# Patient Record
Sex: MALE | Race: BLACK OR AFRICAN AMERICAN | Employment: OTHER | URBAN - METROPOLITAN AREA
[De-identification: names, ages, dates, MRNs, and addresses within clinical notes are randomized per-mention and may not be internally consistent; named-entity substitution may affect disease eponyms.]

---

## 2018-01-10 PROCEDURE — 94762 N-INVAS EAR/PLS OXIMTRY CONT: CPT

## 2018-01-10 PROCEDURE — 51701 INSERT BLADDER CATHETER: CPT

## 2018-01-10 PROCEDURE — 96365 THER/PROPH/DIAG IV INF INIT: CPT

## 2018-01-10 PROCEDURE — 99285 EMERGENCY DEPT VISIT HI MDM: CPT

## 2018-01-10 PROCEDURE — 96361 HYDRATE IV INFUSION ADD-ON: CPT

## 2018-01-11 ENCOUNTER — APPOINTMENT (OUTPATIENT)
Dept: GENERAL RADIOLOGY | Age: 78
DRG: 193 | End: 2018-01-11
Attending: EMERGENCY MEDICINE
Payer: MEDICARE

## 2018-01-11 ENCOUNTER — APPOINTMENT (OUTPATIENT)
Dept: CT IMAGING | Age: 78
DRG: 193 | End: 2018-01-11
Attending: EMERGENCY MEDICINE
Payer: MEDICARE

## 2018-01-11 ENCOUNTER — HOSPITAL ENCOUNTER (INPATIENT)
Age: 78
LOS: 35 days | Discharge: HOME | DRG: 193 | End: 2018-02-15
Attending: EMERGENCY MEDICINE | Admitting: FAMILY MEDICINE
Payer: MEDICARE

## 2018-01-11 DIAGNOSIS — J96.01 ACUTE RESPIRATORY FAILURE WITH HYPOXIA (HCC): Primary | ICD-10-CM

## 2018-01-11 DIAGNOSIS — G47.00 INSOMNIA, UNSPECIFIED TYPE: Chronic | ICD-10-CM

## 2018-01-11 DIAGNOSIS — G89.29 CHRONIC LOW BACK PAIN, UNSPECIFIED BACK PAIN LATERALITY, WITH SCIATICA PRESENCE UNSPECIFIED: ICD-10-CM

## 2018-01-11 DIAGNOSIS — M48.061 SPINAL STENOSIS OF LUMBAR REGION, UNSPECIFIED WHETHER NEUROGENIC CLAUDICATION PRESENT: ICD-10-CM

## 2018-01-11 DIAGNOSIS — M54.5 CHRONIC LOW BACK PAIN, UNSPECIFIED BACK PAIN LATERALITY, WITH SCIATICA PRESENCE UNSPECIFIED: ICD-10-CM

## 2018-01-11 DIAGNOSIS — F41.9 ANXIETY: Chronic | ICD-10-CM

## 2018-01-11 DIAGNOSIS — J18.9 PNEUMONIA OF LEFT LOWER LOBE DUE TO INFECTIOUS ORGANISM: ICD-10-CM

## 2018-01-11 DIAGNOSIS — S32.019A CLOSED FRACTURE OF FIRST LUMBAR VERTEBRA, UNSPECIFIED FRACTURE MORPHOLOGY, INITIAL ENCOUNTER (HCC): ICD-10-CM

## 2018-01-11 PROBLEM — R41.82 ALTERED MENTAL STATUS: Status: ACTIVE | Noted: 2018-01-11

## 2018-01-11 PROBLEM — J44.9 COPD (CHRONIC OBSTRUCTIVE PULMONARY DISEASE) (HCC): Status: ACTIVE | Noted: 2018-01-11

## 2018-01-11 PROBLEM — M54.9 BACK PAIN: Status: ACTIVE | Noted: 2018-01-11

## 2018-01-11 LAB
ALBUMIN SERPL-MCNC: 3.4 G/DL (ref 3.5–5)
ALBUMIN/GLOB SERPL: 0.9 {RATIO} (ref 1.1–2.2)
ALP SERPL-CCNC: 68 U/L (ref 45–117)
ALT SERPL-CCNC: 38 U/L (ref 12–78)
AMMONIA PLAS-SCNC: 14 UMOL/L
AMORPH CRY URNS QL MICRO: ABNORMAL
AMPHET UR QL SCN: NEGATIVE
ANION GAP SERPL CALC-SCNC: 7 MMOL/L (ref 5–15)
APAP SERPL-MCNC: <2 UG/ML (ref 10–30)
APPEARANCE UR: ABNORMAL
APTT PPP: 49.7 SEC (ref 22.1–32.5)
ARTERIAL PATENCY WRIST A: ABNORMAL
AST SERPL-CCNC: 79 U/L (ref 15–37)
ATRIAL RATE: 76 BPM
BACTERIA URNS QL MICRO: NEGATIVE /HPF
BARBITURATES UR QL SCN: NEGATIVE
BASE EXCESS BLD CALC-SCNC: 1 MMOL/L
BASOPHILS # BLD: 0 K/UL (ref 0–0.1)
BASOPHILS NFR BLD: 0 % (ref 0–1)
BDY SITE: ABNORMAL
BENZODIAZ UR QL: POSITIVE
BILIRUB SERPL-MCNC: 0.9 MG/DL (ref 0.2–1)
BILIRUB UR QL: NEGATIVE
BUN SERPL-MCNC: 16 MG/DL (ref 6–20)
BUN/CREAT SERPL: 16 (ref 12–20)
CALCIUM SERPL-MCNC: 8.9 MG/DL (ref 8.5–10.1)
CALCULATED P AXIS, ECG09: 78 DEGREES
CALCULATED R AXIS, ECG10: 65 DEGREES
CALCULATED T AXIS, ECG11: 85 DEGREES
CANNABINOIDS UR QL SCN: NEGATIVE
CHLORIDE SERPL-SCNC: 100 MMOL/L (ref 97–108)
CK MB CFR SERPL CALC: 0.5 % (ref 0–2.5)
CK MB SERPL-MCNC: 9.2 NG/ML (ref 5–25)
CK SERPL-CCNC: 1926 U/L (ref 39–308)
CO2 SERPL-SCNC: 30 MMOL/L (ref 21–32)
COCAINE UR QL SCN: NEGATIVE
COLOR UR: ABNORMAL
CREAT SERPL-MCNC: 1.03 MG/DL (ref 0.7–1.3)
DIAGNOSIS, 93000: NORMAL
DIFFERENTIAL METHOD BLD: ABNORMAL
DRUG SCRN COMMENT,DRGCM: ABNORMAL
EOSINOPHIL # BLD: 0 K/UL (ref 0–0.4)
EOSINOPHIL NFR BLD: 0 % (ref 0–7)
EPITH CASTS URNS QL MICRO: ABNORMAL /LPF
ERYTHROCYTE [DISTWIDTH] IN BLOOD BY AUTOMATED COUNT: 12.6 % (ref 11.5–14.5)
ETHANOL SERPL-MCNC: <10 MG/DL
GAS FLOW.O2 O2 DELIVERY SYS: ABNORMAL L/MIN
GLOBULIN SER CALC-MCNC: 4 G/DL (ref 2–4)
GLUCOSE SERPL-MCNC: 101 MG/DL (ref 65–100)
GLUCOSE UR STRIP.AUTO-MCNC: NEGATIVE MG/DL
HCO3 BLD-SCNC: 26.1 MMOL/L (ref 22–26)
HCT VFR BLD AUTO: 35.9 % (ref 36.6–50.3)
HGB BLD-MCNC: 12.1 G/DL (ref 12.1–17)
HGB UR QL STRIP: NEGATIVE
KETONES UR QL STRIP.AUTO: NEGATIVE MG/DL
LACTATE BLD-SCNC: 1.1 MMOL/L (ref 0.4–2)
LEUKOCYTE ESTERASE UR QL STRIP.AUTO: NEGATIVE
LYMPHOCYTES # BLD: 0.7 K/UL (ref 0.8–3.5)
LYMPHOCYTES NFR BLD: 8 % (ref 12–49)
MAGNESIUM SERPL-MCNC: 2.1 MG/DL (ref 1.6–2.4)
MCH RBC QN AUTO: 33.9 PG (ref 26–34)
MCHC RBC AUTO-ENTMCNC: 33.7 G/DL (ref 30–36.5)
MCV RBC AUTO: 100.6 FL (ref 80–99)
METHADONE UR QL: NEGATIVE
MONOCYTES # BLD: 0.7 K/UL (ref 0–1)
MONOCYTES NFR BLD: 8 % (ref 5–13)
MUCOUS THREADS URNS QL MICRO: ABNORMAL /LPF
MYOGLOBIN UR QL: NEGATIVE
NEUTS SEG # BLD: 7.1 K/UL (ref 1.8–8)
NEUTS SEG NFR BLD: 84 % (ref 32–75)
NITRITE UR QL STRIP.AUTO: NEGATIVE
OPIATES UR QL: NEGATIVE
P-R INTERVAL, ECG05: 118 MS
PCO2 BLD: 46 MMHG (ref 35–45)
PCP UR QL: NEGATIVE
PH BLD: 7.36 [PH] (ref 7.35–7.45)
PH UR STRIP: 5 [PH] (ref 5–8)
PHOSPHATE SERPL-MCNC: 4.4 MG/DL (ref 2.6–4.7)
PLATELET # BLD AUTO: 172 K/UL (ref 150–400)
PO2 BLD: 57 MMHG (ref 80–100)
POTASSIUM SERPL-SCNC: 3.9 MMOL/L (ref 3.5–5.1)
PROT SERPL-MCNC: 7.4 G/DL (ref 6.4–8.2)
PROT UR STRIP-MCNC: 30 MG/DL
Q-T INTERVAL, ECG07: 408 MS
QRS DURATION, ECG06: 72 MS
QTC CALCULATION (BEZET), ECG08: 459 MS
RBC # BLD AUTO: 3.57 M/UL (ref 4.1–5.7)
RBC #/AREA URNS HPF: ABNORMAL /HPF (ref 0–5)
RBC MORPH BLD: ABNORMAL
RBC MORPH BLD: ABNORMAL
SALICYLATES SERPL-MCNC: <1.7 MG/DL (ref 2.8–20)
SAO2 % BLD: 88 % (ref 92–97)
SODIUM SERPL-SCNC: 137 MMOL/L (ref 136–145)
SP GR UR REFRACTOMETRY: 1.03 (ref 1–1.03)
SPECIMEN TYPE: ABNORMAL
THERAPEUTIC RANGE,PTTT: ABNORMAL SECS (ref 58–77)
TOTAL RESP. RATE, ITRR: 18
TROPONIN I SERPL-MCNC: <0.04 NG/ML
TSH SERPL DL<=0.05 MIU/L-ACNC: 1.43 UIU/ML (ref 0.36–3.74)
UROBILINOGEN UR QL STRIP.AUTO: 0.2 EU/DL (ref 0.2–1)
VENTRICULAR RATE, ECG03: 76 BPM
WBC # BLD AUTO: 8.5 K/UL (ref 4.1–11.1)
WBC URNS QL MICRO: ABNORMAL /HPF (ref 0–4)

## 2018-01-11 PROCEDURE — 82140 ASSAY OF AMMONIA: CPT | Performed by: EMERGENCY MEDICINE

## 2018-01-11 PROCEDURE — 97161 PT EVAL LOW COMPLEX 20 MIN: CPT

## 2018-01-11 PROCEDURE — 74011250636 HC RX REV CODE- 250/636: Performed by: FAMILY MEDICINE

## 2018-01-11 PROCEDURE — 93005 ELECTROCARDIOGRAM TRACING: CPT

## 2018-01-11 PROCEDURE — 85025 COMPLETE CBC W/AUTO DIFF WBC: CPT | Performed by: EMERGENCY MEDICINE

## 2018-01-11 PROCEDURE — 84443 ASSAY THYROID STIM HORMONE: CPT | Performed by: FAMILY MEDICINE

## 2018-01-11 PROCEDURE — 87086 URINE CULTURE/COLONY COUNT: CPT | Performed by: EMERGENCY MEDICINE

## 2018-01-11 PROCEDURE — 72131 CT LUMBAR SPINE W/O DYE: CPT

## 2018-01-11 PROCEDURE — 85730 THROMBOPLASTIN TIME PARTIAL: CPT | Performed by: EMERGENCY MEDICINE

## 2018-01-11 PROCEDURE — 97116 GAIT TRAINING THERAPY: CPT

## 2018-01-11 PROCEDURE — 71275 CT ANGIOGRAPHY CHEST: CPT

## 2018-01-11 PROCEDURE — 83735 ASSAY OF MAGNESIUM: CPT | Performed by: EMERGENCY MEDICINE

## 2018-01-11 PROCEDURE — 97530 THERAPEUTIC ACTIVITIES: CPT

## 2018-01-11 PROCEDURE — 74011250636 HC RX REV CODE- 250/636: Performed by: EMERGENCY MEDICINE

## 2018-01-11 PROCEDURE — 81001 URINALYSIS AUTO W/SCOPE: CPT | Performed by: EMERGENCY MEDICINE

## 2018-01-11 PROCEDURE — 80307 DRUG TEST PRSMV CHEM ANLYZR: CPT | Performed by: FAMILY MEDICINE

## 2018-01-11 PROCEDURE — 65270000032 HC RM SEMIPRIVATE

## 2018-01-11 PROCEDURE — 74011636320 HC RX REV CODE- 636/320: Performed by: EMERGENCY MEDICINE

## 2018-01-11 PROCEDURE — 74011250637 HC RX REV CODE- 250/637: Performed by: INTERNAL MEDICINE

## 2018-01-11 PROCEDURE — 71045 X-RAY EXAM CHEST 1 VIEW: CPT

## 2018-01-11 PROCEDURE — 83605 ASSAY OF LACTIC ACID: CPT

## 2018-01-11 PROCEDURE — 84100 ASSAY OF PHOSPHORUS: CPT | Performed by: EMERGENCY MEDICINE

## 2018-01-11 PROCEDURE — 81002 URINALYSIS NONAUTO W/O SCOPE: CPT | Performed by: FAMILY MEDICINE

## 2018-01-11 PROCEDURE — 74011000258 HC RX REV CODE- 258: Performed by: EMERGENCY MEDICINE

## 2018-01-11 PROCEDURE — G8978 MOBILITY CURRENT STATUS: HCPCS

## 2018-01-11 PROCEDURE — 36600 WITHDRAWAL OF ARTERIAL BLOOD: CPT

## 2018-01-11 PROCEDURE — 77030005563 HC CATH URETH INT MMGH -A

## 2018-01-11 PROCEDURE — 70450 CT HEAD/BRAIN W/O DYE: CPT

## 2018-01-11 PROCEDURE — 80307 DRUG TEST PRSMV CHEM ANLYZR: CPT | Performed by: EMERGENCY MEDICINE

## 2018-01-11 PROCEDURE — G8979 MOBILITY GOAL STATUS: HCPCS

## 2018-01-11 PROCEDURE — 82550 ASSAY OF CK (CPK): CPT | Performed by: EMERGENCY MEDICINE

## 2018-01-11 PROCEDURE — 87040 BLOOD CULTURE FOR BACTERIA: CPT | Performed by: EMERGENCY MEDICINE

## 2018-01-11 PROCEDURE — 80053 COMPREHEN METABOLIC PANEL: CPT | Performed by: EMERGENCY MEDICINE

## 2018-01-11 PROCEDURE — 36415 COLL VENOUS BLD VENIPUNCTURE: CPT | Performed by: EMERGENCY MEDICINE

## 2018-01-11 PROCEDURE — 82803 BLOOD GASES ANY COMBINATION: CPT

## 2018-01-11 PROCEDURE — 84484 ASSAY OF TROPONIN QUANT: CPT | Performed by: EMERGENCY MEDICINE

## 2018-01-11 RX ORDER — ACETAMINOPHEN 325 MG/1
650 TABLET ORAL
COMMUNITY
End: 2018-01-11

## 2018-01-11 RX ORDER — PAROXETINE 10 MG/1
10 TABLET, FILM COATED ORAL DAILY
COMMUNITY
End: 2018-01-11

## 2018-01-11 RX ORDER — TRAZODONE HYDROCHLORIDE 150 MG/1
150 TABLET ORAL
Status: ON HOLD | COMMUNITY
End: 2018-02-15

## 2018-01-11 RX ORDER — NALTREXONE HYDROCHLORIDE 50 MG/1
50 TABLET, FILM COATED ORAL 2 TIMES DAILY
COMMUNITY
End: 2018-01-11

## 2018-01-11 RX ORDER — FLUVOXAMINE MALEATE 100 MG/1
100 TABLET, COATED ORAL
COMMUNITY
End: 2018-01-11

## 2018-01-11 RX ORDER — LORAZEPAM 1 MG/1
1 TABLET ORAL
COMMUNITY
End: 2018-01-11

## 2018-01-11 RX ORDER — SODIUM CHLORIDE 0.9 % (FLUSH) 0.9 %
10 SYRINGE (ML) INJECTION
Status: COMPLETED | OUTPATIENT
Start: 2018-01-11 | End: 2018-01-11

## 2018-01-11 RX ORDER — OMEPRAZOLE 20 MG/1
40 CAPSULE, DELAYED RELEASE ORAL DAILY
COMMUNITY
End: 2018-01-11

## 2018-01-11 RX ORDER — MUPIROCIN 20 MG/G
OINTMENT TOPICAL 2 TIMES DAILY
COMMUNITY
End: 2018-01-11

## 2018-01-11 RX ORDER — HYDROXYZINE PAMOATE 50 MG/1
25 CAPSULE ORAL 4 TIMES DAILY
COMMUNITY
End: 2018-01-11

## 2018-01-11 RX ORDER — THERA TABS 400 MCG
1 TAB ORAL DAILY
COMMUNITY
End: 2018-01-11

## 2018-01-11 RX ORDER — IBUPROFEN 600 MG/1
600 TABLET ORAL 3 TIMES DAILY
Status: COMPLETED | OUTPATIENT
Start: 2018-01-11 | End: 2018-01-11

## 2018-01-11 RX ORDER — TEMAZEPAM 30 MG/1
30 CAPSULE ORAL
Status: ON HOLD | COMMUNITY
End: 2018-02-15

## 2018-01-11 RX ORDER — SODIUM CHLORIDE 0.9 % (FLUSH) 0.9 %
5-10 SYRINGE (ML) INJECTION AS NEEDED
Status: DISCONTINUED | OUTPATIENT
Start: 2018-01-11 | End: 2018-02-15 | Stop reason: HOSPADM

## 2018-01-11 RX ORDER — LORATADINE 10 MG/1
10 TABLET ORAL DAILY
COMMUNITY
End: 2018-01-11

## 2018-01-11 RX ORDER — ASPIRIN 81 MG/1
81 TABLET ORAL DAILY
COMMUNITY
End: 2018-01-11

## 2018-01-11 RX ORDER — TRAMADOL HYDROCHLORIDE 50 MG/1
50 TABLET ORAL
Status: ON HOLD | COMMUNITY
End: 2018-02-15

## 2018-01-11 RX ORDER — HYDROCORTISONE 25 MG/G
CREAM TOPICAL 4 TIMES DAILY
COMMUNITY
End: 2018-01-11

## 2018-01-11 RX ORDER — SODIUM CHLORIDE 9 MG/ML
50 INJECTION, SOLUTION INTRAVENOUS CONTINUOUS
Status: DISCONTINUED | OUTPATIENT
Start: 2018-01-11 | End: 2018-01-15

## 2018-01-11 RX ORDER — SODIUM CHLORIDE 0.9 % (FLUSH) 0.9 %
5-10 SYRINGE (ML) INJECTION EVERY 8 HOURS
Status: DISCONTINUED | OUTPATIENT
Start: 2018-01-11 | End: 2018-02-15 | Stop reason: HOSPADM

## 2018-01-11 RX ORDER — RISPERIDONE 2 MG/1
2 TABLET, FILM COATED ORAL
Status: ON HOLD | COMMUNITY
End: 2018-02-15

## 2018-01-11 RX ORDER — OXYBUTYNIN CHLORIDE 5 MG/1
5 TABLET ORAL 2 TIMES DAILY
COMMUNITY
End: 2018-01-11

## 2018-01-11 RX ORDER — LEVOFLOXACIN 5 MG/ML
750 INJECTION, SOLUTION INTRAVENOUS ONCE
Status: COMPLETED | OUTPATIENT
Start: 2018-01-11 | End: 2018-01-11

## 2018-01-11 RX ORDER — LEVOFLOXACIN 5 MG/ML
750 INJECTION, SOLUTION INTRAVENOUS EVERY 24 HOURS
Status: DISCONTINUED | OUTPATIENT
Start: 2018-01-12 | End: 2018-01-12

## 2018-01-11 RX ADMIN — LEVOFLOXACIN 750 MG: 5 INJECTION, SOLUTION INTRAVENOUS at 04:19

## 2018-01-11 RX ADMIN — IBUPROFEN 600 MG: 600 TABLET, FILM COATED ORAL at 08:48

## 2018-01-11 RX ADMIN — PIPERACILLIN AND TAZOBACTAM 10.12 G: 3; .375 INJECTION, POWDER, FOR SOLUTION INTRAVENOUS at 06:09

## 2018-01-11 RX ADMIN — Medication 10 ML: at 03:04

## 2018-01-11 RX ADMIN — SODIUM CHLORIDE 125 ML/HR: 900 INJECTION, SOLUTION INTRAVENOUS at 19:00

## 2018-01-11 RX ADMIN — Medication 10 ML: at 06:11

## 2018-01-11 RX ADMIN — IBUPROFEN 600 MG: 600 TABLET, FILM COATED ORAL at 16:11

## 2018-01-11 RX ADMIN — SODIUM CHLORIDE 100 ML: 900 INJECTION, SOLUTION INTRAVENOUS at 03:04

## 2018-01-11 RX ADMIN — IBUPROFEN 600 MG: 600 TABLET, FILM COATED ORAL at 21:01

## 2018-01-11 RX ADMIN — SODIUM CHLORIDE 1000 ML: 900 INJECTION, SOLUTION INTRAVENOUS at 01:00

## 2018-01-11 RX ADMIN — PIPERACILLIN SODIUM AND TAZOBACTAM SODIUM 3.38 G: .375; 3 INJECTION, POWDER, LYOPHILIZED, FOR SOLUTION INTRAVENOUS at 06:09

## 2018-01-11 RX ADMIN — IOPAMIDOL 70 ML: 755 INJECTION, SOLUTION INTRAVENOUS at 03:04

## 2018-01-11 RX ADMIN — Medication 10 ML: at 21:01

## 2018-01-11 RX ADMIN — SODIUM CHLORIDE 125 ML/HR: 900 INJECTION, SOLUTION INTRAVENOUS at 06:57

## 2018-01-11 NOTE — PROGRESS NOTES
Problem: Mobility Impaired (Adult and Pediatric)  Goal: *Acute Goals and Plan of Care (Insert Text)  Physical Therapy Goals  Initiated 1/11/2018  1. Patient will move from supine to sit and sit to supine  in bed with supervision/set-up within 7 day(s). 2.  Patient will transfer from bed to chair and chair to bed with supervision/set-up using the least restrictive device within 7 day(s). 3.  Patient will perform sit to stand with supervision/set-up within 7 day(s). 4.  Patient will ambulate with supervision/set-up for 150 feet with the least restrictive device within 7 day(s). 5.  Patient will ascend/descend 12 stairs with 1 handrail(s) with supervision/set-up within 7 day(s). 6.  Patient will independently don and doff quick draw TLSO within 7 days. physical Therapy EVALUATION  Patient: Megha Feliciano (15 y.o. male)  Date: 1/11/2018  Primary Diagnosis: Pneumonia  Altered mental status  Closed L1 vertebral fracture (HCC)  Back pain  Acute respiratory failure with hypoxemia (HCC)  COPD (chronic obstructive pulmonary disease) (HCC)  Lumbar spinal stenosis        Precautions:   [place] draw TLSO for OOB, WBAT    ASSESSMENT :  Chart reviewed. Patient presents from group home with AMS which was much worse last night than this morning per nursing report. Patient c/o of LBP and imaging revealing acute L1 compression fracture. Incidentally PNA was found as well. Based on the objective data described below, the patient presents with minimal LBP, decreased independence with bed mobility, and decreased activity tolerance. Patient found on 2L O2 at 93%. O2 doffed to assess as patient does not wear O2 at home. Patient educated on log rolling technique, needing mod assist to come to sitting. Pt c/o of 5/10 pain with task, but quickly relieving once sitting. On EOB, educated patient on how to don new quick draw TLSO.  Patient has good ROM to complete the task, but will most likely need to practice to become independent with the task. Patient standing without device. Pt amb x 15-20 feet, SBA without device. Narrow OMER. Slow but steady gait pattern. No pain reported. Patient from group home and will need to be able to get out of bed, don brace, and perform stairs without physical assist. Recommend home with HHPT, pending progress with acute care PT. Nursing notified. Plan for patient to transfer to floor soon. Patient will benefit from skilled intervention to address the above impairments. Patients rehabilitation potential is considered to be Good  Factors which may influence rehabilitation potential include:   []         None noted  [x]         Mental ability/status  []         Medical condition  []         Home/family situation and support systems  []         Safety awareness  []         Pain tolerance/management  []         Other:      PLAN :  Recommendations and Planned Interventions:  [x]           Bed Mobility Training             []    Neuromuscular Re-Education  [x]           Transfer Training                   []    Orthotic/Prosthetic Training  [x]           Gait Training                         []    Modalities  [x]           Therapeutic Exercises           []    Edema Management/Control  [x]           Therapeutic Activities            []    Patient and Family Training/Education  []           Other (comment):    Frequency/Duration: Patient will be followed by physical therapy  5 times a week to address goals. Discharge Recommendations: Home Health, pending progress with acute care PT  Further Equipment Recommendations for Discharge: Quick draw TLSO     SUBJECTIVE:   Patient stated I just need my crackers ordered.     OBJECTIVE DATA SUMMARY:   HISTORY:    Past Medical History:   Diagnosis Date    Sleep disorder     insomnia   History reviewed. No pertinent surgical history. Prior Level of Function/Home Situation: Patient lives in a group home.  Pt is a poor historian, intermittently responding to questions, though able to follow commands. Patient reports that staff is able to help as often as he needs it, and someone is there 24 hours/ day. Previously ambulating independently without device. Personal factors and/or comorbidities impacting plan of care:     Home Situation  Home Environment: [facility] Name: [facility]  One/Two Story Residence: Two story  # of Interior Steps: 12  Interior Rails: Both  Lift Chair Available: No  Living Alone: No  Support Systems: Home care staff  Patient Expects to be Discharged to[de-identified] Group home  Current DME Used/Available at Home: None    EXAMINATION/PRESENTATION/DECISION MAKING:   Critical Behavior:  Neurologic State: Drowsy, Lethargic, Confused  Orientation Level: Disoriented to place, Disoriented to time     Hearing: Auditory  Auditory Impairment: Hard of hearing, bilateral     Strength:    Strength:  Within functional limits      Tone & Sensation:   Tone: Normal      Coordination:  Coordination: Within functional limits    Functional Mobility:  Bed Mobility:   Supine to Sit: Moderate assistance;Assist x1  Sit to Supine: Minimum assistance;Assist x1  Transfers:  Sit to Stand: Stand-by asssistance  Stand to Sit: Stand-by asssistance  Bed to Chair: Stand-by asssistance  Balance:   Sitting: Intact  Standing: Impaired  Standing - Static: Good  Standing - Dynamic : Fair  Ambulation/Gait Training:  Distance (ft): 15 Feet (ft)  Assistive Device: Gait belt  Ambulation - Level of Assistance: Contact guard assistance  Gait Abnormalities: Decreased step clearance  Base of Support: Narrowed   Speed/Courtney: Slow  Step Length: Right shortened;Left shortened    Functional Measure:  Tinetti test:    Sitting Balance: 1  Arises: 1  Attempts to Rise: 1  Immediate Standing Balance: 2  Standing Balance: 2  Nudged: 1  Eyes Closed: 1  Turn 360 Degrees - Continuous/Discontinuous: 0  Turn 360 Degrees - Steady/Unsteady: 1  Sitting Down: 1  Balance Score: 11  Indication of Gait: 1  R Step Length/Height: 0  L Step Length/Height: 0  R Foot Clearance: 1  L Foot Clearance: 1  Step Symmetry: 1  Step Continuity: 1  Path: 1  Trunk: 1  Walking Time: 1  Gait Score: 8  Total Score: 19       Tinetti Test and G-code impairment scale:  Percentage of Impairment CH    0%   CI    1-19% CJ    20-39% CK    40-59% CL    60-79% CM    80-99% CN     100%   Tinetti  Score 0-28 28 23-27 17-22 12-16 6-11 1-5 0       Tinetti Tool Score Risk of Falls  <19 = High Fall Risk  19-24 = Moderate Fall Risk  25-28 = Low Fall Risk  Tinetti ME. Performance-Oriented Assessment of Mobility Problems in Elderly Patients. Wiseman 66; [id number]. (Scoring Description: PT Bulletin Feb. 10, 1993)    Older adults: Alex Gomez et al, 2009; n = 1000 [facility] elderly evaluated with ABC, IZA, ADL, and IADL)  · Mean IZA score for males aged 69-68 years = 26.21(3.40)  · Mean IZA score for females age 69-68 years = 25.16(4.30)  · Mean IZA score for males over 80 years = 23.29(6.02)  · Mean IZA score for females over 80 years = 17.20(8.32)       G codes: In compliance with CMSs Claims Based Outcome Reporting, the following G-code set was chosen for this patient based on their primary functional limitation being treated: The outcome measure chosen to determine the severity of the functional limitation was the Tinetti with a score of 19/28 which was correlated with the impairment scale. ? Mobility - Walking and Moving Around:     - CURRENT STATUS: CJ - 20%-39% impaired, limited or restricted    - GOAL STATUS: CI - 1%-19% impaired, limited or restricted    - D/C STATUS:  ---------------To be determined---------------        Pain:   Complaints of LBP with bed mobility. Once up with brace on for support, no back back pain reported. Activity Tolerance:   Decreased. O2 down to 90% on RA with minimal activity. Systolic BP up to 528 with activity. Please refer to the flowsheet for vital signs taken during this treatment.   After treatment:   []         Patient left in no apparent distress sitting up in chair  [x]         Patient left in no apparent distress in bed  [x]         Call bell left within reach  [x]         Nursing notified  []         Caregiver present  []         Bed alarm activated    COMMUNICATION/EDUCATION:   The patients plan of care was discussed with: Registered Nurse. [x]         Fall prevention education was provided and the patient/caregiver indicated understanding. [x]         Patient/family have participated as able in goal setting and plan of care. [x]         Patient/family agree to work toward stated goals and plan of care. []         Patient understands intent and goals of therapy, but is neutral about his/her participation. []         Patient is unable to participate in goal setting and plan of care.     Thank you for this referral.  Yamilet Saavedra PT, DPT   Time Calculation: 23 mins

## 2018-01-11 NOTE — CONSULTS
[address]    Nigel Newberry  MR#: [id number]  : 1940  ACCOUNT #: [de-identified]   DATE OF SERVICE: 2018    CHIEF COMPLAINT:  Back pain. HISTORY OF PRESENT ILLNESS:  The patient is a pleasant 53-year-old male who fell in the nursing home that he lives in and had back pain. He tells me that he fell out of his bed and he is oriented to self at this time, which sounds like his normal baseline. He is not complaining of any other pain and this occurred yesterday. PAST MEDICAL HISTORY:  Currently unknown due to his confusion. PAST SURGICAL HISTORY:  Not known. FAMILY HISTORY:  Noncontributory. SOCIAL HISTORY:  He lives in a nursing home. REVIEW OF SYSTEMS:  Negative for any other GI, , endocrine, vascular, musculoskeletal, dermatologic or endocrine complaints from the patient's standpoint. PHYSICAL EXAMINATION:  GENERAL:  He is a well appearing, no apparent distress. He is alert to himself. VITAL SIGNS:  Recorded with blood pressure 150s over 70s, pulse 70, respirations 16. EXTREMITIES:  His upper extremities seem atraumatic, intact C5-T1 without motor or sensory deficit. Lower extremities seem intact without deficit at L2-S1 without motor or sensory deficit. His upper and lower extremities have no deformity on examination today. ABDOMEN:  Seems soft. SKIN:  Warm, dry, intact. DATA:  His CT scan shows L1 compression fracture. ASSESSMENT:  L1 compression fracture. PLAN:  At this time, I do think he needs admission for his medical issues, which I am not sure what his current baseline is. For his back, we will get him into a TLSO which he can wear for about 8 weeks. I will follow him up in 3-4 weeks in the clinic and addressed further surveillance x-rays. At this time, he is weightbearing as tolerated with his brace and we will get the TLSO ordered.       MD KAYY Henry / MADDIE  D: 2018 10:49     T: 01/11/2018 11:26  JOB #: [id number]

## 2018-01-11 NOTE — PROGRESS NOTES
TRANSFER - OUT REPORT:    Verbal report given to JASMYN Webb(name) on Katey Cramer  being transferred to [facility](unit) for routine progression of care       Report consisted of patients Situation, Background, Assessment and   Recommendations(SBAR). Information from the following report(s) SBAR, Kardex, ED Summary and Recent Results was reviewed with the receiving nurse. Lines:   Peripheral IV 01/11/18 Right Antecubital (Active)       Peripheral IV 01/11/18 Left Arm (Active)   Site Assessment Clean, dry, & intact 1/11/2018  1:30 AM   Phlebitis Assessment 0 1/11/2018  1:30 AM   Infiltration Assessment 0 1/11/2018  1:30 AM   Dressing Status Clean, dry, & intact 1/11/2018  1:30 AM   Dressing Type Tape;Topical skin adhesive;Transparent 1/11/2018  1:30 AM   Hub Color/Line Status Green;Flushed;Patent 1/11/2018  1:30 AM   Action Taken Blood drawn 1/11/2018  1:30 AM       Peripheral IV 01/11/18 Left Arm (Active)   Site Assessment Clean, dry, & intact 1/11/2018  1:15 AM   Phlebitis Assessment 0 1/11/2018  1:15 AM   Infiltration Assessment 0 1/11/2018  1:15 AM   Dressing Status Clean, dry, & intact 1/11/2018  1:15 AM   Dressing Type Tape;Topical skin adhesive;Transparent 1/11/2018  1:15 AM   Hub Color/Line Status Green;Flushed;Patent 1/11/2018  1:15 AM        Opportunity for questions and clarification was provided.       Patient transported with:   [employer]

## 2018-01-11 NOTE — ED TRIAGE NOTES
Patient arrives via EMS from [facility] EMS was called because patient was confused and slow to respond, which is not baseline. Additionally patient has been having difficulty urinating and endorses back pain.

## 2018-01-11 NOTE — ED NOTES
Patient arrived from EMS and immediately taken to decon shower. Assessment started at that time. Patient was able to tell me his full name, the correct month and year, and that he is at [facility]. He is unsure of why he was brought here. Remainder of assessment will be completed once decon complete.

## 2018-01-11 NOTE — ED NOTES
Reassessed patient at this time. He is now oriented again and asking for something to eat. He denies any pain or discomfort at this time. Will continue to assess.

## 2018-01-11 NOTE — ED NOTES
Patient reassessed at this time. Patient reports severe back pain, for which he states he takes ibuprofen at home. He is also requesting food. Patient has no orders for pain medication and is NPO. Will page provider to address these issues for patient at this time. Additionally, patient is now oriented x 4, and able to carry on a full conversation without difficulty. Appears his confusion has resolved at this time. CM x 2. Call bell within reach of patient at this time.

## 2018-01-11 NOTE — ED NOTES
Patient is resting well in his bed. No apparent distress at this time. CM x 2. Call bell within reach of patient at this time.

## 2018-01-11 NOTE — PROGRESS NOTES
Primary Nurse Jennifer Avina and Alger Runner, RN performed a dual skin assessment on this patient Impairment noted- see wound doc flow sheet - left hip abrasion  Renato score is 19

## 2018-01-11 NOTE — PROGRESS NOTES
Bedside and Verbal shift change report given to Sandra Salter (oncoming nurse) by Jorge Norton RN (offgoing nurse). Report included the following information SBAR, Kardex, Intake/Output, MAR, Accordion, Recent Results and Med Rec Status.

## 2018-01-11 NOTE — PROGRESS NOTES
Patient was admitted earlier today by my colleague. Seen and examined by me, chart reviewed. Visit Vitals    /69    Pulse 60    Temp 97.8 °F (36.6 °C)    Resp 16    Ht 5' 9\" (1.753 m)    Wt 79.4 kg (175 lb)    SpO2 98%    BMI 25.84 kg/m2       Plan  1. As previously outlined  2.  D/c tele, transfer to medical

## 2018-01-11 NOTE — ED NOTES
Reassessed patient at this time. Patient continues to report severe back pain and requesting breakfast. MD has not yet responded to page regarding these issues. Patient was offered ice packs and repositioning at this time, which he refused. CM x 2. Call bell within reach of patient.

## 2018-01-11 NOTE — ED PROVIDER NOTES
HPI Comments: 78 y.o. male with past medical history significant for Insomnia who presents from [facility] via EMS for evaluation of Altered Mental Status. Per EMS, pt with confusion tonight. Typically he is oriented but en route, pt only oriented to self and place. While in ED, pt c/o back since yesterday. He denies headache, chest pain, abdominal pain, arm pain, leg pain or swelling. Full history, physical exam, and ROS unable to be obtained due to:  Mental status change, limited historian. PCP: Bill Cheng MD    Note written by Symone Villa, as dictated by Anali Marcus MD 12:56 AM    The history is provided by the patient. No  was used. Past Medical History:   Diagnosis Date    Sleep disorder     insomnia       History reviewed. No pertinent surgical history. Family History:   Problem Relation Age of Onset    Family history unknown: Yes       Social History     Social History    Marital status:      Spouse name: N/A    Number of children: N/A    Years of education: N/A     Occupational History    Not on file. Social History Main Topics    Smoking status: Unknown If Ever Smoked    Smokeless tobacco: Never Used    Alcohol use No    Drug use: No    Sexual activity: Not on file     Other Topics Concern    Not on file     Social History Narrative    No narrative on file         ALLERGIES: Review of patient's allergies indicates no known allergies. Review of Systems   Unable to perform ROS: Mental status change       Vitals:    01/11/18 0029   BP: 152/71   Pulse: 70   Resp: 16   Temp: 97.8 °F (36.6 °C)   SpO2: 97%   Weight: 79.4 kg (175 lb)   Height: 5' 9\" (1.753 m)            Physical Exam   Nursing note and vitals reviewed. CONSTITUTIONAL: Well-appearing; well-nourished; in no apparent distress  HEAD: Normocephalic; atraumatic  EYES: PERRL; EOM intact; conjunctiva and sclera are clear bilaterally.   ENT: No rhinorrhea; normal pharynx with no tonsillar hypertrophy; mucous membranes pink/moist, no erythema, no exudate. NECK: Supple; non-tender; no cervical lymphadenopathy  CARD: Normal S1, S2; no murmurs, rubs, or gallops. Regular rate and rhythm. RESP: Normal respiratory effort; breath sounds clear and equal bilaterally; no wheezes, rhonchi, or rales. ABD: Normal bowel sounds; non-distended; non-tender; no palpable organomegaly, no masses, no bruits. Back Exam: Normal inspection; no vertebral point tenderness, no CVA tenderness. Normal range of motion. EXT: Normal ROM in all four extremities; non-tender to palpation; no swelling or deformity; distal pulses are normal, no edema. SKIN: Warm; dry; no rash. NEURO:Alert and oriented x 3, coherent, JOSE ANGEL-XII grossly intact, sensory and motor are non-focal.      MDM  Number of Diagnoses or Management Options  Acute respiratory failure with hypoxia [facility]):   Pneumonia of left lower lobe due to infectious organism [facility]):   Diagnosis management comments: Assessment: 2-year-old male, who presents with altered mental described as lethargy and confusion. the patient had just received his night meds. . History  was difficult to obtain due to the patient's current condition and inability to contact residential home where the patient resides. Differential diagnoses consist of CVA, sepsis, ACS, electrolyte abnormality, metabolic encephalopathy, and tox. Plan:  CT scan of the head/ EKG/ chest x-ray/ lab/ IV fluid/ antibiotics/ consult. Adult hospitalist/ Monitor and Reevaluate.          Amount and/or Complexity of Data Reviewed  Clinical lab tests: ordered and reviewed  Tests in the radiology section of CPT®: ordered and reviewed  Tests in the medicine section of CPT®: reviewed and ordered  Discussion of test results with the performing providers: yes  Decide to obtain previous medical records or to obtain history from someone other than the patient: yes  Obtain history from someone other than the patient: yes  Review and summarize past medical records: yes  Discuss the patient with other providers: yes  Independent visualization of images, tracings, or specimens: yes    Risk of Complications, Morbidity, and/or Mortality  Presenting problems: moderate  Diagnostic procedures: moderate  Management options: moderate    Critical Care  Total time providing critical care: (Total critical care time spent exclusive of procedures: 45 minutes)    Patient Progress  Patient progress: stable    ED Course       Procedures     ED EKG interpretation:  Rhythm: normal sinus rhythm; and regular . Rate (approx.): 76; Axis: normal; P wave: normal; QRS interval: normal ; ST/T wave: normal; in  Lead: Difficusely; Other findings: borderline ekg. This EKG was interpreted by Susan Arteaga MD,ED Provider. XRAY INTERPRETATION (ED MD)  Chest Xray  Left lower lobe pneumonia. Normal heart size. No bony abnormalities. Susan Arteaga MD 01:40 AM    PROGRESS NOTE:  Pt has been reexamined by Susan Arteaga MD all available results have been reviewed with pt and any available family. Pt understands sx, dx, and tx in ED. Care plan has been outlined and questions have been answered. Pt and any available family understands and agrees to need for admission to hospital for further tx not available in ED. Pt is ready for admission. Written by Susan Arteaga MD,  3:40 AM    CONSULT NOTE:  Susan Arteaga MD spoke with Dr. Luma Muniz of the adult hospitalist team. Discussed patient's presentation, history, physical assessment, and available diagnostic results.  He will evaluate, write orders and admit the patient to the hospital. 3:41 AM    .

## 2018-01-11 NOTE — ED NOTES
Spoke with Seun Oliveros from the [facility] who is a staff member and confirmed staff is there 24/7 and will be available to answer door when patient is d/c.

## 2018-01-11 NOTE — ED NOTES
Patient placed in bed and immediately fell asleep. He wakes up with a gentle sternal rub. At this time he is oriented to himself only, stating that his is at his nursing home and the year is 0. Patient unable to stay awake for full interview. CM x 3. Call bell within reach of patient.

## 2018-01-11 NOTE — ED NOTES
Attempted to call [facility] to get some information about the patient. There is no answer and no voicemail box. Will continue to attempt to call.

## 2018-01-11 NOTE — PROGRESS NOTES
IP Orthostist consulted for TLSO administration. Order stating \"TLSO physical therapy may provide if not [facility]\". Patient's waist measured. Medium quick draw TLSO left in patient's ED room 10 for use as indicated by physician. Please consult inpatient PT for full evaluation as appropriate. Thanks!     Yamilet Gabriel PT, DPT  15 minutes

## 2018-01-11 NOTE — ED NOTES
Bedside shift report given to Betzaida Vicente RN. All of her questions were answered to her satisfaction.

## 2018-01-11 NOTE — PROGRESS NOTES
Admission Medication Reconciliation:    Information obtained from: Medication list and RN Ozella Paget from [facility]    Significant PMH/Disease States:   Past Medical History:   Diagnosis Date    Sleep disorder     insomnia       Chief Complaint for this Admission:  AMS    Allergies:  Shellfish derived    Prior to Admission Medications:   Prior to Admission Medications   Prescriptions Last Dose Informant Patient Reported? Taking? risperiDONE (RISPERDAL) 2 mg tablet   Yes Yes   Sig: Take 2 mg by mouth nightly. temazepam (RESTORIL) 30 mg capsule   Yes Yes   Sig: Take 30 mg by mouth nightly. traMADol (ULTRAM) 50 mg tablet   Yes Yes   Sig: Take 50 mg by mouth every eight (8) hours as needed for Pain. traZODone (DESYREL) 150 mg tablet   Yes Yes   Sig: Take 150 mg by mouth nightly. Facility-Administered Medications: None         Comments/Recommendations: Another patient's medication list was scanned into this patient's chart and many of those medications had been entered into PTA list. Removed those medications and added tramadol.

## 2018-01-12 LAB
ANION GAP SERPL CALC-SCNC: 4 MMOL/L (ref 5–15)
BACTERIA SPEC CULT: NORMAL
BASOPHILS # BLD: 0 K/UL (ref 0–0.1)
BASOPHILS NFR BLD: 0 % (ref 0–1)
BUN SERPL-MCNC: 11 MG/DL (ref 6–20)
BUN/CREAT SERPL: 14 (ref 12–20)
CALCIUM SERPL-MCNC: 7.9 MG/DL (ref 8.5–10.1)
CC UR VC: NORMAL
CHLORIDE SERPL-SCNC: 109 MMOL/L (ref 97–108)
CO2 SERPL-SCNC: 28 MMOL/L (ref 21–32)
CREAT SERPL-MCNC: 0.79 MG/DL (ref 0.7–1.3)
EOSINOPHIL # BLD: 0 K/UL (ref 0–0.4)
EOSINOPHIL NFR BLD: 1 % (ref 0–7)
ERYTHROCYTE [DISTWIDTH] IN BLOOD BY AUTOMATED COUNT: 12.8 % (ref 11.5–14.5)
GLUCOSE BLD STRIP.AUTO-MCNC: 138 MG/DL (ref 65–100)
GLUCOSE BLD STRIP.AUTO-MCNC: 97 MG/DL (ref 65–100)
GLUCOSE SERPL-MCNC: 119 MG/DL (ref 65–100)
HCT VFR BLD AUTO: 31.2 % (ref 36.6–50.3)
HGB BLD-MCNC: 10.3 G/DL (ref 12.1–17)
LYMPHOCYTES # BLD: 0.9 K/UL (ref 0.8–3.5)
LYMPHOCYTES NFR BLD: 14 % (ref 12–49)
MCH RBC QN AUTO: 33.3 PG (ref 26–34)
MCHC RBC AUTO-ENTMCNC: 33 G/DL (ref 30–36.5)
MCV RBC AUTO: 101 FL (ref 80–99)
MONOCYTES # BLD: 0.7 K/UL (ref 0–1)
MONOCYTES NFR BLD: 12 % (ref 5–13)
NEUTS SEG # BLD: 4.3 K/UL (ref 1.8–8)
NEUTS SEG NFR BLD: 73 % (ref 32–75)
PLATELET # BLD AUTO: 143 K/UL (ref 150–400)
POTASSIUM SERPL-SCNC: 3.9 MMOL/L (ref 3.5–5.1)
RBC # BLD AUTO: 3.09 M/UL (ref 4.1–5.7)
SERVICE CMNT-IMP: ABNORMAL
SERVICE CMNT-IMP: NORMAL
SERVICE CMNT-IMP: NORMAL
SODIUM SERPL-SCNC: 141 MMOL/L (ref 136–145)
WBC # BLD AUTO: 5.9 K/UL (ref 4.1–11.1)

## 2018-01-12 PROCEDURE — 74011250636 HC RX REV CODE- 250/636: Performed by: FAMILY MEDICINE

## 2018-01-12 PROCEDURE — 80048 BASIC METABOLIC PNL TOTAL CA: CPT | Performed by: FAMILY MEDICINE

## 2018-01-12 PROCEDURE — 74011250637 HC RX REV CODE- 250/637: Performed by: INTERNAL MEDICINE

## 2018-01-12 PROCEDURE — 74011250636 HC RX REV CODE- 250/636: Performed by: INTERNAL MEDICINE

## 2018-01-12 PROCEDURE — 36415 COLL VENOUS BLD VENIPUNCTURE: CPT | Performed by: FAMILY MEDICINE

## 2018-01-12 PROCEDURE — 65270000032 HC RM SEMIPRIVATE

## 2018-01-12 PROCEDURE — 82962 GLUCOSE BLOOD TEST: CPT

## 2018-01-12 PROCEDURE — 97116 GAIT TRAINING THERAPY: CPT

## 2018-01-12 PROCEDURE — 74011250636 HC RX REV CODE- 250/636: Performed by: EMERGENCY MEDICINE

## 2018-01-12 PROCEDURE — 85025 COMPLETE CBC W/AUTO DIFF WBC: CPT | Performed by: FAMILY MEDICINE

## 2018-01-12 RX ORDER — RISPERIDONE 1 MG/1
2 TABLET, FILM COATED ORAL
Status: DISCONTINUED | OUTPATIENT
Start: 2018-01-12 | End: 2018-02-15 | Stop reason: HOSPADM

## 2018-01-12 RX ORDER — KETOROLAC TROMETHAMINE 30 MG/ML
15 INJECTION, SOLUTION INTRAMUSCULAR; INTRAVENOUS
Status: COMPLETED | OUTPATIENT
Start: 2018-01-12 | End: 2018-01-12

## 2018-01-12 RX ORDER — LEVOFLOXACIN 5 MG/ML
500 INJECTION, SOLUTION INTRAVENOUS EVERY 24 HOURS
Status: DISCONTINUED | OUTPATIENT
Start: 2018-01-13 | End: 2018-01-14

## 2018-01-12 RX ORDER — OXYCODONE AND ACETAMINOPHEN 5; 325 MG/1; MG/1
1 TABLET ORAL
Status: DISCONTINUED | OUTPATIENT
Start: 2018-01-12 | End: 2018-02-15 | Stop reason: HOSPADM

## 2018-01-12 RX ORDER — TRAMADOL HYDROCHLORIDE 50 MG/1
50 TABLET ORAL
Status: DISCONTINUED | OUTPATIENT
Start: 2018-01-12 | End: 2018-02-15 | Stop reason: HOSPADM

## 2018-01-12 RX ORDER — OXYCODONE AND ACETAMINOPHEN 5; 325 MG/1; MG/1
1 TABLET ORAL
Status: DISCONTINUED | OUTPATIENT
Start: 2018-01-12 | End: 2018-01-12

## 2018-01-12 RX ORDER — TEMAZEPAM 30 MG/1
30 CAPSULE ORAL
Status: DISCONTINUED | OUTPATIENT
Start: 2018-01-12 | End: 2018-02-15 | Stop reason: HOSPADM

## 2018-01-12 RX ADMIN — SODIUM CHLORIDE 125 ML/HR: 900 INJECTION, SOLUTION INTRAVENOUS at 01:30

## 2018-01-12 RX ADMIN — TRAZODONE HYDROCHLORIDE 150 MG: 100 TABLET ORAL at 21:53

## 2018-01-12 RX ADMIN — SODIUM CHLORIDE 100 ML/HR: 900 INJECTION, SOLUTION INTRAVENOUS at 21:58

## 2018-01-12 RX ADMIN — SODIUM CHLORIDE 100 ML/HR: 900 INJECTION, SOLUTION INTRAVENOUS at 11:50

## 2018-01-12 RX ADMIN — KETOROLAC TROMETHAMINE 15 MG: 30 INJECTION, SOLUTION INTRAMUSCULAR at 06:05

## 2018-01-12 RX ADMIN — OXYCODONE AND ACETAMINOPHEN 1 TABLET: 5; 325 TABLET ORAL at 18:05

## 2018-01-12 RX ADMIN — RISPERIDONE 2 MG: 1 TABLET ORAL at 21:53

## 2018-01-12 RX ADMIN — TRAMADOL HYDROCHLORIDE 50 MG: 50 TABLET, FILM COATED ORAL at 20:25

## 2018-01-12 RX ADMIN — PIPERACILLIN AND TAZOBACTAM 10.12 G: 3; .375 INJECTION, POWDER, FOR SOLUTION INTRAVENOUS at 06:09

## 2018-01-12 RX ADMIN — Medication 10 ML: at 21:54

## 2018-01-12 RX ADMIN — LEVOFLOXACIN 750 MG: 5 INJECTION, SOLUTION INTRAVENOUS at 03:40

## 2018-01-12 RX ADMIN — OXYCODONE HYDROCHLORIDE AND ACETAMINOPHEN 1 TABLET: 5; 325 TABLET ORAL at 09:12

## 2018-01-12 RX ADMIN — Medication 10 ML: at 18:05

## 2018-01-12 RX ADMIN — TEMAZEPAM 30 MG: 15 CAPSULE ORAL at 21:53

## 2018-01-12 RX ADMIN — Medication 10 ML: at 06:05

## 2018-01-12 NOTE — PROGRESS NOTES
Bedside and Verbal shift change report given to Fish Samuel (oncoming nurse) by Maddy Tejada (offgoing nurse). Report included the following information Kardex.

## 2018-01-12 NOTE — PROGRESS NOTES
Problem: Mobility Impaired (Adult and Pediatric)  Goal: *Acute Goals and Plan of Care (Insert Text)  Physical Therapy Goals  Initiated 1/11/2018  1. Patient will move from supine to sit and sit to supine  in bed with supervision/set-up within 7 day(s). 2.  Patient will transfer from bed to chair and chair to bed with supervision/set-up using the least restrictive device within 7 day(s). 3.  Patient will perform sit to stand with supervision/set-up within 7 day(s). 4.  Patient will ambulate with supervision/set-up for 150 feet with the least restrictive device within 7 day(s). 5.  Patient will ascend/descend 12 stairs with 1 handrail(s) with supervision/set-up within 7 day(s). 6.  Patient will independently don and doff quick draw TLSO within 7 days. physical Therapy TREATMENT  Patient: Zachery Figueredo (62 y.o. male)  Date: 1/12/2018  Diagnosis: Pneumonia  Altered mental status  Closed L1 vertebral fracture (HCC)  Back pain  Acute respiratory failure with hypoxemia (HCC)  COPD (chronic obstructive pulmonary disease) (HCC)  Lumbar spinal stenosis Pneumonia       Precautions:      ASSESSMENT:  Chart reviewed, RN cleared patient for mobility, and patient received in bed. Patient required additional time for all mobility however progress to SBA when OOB. Patient ambulated with significantly improved mechanics for 150 feet and was able to don/doff TLSO with CGA and anticipate will improve to independent with a few more trials. Patient ended session in bed with all needs placed within reach and RN notified of patient's status. PT recommending home with 24/7 supervision vs TBD pending patient progress in acute rehab and support systems.      Progression toward goals:  [x]    Improving appropriately and progressing toward goals  []    Improving slowly and progressing toward goals  []    Not making progress toward goals and plan of care will be adjusted     PLAN:  Patient continues to benefit from skilled intervention to address the above impairments. Continue treatment per established plan of care. Discharge Recommendations:  Home with 24/7 supervision vs TBD  Further Equipment Recommendations for Discharge:  TBD     SUBJECTIVE:   Patient stated some Ice water please.     OBJECTIVE DATA SUMMARY:   Critical Behavior:  Neurologic State: Alert  Orientation Level: Oriented to person, Oriented to place, Oriented to situation, Disoriented to time  Cognition: Follows commands, Decreased attention/concentration     Functional Mobility Training:  Bed Mobility:     Supine to Sit: Modified independent; Additional time  Sit to Supine: Modified independent; Additional time           Transfers:  Sit to Stand: Modified independent  Stand to Sit: Modified independent        Bed to Chair: Modified independent                    Balance:     Ambulation/Gait Training:  Distance (ft): 150 Feet (ft)  Assistive Device: Gait belt;Orthotic device (TLSO)  Ambulation - Level of Assistance: Supervision                 Base of Support: Narrowed     Speed/Courtnye:  (WNL)                       Stairs:            Neuro Re-Education:    Therapeutic Exercises:     Pain:  Pain Scale 1: Numeric (0 - 10)  Pain Intensity 1: 9  Pain Location 1: Back  Pain Orientation 1: Posterior  Pain Description 1: Aching  Pain Intervention(s) 1: Medication (see MAR)  Activity Tolerance:   Good, significant improvement from evaluation  Please refer to the flowsheet for vital signs taken during this treatment.   After treatment:   []    Patient left in no apparent distress sitting up in chair  [x]    Patient left in no apparent distress in bed  [x]    Call bell left within reach  [x]    Nursing notified  []    Caregiver present  []    Bed alarm activated    COMMUNICATION/COLLABORATION:   The patients plan of care was discussed with: Registered Nurse    Elizabeth Peters PT, DPT   Time Calculation: 18 mins

## 2018-01-12 NOTE — PROGRESS NOTES
Note consult for \"Niece states patient cannot go back to the [facility]. Niece is POA. \"  Called the [facility] and spoke with the psychologist for the facility, Dr. Regi Singh. Dr. Regi Singh stated that the patient does not have a [address]. However, he stated that \"the patient issued a 30 day notice to the facility that he wanted to move. \"  He then stated that they do not permit smoking and the patient \"violated their smoking rules. \"    The patient has two sisters who live in [place] and a son, but his niece Artur FREIRE#[phone], handles his finances and medical decisions. Spoke with the patient's niece, Artur Looney, who said \"That simply isn't true. He has lived there for 7 years. Why would we want him to move? \"  The patient has been paying $1196/month for rent at the [facility]. He receives $9440 in social security and $797 in pension and has been told in the past per Rani Wall, that he does not qualify for Medicaid. Rani Wall stated \"I'm okay with him going back to the [facility] if they can accept him, but I don't want him mistreated. \"      Rani Leals would like to pursue placement at one of [place], as the psychiatrist the patient has been using at [facility] (Dr. More Díaz) also goes to [place]. Called and asked to speak with the manager of those homes (Demetrice Terrell), but he is out of town. This  was directed to [facility] at [employer] who explained that he is unsure of availability. He asked that the  check back on Monday with Demetrice Terrell.       18 [facility] [employer] has several homes in the area:  [facility] [phone], [address] $1,219 [facility], [address] $1,219 [facility] 7[facility], [address] $0,816 1441 [facility] will continue to follow his disposition.    KANDICE Villa

## 2018-01-12 NOTE — PROGRESS NOTES
Day #2 of Levaquin  Indication:  HCAP (patient resident at [facility])  Current regimen:  Levaquin 500 mg IV Q 24  Abx regimen: Monotherapy  Recent Labs      18   0426  18   0119   WBC  5.9  8.5   CREA  0.79  1.03   BUN  11  16     Est CrCl: >50 ml/min  Temp (24hrs), Av.7 °F (37.1 °C), Min:97.9 °F (36.6 °C), Max:99.4 °F (37.4 °C)    Cultures:    Urine- NG, FINAL   Blood-NG, pending    Plan: Change to 750 mg IV Q 24 hours for HCAP     **close i-vent after initial review. Remove this text prior to posting to note.

## 2018-01-12 NOTE — PROGRESS NOTES
0000 - Bedside and Verbal shift change report given to pierre howell (oncoming nurse) by Bela Knight (offgoing nurse). Report included the following information SBAR, Kardex, Intake/Output, MAR and Recent Results. 1224 - patient with c/o pain. Call to Dr. Che Crawford.   1106 - spoke with Dr. Che Crawford and informed. MD will input new orders. 0730 - Bedside and Verbal shift change report given to julisa espinoza (oncoming nurse) by Deanne Gilman (offgoing nurse). Report included the following information SBAR, Kardex, Intake/Output, MAR and Recent Results.

## 2018-01-12 NOTE — PROGRESS NOTES
Spiritual Care Partner Volunteer visited patient in [address] [place] [address] on 1.12.18. Documented by:  Rev. Karly Ellis M.Div, [facility]

## 2018-01-12 NOTE — PROGRESS NOTES
Dr Chelita Orozco called in regards to patient home medications. MD states he will put his medication orders in.

## 2018-01-12 NOTE — PROGRESS NOTES
Hospitalist Progress Note  Boo Noonan MD  Answering service: [phone] from in house phone  Cell: [phone]      Date of Service:  2018  NAME:  Doc Terry  :  1940  MRN:  [id number]      Admission Summary:   78 y.o. male from [facility] with past medical history of sleep disorder presented to the ED from the [facility] via EMS with reported altered mental status. Patient is a poor historian. Majority of limited history is obtained per review of ED and medical records. Per these collective reports, per EMS, patient became acutely confused tonight. Patient reportedly is usually oriented but en route to ED, he was only oriented to self and place. On arrival in the ED, patient complained of back pain. Workup included CT head which was negative. CT lumbar spine revealed an acute L1 fracture and L4, L5 central spinal stenosis. ABG showed pO2 = 57, pCO2= 46.0, saO2= 88%. CTA chest revealed left lower lobe pneumonia and emphysema but no PE. Interval history / Subjective:     No complaint     Assessment & Plan:     1. AMS (altered mental status). Improved and back to baseline      2. Pneumonia. Continue IV Levofloxacin. Blood culture NGSF.       3. Acute on likely chronic hypercapnic/ hypoxemic respiratory failure- with compensated primary respiratory acidosis. Continue supplemental oxygen.    - Resolved     4. Acute L1 fracture. Consult orthopedic spine specialist for further evaluation and treatments. Seen by Dr Maree Favre, TLSO brace recommended and provided     5. L4-L5 spinal stenosis. Plan as noted above.     6.  Back pain. Plan as above.     7. COPD. Order Duoneb nebulizer treatments.     8.   Elevated CK total.  Possible rhabdomyolysis. Continue IV fluid hydration.   Repeat CK levels.     .     Code status: Full  DVT prophylaxis: SCD    Care Plan discussed with: Patient/Family and Nurse  Disposition: TBD     Hospital Problems  Date Reviewed: 1/11/2018          Codes Class Noted POA    COPD (chronic obstructive pulmonary disease) ([facility] ICD-10-CM: J44.9  ICD-9-CM: 117  1/11/2018 Unknown        Back pain ICD-10-CM: M54.9  ICD-9-CM: 724.5  1/11/2018 Unknown        Altered mental status ICD-10-CM: R41.82  ICD-9-CM: 780.97  1/11/2018 Unknown        * (Principal)Pneumonia ICD-10-CM: J18.9  ICD-9-CM: 486  1/11/2018 Unknown        Lumbar spinal stenosis ICD-10-CM: M48.061  ICD-9-CM: 724.02  1/11/2018 Unknown        Closed L1 vertebral fracture ([facility] ICD-10-CM: S32.019A  ICD-9-CM: 805.4  1/11/2018 Unknown        Acute respiratory failure with hypoxemia [facility]) ICD-10-CM: J96.01  ICD-9-CM: 518.81  1/11/2018 Unknown                Review of Systems:   A comprehensive review of systems was negative. Vital Signs:    Last 24hrs VS reviewed since prior progress note. Most recent are:  Visit Vitals    /71 (BP 1 Location: Right arm, BP Patient Position: At rest)    Pulse (!) 59    Temp 98.8 °F (37.1 °C)    Resp 18    Ht 5' 9\" (1.753 m)    Wt 79.4 kg (175 lb)    SpO2 96%    BMI 25.84 kg/m2         Intake/Output Summary (Last 24 hours) at 01/12/18 1737  Last data filed at 01/12/18 0931   Gross per 24 hour   Intake              490 ml   Output              150 ml   Net              340 ml        Physical Examination:             Constitutional:  No acute distress, cooperative, pleasant    ENT:  Oral mucous moist, oropharynx benign. Neck supple,    Resp:  CTA bilaterally. No wheezing/rhonchi/rales. No accessory muscle use   CV:  Regular rhythm, normal rate, no murmurs, gallops, rubs    GI:  Soft, non distended, non tender. normoactive bowel sounds, no hepatosplenomegaly     Musculoskeletal:  No edema, warm, 2+ pulses throughout    Neurologic:  Moves all extremities.   AAOx3, CN II-XII reviewed            Data Review:    Review and/or order of clinical lab test      Labs:     Recent Labs 01/12/18 0426 01/11/18 0119   WBC  5.9  8.5   HGB  10.3*  12.1   HCT  31.2*  35.9*   PLT  143*  172     Recent Labs      01/12/18 0426 01/11/18 0119   NA  141  137   K  3.9  3.9   CL  109*  100   CO2  28  30   BUN  11  16   CREA  0.79  1.03   GLU  119*  101*   CA  7.9*  8.9   MG   --   2.1   PHOS   --   4.4     Recent Labs      01/11/18 0119   SGOT  79*   ALT  38   AP  68   TBILI  0.9   TP  7.4   ALB  3.4*   GLOB  4.0     Recent Labs      01/11/18 0119   APTT  49.7*      No results for input(s): FE, TIBC, PSAT, FERR in the last 72 hours. No results found for: FOL, RBCF   No results for input(s): PH, PCO2, PO2 in the last 72 hours.   Recent Labs      01/11/18 0119   CPK  1926*   CKNDX  0.5   TROIQ  <0.04     No results found for: CHOL, CHOLX, CHLST, CHOLV, HDL, LDL, LDLC, DLDLP, TGLX, TRIGL, TRIGP, CHHD, CHHDX  Lab Results   Component Value Date/Time    Glucose (POC) 138 01/12/2018 05:17 PM     Lab Results   Component Value Date/Time    Color DARK YELLOW 01/11/2018 02:27 AM    Appearance CLOUDY 01/11/2018 02:27 AM    Specific gravity 1.027 01/11/2018 02:27 AM    pH (UA) 5.0 01/11/2018 02:27 AM    Protein 30 01/11/2018 02:27 AM    Glucose NEGATIVE  01/11/2018 02:27 AM    Ketone NEGATIVE  01/11/2018 02:27 AM    Bilirubin NEGATIVE  01/11/2018 02:27 AM    Urobilinogen 0.2 01/11/2018 02:27 AM    Nitrites NEGATIVE  01/11/2018 02:27 AM    Leukocyte Esterase NEGATIVE  01/11/2018 02:27 AM    Epithelial cells FEW 01/11/2018 02:27 AM    Bacteria NEGATIVE  01/11/2018 02:27 AM    WBC 0-4 01/11/2018 02:27 AM    RBC 0-5 01/11/2018 02:27 AM         Medications Reviewed:     Current Facility-Administered Medications   Medication Dose Route Frequency    risperiDONE (RisperDAL) tablet 2 mg  2 mg Oral QHS    temazepam (RESTORIL) capsule 30 mg  30 mg Oral QHS    traMADol (ULTRAM) tablet 50 mg  50 mg Oral Q8H PRN    traZODone (DESYREL) tablet 150 mg  150 mg Oral QHS    oxyCODONE-acetaminophen (PERCOCET) 5-325 mg per tablet 1 Tab  1 Tab Oral Q6H PRN    sodium chloride (NS) flush 5-10 mL  5-10 mL IntraVENous Q8H    sodium chloride (NS) flush 5-10 mL  5-10 mL IntraVENous PRN    0.9% sodium chloride infusion  100 mL/hr IntraVENous CONTINUOUS     ______________________________________________________________________  EXPECTED LENGTH OF STAY: 4d 12h  ACTUAL LENGTH OF STAY:          1                 Cricket Woodard MD

## 2018-01-13 LAB
ANION GAP SERPL CALC-SCNC: 8 MMOL/L (ref 5–15)
BUN SERPL-MCNC: 7 MG/DL (ref 6–20)
BUN/CREAT SERPL: 9 (ref 12–20)
CALCIUM SERPL-MCNC: 8.3 MG/DL (ref 8.5–10.1)
CHLORIDE SERPL-SCNC: 104 MMOL/L (ref 97–108)
CK SERPL-CCNC: 537 U/L (ref 39–308)
CO2 SERPL-SCNC: 28 MMOL/L (ref 21–32)
CREAT SERPL-MCNC: 0.75 MG/DL (ref 0.7–1.3)
GLUCOSE SERPL-MCNC: 73 MG/DL (ref 65–100)
POTASSIUM SERPL-SCNC: 3.5 MMOL/L (ref 3.5–5.1)
SODIUM SERPL-SCNC: 140 MMOL/L (ref 136–145)

## 2018-01-13 PROCEDURE — 36415 COLL VENOUS BLD VENIPUNCTURE: CPT | Performed by: INTERNAL MEDICINE

## 2018-01-13 PROCEDURE — 74011250636 HC RX REV CODE- 250/636: Performed by: INTERNAL MEDICINE

## 2018-01-13 PROCEDURE — 74011250637 HC RX REV CODE- 250/637: Performed by: INTERNAL MEDICINE

## 2018-01-13 PROCEDURE — 80048 BASIC METABOLIC PNL TOTAL CA: CPT | Performed by: INTERNAL MEDICINE

## 2018-01-13 PROCEDURE — 65270000032 HC RM SEMIPRIVATE

## 2018-01-13 PROCEDURE — 82550 ASSAY OF CK (CPK): CPT | Performed by: INTERNAL MEDICINE

## 2018-01-13 RX ADMIN — SODIUM CHLORIDE 100 ML/HR: 900 INJECTION, SOLUTION INTRAVENOUS at 10:16

## 2018-01-13 RX ADMIN — TRAZODONE HYDROCHLORIDE 150 MG: 100 TABLET ORAL at 21:14

## 2018-01-13 RX ADMIN — OXYCODONE AND ACETAMINOPHEN 1 TABLET: 5; 325 TABLET ORAL at 10:47

## 2018-01-13 RX ADMIN — OXYCODONE AND ACETAMINOPHEN 1 TABLET: 5; 325 TABLET ORAL at 17:40

## 2018-01-13 RX ADMIN — TEMAZEPAM 30 MG: 15 CAPSULE ORAL at 21:14

## 2018-01-13 RX ADMIN — TRAMADOL HYDROCHLORIDE 50 MG: 50 TABLET, FILM COATED ORAL at 08:40

## 2018-01-13 RX ADMIN — LEVOFLOXACIN 500 MG: 5 INJECTION, SOLUTION INTRAVENOUS at 04:52

## 2018-01-13 RX ADMIN — Medication 10 ML: at 21:14

## 2018-01-13 RX ADMIN — TRAMADOL HYDROCHLORIDE 50 MG: 50 TABLET, FILM COATED ORAL at 21:22

## 2018-01-13 RX ADMIN — OXYCODONE AND ACETAMINOPHEN 1 TABLET: 5; 325 TABLET ORAL at 04:55

## 2018-01-13 RX ADMIN — SODIUM CHLORIDE 100 ML/HR: 900 INJECTION, SOLUTION INTRAVENOUS at 20:16

## 2018-01-13 RX ADMIN — Medication 10 ML: at 13:15

## 2018-01-13 RX ADMIN — RISPERIDONE 2 MG: 1 TABLET ORAL at 21:14

## 2018-01-13 NOTE — PROGRESS NOTES
Bedside and Verbal shift change report given to Mildred Cabrera RN  (oncoming nurse) by Jenny Smith RN  (offgoing nurse). Report included the following information SBAR.

## 2018-01-13 NOTE — PROGRESS NOTES
Bedside and Verbal shift change report given to Gita Calderon RN  (oncoming nurse) by Mary Oconnor RN  (offgoing nurse). Report included the following information SBAR.

## 2018-01-13 NOTE — PROGRESS NOTES
Bedside shift change report given to [facility] RACHEAL (oncoming nurse) by Smooth Nettles (offgoing nurse). Report included the following information SBAR.

## 2018-01-13 NOTE — PROGRESS NOTES
Hospitalist Progress Note  Sarthak Santana MD  Answering service: [phone] from in house phone  Cell: [phone]      Date of Service:  2018  NAME:  Etienne Lewis  :  1940  MRN:  [id number]      Admission Summary:   78 y.o. male from [facility] with past medical history of sleep disorder presented to the ED from the [facility] via EMS with reported altered mental status. Patient is a poor historian. Majority of limited history is obtained per review of ED and medical records. Per these collective reports, per EMS, patient became acutely confused tonight. Patient reportedly is usually oriented but en route to ED, he was only oriented to self and place. On arrival in the ED, patient complained of back pain. Workup included CT head which was negative. CT lumbar spine revealed an acute L1 fracture and L4, L5 central spinal stenosis. ABG showed pO2 = 57, pCO2= 46.0, saO2= 88%. CTA chest revealed left lower lobe pneumonia and emphysema but no PE. Interval history / Subjective:     No acute issues, c/o some back pain. Assessment & Plan:     1. AMS (altered mental status). Improved and back to baseline      2. Pneumonia. Continue IV Levofloxacin. Blood culture NGSF.       3. Acute on likely chronic hypercapnic/ hypoxemic respiratory failure- with compensated primary respiratory acidosis. Continue supplemental oxygen.    - Resolved     4. Acute L1 fracture. Consult orthopedic spine specialist for further evaluation and treatments. Seen by Dr Mary Leon, TLSO brace recommended and provided     5. L4-L5 spinal stenosis. Plan as noted above.     6.  Back pain. Plan as above.     7. COPD. Order Duoneb nebulizer treatments.     8.   Elevated CK total.  Possible rhabdomyolysis.   Continue IV fluid hydration.     .     Code status: Full  DVT prophylaxis: SCD    Care Plan discussed with: Patient/Family and Nurse  Disposition: TBD     Hospital Problems  Date Reviewed: 1/11/2018          Codes Class Noted POA    COPD (chronic obstructive pulmonary disease) ([facility] ICD-10-CM: J44.9  ICD-9-CM: 377  1/11/2018 Unknown        Back pain ICD-10-CM: M54.9  ICD-9-CM: 724.5  1/11/2018 Unknown        Altered mental status ICD-10-CM: R41.82  ICD-9-CM: 780.97  1/11/2018 Unknown        * (Principal)Pneumonia ICD-10-CM: J18.9  ICD-9-CM: 486  1/11/2018 Unknown        Lumbar spinal stenosis ICD-10-CM: M48.061  ICD-9-CM: 724.02  1/11/2018 Unknown        Closed L1 vertebral fracture ([facility] ICD-10-CM: S32.019A  ICD-9-CM: 805.4  1/11/2018 Unknown        Acute respiratory failure with hypoxemia [facility]) ICD-10-CM: J96.01  ICD-9-CM: 518.81  1/11/2018 Unknown                Review of Systems:   A comprehensive review of systems was negative. Vital Signs:    Last 24hrs VS reviewed since prior progress note. Most recent are:  Visit Vitals    /76    Pulse 81    Temp 98.2 °F (36.8 °C)    Resp 18    Ht 5' 9\" (1.753 m)    Wt 79.4 kg (175 lb)    SpO2 94%    BMI 25.84 kg/m2         Intake/Output Summary (Last 24 hours) at 01/13/18 1314  Last data filed at 01/13/18 1071   Gross per 24 hour   Intake              240 ml   Output             2150 ml   Net            -1910 ml        Physical Examination:             Constitutional:  No acute distress, cooperative, pleasant    ENT:  Oral mucous moist, oropharynx benign. Neck supple,    Resp:  CTA bilaterally. No wheezing/rhonchi/rales. No accessory muscle use   CV:  Regular rhythm, normal rate, no murmurs, gallops, rubs    GI:  Soft, non distended, non tender. normoactive bowel sounds    Musculoskeletal:  No edema, warm, 2+ pulses throughout    Neurologic:  Moves all extremities.   AAOx3            Data Review:    Review and/or order of clinical lab test      Labs:     Recent Labs      01/12/18   0426  01/11/18   0119   WBC  5.9  8.5   HGB  10.3*  12.1   HCT  31.2*  35.9*   PLT  143*  172 Recent Labs      01/13/18   0514  01/12/18   0426  01/11/18 0119   NA  140  141  137   K  3.5  3.9  3.9   CL  104  109*  100   CO2  28  28  30   BUN  7  11  16   CREA  0.75  0.79  1.03   GLU  73  119*  101*   CA  8.3*  7.9*  8.9   MG   --    --   2.1   PHOS   --    --   4.4     Recent Labs      01/11/18 0119   SGOT  79*   ALT  38   AP  68   TBILI  0.9   TP  7.4   ALB  3.4*   GLOB  4.0     Recent Labs      01/11/18 0119   APTT  49.7*      No results for input(s): FE, TIBC, PSAT, FERR in the last 72 hours. No results found for: FOL, RBCF   No results for input(s): PH, PCO2, PO2 in the last 72 hours.   Recent Labs      01/13/18   0514  01/11/18 0119   CPK  537*  1926*   CKNDX   --   0.5   TROIQ   --   <0.04     No results found for: CHOL, CHOLX, CHLST, CHOLV, HDL, LDL, LDLC, DLDLP, TGLX, TRIGL, TRIGP, CHHD, CHHDX  Lab Results   Component Value Date/Time    Glucose (POC) 97 01/12/2018 10:18 PM    Glucose (POC) 138 01/12/2018 05:17 PM     Lab Results   Component Value Date/Time    Color DARK YELLOW 01/11/2018 02:27 AM    Appearance CLOUDY 01/11/2018 02:27 AM    Specific gravity 1.027 01/11/2018 02:27 AM    pH (UA) 5.0 01/11/2018 02:27 AM    Protein 30 01/11/2018 02:27 AM    Glucose NEGATIVE  01/11/2018 02:27 AM    Ketone NEGATIVE  01/11/2018 02:27 AM    Bilirubin NEGATIVE  01/11/2018 02:27 AM    Urobilinogen 0.2 01/11/2018 02:27 AM    Nitrites NEGATIVE  01/11/2018 02:27 AM    Leukocyte Esterase NEGATIVE  01/11/2018 02:27 AM    Epithelial cells FEW 01/11/2018 02:27 AM    Bacteria NEGATIVE  01/11/2018 02:27 AM    WBC 0-4 01/11/2018 02:27 AM    RBC 0-5 01/11/2018 02:27 AM         Medications Reviewed:     Current Facility-Administered Medications   Medication Dose Route Frequency    risperiDONE (RisperDAL) tablet 2 mg  2 mg Oral QHS    temazepam (RESTORIL) capsule 30 mg  30 mg Oral QHS    traMADol (ULTRAM) tablet 50 mg  50 mg Oral Q8H PRN    traZODone (DESYREL) tablet 150 mg  150 mg Oral QHS    oxyCODONE-acetaminophen (PERCOCET) 5-325 mg per tablet 1 Tab  1 Tab Oral Q6H PRN    levoFLOXacin (LEVAQUIN) 500 mg in D5W IVPB  500 mg IntraVENous Q24H    sodium chloride (NS) flush 5-10 mL  5-10 mL IntraVENous Q8H    sodium chloride (NS) flush 5-10 mL  5-10 mL IntraVENous PRN    0.9% sodium chloride infusion  100 mL/hr IntraVENous CONTINUOUS     ______________________________________________________________________  EXPECTED LENGTH OF STAY: 4d 12h  ACTUAL LENGTH OF STAY:          2                 Bebe Holstein, MD

## 2018-01-13 NOTE — PROGRESS NOTES
Bedside and Verbal shift change report given to Citlaly (oncoming nurse) by Mildred Cabrera (offgoing nurse). Report included the following information SBAR, Kardex, Intake/Output and MAR.

## 2018-01-14 LAB
ANION GAP SERPL CALC-SCNC: 7 MMOL/L (ref 5–15)
BUN SERPL-MCNC: 8 MG/DL (ref 6–20)
BUN/CREAT SERPL: 11 (ref 12–20)
CALCIUM SERPL-MCNC: 8.4 MG/DL (ref 8.5–10.1)
CHLORIDE SERPL-SCNC: 102 MMOL/L (ref 97–108)
CK SERPL-CCNC: 372 U/L (ref 39–308)
CO2 SERPL-SCNC: 29 MMOL/L (ref 21–32)
CREAT SERPL-MCNC: 0.76 MG/DL (ref 0.7–1.3)
ERYTHROCYTE [DISTWIDTH] IN BLOOD BY AUTOMATED COUNT: 12 % (ref 11.5–14.5)
GLUCOSE SERPL-MCNC: 142 MG/DL (ref 65–100)
HCT VFR BLD AUTO: 34.1 % (ref 36.6–50.3)
HGB BLD-MCNC: 11.5 G/DL (ref 12.1–17)
MCH RBC QN AUTO: 33.1 PG (ref 26–34)
MCHC RBC AUTO-ENTMCNC: 33.7 G/DL (ref 30–36.5)
MCV RBC AUTO: 98.3 FL (ref 80–99)
PLATELET # BLD AUTO: 187 K/UL (ref 150–400)
POTASSIUM SERPL-SCNC: 3.5 MMOL/L (ref 3.5–5.1)
RBC # BLD AUTO: 3.47 M/UL (ref 4.1–5.7)
SODIUM SERPL-SCNC: 138 MMOL/L (ref 136–145)
WBC # BLD AUTO: 6.6 K/UL (ref 4.1–11.1)

## 2018-01-14 PROCEDURE — 82550 ASSAY OF CK (CPK): CPT | Performed by: HOSPITALIST

## 2018-01-14 PROCEDURE — 74011250636 HC RX REV CODE- 250/636: Performed by: HOSPITALIST

## 2018-01-14 PROCEDURE — 74011250637 HC RX REV CODE- 250/637: Performed by: HOSPITALIST

## 2018-01-14 PROCEDURE — 74011250637 HC RX REV CODE- 250/637: Performed by: INTERNAL MEDICINE

## 2018-01-14 PROCEDURE — 77010033678 HC OXYGEN DAILY

## 2018-01-14 PROCEDURE — 80048 BASIC METABOLIC PNL TOTAL CA: CPT | Performed by: HOSPITALIST

## 2018-01-14 PROCEDURE — 74011250636 HC RX REV CODE- 250/636: Performed by: INTERNAL MEDICINE

## 2018-01-14 PROCEDURE — 36415 COLL VENOUS BLD VENIPUNCTURE: CPT | Performed by: HOSPITALIST

## 2018-01-14 PROCEDURE — 65270000032 HC RM SEMIPRIVATE

## 2018-01-14 PROCEDURE — 85027 COMPLETE CBC AUTOMATED: CPT | Performed by: HOSPITALIST

## 2018-01-14 RX ORDER — POLYETHYLENE GLYCOL 3350 17 G/17G
17 POWDER, FOR SOLUTION ORAL DAILY PRN
Status: DISCONTINUED | OUTPATIENT
Start: 2018-01-14 | End: 2018-02-15 | Stop reason: HOSPADM

## 2018-01-14 RX ADMIN — AZITHROMYCIN MONOHYDRATE 500 MG: 500 INJECTION, POWDER, LYOPHILIZED, FOR SOLUTION INTRAVENOUS at 11:33

## 2018-01-14 RX ADMIN — TRAMADOL HYDROCHLORIDE 50 MG: 50 TABLET, FILM COATED ORAL at 21:37

## 2018-01-14 RX ADMIN — TEMAZEPAM 30 MG: 15 CAPSULE ORAL at 21:37

## 2018-01-14 RX ADMIN — OXYCODONE AND ACETAMINOPHEN 1 TABLET: 5; 325 TABLET ORAL at 09:24

## 2018-01-14 RX ADMIN — Medication 10 ML: at 21:37

## 2018-01-14 RX ADMIN — CEFTRIAXONE SODIUM 1 G: 1 INJECTION, POWDER, FOR SOLUTION INTRAMUSCULAR; INTRAVENOUS at 10:43

## 2018-01-14 RX ADMIN — LEVOFLOXACIN 500 MG: 5 INJECTION, SOLUTION INTRAVENOUS at 03:18

## 2018-01-14 RX ADMIN — SODIUM CHLORIDE 100 ML/HR: 900 INJECTION, SOLUTION INTRAVENOUS at 06:15

## 2018-01-14 RX ADMIN — RISPERIDONE 2 MG: 1 TABLET ORAL at 21:37

## 2018-01-14 RX ADMIN — TRAMADOL HYDROCHLORIDE 50 MG: 50 TABLET, FILM COATED ORAL at 05:12

## 2018-01-14 RX ADMIN — TRAZODONE HYDROCHLORIDE 150 MG: 100 TABLET ORAL at 21:37

## 2018-01-14 RX ADMIN — OXYCODONE AND ACETAMINOPHEN 1 TABLET: 5; 325 TABLET ORAL at 16:43

## 2018-01-14 RX ADMIN — Medication 10 ML: at 03:34

## 2018-01-14 RX ADMIN — SODIUM CHLORIDE 50 ML/HR: 900 INJECTION, SOLUTION INTRAVENOUS at 22:24

## 2018-01-14 RX ADMIN — OXYCODONE AND ACETAMINOPHEN 1 TABLET: 5; 325 TABLET ORAL at 03:33

## 2018-01-14 RX ADMIN — TRAMADOL HYDROCHLORIDE 50 MG: 50 TABLET, FILM COATED ORAL at 12:38

## 2018-01-14 RX ADMIN — OXYCODONE AND ACETAMINOPHEN 1 TABLET: 5; 325 TABLET ORAL at 22:23

## 2018-01-14 RX ADMIN — POLYETHYLENE GLYCOL 3350 17 G: 17 POWDER, FOR SOLUTION ORAL at 11:42

## 2018-01-14 NOTE — PROGRESS NOTES
Problem: Falls - Risk of  Goal: *Absence of Falls  Document Alberto Fall Risk and appropriate interventions in the flowsheet.    Outcome: Progressing Towards Goal  Fall Risk Interventions:  Mobility Interventions: Patient to call before getting OOB, PT Consult for mobility concerns, Communicate number of staff needed for ambulation/transfer    Mentation Interventions: Familiar objects from home    Medication Interventions: Patient to call before getting OOB, Teach patient to arise slowly    Elimination Interventions: Call light in reach, Urinal in reach

## 2018-01-14 NOTE — PROGRESS NOTES
Bedside and Verbal shift change report given to Nevin Rice (oncoming nurse) by Christina Velarde RN (offgoing nurse). Report included the following information SBAR, Kardex, ED Summary, Intake/Output, MAR and Recent Results.

## 2018-01-14 NOTE — PROGRESS NOTES
Hospitalist Progress Note  Evangelina Myers MD  Answering service: [phone] from in house phone  Cell: [phone]      Date of Service:  2018  NAME:  Katey Cramer  :  1940  MRN:  [id number]      Admission Summary:   78 y.o. male from [facility] with past medical history of sleep disorder presented to the ED from the [facility] via EMS with reported altered mental status. Patient is a poor historian. Majority of limited history is obtained per review of ED and medical records. Per these collective reports, per EMS, patient became acutely confused tonight. Patient reportedly is usually oriented but en route to ED, he was only oriented to self and place. On arrival in the ED, patient complained of back pain. Workup included CT head which was negative. CT lumbar spine revealed an acute L1 fracture and L4, L5 central spinal stenosis. ABG showed pO2 = 57, pCO2= 46.0, saO2= 88%. CTA chest revealed left lower lobe pneumonia and emphysema but no PE. Interval history / Subjective:     No acute issues, c/o some back pain. Assessment & Plan:     1. AMS (altered mental status). Improved and back to baseline      2. Pneumonia, LLL as per CT, on admission got Zosyn, levo today changed to ceftriaxone and azithromycin. Blood culture NGSF.       3. Acute on likely chronic hypercapnic/ hypoxemic respiratory failure- with compensated primary respiratory acidosis. Continue supplemental oxygen as needed. - Resolved     4. Acute L1 fracture. Consult orthopedic spine specialist for further evaluation and treatments. Seen by Dr Isidro Dey, TLSO brace recommended and provided     5. L4-L5 spinal stenosis. Plan as noted above.     6.  Back pain. Plan as above.     7. COPD. Duoneb nebulizer treatments.     8.   Elevated CK total.  Possible rhabdomyolysis. Improved.   Decreased IVf.    .     Code status: Full  DVT prophylaxis: SCD    Care Plan discussed with: Patient/Family and Nurse  Disposition: TBD, from [facility] ([facility]), likely tomorrow with brace. Hospital Problems  Date Reviewed: 1/11/2018          Codes Class Noted POA    COPD (chronic obstructive pulmonary disease) ([facility] ICD-10-CM: J44.9  ICD-9-CM: 849  1/11/2018 Unknown        Back pain ICD-10-CM: M54.9  ICD-9-CM: 724.5  1/11/2018 Unknown        Altered mental status ICD-10-CM: R41.82  ICD-9-CM: 780.97  1/11/2018 Unknown        * (Principal)Pneumonia ICD-10-CM: J18.9  ICD-9-CM: 486  1/11/2018 Unknown        Lumbar spinal stenosis ICD-10-CM: M48.061  ICD-9-CM: 724.02  1/11/2018 Unknown        Closed L1 vertebral fracture ([facility] ICD-10-CM: S32.019A  ICD-9-CM: 805.4  1/11/2018 Unknown        Acute respiratory failure with hypoxemia [facility]) ICD-10-CM: J96.01  ICD-9-CM: 518.81  1/11/2018 Unknown                Review of Systems:   A comprehensive review of systems was negative. Vital Signs:    Last 24hrs VS reviewed since prior progress note. Most recent are:  Visit Vitals    /85 (BP 1 Location: Right arm, BP Patient Position: At rest)    Pulse 82    Temp 98.1 °F (36.7 °C)    Resp 18    Ht 5' 9\" (1.753 m)    Wt 61.6 kg (135 lb 12.9 oz)    SpO2 95%    BMI 20.05 kg/m2         Intake/Output Summary (Last 24 hours) at 01/14/18 1424  Last data filed at 01/14/18 1212   Gross per 24 hour   Intake                0 ml   Output             2825 ml   Net            -2825 ml        Physical Examination:             Constitutional:  No acute distress, cooperative, pleasant    ENT:  Oral mucous moist, oropharynx benign. Neck supple,    Resp:  CTA bilaterally. No wheezing/rhonchi/rales. No accessory muscle use   CV:  Regular rhythm, normal rate, no murmurs, gallops, rubs    GI:  Soft, non distended, non tender. normoactive bowel sounds    Musculoskeletal:  No edema, warm, 2+ pulses throughout    Neurologic:  Moves all extremities.   AAOx3            Data Review: Review and/or order of clinical lab test      Labs:     Recent Labs      01/14/18   0318  01/12/18   0426   WBC  6.6  5.9   HGB  11.5*  10.3*   HCT  34.1*  31.2*   PLT  187  143*     Recent Labs      01/14/18   0932  01/13/18   0514  01/12/18   0426   NA  138  140  141   K  3.5  3.5  3.9   CL  102  104  109*   CO2  29  28  28   BUN  8  7  11   CREA  0.76  0.75  0.79   GLU  142*  73  119*   CA  8.4*  8.3*  7.9*     No results for input(s): SGOT, GPT, ALT, AP, TBIL, TBILI, TP, ALB, GLOB, GGT, AML, LPSE in the last 72 hours. No lab exists for component: AMYP, HLPSE  No results for input(s): INR, PTP, APTT in the last 72 hours. No lab exists for component: INREXT, INREXT   No results for input(s): FE, TIBC, PSAT, FERR in the last 72 hours. No results found for: FOL, RBCF   No results for input(s): PH, PCO2, PO2 in the last 72 hours.   Recent Labs      01/14/18   0932  01/13/18 0514   CPK  372*  537*     No results found for: CHOL, CHOLX, CHLST, CHOLV, HDL, LDL, LDLC, DLDLP, TGLX, TRIGL, TRIGP, CHHD, CHHDX  Lab Results   Component Value Date/Time    Glucose (POC) 97 01/12/2018 10:18 PM    Glucose (POC) 138 01/12/2018 05:17 PM     Lab Results   Component Value Date/Time    Color DARK YELLOW 01/11/2018 02:27 AM    Appearance CLOUDY 01/11/2018 02:27 AM    Specific gravity 1.027 01/11/2018 02:27 AM    pH (UA) 5.0 01/11/2018 02:27 AM    Protein 30 01/11/2018 02:27 AM    Glucose NEGATIVE  01/11/2018 02:27 AM    Ketone NEGATIVE  01/11/2018 02:27 AM    Bilirubin NEGATIVE  01/11/2018 02:27 AM    Urobilinogen 0.2 01/11/2018 02:27 AM    Nitrites NEGATIVE  01/11/2018 02:27 AM    Leukocyte Esterase NEGATIVE  01/11/2018 02:27 AM    Epithelial cells FEW 01/11/2018 02:27 AM    Bacteria NEGATIVE  01/11/2018 02:27 AM    WBC 0-4 01/11/2018 02:27 AM    RBC 0-5 01/11/2018 02:27 AM         Medications Reviewed:     Current Facility-Administered Medications   Medication Dose Route Frequency    cefTRIAXone (ROCEPHIN) 1 g/50 mL NS IVPB 1 g IntraVENous Q24H    azithromycin (ZITHROMAX) 500 mg in 0.9% sodium chloride (MBP/ADV) 250 mL  500 mg IntraVENous Q24H    polyethylene glycol (MIRALAX) packet 17 g  17 g Oral DAILY PRN    risperiDONE (RisperDAL) tablet 2 mg  2 mg Oral QHS    temazepam (RESTORIL) capsule 30 mg  30 mg Oral QHS    traMADol (ULTRAM) tablet 50 mg  50 mg Oral Q8H PRN    traZODone (DESYREL) tablet 150 mg  150 mg Oral QHS    oxyCODONE-acetaminophen (PERCOCET) 5-325 mg per tablet 1 Tab  1 Tab Oral Q6H PRN    sodium chloride (NS) flush 5-10 mL  5-10 mL IntraVENous Q8H    sodium chloride (NS) flush 5-10 mL  5-10 mL IntraVENous PRN    0.9% sodium chloride infusion  50 mL/hr IntraVENous CONTINUOUS     ______________________________________________________________________  EXPECTED LENGTH OF STAY: 4d 12h  ACTUAL LENGTH OF STAY:          3                 Tomas Ghosh MD

## 2018-01-15 PROCEDURE — 74011250637 HC RX REV CODE- 250/637: Performed by: INTERNAL MEDICINE

## 2018-01-15 PROCEDURE — 65270000032 HC RM SEMIPRIVATE

## 2018-01-15 PROCEDURE — 97116 GAIT TRAINING THERAPY: CPT

## 2018-01-15 PROCEDURE — 74011250636 HC RX REV CODE- 250/636: Performed by: HOSPITALIST

## 2018-01-15 RX ADMIN — TEMAZEPAM 30 MG: 15 CAPSULE ORAL at 21:05

## 2018-01-15 RX ADMIN — AZITHROMYCIN MONOHYDRATE 500 MG: 500 INJECTION, POWDER, LYOPHILIZED, FOR SOLUTION INTRAVENOUS at 11:02

## 2018-01-15 RX ADMIN — CEFTRIAXONE SODIUM 1 G: 1 INJECTION, POWDER, FOR SOLUTION INTRAMUSCULAR; INTRAVENOUS at 08:32

## 2018-01-15 RX ADMIN — Medication 10 ML: at 06:26

## 2018-01-15 RX ADMIN — Medication 10 ML: at 21:05

## 2018-01-15 RX ADMIN — TRAZODONE HYDROCHLORIDE 150 MG: 100 TABLET ORAL at 21:05

## 2018-01-15 RX ADMIN — OXYCODONE AND ACETAMINOPHEN 1 TABLET: 5; 325 TABLET ORAL at 08:32

## 2018-01-15 RX ADMIN — RISPERIDONE 2 MG: 1 TABLET ORAL at 21:05

## 2018-01-15 RX ADMIN — OXYCODONE AND ACETAMINOPHEN 1 TABLET: 5; 325 TABLET ORAL at 16:08

## 2018-01-15 NOTE — PROGRESS NOTES
Hospitalist Progress Note  Vania Lamar MD  Answering service: [phone] from in house phone  Cell: [phone]      Date of Service:  1/15/2018  NAME:  Tigist José  :  1940  MRN:  [id number]      Admission Summary:   78 y.o. male from [facility] with past medical history of sleep disorder presented to the ED from the [facility] via EMS with reported altered mental status. Patient is a poor historian. Majority of limited history is obtained per review of ED and medical records. Per these collective reports, per EMS, patient became acutely confused tonight. Patient reportedly is usually oriented but en route to ED, he was only oriented to self and place. On arrival in the ED, patient complained of back pain. Workup included CT head which was negative. CT lumbar spine revealed an acute L1 fracture and L4, L5 central spinal stenosis. ABG showed pO2 = 57, pCO2= 46.0, saO2= 88%. CTA chest revealed left lower lobe pneumonia and emphysema but no PE. Interval history / Subjective:     No acute issues, c/o some back pain. He is awake and alert. Assessment & Plan:     1. AMS (altered mental status). Improved and back to baseline      2. Pneumonia, LLL as per CT, on admission got Zosyn, levo today changed to ceftriaxone and azithromycin. Blood culture NGSF. Probably can stop soon.     3. Acute on likely chronic hypercapnic/ hypoxemic respiratory failure- with compensated primary respiratory acidosis. Continue supplemental oxygen as needed. - Resolved     4. Acute L1 fracture. Consult orthopedic spine specialist for further evaluation and treatments. Seen by Dr Arielle Marshall, TLSO brace recommended and provided     5. L4-L5 spinal stenosis. Plan as noted above.     6.  Back pain. Plan as above.     7. COPD. Duoneb nebulizer treatments.     8.   Elevated CK total.  Possible rhabdomyolysis.  Resolved stop fluids.   .     Code status: Full  DVT prophylaxis: SCD    Care Plan discussed with: Patient/Family and Nurse  Disposition: TBD, from [address] [place] [address] ([facility]), likely tomorrow with brace. Hospital Problems  Date Reviewed: 1/11/2018          Codes Class Noted POA    COPD (chronic obstructive pulmonary disease) ([facility] ICD-10-CM: J44.9  ICD-9-CM: 344  1/11/2018 Unknown        Back pain ICD-10-CM: M54.9  ICD-9-CM: 724.5  1/11/2018 Unknown        Altered mental status ICD-10-CM: R41.82  ICD-9-CM: 780.97  1/11/2018 Unknown        * (Principal)Pneumonia ICD-10-CM: J18.9  ICD-9-CM: 486  1/11/2018 Unknown        Lumbar spinal stenosis ICD-10-CM: M48.061  ICD-9-CM: 724.02  1/11/2018 Unknown        Closed L1 vertebral fracture ([facility] ICD-10-CM: S32.019A  ICD-9-CM: 805.4  1/11/2018 Unknown        Acute respiratory failure with hypoxemia [facility]) ICD-10-CM: J96.01  ICD-9-CM: 518.81  1/11/2018 Unknown                Review of Systems:   A comprehensive review of systems was negative. Vital Signs:    Last 24hrs VS reviewed since prior progress note. Most recent are:  Visit Vitals    /69    Pulse 94    Temp 98.3 °F (36.8 °C)    Resp 18    Ht 5' 9\" (1.753 m)    Wt 63 kg (138 lb 14.4 oz)    SpO2 90%    BMI 20.51 kg/m2         Intake/Output Summary (Last 24 hours) at 01/15/18 0825  Last data filed at 01/14/18 1644   Gross per 24 hour   Intake                0 ml   Output              950 ml   Net             -950 ml        Physical Examination:             Constitutional:  No acute distress, cooperative, pleasant    ENT:  Oral mucous moist, oropharynx benign. Neck supple,    Resp:  CTA bilaterally. No wheezing/rhonchi/rales. No accessory muscle use   CV:  Regular rhythm, normal rate, no murmurs, gallops, rubs    GI:  Soft, non distended, non tender. normoactive bowel sounds    Musculoskeletal:  No edema, warm, 2+ pulses throughout    Neurologic:  Moves all extremities.   AAOx3            Data Review:    Review and/or order of clinical lab test      Labs:     Recent Labs      01/14/18   0318   WBC  6.6   HGB  11.5*   HCT  34.1*   PLT  187     Recent Labs      01/14/18   0932  01/13/18   0514   NA  138  140   K  3.5  3.5   CL  102  104   CO2  29  28   BUN  8  7   CREA  0.76  0.75   GLU  142*  73   CA  8.4*  8.3*     No results for input(s): SGOT, GPT, ALT, AP, TBIL, TBILI, TP, ALB, GLOB, GGT, AML, LPSE in the last 72 hours. No lab exists for component: AMYP, HLPSE  No results for input(s): INR, PTP, APTT in the last 72 hours. No lab exists for component: INREXT, INREXT   No results for input(s): FE, TIBC, PSAT, FERR in the last 72 hours. No results found for: FOL, RBCF   No results for input(s): PH, PCO2, PO2 in the last 72 hours.   Recent Labs      01/14/18   0932  01/13/18   0514   CPK  372*  537*     No results found for: CHOL, CHOLX, CHLST, CHOLV, HDL, LDL, LDLC, DLDLP, TGLX, TRIGL, TRIGP, CHHD, CHHDX  Lab Results   Component Value Date/Time    Glucose (POC) 97 01/12/2018 10:18 PM    Glucose (POC) 138 01/12/2018 05:17 PM     Lab Results   Component Value Date/Time    Color DARK YELLOW 01/11/2018 02:27 AM    Appearance CLOUDY 01/11/2018 02:27 AM    Specific gravity 1.027 01/11/2018 02:27 AM    pH (UA) 5.0 01/11/2018 02:27 AM    Protein 30 01/11/2018 02:27 AM    Glucose NEGATIVE  01/11/2018 02:27 AM    Ketone NEGATIVE  01/11/2018 02:27 AM    Bilirubin NEGATIVE  01/11/2018 02:27 AM    Urobilinogen 0.2 01/11/2018 02:27 AM    Nitrites NEGATIVE  01/11/2018 02:27 AM    Leukocyte Esterase NEGATIVE  01/11/2018 02:27 AM    Epithelial cells FEW 01/11/2018 02:27 AM    Bacteria NEGATIVE  01/11/2018 02:27 AM    WBC 0-4 01/11/2018 02:27 AM    RBC 0-5 01/11/2018 02:27 AM         Medications Reviewed:     Current Facility-Administered Medications   Medication Dose Route Frequency    cefTRIAXone (ROCEPHIN) 1 g/50 mL NS IVPB  1 g IntraVENous Q24H    azithromycin (ZITHROMAX) 500 mg in 0.9% sodium chloride (MBP/ADV) 250 mL  500 mg IntraVENous Q24H  polyethylene glycol (MIRALAX) packet 17 g  17 g Oral DAILY PRN    risperiDONE (RisperDAL) tablet 2 mg  2 mg Oral QHS    temazepam (RESTORIL) capsule 30 mg  30 mg Oral QHS    traMADol (ULTRAM) tablet 50 mg  50 mg Oral Q8H PRN    traZODone (DESYREL) tablet 150 mg  150 mg Oral QHS    oxyCODONE-acetaminophen (PERCOCET) 5-325 mg per tablet 1 Tab  1 Tab Oral Q6H PRN    sodium chloride (NS) flush 5-10 mL  5-10 mL IntraVENous Q8H    sodium chloride (NS) flush 5-10 mL  5-10 mL IntraVENous PRN    0.9% sodium chloride infusion  50 mL/hr IntraVENous CONTINUOUS     ______________________________________________________________________  EXPECTED LENGTH OF STAY: 4d 12h  ACTUAL LENGTH OF STAY:          4                 Rolando Downs MD

## 2018-01-15 NOTE — PROGRESS NOTES
Reviewed medical chart; called the available phone numbers for the [facility], but was directed to the cell phone of the manager of those homes (Kristan Esposito). Mr. Sunday Harper' voicemail box is full, B#[phone], but this  will continue to attempt to reach him at this number to learn if [facility] can accept him into care. Called [facility], but they do not accept patients who smoke. Spoke with Patrice Steve, manager of [address] [place] [address], N#[phone], they do have availability and allow smoking outside of the building. Lubna Soto plans to check with the  of the [facility] and see what challenges they experienced with the patient before agreeing to accept him into care. Awaiting a call back. Care Management will continue to follow his disposition.    KANDICE Romo

## 2018-01-15 NOTE — PROGRESS NOTES
Bedside and Verbal shift change report given to Audelia Finnegan (oncoming nurse) by Renato Page (offgoing nurse). Report included the following information SBAR, Kardex, Intake/Output, MAR and Recent Results.

## 2018-01-15 NOTE — PROGRESS NOTES
Bedside and Verbal shift change report given to JASMYN ARMANDO (oncoming nurse) by JASMYN Solitario (offgoing nurse). Report included the following information SBAR, Kardex, ED Summary, Intake/Output, MAR and Recent Results.

## 2018-01-15 NOTE — PROGRESS NOTES
Problem: Mobility Impaired (Adult and Pediatric)  Goal: *Acute Goals and Plan of Care (Insert Text)  Physical Therapy Goals  Initiated 1/11/2018  1. Patient will move from supine to sit and sit to supine  in bed with supervision/set-up within 7 day(s). 2.  Patient will transfer from bed to chair and chair to bed with supervision/set-up using the least restrictive device within 7 day(s). 3.  Patient will perform sit to stand with supervision/set-up within 7 day(s). 4.  Patient will ambulate with supervision/set-up for 150 feet with the least restrictive device within 7 day(s). 5.  Patient will ascend/descend 12 stairs with 1 handrail(s) with supervision/set-up within 7 day(s). 6.  Patient will independently don and doff quick draw TLSO within 7 days. physical Therapy TREATMENT  Patient: Doc Terry (75 y.o. male)  Date: 1/15/2018  Diagnosis: Pneumonia  Altered mental status  Closed L1 vertebral fracture (HCC)  Back pain  Acute respiratory failure with hypoxemia (HCC)  COPD (chronic obstructive pulmonary disease) (HCC)  Lumbar spinal stenosis Pneumonia       Precautions:    Chart, physical therapy assessment, plan of care and goals were reviewed. ASSESSMENT:  Pt was able to maintain gait tolerance. Pt required min A with donning quickdraw brace. . Pt slightly unsteady during gait and required v.c for safety and sequencing. Pt lives in a group home and will have supervision. Pt may benefit from an OT eval to optimize mobility. Pt may benefit from HHPT at discharge. Progression toward goals:  [x]      Improving appropriately and progressing toward goals  []      Improving slowly and progressing toward goals  []      Not making progress toward goals and plan of care will be adjusted     PLAN:  Patient continues to benefit from skilled intervention to address the above impairments. Continue treatment per established plan of care.   Discharge Recommendations:  Home Health  Further Equipment Recommendations for Discharge:  Nevaeh Turner brace-received      SUBJECTIVE:   Patient stated I need to use the bathroom.     OBJECTIVE DATA SUMMARY:   Critical Behavior:  Neurologic State: Alert  Orientation Level: Oriented X4  Cognition: Follows commands     Functional Mobility Training:  Bed Mobility:     Supine to Sit: Modified independent  Sit to Supine: Modified independent            Transfers:  Sit to Stand: Modified independent  Stand to Sit: Modified independent                             Balance:  Sitting: Intact  Standing: Impaired  Standing - Static: Good  Standing - Dynamic : Fair  Ambulation/Gait Training:  Distance (ft): 150 Feet (ft)  Assistive Device: Brace/Splint;Gait belt  Ambulation - Level of Assistance: Supervision        Gait Abnormalities: Decreased step clearance        Base of Support: Narrowed     Speed/Courtney: Pace decreased (<100 feet/min); Slow  Step Length: Left shortened;Right shortened                Stairs:             Neuro Re-Education:    Therapeutic Exercises:     Pain:  Pain Scale 1: Numeric (0 - 10)  Pain Intensity 1: 5  Pain Location 1: Back  Pain Orientation 1: Lower  Pain Description 1: Aching  Pain Intervention(s) 1: Medication (see MAR)  Activity Tolerance:   Limited   Please refer to the flowsheet for vital signs taken during this treatment.   After treatment:   [] Patient left in no apparent distress sitting up in chair  [x] Patient left in no apparent distress in bed  [x] Call bell left within reach  [x] Nursing notified  [] Caregiver present  [] Bed alarm activated    COMMUNICATION/COLLABORATION:   The patients plan of care was discussed with: Registered Nurse    Bonilla Gerber PTA   Time Calculation: 18 mins

## 2018-01-16 LAB
ANION GAP SERPL CALC-SCNC: 8 MMOL/L (ref 5–15)
BACTERIA SPEC CULT: NORMAL
BUN SERPL-MCNC: 13 MG/DL (ref 6–20)
BUN/CREAT SERPL: 15 (ref 12–20)
CALCIUM SERPL-MCNC: 8.7 MG/DL (ref 8.5–10.1)
CHLORIDE SERPL-SCNC: 103 MMOL/L (ref 97–108)
CO2 SERPL-SCNC: 27 MMOL/L (ref 21–32)
CREAT SERPL-MCNC: 0.86 MG/DL (ref 0.7–1.3)
ERYTHROCYTE [DISTWIDTH] IN BLOOD BY AUTOMATED COUNT: 12.2 % (ref 11.5–14.5)
GLUCOSE SERPL-MCNC: 98 MG/DL (ref 65–100)
HCT VFR BLD AUTO: 35.5 % (ref 36.6–50.3)
HGB BLD-MCNC: 12.2 G/DL (ref 12.1–17)
MCH RBC QN AUTO: 34 PG (ref 26–34)
MCHC RBC AUTO-ENTMCNC: 34.4 G/DL (ref 30–36.5)
MCV RBC AUTO: 98.9 FL (ref 80–99)
PLATELET # BLD AUTO: 191 K/UL (ref 150–400)
POTASSIUM SERPL-SCNC: 4 MMOL/L (ref 3.5–5.1)
RBC # BLD AUTO: 3.59 M/UL (ref 4.1–5.7)
SERVICE CMNT-IMP: NORMAL
SODIUM SERPL-SCNC: 138 MMOL/L (ref 136–145)
WBC # BLD AUTO: 6.9 K/UL (ref 4.1–11.1)

## 2018-01-16 PROCEDURE — 74011250636 HC RX REV CODE- 250/636: Performed by: INTERNAL MEDICINE

## 2018-01-16 PROCEDURE — 80048 BASIC METABOLIC PNL TOTAL CA: CPT | Performed by: HOSPITALIST

## 2018-01-16 PROCEDURE — 97530 THERAPEUTIC ACTIVITIES: CPT

## 2018-01-16 PROCEDURE — 97165 OT EVAL LOW COMPLEX 30 MIN: CPT

## 2018-01-16 PROCEDURE — 97535 SELF CARE MNGMENT TRAINING: CPT

## 2018-01-16 PROCEDURE — 65270000029 HC RM PRIVATE

## 2018-01-16 PROCEDURE — 85027 COMPLETE CBC AUTOMATED: CPT | Performed by: HOSPITALIST

## 2018-01-16 PROCEDURE — G8988 SELF CARE GOAL STATUS: HCPCS

## 2018-01-16 PROCEDURE — G8987 SELF CARE CURRENT STATUS: HCPCS

## 2018-01-16 PROCEDURE — 36415 COLL VENOUS BLD VENIPUNCTURE: CPT | Performed by: HOSPITALIST

## 2018-01-16 PROCEDURE — 74011250637 HC RX REV CODE- 250/637: Performed by: INTERNAL MEDICINE

## 2018-01-16 PROCEDURE — 74011250636 HC RX REV CODE- 250/636: Performed by: HOSPITALIST

## 2018-01-16 PROCEDURE — 97116 GAIT TRAINING THERAPY: CPT

## 2018-01-16 RX ORDER — HEPARIN SODIUM 5000 [USP'U]/ML
5000 INJECTION, SOLUTION INTRAVENOUS; SUBCUTANEOUS EVERY 8 HOURS
Status: DISCONTINUED | OUTPATIENT
Start: 2018-01-16 | End: 2018-02-13

## 2018-01-16 RX ADMIN — OXYCODONE AND ACETAMINOPHEN 1 TABLET: 5; 325 TABLET ORAL at 11:00

## 2018-01-16 RX ADMIN — OXYCODONE AND ACETAMINOPHEN 1 TABLET: 5; 325 TABLET ORAL at 17:31

## 2018-01-16 RX ADMIN — HEPARIN SODIUM 5000 UNITS: 5000 INJECTION, SOLUTION INTRAVENOUS; SUBCUTANEOUS at 09:18

## 2018-01-16 RX ADMIN — RISPERIDONE 2 MG: 1 TABLET ORAL at 21:13

## 2018-01-16 RX ADMIN — OXYCODONE AND ACETAMINOPHEN 1 TABLET: 5; 325 TABLET ORAL at 04:44

## 2018-01-16 RX ADMIN — TRAMADOL HYDROCHLORIDE 50 MG: 50 TABLET, FILM COATED ORAL at 09:22

## 2018-01-16 RX ADMIN — Medication 10 ML: at 21:13

## 2018-01-16 RX ADMIN — CEFTRIAXONE SODIUM 1 G: 1 INJECTION, POWDER, FOR SOLUTION INTRAMUSCULAR; INTRAVENOUS at 09:14

## 2018-01-16 RX ADMIN — TRAZODONE HYDROCHLORIDE 150 MG: 100 TABLET ORAL at 21:13

## 2018-01-16 RX ADMIN — AZITHROMYCIN MONOHYDRATE 500 MG: 500 INJECTION, POWDER, LYOPHILIZED, FOR SOLUTION INTRAVENOUS at 11:00

## 2018-01-16 RX ADMIN — Medication 10 ML: at 04:44

## 2018-01-16 RX ADMIN — TEMAZEPAM 30 MG: 15 CAPSULE ORAL at 21:13

## 2018-01-16 NOTE — PROGRESS NOTES
Bedside shift change report given to Gal Montalvo (oncoming nurse) by Panchito Barraza (offgoing nurse). Report included the following information SBAR, Kardex, ED Summary, Intake/Output, MAR, Accordion and Recent Results.

## 2018-01-16 NOTE — PROGRESS NOTES
Reviewed medical chart; reached the manager of the [facility], C#[phone]). He may have 2 beds available once the  visits his [facility] facility later this week. He plans to visit the patient in the hospital at [facility] tomorrow morning, 1/17/18.       Spoke with the manager of [address] [place] [address] [facility] [place], Q#[phone]). Jonathan Murrell informed this  that he cannot accept the patient into care due to his smoking habits. Called [facility] and they do allow smoking and have potential availability, P#[phone]. Called to update the patient's Kaelyn vazquez - F#[phone]. Asked her to review the list of group home facilities in [place] provided by this  and choose at least 10 facilities that she may be interested in pursuing. Care Management will continue to follow his disposition.    KANDICE Jimenez

## 2018-01-16 NOTE — PROGRESS NOTES
Bedside and Verbal shift change report given to David Suárez RN (oncoming nurse) by Jo Ann Brown RN (offgoing nurse). Report included the following information SBAR, Kardex, ED Summary, Intake/Output, MAR and Recent Results.

## 2018-01-16 NOTE — PROGRESS NOTES
Problem: Self Care Deficits Care Plan (Adult)  Goal: *Acute Goals and Plan of Care (Insert Text)  Occupational Therapy Goals  Initiated 1/16/2018  1. Patient will perform bathing with supervision/set-up within 7 day(s). 2.  Patient will perform bathing with supervision/set-up within 7 day(s). 3.  Patient will perform upper body dressing and lower body dressing with supervision/set-up within 7 day(s). 4.  Patient will perform toilet transfers with supervision/set-up within 7 day(s). 5.  Patient will perform all aspects of toileting with supervision/set-up within 7 day(s). 6.  Patient will participate in upper extremity therapeutic exercise/activities with supervision/set-up for 5 minutes within 7 day(s). 7.  Patient will utilize energy conservation, fall prevention and pain management techniques during functional activities with verbal, visual and tactile cues within 7 day(s). Occupational Therapy EVALUATION  Patient: Maite Nayak (32 y.o. male)  Date: 1/16/2018  Primary Diagnosis: Pneumonia  Altered mental status  Closed L1 vertebral fracture (HCC)  Back pain  Acute respiratory failure with hypoxemia (HCC)  COPD (chronic obstructive pulmonary disease) (HCC)  Lumbar spinal stenosis        Precautions:   Back, Fall, Bed Alarm ([place] B: reads lips; smokes) TLSO ordered by MD    ASSESSMENT :  Based on the objective data described below, the patient presents to ER 1-11 with AMS. Found to have acute L 1 comp fx and L4-L5 spinal stenosis as well as pneumonia. Today he presents with 8/10 pain in back, while supine and sitting supported in bed. Pain eased to 6/10 with ambulation, returning to 8/10 in supine. (Nsg notified ans she reports its time for pain meds.) Max A LE dressing due to pain in back with bending. Unable to tolerate crossed leg method. Bed mobility and toileting with CGA and pain reported improved in standing compared to sitting and supine. S- mod I UE ADLs.  Decreased dynamic standing balance and decreased attention to details: missed toilet while voiding without obvious effort to self correct or response to verbal cues. Patient A and Ox 4 and reports he fell out of bed at home which is when back pain started. Patient will benefit from SNF vs home with [facility] depending on new home setting. Patient will benefit from skilled intervention to address the above impairments. Patients rehabilitation potential is considered to be Good  Factors which may influence rehabilitation potential include:   []             None noted  []             Mental ability/status  []             Medical condition  [x]             Home/family situation and support systems  []             Safety awareness  []             Pain tolerance/management  []             Other:      PLAN :  Recommendations and Planned Interventions:  [x]               Self Care Training                  [x]        Therapeutic Activities  [x]               Functional Mobility Training    [x]        Cognitive Retraining PRN  [x]               Therapeutic Exercises           [x]        Endurance Activities  [x]               Balance Training                   []        Neuromuscular Re-Education  []               Visual/Perceptual Training     [x]   Home Safety Training  [x]               Patient Education                 [x]        Family Training/Education  []               Other (comment):    Frequency/Duration: Patient will be followed by occupational therapy 4 times a week to address goals. Discharge Recommendations: [address] and To Be Determined  Further Equipment Recommendations for Discharge: TLSO vs LSO- note LSO reported by LPTA and order for TLSO by MD- no brace available in room this session; nsg notified. SUBJECTIVE:   Patient stated I fell out of bed, that's how I hurt my back.     OBJECTIVE DATA SUMMARY:   HISTORY:   Past Medical History:   Diagnosis Date    Sleep disorder     insomnia   History reviewed.  No pertinent surgical history. Prior Level of Function/Environment/Context: reports he was mod I PTA/prior to fall out of bed causing back pain; lived at [place] [id number] Name:  [facility])  [address] [place] [address] St: Two story  # of Interior Steps: Javy 87: Both  Lift Chair Available: No  Living Alone: No  Support Systems: Home care staff, Assisted living ([facility])  Patient Expects to be Discharged to[de-identified] Group home  Current DME Used/Available at Home: None  Tub or Shower Type: Shower  [x]  Right hand dominant   []  Left hand dominant    EXAMINATION OF PERFORMANCE DEFICITS:  Cognitive/Behavioral Status:  Neurologic State: Alert  Orientation Level: Oriented X4 (reports Back P! 8/10; fell out of bed at home to cause fx)  Cognition: Follows commands; Impulsive  Perception: Appears intact  Perseveration: No perseveration noted   No clear documentation of PMH/reson for living in Group Home    Skin: intact    Edema: none    Hearing: Auditory  Auditory Impairment: Hard of hearing, bilateral    Vision/Perceptual:                              WFL       Range of Motion:    AROM:  (B UEs AROM ~ 50 % B shoulders due to back pain)                         Strength:    Strength:  (decreased B UEs due to related back pain with UE effort)                Coordination:  Coordination: Generally decreased, functional  Fine Motor Skills-Upper: Left Intact; Right Intact    Gross Motor Skills-Upper: Left Impaired;Right Impaired (due to back pain with B UE shoulder AROM above 90 degrees)    Tone & Sensation:    Tone: Normal  Sensation: Intact                      Balance:  Sitting: Intact (trained patient to not bend over to protect back/max heal)  Standing: Impaired  Standing - Static: Good  Standing - Dynamic : Fair    Functional Mobility and Transfers for ADLs:  Bed Mobility:  Rolling:  Additional time;Modified independent  Supine to Sit: Additional time;Supervision (cues for precautions)  Sit to Supine: Additional time;Contact guard assistance  Scooting: Additional time;Supervision    Transfers:  Sit to Stand: Supervision (cues for precautions)  Stand to Sit: Supervision (cues for precautions)  Bed to Chair: Contact guard assistance; Additional time (cues for precautions)  Toilet Transfer : Supervision; Additional time (cues for precautions)    ADL Assessment:  Feeding: Independent    Oral Facial Hygiene/Grooming: Setup; Additional time;Supervision (cues for precautions )    Bathing: Minimum assistance; Additional time; Adaptive equipment (AE recommended)    Upper Body Dressing: Setup    Lower Body Dressing: Maximum assistance; Additional time (will benefit from AE; limited by back pain)    Toileting: Contact guard assistance; Additional time (poor task attention (peed on feet))                ADL Intervention and task modifications:        initiated training in pain management and back precautions post L1 fx to facilitate improved self care skills                                  Functional Measure:  Barthel Index:    Bathin  Bladder: 5  Bowels: 10  Groomin  Dressin  Feeding: 10  Mobility: 5  Stairs: 5  Toilet Use: 5  Transfer (Bed to Chair and Back): 10  Total: 55       Barthel and G-code impairment scale:  Percentage of impairment CH  0% CI  1-19% CJ  20-39% CK  40-59% CL  60-79% CM  80-99% CN  100%   Barthel Score 0-100 100 99-80 79-60 59-40 20-39 1-19   0   Barthel Score 0-20 20 17-19 13-16 9-12 5-8 1-4 0      The Barthel ADL Index: Guidelines  1. The index should be used as a record of what a patient does, not as a record of what a patient could do. 2. The main aim is to establish degree of independence from any help, physical or verbal, however minor and for whatever reason. 3. The need for supervision renders the patient not independent. 4. A patient's performance should be established using the best available evidence.  Asking the patient, friends/relatives and nurses are the usual sources, but direct observation and common sense are also important. However direct testing is not needed. 5. Usually the patient's performance over the preceding 24-48 hours is important, but occasionally longer periods will be relevant. 6. Middle categories imply that the patient supplies over 50 per cent of the effort. 7. Use of aids to be independent is allowed. Richard Begum., Barthel, D.W. (7281). Functional evaluation: the Barthel Index. [facility] (14)2. Yovani Nj pamela DEANNA Connelly, Jana Goodpasture., Abhi Hines., [place], [facility] (1999). Measuring the change indisability after inpatient rehabilitation; comparison of the responsiveness of the Barthel Index and Functional [place] Measure. Journal of Neurology, Neurosurgery, and Psychiatry, 66(4), 737-573. MARYLOU Braun, COLTEN Page, & Fermin Fagan MKENNY. (2004.) Assessment of post-stroke quality of life in cost-effectiveness studies: The usefulness of the Barthel Index and the EuroQoL-5D. Quality of Life Research, 13, 322-08       G codes: In compliance with CMSs Claims Based Outcome Reporting, the following G-code set was chosen for this patient based on their primary functional limitation being treated: The outcome measure chosen to determine the severity of the functional limitation was the Barthel Index  with a score of CK/CI which was correlated with the impairment scale. ?  Self Care:     - CURRENT STATUS: CK - 40%-59% impaired, limited or restricted    - GOAL STATUS: CI - 1%-19% impaired, limited or restricted    - D/C STATUS:  ---------------To be determined---------------     Occupational Therapy Evaluation Charge Determination   History Examination Decision-Making   LOW Complexity : Brief history review  MEDIUM Complexity : 3-5 performance deficits relating to physical, cognitive , or psychosocial skils that result in activity limitations and / or participation restrictions MEDIUM Complexity : Patient may present with comorbidities that affect occupational performnce. Miniml to moderate modification of tasks or assistance (eg, physical or verbal ) with assesment(s) is necessary to enable patient to complete evaluation       Based on the above components, the patient evaluation is determined to be of the following complexity level: LOW   Pain: 8/10 in supine and sitting; 5-6/10 in standing; nsg notified and medicated after session by nsg report  Pain Scale 1: Numeric (0 - 10)  Pain Intensity 1: 10  Pain Location 1: Back  Pain Orientation 1: Lower  Pain Description 1: Aching  Pain Intervention(s) 1: Medication (see MAR)  Activity Tolerance:   Fair   Please refer to the flowsheet for vital signs taken during this treatment. After treatment:   [] Patient left in no apparent distress sitting up in chair  [x] Patient left in no apparent distress in bed  [x] Call bell left within reach  [x] Nursing notified  [] Caregiver present  [x] Bed alarm activated    COMMUNICATION/EDUCATION:   The patients plan of care was discussed with: Physical Therapy Assistant and Registered Nurse. [x] Home safety education was provided and the patient/caregiver indicated understanding. [x] Patient/family have participated as able in goal setting and plan of care. [x] Patient/family agree to work toward stated goals and plan of care. [] Patient understands intent and goals of therapy, but is neutral about his/her participation. [] Patient is unable to participate in goal setting and plan of care. This patients plan of care is appropriate for delegation to [facility].     Thank you for this referral.  Ruby Cockayne OTR/L  Time Calculation: 40 mins

## 2018-01-16 NOTE — PROGRESS NOTES
Problem: Mobility Impaired (Adult and Pediatric)  Goal: *Acute Goals and Plan of Care (Insert Text)  Physical Therapy Goals  Initiated 1/11/2018  1. Patient will move from supine to sit and sit to supine  in bed with supervision/set-up within 7 day(s). 2.  Patient will transfer from bed to chair and chair to bed with supervision/set-up using the least restrictive device within 7 day(s). 3.  Patient will perform sit to stand with supervision/set-up within 7 day(s). 4.  Patient will ambulate with supervision/set-up for 150 feet with the least restrictive device within 7 day(s). 5.  Patient will ascend/descend 12 stairs with 1 handrail(s) with supervision/set-up within 7 day(s). 6.  Patient will independently don and doff quick draw TLSO within 7 days. physical Therapy TREATMENT  Patient: Nicolle Pedersen (7 y.o. male)  Date: 1/16/2018  Diagnosis: Pneumonia  Altered mental status  Closed L1 vertebral fracture (HCC)  Back pain  Acute respiratory failure with hypoxemia (HCC)  COPD (chronic obstructive pulmonary disease) (HCC)  Lumbar spinal stenosis Pneumonia       Precautions: Back, Fall, Bed Alarm ([place] B: reads lips; smokes)  Chart, physical therapy assessment, plan of care and goals were reviewed. ASSESSMENT:  Pt received a LSO in the ED. confirmed with Beatriz Badillo NP that pt needed a TLSO. Utilized off the shelf [employer] TLSO and sized to pt. Pt has no awareness to back precautions or adherence. Pt has limited carry over of instructions/directions. Pt is impulsive when needing to use the bathroom. Pt got up twice during session to walk to bathroom. Pt has a shuffled gait with a forward trunk. Pt did report the brace was more supportive. Pt is from a group home at this time he may need physical assistance for dressing and to grady TLSO and supervision with upright mobility and to adhere to back precautions.  If unable to provide then pt may need SNF  Progression toward goals:  [x]      Improving appropriately and progressing toward goals  []      Improving slowly and progressing toward goals  []      Not making progress toward goals and plan of care will be adjusted     PLAN:  Patient continues to benefit from skilled intervention to address the above impairments. Continue treatment per established plan of care. Discharge Recommendations: Group home with assistance vs SNF  Further Equipment Recommendations for Discharge:  TLSO     SUBJECTIVE:   Patient stated I need the bathroom.     OBJECTIVE DATA SUMMARY:   Critical Behavior:  Neurologic State: Alert  Orientation Level: Oriented X4 (reports Back P! 8/10; fell out of bed at home to cause fx)  Cognition: Follows commands, Impulsive     Functional Mobility Training:  Bed Mobility:  Sit to Supine: Additional time;Contact guard assistance         Transfers:  Sit to Stand: Supervision (cues for precautions)  Stand to Sit: Supervision (cues for precautions)                    Balance:  Sitting: Intact  Standing: Impaired  Standing - Static: Good  Standing - Dynamic : Fair  Ambulation/Gait Training:  Distance (ft): 160 Feet (ft)  Assistive Device: Brace/Splint;Gait belt  Ambulation - Level of Assistance: Stand-by asssistance;Contact guard assistance        Gait Abnormalities: Decreased step clearance        Base of Support: Narrowed     Speed/Courtney: Shuffled; Slow  Step Length: Left shortened;Right shortened                   Stairs:             Neuro Re-Education:    Therapeutic Exercises:     Pain:  Pain Scale 1: Visual  Pain Intensity 1: 0  Pain Location 1: Back  Pain Orientation 1: Lower  Pain Description 1: Aching  Pain Intervention(s) 1: Medication (see MAR)  Activity Tolerance:   Limited   Please refer to the flowsheet for vital signs taken during this treatment.   After treatment:   [] Patient left in no apparent distress sitting up in chair  [x] Patient left in no apparent distress in bed  [x] Call bell left within reach  [x] Nursing notified  [] Caregiver present  [x] Bed alarm activated    COMMUNICATION/COLLABORATION:   The patients plan of care was discussed with: Registered Nurse    Sathya Weaver PTA   Time Calculation: 29 mins

## 2018-01-16 NOTE — PROGRESS NOTES
Hospitalist Progress Note  Tramaine Jimenez MD  Office: [phone]      Date of Service:  2018  NAME:  Abraham Abraham  :  1940  MRN:  [id number]      Admission Summary:   78 y. o. male from [facility] with past medical history of sleep disorder presented to the ED from the [facility] via EMS with reported altered mental status.   Patient is a poor historian.  Majority of limited history is obtained per review of ED and medical records.   Per these collective reports, per EMS, patient became acutely confused tonight.   Patient reportedly is usually oriented but en route to ED, he was only oriented to self and place.   On arrival in the ED, patient complained of back pain.  Workup included CT head which was negative.  CT lumbar spine revealed an acute L1 fracture and L4, L5 central spinal stenosis.  ABG showed pO2 = 57, pCO2= 46.0, saO2= 88%.  CTA chest revealed left lower lobe pneumonia and emphysema but no PE    Interval history / Subjective:   Seen at the bed side not in distress, still complaining of back pain.       Assessment & Plan:     AMS (altered mental status)  Improved and back to baseline   Alert and oriented to place, person and tivme     Pneumonia, LLL as per CT on admission   Started on Zosyn and levofloxacin at presentation   Currently on Zithromax and Ceftriaxone   Blood culture no growth to date   Trend fever and WBC curve     Acute on likely chronic hypercapnic/ hypoxemic respiratory failure- with compensated primary respiratory acidosis.    Resolved   Saturating > 90% on RA       Acute L1 fracture.     Consult orthopedic spine specialist for further evaluation and treatments.    Seen by Dr Anupama Hernandez,   BENIO brace recommended with a f/u appointment as an outpatient       L4-L5 spinal stenosis.    Plan as noted above.      Back pain.    Plan as above.      COPD.    Duoneb nebulizer treatments.      Elevated CK total.    Possible rhabdomyolysis. Resolved stop fluids. Code status: Full Code   DVT prophylaxis: will start with heparin 5000 sq every 8 hours     Care Plan discussed with: Patient/Family, Nurse and   Disposition: [address] [facility] Problems  Date Reviewed: 1/11/2018          Codes Class Noted POA    COPD (chronic obstructive pulmonary disease) ([facility] ICD-10-CM: J44.9  ICD-9-CM: 421  1/11/2018 Unknown        Back pain ICD-10-CM: M54.9  ICD-9-CM: 724.5  1/11/2018 Unknown        Altered mental status ICD-10-CM: R41.82  ICD-9-CM: 780.97  1/11/2018 Unknown        * (Principal)Pneumonia ICD-10-CM: J18.9  ICD-9-CM: 142  1/11/2018 Unknown        Lumbar spinal stenosis ICD-10-CM: M48.061  ICD-9-CM: 724.02  1/11/2018 Unknown        Closed L1 vertebral fracture ([facility] ICD-10-CM: B06.242V  ICD-9-CM: 805.4  1/11/2018 Unknown        Acute respiratory failure with hypoxemia [facility]) ICD-10-CM: J96.01  ICD-9-CM: 518.81  1/11/2018 Unknown                Review of Systems:   A comprehensive review of systems was negative except for that written in the HPI. Vital Signs:    Last 24hrs VS reviewed since prior progress note. Most recent are:  Visit Vitals    /56    Pulse 95    Temp 99.8 °F (37.7 °C)    Resp 18    Ht 5' 9\" (1.753 m)    Wt 59.7 kg (131 lb 11.2 oz)    SpO2 100%    BMI 19.45 kg/m2         Intake/Output Summary (Last 24 hours) at 01/16/18 0753  Last data filed at 01/15/18 1914   Gross per 24 hour   Intake                0 ml   Output             1600 ml   Net            -1600 ml        Physical Examination:             Constitutional:  No acute distress, cooperative, pleasant,    ENT:  Oral mucous moist, oropharynx benign.  Neck supple,    Resp:  Crepitations over the bibasilar lower third area of lung field    CV:  Regular rhythm, normal rate, no murmurs, gallops, rubs    GI:  Soft, non distended, non tender normoactive bowel sounds, no hepatosplenomegaly     Musculoskeletal:  lower Back tenderness on palpation     Neurologic:  Moves all extremities. AAOx3, CN II-XII reviewed     Psych:  Good insight, Not anxious nor agitated. Data Review:    Review and/or order of clinical lab test  Review and/or order of tests in the medicine section of [facility]      Labs:     Recent Labs      01/16/18   0420  01/14/18   0318   WBC  6.9  6.6   HGB  12.2  11.5*   HCT  35.5*  34.1*   PLT  191  187     Recent Labs      01/16/18   0420  01/14/18   0932   NA  138  138   K  4.0  3.5   CL  103  102   CO2  27  29   BUN  13  8   CREA  0.86  0.76   GLU  98  142*   CA  8.7  8.4*     No results for input(s): SGOT, GPT, ALT, AP, TBIL, TBILI, TP, ALB, GLOB, GGT, AML, LPSE in the last 72 hours. No lab exists for component: AMYP, HLPSE  No results for input(s): INR, PTP, APTT in the last 72 hours. No lab exists for component: INREXT   No results for input(s): FE, TIBC, PSAT, FERR in the last 72 hours. No results found for: FOL, RBCF   No results for input(s): PH, PCO2, PO2 in the last 72 hours.   Recent Labs      01/14/18   0932   CPK  372*     No results found for: CHOL, CHOLX, CHLST, CHOLV, HDL, LDL, LDLC, DLDLP, TGLX, TRIGL, TRIGP, CHHD, CHHDX  Lab Results   Component Value Date/Time    Glucose (POC) 97 01/12/2018 10:18 PM    Glucose (POC) 138 01/12/2018 05:17 PM     Lab Results   Component Value Date/Time    Color DARK YELLOW 01/11/2018 02:27 AM    Appearance CLOUDY 01/11/2018 02:27 AM    Specific gravity 1.027 01/11/2018 02:27 AM    pH (UA) 5.0 01/11/2018 02:27 AM    Protein 30 01/11/2018 02:27 AM    Glucose NEGATIVE  01/11/2018 02:27 AM    Ketone NEGATIVE  01/11/2018 02:27 AM    Bilirubin NEGATIVE  01/11/2018 02:27 AM    Urobilinogen 0.2 01/11/2018 02:27 AM    Nitrites NEGATIVE  01/11/2018 02:27 AM    Leukocyte Esterase NEGATIVE  01/11/2018 02:27 AM    Epithelial cells FEW 01/11/2018 02:27 AM    Bacteria NEGATIVE  01/11/2018 02:27 AM    WBC 0-4 01/11/2018 02:27 AM    RBC 0-5 01/11/2018 02:27 AM         Medications Reviewed:     Current Facility-Administered Medications   Medication Dose Route Frequency    cefTRIAXone (ROCEPHIN) 1 g/50 mL NS IVPB  1 g IntraVENous Q24H    azithromycin (ZITHROMAX) 500 mg in 0.9% sodium chloride (MBP/ADV) 250 mL  500 mg IntraVENous Q24H    polyethylene glycol (MIRALAX) packet 17 g  17 g Oral DAILY PRN    risperiDONE (RisperDAL) tablet 2 mg  2 mg Oral QHS    temazepam (RESTORIL) capsule 30 mg  30 mg Oral QHS    traMADol (ULTRAM) tablet 50 mg  50 mg Oral Q8H PRN    traZODone (DESYREL) tablet 150 mg  150 mg Oral QHS    oxyCODONE-acetaminophen (PERCOCET) 5-325 mg per tablet 1 Tab  1 Tab Oral Q6H PRN    sodium chloride (NS) flush 5-10 mL  5-10 mL IntraVENous Q8H    sodium chloride (NS) flush 5-10 mL  5-10 mL IntraVENous PRN     ______________________________________________________________________  EXPECTED LENGTH OF STAY: 4d 12h  ACTUAL LENGTH OF STAY:          5                 Zara Zamora MD

## 2018-01-17 LAB
GLUCOSE BLD STRIP.AUTO-MCNC: 136 MG/DL (ref 65–100)
SERVICE CMNT-IMP: ABNORMAL

## 2018-01-17 PROCEDURE — 77010033678 HC OXYGEN DAILY

## 2018-01-17 PROCEDURE — 82962 GLUCOSE BLOOD TEST: CPT

## 2018-01-17 PROCEDURE — 65270000029 HC RM PRIVATE

## 2018-01-17 PROCEDURE — 74011250636 HC RX REV CODE- 250/636: Performed by: INTERNAL MEDICINE

## 2018-01-17 PROCEDURE — 97116 GAIT TRAINING THERAPY: CPT

## 2018-01-17 PROCEDURE — 74011250636 HC RX REV CODE- 250/636: Performed by: HOSPITALIST

## 2018-01-17 PROCEDURE — 74011250637 HC RX REV CODE- 250/637: Performed by: INTERNAL MEDICINE

## 2018-01-17 RX ADMIN — Medication 10 ML: at 21:06

## 2018-01-17 RX ADMIN — CEFTRIAXONE SODIUM 1 G: 1 INJECTION, POWDER, FOR SOLUTION INTRAMUSCULAR; INTRAVENOUS at 09:13

## 2018-01-17 RX ADMIN — OXYCODONE AND ACETAMINOPHEN 1 TABLET: 5; 325 TABLET ORAL at 09:12

## 2018-01-17 RX ADMIN — HEPARIN SODIUM 5000 UNITS: 5000 INJECTION, SOLUTION INTRAVENOUS; SUBCUTANEOUS at 01:00

## 2018-01-17 RX ADMIN — TRAMADOL HYDROCHLORIDE 50 MG: 50 TABLET, FILM COATED ORAL at 17:25

## 2018-01-17 RX ADMIN — OXYCODONE AND ACETAMINOPHEN 1 TABLET: 5; 325 TABLET ORAL at 15:30

## 2018-01-17 RX ADMIN — Medication 10 ML: at 06:32

## 2018-01-17 RX ADMIN — HEPARIN SODIUM 5000 UNITS: 5000 INJECTION, SOLUTION INTRAVENOUS; SUBCUTANEOUS at 09:13

## 2018-01-17 RX ADMIN — TRAZODONE HYDROCHLORIDE 150 MG: 100 TABLET ORAL at 21:06

## 2018-01-17 RX ADMIN — RISPERIDONE 2 MG: 1 TABLET ORAL at 21:06

## 2018-01-17 RX ADMIN — HEPARIN SODIUM 5000 UNITS: 5000 INJECTION, SOLUTION INTRAVENOUS; SUBCUTANEOUS at 17:27

## 2018-01-17 RX ADMIN — TEMAZEPAM 30 MG: 15 CAPSULE ORAL at 21:05

## 2018-01-17 RX ADMIN — AZITHROMYCIN MONOHYDRATE 500 MG: 500 INJECTION, POWDER, LYOPHILIZED, FOR SOLUTION INTRAVENOUS at 11:59

## 2018-01-17 RX ADMIN — Medication 10 ML: at 13:53

## 2018-01-17 NOTE — PROGRESS NOTES
Bedside shift change report given to [facility] RACHEAL (oncoming nurse) by Phillip Dumont (offgoing nurse). Report included the following information SBAR.

## 2018-01-17 NOTE — PROGRESS NOTES
Bedside shift change report given to Aneesh Lindsay (oncoming nurse) by Mansi Nickerson RN (offgoing nurse). Report included the following information SBAR, Kardex, Procedure Summary, Intake/Output, MAR, Accordion and Recent Results.

## 2018-01-17 NOTE — PROGRESS NOTES
Bedside shift change report given to Farooq Marquez (oncoming nurse) by Isidro Blanco RN (offgoing nurse). Report included the following information SBAR, Intake/Output and MAR.

## 2018-01-17 NOTE — PROGRESS NOTES
Hospitalist Progress Note  Dakota Ortiz MD  Office: [phone]      Date of Service:  2018  NAME:  Doc Terry  :  1940  MRN:  [id number]      Admission Summary:   78 y. o. male from [facility] with past medical history of sleep disorder presented to the ED from the [facility] via EMS with reported altered mental status.   Patient is a poor historian.  Majority of limited history is obtained per review of ED and medical records.   Per these collective reports, per EMS, patient became acutely confused tonight.   Patient reportedly is usually oriented but en route to ED, he was only oriented to self and place.   On arrival in the ED, patient complained of back pain.  Workup included CT head which was negative.  CT lumbar spine revealed an acute L1 fracture and L4, L5 central spinal stenosis.  CTA chest revealed left lower lobe pneumonia and emphysema but no PE. Orthopedic on board. Waiting for placement. Interval history / Subjective:   Seen at the bed side not in distress, stated that he feels better. Back pain more tolerated.       Assessment & Plan:     AMS (altered mental status)  Improved and back to baseline   Alert and oriented to place, person and tivme   Waiting for placement, Care management on board     Pneumonia, LLL as per CT on admission   Started on Zosyn and levofloxacin at presentation   Currently on Zithromax and Ceftriaxone   Blood culture no growth to date   Trend fever and WBC curve     Acute on likely chronic hypercapnic/ hypoxemic respiratory failure- with compensated primary respiratory acidosis.    Resolved   Saturating > 90% on RA       Acute L1 fracture.     Consult orthopedic spine specialist for further evaluation and treatments.    Seen by Dr Maree Favre,   TLSO brace recommended with a f/u appointment as an outpatient       L4-L5 spinal stenosis.    Plan as noted above.      Back pain.    Plan as above.      COPD.    Duoneb nebulizer treatments.      Elevated CK total.    Possible rhabdomyolysis. Resolved stop fluids. Code status: Full Code   DVT prophylaxis: will start with heparin 5000 sq every 8 hours     Care Plan discussed with: Patient/Family, Nurse and   Disposition: [facility]  Date Reviewed: 1/11/2018          Codes Class Noted POA    COPD (chronic obstructive pulmonary disease) ([facility] ICD-10-CM: J44.9  ICD-9-CM: 200  1/11/2018 Unknown        Back pain ICD-10-CM: M54.9  ICD-9-CM: 724.5  1/11/2018 Unknown        Altered mental status ICD-10-CM: R41.82  ICD-9-CM: 780.97  1/11/2018 Unknown        * (Principal)Pneumonia ICD-10-CM: J18.9  ICD-9-CM: 162  1/11/2018 Unknown        Lumbar spinal stenosis ICD-10-CM: M48.061  ICD-9-CM: 724.02  1/11/2018 Unknown        Closed L1 vertebral fracture ([facility] ICD-10-CM: U04.718O  ICD-9-CM: 805.4  1/11/2018 Unknown        Acute respiratory failure with hypoxemia [facility]) ICD-10-CM: J96.01  ICD-9-CM: 518.81  1/11/2018 Unknown                Review of Systems:   A comprehensive review of systems was negative except for that written in the HPI. Vital Signs:    Last 24hrs VS reviewed since prior progress note. Most recent are:  Visit Vitals    /68 (BP 1 Location: Right arm, BP Patient Position: At rest)    Pulse 84    Temp 98.3 °F (36.8 °C)    Resp 18    Ht 5' 9\" (1.753 m)    Wt 59.7 kg (131 lb 11.2 oz)    SpO2 91%    BMI 19.45 kg/m2       No intake or output data in the 24 hours ending 01/17/18 0725     Physical Examination:             Constitutional:  No acute distress, cooperative, pleasant,    ENT:  Oral mucous moist, oropharynx benign.  Neck supple,    Resp:  soft crepitations over the bi-basilar area of lung fields otherwise clear and resonant chest    CV:  Regular rhythm, normal rate, no murmurs, gallops, rubs    GI:  Soft, non distended, non tender normoactive bowel sounds, no hepatosplenomegaly Musculoskeletal:  lower Back tenderness on palpation     Neurologic:  Moves all extremities. AAOx3, CN II-XII reviewed     Psych:  Good insight, Not anxious nor agitated. Data Review:    Review and/or order of clinical lab test  Review and/or order of tests in the medicine section of [facility]      Labs:     Recent Labs      01/16/18   0420   WBC  6.9   HGB  12.2   HCT  35.5*   PLT  191     Recent Labs      01/16/18   0420  01/14/18   0932   NA  138  138   K  4.0  3.5   CL  103  102   CO2  27  29   BUN  13  8   CREA  0.86  0.76   GLU  98  142*   CA  8.7  8.4*     No results for input(s): SGOT, GPT, ALT, AP, TBIL, TBILI, TP, ALB, GLOB, GGT, AML, LPSE in the last 72 hours. No lab exists for component: AMYP, HLPSE  No results for input(s): INR, PTP, APTT in the last 72 hours. No lab exists for component: INREXT, INREXT   No results for input(s): FE, TIBC, PSAT, FERR in the last 72 hours. No results found for: FOL, RBCF   No results for input(s): PH, PCO2, PO2 in the last 72 hours.   Recent Labs      01/14/18   0932   CPK  372*     No results found for: CHOL, CHOLX, CHLST, CHOLV, HDL, LDL, LDLC, DLDLP, TGLX, TRIGL, TRIGP, CHHD, CHHDX  Lab Results   Component Value Date/Time    Glucose (POC) 97 01/12/2018 10:18 PM    Glucose (POC) 138 01/12/2018 05:17 PM     Lab Results   Component Value Date/Time    Color DARK YELLOW 01/11/2018 02:27 AM    Appearance CLOUDY 01/11/2018 02:27 AM    Specific gravity 1.027 01/11/2018 02:27 AM    pH (UA) 5.0 01/11/2018 02:27 AM    Protein 30 01/11/2018 02:27 AM    Glucose NEGATIVE  01/11/2018 02:27 AM    Ketone NEGATIVE  01/11/2018 02:27 AM    Bilirubin NEGATIVE  01/11/2018 02:27 AM    Urobilinogen 0.2 01/11/2018 02:27 AM    Nitrites NEGATIVE  01/11/2018 02:27 AM    Leukocyte Esterase NEGATIVE  01/11/2018 02:27 AM    Epithelial cells FEW 01/11/2018 02:27 AM    Bacteria NEGATIVE  01/11/2018 02:27 AM    WBC 0-4 01/11/2018 02:27 AM    RBC 0-5 01/11/2018 02:27 AM         Medications Reviewed:     Current Facility-Administered Medications   Medication Dose Route Frequency    heparin (porcine) injection 5,000 Units  5,000 Units SubCUTAneous Q8H    cefTRIAXone (ROCEPHIN) 1 g/50 mL NS IVPB  1 g IntraVENous Q24H    azithromycin (ZITHROMAX) 500 mg in 0.9% sodium chloride (MBP/ADV) 250 mL  500 mg IntraVENous Q24H    polyethylene glycol (MIRALAX) packet 17 g  17 g Oral DAILY PRN    risperiDONE (RisperDAL) tablet 2 mg  2 mg Oral QHS    temazepam (RESTORIL) capsule 30 mg  30 mg Oral QHS    traMADol (ULTRAM) tablet 50 mg  50 mg Oral Q8H PRN    traZODone (DESYREL) tablet 150 mg  150 mg Oral QHS    oxyCODONE-acetaminophen (PERCOCET) 5-325 mg per tablet 1 Tab  1 Tab Oral Q6H PRN    sodium chloride (NS) flush 5-10 mL  5-10 mL IntraVENous Q8H    sodium chloride (NS) flush 5-10 mL  5-10 mL IntraVENous PRN     ______________________________________________________________________  EXPECTED LENGTH OF STAY: 4d 12h  ACTUAL LENGTH OF STAY:          6                 Marine Martel MD

## 2018-01-17 NOTE — PROGRESS NOTES
NUTRITION- DIETETIC tECHnICIAN    Pt seen for:       [x]                  Rescreen  []                  Food preferences/tolerances  []                  Food Allergies  []                  PO intake check  []                  Supplements  []                  Diet order clarification  []                  Education  []                  Other     Rescreen:    [x]                  Not at Nutrition Risk, rescreen per screening protocol  []                  At Nutrition Risk- RD referral         SUBJECTIVE/OBJECTIVE:     Information obtained from:  patient      Diet:  Regular    Intake: excellent    Patient Vitals for the past 100 hrs:   % Diet Eaten   01/17/18 0855 100 %   01/15/18 0831 100 %   01/13/18 1200 50 %   01/13/18 0933 50 %       Weight Changes:       Wt Readings from Last 3 Encounters:   01/16/18 59.7 kg (131 lb 11.2 oz)       Problems Identified:      [x]                  No problems identified and eating very well (100% lunch today)   []                  Specified food preferences   []                  Dislikes supplements              []                  Allergies:   []                  Difficulty chewing      []                  Dentition    []                  Nausea/Vomiting   []                  Constipation   []                  Diarrhea    PLAN:     [x]                   Continue current diet and encourage intake  []                   Obtained/adjusted food preferences/tolerances and/or snacks options   []                   Dislikes supplements will try a substitution  []                   Modify diet for food allergies  []                   Adjust texture due to difficulty chewing   []                   Educated patient  []                   RD Referral  [x]                   Rescreen per screening protocol          Higinio Redding DTR

## 2018-01-17 NOTE — PROGRESS NOTES
Reviewed medical chart; reached the manager of the [facility], N#[phone]). He may have 2 beds available once the  visits his [facility] facility later this week. He visited the patient today and may be able to accept him into care. He is trying to find a first floor bedroom option in one of his homes. He asked this  to call to follow up tomorrow morning. Updated the patient and his neAbraham beltre - P#[phone] at the bedside. Care Management will continue to follow his disposition.    KANDICE Steve

## 2018-01-17 NOTE — PROGRESS NOTES
Problem: Mobility Impaired (Adult and Pediatric)  Goal: *Acute Goals and Plan of Care (Insert Text)  Physical Therapy Goals  Initiated 1/11/2018  1. Patient will move from supine to sit and sit to supine  in bed with supervision/set-up within 7 day(s). 2.  Patient will transfer from bed to chair and chair to bed with supervision/set-up using the least restrictive device within 7 day(s). 3.  Patient will perform sit to stand with supervision/set-up within 7 day(s). 4.  Patient will ambulate with supervision/set-up for 150 feet with the least restrictive device within 7 day(s). 5.  Patient will ascend/descend 12 stairs with 1 handrail(s) with supervision/set-up within 7 day(s). 6.  Patient will independently don and doff quick draw TLSO within 7 days. physical Therapy TREATMENT  Patient: Gloria Orourke (63 y.o. male)  Date: 1/17/2018  Diagnosis: Pneumonia  Altered mental status  Closed L1 vertebral fracture (HCC)  Back pain  Acute respiratory failure with hypoxemia (HCC)  COPD (chronic obstructive pulmonary disease) (HCC)  Lumbar spinal stenosis Pneumonia       Precautions: Back, Fall, Bed Alarm ([place]: reads lips; smokes)    ASSESSMENT:  Patient continues to be unaware of back precautions and with poor adherence with mobility. Performs bed mobility without utlizing log roll technique although supervision. Requires assistance with donning brace. Patient continues to be SBA for all other transfers, slightly impulsive and needs cues for safety. Demonstrates decreased heel strike bilaterally although R > L. Patient returned to supine at end of session with supervision. Recommend HHPT at group home if facility able to assist with brace management vs SNF.   Progression toward goals:  []    Improving appropriately and progressing toward goals  [x]    Improving slowly and progressing toward goals  []    Not making progress toward goals and plan of care will be adjusted     PLAN:  Patient continues to benefit from skilled intervention to address the above impairments. Continue treatment per established plan of care. Discharge Recommendations:  Home Health at group home if able to give needed assistance and [place]  Further Equipment Recommendations for Discharge:  None     SUBJECTIVE:   Patient stated I need to use the bathroom.     OBJECTIVE DATA SUMMARY:   Critical Behavior:  Neurologic State: Alert  Orientation Level: Oriented X4  Cognition: Follows commands     Functional Mobility Training:  Bed Mobility:     Supine to Sit: Supervision (does not perform log roll)  Sit to Supine: Supervision           Transfers:  Sit to Stand: Stand-by asssistance  Stand to Sit: Stand-by asssistance  Stand Pivot Transfers: Stand-by asssistance                          Balance:  Sitting: Intact  Standing: Impaired  Standing - Static: Good  Standing - Dynamic : Fair  Ambulation/Gait Training:  Distance (ft): 150 Feet (ft)  Assistive Device: Brace/Splint;Gait belt  Ambulation - Level of Assistance: Stand-by asssistance        Gait Abnormalities: Decreased step clearance (flexed posture, crouched gait)        Base of Support: Narrowed     Speed/Courtney: Shuffled  Step Length: Right shortened;Left shortened                    Stairs:            Neuro Re-Education:    Therapeutic Exercises:     Pain:  Pain Scale 1: Numeric (0 - 10)  Pain Intensity 1: 4  Pain Location 1: Back  Pain Orientation 1: Lower  Pain Description 1: Aching  Pain Intervention(s) 1: Medication (see MAR)  Activity Tolerance:   Improving  Please refer to the flowsheet for vital signs taken during this treatment.   After treatment:   []    Patient left in no apparent distress sitting up in chair  [x]    Patient left in no apparent distress in bed  [x]    Call bell left within reach  [x]    Nursing notified  []    Caregiver present  []    Bed alarm activated    COMMUNICATION/COLLABORATION:   The patients plan of care was discussed with: Registered Nurse    Inna Cadena, PT   Time Calculation: 16 mins

## 2018-01-18 PROCEDURE — 74011250637 HC RX REV CODE- 250/637: Performed by: INTERNAL MEDICINE

## 2018-01-18 PROCEDURE — 65270000029 HC RM PRIVATE

## 2018-01-18 PROCEDURE — 74011250636 HC RX REV CODE- 250/636: Performed by: HOSPITALIST

## 2018-01-18 PROCEDURE — 74011250636 HC RX REV CODE- 250/636: Performed by: INTERNAL MEDICINE

## 2018-01-18 PROCEDURE — 97116 GAIT TRAINING THERAPY: CPT

## 2018-01-18 RX ADMIN — Medication 10 ML: at 06:50

## 2018-01-18 RX ADMIN — AZITHROMYCIN MONOHYDRATE 500 MG: 500 INJECTION, POWDER, LYOPHILIZED, FOR SOLUTION INTRAVENOUS at 10:31

## 2018-01-18 RX ADMIN — TRAZODONE HYDROCHLORIDE 150 MG: 100 TABLET ORAL at 21:44

## 2018-01-18 RX ADMIN — OXYCODONE AND ACETAMINOPHEN 1 TABLET: 5; 325 TABLET ORAL at 08:56

## 2018-01-18 RX ADMIN — HEPARIN SODIUM 5000 UNITS: 5000 INJECTION, SOLUTION INTRAVENOUS; SUBCUTANEOUS at 02:02

## 2018-01-18 RX ADMIN — OXYCODONE AND ACETAMINOPHEN 1 TABLET: 5; 325 TABLET ORAL at 02:02

## 2018-01-18 RX ADMIN — OXYCODONE AND ACETAMINOPHEN 1 TABLET: 5; 325 TABLET ORAL at 14:59

## 2018-01-18 RX ADMIN — TRAMADOL HYDROCHLORIDE 50 MG: 50 TABLET, FILM COATED ORAL at 16:10

## 2018-01-18 RX ADMIN — HEPARIN SODIUM 5000 UNITS: 5000 INJECTION, SOLUTION INTRAVENOUS; SUBCUTANEOUS at 17:00

## 2018-01-18 RX ADMIN — RISPERIDONE 2 MG: 1 TABLET ORAL at 21:44

## 2018-01-18 RX ADMIN — TEMAZEPAM 30 MG: 15 CAPSULE ORAL at 21:44

## 2018-01-18 RX ADMIN — Medication 10 ML: at 21:44

## 2018-01-18 RX ADMIN — HEPARIN SODIUM 5000 UNITS: 5000 INJECTION, SOLUTION INTRAVENOUS; SUBCUTANEOUS at 08:54

## 2018-01-18 RX ADMIN — CEFTRIAXONE SODIUM 1 G: 1 INJECTION, POWDER, FOR SOLUTION INTRAMUSCULAR; INTRAVENOUS at 08:54

## 2018-01-18 RX ADMIN — TRAMADOL HYDROCHLORIDE 50 MG: 50 TABLET, FILM COATED ORAL at 06:50

## 2018-01-18 NOTE — ROUTINE PROCESS
Bedside and Verbal shift change report given to Krystyna Bolton RN (oncoming nurse) by Pranav Hankins RN (offgoing nurse). Report included the following information SBAR.

## 2018-01-18 NOTE — PROGRESS NOTES
1/18/18, 8:56AM - Reviewed medical chart; [facility] X#[phone][facility] Called to follow up this morning upon his request.  He is attempting to work out a space at his [employer] location. He asked that this  call again later today. He is not requesting that H&P paperwork be filled out yet. Care Management will continue to follow his disposition. KANDICE Vidales    1/18/18, 2:00PM - Called to follow up with Mr. Jaime Porter from [facility]. He is not able to work out a first floor bedroom option for the patient. He suggested calling Jo Hazel, S#[phone], manager at [facility] and [facility], facilities that Marisa Love (psychiatrist) visits. They are not able to accept him into care as they do not have any beds available. She suggested calling Dang Kc at [facility],  R#[phone], another facility that [employer] visits. Called the patient's niece, Dariusz Soto to follow up on the list provided and request that she choose at least 10 facilities for this  to contact. Reached her voicemail; awaiting a call back. Care Management will continue to follow his disposition. KANDICE Vidales     1/18/18, 2:24PM - Reached out to Gaby Land from [facility], S#[phone]. He plans to purse options for group homes within a $1200 budget. Care Management will continue to follow his disposition. KANDICE Vidales     1/18/18, 4:09PM - [place] that Joelle Neumann owns several homes in the area within the patient's budget. Reached out to Kenji Arias at J#[phone]. She is willing to visit the patient tomorrow to assess his needs. Care Management will continue to follow his disposition.    KANDICE Vidales

## 2018-01-18 NOTE — PROGRESS NOTES
Hospitalist Progress Note  Saadia Candelario MD  Office: [phone]      Date of Service:  2018  NAME:  Lalo Montana  :  1940  MRN:  [id number]      Admission Summary:   78 y. o. male from [facility] with past medical history of sleep disorder presented to the ED from the [facility] via EMS with reported altered mental status.   Patient is a poor historian.  Majority of limited history is obtained per review of ED and medical records.   Per these collective reports, per EMS, patient became acutely confused tonight.   Patient reportedly is usually oriented but en route to ED, he was only oriented to self and place.   On arrival in the ED, patient complained of back pain.  Workup included CT head which was negative.  CT lumbar spine revealed an acute L1 fracture and L4, L5 central spinal stenosis.  CTA chest revealed left lower lobe pneumonia and emphysema but no PE. Orthopedic on board. Waiting for placement. Interval history / Subjective:   Seen at the bed side not in distress, No new changes overnight. Assessment & Plan:     AMS (altered mental status)  Improved and back to baseline   Alert and oriented to place, person and time   Waiting for placement, Care management on board     Pneumonia, LLL as per CT on admission   Started on Zosyn and levofloxacin at presentation   Currently on Zithromax and Ceftriaxone (will continue for a total of 7 days. Till )   Blood culture no growth to date   Trend fever and WBC curve     Acute on likely chronic hypercapnic/ hypoxemic respiratory failure- with compensated primary respiratory acidosis.    Resolved   Saturating > 90% on RA       Acute L1 fracture.     Consult orthopedic spine specialist for further evaluation and treatments.    Seen by Dr Cindy Aragon,   NAY brace recommended with a f/u appointment as an outpatient       L4-L5 spinal stenosis.    Plan as noted above.      Back pain.    Plan as above.      COPD.    Duoneb nebulizer treatments.      Elevated CK total.    Possible rhabdomyolysis. Resolved stop fluids. Code status: Full Code   DVT prophylaxis: will start with heparin 5000 sq every 8 hours     Care Plan discussed with: Patient/Family, Nurse and   Disposition: [facility]  Date Reviewed: 1/11/2018          Codes Class Noted POA    COPD (chronic obstructive pulmonary disease) ([facility] ICD-10-CM: J44.9  ICD-9-CM: 961  1/11/2018 Unknown        Back pain ICD-10-CM: M54.9  ICD-9-CM: 724.5  1/11/2018 Unknown        Altered mental status ICD-10-CM: R41.82  ICD-9-CM: 780.97  1/11/2018 Unknown        * (Principal)Pneumonia ICD-10-CM: J18.9  ICD-9-CM: 429  1/11/2018 Unknown        Lumbar spinal stenosis ICD-10-CM: M48.061  ICD-9-CM: 724.02  1/11/2018 Unknown        Closed L1 vertebral fracture ([facility] ICD-10-CM: S75.892F  ICD-9-CM: 805.4  1/11/2018 Unknown        Acute respiratory failure with hypoxemia [facility]) ICD-10-CM: J96.01  ICD-9-CM: 518.81  1/11/2018 Unknown                Review of Systems:   A comprehensive review of systems was negative except for that written in the HPI. Vital Signs:    Last 24hrs VS reviewed since prior progress note. Most recent are:  Visit Vitals    /68    Pulse 79    Temp 98.1 °F (36.7 °C)    Resp 16    Ht 5' 9\" (1.753 m)    Wt 60.2 kg (132 lb 11.2 oz)    SpO2 92%    BMI 19.6 kg/m2         Intake/Output Summary (Last 24 hours) at 01/18/18 0740  Last data filed at 01/18/18 7767   Gross per 24 hour   Intake              720 ml   Output              975 ml   Net             -255 ml        Physical Examination:             Constitutional:  No acute distress, cooperative, pleasant,    ENT:  Oral mucous moist, oropharynx benign.  Neck supple,    Resp:  soft crepitations over the bi-basilar area of lung fields otherwise clear and resonant chest    CV:  Regular rhythm, normal rate, no murmurs, gallops, rubs    GI: Soft, non distended, non tender normoactive bowel sounds, no hepatosplenomegaly     Musculoskeletal:  lower Back tenderness on palpation, not wearing back brace      Neurologic:  Moves all extremities. AAOx3, CN II-XII reviewed     Psych:  Good insight, Not anxious nor agitated. Data Review:    Review and/or order of clinical lab test  Review and/or order of tests in the medicine section of [facility]      Labs:     Recent Labs      01/16/18   0420   WBC  6.9   HGB  12.2   HCT  35.5*   PLT  191     Recent Labs      01/16/18   0420   NA  138   K  4.0   CL  103   CO2  27   BUN  13   CREA  0.86   GLU  98   CA  8.7     No results for input(s): SGOT, GPT, ALT, AP, TBIL, TBILI, TP, ALB, GLOB, GGT, AML, LPSE in the last 72 hours. No lab exists for component: AMYP, HLPSE  No results for input(s): INR, PTP, APTT in the last 72 hours. No lab exists for component: INREXT, INREXT   No results for input(s): FE, TIBC, PSAT, FERR in the last 72 hours. No results found for: FOL, RBCF   No results for input(s): PH, PCO2, PO2 in the last 72 hours. No results for input(s): CPK, CKNDX, TROIQ in the last 72 hours.     No lab exists for component: CPKMB  No results found for: CHOL, CHOLX, CHLST, CHOLV, HDL, LDL, LDLC, DLDLP, TGLX, TRIGL, TRIGP, CHHD, CHHDX  Lab Results   Component Value Date/Time    Glucose (POC) 136 01/17/2018 11:46 AM    Glucose (POC) 97 01/12/2018 10:18 PM    Glucose (POC) 138 01/12/2018 05:17 PM     Lab Results   Component Value Date/Time    Color DARK YELLOW 01/11/2018 02:27 AM    Appearance CLOUDY 01/11/2018 02:27 AM    Specific gravity 1.027 01/11/2018 02:27 AM    pH (UA) 5.0 01/11/2018 02:27 AM    Protein 30 01/11/2018 02:27 AM    Glucose NEGATIVE  01/11/2018 02:27 AM    Ketone NEGATIVE  01/11/2018 02:27 AM    Bilirubin NEGATIVE  01/11/2018 02:27 AM    Urobilinogen 0.2 01/11/2018 02:27 AM    Nitrites NEGATIVE  01/11/2018 02:27 AM    Leukocyte Esterase NEGATIVE  01/11/2018 02:27 AM    Epithelial cells FEW 01/11/2018 02:27 AM    Bacteria NEGATIVE  01/11/2018 02:27 AM    WBC 0-4 01/11/2018 02:27 AM    RBC 0-5 01/11/2018 02:27 AM         Medications Reviewed:     Current Facility-Administered Medications   Medication Dose Route Frequency    heparin (porcine) injection 5,000 Units  5,000 Units SubCUTAneous Q8H    cefTRIAXone (ROCEPHIN) 1 g/50 mL NS IVPB  1 g IntraVENous Q24H    azithromycin (ZITHROMAX) 500 mg in 0.9% sodium chloride (MBP/ADV) 250 mL  500 mg IntraVENous Q24H    polyethylene glycol (MIRALAX) packet 17 g  17 g Oral DAILY PRN    risperiDONE (RisperDAL) tablet 2 mg  2 mg Oral QHS    temazepam (RESTORIL) capsule 30 mg  30 mg Oral QHS    traMADol (ULTRAM) tablet 50 mg  50 mg Oral Q8H PRN    traZODone (DESYREL) tablet 150 mg  150 mg Oral QHS    oxyCODONE-acetaminophen (PERCOCET) 5-325 mg per tablet 1 Tab  1 Tab Oral Q6H PRN    sodium chloride (NS) flush 5-10 mL  5-10 mL IntraVENous Q8H    sodium chloride (NS) flush 5-10 mL  5-10 mL IntraVENous PRN     ______________________________________________________________________  EXPECTED LENGTH OF STAY: 4d 12h  ACTUAL LENGTH OF STAY:          7                 Avery Valencia MD

## 2018-01-18 NOTE — PROGRESS NOTES
Problem: Mobility Impaired (Adult and Pediatric)  Goal: *Acute Goals and Plan of Care (Insert Text)  Physical Therapy Goals  Initiated 1/11/2018  Goals reassessed 1/18/18 and remain appropriate over next 7 days  1. Patient will move from supine to sit and sit to supine  in bed with supervision/set-up within 7 day(s). MET  2. Patient will transfer from bed to chair and chair to bed with supervision/set-up using the least restrictive device within 7 day(s). 3.  Patient will perform sit to stand with supervision/set-up within 7 day(s). 4.  Patient will ambulate with supervision/set-up for 150 feet with the least restrictive device within 7 day(s). 5.  Patient will ascend/descend 12 stairs with 1 handrail(s) with supervision/set-up within 7 day(s). 6.  Patient will independently don and doff quick draw TLSO within 7 days. physical Therapy TREATMENT: WEEKLY REASSESSMENT  Patient: Mona Stanton [de-identified]78 y.o. male)  Date: 1/18/2018  Diagnosis: Pneumonia  Altered mental status  Closed L1 vertebral fracture (HCC)  Back pain  Acute respiratory failure with hypoxemia (HCC)  COPD (chronic obstructive pulmonary disease) (HCC)  Lumbar spinal stenosis Pneumonia       Precautions: Back, Fall, Bed Alarm ([place] B: reads lips; smokes)    ASSESSMENT:  Patient has met 1 goal to date, all others remain appropriate although patient is very close to supervision level. He continues to have no awareness of back precautions despite education. He is aware he needs to wear TLSO and requires min A for donning this date. Patient overall SBA for transfers and gait. Able to increase gait to 175 feet without AD and SBA. He ambulates with crouched posture and decreased heel strike B although unable to correct with cues. Able to doff brace independently. Will decrease frequency to 3 x week as patient appears to be nearing his baseline.     Patient's progression toward goals since last assessment: met 1 goal, progressing toward all other PLAN:  Goals have been updated based on progression since last assessment. Patient continues to benefit from skilled intervention to address the above impairments. Continue to follow the patient 3 times a week to address goals. Planned Interventions:  [x]              Bed Mobility Training             [x]       Neuromuscular Re-Education  [x]              Transfer Training                   []       Orthotic/Prosthetic Training  [x]              Gait Training                         []       Modalities  [x]              Therapeutic Exercises           []       Edema Management/Control  [x]              Therapeutic Activities            [x]       Patient and Family Training/Education  []              Other (comment):  Discharge Recommendations: Home Health at group home with assistance for brace PRN from staff  Further Equipment Recommendations for Discharge: None     SUBJECTIVE:   Patient stated I don't remember.     OBJECTIVE DATA SUMMARY:   Critical Behavior:  Neurologic State: Alert  Orientation Level: Oriented X4  Cognition: Appropriate safety awareness       Strength:                          Functional Mobility Training:  Bed Mobility:     Supine to Sit: Supervision  Sit to Supine: Supervision           Transfers:  Sit to Stand: Stand-by asssistance; Additional time  Stand to Sit: Stand-by asssistance  Stand Pivot Transfers: Stand-by asssistance                          Balance:  Sitting: Intact  Standing: Impaired  Standing - Static: Good  Standing - Dynamic : Fair  Ambulation/Gait Training:  Distance (ft): 175 Feet (ft)  Assistive Device: Brace/Splint;Gait belt  Ambulation - Level of Assistance: Stand-by asssistance        Gait Abnormalities: Decreased step clearance (crouched posture)        Base of Support: Narrowed     Speed/Courtney: Shuffled  Step Length: Right shortened;Left shortened                    Stairs:           Neuro Re-Education:    Therapeutic Exercises:     Pain:  Pain Scale 1: Numeric (0 - 10)  Pain Intensity 1: 6  Pain Location 1: Back  Pain Orientation 1: Lower  Pain Description 1: Aching  Pain Intervention(s) 1: Medication (see MAR)  Activity Tolerance:   Improving  Please refer to the flowsheet for vital signs taken during this treatment.   After treatment:   []  Patient left in no apparent distress sitting up in chair  [x]  Patient left in no apparent distress in bed  [x]  Call bell left within reach  [x]  Nursing notified  []  Caregiver present  [x]  Bed alarm activated    COMMUNICATION/COLLABORATION:   The patients plan of care was discussed with: Registered Nurse    Bard Nataly PT   Time Calculation: 12 mins

## 2018-01-19 PROCEDURE — 74011250636 HC RX REV CODE- 250/636: Performed by: HOSPITALIST

## 2018-01-19 PROCEDURE — 74011250636 HC RX REV CODE- 250/636: Performed by: INTERNAL MEDICINE

## 2018-01-19 PROCEDURE — 74011250637 HC RX REV CODE- 250/637: Performed by: INTERNAL MEDICINE

## 2018-01-19 PROCEDURE — 65270000029 HC RM PRIVATE

## 2018-01-19 RX ADMIN — TEMAZEPAM 30 MG: 15 CAPSULE ORAL at 22:00

## 2018-01-19 RX ADMIN — OXYCODONE AND ACETAMINOPHEN 1 TABLET: 5; 325 TABLET ORAL at 11:31

## 2018-01-19 RX ADMIN — Medication 10 ML: at 22:02

## 2018-01-19 RX ADMIN — TRAZODONE HYDROCHLORIDE 150 MG: 100 TABLET ORAL at 22:00

## 2018-01-19 RX ADMIN — CEFTRIAXONE SODIUM 1 G: 1 INJECTION, POWDER, FOR SOLUTION INTRAMUSCULAR; INTRAVENOUS at 09:30

## 2018-01-19 RX ADMIN — AZITHROMYCIN MONOHYDRATE 500 MG: 500 INJECTION, POWDER, LYOPHILIZED, FOR SOLUTION INTRAVENOUS at 10:33

## 2018-01-19 RX ADMIN — HEPARIN SODIUM 5000 UNITS: 5000 INJECTION, SOLUTION INTRAVENOUS; SUBCUTANEOUS at 01:35

## 2018-01-19 RX ADMIN — TRAMADOL HYDROCHLORIDE 50 MG: 50 TABLET, FILM COATED ORAL at 19:29

## 2018-01-19 RX ADMIN — HEPARIN SODIUM 5000 UNITS: 5000 INJECTION, SOLUTION INTRAVENOUS; SUBCUTANEOUS at 17:11

## 2018-01-19 RX ADMIN — Medication 10 ML: at 13:15

## 2018-01-19 RX ADMIN — OXYCODONE AND ACETAMINOPHEN 1 TABLET: 5; 325 TABLET ORAL at 05:19

## 2018-01-19 RX ADMIN — HEPARIN SODIUM 5000 UNITS: 5000 INJECTION, SOLUTION INTRAVENOUS; SUBCUTANEOUS at 09:00

## 2018-01-19 RX ADMIN — Medication 10 ML: at 05:19

## 2018-01-19 RX ADMIN — OXYCODONE AND ACETAMINOPHEN 1 TABLET: 5; 325 TABLET ORAL at 22:00

## 2018-01-19 RX ADMIN — RISPERIDONE 2 MG: 1 TABLET ORAL at 22:00

## 2018-01-19 NOTE — PROGRESS NOTES
Bedside shift change report given to Stevie Beltran (oncoming nurse) by Panchito Barraza (offgoing nurse). Report included the following information SBAR, Kardex, ED Summary, Intake/Output, MAR, Accordion and Recent Results.

## 2018-01-19 NOTE — PROGRESS NOTES
Hospitalist Progress Note  Juan Blanco MD  Office: [phone]      Date of Service:  2018  NAME:  Gloria Orourke  :  1940  MRN:  [id number]      Admission Summary:   78 y. o. male from [facility] with past medical history of sleep disorder presented to the ED from the [facility] via EMS with reported altered mental status.   Patient is a poor historian.  Majority of limited history is obtained per review of ED and medical records.   Per these collective reports, per EMS, patient became acutely confused tonight.   Patient reportedly is usually oriented but en route to ED, he was only oriented to self and place.   On arrival in the ED, patient complained of back pain.  Workup included CT head which was negative.  CT lumbar spine revealed an acute L1 fracture and L4, L5 central spinal stenosis.  CTA chest revealed left lower lobe pneumonia and emphysema but no PE. Orthopedic on board. Waiting for placement. Interval history / Subjective:   Seen at the bed side not in distress, No new changes overnight. Assessment & Plan:     AMS (altered mental status)  Improved and back to baseline   Alert and oriented to place, person and time   Waiting for placement, Care management on board     Pneumonia, LLL as per CT on admission   Started on Zosyn and levofloxacin at presentation   Currently on Zithromax and Ceftriaxone (will continue for a total of 7 days.  Till )   Blood culture no growth to date   No fever and white counts within normal limits     Acute on likely chronic hypercapnic/ hypoxemic respiratory failure- with compensated primary respiratory acidosis.    Resolved   Saturating > 90% on RA       Acute L1 fracture.     Consult orthopedic spine specialist for further evaluation and treatments.    Seen by Dr David Urbina,   TLSO brace recommended with a f/u appointment as an outpatient       L4-L5 spinal stenosis.    Plan as noted above.      Back pain.    Plan as above.      COPD.    Duoneb nebulizer treatments.      Elevated CK total.    Possible rhabdomyolysis. Resolved stop fluids. Code status: Full Code   DVT prophylaxis: will start with heparin 5000 sq every 8 hours     Care Plan discussed with: Patient/Family, Nurse and   Disposition: Awaiting Rehab facility placement, Care management on board      Hospital Problems  Date Reviewed: 1/11/2018          Codes Class Noted POA    COPD (chronic obstructive pulmonary disease) ([facility] ICD-10-CM: J44.9  ICD-9-CM: 496  1/11/2018 Unknown        Back pain ICD-10-CM: M54.9  ICD-9-CM: 724.5  1/11/2018 Unknown        Altered mental status ICD-10-CM: R41.82  ICD-9-CM: 780.97  1/11/2018 Unknown        * (Principal)Pneumonia ICD-10-CM: J18.9  ICD-9-CM: 114  1/11/2018 Unknown        Lumbar spinal stenosis ICD-10-CM: M48.061  ICD-9-CM: 724.02  1/11/2018 Unknown        Closed L1 vertebral fracture ([facility] ICD-10-CM: Q65.373Z  ICD-9-CM: 805.4  1/11/2018 Unknown        Acute respiratory failure with hypoxemia [facility]) ICD-10-CM: J96.01  ICD-9-CM: 518.81  1/11/2018 Unknown                Review of Systems:   A comprehensive review of systems was negative except for that written in the HPI. Vital Signs:    Last 24hrs VS reviewed since prior progress note. Most recent are:  Visit Vitals    /72    Pulse 86    Temp 98.1 °F (36.7 °C)    Resp 16    Ht 5' 9\" (1.753 m)    Wt 60.2 kg (132 lb 11.2 oz)    SpO2 94%    BMI 19.6 kg/m2         Intake/Output Summary (Last 24 hours) at 01/19/18 0736  Last data filed at 01/19/18 0413   Gross per 24 hour   Intake                0 ml   Output              900 ml   Net             -900 ml        Physical Examination:             Constitutional:  No acute distress, cooperative, pleasant,    ENT:  Oral mucous moist, oropharynx benign.  Neck supple,    Resp:  soft crepitations over the bi-basilar area of lung fields otherwise clear and resonant chest    CV: Regular rhythm, normal rate, no murmurs, gallops, rubs    GI:  Soft, non distended, non tender normoactive bowel sounds, no hepatosplenomegaly     Musculoskeletal:  lower Back tenderness on palpation, not wearing back brace      Neurologic:  Moves all extremities. AAOx3, CN II-XII reviewed     Psych:  Good insight, Not anxious nor agitated. Data Review:    Review and/or order of clinical lab test  Review and/or order of tests in the medicine section of [facility]      Labs:     No results for input(s): WBC, HGB, HCT, PLT, HGBEXT, HCTEXT, PLTEXT, HGBEXT, HCTEXT, PLTEXT in the last 72 hours. No results for input(s): NA, K, CL, CO2, BUN, CREA, GLU, CA, MG, PHOS, URICA in the last 72 hours. No results for input(s): SGOT, GPT, ALT, AP, TBIL, TBILI, TP, ALB, GLOB, GGT, AML, LPSE in the last 72 hours. No lab exists for component: AMYP, HLPSE  No results for input(s): INR, PTP, APTT in the last 72 hours. No lab exists for component: INREXT, INREXT   No results for input(s): FE, TIBC, PSAT, FERR in the last 72 hours. No results found for: FOL, RBCF   No results for input(s): PH, PCO2, PO2 in the last 72 hours. No results for input(s): CPK, CKNDX, TROIQ in the last 72 hours.     No lab exists for component: CPKMB  No results found for: CHOL, CHOLX, CHLST, CHOLV, HDL, LDL, LDLC, DLDLP, TGLX, TRIGL, TRIGP, CHHD, CHHDX  Lab Results   Component Value Date/Time    Glucose (POC) 136 01/17/2018 11:46 AM    Glucose (POC) 97 01/12/2018 10:18 PM    Glucose (POC) 138 01/12/2018 05:17 PM     Lab Results   Component Value Date/Time    Color DARK YELLOW 01/11/2018 02:27 AM    Appearance CLOUDY 01/11/2018 02:27 AM    Specific gravity 1.027 01/11/2018 02:27 AM    pH (UA) 5.0 01/11/2018 02:27 AM    Protein 30 01/11/2018 02:27 AM    Glucose NEGATIVE  01/11/2018 02:27 AM    Ketone NEGATIVE  01/11/2018 02:27 AM    Bilirubin NEGATIVE  01/11/2018 02:27 AM    Urobilinogen 0.2 01/11/2018 02:27 AM    Nitrites NEGATIVE  01/11/2018 02:27 AM Leukocyte Esterase NEGATIVE  01/11/2018 02:27 AM    Epithelial cells FEW 01/11/2018 02:27 AM    Bacteria NEGATIVE  01/11/2018 02:27 AM    WBC 0-4 01/11/2018 02:27 AM    RBC 0-5 01/11/2018 02:27 AM         Medications Reviewed:     Current Facility-Administered Medications   Medication Dose Route Frequency    heparin (porcine) injection 5,000 Units  5,000 Units SubCUTAneous Q8H    cefTRIAXone (ROCEPHIN) 1 g/50 mL NS IVPB  1 g IntraVENous Q24H    azithromycin (ZITHROMAX) 500 mg in 0.9% sodium chloride (MBP/ADV) 250 mL  500 mg IntraVENous Q24H    polyethylene glycol (MIRALAX) packet 17 g  17 g Oral DAILY PRN    risperiDONE (RisperDAL) tablet 2 mg  2 mg Oral QHS    temazepam (RESTORIL) capsule 30 mg  30 mg Oral QHS    traMADol (ULTRAM) tablet 50 mg  50 mg Oral Q8H PRN    traZODone (DESYREL) tablet 150 mg  150 mg Oral QHS    oxyCODONE-acetaminophen (PERCOCET) 5-325 mg per tablet 1 Tab  1 Tab Oral Q6H PRN    sodium chloride (NS) flush 5-10 mL  5-10 mL IntraVENous Q8H    sodium chloride (NS) flush 5-10 mL  5-10 mL IntraVENous PRN     ______________________________________________________________________  EXPECTED LENGTH OF STAY: 4d 12h  ACTUAL LENGTH OF STAY:          8                 Trisha Stevens MD

## 2018-01-19 NOTE — PROGRESS NOTES
Bedside shift change report given to Cynthia Dunn RN (oncoming nurse) by Jada Smallwood RN (offgoing nurse). Report included the following information SBAR, Kardex, Intake/Output and MAR.

## 2018-01-19 NOTE — PROGRESS NOTES
Patient's niece, Veto Leigh, called to notify this  that she is still trying to work with Suzie Smith regarding a possible room at [facility]. Mr. Sandoval German' indicated to this  that this would not be possibility, however. Veto Leigh has a meeting with Joelle Neumann of [facility], on Monday at 2:30PM at [facility] (G#[phone]). Care Management will continue to follow his disposition.    KANDICE Vidales

## 2018-01-20 LAB
GLUCOSE BLD STRIP.AUTO-MCNC: 91 MG/DL (ref 65–100)
SERVICE CMNT-IMP: NORMAL

## 2018-01-20 PROCEDURE — 74011000258 HC RX REV CODE- 258: Performed by: HOSPITALIST

## 2018-01-20 PROCEDURE — 74011250637 HC RX REV CODE- 250/637: Performed by: INTERNAL MEDICINE

## 2018-01-20 PROCEDURE — 74011250636 HC RX REV CODE- 250/636: Performed by: INTERNAL MEDICINE

## 2018-01-20 PROCEDURE — 65270000029 HC RM PRIVATE

## 2018-01-20 PROCEDURE — 82962 GLUCOSE BLOOD TEST: CPT

## 2018-01-20 PROCEDURE — 74011250636 HC RX REV CODE- 250/636: Performed by: HOSPITALIST

## 2018-01-20 RX ADMIN — HEPARIN SODIUM 5000 UNITS: 5000 INJECTION, SOLUTION INTRAVENOUS; SUBCUTANEOUS at 09:53

## 2018-01-20 RX ADMIN — TRAZODONE HYDROCHLORIDE 150 MG: 100 TABLET ORAL at 21:36

## 2018-01-20 RX ADMIN — TRAMADOL HYDROCHLORIDE 50 MG: 50 TABLET, FILM COATED ORAL at 09:52

## 2018-01-20 RX ADMIN — OXYCODONE AND ACETAMINOPHEN 1 TABLET: 5; 325 TABLET ORAL at 16:51

## 2018-01-20 RX ADMIN — HEPARIN SODIUM 5000 UNITS: 5000 INJECTION, SOLUTION INTRAVENOUS; SUBCUTANEOUS at 00:32

## 2018-01-20 RX ADMIN — Medication 10 ML: at 11:32

## 2018-01-20 RX ADMIN — CEFTRIAXONE 1 G: 1 INJECTION, POWDER, FOR SOLUTION INTRAMUSCULAR; INTRAVENOUS at 09:52

## 2018-01-20 RX ADMIN — Medication 10 ML: at 21:37

## 2018-01-20 RX ADMIN — RISPERIDONE 2 MG: 1 TABLET ORAL at 21:36

## 2018-01-20 RX ADMIN — HEPARIN SODIUM 5000 UNITS: 5000 INJECTION, SOLUTION INTRAVENOUS; SUBCUTANEOUS at 16:51

## 2018-01-20 RX ADMIN — Medication 10 ML: at 16:51

## 2018-01-20 RX ADMIN — TEMAZEPAM 30 MG: 15 CAPSULE ORAL at 21:36

## 2018-01-20 RX ADMIN — AZITHROMYCIN MONOHYDRATE 500 MG: 500 INJECTION, POWDER, LYOPHILIZED, FOR SOLUTION INTRAVENOUS at 11:32

## 2018-01-20 NOTE — PROGRESS NOTES
Bedside shift change report given to JASMYN Allen (oncoming nurse) by Tonya Garcia RN (offgoing nurse). Report included the following information SBAR, Intake/Output and MAR.

## 2018-01-20 NOTE — PROGRESS NOTES
Hospitalist Progress Note  Trisha Stevens MD  Office: [phone]      Date of Service:  2018  NAME:  Jessica Garcia  :  1940  MRN:  [id number]      Admission Summary:   78 y. o. male from [facility] with past medical history of sleep disorder presented to the ED from the [facility] via EMS with reported altered mental status.   Patient is a poor historian.  Majority of limited history is obtained per review of ED and medical records.   Per these collective reports, per EMS, patient became acutely confused tonight.   Patient reportedly is usually oriented but en route to ED, he was only oriented to self and place.   On arrival in the ED, patient complained of back pain.  Workup included CT head which was negative.  CT lumbar spine revealed an acute L1 fracture and L4, L5 central spinal stenosis.  CTA chest revealed left lower lobe pneumonia and emphysema but no PE. Orthopedic on board. Waiting for placement. Interval history / Subjective:   Seen at the bed side, no new changes, not in respiratory distress. Assessment & Plan:     AMS (altered mental status)  Improved and back to baseline   Alert and oriented to place, person and time   Waiting for placement, Care management on board     Pneumonia, LLL as per CT on admission   Started on Zosyn and levofloxacin at presentation   Currently on Zithromax and Ceftriaxone (will continue for a total of 7 days.  Till )   Blood culture no growth to date   No fever and white counts within normal limits     Acute on likely chronic hypercapnic/ hypoxemic respiratory failure- with compensated primary respiratory acidosis.    Resolved   Saturating > 90% on RA       Acute L1 fracture.     Consult orthopedic spine specialist for further evaluation and treatments.    Seen by BENI EstebanO brace recommended with a f/u appointment as an outpatient       L4-L5 spinal stenosis.    Plan as noted above.      Back pain.    Plan as above.      COPD.    Duoneb nebulizer treatments.      Elevated CK total.    Possible rhabdomyolysis. Resolved stop fluids. Code status: Full Code   DVT prophylaxis: will start with heparin 5000 sq every 8 hours     Care Plan discussed with: Patient/Family, Nurse and   Disposition: Awaiting Rehab facility placement, Care management on board      Hospital Problems  Date Reviewed: 1/11/2018          Codes Class Noted POA    COPD (chronic obstructive pulmonary disease) ([facility] ICD-10-CM: J44.9  ICD-9-CM: 496  1/11/2018 Unknown        Back pain ICD-10-CM: M54.9  ICD-9-CM: 724.5  1/11/2018 Unknown        Altered mental status ICD-10-CM: R41.82  ICD-9-CM: 780.97  1/11/2018 Unknown        * (Principal)Pneumonia ICD-10-CM: J18.9  ICD-9-CM: 497  1/11/2018 Unknown        Lumbar spinal stenosis ICD-10-CM: M48.061  ICD-9-CM: 724.02  1/11/2018 Unknown        Closed L1 vertebral fracture ([facility] ICD-10-CM: S07.823H  ICD-9-CM: 805.4  1/11/2018 Unknown        Acute respiratory failure with hypoxemia [facility]) ICD-10-CM: J96.01  ICD-9-CM: 518.81  1/11/2018 Unknown                Review of Systems:   A comprehensive review of systems was negative except for that written in the HPI. Vital Signs:    Last 24hrs VS reviewed since prior progress note. Most recent are:  Visit Vitals    /62 (BP 1 Location: Right arm, BP Patient Position: At rest)    Pulse 81    Temp 99 °F (37.2 °C)    Resp 24    Ht 5' 9\" (1.753 m)    Wt 61.5 kg (135 lb 9.6 oz)    SpO2 94%    BMI 20.02 kg/m2         Intake/Output Summary (Last 24 hours) at 01/20/18 1651  Last data filed at 01/20/18 1306   Gross per 24 hour   Intake              550 ml   Output              600 ml   Net              -50 ml        Physical Examination:             Constitutional:  No acute distress, cooperative, pleasant,    ENT:  Oral mucous moist, oropharynx benign.  Neck supple,    Resp:  soft crepitations over the bi-basilar area of lung fields otherwise clear and resonant chest    CV:  Regular rhythm, normal rate, no murmurs, gallops, rubs    GI:  Soft, non distended, non tender normoactive bowel sounds, no hepatosplenomegaly     Musculoskeletal:  lower Back tenderness on palpation, not wearing back brace      Neurologic:  Moves all extremities. AAOx3, CN II-XII reviewed     Psych:  Good insight, Not anxious nor agitated. Data Review:    Review and/or order of clinical lab test  Review and/or order of tests in the medicine section of [facility]      Labs:     No results for input(s): WBC, HGB, HCT, PLT, HGBEXT, HCTEXT, PLTEXT, HGBEXT, HCTEXT, PLTEXT in the last 72 hours. No results for input(s): NA, K, CL, CO2, BUN, CREA, GLU, CA, MG, PHOS, URICA in the last 72 hours. No results for input(s): SGOT, GPT, ALT, AP, TBIL, TBILI, TP, ALB, GLOB, GGT, AML, LPSE in the last 72 hours. No lab exists for component: AMYP, HLPSE  No results for input(s): INR, PTP, APTT in the last 72 hours. No lab exists for component: INREXT, INREXT   No results for input(s): FE, TIBC, PSAT, FERR in the last 72 hours. No results found for: FOL, RBCF   No results for input(s): PH, PCO2, PO2 in the last 72 hours. No results for input(s): CPK, CKNDX, TROIQ in the last 72 hours.     No lab exists for component: CPKMB  No results found for: CHOL, CHOLX, CHLST, CHOLV, HDL, LDL, LDLC, DLDLP, TGLX, TRIGL, TRIGP, CHHD, CHHDX  Lab Results   Component Value Date/Time    Glucose (POC) 91 01/20/2018 06:52 AM    Glucose (POC) 136 01/17/2018 11:46 AM    Glucose (POC) 97 01/12/2018 10:18 PM    Glucose (POC) 138 01/12/2018 05:17 PM     Lab Results   Component Value Date/Time    Color DARK YELLOW 01/11/2018 02:27 AM    Appearance CLOUDY 01/11/2018 02:27 AM    Specific gravity 1.027 01/11/2018 02:27 AM    pH (UA) 5.0 01/11/2018 02:27 AM    Protein 30 01/11/2018 02:27 AM    Glucose NEGATIVE  01/11/2018 02:27 AM    Ketone NEGATIVE  01/11/2018 02:27 AM    Bilirubin NEGATIVE 01/11/2018 02:27 AM    Urobilinogen 0.2 01/11/2018 02:27 AM    Nitrites NEGATIVE  01/11/2018 02:27 AM    Leukocyte Esterase NEGATIVE  01/11/2018 02:27 AM    Epithelial cells FEW 01/11/2018 02:27 AM    Bacteria NEGATIVE  01/11/2018 02:27 AM    WBC 0-4 01/11/2018 02:27 AM    RBC 0-5 01/11/2018 02:27 AM         Medications Reviewed:     Current Facility-Administered Medications   Medication Dose Route Frequency    cefTRIAXone (ROCEPHIN) 1 g in 0.9% sodium chloride (MBP/ADV) 50 mL  1 g IntraVENous Q24H    heparin (porcine) injection 5,000 Units  5,000 Units SubCUTAneous Q8H    azithromycin (ZITHROMAX) 500 mg in 0.9% sodium chloride (MBP/ADV) 250 mL  500 mg IntraVENous Q24H    polyethylene glycol (MIRALAX) packet 17 g  17 g Oral DAILY PRN    risperiDONE (RisperDAL) tablet 2 mg  2 mg Oral QHS    temazepam (RESTORIL) capsule 30 mg  30 mg Oral QHS    traMADol (ULTRAM) tablet 50 mg  50 mg Oral Q8H PRN    traZODone (DESYREL) tablet 150 mg  150 mg Oral QHS    oxyCODONE-acetaminophen (PERCOCET) 5-325 mg per tablet 1 Tab  1 Tab Oral Q6H PRN    sodium chloride (NS) flush 5-10 mL  5-10 mL IntraVENous Q8H    sodium chloride (NS) flush 5-10 mL  5-10 mL IntraVENous PRN     ______________________________________________________________________  EXPECTED LENGTH OF STAY: 4d 12h  ACTUAL LENGTH OF STAY:          9                 Avery Valencia MD

## 2018-01-21 PROCEDURE — 74011250636 HC RX REV CODE- 250/636: Performed by: HOSPITALIST

## 2018-01-21 PROCEDURE — 74011250637 HC RX REV CODE- 250/637: Performed by: INTERNAL MEDICINE

## 2018-01-21 PROCEDURE — 74011250636 HC RX REV CODE- 250/636: Performed by: INTERNAL MEDICINE

## 2018-01-21 PROCEDURE — 74011000258 HC RX REV CODE- 258: Performed by: HOSPITALIST

## 2018-01-21 PROCEDURE — 65270000032 HC RM SEMIPRIVATE

## 2018-01-21 RX ADMIN — HEPARIN SODIUM 5000 UNITS: 5000 INJECTION, SOLUTION INTRAVENOUS; SUBCUTANEOUS at 17:37

## 2018-01-21 RX ADMIN — Medication 10 ML: at 09:59

## 2018-01-21 RX ADMIN — TEMAZEPAM 30 MG: 15 CAPSULE ORAL at 21:02

## 2018-01-21 RX ADMIN — CEFTRIAXONE 1 G: 1 INJECTION, POWDER, FOR SOLUTION INTRAMUSCULAR; INTRAVENOUS at 09:54

## 2018-01-21 RX ADMIN — TRAZODONE HYDROCHLORIDE 150 MG: 100 TABLET ORAL at 21:02

## 2018-01-21 RX ADMIN — TRAMADOL HYDROCHLORIDE 50 MG: 50 TABLET, FILM COATED ORAL at 10:57

## 2018-01-21 RX ADMIN — AZITHROMYCIN MONOHYDRATE 500 MG: 500 INJECTION, POWDER, LYOPHILIZED, FOR SOLUTION INTRAVENOUS at 10:44

## 2018-01-21 RX ADMIN — TRAMADOL HYDROCHLORIDE 50 MG: 50 TABLET, FILM COATED ORAL at 20:59

## 2018-01-21 RX ADMIN — Medication 10 ML: at 21:04

## 2018-01-21 RX ADMIN — RISPERIDONE 2 MG: 1 TABLET ORAL at 21:02

## 2018-01-21 RX ADMIN — OXYCODONE AND ACETAMINOPHEN 1 TABLET: 5; 325 TABLET ORAL at 02:22

## 2018-01-21 RX ADMIN — HEPARIN SODIUM 5000 UNITS: 5000 INJECTION, SOLUTION INTRAVENOUS; SUBCUTANEOUS at 01:02

## 2018-01-21 RX ADMIN — HEPARIN SODIUM 5000 UNITS: 5000 INJECTION, SOLUTION INTRAVENOUS; SUBCUTANEOUS at 09:54

## 2018-01-21 RX ADMIN — OXYCODONE AND ACETAMINOPHEN 1 TABLET: 5; 325 TABLET ORAL at 17:37

## 2018-01-21 NOTE — PROGRESS NOTES
Hospitalist Progress Note  Indira Nick MD  Office: [phone]      Date of Service:  2018  NAME:  Jimmy Chavis  :  1940  MRN:  [id number]      Admission Summary:   78 y. o. male from [facility] with past medical history of sleep disorder presented to the ED from the [facility] via EMS with reported altered mental status.   Patient is a poor historian.  Majority of limited history is obtained per review of ED and medical records.   Per these collective reports, per EMS, patient became acutely confused tonight.   Patient reportedly is usually oriented but en route to ED, he was only oriented to self and place.   On arrival in the ED, patient complained of back pain.  Workup included CT head which was negative.  CT lumbar spine revealed an acute L1 fracture and L4, L5 central spinal stenosis.  CTA chest revealed left lower lobe pneumonia and emphysema but no PE. Orthopedic on board. Waiting for placement. Interval history / Subjective:   Seen at the bed side, no new changes. Assessment & Plan:     AMS (altered mental status)  Improved and back to baseline   Alert and oriented to place, person and time   Waiting for placement, Care management on board     Pneumonia, LLL as per CT on admission   Started on Zosyn and levofloxacin at presentation   Stopped Zithromax and Zosyn ()   Blood culture no growth to date   No fever and white counts within normal limits     Acute on likely chronic hypercapnic/ hypoxemic respiratory failure- with compensated primary respiratory acidosis.    Resolved   Saturating > 90% on RA       Acute L1 fracture.     Consult orthopedic spine specialist for further evaluation and treatments.    Seen by BENI OakleyO brace recommended with a f/u appointment as an outpatient   Patient non compliant       L4-L5 spinal stenosis.    Plan as noted above.      Back pain.    Plan as above.      COPD.    Duoneb nebulizer treatments.      Elevated CK total.    Possible rhabdomyolysis. Resolved stop fluids. Code status: Full Code   DVT prophylaxis: will start with heparin 5000 sq every 8 hours     Care Plan discussed with: Patient/Family, Nurse and   Disposition: Awaiting Rehab facility placement, Care management on board      Hospital Problems  Date Reviewed: 1/11/2018          Codes Class Noted POA    COPD (chronic obstructive pulmonary disease) ([facility] ICD-10-CM: J44.9  ICD-9-CM: 496  1/11/2018 Unknown        Back pain ICD-10-CM: M54.9  ICD-9-CM: 724.5  1/11/2018 Unknown        Altered mental status ICD-10-CM: R41.82  ICD-9-CM: 780.97  1/11/2018 Unknown        * (Principal)Pneumonia ICD-10-CM: J18.9  ICD-9-CM: 444  1/11/2018 Unknown        Lumbar spinal stenosis ICD-10-CM: M48.061  ICD-9-CM: 724.02  1/11/2018 Unknown        Closed L1 vertebral fracture ([facility] ICD-10-CM: Z68.001P  ICD-9-CM: 805.4  1/11/2018 Unknown        Acute respiratory failure with hypoxemia [facility]) ICD-10-CM: J96.01  ICD-9-CM: 518.81  1/11/2018 Unknown                Review of Systems:   A comprehensive review of systems was negative except for that written in the HPI. Vital Signs:    Last 24hrs VS reviewed since prior progress note. Most recent are:  Visit Vitals    /71 (BP 1 Location: Right arm, BP Patient Position: At rest)    Pulse 75    Temp 98.4 °F (36.9 °C)    Resp 16    Ht 5' 9\" (1.753 m)    Wt 61.5 kg (135 lb 9.6 oz)    SpO2 95%    BMI 20.02 kg/m2         Intake/Output Summary (Last 24 hours) at 01/21/18 1544  Last data filed at 01/21/18 0105   Gross per 24 hour   Intake                0 ml   Output              425 ml   Net             -425 ml        Physical Examination:             Constitutional:  No acute distress, cooperative, pleasant,    ENT:  Oral mucous moist, oropharynx benign.  Neck supple,    Resp:  soft crepitations over the bi-basilar area of lung fields otherwise clear and resonant chest CV:  Regular rhythm, normal rate, no murmurs, gallops, rubs    GI:  Soft, non distended, non tender normoactive bowel sounds, no hepatosplenomegaly     Musculoskeletal:  lower Back tenderness on palpation, not wearing back brace      Neurologic:  Moves all extremities. AAOx3, CN II-XII reviewed     Psych:  Good insight, Not anxious nor agitated. Data Review:    Review and/or order of clinical lab test  Review and/or order of tests in the medicine section of [facility]      Labs:     No results for input(s): WBC, HGB, HCT, PLT, HGBEXT, HCTEXT, PLTEXT, HGBEXT, HCTEXT, PLTEXT in the last 72 hours. No results for input(s): NA, K, CL, CO2, BUN, CREA, GLU, CA, MG, PHOS, URICA in the last 72 hours. No results for input(s): SGOT, GPT, ALT, AP, TBIL, TBILI, TP, ALB, GLOB, GGT, AML, LPSE in the last 72 hours. No lab exists for component: AMYP, HLPSE  No results for input(s): INR, PTP, APTT in the last 72 hours. No lab exists for component: INREXT, INREXT   No results for input(s): FE, TIBC, PSAT, FERR in the last 72 hours. No results found for: FOL, RBCF   No results for input(s): PH, PCO2, PO2 in the last 72 hours. No results for input(s): CPK, CKNDX, TROIQ in the last 72 hours.     No lab exists for component: CPKMB  No results found for: CHOL, CHOLX, CHLST, CHOLV, HDL, LDL, LDLC, DLDLP, TGLX, TRIGL, TRIGP, CHHD, CHHDX  Lab Results   Component Value Date/Time    Glucose (POC) 91 01/20/2018 06:52 AM    Glucose (POC) 136 01/17/2018 11:46 AM    Glucose (POC) 97 01/12/2018 10:18 PM    Glucose (POC) 138 01/12/2018 05:17 PM     Lab Results   Component Value Date/Time    Color DARK YELLOW 01/11/2018 02:27 AM    Appearance CLOUDY 01/11/2018 02:27 AM    Specific gravity 1.027 01/11/2018 02:27 AM    pH (UA) 5.0 01/11/2018 02:27 AM    Protein 30 01/11/2018 02:27 AM    Glucose NEGATIVE  01/11/2018 02:27 AM    Ketone NEGATIVE  01/11/2018 02:27 AM    Bilirubin NEGATIVE  01/11/2018 02:27 AM    Urobilinogen 0.2 01/11/2018 02:27 AM    Nitrites NEGATIVE  01/11/2018 02:27 AM    Leukocyte Esterase NEGATIVE  01/11/2018 02:27 AM    Epithelial cells FEW 01/11/2018 02:27 AM    Bacteria NEGATIVE  01/11/2018 02:27 AM    WBC 0-4 01/11/2018 02:27 AM    RBC 0-5 01/11/2018 02:27 AM         Medications Reviewed:     Current Facility-Administered Medications   Medication Dose Route Frequency    cefTRIAXone (ROCEPHIN) 1 g in 0.9% sodium chloride (MBP/ADV) 50 mL  1 g IntraVENous Q24H    heparin (porcine) injection 5,000 Units  5,000 Units SubCUTAneous Q8H    azithromycin (ZITHROMAX) 500 mg in 0.9% sodium chloride (MBP/ADV) 250 mL  500 mg IntraVENous Q24H    polyethylene glycol (MIRALAX) packet 17 g  17 g Oral DAILY PRN    risperiDONE (RisperDAL) tablet 2 mg  2 mg Oral QHS    temazepam (RESTORIL) capsule 30 mg  30 mg Oral QHS    traMADol (ULTRAM) tablet 50 mg  50 mg Oral Q8H PRN    traZODone (DESYREL) tablet 150 mg  150 mg Oral QHS    oxyCODONE-acetaminophen (PERCOCET) 5-325 mg per tablet 1 Tab  1 Tab Oral Q6H PRN    sodium chloride (NS) flush 5-10 mL  5-10 mL IntraVENous Q8H    sodium chloride (NS) flush 5-10 mL  5-10 mL IntraVENous PRN     ______________________________________________________________________  EXPECTED LENGTH OF STAY: 4d 12h  ACTUAL LENGTH OF STAY:          10                 Avery Valencia MD

## 2018-01-21 NOTE — PROGRESS NOTES
Bedside shift change report given to Gordy Carroll RN (oncoming nurse) by Prabha Canchola RN (offgoing nurse). Report included the following information SBAR, Kardex, Intake/Output and Recent Results.

## 2018-01-22 PROCEDURE — 97116 GAIT TRAINING THERAPY: CPT

## 2018-01-22 PROCEDURE — 65270000032 HC RM SEMIPRIVATE

## 2018-01-22 PROCEDURE — 74011250637 HC RX REV CODE- 250/637: Performed by: INTERNAL MEDICINE

## 2018-01-22 PROCEDURE — 97535 SELF CARE MNGMENT TRAINING: CPT

## 2018-01-22 PROCEDURE — 74011250636 HC RX REV CODE- 250/636: Performed by: INTERNAL MEDICINE

## 2018-01-22 RX ADMIN — TRAZODONE HYDROCHLORIDE 150 MG: 100 TABLET ORAL at 21:11

## 2018-01-22 RX ADMIN — RISPERIDONE 2 MG: 1 TABLET ORAL at 21:11

## 2018-01-22 RX ADMIN — TRAMADOL HYDROCHLORIDE 50 MG: 50 TABLET, FILM COATED ORAL at 21:14

## 2018-01-22 RX ADMIN — OXYCODONE AND ACETAMINOPHEN 1 TABLET: 5; 325 TABLET ORAL at 16:54

## 2018-01-22 RX ADMIN — Medication 10 ML: at 07:14

## 2018-01-22 RX ADMIN — Medication 10 ML: at 15:25

## 2018-01-22 RX ADMIN — TEMAZEPAM 30 MG: 15 CAPSULE ORAL at 21:11

## 2018-01-22 RX ADMIN — OXYCODONE AND ACETAMINOPHEN 1 TABLET: 5; 325 TABLET ORAL at 10:35

## 2018-01-22 RX ADMIN — HEPARIN SODIUM 5000 UNITS: 5000 INJECTION, SOLUTION INTRAVENOUS; SUBCUTANEOUS at 09:02

## 2018-01-22 RX ADMIN — TRAMADOL HYDROCHLORIDE 50 MG: 50 TABLET, FILM COATED ORAL at 07:13

## 2018-01-22 RX ADMIN — HEPARIN SODIUM 5000 UNITS: 5000 INJECTION, SOLUTION INTRAVENOUS; SUBCUTANEOUS at 00:09

## 2018-01-22 RX ADMIN — HEPARIN SODIUM 5000 UNITS: 5000 INJECTION, SOLUTION INTRAVENOUS; SUBCUTANEOUS at 16:45

## 2018-01-22 RX ADMIN — Medication 10 ML: at 21:12

## 2018-01-22 NOTE — PROGRESS NOTES
Hospitalist Progress Note  Tramaine Jimenez MD  Office: [phone]      Date of Service:  2018  NAME:  Abraham Abraham  :  1940  MRN:  [id number]      Admission Summary:   78 y. o. male from [facility] with past medical history of sleep disorder presented to the ED from the [facility] via EMS with reported altered mental status.   Patient is a poor historian.  Majority of limited history is obtained per review of ED and medical records.   Per these collective reports, per EMS, patient became acutely confused tonight.   Patient reportedly is usually oriented but en route to ED, he was only oriented to self and place.   On arrival in the ED, patient complained of back pain.  Workup included CT head which was negative.  CT lumbar spine revealed an acute L1 fracture and L4, L5 central spinal stenosis.  CTA chest revealed left lower lobe pneumonia and emphysema but no PE. Orthopedic on board. Waiting for placement. Interval history / Subjective:   Seen at the bed side, no new changes. Plan of care discussed with his niece Ms Brittanie Guerrier. Assessment & Plan:     AMS (altered mental status)  Improved and back to baseline   Alert and oriented to place, person and time   Waiting for placement, Care management on board   Plan of care discussed with Ms. Madison for > 20 minutes. Explained orthopedic spine follow up plan once patient is discharged.       Pneumonia, LLL as per CT on admission   Started on Zosyn and levofloxacin at presentation   Stopped Zithromax and Zosyn ()   Blood culture no growth to date   No fever and white counts within normal limits     Acute on likely chronic hypercapnic/ hypoxemic respiratory failure- with compensated primary respiratory acidosis.    Resolved   Saturating > 90% on RA       Acute L1 fracture.     Consult orthopedic spine specialist for further evaluation and treatments.    Seen by NAY Friedman recommended with a f/u appointment as an outpatient   Patient non compliant concerning wearing brace       L4-L5 spinal stenosis.    Plan as noted above.      Back pain.    Plan as above.      COPD.    Duoneb nebulizer treatments.      Elevated CK total.    Possible rhabdomyolysis. Resolved stop fluids    Code status: Full Code   DVT prophylaxis: will start with heparin 5000 sq every 8 hours     Care Plan discussed with: Patient/Family, Nurse and   Disposition: Awaiting Rehab facility placement, Care management on board      Hospital Problems  Date Reviewed: 1/11/2018          Codes Class Noted POA    COPD (chronic obstructive pulmonary disease) ([facility] ICD-10-CM: J44.9  ICD-9-CM: 496  1/11/2018 Unknown        Back pain ICD-10-CM: M54.9  ICD-9-CM: 724.5  1/11/2018 Unknown        Altered mental status ICD-10-CM: R41.82  ICD-9-CM: 780.97  1/11/2018 Unknown        * (Principal)Pneumonia ICD-10-CM: J18.9  ICD-9-CM: 856  1/11/2018 Unknown        Lumbar spinal stenosis ICD-10-CM: M48.061  ICD-9-CM: 724.02  1/11/2018 Unknown        Closed L1 vertebral fracture ([facility] ICD-10-CM: S32.019A  ICD-9-CM: 805.4  1/11/2018 Unknown        Acute respiratory failure with hypoxemia [facility]) ICD-10-CM: J96.01  ICD-9-CM: 518.81  1/11/2018 Unknown                Review of Systems:   A comprehensive review of systems was negative except for that written in the HPI. Vital Signs:    Last 24hrs VS reviewed since prior progress note.  Most recent are:  Visit Vitals    /65    Pulse 100    Temp 98.7 °F (37.1 °C)    Resp 18    Ht 5' 9\" (1.753 m)    Wt 61.5 kg (135 lb 9.6 oz)    SpO2 94%    BMI 20.02 kg/m2         Intake/Output Summary (Last 24 hours) at 01/22/18 1539  Last data filed at 01/22/18 1526   Gross per 24 hour   Intake                0 ml   Output              750 ml   Net             -750 ml        Physical Examination:             Constitutional:  No acute distress, cooperative, pleasant,    ENT:  Oral mucous moist, oropharynx benign. Neck supple,    Resp:  soft crepitations over the bi-basilar area of lung fields otherwise clear and resonant chest    CV:  Regular rhythm, normal rate, no murmurs, gallops, rubs    GI:  Soft, non distended, non tender normoactive bowel sounds, no hepatosplenomegaly     Musculoskeletal:  lower Back tenderness on palpation, not wearing back brace      Neurologic:  Moves all extremities. AAOx3, CN II-XII reviewed     Psych:  Good insight, Not anxious nor agitated. Data Review:    Review and/or order of clinical lab test  Review and/or order of tests in the medicine section of [facility]      Labs:     No results for input(s): WBC, HGB, HCT, PLT, HGBEXT, HCTEXT, PLTEXT, HGBEXT, HCTEXT, PLTEXT in the last 72 hours. No results for input(s): NA, K, CL, CO2, BUN, CREA, GLU, CA, MG, PHOS, URICA in the last 72 hours. No results for input(s): SGOT, GPT, ALT, AP, TBIL, TBILI, TP, ALB, GLOB, GGT, AML, LPSE in the last 72 hours. No lab exists for component: AMYP, HLPSE  No results for input(s): INR, PTP, APTT in the last 72 hours. No lab exists for component: INREXT, INREXT   No results for input(s): FE, TIBC, PSAT, FERR in the last 72 hours. No results found for: FOL, RBCF   No results for input(s): PH, PCO2, PO2 in the last 72 hours. No results for input(s): CPK, CKNDX, TROIQ in the last 72 hours.     No lab exists for component: CPKMB  No results found for: CHOL, CHOLX, CHLST, CHOLV, HDL, LDL, LDLC, DLDLP, TGLX, TRIGL, TRIGP, CHHD, CHHDX  Lab Results   Component Value Date/Time    Glucose (POC) 91 01/20/2018 06:52 AM    Glucose (POC) 136 01/17/2018 11:46 AM    Glucose (POC) 97 01/12/2018 10:18 PM    Glucose (POC) 138 01/12/2018 05:17 PM     Lab Results   Component Value Date/Time    Color DARK YELLOW 01/11/2018 02:27 AM    Appearance CLOUDY 01/11/2018 02:27 AM    Specific gravity 1.027 01/11/2018 02:27 AM    pH (UA) 5.0 01/11/2018 02:27 AM    Protein 30 01/11/2018 02:27 AM    Glucose NEGATIVE  01/11/2018 02:27 AM    Ketone NEGATIVE  01/11/2018 02:27 AM    Bilirubin NEGATIVE  01/11/2018 02:27 AM    Urobilinogen 0.2 01/11/2018 02:27 AM    Nitrites NEGATIVE  01/11/2018 02:27 AM    Leukocyte Esterase NEGATIVE  01/11/2018 02:27 AM    Epithelial cells FEW 01/11/2018 02:27 AM    Bacteria NEGATIVE  01/11/2018 02:27 AM    WBC 0-4 01/11/2018 02:27 AM    RBC 0-5 01/11/2018 02:27 AM         Medications Reviewed:     Current Facility-Administered Medications   Medication Dose Route Frequency    heparin (porcine) injection 5,000 Units  5,000 Units SubCUTAneous Q8H    polyethylene glycol (MIRALAX) packet 17 g  17 g Oral DAILY PRN    risperiDONE (RisperDAL) tablet 2 mg  2 mg Oral QHS    temazepam (RESTORIL) capsule 30 mg  30 mg Oral QHS    traMADol (ULTRAM) tablet 50 mg  50 mg Oral Q8H PRN    traZODone (DESYREL) tablet 150 mg  150 mg Oral QHS    oxyCODONE-acetaminophen (PERCOCET) 5-325 mg per tablet 1 Tab  1 Tab Oral Q6H PRN    sodium chloride (NS) flush 5-10 mL  5-10 mL IntraVENous Q8H    sodium chloride (NS) flush 5-10 mL  5-10 mL IntraVENous PRN     ______________________________________________________________________  EXPECTED LENGTH OF STAY: 4d 12h  ACTUAL LENGTH OF STAY:          11                 Mike Rosenthal MD

## 2018-01-22 NOTE — PROGRESS NOTES
Bedside and Verbal shift change report given to Andria (oncoming nurse) by Octavio Chin (offgoing nurse). Report included the following information SBAR, Kardex and MAR.

## 2018-01-22 NOTE — PROGRESS NOTES
Bedside shift change report given to Celina Wilkinson (oncoming nurse) by Magaly Lovett RN (offgoing nurse). Report included the following information SBAR, Kardex, Intake/Output and Recent Results.

## 2018-01-22 NOTE — PROGRESS NOTES
Problem: Mobility Impaired (Adult and Pediatric)  Goal: *Acute Goals and Plan of Care (Insert Text)  Physical Therapy Goals  Initiated 1/11/2018  Goals reassessed 1/18/18 and remain appropriate over next 7 days  1. Patient will move from supine to sit and sit to supine  in bed with supervision/set-up within 7 day(s). MET  2. Patient will transfer from bed to chair and chair to bed with supervision/set-up using the least restrictive device within 7 day(s). 3.  Patient will perform sit to stand with supervision/set-up within 7 day(s). 4.  Patient will ambulate with supervision/set-up for 150 feet with the least restrictive device within 7 day(s). 5.  Patient will ascend/descend 12 stairs with 1 handrail(s) with supervision/set-up within 7 day(s). 6.  Patient will independently don and doff quick draw TLSO within 7 days. physical Therapy TREATMENT  Patient: Tigist José (51 y.o. male)  Date: 1/22/2018  Diagnosis: Pneumonia  Altered mental status  Closed L1 vertebral fracture (HCC)  Back pain  Acute respiratory failure with hypoxemia (HCC)  COPD (chronic obstructive pulmonary disease) (HCC)  Lumbar spinal stenosis Pneumonia       Precautions: Back, Fall, Bed Alarm ([place]: reads lips; smokes)  Chart, physical therapy assessment, plan of care and goals were reviewed. ASSESSMENT:  Pt required min A for bed mobility today. Pt required assistance to grayd brace. Pt was able to ambulate with SBA with slight unsteadiness. Attempted to fully grady the hospital underwear. Pt declined did not want them to pull up. Pt is maintaining mobility tolerance and assistance.  Pt may benefit from HHPT at group home  Progression toward goals:  [x]      Improving appropriately and progressing toward goals  []      Improving slowly and progressing toward goals  []      Not making progress toward goals and plan of care will be adjusted     PLAN:  Patient continues to benefit from skilled intervention to address the above impairments. Continue treatment per established plan of care. Discharge Recommendations:  HHPT at group home  Further Equipment Recommendations for Discharge:  Received TLSO     SUBJECTIVE:   Patient stated leave them.     OBJECTIVE DATA SUMMARY:   Critical Behavior:  Neurologic State: Alert  Orientation Level: Oriented X4  Cognition: Follows commands     Functional Mobility Training:  Bed Mobility:     Supine to Sit: Minimum assistance               Transfers:  Sit to Stand: Stand-by asssistance  Stand to Sit: Stand-by asssistance                             Balance:  Sitting: Intact  Standing: Impaired  Standing - Static: Good  Standing - Dynamic : Fair  Ambulation/Gait Training:  Distance (ft): 150 Feet (ft)  Assistive Device: Brace/Splint;Gait belt  Ambulation - Level of Assistance: Stand-by asssistance;Contact guard assistance        Gait Abnormalities: Decreased step clearance (flexed posture)        Base of Support: Narrowed     Speed/Courtney: Pace decreased (<100 feet/min); Shuffled; Slow  Step Length: Left shortened;Right shortened                   Stairs:             Neuro Re-Education:    Therapeutic Exercises:     Pain:  Pain Scale 1: Numeric (0 - 10)  Pain Intensity 1: 5  Pain Location 1: Back  Pain Orientation 1: Lower  Pain Description 1: Stabbing  Pain Intervention(s) 1: Medication (see MAR)  Activity Tolerance:   Limited   Please refer to the flowsheet for vital signs taken during this treatment.   After treatment:   [] Patient left in no apparent distress sitting up in chair  [x] Patient left in no apparent distress in bed  [x] Call bell left within reach  [x] Nursing notified  [] Caregiver present  [x] Bed alarm activated    COMMUNICATION/COLLABORATION:   The patients plan of care was discussed with: Registered Nurse    Bhargav Sun PTA   Time Calculation: 13 mins

## 2018-01-22 NOTE — PROGRESS NOTES
Problem: Self Care Deficits Care Plan (Adult)  Goal: *Acute Goals and Plan of Care (Insert Text)  Occupational Therapy Goals  Initiated 1/16/2018  1. Patient will perform bathing with supervision/set-up within 7 day(s). 2.  Patient will perform bathing with supervision/set-up within 7 day(s). 3.  Patient will perform upper body dressing and lower body dressing with supervision/set-up within 7 day(s). 4.  Patient will perform toilet transfers with supervision/set-up within 7 day(s). 5.  Patient will perform all aspects of toileting with supervision/set-up within 7 day(s). 6.  Patient will participate in upper extremity therapeutic exercise/activities with supervision/set-up for 5 minutes within 7 day(s). 7.  Patient will utilize energy conservation, fall prevention and pain management techniques during functional activities with verbal, visual and tactile cues within 7 day(s). Occupational Therapy TREATMENT  Patient: Aliya Brannon (19 y.o. male)  Date: 1/22/2018  Diagnosis: Pneumonia  Altered mental status  Closed L1 vertebral fracture (HCC)  Back pain  Acute respiratory failure with hypoxemia (HCC)  COPD (chronic obstructive pulmonary disease) (HCC)  Lumbar spinal stenosis Pneumonia       Precautions: Back, Fall, Bed Alarm ([place] B: reads lips; smokes)    ASSESSMENT:  Pt seen for OT session this afternoon, received supine in bed, agreeable to participate. Instructed pt on steps of logroll technique to avoid back pain with bed mobility, and pt performed with CGA and verbal/tactile cueing. Reviewed steps of donning TLSO brace, pt demonstrating some carryover from previous sessions and donning with min A. Emphasized importance of avoiding deep bending during LB dressing ADLs, this session pt able to use leg crossover technique to don/doff socks and apply lotion to feet with CGA (min verbal/tactile cues to avoid bending).  Stood with CGA to allow for changing of bed linens, then returned to supine use logroll technique with CGA. Pt mildly impulsive throughout session and required cueing to wait for therapist's assistance. In bed at end of session, call bell in reach, alarm set and needs met. He will benefit from continued OT to work towards the above goals. Recommend HHOT at group [facility] upon discharge. Progression toward goals:  [x]       Improving appropriately and progressing toward goals  []       Improving slowly and progressing toward goals  []       Not making progress toward goals and plan of care will be adjusted     PLAN:  Patient continues to benefit from skilled intervention to address the above impairments. Continue treatment per established plan of care. Discharge Recommendations:  Home Health at group [facility]  Further Equipment Recommendations for Discharge:  TBD     SUBJECTIVE:   Patient stated Yes I walked with PT earlier.     OBJECTIVE DATA SUMMARY:   Cognitive/Behavioral Status:  Neurologic State: Alert  Orientation Level: Oriented X4  Cognition: Follows commands  Perception: Appears intact  Perseveration: No perseveration noted  Safety/Judgement: Awareness of environment; Fall prevention;Home safety;Decreased awareness of need for safety    Functional Mobility and Transfers for ADLs:  Bed Mobility:  Supine to Sit: CGA  Sit to supine: CGA    Transfers:  Sit to Stand: Stand-by asssistance       Balance:  Sitting: Intact  Standing: Impaired  Standing - Static: Good  Standing - Dynamic : Fair    ADL Intervention:     Instructed pt on steps of logroll technique to avoid back pain with bed mobility, and pt performed with CGA and verbal/tactile cueing. Reviewed steps of donning TLSO brace, pt demonstrating some carryover from previous sessions and donning with min A. Emphasized importance of avoiding deep bending during LB dressing ADLs, this session pt able to use leg crossover technique to don/doff socks and apply lotion to feet with CGA (min verbal/tactile cues to avoid bending).  Grooming  Grooming Assistance: Supervision/set up (in unsupported sit)  Washing Face: Supervision/set-up       Upper Body Dressing Assistance  Orthotics(Brace): Minimum assistance  Hospital Gown: Supervision/ set-up  Cues: Tactile cues provided;Verbal cues provided    Lower Body Dressing Assistance  Socks: Contact guard assistance  Leg Crossed Method Used: Yes  Position Performed: Seated edge of bed  Cues: Tactile cues provided;Verbal cues provided         Cognitive Retraining  Safety/Judgement: Awareness of environment; Fall prevention;Home safety;Decreased awareness of need for safety    Pain:  Pain Scale 1: Numeric (0 - 10)  Pain Intensity 1: 5  Pain Location 1: Back  Pain Orientation 1: Lower  Pain Description 1: Stabbing  Pain Intervention(s) 1: Medication (see MAR)  Activity Tolerance:   Good  Please refer to the flowsheet for vital signs taken during this treatment.   After treatment:   [] Patient left in no apparent distress sitting up in chair  [x] Patient left in no apparent distress in bed  [x] Call bell left within reach  [x] Nursing notified  [] Caregiver present  [x] Bed alarm activated    COMMUNICATION/COLLABORATION:   The patients plan of care was discussed with: Registered Nurse    Arnaldo Mullins OT  Time Calculation: 23 mins

## 2018-01-23 PROCEDURE — 97116 GAIT TRAINING THERAPY: CPT

## 2018-01-23 PROCEDURE — 74011250637 HC RX REV CODE- 250/637: Performed by: INTERNAL MEDICINE

## 2018-01-23 PROCEDURE — 65270000032 HC RM SEMIPRIVATE

## 2018-01-23 PROCEDURE — 74011250636 HC RX REV CODE- 250/636: Performed by: INTERNAL MEDICINE

## 2018-01-23 PROCEDURE — 94760 N-INVAS EAR/PLS OXIMETRY 1: CPT

## 2018-01-23 PROCEDURE — 97535 SELF CARE MNGMENT TRAINING: CPT

## 2018-01-23 PROCEDURE — 97530 THERAPEUTIC ACTIVITIES: CPT

## 2018-01-23 RX ORDER — IBUPROFEN 200 MG
1 TABLET ORAL DAILY
Status: DISCONTINUED | OUTPATIENT
Start: 2018-01-23 | End: 2018-02-15 | Stop reason: HOSPADM

## 2018-01-23 RX ADMIN — TRAZODONE HYDROCHLORIDE 150 MG: 100 TABLET ORAL at 21:00

## 2018-01-23 RX ADMIN — RISPERIDONE 2 MG: 1 TABLET ORAL at 21:00

## 2018-01-23 RX ADMIN — TRAMADOL HYDROCHLORIDE 50 MG: 50 TABLET, FILM COATED ORAL at 10:44

## 2018-01-23 RX ADMIN — Medication 10 ML: at 06:34

## 2018-01-23 RX ADMIN — HEPARIN SODIUM 5000 UNITS: 5000 INJECTION, SOLUTION INTRAVENOUS; SUBCUTANEOUS at 02:46

## 2018-01-23 RX ADMIN — TEMAZEPAM 30 MG: 15 CAPSULE ORAL at 21:00

## 2018-01-23 RX ADMIN — Medication 10 ML: at 21:00

## 2018-01-23 RX ADMIN — Medication 10 ML: at 14:28

## 2018-01-23 RX ADMIN — HEPARIN SODIUM 5000 UNITS: 5000 INJECTION, SOLUTION INTRAVENOUS; SUBCUTANEOUS at 09:10

## 2018-01-23 RX ADMIN — HEPARIN SODIUM 5000 UNITS: 5000 INJECTION, SOLUTION INTRAVENOUS; SUBCUTANEOUS at 16:22

## 2018-01-23 RX ADMIN — OXYCODONE AND ACETAMINOPHEN 1 TABLET: 5; 325 TABLET ORAL at 16:22

## 2018-01-23 RX ADMIN — TRAMADOL HYDROCHLORIDE 50 MG: 50 TABLET, FILM COATED ORAL at 21:00

## 2018-01-23 NOTE — PROGRESS NOTES
Problem: Self Care Deficits Care Plan (Adult)  Goal: *Acute Goals and Plan of Care (Insert Text)  Occupational Therapy Goals  Initiated 1/16/2018; Goals reviewed 1/23/18 and remain appropriate to be met within 7 days  1. Patient will perform bathing with supervision/set-up within 7 day(s). 2.  Patient will perform bathing with supervision/set-up within 7 day(s). - Change to LB dressing 1/23/18  3. Patient will perform upper body dressing and lower body dressing with supervision/set-up within 7 day(s). 4.  Patient will perform toilet transfers with supervision/set-up within 7 day(s). 5.  Patient will perform all aspects of toileting with supervision/set-up within 7 day(s). 6.  Patient will participate in upper extremity therapeutic exercise/activities with supervision/set-up for 5 minutes within 7 day(s). 7.  Patient will utilize energy conservation, fall prevention and pain management techniques during functional activities with verbal, visual and tactile cues within 7 day(s). Occupational Therapy TREATMENT: WEEKLY REASSESSMENT  Patient: Gloria Orourke [de-identified]78 y.o. male)  Date: 1/23/2018  Diagnosis: Pneumonia  Altered mental status  Closed L1 vertebral fracture (HCC)  Back pain  Acute respiratory failure with hypoxemia (HCC)  COPD (chronic obstructive pulmonary disease) (HCC)  Lumbar spinal stenosis Pneumonia       Precautions: Back, Fall, Bed Alarm ([place] B: reads lips; smokes)  Chart, occupational therapy assessment, plan of care, and goals were reviewed. ASSESSMENT:  Cleared by RN to see pt for weekly reassessment, goals reviewed and none met but remain appropriate to be met within next 7 days. Reviewed logroll technique taught in yesterday's session as pt initially attempting to sit straight forward out of bed. Pt progressing with donning/doffing brace, today required min A only to don, doffed without assistance. Donned/doffed socks with CGA and min verbal cueing to avoid bending.  Pt ambulated to bathroom without use of assistive device with CGA, pt mildly unsteady with shuffling gait but no LOB. Transferred to toilet with CGA and verbal/tactile cueing for safe technique. Performed grooming ADL in standing at sink, therapist cueing pt to avoid twisting or bending when reaching for needed items. Returned to supine in bed with min A to lift LEs during logroll. Bed alarm set and pt's needs met, call bell in reach. Overall pt progressing slowly and steadily towards goals, improvement in donning/doffing brace and overall mobility noted but still needing frequent cueing for safety. Pt will benefit from continued OT at 3x/week to address the above goals. Recommend HHOT at group [facility] upon discharge. Progression toward goals:  []            Improving appropriately and progressing toward goals  [x]            Improving slowly and progressing toward goals  []            Not making progress toward goals and plan of care will be adjusted     PLAN:  Goals have been updated based on progression since last assessment. Patient continues to benefit from skilled intervention to address the above impairments. Continue to follow patient 3 times a week to address goals.   Planned Interventions:  [x]                    Self Care Training                  [x]             Therapeutic Activities  [x]                    Functional Mobility Training    []             Cognitive Retraining  [x]                    Therapeutic Exercises           [x]             Endurance Activities  [x]                    Balance Training                   []             Neuromuscular Re-Education  []                    Visual/Perceptual Training     [x]        Home Safety Training  [x]                    Patient Education                 [x]             Family Training/Education  []                    Other (comment):  Discharge Recommendations: Home Health at group [facility]  Further Equipment Recommendations for Discharge: potentially a reacher SUBJECTIVE:   Patient stated Yes.     OBJECTIVE DATA SUMMARY:   Cognitive/Behavioral Status:  Neurologic State: Alert  Orientation Level: Oriented to person;Oriented to place  Cognition: Follows commands  Perception: Appears intact  Perseveration: No perseveration noted  Safety/Judgement: Awareness of environment; Fall prevention;Home safety;Decreased awareness of need for safety    Functional Mobility and Transfers for ADLs:  Bed Mobility:  Supine to Sit: Supervision  Sit to Supine: Minimum assistance    Transfers:  Sit to Stand: Stand-by asssistance  Functional Transfers  Toilet Transfer : Contact guard assistance    Balance:  Sitting: Intact  Standing: Impaired  Standing - Static: Good  Standing - Dynamic : Fair    ADL Intervention:    Reviewed logroll technique taught in yesterday's session as pt initially attempting to sit straight forward out of bed. Pt progressing with donning/doffing brace, today required min A only to don, doffed without assistance. Donned/doffed socks with CGA and min verbal cueing to avoid bending. Pt ambulated to bathroom without use of assistive device with CGA, pt mildly unsteady with shuffling gait but no LOB. Transferred to toilet with CGA and verbal/tactile cueing for safe technique. Performed grooming ADL in standing at sink, therapist cueing pt to avoid twisting or bending when reaching for needed items. Returned to supine in bed with min A to lift LEs during logroll. Grooming  Washing Hands: Contact guard assistance (in standing)              Upper Body Dressing Assistance  Orthotics(Brace): Minimum assistance    Lower Body Dressing Assistance  Socks: Contact guard assistance         Cognitive Retraining  Safety/Judgement: Awareness of environment; Fall prevention;Home safety;Decreased awareness of need for safety    Pain:  Pain Scale 1: Numeric (0 - 10)  Pain Intensity 1: 5  Pain Location 1: Back  Pain Orientation 1: Lower  Pain Description 1: Aching  Pain Intervention(s) 1: Medication (see MAR)  Activity Tolerance:   Good  Please refer to the flowsheet for vital signs taken during this treatment.   After treatment:   [] Patient left in no apparent distress sitting up in chair  [x] Patient left in no apparent distress in bed  [x] Call bell left within reach  [x] Nursing notified  [] Caregiver present  [x] Bed alarm activated    COMMUNICATION/COLLABORATION:   The patients plan of care was discussed with: Registered Nurse    Wing Avila OT  Time Calculation: 14 mins

## 2018-01-23 NOTE — PROGRESS NOTES
Hospitalist Progress Note  Ana Luisa Gaming MD  Office: [phone]      Date of Service:  2018  NAME:  Niranjan Mccollum  :  1940  MRN:  [id number]      Admission Summary:   78 y. o. male from [facility] with past medical history of sleep disorder presented to the ED from the [facility] via EMS with reported altered mental status.   Patient is a poor historian.  Majority of limited history is obtained per review of ED and medical records.   Per these collective reports, per EMS, patient became acutely confused tonight.   Patient reportedly is usually oriented but en route to ED, he was only oriented to self and place.   On arrival in the ED, patient complained of back pain.  Workup included CT head which was negative.  CT lumbar spine revealed an acute L1 fracture and L4, L5 central spinal stenosis.  CTA chest revealed left lower lobe pneumonia and emphysema but no PE. Orthopedic on board. Waiting for placement.      Interval history / Subjective:   Seen at the bed side, No new changes      Assessment & Plan:     AMS (altered mental status)  Improved and back to baseline   Alert and oriented to place, person and time   Waiting for placement, Care management on board      Pneumonia, LLL as per CT on admission   Started on Zosyn and levofloxacin at presentation   Stopped Zithromax and Zosyn ()   Blood culture no growth to date   No fever and white counts within normal limits     Acute on likely chronic hypercapnic/ hypoxemic respiratory failure- with compensated primary respiratory acidosis.    Resolved   Saturating > 90% on RA       Acute L1 fracture.     Consult orthopedic spine specialist for further evaluation and treatments.    Seen by Dr Angela Fink,   TLSO brace recommended with a f/u appointment as an outpatient   Patient non compliant concerning wearing brace       L4-L5 spinal stenosis.    Plan as noted above.      Back pain.    Plan as above.      COPD.    Duoneb nebulizer treatments.      Elevated CK total.    Resolved   Encourage fluid intake     Code status: Full Code   DVT prophylaxis: will start with heparin 5000 sq every 8 hours     Care Plan discussed with: Patient/Family, Nurse and   Disposition: Awaiting Rehab facility placement, Care management on board      Hospital Problems  Date Reviewed: 1/11/2018          Codes Class Noted POA    COPD (chronic obstructive pulmonary disease) ([facility] ICD-10-CM: J44.9  ICD-9-CM: 835  1/11/2018 Unknown        Back pain ICD-10-CM: M54.9  ICD-9-CM: 724.5  1/11/2018 Unknown        Altered mental status ICD-10-CM: R41.82  ICD-9-CM: 780.97  1/11/2018 Unknown        * (Principal)Pneumonia ICD-10-CM: J18.9  ICD-9-CM: 894  1/11/2018 Unknown        Lumbar spinal stenosis ICD-10-CM: M48.061  ICD-9-CM: 724.02  1/11/2018 Unknown        Closed L1 vertebral fracture ([facility] ICD-10-CM: S32.019A  ICD-9-CM: 805.4  1/11/2018 Unknown        Acute respiratory failure with hypoxemia [facility]) ICD-10-CM: J96.01  ICD-9-CM: 518.81  1/11/2018 Unknown                Review of Systems:   A comprehensive review of systems was negative except for that written in the HPI. Vital Signs:    Last 24hrs VS reviewed since prior progress note. Most recent are:  Visit Vitals    /74 (BP 1 Location: Right arm, BP Patient Position: At rest)    Pulse 100    Temp 99.2 °F (37.3 °C)    Resp 18    Ht 5' 9\" (1.753 m)    Wt 58.8 kg (129 lb 9.6 oz)    SpO2 96%    BMI 19.14 kg/m2         Intake/Output Summary (Last 24 hours) at 01/23/18 1648  Last data filed at 01/23/18 1106   Gross per 24 hour   Intake                0 ml   Output              600 ml   Net             -600 ml        Physical Examination:             Constitutional:  No acute distress, cooperative, pleasant,    ENT:  Oral mucous moist, oropharynx benign.  Neck supple,    Resp:  soft crepitations over the bi-basilar area of lung fields otherwise clear and resonant chest    CV:  Regular rhythm, normal rate, no murmurs, gallops, rubs    GI:  Soft, non distended, non tender normoactive bowel sounds, no hepatosplenomegaly     Musculoskeletal:  lower Back tenderness on palpation, not wearing back brace      Neurologic:  Moves all extremities. AAOx3, CN II-XII reviewed     Psych:  Good insight, Not anxious nor agitated. Data Review:    Review and/or order of clinical lab test  Review and/or order of tests in the medicine section of [facility]      Labs:     No results for input(s): WBC, HGB, HCT, PLT, HGBEXT, HCTEXT, PLTEXT, HGBEXT, HCTEXT, PLTEXT in the last 72 hours. No results for input(s): NA, K, CL, CO2, BUN, CREA, GLU, CA, MG, PHOS, URICA in the last 72 hours. No results for input(s): SGOT, GPT, ALT, AP, TBIL, TBILI, TP, ALB, GLOB, GGT, AML, LPSE in the last 72 hours. No lab exists for component: AMYP, HLPSE  No results for input(s): INR, PTP, APTT in the last 72 hours. No lab exists for component: INREXT, INREXT   No results for input(s): FE, TIBC, PSAT, FERR in the last 72 hours. No results found for: FOL, RBCF   No results for input(s): PH, PCO2, PO2 in the last 72 hours. No results for input(s): CPK, CKNDX, TROIQ in the last 72 hours.     No lab exists for component: CPKMB  No results found for: CHOL, CHOLX, CHLST, CHOLV, HDL, LDL, LDLC, DLDLP, TGLX, TRIGL, TRIGP, CHHD, CHHDX  Lab Results   Component Value Date/Time    Glucose (POC) 91 01/20/2018 06:52 AM    Glucose (POC) 136 01/17/2018 11:46 AM    Glucose (POC) 97 01/12/2018 10:18 PM    Glucose (POC) 138 01/12/2018 05:17 PM     Lab Results   Component Value Date/Time    Color DARK YELLOW 01/11/2018 02:27 AM    Appearance CLOUDY 01/11/2018 02:27 AM    Specific gravity 1.027 01/11/2018 02:27 AM    pH (UA) 5.0 01/11/2018 02:27 AM    Protein 30 01/11/2018 02:27 AM    Glucose NEGATIVE  01/11/2018 02:27 AM    Ketone NEGATIVE  01/11/2018 02:27 AM    Bilirubin NEGATIVE  01/11/2018 02:27 AM    Urobilinogen 0.2 01/11/2018 02:27 AM    Nitrites NEGATIVE  01/11/2018 02:27 AM    Leukocyte Esterase NEGATIVE  01/11/2018 02:27 AM    Epithelial cells FEW 01/11/2018 02:27 AM    Bacteria NEGATIVE  01/11/2018 02:27 AM    WBC 0-4 01/11/2018 02:27 AM    RBC 0-5 01/11/2018 02:27 AM         Medications Reviewed:     Current Facility-Administered Medications   Medication Dose Route Frequency    nicotine (NICODERM CQ) 14 mg/24 hr patch 1 Patch  1 Patch TransDERmal DAILY    heparin (porcine) injection 5,000 Units  5,000 Units SubCUTAneous Q8H    polyethylene glycol (MIRALAX) packet 17 g  17 g Oral DAILY PRN    risperiDONE (RisperDAL) tablet 2 mg  2 mg Oral QHS    temazepam (RESTORIL) capsule 30 mg  30 mg Oral QHS    traMADol (ULTRAM) tablet 50 mg  50 mg Oral Q8H PRN    traZODone (DESYREL) tablet 150 mg  150 mg Oral QHS    oxyCODONE-acetaminophen (PERCOCET) 5-325 mg per tablet 1 Tab  1 Tab Oral Q6H PRN    sodium chloride (NS) flush 5-10 mL  5-10 mL IntraVENous Q8H    sodium chloride (NS) flush 5-10 mL  5-10 mL IntraVENous PRN     ______________________________________________________________________  EXPECTED LENGTH OF STAY: 4d 12h  ACTUAL LENGTH OF STAY:          12                 Jung Deutsch MD

## 2018-01-23 NOTE — PROGRESS NOTES
Bedside shift change report given to Marycuhy Gracia RN (oncoming nurse) by Danielle Ferrer RN (offgoing nurse). Report included the following information SBAR, Intake/Output and MAR.

## 2018-01-23 NOTE — PROGRESS NOTES
Problem: Mobility Impaired (Adult and Pediatric)  Goal: *Acute Goals and Plan of Care (Insert Text)  Physical Therapy Goals  Initiated 1/11/2018  Goals reassessed 1/18/18 and remain appropriate over next 7 days  1. Patient will move from supine to sit and sit to supine  in bed with supervision/set-up within 7 day(s). MET  2. Patient will transfer from bed to chair and chair to bed with supervision/set-up using the least restrictive device within 7 day(s). 3.  Patient will perform sit to stand with supervision/set-up within 7 day(s). 4.  Patient will ambulate with supervision/set-up for 150 feet with the least restrictive device within 7 day(s). 5.  Patient will ascend/descend 12 stairs with 1 handrail(s) with supervision/set-up within 7 day(s). 6.  Patient will independently don and doff quick draw TLSO within 7 days. physical Therapy TREATMENT  Patient: Vivian Magallanes (76 y.o. male)  Date: 1/23/2018  Diagnosis: Pneumonia  Altered mental status  Closed L1 vertebral fracture (HCC)  Back pain  Acute respiratory failure with hypoxemia (HCC)  COPD (chronic obstructive pulmonary disease) (HCC)  Lumbar spinal stenosis Pneumonia       Precautions: Back, Fall, Bed Alarm ([place] B: reads lips; smokes)  Chart, physical therapy assessment, plan of care and goals were reviewed. ASSESSMENT:  Pt was able to maintain gait tolerance. Pt requiring CGA to supervision to mobilize. Pt spilled urinal in bed requiring gown and bed linens changed. Pt was able to assist with back brace donning. Pt may benefit from HHPT at group home at discharge    Progression toward goals:  [x]      Improving appropriately and progressing toward goals  []      Improving slowly and progressing toward goals  []      Not making progress toward goals and plan of care will be adjusted     PLAN:  Patient continues to benefit from skilled intervention to address the above impairments. Continue treatment per established plan of care.   Discharge Recommendations: HHPT at group home   Further Equipment Recommendations for Discharge: received TLSO     SUBJECTIVE:   Patient stated that's enough.     OBJECTIVE DATA SUMMARY:   Critical Behavior:  Neurologic State: Alert  Orientation Level: Oriented X4  Cognition: Follows commands  Safety/Judgement: Awareness of environment, Fall prevention, Home safety, Decreased awareness of need for safety  Functional Mobility Training:  Bed Mobility:     Supine to Sit: Supervision  Sit to Supine: Supervision            Transfers:  Sit to Stand: Stand-by asssistance  Stand to Sit: Stand-by asssistance                             Balance:  Sitting: Intact  Standing: Impaired  Standing - Static: Good  Standing - Dynamic : Fair  Ambulation/Gait Training:  Distance (ft): 150 Feet (ft)  Assistive Device: Brace/Splint;Gait belt  Ambulation - Level of Assistance: Stand-by asssistance;Contact guard assistance        Gait Abnormalities: Decreased step clearance (flexed trunk)        Base of Support: Center of gravity altered;Narrowed     Speed/Courtney: Pace decreased (<100 feet/min)  Step Length: Left shortened;Right shortened                   Stairs:             Neuro Re-Education:    Therapeutic Exercises:     Pain:  Pain Scale 1: Numeric (0 - 10)  Pain Intensity 1: 5  Pain Location 1: Back  Pain Orientation 1: Lower  Pain Description 1: Aching  Pain Intervention(s) 1: Medication (see MAR)  Activity Tolerance:   Limited   Please refer to the flowsheet for vital signs taken during this treatment.   After treatment:   [] Patient left in no apparent distress sitting up in chair  [x] Patient left in no apparent distress in bed  [x] Call bell left within reach  [x] Nursing notified  [] Caregiver present  [x] Bed alarm activated    COMMUNICATION/COLLABORATION:   The patients plan of care was discussed with: Registered Nurse    Quincy Fraser PTA   Time Calculation: 26 mins

## 2018-01-23 NOTE — PROGRESS NOTES
Reviewed medical chart; called and spoke with the patient's niece, Lacie Ayers - XIANG#[phone]. She spoke with Kamryn Jang at [facility] yesterday at V#[phone]. Beatrice [facility] charges $1800/month for a first floor bedroom. Gricelda's stated \"That's his entire check and I would have to supplement for toiletries, clothing, his hair cuts, and cell phone bill. \"      Darnellaaron Korin is interested in [facility], [address], [facility], [place] [address], [phone], as the psychiatry NP, Koby Barbosa, visits that facility. Called and spoke with Bernard Mark, who shared that a semi-private room costs $3795/month, but the price can be negotiable. They have availability and Bernard Mark plans to visit with the patient today to assess his care needs. Christophe Langley then asked \"I have a 3 bedroom rental home. Do you know of anyone in need of housing who would be willing to live with him and drive him to doctor's appointments? \"  This  explained that she is not aware of anyone willing or able to provide that service within her budget. Patient's niece asked that the patient get a nicotine patch; MD notified of this request.  Care Management will continue to follow his disposition.    KANDICE Vick

## 2018-01-23 NOTE — PROGRESS NOTES
Bedside shift change report given to Zev (oncoming nurse) by Gómez Perez (offgoing nurse). Report included the following information SBAR.

## 2018-01-23 NOTE — PROGRESS NOTES
Bedside and Verbal shift change report given to Jason Blanco RN (oncoming nurse) by Chance Bowman RN (offgoing nurse). Report included the following information SBAR, Kardex, ED Summary, Intake/Output, MAR and Recent Results.

## 2018-01-24 PROCEDURE — 65270000032 HC RM SEMIPRIVATE

## 2018-01-24 PROCEDURE — 74011250637 HC RX REV CODE- 250/637: Performed by: INTERNAL MEDICINE

## 2018-01-24 PROCEDURE — 74011250636 HC RX REV CODE- 250/636: Performed by: INTERNAL MEDICINE

## 2018-01-24 PROCEDURE — 97116 GAIT TRAINING THERAPY: CPT

## 2018-01-24 RX ADMIN — TRAMADOL HYDROCHLORIDE 50 MG: 50 TABLET, FILM COATED ORAL at 10:20

## 2018-01-24 RX ADMIN — Medication 10 ML: at 22:00

## 2018-01-24 RX ADMIN — Medication 10 ML: at 15:42

## 2018-01-24 RX ADMIN — TEMAZEPAM 30 MG: 15 CAPSULE ORAL at 21:21

## 2018-01-24 RX ADMIN — HEPARIN SODIUM 5000 UNITS: 5000 INJECTION, SOLUTION INTRAVENOUS; SUBCUTANEOUS at 17:46

## 2018-01-24 RX ADMIN — RISPERIDONE 2 MG: 1 TABLET ORAL at 21:22

## 2018-01-24 RX ADMIN — OXYCODONE AND ACETAMINOPHEN 1 TABLET: 5; 325 TABLET ORAL at 11:57

## 2018-01-24 RX ADMIN — HEPARIN SODIUM 5000 UNITS: 5000 INJECTION, SOLUTION INTRAVENOUS; SUBCUTANEOUS at 10:06

## 2018-01-24 RX ADMIN — TRAMADOL HYDROCHLORIDE 50 MG: 50 TABLET, FILM COATED ORAL at 20:48

## 2018-01-24 RX ADMIN — HEPARIN SODIUM 5000 UNITS: 5000 INJECTION, SOLUTION INTRAVENOUS; SUBCUTANEOUS at 01:00

## 2018-01-24 RX ADMIN — TRAZODONE HYDROCHLORIDE 150 MG: 100 TABLET ORAL at 21:22

## 2018-01-24 NOTE — PROGRESS NOTES
Patient has had more than 100cc out of urine today, however did not get an accurate calculation on mid day urine because it got dumped before I could measure it.

## 2018-01-24 NOTE — PROGRESS NOTES
Problem: Mobility Impaired (Adult and Pediatric)  Goal: *Acute Goals and Plan of Care (Insert Text)  Physical Therapy Goals  Initiated 1/11/2018  Goals reassessed 1/18/18 and remain appropriate over next 7 days  1. Patient will move from supine to sit and sit to supine  in bed with supervision/set-up within 7 day(s). MET  2. Patient will transfer from bed to chair and chair to bed with supervision/set-up using the least restrictive device within 7 day(s). 3.  Patient will perform sit to stand with supervision/set-up within 7 day(s). 4.  Patient will ambulate with supervision/set-up for 150 feet with the least restrictive device within 7 day(s). 5.  Patient will ascend/descend 12 stairs with 1 handrail(s) with supervision/set-up within 7 day(s). 6.  Patient will independently don and doff quick draw TLSO within 7 days. physical Therapy TREATMENT  Patient: Vivian Magallanes (81 y.o. male)  Date: 1/24/2018  Diagnosis: Pneumonia  Altered mental status  Closed L1 vertebral fracture (HCC)  Back pain  Acute respiratory failure with hypoxemia (HCC)  COPD (chronic obstructive pulmonary disease) (HCC)  Lumbar spinal stenosis Pneumonia       Precautions: Back, Fall, Bed Alarm ([place] B: reads lips; smokes)  Chart, physical therapy assessment, plan of care and goals were reviewed. ASSESSMENT:  Pt agreeable to gait but continues to decline sitting in the chair. Pt required encouragement to ambulate further. Pt fatigued with extended distance. Pt slightly unsteady with a forward posture. Pt may benefit from HHPT at group home. Progression toward goals:  [x]      Improving appropriately and progressing toward goals  []      Improving slowly and progressing toward goals  []      Not making progress toward goals and plan of care will be adjusted     PLAN:  Patient continues to benefit from skilled intervention to address the above impairments. Continue treatment per established plan of care.   Discharge Recommendations: HHPT at group home with assistance with TLSO  Further Equipment Recommendations for Discharge: none     SUBJECTIVE:   Patient stated bathroom.     OBJECTIVE DATA SUMMARY:   Critical Behavior:  Neurologic State: Drowsy, Alert  Orientation Level: Oriented to place, Oriented to person  Cognition: Follows commands  Safety/Judgement: Awareness of environment, Fall prevention, Home safety, Decreased awareness of need for safety  Functional Mobility Training:  Bed Mobility:     Supine to Sit: Supervision               Transfers:  Sit to Stand: Stand-by asssistance  Stand to Sit: Stand-by asssistance                             Balance:  Sitting: Intact  Standing: Impaired  Standing - Static: Good  Standing - Dynamic : Fair  Ambulation/Gait Training:  Distance (ft): 170 Feet (ft)  Assistive Device: Brace/Splint  Ambulation - Level of Assistance: Stand-by asssistance;Contact guard assistance        Gait Abnormalities: Decreased step clearance (flexed trunk)        Base of Support: Center of gravity altered;Narrowed     Speed/Courtney: Pace decreased (<100 feet/min)  Step Length: Left shortened;Right shortened                   Stairs:             Neuro Re-Education:    Therapeutic Exercises:     Pain:  Pain Scale 1: Numeric (0 - 10)  Pain Intensity 1: 4  Pain Location 1: Back     Pain Description 1: Aching  Pain Intervention(s) 1: Medication (see MAR)  Activity Tolerance:   Limited   Please refer to the flowsheet for vital signs taken during this treatment.   After treatment:   [] Patient left in no apparent distress sitting up in chair  [x] Patient left in no apparent distress in bed  [x] Call bell left within reach  [x] Nursing notified  [] Caregiver present  [x] Bed alarm activated    COMMUNICATION/COLLABORATION:   The patients plan of care was discussed with: Registered Nurse    Kartik Esposito PTA   Time Calculation: 14 mins

## 2018-01-24 NOTE — PROGRESS NOTES
Hospitalist Progress Note  Stephanie Crockett MD  Office: [phone]      Date of Service:  2018  NAME:  Nael Lopez  :  1940  MRN:  [id number]      Admission Summary:   78 y. o. male from [facility] with past medical history of sleep disorder presented to the ED from the [facility] via EMS with reported altered mental status.   Patient is a poor historian.  Majority of limited history is obtained per review of ED and medical records.   Per these collective reports, per EMS, patient became acutely confused tonight.   Patient reportedly is usually oriented but en route to ED, he was only oriented to self and place.   On arrival in the ED, patient complained of back pain.  Workup included CT head which was negative.  CT lumbar spine revealed an acute L1 fracture and L4, L5 central spinal stenosis.  CTA chest revealed left lower lobe pneumonia and emphysema but no PE. Orthopedic on board. Waiting for placement. Interval history / Subjective:   Seen at the bed side, not in distress.  No new changes      Assessment & Plan:     AMS (altered mental status)  Improved and back to baseline   Alert and oriented to place, person and time   Waiting for placement, Care management on board      Pneumonia, LLL as per CT on admission   Started on Zosyn and levofloxacin at presentation   Stopped Zithromax and Zosyn ()   Blood culture no growth to date   No fever and white counts within normal limits     Acute on likely chronic hypercapnic/ hypoxemic respiratory failure- with compensated primary respiratory acidosis.    Resolved   Saturating > 90% on RA       Acute L1 fracture.     Consult orthopedic spine specialist for further evaluation and treatments.    Seen by NAY Malhotra brace recommended with a f/u appointment as an outpatient   Patient non compliant concerning wearing brace       L4-L5 spinal stenosis.    Plan as noted above.      Back pain.    Plan as above.      COPD.    Duoneb nebulizer treatments.      Elevated CK total.    Resolved   Encourage fluid intake     Code status: Full Code   DVT prophylaxis: will start with heparin 5000 sq every 8 hours     Care Plan discussed with: Patient/Family, Nurse and   Disposition: Awaiting Rehab facility placement, Care management on board      Hospital Problems  Date Reviewed: 1/11/2018          Codes Class Noted POA    COPD (chronic obstructive pulmonary disease) ([facility] ICD-10-CM: J44.9  ICD-9-CM: 977  1/11/2018 Unknown        Back pain ICD-10-CM: M54.9  ICD-9-CM: 724.5  1/11/2018 Unknown        Altered mental status ICD-10-CM: R41.82  ICD-9-CM: 780.97  1/11/2018 Unknown        * (Principal)Pneumonia ICD-10-CM: J18.9  ICD-9-CM: 203  1/11/2018 Unknown        Lumbar spinal stenosis ICD-10-CM: M48.061  ICD-9-CM: 724.02  1/11/2018 Unknown        Closed L1 vertebral fracture ([facility] ICD-10-CM: S32.019A  ICD-9-CM: 805.4  1/11/2018 Unknown        Acute respiratory failure with hypoxemia [facility]) ICD-10-CM: J96.01  ICD-9-CM: 518.81  1/11/2018 Unknown                Review of Systems:   A comprehensive review of systems was negative except for that written in the HPI. Vital Signs:    Last 24hrs VS reviewed since prior progress note. Most recent are:  Visit Vitals    /76 (BP 1 Location: Left arm, BP Patient Position: At rest)    Pulse (!) 108    Temp 97.9 °F (36.6 °C)    Resp 18    Ht 5' 9\" (1.753 m)    Wt 57.9 kg (127 lb 9.6 oz)    SpO2 94%    BMI 18.84 kg/m2         Intake/Output Summary (Last 24 hours) at 01/24/18 1100  Last data filed at 01/24/18 0518   Gross per 24 hour   Intake              400 ml   Output              650 ml   Net             -250 ml        Physical Examination:             Constitutional:  No acute distress, cooperative, pleasant,    ENT:  Oral mucous moist, oropharynx benign.  Neck supple,    Resp:  soft crepitations over the bi-basilar area of lung fields otherwise clear and resonant chest    CV:  Regular rhythm, normal rate, no murmurs, gallops, rubs    GI:  Soft, non distended, non tender normoactive bowel sounds, no hepatosplenomegaly     Musculoskeletal:  lower Back tenderness on palpation, not wearing back brace      Neurologic:  Moves all extremities. AAOx3, CN II-XII reviewed     Psych:  Good insight, Not anxious nor agitated. Data Review:    Review and/or order of clinical lab test  Review and/or order of tests in the medicine section of [facility]      Labs:     No results for input(s): WBC, HGB, HCT, PLT, HGBEXT, HCTEXT, PLTEXT, HGBEXT, HCTEXT, PLTEXT in the last 72 hours. No results for input(s): NA, K, CL, CO2, BUN, CREA, GLU, CA, MG, PHOS, URICA in the last 72 hours. No results for input(s): SGOT, GPT, ALT, AP, TBIL, TBILI, TP, ALB, GLOB, GGT, AML, LPSE in the last 72 hours. No lab exists for component: AMYP, HLPSE  No results for input(s): INR, PTP, APTT in the last 72 hours. No lab exists for component: INREXT, INREXT   No results for input(s): FE, TIBC, PSAT, FERR in the last 72 hours. No results found for: FOL, RBCF   No results for input(s): PH, PCO2, PO2 in the last 72 hours. No results for input(s): CPK, CKNDX, TROIQ in the last 72 hours.     No lab exists for component: CPKMB  No results found for: CHOL, CHOLX, CHLST, CHOLV, HDL, LDL, LDLC, DLDLP, TGLX, TRIGL, TRIGP, CHHD, CHHDX  Lab Results   Component Value Date/Time    Glucose (POC) 91 01/20/2018 06:52 AM    Glucose (POC) 136 01/17/2018 11:46 AM    Glucose (POC) 97 01/12/2018 10:18 PM    Glucose (POC) 138 01/12/2018 05:17 PM     Lab Results   Component Value Date/Time    Color DARK YELLOW 01/11/2018 02:27 AM    Appearance CLOUDY 01/11/2018 02:27 AM    Specific gravity 1.027 01/11/2018 02:27 AM    pH (UA) 5.0 01/11/2018 02:27 AM    Protein 30 01/11/2018 02:27 AM    Glucose NEGATIVE  01/11/2018 02:27 AM    Ketone NEGATIVE  01/11/2018 02:27 AM    Bilirubin NEGATIVE 01/11/2018 02:27 AM    Urobilinogen 0.2 01/11/2018 02:27 AM    Nitrites NEGATIVE  01/11/2018 02:27 AM    Leukocyte Esterase NEGATIVE  01/11/2018 02:27 AM    Epithelial cells FEW 01/11/2018 02:27 AM    Bacteria NEGATIVE  01/11/2018 02:27 AM    WBC 0-4 01/11/2018 02:27 AM    RBC 0-5 01/11/2018 02:27 AM         Medications Reviewed:     Current Facility-Administered Medications   Medication Dose Route Frequency    nicotine (NICODERM CQ) 14 mg/24 hr patch 1 Patch  1 Patch TransDERmal DAILY    heparin (porcine) injection 5,000 Units  5,000 Units SubCUTAneous Q8H    polyethylene glycol (MIRALAX) packet 17 g  17 g Oral DAILY PRN    risperiDONE (RisperDAL) tablet 2 mg  2 mg Oral QHS    temazepam (RESTORIL) capsule 30 mg  30 mg Oral QHS    traMADol (ULTRAM) tablet 50 mg  50 mg Oral Q8H PRN    traZODone (DESYREL) tablet 150 mg  150 mg Oral QHS    oxyCODONE-acetaminophen (PERCOCET) 5-325 mg per tablet 1 Tab  1 Tab Oral Q6H PRN    sodium chloride (NS) flush 5-10 mL  5-10 mL IntraVENous Q8H    sodium chloride (NS) flush 5-10 mL  5-10 mL IntraVENous PRN     ______________________________________________________________________  EXPECTED LENGTH OF STAY: 4d 12h  ACTUAL LENGTH OF STAY:          13                 Jory Boxer, MD

## 2018-01-24 NOTE — PROGRESS NOTES
Received a call back from Yarely, coordinator at the [facility], [address], [address] [address], [phone]. Yarely visited the patient yesterday. He sent a request to their corporate office to see if they could offer the patient a rate of $1800. Yarely received a response back that they are not able to reduce their price of $3795/month. Called and left a message with the patient's niece, Lacie Ayers Usha PEREZ#[phone] to notify her that the [facility] cannot accept the patient into care. As noted previously, Kamryn Jang at [facility], X#[phone] stated that she can accept the patient into care for $1800/month. This is within the patient's budget, but the patient's niece stated \"That's his entire check and I would have to supplement for toiletries, clothing, his hair cuts, and cell phone bill. \" Patient's niece/MPOA is currently declining this safe discharge option. Care Management will continue to follow his disposition.     KANDICE Vick

## 2018-01-24 NOTE — PROGRESS NOTES
Bedside and Verbal shift change report given to Blanche Gan (oncoming nurse) by Lynnette Plasencia (offgoing nurse). Report included the following information SBAR, Kardex, Procedure Summary, Intake/Output, MAR, Accordion and Recent Results.

## 2018-01-24 NOTE — PROGRESS NOTES
Bedside and Verbal shift change report given to Farzana VINES (oncoming nurse) by Nilda Burr (offgoing nurse). Report included the following information SBAR.

## 2018-01-24 NOTE — PROGRESS NOTES
NUTRITION- DIETETIC tECHnICIAN    Pt seen for:       [x]                  Rescreen  []                  Food preferences/tolerances  []                  Food Allergies  []                  PO intake check  []                  Supplements  []                  Diet order clarification  []                  Education  []                  Other     Rescreen:    [x]                  Not at Nutrition Risk, rescreen per screening protocol  []                  At Nutrition Risk- RD referral         SUBJECTIVE/OBJECTIVE:     Information obtained from:  patient      Diet:  Regular    Intake: excellent    Patient Vitals for the past 100 hrs:   % Diet Eaten   01/23/18 1657 100 %   01/20/18 1306 90 %       Weight Changes:       Wt Readings from Last 3 Encounters:   01/24/18 57.9 kg (127 lb 9.6 oz)       Problems Identified:      [x]                  No problems identified    []                  Specified food preferences   []                  Dislikes supplements              []                  Allergies:   []                  Difficulty chewing      []                  Dentition    []                  Nausea/Vomiting   []                  Constipation   []                  Diarrhea    PLAN:     [x]                   Continue current diet and encourage intake  []                   Obtained/adjusted food preferences/tolerances and/or snacks options   []                   Dislikes supplements will try a substitution  []                   Modify diet for food allergies  []                   Adjust texture due to difficulty chewing   []                   Educated patient  []                   RD Referral  [x]                   Rescreen per screening protocol          Maddison Naik DTR

## 2018-01-25 LAB
ERYTHROCYTE [DISTWIDTH] IN BLOOD BY AUTOMATED COUNT: 12.2 % (ref 11.5–14.5)
HCT VFR BLD AUTO: 37.1 % (ref 36.6–50.3)
HGB BLD-MCNC: 12.5 G/DL (ref 12.1–17)
MCH RBC QN AUTO: 33.8 PG (ref 26–34)
MCHC RBC AUTO-ENTMCNC: 33.7 G/DL (ref 30–36.5)
MCV RBC AUTO: 100.3 FL (ref 80–99)
PLATELET # BLD AUTO: 278 K/UL (ref 150–400)
RBC # BLD AUTO: 3.7 M/UL (ref 4.1–5.7)
WBC # BLD AUTO: 5.8 K/UL (ref 4.1–11.1)

## 2018-01-25 PROCEDURE — 74011250637 HC RX REV CODE- 250/637: Performed by: INTERNAL MEDICINE

## 2018-01-25 PROCEDURE — 65270000032 HC RM SEMIPRIVATE

## 2018-01-25 PROCEDURE — 85027 COMPLETE CBC AUTOMATED: CPT | Performed by: INTERNAL MEDICINE

## 2018-01-25 PROCEDURE — 36415 COLL VENOUS BLD VENIPUNCTURE: CPT | Performed by: INTERNAL MEDICINE

## 2018-01-25 PROCEDURE — 74011250636 HC RX REV CODE- 250/636: Performed by: INTERNAL MEDICINE

## 2018-01-25 RX ADMIN — TRAZODONE HYDROCHLORIDE 150 MG: 100 TABLET ORAL at 22:28

## 2018-01-25 RX ADMIN — HEPARIN SODIUM 5000 UNITS: 5000 INJECTION, SOLUTION INTRAVENOUS; SUBCUTANEOUS at 00:58

## 2018-01-25 RX ADMIN — HEPARIN SODIUM 5000 UNITS: 5000 INJECTION, SOLUTION INTRAVENOUS; SUBCUTANEOUS at 18:30

## 2018-01-25 RX ADMIN — RISPERIDONE 2 MG: 1 TABLET ORAL at 22:28

## 2018-01-25 RX ADMIN — TEMAZEPAM 30 MG: 15 CAPSULE ORAL at 22:28

## 2018-01-25 RX ADMIN — Medication 10 ML: at 22:28

## 2018-01-25 RX ADMIN — OXYCODONE AND ACETAMINOPHEN 1 TABLET: 5; 325 TABLET ORAL at 10:58

## 2018-01-25 RX ADMIN — TRAMADOL HYDROCHLORIDE 50 MG: 50 TABLET, FILM COATED ORAL at 07:10

## 2018-01-25 RX ADMIN — OXYCODONE AND ACETAMINOPHEN 1 TABLET: 5; 325 TABLET ORAL at 02:57

## 2018-01-25 RX ADMIN — Medication 10 ML: at 07:10

## 2018-01-25 RX ADMIN — OXYCODONE AND ACETAMINOPHEN 1 TABLET: 5; 325 TABLET ORAL at 18:30

## 2018-01-25 RX ADMIN — TRAMADOL HYDROCHLORIDE 50 MG: 50 TABLET, FILM COATED ORAL at 15:05

## 2018-01-25 RX ADMIN — HEPARIN SODIUM 5000 UNITS: 5000 INJECTION, SOLUTION INTRAVENOUS; SUBCUTANEOUS at 09:22

## 2018-01-25 RX ADMIN — TRAMADOL HYDROCHLORIDE 50 MG: 50 TABLET, FILM COATED ORAL at 22:28

## 2018-01-25 RX ADMIN — Medication 10 ML: at 15:14

## 2018-01-25 NOTE — PROGRESS NOTES
Hospitalist Progress Note  Quang Bradshaw MD  Office: [phone]      Date of Service:  2018  NAME:  Nicolle Pedersen  :  1940  MRN:  [id number]      Admission Summary:   78 y. o. male from [facility] with past medical history of sleep disorder presented to the ED from the [facility] via EMS with reported altered mental status.   Patient is a poor historian.  Majority of limited history is obtained per review of ED and medical records.   Per these collective reports, per EMS, patient became acutely confused tonight.   Patient reportedly is usually oriented but en route to ED, he was only oriented to self and place.   On arrival in the ED, patient complained of back pain.  Workup included CT head which was negative.  CT lumbar spine revealed an acute L1 fracture and L4, L5 central spinal stenosis.  CTA chest revealed left lower lobe pneumonia and emphysema but no PE. Orthopedic on board. Waiting for placement.      Interval history / Subjective:   Seen at the bed side   No new changes   Waiting for placement      Assessment & Plan:     AMS (altered mental status)  Improved and back to baseline   Alert and oriented to place, person and time   Waiting for placement, Care management on board      Pneumonia, LLL as per CT on admission   Started on Zosyn and levofloxacin at presentation   Stopped Zithromax and Zosyn ()   Blood culture no growth to date   No fever and white counts within normal limits     Acute on likely chronic hypercapnic/ hypoxemic respiratory failure- with compensated primary respiratory acidosis.    Resolved   Saturating > 90% on RA       Acute L1 fracture.     Consult orthopedic spine specialist for further evaluation and treatments.    Seen by Dr Hope Goldberg,   NAY brace recommended with a f/u appointment as an outpatient   Patient non compliant concerning wearing brace       L4-L5 spinal stenosis.    Plan as noted above.      Back pain.    Plan as above.      COPD.    Duoneb nebulizer treatments.      Elevated CK total.    Resolved   Encourage fluid intake     Code status: Full Code   DVT prophylaxis: will start with heparin 5000 sq every 8 hours     Care Plan discussed with: Patient/Family, Nurse and   Disposition: Awaiting Rehab facility placement, Care management on board      Hospital Problems  Date Reviewed: 1/11/2018          Codes Class Noted POA    COPD (chronic obstructive pulmonary disease) ([facility] ICD-10-CM: J44.9  ICD-9-CM: 131  1/11/2018 Unknown        Back pain ICD-10-CM: M54.9  ICD-9-CM: 724.5  1/11/2018 Unknown        Altered mental status ICD-10-CM: R41.82  ICD-9-CM: 780.97  1/11/2018 Unknown        * (Principal)Pneumonia ICD-10-CM: J18.9  ICD-9-CM: 186  1/11/2018 Unknown        Lumbar spinal stenosis ICD-10-CM: M48.061  ICD-9-CM: 724.02  1/11/2018 Unknown        Closed L1 vertebral fracture ([facility] ICD-10-CM: S32.019A  ICD-9-CM: 805.4  1/11/2018 Unknown        Acute respiratory failure with hypoxemia [facility]) ICD-10-CM: J96.01  ICD-9-CM: 518.81  1/11/2018 Unknown                Review of Systems:   A comprehensive review of systems was negative except for that written in the HPI. Vital Signs:    Last 24hrs VS reviewed since prior progress note. Most recent are:  Visit Vitals    /80    Pulse (!) 102    Temp 97.9 °F (36.6 °C)    Resp 20    Ht 5' 9\" (1.753 m)    Wt 56 kg (123 lb 8 oz)    SpO2 95%    BMI 18.24 kg/m2         Intake/Output Summary (Last 24 hours) at 01/25/18 1512  Last data filed at 01/24/18 2124   Gross per 24 hour   Intake                0 ml   Output              300 ml   Net             -300 ml        Physical Examination:             Constitutional:  No acute distress, cooperative, pleasant,    ENT:  Oral mucous moist, oropharynx benign.  Neck supple,    Resp:  soft crepitations over the bi-basilar area of lung fields otherwise clear and resonant chest    CV:  Regular rhythm, normal rate, no murmurs, gallops, rubs    GI:  Soft, non distended, non tender normoactive bowel sounds, no hepatosplenomegaly     Musculoskeletal:  lower Back tenderness on palpation, not wearing back brace      Neurologic:  Moves all extremities. AAOx3, CN II-XII reviewed     Psych:  Good insight, Not anxious nor agitated. Data Review:    Review and/or order of clinical lab test  Review and/or order of tests in the medicine section of [facility]      Labs:     Recent Labs      01/25/18   0303   WBC  5.8   HGB  12.5   HCT  37.1   PLT  278     No results for input(s): NA, K, CL, CO2, BUN, CREA, GLU, CA, MG, PHOS, URICA in the last 72 hours. No results for input(s): SGOT, GPT, ALT, AP, TBIL, TBILI, TP, ALB, GLOB, GGT, AML, LPSE in the last 72 hours. No lab exists for component: AMYP, HLPSE  No results for input(s): INR, PTP, APTT in the last 72 hours. No lab exists for component: INREXT, INREXT   No results for input(s): FE, TIBC, PSAT, FERR in the last 72 hours. No results found for: FOL, RBCF   No results for input(s): PH, PCO2, PO2 in the last 72 hours. No results for input(s): CPK, CKNDX, TROIQ in the last 72 hours.     No lab exists for component: CPKMB  No results found for: CHOL, CHOLX, CHLST, CHOLV, HDL, LDL, LDLC, DLDLP, TGLX, TRIGL, TRIGP, CHHD, CHHDX  Lab Results   Component Value Date/Time    Glucose (POC) 91 01/20/2018 06:52 AM    Glucose (POC) 136 01/17/2018 11:46 AM    Glucose (POC) 97 01/12/2018 10:18 PM    Glucose (POC) 138 01/12/2018 05:17 PM     Lab Results   Component Value Date/Time    Color DARK YELLOW 01/11/2018 02:27 AM    Appearance CLOUDY 01/11/2018 02:27 AM    Specific gravity 1.027 01/11/2018 02:27 AM    pH (UA) 5.0 01/11/2018 02:27 AM    Protein 30 01/11/2018 02:27 AM    Glucose NEGATIVE  01/11/2018 02:27 AM    Ketone NEGATIVE  01/11/2018 02:27 AM    Bilirubin NEGATIVE  01/11/2018 02:27 AM    Urobilinogen 0.2 01/11/2018 02:27 AM    Nitrites NEGATIVE  01/11/2018 02:27 AM Leukocyte Esterase NEGATIVE  01/11/2018 02:27 AM    Epithelial cells FEW 01/11/2018 02:27 AM    Bacteria NEGATIVE  01/11/2018 02:27 AM    WBC 0-4 01/11/2018 02:27 AM    RBC 0-5 01/11/2018 02:27 AM         Medications Reviewed:     Current Facility-Administered Medications   Medication Dose Route Frequency    nicotine (NICODERM CQ) 14 mg/24 hr patch 1 Patch  1 Patch TransDERmal DAILY    heparin (porcine) injection 5,000 Units  5,000 Units SubCUTAneous Q8H    polyethylene glycol (MIRALAX) packet 17 g  17 g Oral DAILY PRN    risperiDONE (RisperDAL) tablet 2 mg  2 mg Oral QHS    temazepam (RESTORIL) capsule 30 mg  30 mg Oral QHS    traMADol (ULTRAM) tablet 50 mg  50 mg Oral Q8H PRN    traZODone (DESYREL) tablet 150 mg  150 mg Oral QHS    oxyCODONE-acetaminophen (PERCOCET) 5-325 mg per tablet 1 Tab  1 Tab Oral Q6H PRN    sodium chloride (NS) flush 5-10 mL  5-10 mL IntraVENous Q8H    sodium chloride (NS) flush 5-10 mL  5-10 mL IntraVENous PRN     ______________________________________________________________________  EXPECTED LENGTH OF STAY: 4d 12h  ACTUAL LENGTH OF STAY:          14                 Radha Lamas MD

## 2018-01-25 NOTE — PROGRESS NOTES
Bedside shift change report given to [address] [place] [address] (oncoming nurse) by Shanta Lares (offgoing nurse). Report included the following information SBAR, Kardex and MAR.

## 2018-01-26 PROCEDURE — 74011250636 HC RX REV CODE- 250/636: Performed by: INTERNAL MEDICINE

## 2018-01-26 PROCEDURE — 74011250637 HC RX REV CODE- 250/637: Performed by: INTERNAL MEDICINE

## 2018-01-26 PROCEDURE — 97116 GAIT TRAINING THERAPY: CPT

## 2018-01-26 PROCEDURE — 65270000032 HC RM SEMIPRIVATE

## 2018-01-26 RX ADMIN — TRAMADOL HYDROCHLORIDE 50 MG: 50 TABLET, FILM COATED ORAL at 14:40

## 2018-01-26 RX ADMIN — RISPERIDONE 2 MG: 1 TABLET ORAL at 21:17

## 2018-01-26 RX ADMIN — Medication 10 ML: at 14:42

## 2018-01-26 RX ADMIN — OXYCODONE AND ACETAMINOPHEN 1 TABLET: 5; 325 TABLET ORAL at 09:09

## 2018-01-26 RX ADMIN — TRAZODONE HYDROCHLORIDE 150 MG: 100 TABLET ORAL at 21:17

## 2018-01-26 RX ADMIN — Medication 10 ML: at 08:10

## 2018-01-26 RX ADMIN — HEPARIN SODIUM 5000 UNITS: 5000 INJECTION, SOLUTION INTRAVENOUS; SUBCUTANEOUS at 19:35

## 2018-01-26 RX ADMIN — TEMAZEPAM 30 MG: 15 CAPSULE ORAL at 21:17

## 2018-01-26 RX ADMIN — HEPARIN SODIUM 5000 UNITS: 5000 INJECTION, SOLUTION INTRAVENOUS; SUBCUTANEOUS at 02:34

## 2018-01-26 RX ADMIN — HEPARIN SODIUM 5000 UNITS: 5000 INJECTION, SOLUTION INTRAVENOUS; SUBCUTANEOUS at 09:12

## 2018-01-26 NOTE — PROGRESS NOTES
Bedside shift change report given to Nyle Koyanagi (oncoming nurse) by Nicolas Bello (offgoing nurse). Report included the following information SBAR, Kardex, Intake/Output, MAR and Accordion.

## 2018-01-26 NOTE — PROGRESS NOTES
Problem: Mobility Impaired (Adult and Pediatric)  Goal: *Acute Goals and Plan of Care (Insert Text)  Physical Therapy Goals  Initiated 1/11/2018  Goals reassessed 1/18/18 and remain appropriate over next 7 days  1. Patient will move from supine to sit and sit to supine  in bed with supervision/set-up within 7 day(s). MET  2. Patient will transfer from bed to chair and chair to bed with supervision/set-up using the least restrictive device within 7 day(s). 3.  Patient will perform sit to stand with supervision/set-up within 7 day(s). 4.  Patient will ambulate with supervision/set-up for 150 feet with the least restrictive device within 7 day(s). 5.  Patient will ascend/descend 12 stairs with 1 handrail(s) with supervision/set-up within 7 day(s). 6.  Patient will independently don and doff quick draw TLSO within 7 days. physical Therapy TREATMENT  Patient: Abraham Abraham (87 y.o. male)  Date: 1/26/2018  Diagnosis: Pneumonia  Altered mental status  Closed L1 vertebral fracture (HCC)  Back pain  Acute respiratory failure with hypoxemia (HCC)  COPD (chronic obstructive pulmonary disease) (HCC)  Lumbar spinal stenosis Pneumonia       Precautions: Back, Fall, Bed Alarm ([place] [facility]: reads lips; smokes)  Chart, physical therapy assessment, plan of care and goals were reviewed. ASSESSMENT:  Pt was agreeable to gait. Pt is maintaining gait tolerance and assist level. Pt required assistance with TLSO. Pt was initially agreeable to sit OOB. Pt was OOB for 5 min then requesting to return to bed. Assisted pt supine. Pt is awaiting placement. Pt could benefit from HHPT at group home to optimize mobility   Progression toward goals:  [x]      Improving appropriately and progressing toward goals  []      Improving slowly and progressing toward goals  []      Not making progress toward goals and plan of care will be adjusted     PLAN:  Patient continues to benefit from skilled intervention to address the above impairments. Continue treatment per established plan of care. Discharge Recommendations:  HHPT at group home  Further Equipment Recommendations for Discharge:  Has TLSO     SUBJECTIVE:   Patient stated back to bed.     OBJECTIVE DATA SUMMARY:   Critical Behavior:  Neurologic State: Alert  Orientation Level: Oriented to person, Oriented to place, Oriented to situation  Cognition: Appropriate for age attention/concentration, Follows commands  Safety/Judgement: Awareness of environment, Fall prevention, Home safety, Decreased awareness of need for safety  Functional Mobility Training:  Bed Mobility:     Supine to Sit: Supervision  Sit to Supine: Supervision            Transfers:  Sit to Stand: Stand-by asssistance  Stand to Sit: Stand-by asssistance                             Balance:  Sitting: Intact  Standing: Impaired  Standing - Static: Good  Standing - Dynamic : Fair  Ambulation/Gait Training:  Distance (ft): 170 Feet (ft)  Assistive Device: Brace/Splint  Ambulation - Level of Assistance: Stand-by asssistance;Contact guard assistance        Gait Abnormalities: Decreased step clearance (flexed trunk)        Base of Support: Center of gravity altered     Speed/Courtney: Pace decreased (<100 feet/min)  Step Length: Left shortened;Right shortened                Stairs:             Neuro Re-Education:    Therapeutic Exercises:     Pain:  Pain Scale 1: Numeric (0 - 10)  Pain Intensity 1: 5  Pain Location 1: Back     Pain Description 1: Aching  Pain Intervention(s) 1: Medication (see MAR)  Activity Tolerance:   Limited   Please refer to the flowsheet for vital signs taken during this treatment.   After treatment:   [] Patient left in no apparent distress sitting up in chair  [x] Patient left in no apparent distress in bed  [x] Call bell left within reach  [x] Nursing notified  [] Caregiver present  [x] Bed alarm activated    COMMUNICATION/COLLABORATION:   The patients plan of care was discussed with: Registered Nurse    Kaylynn Márquez JIMMY Paul   Time Calculation: 17 mins

## 2018-01-26 NOTE — PROGRESS NOTES
1/26/18, 11:07AM - Lan Junes, the patient's niece, visited yesterday and asked this  to check again with [address] [place] [address], as she has a family connection with the owner of that facility. Called and spoke with [facility] owner, Kalyani JEREZ#[phone], explaining that the patient has been on a smoking patch and has not smoked since his admission to the hospital.  Columba Gay confirmed that they will absolutely not be willing to consider accepting him due to this smoking history. Called and left a voicemail message to update Lan Junes. As noted previously, Kj Brett at [facility], J#[phone] stated that she can accept the patient into care for $1800/month. This is within the patient's budget, but the patient's niece stated \"That's his entire check and I would have to supplement for toiletries, clothing, his hair cuts, and cell phone bill. \" Patient's niece/MPOA is currently declining this safe discharge option. Care Management will continue to follow his disposition. KANDICE Mayers    1/26/18, 3:42PM - Still have not received a call back from Lan Junes. Did receive a voicemail  from the , [phone] (Miriam Walker). Attempted to call her back, but reached a full voicemail box. Care Management will continue to follow his disposition.     KANDICE Mayers

## 2018-01-26 NOTE — PROGRESS NOTES
Hospitalist Progress Note  Blanco Yeung MD  Office: [phone]      Date of Service:  2018  NAME:  Zaira East  :  1940  MRN:  [id number]      Admission Summary:   78 y. o. male from [facility] with past medical history of sleep disorder presented to the ED from the [facility] via EMS with reported altered mental status.   Patient is a poor historian.  Majority of limited history is obtained per review of ED and medical records.   Per these collective reports, per EMS, patient became acutely confused tonight.   Patient reportedly is usually oriented but en route to ED, he was only oriented to self and place.   On arrival in the ED, patient complained of back pain.  Workup included CT head which was negative.  CT lumbar spine revealed an acute L1 fracture and L4, L5 central spinal stenosis.  CTA chest revealed left lower lobe pneumonia and emphysema but no PE. Orthopedic on board. Waiting for placement. Interval history / Subjective:   Seen at the bed side, not in respiratory distress.    No new complaints      Assessment & Plan:     AMS (altered mental status)  Improved and back to baseline   Alert and oriented to place, person and time   Waiting for placement, Care management on board      Pneumonia, LLL as per CT on admission   Started on Zosyn and levofloxacin at presentation   Stopped Zithromax and Zosyn ()   Blood culture no growth to date   No fever and white counts within normal limits     Acute on likely chronic hypercapnic/ hypoxemic respiratory failure- with compensated primary respiratory acidosis.    Resolved   Saturating > 90% on RA       Acute L1 fracture.     Consult orthopedic spine specialist for further evaluation and treatments.    Seen by NAY Negro brace recommended with a f/u appointment as an outpatient   Patient non compliant concerning wearing brace       L4-L5 spinal stenosis.    Plan as noted above.      Back pain.    Plan as above.      COPD.    Duoneb nebulizer treatments.      Elevated CK total.    Resolved   Encourage fluid intake     Code status: Full Code   DVT prophylaxis: will start with heparin 5000 sq every 8 hours     Care Plan discussed with: Patient/Family, Nurse and   Disposition: Awaiting Rehab facility placement, Care management on board      Hospital Problems  Date Reviewed: 1/11/2018          Codes Class Noted POA    COPD (chronic obstructive pulmonary disease) ([facility] ICD-10-CM: J44.9  ICD-9-CM: 004  1/11/2018 Unknown        Back pain ICD-10-CM: M54.9  ICD-9-CM: 724.5  1/11/2018 Unknown        Altered mental status ICD-10-CM: R41.82  ICD-9-CM: 780.97  1/11/2018 Unknown        * (Principal)Pneumonia ICD-10-CM: J18.9  ICD-9-CM: 922  1/11/2018 Unknown        Lumbar spinal stenosis ICD-10-CM: M48.061  ICD-9-CM: 724.02  1/11/2018 Unknown        Closed L1 vertebral fracture ([facility] ICD-10-CM: S32.019A  ICD-9-CM: 805.4  1/11/2018 Unknown        Acute respiratory failure with hypoxemia [facility]) ICD-10-CM: J96.01  ICD-9-CM: 518.81  1/11/2018 Unknown                Review of Systems:   A comprehensive review of systems was negative except for that written in the HPI. Vital Signs:    Last 24hrs VS reviewed since prior progress note. Most recent are:  Visit Vitals    /73 (BP 1 Location: Right arm, BP Patient Position: Lying right side)    Pulse 92    Temp 98.2 °F (36.8 °C)    Resp 20    Ht 5' 9\" (1.753 m)    Wt 60.4 kg (133 lb 3.2 oz)    SpO2 93%    BMI 19.67 kg/m2         Intake/Output Summary (Last 24 hours) at 01/26/18 1413  Last data filed at 01/26/18 1345   Gross per 24 hour   Intake              620 ml   Output              625 ml   Net               -5 ml        Physical Examination:             Constitutional:  No acute distress, cooperative, pleasant,    ENT:  Oral mucous moist, oropharynx benign.  Neck supple,    Resp:  soft crepitations over the bi-basilar area of lung fields otherwise clear and resonant chest    CV:  Regular rhythm, normal rate, no murmurs, gallops, rubs    GI:  Soft, non distended, non tender normoactive bowel sounds, no hepatosplenomegaly     Musculoskeletal:  lower Back tenderness on palpation, not wearing back brace      Neurologic:  Moves all extremities. AAOx3, CN II-XII reviewed     Psych:  Good insight, Not anxious nor agitated. Data Review:    Review and/or order of clinical lab test  Review and/or order of tests in the medicine section of [facility]      Labs:     Recent Labs      01/25/18   0303   WBC  5.8   HGB  12.5   HCT  37.1   PLT  278     No results for input(s): NA, K, CL, CO2, BUN, CREA, GLU, CA, MG, PHOS, URICA in the last 72 hours. No results for input(s): SGOT, GPT, ALT, AP, TBIL, TBILI, TP, ALB, GLOB, GGT, AML, LPSE in the last 72 hours. No lab exists for component: AMYP, HLPSE  No results for input(s): INR, PTP, APTT in the last 72 hours. No lab exists for component: INREXT, INREXT   No results for input(s): FE, TIBC, PSAT, FERR in the last 72 hours. No results found for: FOL, RBCF   No results for input(s): PH, PCO2, PO2 in the last 72 hours. No results for input(s): CPK, CKNDX, TROIQ in the last 72 hours.     No lab exists for component: CPKMB  No results found for: CHOL, CHOLX, CHLST, CHOLV, HDL, LDL, LDLC, DLDLP, TGLX, TRIGL, TRIGP, CHHD, CHHDX  Lab Results   Component Value Date/Time    Glucose (POC) 91 01/20/2018 06:52 AM    Glucose (POC) 136 01/17/2018 11:46 AM    Glucose (POC) 97 01/12/2018 10:18 PM    Glucose (POC) 138 01/12/2018 05:17 PM     Lab Results   Component Value Date/Time    Color DARK YELLOW 01/11/2018 02:27 AM    Appearance CLOUDY 01/11/2018 02:27 AM    Specific gravity 1.027 01/11/2018 02:27 AM    pH (UA) 5.0 01/11/2018 02:27 AM    Protein 30 01/11/2018 02:27 AM    Glucose NEGATIVE  01/11/2018 02:27 AM    Ketone NEGATIVE  01/11/2018 02:27 AM    Bilirubin NEGATIVE  01/11/2018 02:27 AM Urobilinogen 0.2 01/11/2018 02:27 AM    Nitrites NEGATIVE  01/11/2018 02:27 AM    Leukocyte Esterase NEGATIVE  01/11/2018 02:27 AM    Epithelial cells FEW 01/11/2018 02:27 AM    Bacteria NEGATIVE  01/11/2018 02:27 AM    WBC 0-4 01/11/2018 02:27 AM    RBC 0-5 01/11/2018 02:27 AM         Medications Reviewed:     Current Facility-Administered Medications   Medication Dose Route Frequency    nicotine (NICODERM CQ) 14 mg/24 hr patch 1 Patch  1 Patch TransDERmal DAILY    heparin (porcine) injection 5,000 Units  5,000 Units SubCUTAneous Q8H    polyethylene glycol (MIRALAX) packet 17 g  17 g Oral DAILY PRN    risperiDONE (RisperDAL) tablet 2 mg  2 mg Oral QHS    temazepam (RESTORIL) capsule 30 mg  30 mg Oral QHS    traMADol (ULTRAM) tablet 50 mg  50 mg Oral Q8H PRN    traZODone (DESYREL) tablet 150 mg  150 mg Oral QHS    oxyCODONE-acetaminophen (PERCOCET) 5-325 mg per tablet 1 Tab  1 Tab Oral Q6H PRN    sodium chloride (NS) flush 5-10 mL  5-10 mL IntraVENous Q8H    sodium chloride (NS) flush 5-10 mL  5-10 mL IntraVENous PRN     ______________________________________________________________________  EXPECTED LENGTH OF STAY: 4d 12h  ACTUAL LENGTH OF STAY:          15                 Saadia Candelario MD

## 2018-01-27 PROCEDURE — 74011250637 HC RX REV CODE- 250/637: Performed by: INTERNAL MEDICINE

## 2018-01-27 PROCEDURE — 65270000032 HC RM SEMIPRIVATE

## 2018-01-27 PROCEDURE — 74011250636 HC RX REV CODE- 250/636: Performed by: INTERNAL MEDICINE

## 2018-01-27 RX ADMIN — HEPARIN SODIUM 5000 UNITS: 5000 INJECTION, SOLUTION INTRAVENOUS; SUBCUTANEOUS at 00:39

## 2018-01-27 RX ADMIN — HEPARIN SODIUM 5000 UNITS: 5000 INJECTION, SOLUTION INTRAVENOUS; SUBCUTANEOUS at 17:35

## 2018-01-27 RX ADMIN — TRAZODONE HYDROCHLORIDE 150 MG: 100 TABLET ORAL at 21:31

## 2018-01-27 RX ADMIN — TRAMADOL HYDROCHLORIDE 50 MG: 50 TABLET, FILM COATED ORAL at 21:31

## 2018-01-27 RX ADMIN — HEPARIN SODIUM 5000 UNITS: 5000 INJECTION, SOLUTION INTRAVENOUS; SUBCUTANEOUS at 08:51

## 2018-01-27 RX ADMIN — RISPERIDONE 2 MG: 1 TABLET ORAL at 21:31

## 2018-01-27 RX ADMIN — OXYCODONE AND ACETAMINOPHEN 1 TABLET: 5; 325 TABLET ORAL at 05:42

## 2018-01-27 RX ADMIN — Medication 10 ML: at 12:55

## 2018-01-27 RX ADMIN — TRAMADOL HYDROCHLORIDE 50 MG: 50 TABLET, FILM COATED ORAL at 13:35

## 2018-01-27 RX ADMIN — TRAMADOL HYDROCHLORIDE 50 MG: 50 TABLET, FILM COATED ORAL at 03:39

## 2018-01-27 RX ADMIN — Medication 10 ML: at 23:49

## 2018-01-27 RX ADMIN — Medication 10 ML: at 05:24

## 2018-01-27 RX ADMIN — TEMAZEPAM 30 MG: 15 CAPSULE ORAL at 21:32

## 2018-01-27 NOTE — PROGRESS NOTES
Hospitalist Progress Note  Tresa Bryant MD  Office: [phone]      Date of Service:  2018  NAME:  Zaira East  :  1940  MRN:  [id number]      Admission Summary:   78 y. o. male from [facility] with past medical history of sleep disorder presented to the ED from the [facility] via EMS with reported altered mental status.   Patient is a poor historian.  Majority of limited history is obtained per review of ED and medical records.   Per these collective reports, per EMS, patient became acutely confused tonight.   Patient reportedly is usually oriented but en route to ED, he was only oriented to self and place.   On arrival in the ED, patient complained of back pain.  Workup included CT head which was negative.  CT lumbar spine revealed an acute L1 fracture and L4, L5 central spinal stenosis.  CTA chest revealed left lower lobe pneumonia and emphysema but no PE. Orthopedic on board. Waiting for placement. Interval history / Subjective:   Seen at the bed side, not in respiratory distress.    No new complaints waiting for placement     Assessment & Plan:     AMS (altered mental status)  Improved and back to baseline   Alert and oriented to place, person and time   Waiting for placement, Care management on board      Pneumonia, LLL as per CT on admission   Started on Zosyn and levofloxacin at presentation   Stopped Zithromax and Zosyn ()   Blood culture no growth to date   No fever and white counts within normal limits     Acute on likely chronic hypercapnic/ hypoxemic respiratory failure- with compensated primary respiratory acidosis.    Resolved   Saturating > 90% on RA       Acute L1 fracture.     Consult orthopedic spine specialist for further evaluation and treatments.    Seen by NAY Negro brace recommended with a f/u appointment as an outpatient   Patient non compliant concerning wearing brace       L4-L5 spinal stenosis.    Plan as noted above.      Back pain.    Plan as above.      COPD.    Duoneb nebulizer treatments.      Elevated CK total.    Resolved   Encourage fluid intake     Code status: Full Code   DVT prophylaxis: will start with heparin 5000 sq every 8 hours     Care Plan discussed with: Patient/Family, Nurse and   Disposition: Awaiting Rehab facility placement, Care management on board      Hospital Problems  Date Reviewed: 1/11/2018          Codes Class Noted POA    COPD (chronic obstructive pulmonary disease) ([facility] ICD-10-CM: J44.9  ICD-9-CM: 849  1/11/2018 Unknown        Back pain ICD-10-CM: M54.9  ICD-9-CM: 724.5  1/11/2018 Unknown        Altered mental status ICD-10-CM: R41.82  ICD-9-CM: 780.97  1/11/2018 Unknown        * (Principal)Pneumonia ICD-10-CM: J18.9  ICD-9-CM: 207  1/11/2018 Unknown        Lumbar spinal stenosis ICD-10-CM: M48.061  ICD-9-CM: 724.02  1/11/2018 Unknown        Closed L1 vertebral fracture ([facility] ICD-10-CM: D17.623I  ICD-9-CM: 805.4  1/11/2018 Unknown        Acute respiratory failure with hypoxemia [facility]) ICD-10-CM: J96.01  ICD-9-CM: 518.81  1/11/2018 Unknown                Review of Systems:   A comprehensive review of systems was negative except for that written in the HPI. Vital Signs:    Last 24hrs VS reviewed since prior progress note. Most recent are:  Visit Vitals    /71 (BP 1 Location: Right arm, BP Patient Position: At rest)    Pulse 90    Temp 98.7 °F (37.1 °C)    Resp 22    Ht 5' 9\" (1.753 m)    Wt 58.7 kg (129 lb 6.4 oz)    SpO2 94%    BMI 19.11 kg/m2         Intake/Output Summary (Last 24 hours) at 01/27/18 7751  Last data filed at 01/26/18 1345   Gross per 24 hour   Intake              420 ml   Output                0 ml   Net              420 ml        Physical Examination:             Constitutional:  No acute distress, cooperative, pleasant,    ENT:  Oral mucous moist, oropharynx benign.  Neck supple,    Resp:  soft crepitations over the bi-basilar area of lung fields otherwise clear and resonant chest    CV:  Regular rhythm, normal rate, no murmurs, gallops, rubs    GI:  Soft, non distended, non tender normoactive bowel sounds, no hepatosplenomegaly     Musculoskeletal:  lower Back tenderness on palpation, not wearing back brace      Neurologic:  Moves all extremities. AAOx3, CN II-XII reviewed     Psych:  Good insight, Not anxious nor agitated. Data Review:    Review and/or order of clinical lab test  Review and/or order of tests in the medicine section of [facility]      Labs:     Recent Labs      01/25/18   0303   WBC  5.8   HGB  12.5   HCT  37.1   PLT  278     No results for input(s): NA, K, CL, CO2, BUN, CREA, GLU, CA, MG, PHOS, URICA in the last 72 hours. No results for input(s): SGOT, GPT, ALT, AP, TBIL, TBILI, TP, ALB, GLOB, GGT, AML, LPSE in the last 72 hours. No lab exists for component: AMYP, HLPSE  No results for input(s): INR, PTP, APTT in the last 72 hours. No lab exists for component: INREXT, INREXT   No results for input(s): FE, TIBC, PSAT, FERR in the last 72 hours. No results found for: FOL, RBCF   No results for input(s): PH, PCO2, PO2 in the last 72 hours. No results for input(s): CPK, CKNDX, TROIQ in the last 72 hours.     No lab exists for component: CPKMB  No results found for: CHOL, CHOLX, CHLST, CHOLV, HDL, LDL, LDLC, DLDLP, TGLX, TRIGL, TRIGP, CHHD, CHHDX  Lab Results   Component Value Date/Time    Glucose (POC) 91 01/20/2018 06:52 AM    Glucose (POC) 136 01/17/2018 11:46 AM    Glucose (POC) 97 01/12/2018 10:18 PM    Glucose (POC) 138 01/12/2018 05:17 PM     Lab Results   Component Value Date/Time    Color DARK YELLOW 01/11/2018 02:27 AM    Appearance CLOUDY 01/11/2018 02:27 AM    Specific gravity 1.027 01/11/2018 02:27 AM    pH (UA) 5.0 01/11/2018 02:27 AM    Protein 30 01/11/2018 02:27 AM    Glucose NEGATIVE  01/11/2018 02:27 AM    Ketone NEGATIVE  01/11/2018 02:27 AM    Bilirubin NEGATIVE  01/11/2018 02:27 AM Urobilinogen 0.2 01/11/2018 02:27 AM    Nitrites NEGATIVE  01/11/2018 02:27 AM    Leukocyte Esterase NEGATIVE  01/11/2018 02:27 AM    Epithelial cells FEW 01/11/2018 02:27 AM    Bacteria NEGATIVE  01/11/2018 02:27 AM    WBC 0-4 01/11/2018 02:27 AM    RBC 0-5 01/11/2018 02:27 AM         Medications Reviewed:     Current Facility-Administered Medications   Medication Dose Route Frequency    nicotine (NICODERM CQ) 14 mg/24 hr patch 1 Patch  1 Patch TransDERmal DAILY    heparin (porcine) injection 5,000 Units  5,000 Units SubCUTAneous Q8H    polyethylene glycol (MIRALAX) packet 17 g  17 g Oral DAILY PRN    risperiDONE (RisperDAL) tablet 2 mg  2 mg Oral QHS    temazepam (RESTORIL) capsule 30 mg  30 mg Oral QHS    traMADol (ULTRAM) tablet 50 mg  50 mg Oral Q8H PRN    traZODone (DESYREL) tablet 150 mg  150 mg Oral QHS    oxyCODONE-acetaminophen (PERCOCET) 5-325 mg per tablet 1 Tab  1 Tab Oral Q6H PRN    sodium chloride (NS) flush 5-10 mL  5-10 mL IntraVENous Q8H    sodium chloride (NS) flush 5-10 mL  5-10 mL IntraVENous PRN     ______________________________________________________________________  EXPECTED LENGTH OF STAY: 4d 12h  ACTUAL LENGTH OF STAY:          [address] Quiroga MD

## 2018-01-27 NOTE — PROGRESS NOTES
Bedside and Verbal shift change report given to Brenda Lopez (oncoming nurse) by Kiya Ashley (offgoing nurse). Report included the following information SBAR, Kardex, Procedure Summary, Intake/Output, MAR, Accordion and Recent Results.

## 2018-01-27 NOTE — PROGRESS NOTES
Bedside and Verbal shift change report given to [address] [place] [address]  (oncoming nurse) by Melvi Loya (offgoing nurse). Report included the following information SBAR and Kardex.

## 2018-01-27 NOTE — PROGRESS NOTES
Bedside shift change report given to JASMYN Yanes (oncoming nurse) by Alec Winslow RN (offgoing nurse). Report included the following information SBAR, Intake/Output and MAR.

## 2018-01-28 PROCEDURE — 74011250636 HC RX REV CODE- 250/636: Performed by: INTERNAL MEDICINE

## 2018-01-28 PROCEDURE — 65270000032 HC RM SEMIPRIVATE

## 2018-01-28 PROCEDURE — 74011250637 HC RX REV CODE- 250/637: Performed by: INTERNAL MEDICINE

## 2018-01-28 RX ADMIN — Medication 10 ML: at 06:27

## 2018-01-28 RX ADMIN — TRAZODONE HYDROCHLORIDE 150 MG: 100 TABLET ORAL at 21:23

## 2018-01-28 RX ADMIN — RISPERIDONE 2 MG: 1 TABLET ORAL at 21:24

## 2018-01-28 RX ADMIN — HEPARIN SODIUM 5000 UNITS: 5000 INJECTION, SOLUTION INTRAVENOUS; SUBCUTANEOUS at 09:04

## 2018-01-28 RX ADMIN — TRAMADOL HYDROCHLORIDE 50 MG: 50 TABLET, FILM COATED ORAL at 09:05

## 2018-01-28 RX ADMIN — Medication 10 ML: at 17:03

## 2018-01-28 RX ADMIN — HEPARIN SODIUM 5000 UNITS: 5000 INJECTION, SOLUTION INTRAVENOUS; SUBCUTANEOUS at 17:02

## 2018-01-28 RX ADMIN — TEMAZEPAM 30 MG: 15 CAPSULE ORAL at 21:24

## 2018-01-28 RX ADMIN — HEPARIN SODIUM 5000 UNITS: 5000 INJECTION, SOLUTION INTRAVENOUS; SUBCUTANEOUS at 01:48

## 2018-01-28 RX ADMIN — Medication 10 ML: at 21:25

## 2018-01-28 RX ADMIN — OXYCODONE AND ACETAMINOPHEN 1 TABLET: 5; 325 TABLET ORAL at 21:24

## 2018-01-28 NOTE — PROGRESS NOTES
Bedside shift change report given to JASMYN Yanes (oncoming nurse) by Leo Clayton RN (offgoing nurse). Report included the following information SBAR, Intake/Output and MAR.

## 2018-01-28 NOTE — PROGRESS NOTES
Hospitalist Progress Note  Frances Zhou MD  Office: [phone]      Date of Service:  2018  NAME:  Jenny Oliveira  :  1940  MRN:  [id number]      Admission Summary:   78 y. o. male from [facility] with past medical history of sleep disorder presented to the ED from the [facility] via EMS with reported altered mental status.   Patient is a poor historian.  Majority of limited history is obtained per review of ED and medical records.   Per these collective reports, per EMS, patient became acutely confused tonight.   Patient reportedly is usually oriented but en route to ED, he was only oriented to self and place.   On arrival in the ED, patient complained of back pain.  Workup included CT head which was negative.  CT lumbar spine revealed an acute L1 fracture and L4, L5 central spinal stenosis.  CTA chest revealed left lower lobe pneumonia and emphysema but no PE. Orthopedic on board. Waiting for placement.     Interval history / Subjective:   Seen at the bed side, no acute events  No new complaints waiting for placement     Assessment & Plan:     AMS (altered mental status)  Improved and back to baseline   Alert and oriented to place, person and time   Waiting for placement, Care management on board      Pneumonia, LLL as per CT on admission   Started on Zosyn and levofloxacin at presentation   Stopped Zithromax and Zosyn ()   Blood culture no growth to date   No fever and white counts within normal limits     Acute on likely chronic hypercapnic/ hypoxemic respiratory failure- with compensated primary respiratory acidosis.    Resolved   Saturating > 90% on RA       Acute L1 fracture.     Consult orthopedic spine specialist for further evaluation and treatments.    Seen by NAY [facility]ston brace recommended with a f/u appointment as an outpatient   Patient non compliant concerning wearing brace       L4-L5 spinal stenosis.    Plan as noted above.      Back pain.    Plan as above.      COPD.    Duoneb nebulizer treatments.      Elevated CK total.    Resolved   Encourage fluid intake     Code status: Full Code   DVT prophylaxis: will start with heparin 5000 sq every 8 hours     Care Plan discussed with: Patient/Family, Nurse and   Disposition: Awaiting Rehab facility placement, Care management on board      Hospital Problems  Date Reviewed: 1/11/2018          Codes Class Noted POA    COPD (chronic obstructive pulmonary disease) ([facility] ICD-10-CM: J44.9  ICD-9-CM: 674  1/11/2018 Unknown        Back pain ICD-10-CM: M54.9  ICD-9-CM: 724.5  1/11/2018 Unknown        Altered mental status ICD-10-CM: R41.82  ICD-9-CM: 780.97  1/11/2018 Unknown        * (Principal)Pneumonia ICD-10-CM: J18.9  ICD-9-CM: 200  1/11/2018 Unknown        Lumbar spinal stenosis ICD-10-CM: M48.061  ICD-9-CM: 724.02  1/11/2018 Unknown        Closed L1 vertebral fracture ([facility].) ICD-10-CM: S32.019A  ICD-9-CM: 805.4  1/11/2018 Unknown        Acute respiratory failure with hypoxemia [facility]) ICD-10-CM: J96.01  ICD-9-CM: 518.81  1/11/2018 Unknown                Review of Systems:   A comprehensive review of systems was negative except for that written in the HPI. Vital Signs:    Last 24hrs VS reviewed since prior progress note. Most recent are:  Visit Vitals    /75    Pulse 84    Temp 98.8 °F (37.1 °C)    Resp 20    Ht 5' 9\" (1.753 m)    Wt 57.7 kg (127 lb 1.6 oz)    SpO2 95%    BMI 18.77 kg/m2         Intake/Output Summary (Last 24 hours) at 01/28/18 0741  Last data filed at 01/28/18 0534   Gross per 24 hour   Intake                0 ml   Output              825 ml   Net             -825 ml        Physical Examination:             Constitutional:  No acute distress, cooperative, pleasant,    ENT:  Oral mucous moist, oropharynx benign.  Neck supple,    Resp:  soft crepitations over the bi-basilar area of lung fields otherwise clear and resonant chest    CV: Regular rhythm, normal rate, no murmurs, gallops, rubs    GI:  Soft, non distended, non tender normoactive bowel sounds, no hepatosplenomegaly     Musculoskeletal:  lower Back tenderness on palpation, not wearing back brace      Neurologic:  Moves all extremities. AAOx3, CN II-XII reviewed     Psych:  Good insight, Not anxious nor agitated. Data Review:    Review and/or order of clinical lab test  Review and/or order of tests in the medicine section of [facility]      Labs:     No results for input(s): WBC, HGB, HCT, PLT, HGBEXT, HCTEXT, PLTEXT, HGBEXT, HCTEXT, PLTEXT in the last 72 hours. No results for input(s): NA, K, CL, CO2, BUN, CREA, GLU, CA, MG, PHOS, URICA in the last 72 hours. No results for input(s): SGOT, GPT, ALT, AP, TBIL, TBILI, TP, ALB, GLOB, GGT, AML, LPSE in the last 72 hours. No lab exists for component: AMYP, HLPSE  No results for input(s): INR, PTP, APTT in the last 72 hours. No lab exists for component: INREXT, INREXT   No results for input(s): FE, TIBC, PSAT, FERR in the last 72 hours. No results found for: FOL, RBCF   No results for input(s): PH, PCO2, PO2 in the last 72 hours. No results for input(s): CPK, CKNDX, TROIQ in the last 72 hours.     No lab exists for component: CPKMB  No results found for: CHOL, CHOLX, CHLST, CHOLV, HDL, LDL, LDLC, DLDLP, TGLX, TRIGL, TRIGP, CHHD, CHHDX  Lab Results   Component Value Date/Time    Glucose (POC) 91 01/20/2018 06:52 AM    Glucose (POC) 136 01/17/2018 11:46 AM    Glucose (POC) 97 01/12/2018 10:18 PM    Glucose (POC) 138 01/12/2018 05:17 PM     Lab Results   Component Value Date/Time    Color DARK YELLOW 01/11/2018 02:27 AM    Appearance CLOUDY 01/11/2018 02:27 AM    Specific gravity 1.027 01/11/2018 02:27 AM    pH (UA) 5.0 01/11/2018 02:27 AM    Protein 30 01/11/2018 02:27 AM    Glucose NEGATIVE  01/11/2018 02:27 AM    Ketone NEGATIVE  01/11/2018 02:27 AM    Bilirubin NEGATIVE  01/11/2018 02:27 AM    Urobilinogen 0.2 01/11/2018 02:27 AM Nitrites NEGATIVE  01/11/2018 02:27 AM    Leukocyte Esterase NEGATIVE  01/11/2018 02:27 AM    Epithelial cells FEW 01/11/2018 02:27 AM    Bacteria NEGATIVE  01/11/2018 02:27 AM    WBC 0-4 01/11/2018 02:27 AM    RBC 0-5 01/11/2018 02:27 AM         Medications Reviewed:     Current Facility-Administered Medications   Medication Dose Route Frequency    nicotine (NICODERM CQ) 14 mg/24 hr patch 1 Patch  1 Patch TransDERmal DAILY    heparin (porcine) injection 5,000 Units  5,000 Units SubCUTAneous Q8H    polyethylene glycol (MIRALAX) packet 17 g  17 g Oral DAILY PRN    risperiDONE (RisperDAL) tablet 2 mg  2 mg Oral QHS    temazepam (RESTORIL) capsule 30 mg  30 mg Oral QHS    traMADol (ULTRAM) tablet 50 mg  50 mg Oral Q8H PRN    traZODone (DESYREL) tablet 150 mg  150 mg Oral QHS    oxyCODONE-acetaminophen (PERCOCET) 5-325 mg per tablet 1 Tab  1 Tab Oral Q6H PRN    sodium chloride (NS) flush 5-10 mL  5-10 mL IntraVENous Q8H    sodium chloride (NS) flush 5-10 mL  5-10 mL IntraVENous PRN     ______________________________________________________________________  EXPECTED LENGTH OF STAY: 4d 12h  ACTUAL LENGTH OF STAY:          [address] [place]

## 2018-01-28 NOTE — PROGRESS NOTES
Bedside and Verbal shift change report given to Michael Vega (oncoming nurse) by Octavia Coreas (offgoing nurse). Report included the following information SBAR, Kardex, Procedure Summary, Intake/Output, MAR, Accordion and Recent Results.

## 2018-01-29 PROCEDURE — 97116 GAIT TRAINING THERAPY: CPT

## 2018-01-29 PROCEDURE — 74011250636 HC RX REV CODE- 250/636: Performed by: INTERNAL MEDICINE

## 2018-01-29 PROCEDURE — 74011250637 HC RX REV CODE- 250/637: Performed by: INTERNAL MEDICINE

## 2018-01-29 PROCEDURE — 65270000032 HC RM SEMIPRIVATE

## 2018-01-29 RX ADMIN — HEPARIN SODIUM 5000 UNITS: 5000 INJECTION, SOLUTION INTRAVENOUS; SUBCUTANEOUS at 09:18

## 2018-01-29 RX ADMIN — HEPARIN SODIUM 5000 UNITS: 5000 INJECTION, SOLUTION INTRAVENOUS; SUBCUTANEOUS at 01:47

## 2018-01-29 RX ADMIN — OXYCODONE AND ACETAMINOPHEN 1 TABLET: 5; 325 TABLET ORAL at 21:14

## 2018-01-29 RX ADMIN — SODIUM CHLORIDE 500 ML: 900 INJECTION, SOLUTION INTRAVENOUS at 09:17

## 2018-01-29 RX ADMIN — OXYCODONE AND ACETAMINOPHEN 1 TABLET: 5; 325 TABLET ORAL at 12:42

## 2018-01-29 RX ADMIN — Medication 10 ML: at 21:15

## 2018-01-29 RX ADMIN — TRAZODONE HYDROCHLORIDE 150 MG: 100 TABLET ORAL at 21:14

## 2018-01-29 RX ADMIN — HEPARIN SODIUM 5000 UNITS: 5000 INJECTION, SOLUTION INTRAVENOUS; SUBCUTANEOUS at 18:52

## 2018-01-29 RX ADMIN — RISPERIDONE 2 MG: 1 TABLET ORAL at 21:14

## 2018-01-29 RX ADMIN — Medication 10 ML: at 18:53

## 2018-01-29 RX ADMIN — Medication 10 ML: at 07:23

## 2018-01-29 RX ADMIN — TEMAZEPAM 30 MG: 15 CAPSULE ORAL at 21:14

## 2018-01-29 RX ADMIN — TRAMADOL HYDROCHLORIDE 50 MG: 50 TABLET, FILM COATED ORAL at 09:18

## 2018-01-29 NOTE — PROGRESS NOTES
.Bedside and Verbal shift change report given to Sydney Hardy RN  (oncoming nurse) by Renetta Garcia   (offgoing nurse). Report included the following information SBAR and Kardex.

## 2018-01-29 NOTE — PROGRESS NOTES
Problem: Mobility Impaired (Adult and Pediatric)  Goal: *Acute Goals and Plan of Care (Insert Text)  Physical Therapy Goals  Updated 1/29/2018  Initiated 1/11/2018  Goals reassessed 1/18/18 and remain appropriate over next 7 days  1. Patient will move from supine to sit and sit to supine  in bed with supervision/set-up within 7 day(s). MET  2. Patient will transfer from bed to chair and chair to bed with supervision/set-up using the least restrictive device within 7 day(s). 3.  Patient will perform sit to stand with modified independence within 7 day(s). 4.  Patient will ambulate with supervision/set-up for 250 feet with the least restrictive device within 7 day(s). 5.  Patient will ascend/descend 12 stairs with 1 handrail(s) with supervision/set-up within 7 day(s). 6.  Patient will independently don and doff quick draw TLSO within 7 days. physical Therapy REEVALUATION  Patient: Loly Schmitz [de-identified]78 y.o. male)  Date: 1/29/2018  Primary Diagnosis: Pneumonia  Altered mental status  Closed L1 vertebral fracture (HCC)  Back pain  Acute respiratory failure with hypoxemia (HCC)  COPD (chronic obstructive pulmonary disease) (HCC)  Lumbar spinal stenosis        Precautions:   Back, Fall, Bed Alarm (Crow B: reads lips; smokes)  Chart, physical therapy assessment, plan of care and goals were reviewed. ASSESSMENT :  Based on the objective data described below, the patient presents with progress towards his acute goals with improved bed mobility and gait tolerance. He demonstrates fair understanding of his TLSO and applied it with minimal assistance while seated EOB. The patient is minimally communicative but noted increased respiratory rate and tachycardia with a  BPM resting to 122 immediately following gait. This patient lives in a group home and is typically ambulatory. Recommend return to this group home with HHPT services to progress independence and safety with activity.     Patient will benefit from skilled intervention to address the above impairments. Patients rehabilitation potential is considered to be Good  Factors which may influence rehabilitation potential include:   [x]           None noted  []           Mental ability/status  []           Medical condition  []           Home/family situation and support systems  []           Safety awareness  []           Pain tolerance/management  []           Other:      PLAN :  Recommendations and Planned Interventions:  [x]             Bed Mobility Training             [x]      Neuromuscular Re-Education  [x]             Transfer Training                   []      Orthotic/Prosthetic Training  [x]             Gait Training                         []      Modalities  [x]             Therapeutic Exercises           []      Edema Management/Control  [x]             Therapeutic Activities            []      Patient and Family Training/Education  []             Other (comment):  Frequency/Duration: Patient will be followed by physical therapy 3 times a week to address goals. Discharge Recommendations: Home Health  Further Equipment Recommendations for Discharge: None     SUBJECTIVE:   Patient stated I don't need it.  (when discussing use of a RW for therapy)    OBJECTIVE DATA SUMMARY:     Past Medical History:   Diagnosis Date    Sleep disorder     insomnia   History reviewed. No pertinent surgical history. Hospital course since last seen and reason for reevaluation: weekly re-eval  Critical Behavior:  Neurologic State: Alert  Orientation Level: Oriented X4  Cognition: Follows commands  Safety/Judgement: Awareness of environment, Fall prevention, Home safety, Decreased awareness of need for safety  Functional Mobility:  Bed Mobility:  Rolling: Modified independent; Additional time  Supine to Sit: Supervision  Sit to Supine: Supervision  Scooting: Supervision  Transfers:  Sit to Stand: Stand-by asssistance  Stand to Sit: Stand-by asssistance Balance:   Sitting: Intact  Standing: Impaired  Standing - Static: Good  Standing - Dynamic : Fair  Ambulation/Gait Training:  Distance (ft): 180 Feet (ft)  Assistive Device: Gait belt;Brace/Splint  Ambulation - Level of Assistance: Contact guard assistance        Gait Abnormalities: Shuffling gait (flexed trunk)        Base of Support: Center of gravity altered     Speed/Courtney: Slow  Step Length: Left shortened;Right shortened                  Cued for arm swing and erect posture       Pain:  Pain Scale 1: Numeric (0 - 10)  Pain Intensity 1: 5  Pain Location 1: Back  Pain Orientation 1: Posterior  Pain Description 1: Aching  Pain Intervention(s) 1: Medication (see MAR)  Activity Tolerance:     Please refer to the flowsheet for vital signs taken during this treatment. After treatment:   []  Patient left in no apparent distress sitting up in chair  [x]  Patient left in no apparent distress in bed  [x]  Call bell left within reach  [x]  Nursing notified  []  Caregiver present  [x]  Bed alarm activated    COMMUNICATION/EDUCATION:   The patients plan of care was discussed with: Registered Nurse. [x]  Fall prevention education was provided and the patient/caregiver indicated understanding. [x]  Patient/family have participated as able in goal setting and plan of care. [x]  Patient/family agree to work toward stated goals and plan of care. []  Patient understands intent and goals of therapy, but is neutral about his/her participation. []  Patient is unable to participate in goal setting and plan of care.     Thank you for this referral.  Ed Oris, PT, DPT   Time Calculation: 19 mins

## 2018-01-29 NOTE — PROGRESS NOTES
Bedside shift change report given to Ena Macias (oncoming nurse) by Delphine May RN (offgoing nurse). Report included the following information SBAR, Kardex, ED Summary, Intake/Output, MAR, Accordion and Recent Results.

## 2018-01-29 NOTE — PROGRESS NOTES
TREVOR spoke with pt's niece, Reed Pak phone # [phone]. During Conversation with niece she informed CM that she dose not have MPOA but is his Financial Power of . TREVOR informed Reed Pak that if she would like to obtain documents from patients chart, he would need to sign a release and be able to fill out where those documents would go. CM informed her that information could not be printed from the chart. Niece states her uncle gets a total of $9265.07 per month and she is not going to be financially responsible for covering any expenses that he is not able to cover himself. She continues to look at providers and is not interested in the facility mentioned by fellow CM due to costing $1800/month and that takes almost all his money.   Coleen Billings RN CRm

## 2018-01-30 LAB
ANION GAP SERPL CALC-SCNC: 3 MMOL/L (ref 5–15)
BUN SERPL-MCNC: 19 MG/DL (ref 6–20)
BUN/CREAT SERPL: 20 (ref 12–20)
CALCIUM SERPL-MCNC: 9.3 MG/DL (ref 8.5–10.1)
CHLORIDE SERPL-SCNC: 99 MMOL/L (ref 97–108)
CK SERPL-CCNC: 90 U/L (ref 39–308)
CO2 SERPL-SCNC: 34 MMOL/L (ref 21–32)
CREAT SERPL-MCNC: 0.94 MG/DL (ref 0.7–1.3)
GLUCOSE SERPL-MCNC: 101 MG/DL (ref 65–100)
POTASSIUM SERPL-SCNC: 4.7 MMOL/L (ref 3.5–5.1)
SODIUM SERPL-SCNC: 136 MMOL/L (ref 136–145)

## 2018-01-30 PROCEDURE — 74011250637 HC RX REV CODE- 250/637: Performed by: INTERNAL MEDICINE

## 2018-01-30 PROCEDURE — 77030027138 HC INCENT SPIROMETER -A

## 2018-01-30 PROCEDURE — 74011250636 HC RX REV CODE- 250/636: Performed by: INTERNAL MEDICINE

## 2018-01-30 PROCEDURE — 65270000032 HC RM SEMIPRIVATE

## 2018-01-30 PROCEDURE — 82550 ASSAY OF CK (CPK): CPT | Performed by: INTERNAL MEDICINE

## 2018-01-30 PROCEDURE — 36415 COLL VENOUS BLD VENIPUNCTURE: CPT | Performed by: INTERNAL MEDICINE

## 2018-01-30 PROCEDURE — 80048 BASIC METABOLIC PNL TOTAL CA: CPT | Performed by: INTERNAL MEDICINE

## 2018-01-30 RX ORDER — ACETAMINOPHEN 325 MG/1
650 TABLET ORAL
Status: DISCONTINUED | OUTPATIENT
Start: 2018-01-30 | End: 2018-02-05

## 2018-01-30 RX ADMIN — TRAMADOL HYDROCHLORIDE 50 MG: 50 TABLET, FILM COATED ORAL at 21:15

## 2018-01-30 RX ADMIN — HEPARIN SODIUM 5000 UNITS: 5000 INJECTION, SOLUTION INTRAVENOUS; SUBCUTANEOUS at 08:55

## 2018-01-30 RX ADMIN — Medication 5 ML: at 14:12

## 2018-01-30 RX ADMIN — RISPERIDONE 2 MG: 1 TABLET ORAL at 21:10

## 2018-01-30 RX ADMIN — ACETAMINOPHEN 650 MG: 325 TABLET, FILM COATED ORAL at 12:03

## 2018-01-30 RX ADMIN — OXYCODONE AND ACETAMINOPHEN 1 TABLET: 5; 325 TABLET ORAL at 09:46

## 2018-01-30 RX ADMIN — TEMAZEPAM 30 MG: 15 CAPSULE ORAL at 21:11

## 2018-01-30 RX ADMIN — Medication 10 ML: at 07:43

## 2018-01-30 RX ADMIN — HEPARIN SODIUM 5000 UNITS: 5000 INJECTION, SOLUTION INTRAVENOUS; SUBCUTANEOUS at 00:19

## 2018-01-30 RX ADMIN — HEPARIN SODIUM 5000 UNITS: 5000 INJECTION, SOLUTION INTRAVENOUS; SUBCUTANEOUS at 16:41

## 2018-01-30 RX ADMIN — Medication 10 ML: at 21:11

## 2018-01-30 RX ADMIN — TRAZODONE HYDROCHLORIDE 150 MG: 100 TABLET ORAL at 21:10

## 2018-01-30 RX ADMIN — OXYCODONE AND ACETAMINOPHEN 1 TABLET: 5; 325 TABLET ORAL at 16:40

## 2018-01-30 NOTE — PROGRESS NOTES
Bedside shift change report given to [id number] (oncoming nurse) by Amanda Castañeda RN (offgoing nurse). Report included the following information SBAR, Kardex, ED Summary, Intake/Output, MAR, Accordion and Recent Results.

## 2018-01-30 NOTE — PROGRESS NOTES
Bedside and Verbal shift change report given to Sydney Hardy RN  (oncoming nurse) by Nael Darling (offgoing nurse). Report included the following information SBAR and Kardex.

## 2018-01-30 NOTE — PROGRESS NOTES
Hospitalist Progress Note  Luis Bliss MD  Answering service: [phone] from in house phone      Date of Service:  2018  NAME:  Zachery Figueredo  :  1940  MRN:  [id number]      Admission Summary:   79 yo man with h/o sleep disorder was BIBEMS to the ED from [facility] on 18 with confusion. He was admitted with acute encephalopathy, acute L1 fracture, rhabdomyolysis, and acute hypoxic and hypercapnic respiratory failure. Interval history / Subjective:   C/o lower back pain; only wants to wear his TLSO brace when walking; d/w nurse; no overnight events     Assessment & Plan:         Acute hypoxic and hypercapnic respiratory failure (POA)  - resolved; weaned off O2 and satting well on RA    LLL CAP (POA) - as per admission CT  - BCx  negative  - UCx  negative  - s/p azithromycin, Zosyn, LVQ    Acute COPD exacerbation - resolved; prn nebs    Rhabdomyolysis (POA) - resolved with IVF; CK WNL    Acute L1 compression fracture (POA) - Ortho consulted  - conservative management with TLSO brace, PT/OT, OOB, pain control  - nonadherent with wearing brace  - f/u with Dr Clay Sosa outpatient    Lumbar spinal stenosis (L4-5) - pain control; plan as above    Acute metabolic encephalopathy (POA) - resolved to baseline    Chronic nicotine dependence - cessation counseling    Code status: full  DVT prophylaxis: heparin     Care Plan discussed with: Patient/Family, Nurse and   Disposition: Likely to group home once niece/financial POA makes a decision on place, Defers to his niece for placement decisions.      Hospital Problems  Date Reviewed: 2018          Codes Class Noted POA    COPD (chronic obstructive pulmonary disease) ([email or web address] ICD-10-CM: J44.9  ICD-9-CM: 126  2018 Unknown        Back pain ICD-10-CM: M54.9  ICD-9-CM: 724.5  2018 Unknown        Altered mental status ICD-10-CM: R41.82  ICD-9-CM: 780.97  1/11/2018 Unknown        * (Principal)Pneumonia ICD-10-CM: J18.9  ICD-9-CM: 895  1/11/2018 Unknown        Lumbar spinal stenosis ICD-10-CM: M48.061  ICD-9-CM: 724.02  1/11/2018 Unknown        Closed L1 vertebral fracture ([facility] ICD-10-CM: S32.019A  ICD-9-CM: 805.4  1/11/2018 Unknown        Acute respiratory failure with hypoxemia [facility]) ICD-10-CM: J96.01  ICD-9-CM: 518.81  1/11/2018 Unknown            Review of Systems:   Pertinent items are noted in HPI. Vital Signs:    Last 24hrs VS reviewed since prior progress note. Most recent are:  Visit Vitals    /71    Pulse 89    Temp 98 °F (36.7 °C)    Resp 16    Ht 5' 9\" (1.753 m)    Wt 57.7 kg (127 lb 1.6 oz)    SpO2 98%    BMI 18.77 kg/m2       Intake/Output Summary (Last 24 hours) at 01/30/18 1545  Last data filed at 01/30/18 1411   Gross per 24 hour   Intake             1920 ml   Output             1350 ml   Net              570 ml      Physical Examination:     Gen: awake, NAD  HENT: NCAT, oral mucosa benign  Eyes: PERRL, anicteric sclera  CVS: regular rhythm, no m/g/r appreciated, no peripheral edema, +pulses  Resp: CTAB, no w/r/r  Abd: + BS, soft, NT/ND  Neuro: AAOx3, responds appropriately to questions and commands    Data Review:    Review and/or order of clinical lab test  Review and/or order of tests in the radiology section of CPT  Review and/or order of tests in the medicine section of CPT    Labs:   No results for input(s): WBC, HGB, HCT, PLT, HGBEXT, HCTEXT, PLTEXT in the last 72 hours. Recent Labs      01/30/18   0242   NA  136   K  4.7   CL  99   CO2  34*   BUN  19   CREA  0.94   GLU  101*   CA  9.3     No results for input(s): SGOT, GPT, ALT, AP, TBIL, TBILI, TP, ALB, GLOB, GGT, AML, LPSE in the last 72 hours. No lab exists for component: AMYP, HLPSE  No results for input(s): INR, PTP, APTT in the last 72 hours.     No lab exists for component: INREXT   No results for input(s): FE, TIBC, PSAT, FERR in the last 72 hours. No results found for: FOL, RBCF   No results for input(s): PH, PCO2, PO2 in the last 72 hours.   Recent Labs      01/30/18   0242   CPK  90     No results found for: CHOL, CHOLX, CHLST, CHOLV, HDL, LDL, LDLC, DLDLP, TGLX, TRIGL, TRIGP, CHHD, CHHDX  Lab Results   Component Value Date/Time    Glucose (POC) 91 01/20/2018 06:52 AM    Glucose (POC) 136 01/17/2018 11:46 AM    Glucose (POC) 97 01/12/2018 10:18 PM    Glucose (POC) 138 01/12/2018 05:17 PM     Lab Results   Component Value Date/Time    Color DARK YELLOW 01/11/2018 02:27 AM    Appearance CLOUDY 01/11/2018 02:27 AM    Specific gravity 1.027 01/11/2018 02:27 AM    pH (UA) 5.0 01/11/2018 02:27 AM    Protein 30 01/11/2018 02:27 AM    Glucose NEGATIVE  01/11/2018 02:27 AM    Ketone NEGATIVE  01/11/2018 02:27 AM    Bilirubin NEGATIVE  01/11/2018 02:27 AM    Urobilinogen 0.2 01/11/2018 02:27 AM    Nitrites NEGATIVE  01/11/2018 02:27 AM    Leukocyte Esterase NEGATIVE  01/11/2018 02:27 AM    Epithelial cells FEW 01/11/2018 02:27 AM    Bacteria NEGATIVE  01/11/2018 02:27 AM    WBC 0-4 01/11/2018 02:27 AM    RBC 0-5 01/11/2018 02:27 AM     Medications Reviewed:     Current Facility-Administered Medications   Medication Dose Route Frequency    acetaminophen (TYLENOL) tablet 650 mg  650 mg Oral Q6H PRN    nicotine (NICODERM CQ) 14 mg/24 hr patch 1 Patch  1 Patch TransDERmal DAILY    heparin (porcine) injection 5,000 Units  5,000 Units SubCUTAneous Q8H    polyethylene glycol (MIRALAX) packet 17 g  17 g Oral DAILY PRN    risperiDONE (RisperDAL) tablet 2 mg  2 mg Oral QHS    temazepam (RESTORIL) capsule 30 mg  30 mg Oral QHS    traMADol (ULTRAM) tablet 50 mg  50 mg Oral Q8H PRN    traZODone (DESYREL) tablet 150 mg  150 mg Oral QHS    oxyCODONE-acetaminophen (PERCOCET) 5-325 mg per tablet 1 Tab  1 Tab Oral Q6H PRN    sodium chloride (NS) flush 5-10 mL  5-10 mL IntraVENous Q8H    sodium chloride (NS) flush 5-10 mL  5-10 mL IntraVENous PRN ______________________________________________________________________  EXPECTED LENGTH OF STAY: 4d 12h  ACTUAL LENGTH OF STAY:          [address], [place]

## 2018-01-30 NOTE — PROGRESS NOTES
Hospitalist Progress Note  William Davies MD  Answering service: [phone] from in house phone        Date of Service:  2018  NAME:  Lalo Montana  :  1940  MRN:  [id number]      Admission Summary:   78 y. o. male from [facility] with past medical history of sleep disorder presented to the ED from the [facility] via EMS with reported altered mental status.   Patient is a poor historian.  Majority of limited history is obtained per review of ED and medical records.   Per these collective reports, per EMS, patient became acutely confused tonight.   Patient reportedly is usually oriented but en route to ED, he was only oriented to self and place.   On arrival in the ED, patient complained of back pain.  Workup included CT head which was negative.  CT lumbar spine revealed an acute L1 fracture and L4, L5 central spinal stenosis.  CTA chest revealed left lower lobe pneumonia and emphysema but no PE. Orthopedic on board. Waiting for placement. Interval history / Subjective:   Pt has no complaints. No adverse events overnight. He denies any chest pain, SOB, N/V, D/C. He is tolerating his meals. Assessment & Plan:     76yo BM     PROBLEM LIST:  1. Acute respiratory failure with hypoxia and hypercapnia  2. Acute COPD exacerbation, resolved  3. Rhabdomyolysis  4. Acute L1 compression fracture  5. Lumbar spinal stenosis (L4-5)  6. Acute metabolic encephalopathy, resolved  7. Chronic tobacco/cigarette usage    PLAN: Will repeat total CK to determine rhabdomyolysis has resolved. Will continue Orthopedic recommendations for his L1 fracture. Continue current pain mgmt. Continue PRN nebulizers. He is currently off supplemental O2 and tolerating RA.       Code status: FULL  DVT prophylaxis: heparin    Care Plan discussed with: Patient/Family, Nurse and   Disposition: TBD currently awaiting placement     Hospital Problems Date Reviewed: 1/11/2018          Codes Class Noted POA    COPD (chronic obstructive pulmonary disease) ([facility] ICD-10-CM: J44.9  ICD-9-CM: 213  1/11/2018 Unknown        Back pain ICD-10-CM: M54.9  ICD-9-CM: 724.5  1/11/2018 Unknown        Altered mental status ICD-10-CM: R41.82  ICD-9-CM: 780.97  1/11/2018 Unknown        * (Principal)Pneumonia ICD-10-CM: J18.9  ICD-9-CM: 486  1/11/2018 Unknown        Lumbar spinal stenosis ICD-10-CM: M48.061  ICD-9-CM: 724.02  1/11/2018 Unknown        Closed L1 vertebral fracture ([facility] ICD-10-CM: S32.019A  ICD-9-CM: 805.4  1/11/2018 Unknown        Acute respiratory failure with hypoxemia [facility]) ICD-10-CM: J96.01  ICD-9-CM: 518.81  1/11/2018 Unknown                Review of Systems:   A comprehensive review of systems was negative except for that written in the HPI. Vital Signs:    Last 24hrs VS reviewed since prior progress note. Most recent are:  Visit Vitals    /76 (BP 1 Location: Left arm, BP Patient Position: Sitting)    Pulse (!) 104    Temp 98 °F (36.7 °C)    Resp 18    Ht 5' 9\" (1.753 m)    Wt 57.7 kg (127 lb 1.6 oz)    SpO2 98%    BMI 18.77 kg/m2         Intake/Output Summary (Last 24 hours) at 01/29/18 2021  Last data filed at 01/29/18 1034   Gross per 24 hour   Intake                0 ml   Output              425 ml   Net             -425 ml        Physical Examination:       Gen: elderly BM, lying in bed, NAD  HENT: NC/AT, oral mucosa pink and moist  Eyes: PERRL, anicteric sclera, conjunctiva pink and moist  CVS: RRR, s1/s2 audible, no m/g/r, no edema, radial/DP pulses 2+ B/L  Resp: CTAB, no w/r/r  Abd: BS present, NT/ND, no HSM  Neuro: AAOx3, CN 2-12 grossly intact, muscle strength 5/5 UE/LE B/L       Data Review:    Review and/or order of clinical lab test      Labs:   No results for input(s): WBC, HGB, HCT, PLT, HGBEXT, HCTEXT, PLTEXT in the last 72 hours.   No results for input(s): NA, K, CL, CO2, BUN, CREA, GLU, CA, MG, PHOS, URICA in the last 72 hours. No results for input(s): SGOT, GPT, ALT, AP, TBIL, TBILI, TP, ALB, GLOB, GGT, AML, LPSE in the last 72 hours. No lab exists for component: AMYP, HLPSE  No results for input(s): INR, PTP, APTT in the last 72 hours. No lab exists for component: INREXT   No results for input(s): FE, TIBC, PSAT, FERR in the last 72 hours. No results found for: FOL, RBCF   No results for input(s): PH, PCO2, PO2 in the last 72 hours. No results for input(s): CPK, CKNDX, TROIQ in the last 72 hours.     No lab exists for component: CPKMB  No results found for: CHOL, CHOLX, CHLST, CHOLV, HDL, LDL, LDLC, DLDLP, TGLX, TRIGL, TRIGP, CHHD, CHHDX  Lab Results   Component Value Date/Time    Glucose (POC) 91 01/20/2018 06:52 AM    Glucose (POC) 136 01/17/2018 11:46 AM    Glucose (POC) 97 01/12/2018 10:18 PM    Glucose (POC) 138 01/12/2018 05:17 PM     Lab Results   Component Value Date/Time    Color DARK YELLOW 01/11/2018 02:27 AM    Appearance CLOUDY 01/11/2018 02:27 AM    Specific gravity 1.027 01/11/2018 02:27 AM    pH (UA) 5.0 01/11/2018 02:27 AM    Protein 30 01/11/2018 02:27 AM    Glucose NEGATIVE  01/11/2018 02:27 AM    Ketone NEGATIVE  01/11/2018 02:27 AM    Bilirubin NEGATIVE  01/11/2018 02:27 AM    Urobilinogen 0.2 01/11/2018 02:27 AM    Nitrites NEGATIVE  01/11/2018 02:27 AM    Leukocyte Esterase NEGATIVE  01/11/2018 02:27 AM    Epithelial cells FEW 01/11/2018 02:27 AM    Bacteria NEGATIVE  01/11/2018 02:27 AM    WBC 0-4 01/11/2018 02:27 AM    RBC 0-5 01/11/2018 02:27 AM         Medications Reviewed:     Current Facility-Administered Medications   Medication Dose Route Frequency    nicotine (NICODERM CQ) 14 mg/24 hr patch 1 Patch  1 Patch TransDERmal DAILY    heparin (porcine) injection 5,000 Units  5,000 Units SubCUTAneous Q8H    polyethylene glycol (MIRALAX) packet 17 g  17 g Oral DAILY PRN    risperiDONE (RisperDAL) tablet 2 mg  2 mg Oral QHS    temazepam (RESTORIL) capsule 30 mg  30 mg Oral QHS    traMADol (ULTRAM) tablet 50 mg  50 mg Oral Q8H PRN    traZODone (DESYREL) tablet 150 mg  150 mg Oral QHS    oxyCODONE-acetaminophen (PERCOCET) 5-325 mg per tablet 1 Tab  1 Tab Oral Q6H PRN    sodium chloride (NS) flush 5-10 mL  5-10 mL IntraVENous Q8H    sodium chloride (NS) flush 5-10 mL  5-10 mL IntraVENous PRN     ______________________________________________________________________  EXPECTED LENGTH OF STAY: 4d 12h  ACTUAL LENGTH OF STAY:          18                 Billy Escamilla IV, MD

## 2018-01-31 LAB
ANION GAP SERPL CALC-SCNC: 8 MMOL/L (ref 5–15)
BUN SERPL-MCNC: 20 MG/DL (ref 6–20)
BUN/CREAT SERPL: 27 (ref 12–20)
CALCIUM SERPL-MCNC: 8.9 MG/DL (ref 8.5–10.1)
CHLORIDE SERPL-SCNC: 101 MMOL/L (ref 97–108)
CK SERPL-CCNC: 80 U/L (ref 39–308)
CO2 SERPL-SCNC: 29 MMOL/L (ref 21–32)
CREAT SERPL-MCNC: 0.75 MG/DL (ref 0.7–1.3)
ERYTHROCYTE [DISTWIDTH] IN BLOOD BY AUTOMATED COUNT: 11.8 % (ref 11.5–14.5)
GLUCOSE BLD STRIP.AUTO-MCNC: 102 MG/DL (ref 65–100)
GLUCOSE SERPL-MCNC: 88 MG/DL (ref 65–100)
HCT VFR BLD AUTO: 34.4 % (ref 36.6–50.3)
HGB BLD-MCNC: 11.8 G/DL (ref 12.1–17)
MCH RBC QN AUTO: 34.6 PG (ref 26–34)
MCHC RBC AUTO-ENTMCNC: 34.3 G/DL (ref 30–36.5)
MCV RBC AUTO: 100.9 FL (ref 80–99)
NRBC # BLD: 0 K/UL (ref 0–0.01)
NRBC BLD-RTO: 0 PER 100 WBC
PLATELET # BLD AUTO: 251 K/UL (ref 150–400)
PMV BLD AUTO: 8.8 FL (ref 8.9–12.9)
POTASSIUM SERPL-SCNC: 4.1 MMOL/L (ref 3.5–5.1)
PREALB SERPL-MCNC: 28.3 MG/DL (ref 20–40)
RBC # BLD AUTO: 3.41 M/UL (ref 4.1–5.7)
SERVICE CMNT-IMP: ABNORMAL
SODIUM SERPL-SCNC: 138 MMOL/L (ref 136–145)
WBC # BLD AUTO: 5 K/UL (ref 4.1–11.1)

## 2018-01-31 PROCEDURE — 80048 BASIC METABOLIC PNL TOTAL CA: CPT | Performed by: INTERNAL MEDICINE

## 2018-01-31 PROCEDURE — 85027 COMPLETE CBC AUTOMATED: CPT | Performed by: INTERNAL MEDICINE

## 2018-01-31 PROCEDURE — 65270000032 HC RM SEMIPRIVATE

## 2018-01-31 PROCEDURE — 74011250636 HC RX REV CODE- 250/636: Performed by: INTERNAL MEDICINE

## 2018-01-31 PROCEDURE — 36415 COLL VENOUS BLD VENIPUNCTURE: CPT | Performed by: INTERNAL MEDICINE

## 2018-01-31 PROCEDURE — 82962 GLUCOSE BLOOD TEST: CPT

## 2018-01-31 PROCEDURE — 74011250637 HC RX REV CODE- 250/637: Performed by: INTERNAL MEDICINE

## 2018-01-31 PROCEDURE — 97116 GAIT TRAINING THERAPY: CPT

## 2018-01-31 PROCEDURE — 84134 ASSAY OF PREALBUMIN: CPT | Performed by: INTERNAL MEDICINE

## 2018-01-31 PROCEDURE — 82550 ASSAY OF CK (CPK): CPT | Performed by: INTERNAL MEDICINE

## 2018-01-31 RX ADMIN — Medication 10 ML: at 21:10

## 2018-01-31 RX ADMIN — Medication 5 ML: at 14:00

## 2018-01-31 RX ADMIN — OXYCODONE AND ACETAMINOPHEN 1 TABLET: 5; 325 TABLET ORAL at 13:40

## 2018-01-31 RX ADMIN — TRAZODONE HYDROCHLORIDE 150 MG: 100 TABLET ORAL at 21:09

## 2018-01-31 RX ADMIN — TRAMADOL HYDROCHLORIDE 50 MG: 50 TABLET, FILM COATED ORAL at 21:09

## 2018-01-31 RX ADMIN — HEPARIN SODIUM 5000 UNITS: 5000 INJECTION, SOLUTION INTRAVENOUS; SUBCUTANEOUS at 17:28

## 2018-01-31 RX ADMIN — TEMAZEPAM 30 MG: 15 CAPSULE ORAL at 21:09

## 2018-01-31 RX ADMIN — Medication 10 ML: at 08:10

## 2018-01-31 RX ADMIN — HEPARIN SODIUM 5000 UNITS: 5000 INJECTION, SOLUTION INTRAVENOUS; SUBCUTANEOUS at 01:41

## 2018-01-31 RX ADMIN — RISPERIDONE 2 MG: 1 TABLET ORAL at 21:09

## 2018-01-31 NOTE — PROGRESS NOTES
NUTRITION COMPLETE ASSESSMENT    RECOMMENDATIONS:   1. Continue current diet, Supplements (specify)  2. Encourage intake of supplements  3. Daily weights     Interventions/Plan:   Food/Nutrient Delivery:           RD to add supplements    Assessment:   Reason for Assessment:   [x] Provider Consult    Diet: Regular  Supplements: None  Nutritionally Significant Medications: [x] Reviewed & Includes: Miralax prn  Meal Intake: Patient Vitals for the past 100 hrs:   % Diet Eaten   01/30/18 1245 90 %   01/30/18 0946 95 %   01/28/18 1838 75 %   01/28/18 0901 100 %          Current Hospitalization:   Fluid Restriction:  n/a  Appetite: Good  PO Ability: Independent Average po intake:%  Average supplements intake:  n/a      Subjective:  \"I'm eating ok. I weighed 127 lbs when I came in.\"    Objective:  Pt seen for MD consult. Pt admitted with acute encephalopathy, acute L1 fracture, rhabdomyolysis, and acute hypoxic and hypercapnic respiratory failure. Reviewed chart, spoke with pt and RN. Per EHR pt consuming % meals, but 9 lb wt loss noted since admission. RN states pt eating very well. Pt agreeable to trial of Ensure (vanilla) for increased calories/wt gain; Ensure BID will provide 700 kcal, 40g protein meeting 39% caloric, 58% estimated protein needs if 100% consumed. RD to follow up po intake, supplement acceptance, wt status. Estimated Nutrition Needs:   Kcals/day: 8886 Kcals/day (9868-7333 kcal/day (MSJ x 1.2-1.3 + 250))  Protein: 69 g (69-75g/day (1.2-1.3g/kg))  Fluid: 1900 ml (1mL/kcal)  Based On: [place] Jeor  Weight Used: Actual wt    Pt expected to meet estimated nutrient needs:  []   Yes     []  No [x] Unable to predict at this time    Nutrition Diagnosis:   1.  Unintended weight loss related to unsure etiology as evidenced by pt with 9 lb wt loss since admission    Goals:      Pt will consume at least 75% meals, supplements over next 5-7 days     Monitoring & Evaluation:    - Total energy intake, Liquid meal replacement   - Weight/weight change   -      Previous Nutrition Goals Met:   N/A  Previous Recommendations:    N/A    Education & Discharge Needs:   [x] None Identified   [] Identified and addressed    [] Participated in care plan, discharge planning, and/or interdisciplinary rounds        Cultural, Mormonism and ethnic food preferences identified: None    Skin Integrity: [x]Intact  []Other  Edema: [x]None []Other  Last BM: 1/30/2018  Food Allergies: []None [x]Other - shellfish  Diet Restrictions: Cultural/Mormonism Preference(s): None     Anthropometrics:    Weight Loss Metrics 1/28/2018 1/11/2018   Today's Wt 127 lb 1.6 oz -   BMI - 18.77 kg/m2      Weight Source: Bed  Height: 5' 9\" (175.3 cm),    Body mass index is 18.77 kg/(m^2).    ,     ,      Labs:  Lab Results   Component Value Date/Time    Sodium 138 01/31/2018 02:00 AM    Potassium 4.1 01/31/2018 02:00 AM    Chloride 101 01/31/2018 02:00 AM    CO2 29 01/31/2018 02:00 AM    Glucose 88 01/31/2018 02:00 AM    BUN 20 01/31/2018 02:00 AM    Creatinine 0.75 01/31/2018 02:00 AM    Calcium 8.9 01/31/2018 02:00 AM    Magnesium 2.1 01/11/2018 01:19 AM    Phosphorus 4.4 01/11/2018 01:19 AM    Albumin 3.4 01/11/2018 01:19 AM     No results found for: HBA1C, HGBE8, CDJ6AVIZ, [id number]      Faisal Mondragon, JUDITH

## 2018-01-31 NOTE — PROGRESS NOTES
Bedside shift change report given to Zahida (oncoming nurse) by Allyne Goltz (offgoing nurse). Report included the following information SBAR, Kardex, Intake/Output and MAR.

## 2018-01-31 NOTE — PROGRESS NOTES
Hospitalist Progress Note  Aniceto Stapleton MD  Answering service: [phone] from in house phone      Date of Service:  2018  NAME:  Larry Lockwood  :  1940  MRN:  [id number]      Admission Summary:   79 yo man with h/o sleep disorder was BIBEMS to the ED from [facility] on 18 with confusion. He was admitted with acute encephalopathy, acute L1 fracture, rhabdomyolysis, and acute hypoxic and hypercapnic respiratory failure. Interval history / Subjective:   C/o lower back pain     Assessment & Plan:     Acute hypoxic and hypercapnic respiratory failure (POA)  - resolved; weaned off O2 and satting well on RA    LLL CAP (POA) - as per admission CT  - BCx  negative  - UCx  negative  - s/p azithromycin, Zosyn, LVQ    Acute COPD exacerbation - resolved; prn nebs    Rhabdomyolysis (POA) - resolved with IVF; CK WNL    Acute L1 compression fracture (POA) - Ortho consulted  - conservative management with TLSO brace, PT/OT, OOB, pain control  - nonadherent with wearing brace  - f/u with Dr Genia Walker outpatient    Lumbar spinal stenosis (L4-5) - pain control; plan as above    Acute metabolic encephalopathy (POA) - resolved to baseline    Chronic nicotine dependence - cessation counseling    Code status: full  DVT prophylaxis: heparin     Care Plan discussed with: Patient/Family, Nurse and   Disposition: Likely to group home once niece/financial POA makes a decision on place. Pt efers to his niece for placement decisions.      Hospital Problems  Date Reviewed: 2018          Codes Class Noted POA    COPD (chronic obstructive pulmonary disease) ([facility] ICD-10-CM: J44.9  ICD-9-CM: 230  2018 Unknown        Back pain ICD-10-CM: M54.9  ICD-9-CM: 724.5  2018 Unknown        Altered mental status ICD-10-CM: R41.82  ICD-9-CM: 780.97  2018 Unknown        * (Principal)Pneumonia ICD-10-CM: J18.9  ICD-9-CM: 152  1/11/2018 Unknown        Lumbar spinal stenosis ICD-10-CM: M48.061  ICD-9-CM: 724.02  1/11/2018 Unknown        Closed L1 vertebral fracture ([facility] ICD-10-CM: S32.019A  ICD-9-CM: 805.4  1/11/2018 Unknown        Acute respiratory failure with hypoxemia [facility]) ICD-10-CM: J96.01  ICD-9-CM: 518.81  1/11/2018 Unknown            Review of Systems:   Pertinent items are noted in HPI. Vital Signs:    Last 24hrs VS reviewed since prior progress note. Most recent are:  Visit Vitals    /71 (BP 1 Location: Left arm, BP Patient Position: At rest)    Pulse 90    Temp 97.8 °F (36.6 °C)    Resp 20    Ht 5' 9\" (1.753 m)    Wt 57.7 kg (127 lb 1.6 oz)    SpO2 96%    BMI 18.77 kg/m2       Intake/Output Summary (Last 24 hours) at 01/31/18 1226  Last data filed at 01/31/18 1123   Gross per 24 hour   Intake              650 ml   Output              925 ml   Net             -275 ml      Physical Examination:     Gen: awake, NAD  HENT: NCAT, oral mucosa benign  Eyes: PERRL, anicteric sclera  CVS: regular rhythm, no m/g/r appreciated, no peripheral edema, +pulses  Resp: CTAB, no w/r/r  Abd: + BS, soft, NT/ND  Neuro: AAOx3, responds appropriately to questions and commands    Data Review:    Review and/or order of clinical lab test  Review and/or order of tests in the radiology section of CPT  Review and/or order of tests in the medicine section of CPT    Labs:     Recent Labs      01/31/18   0200   WBC  5.0   HGB  11.8*   HCT  34.4*   PLT  251     Recent Labs      01/31/18   0200  01/30/18   0242   NA  138  136   K  4.1  4.7   CL  101  99   CO2  29  34*   BUN  20  19   CREA  0.75  0.94   GLU  88  101*   CA  8.9  9.3     No results for input(s): SGOT, GPT, ALT, AP, TBIL, TBILI, TP, ALB, GLOB, GGT, AML, LPSE in the last 72 hours. No lab exists for component: AMYP, HLPSE  No results for input(s): INR, PTP, APTT in the last 72 hours.     No lab exists for component: INREXT, INREXT   No results for input(s): FE, TIBC, PSAT, FERR in the last 72 hours. No results found for: FOL, RBCF   No results for input(s): PH, PCO2, PO2 in the last 72 hours.   Recent Labs      01/31/18   0200  01/30/18   0242   CPK  80  90     No results found for: CHOL, CHOLX, CHLST, CHOLV, HDL, LDL, LDLC, DLDLP, TGLX, TRIGL, TRIGP, CHHD, CHHDX  Lab Results   Component Value Date/Time    Glucose (POC) 102 01/31/2018 12:03 PM    Glucose (POC) 91 01/20/2018 06:52 AM    Glucose (POC) 136 01/17/2018 11:46 AM    Glucose (POC) 97 01/12/2018 10:18 PM    Glucose (POC) 138 01/12/2018 05:17 PM     Lab Results   Component Value Date/Time    Color DARK YELLOW 01/11/2018 02:27 AM    Appearance CLOUDY 01/11/2018 02:27 AM    Specific gravity 1.027 01/11/2018 02:27 AM    pH (UA) 5.0 01/11/2018 02:27 AM    Protein 30 01/11/2018 02:27 AM    Glucose NEGATIVE  01/11/2018 02:27 AM    Ketone NEGATIVE  01/11/2018 02:27 AM    Bilirubin NEGATIVE  01/11/2018 02:27 AM    Urobilinogen 0.2 01/11/2018 02:27 AM    Nitrites NEGATIVE  01/11/2018 02:27 AM    Leukocyte Esterase NEGATIVE  01/11/2018 02:27 AM    Epithelial cells FEW 01/11/2018 02:27 AM    Bacteria NEGATIVE  01/11/2018 02:27 AM    WBC 0-4 01/11/2018 02:27 AM    RBC 0-5 01/11/2018 02:27 AM     Medications Reviewed:     Current Facility-Administered Medications   Medication Dose Route Frequency    acetaminophen (TYLENOL) tablet 650 mg  650 mg Oral Q6H PRN    nicotine (NICODERM CQ) 14 mg/24 hr patch 1 Patch  1 Patch TransDERmal DAILY    heparin (porcine) injection 5,000 Units  5,000 Units SubCUTAneous Q8H    polyethylene glycol (MIRALAX) packet 17 g  17 g Oral DAILY PRN    risperiDONE (RisperDAL) tablet 2 mg  2 mg Oral QHS    temazepam (RESTORIL) capsule 30 mg  30 mg Oral QHS    traMADol (ULTRAM) tablet 50 mg  50 mg Oral Q8H PRN    traZODone (DESYREL) tablet 150 mg  150 mg Oral QHS    oxyCODONE-acetaminophen (PERCOCET) 5-325 mg per tablet 1 Tab  1 Tab Oral Q6H PRN    sodium chloride (NS) flush 5-10 mL  5-10 mL IntraVENous Q8H    sodium chloride (NS) flush 5-10 mL  5-10 mL IntraVENous PRN     ______________________________________________________________________  EXPECTED LENGTH OF STAY: 4d 12h  ACTUAL LENGTH OF STAY:          [address] [facility], MD

## 2018-01-31 NOTE — PROGRESS NOTES
Problem: Falls - Risk of  Goal: *Absence of Falls  Document Alberto Fall Risk and appropriate interventions in the flowsheet.    Outcome: Progressing Towards Goal  Fall Risk Interventions:  Mobility Interventions: Communicate number of staff needed for ambulation/transfer    Mentation Interventions: Door open when patient unattended    Medication Interventions: Patient to call before getting OOB, Teach patient to arise slowly    Elimination Interventions: Call light in reach, Toileting schedule/hourly rounds, Urinal in reach    History of Falls Interventions: Door open when patient unattended, Room close to nurse's station

## 2018-01-31 NOTE — PROGRESS NOTES
Bedside shift change report given to [id number] (oncoming nurse) by Yoav Bergeron RN (offgoing nurse). Report included the following information SBAR, Kardex, ED Summary, Procedure Summary, Intake/Output, MAR, Accordion and Recent Results.

## 2018-01-31 NOTE — PROGRESS NOTES
CM received notification from [facility] [employer] that nabila is working with that company on appropriate placement of patient.   Lukasz Malave RN CRM

## 2018-01-31 NOTE — PROGRESS NOTES
Bedside and Verbal shift change report given to [address] [place] [address] (oncoming nurse) by Kiet Dwyer (offgoing nurse). Report included the following information SBAR, Kardex and ED Summary.

## 2018-02-01 LAB
25(OH)D3 SERPL-MCNC: 31.8 NG/ML (ref 30–100)
ANION GAP SERPL CALC-SCNC: 7 MMOL/L (ref 5–15)
BUN SERPL-MCNC: 19 MG/DL (ref 6–20)
BUN/CREAT SERPL: 25 (ref 12–20)
CALCIUM SERPL-MCNC: 9 MG/DL (ref 8.5–10.1)
CHLORIDE SERPL-SCNC: 101 MMOL/L (ref 97–108)
CO2 SERPL-SCNC: 30 MMOL/L (ref 21–32)
CREAT SERPL-MCNC: 0.77 MG/DL (ref 0.7–1.3)
ERYTHROCYTE [DISTWIDTH] IN BLOOD BY AUTOMATED COUNT: 11.9 % (ref 11.5–14.5)
GLUCOSE SERPL-MCNC: 91 MG/DL (ref 65–100)
HCT VFR BLD AUTO: 36.5 % (ref 36.6–50.3)
HGB BLD-MCNC: 12.3 G/DL (ref 12.1–17)
MCH RBC QN AUTO: 34.5 PG (ref 26–34)
MCHC RBC AUTO-ENTMCNC: 33.7 G/DL (ref 30–36.5)
MCV RBC AUTO: 102.2 FL (ref 80–99)
NRBC # BLD: 0 K/UL (ref 0–0.01)
NRBC BLD-RTO: 0 PER 100 WBC
PLATELET # BLD AUTO: 232 K/UL (ref 150–400)
PMV BLD AUTO: 8.7 FL (ref 8.9–12.9)
POTASSIUM SERPL-SCNC: 4.2 MMOL/L (ref 3.5–5.1)
RBC # BLD AUTO: 3.57 M/UL (ref 4.1–5.7)
SODIUM SERPL-SCNC: 138 MMOL/L (ref 136–145)
WBC # BLD AUTO: 5.3 K/UL (ref 4.1–11.1)

## 2018-02-01 PROCEDURE — 74011250637 HC RX REV CODE- 250/637: Performed by: INTERNAL MEDICINE

## 2018-02-01 PROCEDURE — 82306 VITAMIN D 25 HYDROXY: CPT | Performed by: INTERNAL MEDICINE

## 2018-02-01 PROCEDURE — 85027 COMPLETE CBC AUTOMATED: CPT | Performed by: INTERNAL MEDICINE

## 2018-02-01 PROCEDURE — 74011250636 HC RX REV CODE- 250/636: Performed by: INTERNAL MEDICINE

## 2018-02-01 PROCEDURE — 65270000032 HC RM SEMIPRIVATE

## 2018-02-01 PROCEDURE — 80048 BASIC METABOLIC PNL TOTAL CA: CPT | Performed by: INTERNAL MEDICINE

## 2018-02-01 PROCEDURE — 36415 COLL VENOUS BLD VENIPUNCTURE: CPT | Performed by: INTERNAL MEDICINE

## 2018-02-01 RX ADMIN — Medication 10 ML: at 21:05

## 2018-02-01 RX ADMIN — RISPERIDONE 2 MG: 1 TABLET ORAL at 21:06

## 2018-02-01 RX ADMIN — HEPARIN SODIUM 5000 UNITS: 5000 INJECTION, SOLUTION INTRAVENOUS; SUBCUTANEOUS at 11:03

## 2018-02-01 RX ADMIN — HEPARIN SODIUM 5000 UNITS: 5000 INJECTION, SOLUTION INTRAVENOUS; SUBCUTANEOUS at 17:04

## 2018-02-01 RX ADMIN — TRAZODONE HYDROCHLORIDE 150 MG: 100 TABLET ORAL at 21:06

## 2018-02-01 RX ADMIN — TRAMADOL HYDROCHLORIDE 50 MG: 50 TABLET, FILM COATED ORAL at 21:06

## 2018-02-01 RX ADMIN — HEPARIN SODIUM 5000 UNITS: 5000 INJECTION, SOLUTION INTRAVENOUS; SUBCUTANEOUS at 03:44

## 2018-02-01 RX ADMIN — OXYCODONE AND ACETAMINOPHEN 1 TABLET: 5; 325 TABLET ORAL at 18:17

## 2018-02-01 RX ADMIN — TEMAZEPAM 30 MG: 15 CAPSULE ORAL at 21:06

## 2018-02-01 RX ADMIN — Medication 10 ML: at 06:08

## 2018-02-01 NOTE — PROGRESS NOTES
Bedside shift change report given to Cecelia Morales (oncoming nurse) by Cecelia Pyle (offgoing nurse). Report included the following information SBAR.

## 2018-02-01 NOTE — PROGRESS NOTES
Bedside shift change report given to [facility] RACHEAL (oncoming nurse) by Laney Anna (offgoing nurse). Report included the following information SBAR.

## 2018-02-01 NOTE — PROGRESS NOTES
shift change report given to [facility] and [employer] (oncoming nurse) by Leida (offgoing nurse). Report included the following information SBAR.

## 2018-02-01 NOTE — PROGRESS NOTES
Hospitalist Progress Note  Ryanne Gonzalez MD  Answering service: [phone] from in house phone      Date of Service:  2018  NAME:  Sukhi Boston  :  1940  MRN:  [id number]      Admission Summary:   79 yo man with h/o sleep disorder was BIBEMS to the ED from [facility] on 18 with confusion. He was admitted with acute encephalopathy, acute L1 fracture, rhabdomyolysis, and acute hypoxic and hypercapnic respiratory failure. Interval history / Subjective:   Still c/o lower back pain; not using his TLSO brace; d/w nurse, no overnight events     Assessment & Plan:     Acute hypoxic and hypercapnic respiratory failure (POA)  - resolved; weaned off O2 and satting well on RA    LLL CAP (POA) - as per admission CT  - BCx  negative  - UCx  negative  - s/p azithromycin, Zosyn, LVQ    Acute COPD exacerbation - resolved; prn nebs    Rhabdomyolysis (POA) - resolved with IVF; CK WNL    Acute L1 compression fracture (POA) - Ortho consulted  - conservative management with TLSO brace, PT/OT, OOB, pain control  - nonadherent with wearing brace  - f/u with Dr Ellie Smith outpatient  - vitamin D WNL    Lumbar spinal stenosis (L4-5) - pain control; plan as above    Acute metabolic encephalopathy (POA) - resolved to baseline    Chronic nicotine dependence - cessation counseling    Code status: full  DVT prophylaxis: heparin     Care Plan discussed with: Patient/Family, Nurse and   Disposition: Likely to group home once niece/financial POA makes a decision on place. Pt defers to his niece for placement decisions.      Hospital Problems  Date Reviewed: 2018          Codes Class Noted POA    [facility] ICD-10-CM: J44.9  ICD-9-CM: 902  2018 Unknown        Back pain ICD-10-CM: M54.9  ICD-9-CM: 724.5  2018 Unknown        Altered mental status ICD-10-CM: R41.82  ICD-9-CM: 780.97  1/11/2018 Unknown        * (Principal)Pneumonia ICD-10-CM: J18.9  ICD-9-CM: 209  1/11/2018 Unknown        Lumbar spinal stenosis ICD-10-CM: M48.061  ICD-9-CM: 724.02  1/11/2018 Unknown        Closed L1 vertebral fracture ([facility] ICD-10-CM: S32.019A  ICD-9-CM: 805.4  1/11/2018 Unknown        Acute respiratory failure with hypoxemia [facility]) ICD-10-CM: J96.01  ICD-9-CM: 518.81  1/11/2018 Unknown            Review of Systems:   Pertinent items are noted in HPI. Vital Signs:    Last 24hrs VS reviewed since prior progress note.  Most recent are:  Visit Vitals    BP (P) 152/88 (BP 1 Location: Left arm, BP Patient Position: At rest)    Pulse (P) 80    Temp (P) 98.8 °F (37.1 °C)    Resp (P) 18    Ht 5' 9\" (1.753 m)    Wt 57.7 kg (127 lb 1.6 oz)    SpO2 (P) 98%    BMI 18.77 kg/m2       Intake/Output Summary (Last 24 hours) at 02/01/18 1647  Last data filed at 02/01/18 5967   Gross per 24 hour   Intake              320 ml   Output              550 ml   Net             -230 ml      Physical Examination:     Gen: awake, NAD, flat affect  HENT: NCAT, oral mucosa benign  Eyes: PERRL, anicteric sclera  CVS: regular rhythm, no m/g/r appreciated, no peripheral edema, +pulses  Resp: CTAB, no w/r/r  Abd: + BS, soft, NT/ND  Neuro: AAOx3, responds appropriately to questions and commands    Data Review:    Review and/or order of clinical lab test  Review and/or order of tests in the radiology section of CPT  Review and/or order of tests in the medicine section of CPT    Labs:     Recent Labs      02/01/18 0331 01/31/18   0200   WBC  5.3  5.0   HGB  12.3  11.8*   HCT  36.5*  34.4*   PLT  232  251     Recent Labs      02/01/18   0331 01/31/18   0200  01/30/18   0242   NA  138  138  136   K  4.2  4.1  4.7   CL  101  101  99   CO2  30  29  34*   BUN  19  20  19   CREA  0.77  0.75  0.94   GLU  91  88  101*   CA  9.0  8.9  9.3     No results for input(s): SGOT, GPT, ALT, AP, TBIL, TBILI, TP, ALB, GLOB, GGT, AML, LPSE in the last 72 hours. No lab exists for component: AMYP, HLPSE  No results for input(s): INR, PTP, APTT in the last 72 hours. No lab exists for component: INREXT, INREXT   No results for input(s): FE, TIBC, PSAT, FERR in the last 72 hours. No results found for: FOL, RBCF   No results for input(s): PH, PCO2, PO2 in the last 72 hours.   Recent Labs      01/31/18   0200  01/30/18   0242   CPK  80  90     No results found for: CHOL, CHOLX, CHLST, CHOLV, HDL, LDL, LDLC, DLDLP, TGLX, TRIGL, TRIGP, CHHD, CHHDX  Lab Results   Component Value Date/Time    Glucose (POC) 102 01/31/2018 12:03 PM    Glucose (POC) 91 01/20/2018 06:52 AM    Glucose (POC) 136 01/17/2018 11:46 AM    Glucose (POC) 97 01/12/2018 10:18 PM    Glucose (POC) 138 01/12/2018 05:17 PM     Lab Results   Component Value Date/Time    Color DARK YELLOW 01/11/2018 02:27 AM    Appearance CLOUDY 01/11/2018 02:27 AM    Specific gravity 1.027 01/11/2018 02:27 AM    pH (UA) 5.0 01/11/2018 02:27 AM    Protein 30 01/11/2018 02:27 AM    Glucose NEGATIVE  01/11/2018 02:27 AM    Ketone NEGATIVE  01/11/2018 02:27 AM    Bilirubin NEGATIVE  01/11/2018 02:27 AM    Urobilinogen 0.2 01/11/2018 02:27 AM    Nitrites NEGATIVE  01/11/2018 02:27 AM    Leukocyte Esterase NEGATIVE  01/11/2018 02:27 AM    Epithelial cells FEW 01/11/2018 02:27 AM    Bacteria NEGATIVE  01/11/2018 02:27 AM    WBC 0-4 01/11/2018 02:27 AM    RBC 0-5 01/11/2018 02:27 AM     Medications Reviewed:     Current Facility-Administered Medications   Medication Dose Route Frequency    acetaminophen (TYLENOL) tablet 650 mg  650 mg Oral Q6H PRN    nicotine (NICODERM CQ) 14 mg/24 hr patch 1 Patch  1 Patch TransDERmal DAILY    heparin (porcine) injection 5,000 Units  5,000 Units SubCUTAneous Q8H    polyethylene glycol (MIRALAX) packet 17 g  17 g Oral DAILY PRN    risperiDONE (RisperDAL) tablet 2 mg  2 mg Oral QHS    temazepam (RESTORIL) capsule 30 mg  30 mg Oral QHS    traMADol (ULTRAM) tablet 50 mg  50 mg Oral Q8H PRN    traZODone (DESYREL) tablet 150 mg  150 mg Oral QHS    oxyCODONE-acetaminophen (PERCOCET) 5-325 mg per tablet 1 Tab  1 Tab Oral Q6H PRN    sodium chloride (NS) flush 5-10 mL  5-10 mL IntraVENous Q8H    sodium chloride (NS) flush 5-10 mL  5-10 mL IntraVENous PRN     ______________________________________________________________________  EXPECTED LENGTH OF STAY: 4d 12h  ACTUAL LENGTH OF STAY:          21                 Lola Miller MD

## 2018-02-02 LAB
ANION GAP SERPL CALC-SCNC: 9 MMOL/L (ref 5–15)
BUN SERPL-MCNC: 23 MG/DL (ref 6–20)
BUN/CREAT SERPL: 28 (ref 12–20)
CALCIUM SERPL-MCNC: 9.1 MG/DL (ref 8.5–10.1)
CHLORIDE SERPL-SCNC: 103 MMOL/L (ref 97–108)
CO2 SERPL-SCNC: 28 MMOL/L (ref 21–32)
CREAT SERPL-MCNC: 0.81 MG/DL (ref 0.7–1.3)
ERYTHROCYTE [DISTWIDTH] IN BLOOD BY AUTOMATED COUNT: 12.1 % (ref 11.5–14.5)
GLUCOSE SERPL-MCNC: 99 MG/DL (ref 65–100)
HCT VFR BLD AUTO: 35.3 % (ref 36.6–50.3)
HGB BLD-MCNC: 12 G/DL (ref 12.1–17)
MCH RBC QN AUTO: 34.3 PG (ref 26–34)
MCHC RBC AUTO-ENTMCNC: 34 G/DL (ref 30–36.5)
MCV RBC AUTO: 100.9 FL (ref 80–99)
NRBC # BLD: 0 K/UL (ref 0–0.01)
NRBC BLD-RTO: 0 PER 100 WBC
PLATELET # BLD AUTO: 216 K/UL (ref 150–400)
PMV BLD AUTO: 9 FL (ref 8.9–12.9)
POTASSIUM SERPL-SCNC: 4.5 MMOL/L (ref 3.5–5.1)
RBC # BLD AUTO: 3.5 M/UL (ref 4.1–5.7)
SODIUM SERPL-SCNC: 140 MMOL/L (ref 136–145)
WBC # BLD AUTO: 6 K/UL (ref 4.1–11.1)

## 2018-02-02 PROCEDURE — 74011250637 HC RX REV CODE- 250/637: Performed by: INTERNAL MEDICINE

## 2018-02-02 PROCEDURE — 74011250636 HC RX REV CODE- 250/636: Performed by: INTERNAL MEDICINE

## 2018-02-02 PROCEDURE — 97116 GAIT TRAINING THERAPY: CPT

## 2018-02-02 PROCEDURE — 65270000032 HC RM SEMIPRIVATE

## 2018-02-02 PROCEDURE — 97535 SELF CARE MNGMENT TRAINING: CPT

## 2018-02-02 PROCEDURE — 36415 COLL VENOUS BLD VENIPUNCTURE: CPT | Performed by: INTERNAL MEDICINE

## 2018-02-02 PROCEDURE — 85027 COMPLETE CBC AUTOMATED: CPT | Performed by: INTERNAL MEDICINE

## 2018-02-02 PROCEDURE — 80048 BASIC METABOLIC PNL TOTAL CA: CPT | Performed by: INTERNAL MEDICINE

## 2018-02-02 RX ADMIN — TEMAZEPAM 30 MG: 15 CAPSULE ORAL at 22:17

## 2018-02-02 RX ADMIN — Medication 10 ML: at 22:18

## 2018-02-02 RX ADMIN — TRAMADOL HYDROCHLORIDE 50 MG: 50 TABLET, FILM COATED ORAL at 12:26

## 2018-02-02 RX ADMIN — OXYCODONE AND ACETAMINOPHEN 1 TABLET: 5; 325 TABLET ORAL at 16:39

## 2018-02-02 RX ADMIN — Medication 10 ML: at 06:26

## 2018-02-02 RX ADMIN — RISPERIDONE 2 MG: 1 TABLET ORAL at 22:15

## 2018-02-02 RX ADMIN — TRAZODONE HYDROCHLORIDE 150 MG: 100 TABLET ORAL at 22:16

## 2018-02-02 RX ADMIN — TRAMADOL HYDROCHLORIDE 50 MG: 50 TABLET, FILM COATED ORAL at 21:13

## 2018-02-02 RX ADMIN — HEPARIN SODIUM 5000 UNITS: 5000 INJECTION, SOLUTION INTRAVENOUS; SUBCUTANEOUS at 03:43

## 2018-02-02 RX ADMIN — HEPARIN SODIUM 5000 UNITS: 5000 INJECTION, SOLUTION INTRAVENOUS; SUBCUTANEOUS at 16:40

## 2018-02-02 RX ADMIN — HEPARIN SODIUM 5000 UNITS: 5000 INJECTION, SOLUTION INTRAVENOUS; SUBCUTANEOUS at 08:41

## 2018-02-02 RX ADMIN — Medication 10 ML: at 16:40

## 2018-02-02 RX ADMIN — OXYCODONE AND ACETAMINOPHEN 1 TABLET: 5; 325 TABLET ORAL at 08:41

## 2018-02-02 NOTE — PROGRESS NOTES
Problem: Self Care Deficits Care Plan (Adult)  Goal: *Acute Goals and Plan of Care (Insert Text)  Occupational Therapy Goals  Goals reviewed and updated to reflect current status: 2/2/18    1. Patient will perform lower body dressing with set up within 7 days  2. Patient will perform upper body dressing including TLSO with supervision/set-up within 7 day(s). 3.  Patient will perform toilet transfers with supervision/set-up within 7 day(s). 4.  Patient will perform all aspects of toileting with supervision/set-up within 7 day(s). 5.  Patient will participate in upper extremity therapeutic exercise/activities with supervision/set-up for 5 minutes within 7 day(s). 6.  Patient will utilize energy conservation, fall prevention and pain management techniques during functional activities with verbal, visual and tactile cues within 7 day(s). Initiated 1/16/2018; Goals reviewed 1/23/18 and remain appropriate to be met within 7 days  1. Patient will perform bathing with supervision/set-up within 7 day(s). 2.  Patient will perform bathing with supervision/set-up within 7 day(s). - Change to LB dressing 1/23/18  3. Patient will perform upper body dressing and lower body dressing with supervision/set-up within 7 day(s). 4.  Patient will perform toilet transfers with supervision/set-up within 7 day(s). 5.  Patient will perform all aspects of toileting with supervision/set-up within 7 day(s). 6.  Patient will participate in upper extremity therapeutic exercise/activities with supervision/set-up for 5 minutes within 7 day(s). 7.  Patient will utilize energy conservation, fall prevention and pain management techniques during functional activities with verbal, visual and tactile cues within 7 day(s).  MET        Occupational Therapy TREATMENT: WEEKLY REASSESSMENT  Patient: Megha Feliciano (53 y.o. male)  Date: 2/2/2018  Diagnosis: Pneumonia  Altered mental status  Closed L1 vertebral fracture (HCC)  Back pain  Acute respiratory failure with hypoxemia (HCC)  COPD (chronic obstructive pulmonary disease) (HCC)  Lumbar spinal stenosis Pneumonia       Precautions: Back, Fall, Bed Alarm ([place]ath B: reads lips; smokes)  Chart, occupational therapy assessment, plan of care, and goals were reviewed. ASSESSMENT:  Based on the objective data below, the patient participated in bed mobility, ambulation (including walk with PT who arrived), washing hands at sink, donning TLSO and transfer to chair with supervision to max assist. Patient is able to name 1/3 back precautions. Reviewed precautions and demonstrated impact on self care and mobility. He insisted that back brace be removed while he is seated in chair. Performance impacted by baseline cognitive impairments and insight, impaired reach to LE due to back precautions. Patient met 1 goals and is progressing toward all goals. Goals upgraded. Per chart, patient was Mod I and living at group home at baseline. Recommend return to group home at discharge. Recommend patient OOB for all meals, using bathroom for BMs with supervision/CGA, and participating in self care with assist as needed. Progression toward goals:  [x]          Improving appropriately and progressing toward goals  []          Improving slowly and progressing toward goals  []          Not making progress toward goals and plan of care will be adjusted     PLAN:  Goals have been updated based on progression since last assessment. Patient continues to benefit from skilled intervention to address the above impairments. Continue to follow patient 3 times a week to address goals.   Planned Interventions:  []                  Self Care Training                  []           Therapeutic Activities  []                  Functional Mobility Training    []           Cognitive Retraining  []                  Therapeutic Exercises           []           Endurance Activities  []                  Balance Training                   [] Neuromuscular Re-Education  []                  Visual/Perceptual Training     []      Home Safety Training  []                  Patient Education                 []           Family Training/Education  []                  Other (comment):  Discharge Recommendations: Return to group home  Further Equipment Recommendations for Discharge: none     SUBJECTIVE:   Patient stated Don't  anything heavy.  when naming back precautions    OBJECTIVE DATA SUMMARY:   Cognitive/Behavioral Status:  Neurologic State: Alert  Orientation Level: Oriented to person;Oriented to situation  Cognition: Decreased attention/concentration; Follows commands; Impulsive  Perception: Appears intact  Perseveration: No perseveration noted  Safety/Judgement: Awareness of environment  Functional Mobility and Transfers for ADLs:   Bed Mobility:     Supine to Sit: Supervision;Assist x1          Transfers:  Sit to Stand: Contact guard assistance;Assist x1        Balance:  Sitting: Intact  Standing - Static: Good  ADL Intervention:       Grooming  Washing Hands: Supervision/set-up (standing at sink)         Lower Body Bathing  Lower Body : Minimum assistance (secondary to back precautions; able to use cross leg technique)  Position Performed: Seated edge of bed    Upper Body Dressing Assistance  Orthotics(Brace): Maximum assistance (but is able to direct donning correctly)    Lower Body Dressing Assistance  Socks: Minimum assistance (secondary to back precautions)  Leg Crossed Method Used: Yes  Position Performed: Seated edge of bed;Standing    Toileting  Bladder Hygiene:  (uses urinal, but bed pad all wet from spillage)    Cognitive Retraining  Organizing/Sequencing: Breaking task down  Following Commands: Follows one step commands/directions  Safety/Judgement: Awareness of environment      Activity Tolerance:    fair  Please refer to the flowsheet for vital signs taken during this treatment.   After treatment:   [x] Patient left in no apparent distress sitting up in chair  [] Patient left in no apparent distress in bed  [x] Call bell left within reach  [x] Nursing notified  [] Caregiver present  [x] Bed alarm activated    COMMUNICATION/COLLABORATION:   The patients plan of care was discussed with: Occupational Therapist and Registered Nurse, PT    BARI Cuadra  Time Calculation: 22 mins

## 2018-02-02 NOTE — ROUTINE PROCESS
Patient's pain well controlled on prn medications. Bedside shift change report given to Leno Botello (oncoming nurse) by Osmar Solis RN (offgoing nurse). Report included the following information SBAR, Kardex, Intake/Output, MAR, Accordion and Recent Results.

## 2018-02-02 NOTE — PROGRESS NOTES
Hospitalist Progress Note  Luis Bliss MD  Answering service: [phone] from in house phone      Date of Service:  2018  NAME:  Zachery Figueredo  :  1940  MRN:  [id number]      Admission Summary:   79 yo man with h/o sleep disorder was BIBEMS to the ED from [facility] on 18 with confusion. He was admitted with acute encephalopathy, acute L1 fracture, rhabdomyolysis, and acute hypoxic and hypercapnic respiratory failure. Interval history / Subjective:   Still c/o lower back pain; not using his TLSO brace; d/w nurse, HR up to 120s this morning     Assessment & Plan:     Acute hypoxic and hypercapnic respiratory failure (POA)  - resolved; weaned off O2 and satting well on RA    LLL CAP (POA) - as per admission CT  - BCx  negative  - UCx  negative  - s/p azithromycin, Zosyn, LVQ    Acute COPD exacerbation - resolved; prn nebs    Rhabdomyolysis (POA) - resolved with IVF; CK WNL    Acute L1 compression fracture (POA) - Ortho consulted  - conservative management with TLSO brace, PT/OT, OOB, pain control  - nonadherent with wearing brace  - f/u with Dr Clay Sosa outpatient  - vitamin D WNL    Lumbar spinal stenosis (L4-5) - pain control; plan as above    Acute metabolic encephalopathy (POA) - resolved to baseline    Chronic nicotine dependence - cessation counseling    Hypokalemia - replete prn    Code status: full  DVT prophylaxis: heparin     Care Plan discussed with: Patient/Family, Nurse and   Disposition: Likely to group home once niece/financial POA makes a decision on place. Pt defers to his niece for placement decisions.      Hospital Problems  Date Reviewed: 2018          Codes Class Noted POA    COPD [facility]ch[facility] ([facility] ICD-10-CM: J44.9  ICD-9-CM: 921  2018 Unknown        Back pain ICD-10-CM: M54.9  ICD-9-CM: 724.5  2018 Unknown        Altered mental status ICD-10-CM: R41.82  ICD-9-CM: 780.97  1/11/2018 Unknown        * (Principal)Pneumonia ICD-10-CM: J18.9  ICD-9-CM: 539  1/11/2018 Unknown        Lumbar spinal stenosis ICD-10-CM: M48.061  ICD-9-CM: 724.02  1/11/2018 Unknown        Closed L1 vertebral fracture ([facility] ICD-10-CM: S32.019A  ICD-9-CM: 805.4  1/11/2018 Unknown        Acute respiratory failure with hypoxemia [facility]) ICD-10-CM: J96.01  ICD-9-CM: 518.81  1/11/2018 Unknown            Review of Systems:   Pertinent items are noted in HPI. Vital Signs:    Last 24hrs VS reviewed since prior progress note. Most recent are:  Visit Vitals    /72    Pulse 93    Temp 98.8 °F (37.1 °C)    Resp 18    Ht 5' 9\" (1.753 m)    Wt 57.7 kg (127 lb 1.6 oz)    SpO2 93%    BMI 18.77 kg/m2       Intake/Output Summary (Last 24 hours) at 02/02/18 1853  Last data filed at 02/02/18 1225   Gross per 24 hour   Intake              480 ml   Output              300 ml   Net              180 ml      Physical Examination:     Gen: awake, NAD, flat affect  HENT: NCAT, oral mucosa benign  Eyes: PERRL, anicteric sclera  CVS: regular rhythm, no m/g/r appreciated, no peripheral edema, +pulses  Resp: CTAB, no w/r/r  Abd: + BS, soft, NT/ND  Neuro: AAOx3, responds appropriately to questions and commands    Data Review:    Review and/or order of clinical lab test  Review and/or order of tests in the radiology section of CPT  Review and/or order of tests in the medicine section of CPT    Labs:     Recent Labs      02/02/18 0342 02/01/18   0331   WBC  6.0  5.3   HGB  12.0*  12.3   HCT  35.3*  36.5*   PLT  216  232     Recent Labs      02/02/18   0342  02/01/18   0331  01/31/18   0200   NA  140  138  138   K  4.5  4.2  4.1   CL  103  101  101   CO2  28  30  29   BUN  23*  19  20   CREA  0.81  0.77  0.75   GLU  99  91  88   CA  9.1  9.0  8.9     No results for input(s): SGOT, GPT, ALT, AP, TBIL, TBILI, TP, ALB, GLOB, GGT, AML, LPSE in the last 72 hours.     No lab exists for component: AMYP, HLPSE  No results for input(s): INR, PTP, APTT in the last 72 hours. No lab exists for component: INREXT, INREXT   No results for input(s): FE, TIBC, PSAT, FERR in the last 72 hours. No results found for: FOL, RBCF   No results for input(s): PH, PCO2, PO2 in the last 72 hours.   Recent Labs      01/31/18   0200   CPK  80     No results found for: CHOL, CHOLX, CHLST, CHOLV, HDL, LDL, LDLC, DLDLP, TGLX, TRIGL, TRIGP, CHHD, CHHDX  Lab Results   Component Value Date/Time    Glucose (POC) 102 01/31/2018 12:03 PM    Glucose (POC) 91 01/20/2018 06:52 AM    Glucose (POC) 136 01/17/2018 11:46 AM    Glucose (POC) 97 01/12/2018 10:18 PM    Glucose (POC) 138 01/12/2018 05:17 PM     Lab Results   Component Value Date/Time    Color DARK YELLOW 01/11/2018 02:27 AM    Appearance CLOUDY 01/11/2018 02:27 AM    Specific gravity 1.027 01/11/2018 02:27 AM    pH (UA) 5.0 01/11/2018 02:27 AM    Protein 30 01/11/2018 02:27 AM    Glucose NEGATIVE  01/11/2018 02:27 AM    Ketone NEGATIVE  01/11/2018 02:27 AM    Bilirubin NEGATIVE  01/11/2018 02:27 AM    Urobilinogen 0.2 01/11/2018 02:27 AM    Nitrites NEGATIVE  01/11/2018 02:27 AM    Leukocyte Esterase NEGATIVE  01/11/2018 02:27 AM    Epithelial cells FEW 01/11/2018 02:27 AM    Bacteria NEGATIVE  01/11/2018 02:27 AM    WBC 0-4 01/11/2018 02:27 AM    RBC 0-5 01/11/2018 02:27 AM     Medications Reviewed:     Current Facility-Administered Medications   Medication Dose Route Frequency    acetaminophen (TYLENOL) tablet 650 mg  650 mg Oral Q6H PRN    nicotine (NICODERM CQ) 14 mg/24 hr patch 1 Patch  1 Patch TransDERmal DAILY    heparin (porcine) injection 5,000 Units  5,000 Units SubCUTAneous Q8H    polyethylene glycol (MIRALAX) packet 17 g  17 g Oral DAILY PRN    risperiDONE (RisperDAL) tablet 2 mg  2 mg Oral QHS    temazepam (RESTORIL) capsule 30 mg  30 mg Oral QHS    traMADol (ULTRAM) tablet 50 mg  50 mg Oral Q8H PRN    traZODone (DESYREL) tablet 150 mg  150 mg Oral QHS    oxyCODONE-acetaminophen (PERCOCET) 5-325 mg per tablet 1 Tab  1 Tab Oral Q6H PRN    sodium chloride (NS) flush 5-10 mL  5-10 mL IntraVENous Q8H    sodium chloride (NS) flush 5-10 mL  5-10 mL IntraVENous PRN     ______________________________________________________________________  EXPECTED LENGTH OF STAY: 4d 12h  ACTUAL LENGTH OF STAY:          [address]

## 2018-02-02 NOTE — PROGRESS NOTES
physical Therapy TREATMENT  Patient: Zaira East (76 y.o. male)  Date: 2/2/2018  Diagnosis: Pneumonia  Altered mental status  Closed L1 vertebral fracture (HCC)  Back pain  Acute respiratory failure with hypoxemia (HCC)  COPD (chronic obstructive pulmonary disease) (HCC)  Lumbar spinal stenosis Pneumonia       Precautions: Back, Fall, Bed Alarm ([place] B: reads lips; smokes)  Chart, physical therapy assessment, plan of care and goals were reviewed. ASSESSMENT:  Patient continues to progress. Able to state 1/3 back precautions, all were reviewed. Back brace donned with OT assistance. Overall CGA to supervision for transfers. Ambulated 200 feet without AD and SBA. Ambulates with shuffled gait although balance improved from last time this author saw patient. He was able to improve standing posture with cues although difficulty maintaining. Patient remained OOB in chair at end of session. Recommend HHPT at group home. Progression toward goals:  [x]    Improving appropriately and progressing toward goals  []    Improving slowly and progressing toward goals  []    Not making progress toward goals and plan of care will be adjusted     PLAN:  Patient continues to benefit from skilled intervention to address the above impairments. Continue treatment per established plan of care. Discharge Recommendations:  Home Health  Further Equipment Recommendations for Discharge:  TBD     SUBJECTIVE:   Patient stated No lifting.     OBJECTIVE DATA SUMMARY:   Critical Behavior:  Neurologic State: Alert  Orientation Level: Oriented to person, Oriented to situation  Cognition: Decreased attention/concentration, Follows commands, Impulsive  Safety/Judgement: Awareness of environment  Functional Mobility Training:  Bed Mobility:     Supine to Sit: Supervision;Assist x1              Transfers:  Sit to Stand: Contact guard assistance;Assist x1           Bed to Chair: Supervision;Assist x1                    Balance:  Sitting: Intact  Standing - Static: Good  Standing - Dynamic : Fair  Ambulation/Gait Training:  Distance (ft): 200 Feet (ft)  Assistive Device: Gait belt;Brace/Splint  Ambulation - Level of Assistance: Stand-by asssistance        Gait Abnormalities: Decreased step clearance;Shuffling gait        Base of Support: Narrowed     Speed/Courtney: Pace decreased (<100 feet/min)  Step Length: Right shortened;Left shortened                    Stairs:            Neuro Re-Education:    Therapeutic Exercises:     Pain:  Pain Scale 1: Numeric (0 - 10)  Pain Intensity 1: 5  Pain Location 1: Back  Pain Orientation 1: Posterior     Pain Intervention(s) 1: Medication (see MAR)  Activity Tolerance:   Improving  Please refer to the flowsheet for vital signs taken during this treatment.   After treatment:   [x]    Patient left in no apparent distress sitting up in chair  []    Patient left in no apparent distress in bed  [x]    Call bell left within reach  [x]    Nursing notified  []    Caregiver present  []    Bed alarm activated    COMMUNICATION/COLLABORATION:   The patients plan of care was discussed with: Registered Nurse    Trevor Leal PT   Time Calculation: 12 mins

## 2018-02-02 NOTE — PROGRESS NOTES
On assessment this a.m. around 8:30a.m., patient's heart rate was elevated at 124bpm.  Patient also complained of pain 8/10. RN administered prn pain medication as ordered and rechecked HR 20 minutes later, HR at 8:50a.m. = 112. At 10:00a.m. Patient is resting quietly and HR = 100. RN also spoke with Dr. Cruz Davidson regarding patient's elevated HR, and will continue to monitor patient.

## 2018-02-03 LAB
ANION GAP SERPL CALC-SCNC: 6 MMOL/L (ref 5–15)
BUN SERPL-MCNC: 21 MG/DL (ref 6–20)
BUN/CREAT SERPL: 24 (ref 12–20)
CALCIUM SERPL-MCNC: 9.3 MG/DL (ref 8.5–10.1)
CHLORIDE SERPL-SCNC: 101 MMOL/L (ref 97–108)
CO2 SERPL-SCNC: 30 MMOL/L (ref 21–32)
CREAT SERPL-MCNC: 0.87 MG/DL (ref 0.7–1.3)
ERYTHROCYTE [DISTWIDTH] IN BLOOD BY AUTOMATED COUNT: 12.3 % (ref 11.5–14.5)
GLUCOSE SERPL-MCNC: 93 MG/DL (ref 65–100)
HCT VFR BLD AUTO: 35 % (ref 36.6–50.3)
HGB BLD-MCNC: 11.9 G/DL (ref 12.1–17)
MAGNESIUM SERPL-MCNC: 1.9 MG/DL (ref 1.6–2.4)
MCH RBC QN AUTO: 34.9 PG (ref 26–34)
MCHC RBC AUTO-ENTMCNC: 34 G/DL (ref 30–36.5)
MCV RBC AUTO: 102.6 FL (ref 80–99)
NRBC # BLD: 0 K/UL (ref 0–0.01)
NRBC BLD-RTO: 0 PER 100 WBC
PHOSPHATE SERPL-MCNC: 5 MG/DL (ref 2.6–4.7)
PLATELET # BLD AUTO: 186 K/UL (ref 150–400)
PMV BLD AUTO: 8.8 FL (ref 8.9–12.9)
POTASSIUM SERPL-SCNC: 4.2 MMOL/L (ref 3.5–5.1)
RBC # BLD AUTO: 3.41 M/UL (ref 4.1–5.7)
SODIUM SERPL-SCNC: 137 MMOL/L (ref 136–145)
WBC # BLD AUTO: 5.4 K/UL (ref 4.1–11.1)

## 2018-02-03 PROCEDURE — 80048 BASIC METABOLIC PNL TOTAL CA: CPT | Performed by: INTERNAL MEDICINE

## 2018-02-03 PROCEDURE — 36415 COLL VENOUS BLD VENIPUNCTURE: CPT | Performed by: INTERNAL MEDICINE

## 2018-02-03 PROCEDURE — 74011250637 HC RX REV CODE- 250/637: Performed by: INTERNAL MEDICINE

## 2018-02-03 PROCEDURE — 65270000032 HC RM SEMIPRIVATE

## 2018-02-03 PROCEDURE — 85027 COMPLETE CBC AUTOMATED: CPT | Performed by: INTERNAL MEDICINE

## 2018-02-03 PROCEDURE — 83735 ASSAY OF MAGNESIUM: CPT | Performed by: INTERNAL MEDICINE

## 2018-02-03 PROCEDURE — 74011250636 HC RX REV CODE- 250/636: Performed by: INTERNAL MEDICINE

## 2018-02-03 PROCEDURE — 84100 ASSAY OF PHOSPHORUS: CPT | Performed by: INTERNAL MEDICINE

## 2018-02-03 RX ORDER — LIDOCAINE 50 MG/G
2 PATCH TOPICAL EVERY 24 HOURS
Status: DISCONTINUED | OUTPATIENT
Start: 2018-02-03 | End: 2018-02-15 | Stop reason: HOSPADM

## 2018-02-03 RX ADMIN — TEMAZEPAM 30 MG: 15 CAPSULE ORAL at 21:41

## 2018-02-03 RX ADMIN — Medication 10 ML: at 21:42

## 2018-02-03 RX ADMIN — HEPARIN SODIUM 5000 UNITS: 5000 INJECTION, SOLUTION INTRAVENOUS; SUBCUTANEOUS at 16:28

## 2018-02-03 RX ADMIN — TRAMADOL HYDROCHLORIDE 50 MG: 50 TABLET, FILM COATED ORAL at 21:41

## 2018-02-03 RX ADMIN — HEPARIN SODIUM 5000 UNITS: 5000 INJECTION, SOLUTION INTRAVENOUS; SUBCUTANEOUS at 00:56

## 2018-02-03 RX ADMIN — TRAMADOL HYDROCHLORIDE 50 MG: 50 TABLET, FILM COATED ORAL at 13:56

## 2018-02-03 RX ADMIN — HEPARIN SODIUM 5000 UNITS: 5000 INJECTION, SOLUTION INTRAVENOUS; SUBCUTANEOUS at 10:06

## 2018-02-03 RX ADMIN — TRAZODONE HYDROCHLORIDE 150 MG: 100 TABLET ORAL at 21:41

## 2018-02-03 RX ADMIN — Medication 10 ML: at 06:11

## 2018-02-03 RX ADMIN — RISPERIDONE 2 MG: 1 TABLET ORAL at 21:41

## 2018-02-03 RX ADMIN — Medication 10 ML: at 15:36

## 2018-02-03 NOTE — PROGRESS NOTES
Bedside shift change report given to Atul RN (oncoming nurse) by Gisselle Jc RN (offgoing nurse). Report included the following information SBAR, Intake/Output and MAR.

## 2018-02-03 NOTE — PROGRESS NOTES
Hospitalist Progress Note  Marybeth Morrell MD  Answering service: [phone] from in house phone      Date of Service:  2/3/2018  NAME:  Doc Terry  :  1940  MRN:  [id number]      Admission Summary:   77 yo man with h/o sleep disorder was BIBEMS to the ED from [facility] on 18 with confusion. He was admitted with acute encephalopathy, acute L1 fracture, rhabdomyolysis, and acute hypoxic and hypercapnic respiratory failure. Interval history / Subjective:   Still c/o lower back pain; not using his TLSO brace; d/w nurse, tachy at times     Assessment & Plan:     Acute hypoxic and hypercapnic respiratory failure (POA)  - resolved; weaned off O2 and satting well on RA    LLL CAP (POA) - as per admission CT  - BCx  negative  - UCx  negative  - s/p azithromycin, Zosyn, LVQ    Acute COPD exacerbation - resolved; prn nebs    Rhabdomyolysis (POA) - resolved with IVF; CK WNL    Acute L1 compression fracture (POA) - Ortho consulted  - conservative management with TLSO brace, PT/OT, OOB, pain control  - nonadherent with wearing brace  - f/u with Dr Maree Favre outpatient  - vitamin D WNL  - add lidocaine patch    Lumbar spinal stenosis (L4-5) - pain control; plan as above    Acute metabolic encephalopathy (POA) - resolved to baseline    Chronic nicotine dependence - cessation counseling    Hypokalemia - replete prn  Hyperphosphatemia - unknown etiology; monitor    Code status: full  DVT prophylaxis: heparin     Care Plan discussed with: Patient/Family and Nurse  Disposition: Likely to group home once niece/financial POA makes a decision on place. Pt defers to his niece for placement decisions.      Hospital Problems  Date Reviewed: 2018          Codes Class Noted POA    COPD (chronic obstructive pulmonary disease) ([facility] ICD-10-CM: J44.9  ICD-9-CM: 998  2018 Unknown        Back pain ICD-10-CM: M54.9  ICD-9-CM: 724.5  1/11/2018 Unknown        Altered mental status ICD-10-CM: R41.82  ICD-9-CM: 780.97  1/11/2018 Unknown        * (Principal)Pneumonia ICD-10-CM: J18.9  ICD-9-CM: 639  1/11/2018 Unknown        Lumbar spinal stenosis ICD-10-CM: M48.061  ICD-9-CM: 724.02  1/11/2018 Unknown        Closed L1 vertebral fracture ([address] ICD-10-CM: S32.019A  ICD-9-CM: 805.4  1/11/2018 Unknown        Acute respiratory failure with hypoxemia [facility]) ICD-10-CM: J96.01  ICD-9-CM: 518.81  1/11/2018 Unknown            Review of Systems:   Pertinent items are noted in HPI. Vital Signs:    Last 24hrs VS reviewed since prior progress note.  Most recent are:  Visit Vitals    /70 (BP 1 Location: Left arm, BP Patient Position: At rest)    Pulse (!) 106    Temp 98.8 °F (37.1 °C)    Resp 18    Ht 5' 9\" (1.753 m)    Wt 60.3 kg (133 lb)    SpO2 94%    BMI 19.64 kg/m2       Intake/Output Summary (Last 24 hours) at 02/03/18 1436  Last data filed at 02/03/18 1423   Gross per 24 hour   Intake                0 ml   Output              650 ml   Net             -650 ml      Physical Examination:     Gen: awake, NAD, flat affect  HENT: NCAT, oral mucosa benign  Eyes: PERRL, anicteric sclera  CVS: regular rhythm, no m/g/r appreciated, no peripheral edema, +pulses  Resp: CTAB, no w/r/r  Abd: + BS, soft, NT/ND  Neuro: AAOx3, responds appropriately to questions and commands    Data Review:    Review and/or order of clinical lab test  Review and/or order of tests in the radiology section of CPT  Review and/or order of tests in the medicine section of CPT    Labs:     Recent Labs      02/03/18   0602  02/02/18   0342   WBC  5.4  6.0   HGB  11.9*  12.0*   HCT  35.0*  35.3*   PLT  186  216     Recent Labs      02/03/18   0602  02/02/18   0342  02/01/18   0331   NA  137  140  138   K  4.2  4.5  4.2   CL  101  103  101   CO2  30  28  30   BUN  21*  23*  19   CREA  0.87  0.81  0.77   GLU  93  99  91   CA  9.3  9.1  9.0   MG  1.9   --    --    PHOS  5.0* --    --      No results for input(s): SGOT, GPT, ALT, AP, TBIL, TBILI, TP, ALB, GLOB, GGT, AML, LPSE in the last 72 hours. No lab exists for component: AMYP, HLPSE  No results for input(s): INR, PTP, APTT in the last 72 hours. No lab exists for component: INREXT, INREXT   No results for input(s): FE, TIBC, PSAT, FERR in the last 72 hours. No results found for: FOL, RBCF   No results for input(s): PH, PCO2, PO2 in the last 72 hours. No results for input(s): CPK, CKNDX, TROIQ in the last 72 hours.     No lab exists for component: CPKMB  No results found for: CHOL, CHOLX, CHLST, CHOLV, HDL, LDL, LDLC, DLDLP, TGLX, TRIGL, TRIGP, CHHD, CHHDX  Lab Results   Component Value Date/Time    Glucose (POC) 102 01/31/2018 12:03 PM    Glucose (POC) 91 01/20/2018 06:52 AM    Glucose (POC) 136 01/17/2018 11:46 AM    Glucose (POC) 97 01/12/2018 10:18 PM    Glucose (POC) 138 01/12/2018 05:17 PM     Lab Results   Component Value Date/Time    Color DARK YELLOW 01/11/2018 02:27 AM    Appearance CLOUDY 01/11/2018 02:27 AM    Specific gravity 1.027 01/11/2018 02:27 AM    pH (UA) 5.0 01/11/2018 02:27 AM    Protein 30 01/11/2018 02:27 AM    Glucose NEGATIVE  01/11/2018 02:27 AM    Ketone NEGATIVE  01/11/2018 02:27 AM    Bilirubin NEGATIVE  01/11/2018 02:27 AM    Urobilinogen 0.2 01/11/2018 02:27 AM    Nitrites NEGATIVE  01/11/2018 02:27 AM    Leukocyte Esterase NEGATIVE  01/11/2018 02:27 AM    Epithelial cells FEW 01/11/2018 02:27 AM    Bacteria NEGATIVE  01/11/2018 02:27 AM    WBC 0-4 01/11/2018 02:27 AM    RBC 0-5 01/11/2018 02:27 AM     Medications Reviewed:     Current Facility-Administered Medications   Medication Dose Route Frequency    acetaminophen (TYLENOL) tablet 650 mg  650 mg Oral Q6H PRN    nicotine (NICODERM CQ) 14 mg/24 hr patch 1 Patch  1 Patch TransDERmal DAILY    heparin (porcine) injection 5,000 Units  5,000 Units SubCUTAneous Q8H    polyethylene glycol (MIRALAX) packet 17 g  17 g Oral DAILY PRN    risperiDONE (RisperDAL) tablet 2 mg  2 mg Oral QHS    temazepam (RESTORIL) capsule 30 mg  30 mg Oral QHS    traMADol (ULTRAM) tablet 50 mg  50 mg Oral Q8H PRN    traZODone (DESYREL) tablet 150 mg  150 mg Oral QHS    oxyCODONE-acetaminophen (PERCOCET) 5-325 mg per tablet 1 Tab  1 Tab Oral Q6H PRN    sodium chloride (NS) flush 5-10 mL  5-10 mL IntraVENous Q8H    sodium chloride (NS) flush 5-10 mL  5-10 mL IntraVENous PRN     ______________________________________________________________________  EXPECTED LENGTH OF STAY: 4d 12h  ACTUAL LENGTH OF STAY:          79 Maryam Sarkar, MD

## 2018-02-04 LAB
ALBUMIN SERPL-MCNC: 3 G/DL (ref 3.5–5)
ALBUMIN/GLOB SERPL: 0.7 {RATIO} (ref 1.1–2.2)
ALP SERPL-CCNC: 73 U/L (ref 45–117)
ALT SERPL-CCNC: 35 U/L (ref 12–78)
ANION GAP SERPL CALC-SCNC: 8 MMOL/L (ref 5–15)
AST SERPL-CCNC: 19 U/L (ref 15–37)
BILIRUB SERPL-MCNC: 0.7 MG/DL (ref 0.2–1)
BUN SERPL-MCNC: 20 MG/DL (ref 6–20)
BUN/CREAT SERPL: 22 (ref 12–20)
CALCIUM SERPL-MCNC: 9.5 MG/DL (ref 8.5–10.1)
CHLORIDE SERPL-SCNC: 98 MMOL/L (ref 97–108)
CO2 SERPL-SCNC: 31 MMOL/L (ref 21–32)
CREAT SERPL-MCNC: 0.92 MG/DL (ref 0.7–1.3)
ERYTHROCYTE [DISTWIDTH] IN BLOOD BY AUTOMATED COUNT: 12.2 % (ref 11.5–14.5)
GLOBULIN SER CALC-MCNC: 4.6 G/DL (ref 2–4)
GLUCOSE SERPL-MCNC: 110 MG/DL (ref 65–100)
HCT VFR BLD AUTO: 37.2 % (ref 36.6–50.3)
HGB BLD-MCNC: 12.4 G/DL (ref 12.1–17)
MAGNESIUM SERPL-MCNC: 1.8 MG/DL (ref 1.6–2.4)
MCH RBC QN AUTO: 34.5 PG (ref 26–34)
MCHC RBC AUTO-ENTMCNC: 33.3 G/DL (ref 30–36.5)
MCV RBC AUTO: 103.6 FL (ref 80–99)
NRBC # BLD: 0 K/UL (ref 0–0.01)
NRBC BLD-RTO: 0 PER 100 WBC
PHOSPHATE SERPL-MCNC: 4.7 MG/DL (ref 2.6–4.7)
PHOSPHATE SERPL-MCNC: 4.8 MG/DL (ref 2.6–4.7)
PLATELET # BLD AUTO: 206 K/UL (ref 150–400)
PMV BLD AUTO: 9 FL (ref 8.9–12.9)
POTASSIUM SERPL-SCNC: 4 MMOL/L (ref 3.5–5.1)
PROT SERPL-MCNC: 7.6 G/DL (ref 6.4–8.2)
RBC # BLD AUTO: 3.59 M/UL (ref 4.1–5.7)
SODIUM SERPL-SCNC: 137 MMOL/L (ref 136–145)
WBC # BLD AUTO: 5.8 K/UL (ref 4.1–11.1)

## 2018-02-04 PROCEDURE — 84100 ASSAY OF PHOSPHORUS: CPT | Performed by: INTERNAL MEDICINE

## 2018-02-04 PROCEDURE — 74011250636 HC RX REV CODE- 250/636: Performed by: INTERNAL MEDICINE

## 2018-02-04 PROCEDURE — 65270000032 HC RM SEMIPRIVATE

## 2018-02-04 PROCEDURE — 74011250637 HC RX REV CODE- 250/637: Performed by: INTERNAL MEDICINE

## 2018-02-04 PROCEDURE — 80053 COMPREHEN METABOLIC PANEL: CPT | Performed by: INTERNAL MEDICINE

## 2018-02-04 PROCEDURE — 36415 COLL VENOUS BLD VENIPUNCTURE: CPT | Performed by: INTERNAL MEDICINE

## 2018-02-04 PROCEDURE — 83735 ASSAY OF MAGNESIUM: CPT | Performed by: INTERNAL MEDICINE

## 2018-02-04 PROCEDURE — 85027 COMPLETE CBC AUTOMATED: CPT | Performed by: INTERNAL MEDICINE

## 2018-02-04 RX ADMIN — HEPARIN SODIUM 5000 UNITS: 5000 INJECTION, SOLUTION INTRAVENOUS; SUBCUTANEOUS at 03:56

## 2018-02-04 RX ADMIN — Medication 10 ML: at 21:12

## 2018-02-04 RX ADMIN — RISPERIDONE 2 MG: 1 TABLET ORAL at 21:12

## 2018-02-04 RX ADMIN — OXYCODONE AND ACETAMINOPHEN 1 TABLET: 5; 325 TABLET ORAL at 21:12

## 2018-02-04 RX ADMIN — TRAZODONE HYDROCHLORIDE 150 MG: 100 TABLET ORAL at 21:12

## 2018-02-04 RX ADMIN — HEPARIN SODIUM 5000 UNITS: 5000 INJECTION, SOLUTION INTRAVENOUS; SUBCUTANEOUS at 11:36

## 2018-02-04 RX ADMIN — TEMAZEPAM 30 MG: 15 CAPSULE ORAL at 21:12

## 2018-02-04 RX ADMIN — OXYCODONE AND ACETAMINOPHEN 1 TABLET: 5; 325 TABLET ORAL at 14:31

## 2018-02-04 RX ADMIN — HEPARIN SODIUM 5000 UNITS: 5000 INJECTION, SOLUTION INTRAVENOUS; SUBCUTANEOUS at 19:33

## 2018-02-04 RX ADMIN — Medication 10 ML: at 09:32

## 2018-02-04 NOTE — PROGRESS NOTES
Bedside, Verbal and Written shift change report given to Deanne (oncoming nurse) by Bonnita Dakins (offgoing nurse). Report included the following information SBAR, Kardex, MAR and Recent Results.

## 2018-02-04 NOTE — PROGRESS NOTES
Problem: Falls - Risk of  Goal: *Absence of Falls  Document Alberto Fall Risk and appropriate interventions in the flowsheet.    Outcome: Progressing Towards Goal  Fall Risk Interventions:  Mobility Interventions: Strengthening exercises (ROM-active/passive), Communicate number of staff needed for ambulation/transfer, Patient to call before getting OOB    Mentation Interventions: Room close to nurse's station    Medication Interventions: Patient to call before getting OOB    Elimination Interventions: Call light in reach, Patient to call for help with toileting needs    History of Falls Interventions: Bed/chair exit alarm

## 2018-02-04 NOTE — PROGRESS NOTES
Hospitalist Progress Note  Jackie Neri MD  Answering service: [phone] from in house phone      Date of Service:  2018  NAME:  Lalo Montana  :  1940  MRN:  [id number]      Admission Summary:   79 yo man with h/o sleep disorder was BIBEMS to the ED from [facility] on 18 with confusion. He was admitted with acute encephalopathy, acute L1 fracture, rhabdomyolysis, and acute hypoxic and hypercapnic respiratory failure. Interval history / Subjective:   Still c/o lower back pain but per nurse only asked for pain medication once during dayshift; lidocaine x2 patches started yesterday; still not using his TLSO brace; d/w nurse     Assessment & Plan:     Acute hypoxic and hypercapnic respiratory failure (POA)  - resolved; weaned off O2 and satting well on RA    LLL CAP (POA) - as per admission CT  - BCx  negative  - UCx  negative  - s/p azithromycin, Zosyn, LVQ    Acute COPD exacerbation - resolved; prn nebs    Rhabdomyolysis (POA) - resolved with IVF; CK WNL    Acute L1 compression fracture (POA) - Ortho consulted  - conservative management with TLSO brace, PT/OT, OOB, pain control  - nonadherent with wearing brace  - f/u with Dr Cindy Aragon outpatient  - vitamin D WNL  - added lidocaine patches x2    Lumbar spinal stenosis (L4-5) - pain control; plan as above    Acute metabolic encephalopathy (POA) - resolved to baseline    Chronic nicotine dependence - cessation counseling    Hypokalemia - replete prn  Hyperphosphatemia - unknown etiology; monitor    Code status: full  DVT prophylaxis: heparin     Care Plan discussed with: Patient/Family and Nurse  Disposition: Likely to group home once niece/financial POA makes a decision on place. Pt defers to his niece for placement decisions.      Hospital Problems  Date Reviewed: 2018          Codes Class Noted POA    COPD (chronic obstructive pulmonary disease) [facility]) ICD-10-CM: J44.9  ICD-9-CM: 853  1/11/2018 Unknown        Back pain ICD-10-CM: M54.9  ICD-9-CM: 724.5  1/11/2018 Unknown        Altered mental status ICD-10-CM: R41.82  ICD-9-CM: 780.97  1/11/2018 Unknown        * (Principal)Pneumonia ICD-10-CM: J18.9  ICD-9-CM: 735  1/11/2018 Unknown        Lumbar spinal stenosis ICD-10-CM: M48.061  ICD-9-CM: 724.02  1/11/2018 Unknown        Closed L1 vertebral fracture ([facility] ICD-10-CM: S32.019A  ICD-9-CM: 805.4  1/11/2018 Unknown        Acute respiratory failure with hypoxemia [facility]) ICD-10-CM: J96.01  ICD-9-CM: 518.81  1/11/2018 Unknown            Review of Systems:   Pertinent items are noted in HPI. Vital Signs:    Last 24hrs VS reviewed since prior progress note.  Most recent are:  Visit Vitals    /74 (BP 1 Location: Right arm, BP Patient Position: At rest)    Pulse 84    Temp 99.1 °F (37.3 °C)    Resp 20    Ht 5' 9\" (1.753 m)    Wt 60.3 kg (133 lb)    SpO2 93%    BMI 19.64 kg/m2     No intake or output data in the 24 hours ending 02/04/18 1532     Physical Examination:     Gen: awake, NAD, flat affect  HENT: NCAT, oral mucosa benign  Eyes: PERRL, anicteric sclera  CVS: regular rhythm, no m/g/r appreciated, no peripheral edema, +pulses  Resp: CTAB, no w/r/r  Abd: + BS, soft, NT/ND  Neuro: AAOx3, responds appropriately to questions and commands    Data Review:    Review and/or order of clinical lab test  Review and/or order of tests in the radiology section of CPT  Review and/or order of tests in the medicine section of CPT    Labs:     Recent Labs      02/04/18   0930  02/03/18   0602   WBC  5.8  5.4   HGB  12.4  11.9*   HCT  37.2  35.0*   PLT  206  186     Recent Labs      02/04/18   0930  02/03/18   0602  02/02/18   0342   NA  137  137  140   K  4.0  4.2  4.5   CL  98  101  103   CO2  31  30  28   BUN  20  21*  23*   CREA  0.92  0.87  0.81   GLU  110*  93  99   CA  9.5  9.3  9.1   MG  1.8  1.9   --    PHOS  4.8*  5.0*   --      Recent Labs 02/04/18   0930   SGOT  19   ALT  35   AP  73   TBILI  0.7   TP  7.6   ALB  3.0*   GLOB  4.6*     No results for input(s): INR, PTP, APTT in the last 72 hours. No lab exists for component: INREXT, INREXT   No results for input(s): FE, TIBC, PSAT, FERR in the last 72 hours. No results found for: FOL, RBCF   No results for input(s): PH, PCO2, PO2 in the last 72 hours. No results for input(s): CPK, CKNDX, TROIQ in the last 72 hours.     No lab exists for component: CPKMB  No results found for: CHOL, CHOLX, CHLST, CHOLV, HDL, LDL, LDLC, DLDLP, TGLX, TRIGL, TRIGP, CHHD, CHHDX  Lab Results   Component Value Date/Time    Glucose (POC) 102 01/31/2018 12:03 PM    Glucose (POC) 91 01/20/2018 06:52 AM    Glucose (POC) 136 01/17/2018 11:46 AM    Glucose (POC) 97 01/12/2018 10:18 PM    Glucose (POC) 138 01/12/2018 05:17 PM     Lab Results   Component Value Date/Time    Color DARK YELLOW 01/11/2018 02:27 AM    Appearance CLOUDY 01/11/2018 02:27 AM    Specific gravity 1.027 01/11/2018 02:27 AM    pH (UA) 5.0 01/11/2018 02:27 AM    Protein 30 01/11/2018 02:27 AM    Glucose NEGATIVE  01/11/2018 02:27 AM    Ketone NEGATIVE  01/11/2018 02:27 AM    Bilirubin NEGATIVE  01/11/2018 02:27 AM    Urobilinogen 0.2 01/11/2018 02:27 AM    Nitrites NEGATIVE  01/11/2018 02:27 AM    Leukocyte Esterase NEGATIVE  01/11/2018 02:27 AM    Epithelial cells FEW 01/11/2018 02:27 AM    Bacteria NEGATIVE  01/11/2018 02:27 AM    WBC 0-4 01/11/2018 02:27 AM    RBC 0-5 01/11/2018 02:27 AM     Medications Reviewed:     Current Facility-Administered Medications   Medication Dose Route Frequency    lidocaine (LIDODERM) 5 % patch 2 Patch  2 Patch TransDERmal Q24H    acetaminophen (TYLENOL) tablet 650 mg  650 mg Oral Q6H PRN    nicotine (NICODERM CQ) 14 mg/24 hr patch 1 Patch  1 Patch TransDERmal DAILY    heparin (porcine) injection 5,000 Units  5,000 Units SubCUTAneous Q8H    polyethylene glycol (MIRALAX) packet 17 g  17 g Oral DAILY PRN    risperiDONE (RisperDAL) tablet 2 mg  2 mg Oral QHS    temazepam (RESTORIL) capsule 30 mg  30 mg Oral QHS    traMADol (ULTRAM) tablet 50 mg  50 mg Oral Q8H PRN    traZODone (DESYREL) tablet 150 mg  150 mg Oral QHS    oxyCODONE-acetaminophen (PERCOCET) 5-325 mg per tablet 1 Tab  1 Tab Oral Q6H PRN    sodium chloride (NS) flush 5-10 mL  5-10 mL IntraVENous Q8H    sodium chloride (NS) flush 5-10 mL  5-10 mL IntraVENous PRN     ______________________________________________________________________  EXPECTED LENGTH OF STAY: 4d 12h  ACTUAL LENGTH OF STAY:          [address] [facility] MD

## 2018-02-04 NOTE — PROGRESS NOTES
Bedside shift change report given to JASMYN Alicea (oncoming nurse) by Mariangel Martinez RN (offgoing nurse). Report included the following information SBAR, Kardex, Intake/Output and MAR.

## 2018-02-05 LAB
ANION GAP SERPL CALC-SCNC: 6 MMOL/L (ref 5–15)
BUN SERPL-MCNC: 20 MG/DL (ref 6–20)
BUN/CREAT SERPL: 23 (ref 12–20)
CALCIUM SERPL-MCNC: 9.3 MG/DL (ref 8.5–10.1)
CHLORIDE SERPL-SCNC: 100 MMOL/L (ref 97–108)
CO2 SERPL-SCNC: 31 MMOL/L (ref 21–32)
CREAT SERPL-MCNC: 0.86 MG/DL (ref 0.7–1.3)
ERYTHROCYTE [DISTWIDTH] IN BLOOD BY AUTOMATED COUNT: 12 % (ref 11.5–14.5)
GLUCOSE BLD STRIP.AUTO-MCNC: 116 MG/DL (ref 65–100)
GLUCOSE SERPL-MCNC: 95 MG/DL (ref 65–100)
HCT VFR BLD AUTO: 35.7 % (ref 36.6–50.3)
HGB BLD-MCNC: 12.3 G/DL (ref 12.1–17)
MCH RBC QN AUTO: 35.1 PG (ref 26–34)
MCHC RBC AUTO-ENTMCNC: 34.5 G/DL (ref 30–36.5)
MCV RBC AUTO: 102 FL (ref 80–99)
NRBC # BLD: 0 K/UL (ref 0–0.01)
NRBC BLD-RTO: 0 PER 100 WBC
PLATELET # BLD AUTO: 191 K/UL (ref 150–400)
PMV BLD AUTO: 9 FL (ref 8.9–12.9)
POTASSIUM SERPL-SCNC: 4.2 MMOL/L (ref 3.5–5.1)
RBC # BLD AUTO: 3.5 M/UL (ref 4.1–5.7)
SERVICE CMNT-IMP: ABNORMAL
SODIUM SERPL-SCNC: 137 MMOL/L (ref 136–145)
WBC # BLD AUTO: 4.9 K/UL (ref 4.1–11.1)

## 2018-02-05 PROCEDURE — 82962 GLUCOSE BLOOD TEST: CPT

## 2018-02-05 PROCEDURE — 74011250637 HC RX REV CODE- 250/637: Performed by: INTERNAL MEDICINE

## 2018-02-05 PROCEDURE — 97116 GAIT TRAINING THERAPY: CPT | Performed by: PHYSICAL THERAPIST

## 2018-02-05 PROCEDURE — 74011250636 HC RX REV CODE- 250/636: Performed by: INTERNAL MEDICINE

## 2018-02-05 PROCEDURE — 65270000032 HC RM SEMIPRIVATE

## 2018-02-05 PROCEDURE — 85027 COMPLETE CBC AUTOMATED: CPT | Performed by: INTERNAL MEDICINE

## 2018-02-05 PROCEDURE — 36415 COLL VENOUS BLD VENIPUNCTURE: CPT | Performed by: INTERNAL MEDICINE

## 2018-02-05 PROCEDURE — 80048 BASIC METABOLIC PNL TOTAL CA: CPT | Performed by: INTERNAL MEDICINE

## 2018-02-05 RX ORDER — ACETAMINOPHEN 500 MG
1000 TABLET ORAL EVERY 8 HOURS
Status: DISCONTINUED | OUTPATIENT
Start: 2018-02-05 | End: 2018-02-15 | Stop reason: HOSPADM

## 2018-02-05 RX ADMIN — OXYCODONE AND ACETAMINOPHEN 1 TABLET: 5; 325 TABLET ORAL at 08:08

## 2018-02-05 RX ADMIN — RISPERIDONE 2 MG: 1 TABLET ORAL at 21:29

## 2018-02-05 RX ADMIN — ACETAMINOPHEN 1000 MG: 500 TABLET, FILM COATED ORAL at 21:29

## 2018-02-05 RX ADMIN — HEPARIN SODIUM 5000 UNITS: 5000 INJECTION, SOLUTION INTRAVENOUS; SUBCUTANEOUS at 12:00

## 2018-02-05 RX ADMIN — TEMAZEPAM 30 MG: 15 CAPSULE ORAL at 21:29

## 2018-02-05 RX ADMIN — HEPARIN SODIUM 5000 UNITS: 5000 INJECTION, SOLUTION INTRAVENOUS; SUBCUTANEOUS at 05:15

## 2018-02-05 RX ADMIN — Medication 10 ML: at 05:28

## 2018-02-05 RX ADMIN — OXYCODONE AND ACETAMINOPHEN 1 TABLET: 5; 325 TABLET ORAL at 19:00

## 2018-02-05 RX ADMIN — HEPARIN SODIUM 5000 UNITS: 5000 INJECTION, SOLUTION INTRAVENOUS; SUBCUTANEOUS at 20:30

## 2018-02-05 RX ADMIN — OXYCODONE AND ACETAMINOPHEN 1 TABLET: 5; 325 TABLET ORAL at 13:29

## 2018-02-05 RX ADMIN — Medication 10 ML: at 21:36

## 2018-02-05 RX ADMIN — TRAZODONE HYDROCHLORIDE 150 MG: 100 TABLET ORAL at 21:29

## 2018-02-05 NOTE — PROGRESS NOTES
Hospitalist Progress Note  Hal Anna MD  Answering service: [phone] from in house phone      Date of Service:  2018  NAME:  Denae Anand  :  1940  MRN:  [id number]      Admission Summary:   79 yo man with h/o sleep disorder was BIBEMS to the ED from [facility] on 18 with confusion. He was admitted with acute encephalopathy, acute L1 fracture, rhabdomyolysis, and acute hypoxic and hypercapnic respiratory failure.      Interval history / Subjective:   Still c/o lower back pain always 5/10; d/w nurse, pt gets very sleepy with Percocet and still not using his TLSO brace when ambulating     Assessment & Plan:     Persistent back pain  - due to lumbar stenosis and L1 compression fracture  - changed to scheduled TID APAP; continue prn Percocet with hold parameters  - lidocaine patches x2  - strongly advised to wear TLSO brace but nonadherent per staff; able to ambulate to bathroom with assistance    Acute hypoxic and hypercapnic respiratory failure (POA)  - resolved; weaned off O2 and satting well on RA    LLL CAP (POA) - as per admission CT  - BCx  negative  - UCx  negative  - s/p azithromycin, Zosyn, LVQ  - resolved    Acute COPD exacerbation - resolved; prn nebs    Rhabdomyolysis (POA) - resolved with IVF; CK WNL    Acute L1 compression fracture (POA) - Ortho consulted and signed off  - conservative management with TLSO brace, PT/OT, OOB, pain control  - nonadherent with wearing brace  - f/u with Dr Prudence Sepulveda outpatient  - vitamin D WNL    L4-L5 spinal stenosis - pain control; plan as above    Acute metabolic encephalopathy (POA) - resolved to baseline    Chronic nicotine dependence - cessation counseling    Hypokalemia - replete prn  Hyperphosphatemia - unknown etiology; monitor    Code status: full  DVT prophylaxis: heparin     Care Plan discussed with: Patient/Family, Nurse and Case Manager  Disposition: Likely to group home once niece/financial POA makes a decision on place. Pt defers to his niece for placement decisions. Hospital Problems  Date Reviewed: 1/11/2018          Codes Class Noted POA    COPD (chronic obstructive pulmonary disease) ([facility] ICD-10-CM: J44.9  ICD-9-CM: 191  1/11/2018 Unknown        Back pain ICD-10-CM: M54.9  ICD-9-CM: 724.5  1/11/2018 Unknown        Altered mental status ICD-10-CM: R41.82  ICD-9-CM: 780.97  1/11/2018 Unknown        * (Principal)Pneumonia ICD-10-CM: J18.9  ICD-9-CM: 486  1/11/2018 Unknown        Lumbar spinal stenosis ICD-10-CM: M48.061  ICD-9-CM: 724.02  1/11/2018 Unknown        Closed L1 vertebral fracture ([facility] ICD-10-CM: S32.019A  ICD-9-CM: 805.4  1/11/2018 Unknown        Acute respiratory failure with hypoxemia [facility]) ICD-10-CM: J96.01  ICD-9-CM: 518.81  1/11/2018 Unknown            Review of Systems:   Pertinent items are noted in HPI. Vital Signs:    Last 24hrs VS reviewed since prior progress note.  Most recent are:  Visit Vitals    /65    Pulse 89    Temp 98.4 °F (36.9 °C)    Resp 18    Ht 5' 9\" (1.753 m)    Wt 60.3 kg (133 lb)    SpO2 95%    BMI 19.64 kg/m2       Intake/Output Summary (Last 24 hours) at 02/05/18 1433  Last data filed at 02/04/18 1846   Gross per 24 hour   Intake                0 ml   Output              200 ml   Net             -200 ml      Physical Examination:     Gen: awake, NAD, flat affect  HENT: NCAT, oral mucosa benign  Eyes: PERRL, anicteric sclera  CVS: regular rhythm, no m/g/r appreciated, no peripheral edema, +pulses  Resp: CTAB, no w/r/r  Abd: + BS, soft, NT/ND  Neuro: AAOx3, responds appropriately to questions and commands    Data Review:    Review and/or order of clinical lab test  Review and/or order of tests in the radiology section of CPT  Review and/or order of tests in the medicine section of CPT    Labs:     Recent Labs      02/05/18   0516  02/04/18   0930   WBC  4.9  5.8   HGB  12.3 12.4   HCT  35.7*  37.2   PLT  191  206     Recent Labs      02/05/18   0516  02/04/18   1855  02/04/18   0930  02/03/18   0602   NA  137   --   137  137   K  4.2   --   4.0  4.2   CL  100   --   98  101   CO2  31   --   31  30   BUN  20   --   20  21*   CREA  0.86   --   0.92  0.87   GLU  95   --   110*  93   CA  9.3   --   9.5  9.3   MG   --    --   1.8  1.9   PHOS   --   4.7  4.8*  5.0*     Recent Labs      02/04/18   0930   SGOT  19   ALT  35   AP  73   TBILI  0.7   TP  7.6   ALB  3.0*   GLOB  4.6*     No results for input(s): INR, PTP, APTT in the last 72 hours. No lab exists for component: INREXT, INREXT   No results for input(s): FE, TIBC, PSAT, FERR in the last 72 hours. No results found for: FOL, RBCF   No results for input(s): PH, PCO2, PO2 in the last 72 hours. No results for input(s): CPK, CKNDX, TROIQ in the last 72 hours.     No lab exists for component: CPKMB  No results found for: CHOL, CHOLX, CHLST, CHOLV, HDL, LDL, LDLC, DLDLP, TGLX, TRIGL, TRIGP, CHHD, CHHDX  Lab Results   Component Value Date/Time    Glucose (POC) 116 02/05/2018 06:20 AM    Glucose (POC) 102 01/31/2018 12:03 PM    Glucose (POC) 91 01/20/2018 06:52 AM    Glucose (POC) 136 01/17/2018 11:46 AM    Glucose (POC) 97 01/12/2018 10:18 PM     Lab Results   Component Value Date/Time    Color DARK YELLOW 01/11/2018 02:27 AM    Appearance CLOUDY 01/11/2018 02:27 AM    Specific gravity 1.027 01/11/2018 02:27 AM    pH (UA) 5.0 01/11/2018 02:27 AM    Protein 30 01/11/2018 02:27 AM    Glucose NEGATIVE  01/11/2018 02:27 AM    Ketone NEGATIVE  01/11/2018 02:27 AM    Bilirubin NEGATIVE  01/11/2018 02:27 AM    Urobilinogen 0.2 01/11/2018 02:27 AM    Nitrites NEGATIVE  01/11/2018 02:27 AM    Leukocyte Esterase NEGATIVE  01/11/2018 02:27 AM    Epithelial cells FEW 01/11/2018 02:27 AM    Bacteria NEGATIVE  01/11/2018 02:27 AM    WBC 0-4 01/11/2018 02:27 AM    RBC 0-5 01/11/2018 02:27 AM     Medications Reviewed:     Current Facility-Administered Medications   Medication Dose Route Frequency    lidocaine (LIDODERM) 5 % patch 2 Patch  2 Patch TransDERmal Q24H    acetaminophen (TYLENOL) tablet 650 mg  650 mg Oral Q6H PRN    nicotine (NICODERM CQ) 14 mg/24 hr patch 1 Patch  1 Patch TransDERmal DAILY    heparin (porcine) injection 5,000 Units  5,000 Units SubCUTAneous Q8H    polyethylene glycol (MIRALAX) packet 17 g  17 g Oral DAILY PRN    risperiDONE (RisperDAL) tablet 2 mg  2 mg Oral QHS    temazepam (RESTORIL) capsule 30 mg  30 mg Oral QHS    traMADol (ULTRAM) tablet 50 mg  50 mg Oral Q8H PRN    traZODone (DESYREL) tablet 150 mg  150 mg Oral QHS    oxyCODONE-acetaminophen (PERCOCET) 5-325 mg per tablet 1 Tab  1 Tab Oral Q6H PRN    sodium chloride (NS) flush 5-10 mL  5-10 mL IntraVENous Q8H    sodium chloride (NS) flush 5-10 mL  5-10 mL IntraVENous PRN     ______________________________________________________________________  EXPECTED LENGTH OF STAY: 4d 12h  ACTUAL LENGTH OF STAY:          25                 Rosa Barreto MD

## 2018-02-05 NOTE — PROGRESS NOTES
Problem: Mobility Impaired (Adult and Pediatric)  Goal: *Acute Goals and Plan of Care (Insert Text)  Physical Therapy Goals  Updated 1/29/2018  Initiated 1/11/2018  Goals reassessed 1/18/18 and remain appropriate over next 7 days  1. Patient will move from supine to sit and sit to supine  in bed with supervision/set-up within 7 day(s). MET  2. Patient will transfer from bed to chair and chair to bed with supervision/set-up using the least restrictive device within 7 day(s). 3.  Patient will perform sit to stand with modified independence within 7 day(s). 4.  Patient will ambulate with supervision/set-up for 250 feet with the least restrictive device within 7 day(s). 5.  Patient will ascend/descend 12 stairs with 1 handrail(s) with supervision/set-up within 7 day(s). 6.  Patient will independently don and doff quick draw TLSO within 7 days. Physical Therapy Goals    Initiated 1/11/2018  Updated: 2/5/2018      1. Patient will move from supine to sit and sit to supine  in bed with supervision/set-up within 7 day(s). MET  2. Patient will transfer from bed to chair and chair to bed with supervision/set-up using the least restrictive device within 7 day(s). 3.  Patient will perform sit to stand with modified independence within 7 day(s). MET  4. Patient will ambulate with supervision/set-up for 250 feet with the least restrictive device within 7 day(s). 5.  Patient will ascend/descend 12 stairs with 1 handrail(s) with supervision/set-up within 7 day(s). 6.  Patient will independently don and doff quick draw TLSO within 7 days.     physical Therapy REEVALUATION  Patient: Gloria Ryan78 y.o. male)  Date: 2/5/2018  Primary Diagnosis: Pneumonia  Altered mental status  Closed L1 vertebral fracture (HCC)  Back pain  Acute respiratory failure with hypoxemia (HCC)  COPD (chronic obstructive pulmonary disease) (HCC)  Lumbar spinal stenosis        Precautions:   Back, Fall, Bed Alarm ([place] B: reads lips; carl)  Chart, physical therapy assessment, plan of care and goals were reviewed. ASSESSMENT :  Based on the objective data described below, the patient presents with functional status below baseline level of function. Pt is received supine and willing to participate with therapy. Pt is quiet in demeanor but command following is intact. Pt is standby assist with bed mobility and transferring sit<>stand. Pt benefits from verbal and tactile cues to maintain back precautions during transfer. Pt ambulates 125' at accelerated pace with SBA wearing brace. Pt demonstrates narrow OMER, slight forward lean and no arm swing during gait. Pt returned to bed with bed alarm and needs met. Pt demonstrates mild dyspnea on exertion. Reviewed back precautions and educated pt on the importance of monitoring activity tolerance. Continue to recommend DC to [facility] PT at group home. Patient will benefit from skilled intervention to address the above impairments.   Patients rehabilitation potential is considered to be Good  Factors which may influence rehabilitation potential include:   []           None noted  [x]           Mental ability/status  [x]           Medical condition  []           Home/family situation and support systems  []           Safety awareness  []           Pain tolerance/management  []           Other:      PLAN :  Recommendations and Planned Interventions:  [x]             Bed Mobility Training             [x]      Neuromuscular Re-Education  [x]             Transfer Training                   [x]      Orthotic/Prosthetic Training  [x]             Gait Training                         []      Modalities  [x]             Therapeutic Exercises           []      Edema Management/Control  [x]             Therapeutic Activities            []      Patient and Family Training/Education  []             Other (comment):  Frequency/Duration: Patient will be followed by physical therapy 3 times a week to address goals.  Discharge Recommendations: Home Health  Further Equipment Recommendations for Discharge: tbd       SUBJECTIVE:   Patient stated I'm a little short of breath.     OBJECTIVE DATA SUMMARY:     Past Medical History:   Diagnosis Date    Sleep disorder     insomnia   History reviewed. No pertinent surgical history. Hospital course since last seen and reason for reevaluation: greater independence with bed mobility. Still requires assist with donning brace. Critical Behavior:  Neurologic State: Alert, Confused  Orientation Level: Oriented to place, Oriented to person, Disoriented to situation, Disoriented to time  Cognition: Follows commands  Safety/Judgement: Awareness of environment    Strength:    Strength: Generally decreased, functional                      Tone & Sensation:                                  Range Of Motion:  AROM: Generally decreased, functional                       Coordination:       Functional Mobility:  Bed Mobility:   Standby assist           Transfers:  Sit to Stand: Stand-by asssistance  Stand to Sit: Stand-by asssistance                    Balance:      Ambulation/Gait Training:  Distance (ft): 125 Feet (ft)  Assistive Device: Brace/Splint;Gait belt  Ambulation - Level of Assistance: Stand-by asssistance        Gait Abnormalities: Decreased step clearance;Shuffling gait        Base of Support: Narrowed     Speed/Courtney: Accelerated  Step Length: Left shortened;Right shortened                  Functional Measure:  Barthel Index:    Bathin  Bladder: 5  Bowels: 10  Groomin  Dressin  Feeding: 10  Mobility: 5  Stairs: 5  Toilet Use: 5  Transfer (Bed to Chair and Back): 10  Total: 55       Barthel and G-code impairment scale:  Percentage of impairment CH  0% CI  1-19% CJ  20-39% CK  40-59% CL  60-79% CM  80-99% CN  100%   Barthel Score 0-100 100 99-80 79-60 59-40 20-39 1-19   0   Barthel Score 0-20 20 17-19 13-16 9-12 5-8 1-4 0      The Barthel ADL Index: Guidelines  1.  The index should be used as a record of what a patient does, not as a record of what a patient could do. 2. The main aim is to establish degree of independence from any help, physical or verbal, however minor and for whatever reason. 3. The need for supervision renders the patient not independent. 4. A patient's performance should be established using the best available evidence. Asking the patient, friends/relatives and nurses are the usual sources, but direct observation and common sense are also important. However direct testing is not needed. 5. Usually the patient's performance over the preceding 24-48 hours is important, but occasionally longer periods will be relevant. 6. Middle categories imply that the patient supplies over 50 per cent of the effort. 7. Use of aids to be independent is allowed. Maykel Manzanares., Barthel, D.W. (0599). Functional evaluation: the Barthel Index. [facility] (14)2. Jean Paul Wolfe pamela DEANNA Connelly, Becky Saini., Agustin Sauer., [place], [address] [place] [address] (1999). Measuring the change indisability after inpatient rehabilitation; comparison of the responsiveness of the Barthel Index and Functional [place] Measure. Journal of Neurology, Neurosurgery, and Psychiatry, 66(4), 659-627. Akila Cabrera, N.J.A, JADIEL PageJ.M, & Flavia Bertrand, M.A. (2004.) Assessment of post-stroke quality of life in cost-effectiveness studies: The usefulness of the Barthel Index and the EuroQoL-5D. Quality of Life Research, 13, 401-70         G codes: In compliance with CMSs Claims Based Outcome Reporting, the following G-code set was chosen for this patient based on their primary functional limitation being treated: The outcome measure chosen to determine the severity of the functional limitation was the barthel with a score of 55/100 which was correlated with the impairment scale.     ? Mobility - Walking and Moving Around:     - CURRENT STATUS: CK - 40%-59% impaired, limited or restricted    - GOAL STATUS: CJ - 20%-39% impaired, limited or restricted    - D/C STATUS:  ---------------To be determined---------------     Pain:  Pain Scale 1: Numeric (0 - 10)  Pain Intensity 1: 8  Pain Location 1: Back  Pain Orientation 1: Anterior  Pain Description 1: Aching  Pain Intervention(s) 1: Medication (see MAR)  Activity Tolerance:   Fair: ambulates 125' with SBA and mild dyspnea  Please refer to the flowsheet for vital signs taken during this treatment. After treatment:   []  Patient left in no apparent distress sitting up in chair  [x]  Patient left in no apparent distress in bed  [x]  Call bell left within reach  [x]  Nursing notified  []  Caregiver present  []  Bed alarm activated    COMMUNICATION/EDUCATION:   The patients plan of care was discussed with: Physical Therapist and Registered Nurse. [x]  Fall prevention education was provided and the patient/caregiver indicated understanding. [x]  Patient/family have participated as able in goal setting and plan of care. [x]  Patient/family agree to work toward stated goals and plan of care. []  Patient understands intent and goals of therapy, but is neutral about his/her participation. []  Patient is unable to participate in goal setting and plan of care. Thank you for this referral.  Ada Apodaca, [facility]   Time Calculation: 9 mins    Regarding student involvement in patient care:  A student participated in this treatment session. Per CMS Medicare statements and APTA guidelines I certify that the following was true:  1. I was present and directly observed the entire session. 2. I made all skilled judgments and clinical decisions regarding care. 3. I am the practitioner responsible for assessment, treatment, and documentation.

## 2018-02-05 NOTE — PROGRESS NOTES
Problem: Falls - Risk of  Goal: *Absence of Falls  Document Alberto Fall Risk and appropriate interventions in the flowsheet.    Outcome: Progressing Towards Goal  Fall Risk Interventions:  Mobility Interventions: Assess mobility with egress test, Bed/chair exit alarm, Communicate number of staff needed for ambulation/transfer    Mentation Interventions: Adequate sleep, hydration, pain control, Bed/chair exit alarm, Door open when patient unattended, Evaluate medications/consider consulting pharmacy    Medication Interventions: Bed/chair exit alarm, Evaluate medications/consider consulting pharmacy, Patient to call before getting OOB    Elimination Interventions: Bed/chair exit alarm, Elevated toilet seat, Call light in reach    History of Falls Interventions: Bed/chair exit alarm, Door open when patient unattended, Consult care management for discharge planning

## 2018-02-05 NOTE — PROGRESS NOTES
Bedside and Verbal shift change report given to Justina Fajardo RN (oncoming nurse) by Thaddeus Cardoso RN (offgoing nurse). Report included the following information SBAR, Kardex, ED Summary, Intake/Output, MAR and Recent Results.

## 2018-02-06 LAB
ANION GAP SERPL CALC-SCNC: 8 MMOL/L (ref 5–15)
BUN SERPL-MCNC: 21 MG/DL (ref 6–20)
BUN/CREAT SERPL: 24 (ref 12–20)
CALCIUM SERPL-MCNC: 9.3 MG/DL (ref 8.5–10.1)
CHLORIDE SERPL-SCNC: 101 MMOL/L (ref 97–108)
CO2 SERPL-SCNC: 29 MMOL/L (ref 21–32)
CREAT SERPL-MCNC: 0.87 MG/DL (ref 0.7–1.3)
ERYTHROCYTE [DISTWIDTH] IN BLOOD BY AUTOMATED COUNT: 11.8 % (ref 11.5–14.5)
GLUCOSE SERPL-MCNC: 101 MG/DL (ref 65–100)
HCT VFR BLD AUTO: 36 % (ref 36.6–50.3)
HGB BLD-MCNC: 12.2 G/DL (ref 12.1–17)
MCH RBC QN AUTO: 34.3 PG (ref 26–34)
MCHC RBC AUTO-ENTMCNC: 33.9 G/DL (ref 30–36.5)
MCV RBC AUTO: 101.1 FL (ref 80–99)
NRBC # BLD: 0 K/UL (ref 0–0.01)
NRBC BLD-RTO: 0 PER 100 WBC
PLATELET # BLD AUTO: 201 K/UL (ref 150–400)
PMV BLD AUTO: 9.2 FL (ref 8.9–12.9)
POTASSIUM SERPL-SCNC: 4.3 MMOL/L (ref 3.5–5.1)
RBC # BLD AUTO: 3.56 M/UL (ref 4.1–5.7)
SODIUM SERPL-SCNC: 138 MMOL/L (ref 136–145)
WBC # BLD AUTO: 4.9 K/UL (ref 4.1–11.1)

## 2018-02-06 PROCEDURE — 74011250637 HC RX REV CODE- 250/637: Performed by: INTERNAL MEDICINE

## 2018-02-06 PROCEDURE — 36415 COLL VENOUS BLD VENIPUNCTURE: CPT | Performed by: INTERNAL MEDICINE

## 2018-02-06 PROCEDURE — 80048 BASIC METABOLIC PNL TOTAL CA: CPT | Performed by: INTERNAL MEDICINE

## 2018-02-06 PROCEDURE — 65270000032 HC RM SEMIPRIVATE

## 2018-02-06 PROCEDURE — 85027 COMPLETE CBC AUTOMATED: CPT | Performed by: INTERNAL MEDICINE

## 2018-02-06 PROCEDURE — 74011250636 HC RX REV CODE- 250/636: Performed by: INTERNAL MEDICINE

## 2018-02-06 RX ADMIN — ACETAMINOPHEN 1000 MG: 500 TABLET, FILM COATED ORAL at 14:39

## 2018-02-06 RX ADMIN — ACETAMINOPHEN 1000 MG: 500 TABLET, FILM COATED ORAL at 21:41

## 2018-02-06 RX ADMIN — HEPARIN SODIUM 5000 UNITS: 5000 INJECTION, SOLUTION INTRAVENOUS; SUBCUTANEOUS at 19:52

## 2018-02-06 RX ADMIN — TRAZODONE HYDROCHLORIDE 150 MG: 100 TABLET ORAL at 21:41

## 2018-02-06 RX ADMIN — Medication 10 ML: at 21:41

## 2018-02-06 RX ADMIN — HEPARIN SODIUM 5000 UNITS: 5000 INJECTION, SOLUTION INTRAVENOUS; SUBCUTANEOUS at 13:08

## 2018-02-06 RX ADMIN — HEPARIN SODIUM 5000 UNITS: 5000 INJECTION, SOLUTION INTRAVENOUS; SUBCUTANEOUS at 04:23

## 2018-02-06 RX ADMIN — Medication 10 ML: at 07:17

## 2018-02-06 RX ADMIN — RISPERIDONE 2 MG: 1 TABLET ORAL at 21:41

## 2018-02-06 RX ADMIN — TEMAZEPAM 30 MG: 15 CAPSULE ORAL at 21:41

## 2018-02-06 RX ADMIN — ACETAMINOPHEN 1000 MG: 500 TABLET, FILM COATED ORAL at 07:17

## 2018-02-06 RX ADMIN — OXYCODONE AND ACETAMINOPHEN 1 TABLET: 5; 325 TABLET ORAL at 10:46

## 2018-02-06 RX ADMIN — OXYCODONE AND ACETAMINOPHEN 1 TABLET: 5; 325 TABLET ORAL at 16:52

## 2018-02-06 NOTE — PROGRESS NOTES
Bedside shift change report given to [facility] (oncoming nurse) by Rosangela Wilkerson (offgoing nurse). Report included the following information SBAR.

## 2018-02-06 NOTE — PROGRESS NOTES
Hospitalist Progress Note  Divya Gomez MD  Answering service: [phone] OR [phone] from in house phone      Date of Service:  2018  NAME:  Aliya Brannon  :  1940  MRN:  [id number]      Admission Summary:   77 yo man with h/o sleep disorder was BIBEMS to the ED from [facility] on 18 with confusion. He was admitted with acute encephalopathy, acute L1 fracture, rhabdomyolysis, and acute hypoxic and hypercapnic respiratory failure.      Interval history / Subjective:   Pt awaiting decision from POA regarding d/c   Encourage wearing TLSO brace     Assessment & Plan:     Persistent back pain  - due to lumbar stenosis and L1 compression fracture  - changed to scheduled TID APAP; continue prn Percocet with hold parameters  - lidocaine patches x2  - strongly advised to wear TLSO brace but nonadherent per staff; able to ambulate to bathroom with assistance    Acute hypoxic and hypercapnic respiratory failure (POA)  - resolved; weaned off O2 and satting well on RA    LLL CAP (POA) - as per admission CT  - BCx  negative  - UCx  negative  - s/p azithromycin, Zosyn, LVQ  - resolved    Acute COPD exacerbation - resolved  prn nebs    Rhabdomyolysis (POA) - resolved with IVF; CK WNL    Acute L1 compression fracture (POA) - Ortho consulted and signed off  - conservative management with TLSO brace, PT/OT, OOB, pain control  - nonadherent with wearing brace  - f/u with Dr Galo Yost outpatient  - vitamin D WNL    L4-L5 spinal stenosis - pain control; plan as above    Acute metabolic encephalopathy (POA) - resolved to baseline    Chronic nicotine dependence - cessation counseling    Hypokalemia - replete prn  Hyperphosphatemia - unknown etiology; monitor    Code status: Full  DVT prophylaxis: heparin     Care Plan discussed with: Patient/Family, Nurse and   Disposition: Likely to group home once niece/financial POA makes a decision on place. Pt defers to his niece for placement decisions. Hospital Problems  Date Reviewed: 1/11/2018          Codes Class Noted POA    COPD (chronic obstructive pulmonary disease) ([facility] ICD-10-CM: J44.9  ICD-9-CM: 342  1/11/2018 Unknown        Back pain ICD-10-CM: M54.9  ICD-9-CM: 724.5  1/11/2018 Unknown        Altered mental status ICD-10-CM: R41.82  ICD-9-CM: 780.97  1/11/2018 Unknown        * (Principal)Pneumonia ICD-10-CM: J18.9  ICD-9-CM: 486  1/11/2018 Unknown        Lumbar spinal stenosis ICD-10-CM: M48.061  ICD-9-CM: 724.02  1/11/2018 Unknown        Closed L1 vertebral fracture ([facility] ICD-10-CM: S32.019A  ICD-9-CM: 805.4  1/11/2018 Unknown        Acute respiratory failure with hypoxemia [facility]) ICD-10-CM: J96.01  ICD-9-CM: 518.81  1/11/2018 Unknown            Review of Systems:   Pertinent items are noted in HPI. Vital Signs:    Last 24hrs VS reviewed since prior progress note.  Most recent are:  Visit Vitals    /64    Pulse (!) 108    Temp 98.4 °F (36.9 °C)    Resp 16    Ht 5' 9\" (1.753 m)    Wt 57.5 kg (126 lb 12.8 oz)    SpO2 95%    BMI 18.73 kg/m2       Intake/Output Summary (Last 24 hours) at 02/06/18 0931  Last data filed at 02/05/18 2216   Gross per 24 hour   Intake              400 ml   Output              350 ml   Net               50 ml      Physical Examination:     Gen: awake, NAD, flat affect  HENT: NCAT, oral mucosa benign  Eyes: PERRL, anicteric sclera  CVS: regular rhythm, no m/g/r appreciated, no peripheral edema, +pulses  Resp: CTAB, no w/r/r  Abd: + BS, soft, NT/ND  Neuro: AAOx3, responds appropriately to questions and commands    Data Review:    Review and/or order of clinical lab test  Review and/or order of tests in the radiology section of CPT  Review and/or order of tests in the medicine section of CPT    Labs:     Recent Labs      02/06/18   0410  02/05/18   0516   WBC  4.9  4.9   HGB  12.2  12.3   HCT  36.0*  35.7*   PLT  201  191     Recent Labs 02/06/18   0410  02/05/18   0516  02/04/18   1855  02/04/18   0930   NA  138  137   --   137   K  4.3  4.2   --   4.0   CL  101  100   --   98   CO2  29  31   --   31   BUN  21*  20   --   20   CREA  0.87  0.86   --   0.92   GLU  101*  95   --   110*   CA  9.3  9.3   --   9.5   MG   --    --    --   1.8   PHOS   --    --   4.7  4.8*     Recent Labs      02/04/18   0930   SGOT  19   ALT  35   AP  73   TBILI  0.7   TP  7.6   ALB  3.0*   GLOB  4.6*     No results for input(s): INR, PTP, APTT in the last 72 hours. No lab exists for component: INREXT, INREXT   No results for input(s): FE, TIBC, PSAT, FERR in the last 72 hours. No results found for: FOL, RBCF   No results for input(s): PH, PCO2, PO2 in the last 72 hours. No results for input(s): CPK, CKNDX, TROIQ in the last 72 hours.     No lab exists for component: CPKMB  No results found for: CHOL, CHOLX, CHLST, CHOLV, HDL, LDL, LDLC, DLDLP, TGLX, TRIGL, TRIGP, CHHD, CHHDX  Lab Results   Component Value Date/Time    Glucose (POC) 116 02/05/2018 06:20 AM    Glucose (POC) 102 01/31/2018 12:03 PM    Glucose (POC) 91 01/20/2018 06:52 AM    Glucose (POC) 136 01/17/2018 11:46 AM    Glucose (POC) 97 01/12/2018 10:18 PM     Lab Results   Component Value Date/Time    Color DARK YELLOW 01/11/2018 02:27 AM    Appearance CLOUDY 01/11/2018 02:27 AM    Specific gravity 1.027 01/11/2018 02:27 AM    pH (UA) 5.0 01/11/2018 02:27 AM    Protein 30 01/11/2018 02:27 AM    Glucose NEGATIVE  01/11/2018 02:27 AM    Ketone NEGATIVE  01/11/2018 02:27 AM    Bilirubin NEGATIVE  01/11/2018 02:27 AM    Urobilinogen 0.2 01/11/2018 02:27 AM    Nitrites NEGATIVE  01/11/2018 02:27 AM    Leukocyte Esterase NEGATIVE  01/11/2018 02:27 AM    Epithelial cells FEW 01/11/2018 02:27 AM    Bacteria NEGATIVE  01/11/2018 02:27 AM    WBC 0-4 01/11/2018 02:27 AM    RBC 0-5 01/11/2018 02:27 AM     Medications Reviewed:     Current Facility-Administered Medications   Medication Dose Route Frequency    acetaminophen (TYLENOL) tablet 1,000 mg  1,000 mg Oral Q8H    lidocaine (LIDODERM) 5 % patch 2 Patch  2 Patch TransDERmal Q24H    nicotine (NICODERM CQ) 14 mg/24 hr patch 1 Patch  1 Patch TransDERmal DAILY    heparin (porcine) injection 5,000 Units  5,000 Units SubCUTAneous Q8H    polyethylene glycol (MIRALAX) packet 17 g  17 g Oral DAILY PRN    risperiDONE (RisperDAL) tablet 2 mg  2 mg Oral QHS    temazepam (RESTORIL) capsule 30 mg  30 mg Oral QHS    traMADol (ULTRAM) tablet 50 mg  50 mg Oral Q8H PRN    traZODone (DESYREL) tablet 150 mg  150 mg Oral QHS    oxyCODONE-acetaminophen (PERCOCET) 5-325 mg per tablet 1 Tab  1 Tab Oral Q6H PRN    sodium chloride (NS) flush 5-10 mL  5-10 mL IntraVENous Q8H    sodium chloride (NS) flush 5-10 mL  5-10 mL IntraVENous PRN     ______________________________________________________________________  EXPECTED LENGTH OF STAY: 4d 12h  ACTUAL LENGTH OF STAY:          26                 Louie Philippe MD

## 2018-02-06 NOTE — PROGRESS NOTES
Bedside shift change report given to Kimmie d'Sawyer (oncoming nurse) by Monet Frye (offgoing nurse). Report included the following information SBAR.

## 2018-02-07 PROCEDURE — 74011250637 HC RX REV CODE- 250/637: Performed by: INTERNAL MEDICINE

## 2018-02-07 PROCEDURE — 74011250636 HC RX REV CODE- 250/636: Performed by: INTERNAL MEDICINE

## 2018-02-07 PROCEDURE — 65270000032 HC RM SEMIPRIVATE

## 2018-02-07 RX ADMIN — TEMAZEPAM 30 MG: 15 CAPSULE ORAL at 21:22

## 2018-02-07 RX ADMIN — HEPARIN SODIUM 5000 UNITS: 5000 INJECTION, SOLUTION INTRAVENOUS; SUBCUTANEOUS at 20:32

## 2018-02-07 RX ADMIN — RISPERIDONE 2 MG: 1 TABLET ORAL at 21:22

## 2018-02-07 RX ADMIN — OXYCODONE AND ACETAMINOPHEN 1 TABLET: 5; 325 TABLET ORAL at 10:29

## 2018-02-07 RX ADMIN — TRAZODONE HYDROCHLORIDE 150 MG: 100 TABLET ORAL at 21:22

## 2018-02-07 RX ADMIN — HEPARIN SODIUM 5000 UNITS: 5000 INJECTION, SOLUTION INTRAVENOUS; SUBCUTANEOUS at 14:21

## 2018-02-07 RX ADMIN — Medication 10 ML: at 21:22

## 2018-02-07 RX ADMIN — ACETAMINOPHEN 1000 MG: 500 TABLET, FILM COATED ORAL at 07:38

## 2018-02-07 RX ADMIN — TRAMADOL HYDROCHLORIDE 50 MG: 50 TABLET, FILM COATED ORAL at 14:27

## 2018-02-07 RX ADMIN — HEPARIN SODIUM 5000 UNITS: 5000 INJECTION, SOLUTION INTRAVENOUS; SUBCUTANEOUS at 07:39

## 2018-02-07 RX ADMIN — ACETAMINOPHEN 1000 MG: 500 TABLET, FILM COATED ORAL at 21:21

## 2018-02-07 RX ADMIN — Medication 10 ML: at 07:39

## 2018-02-07 NOTE — PROGRESS NOTES
CM sp[margarito with pt's niece and she has selected to have pt go to [facility]. She is currently working with the admissions coordinator to accept pt. Facility is awaiting a patient to move out this week to accept pt. CM placed call to [employer] phone # [phone] and left message for return call to verify acceptance of pt.   Jeffrey Stein RN CRM

## 2018-02-07 NOTE — PROGRESS NOTES
Bedside shift change report given to Zahida (oncoming nurse) by Blade Bourgeois (offgoing nurse). Report included the following information SBAR.

## 2018-02-07 NOTE — PROGRESS NOTES
Hospitalist Progress Note  Joy Peña MD  Answering service: [phone] OR [phone] from in house phone      Date of Service:  2018  NAME:  Manda Marie  :  1940  MRN:  [id number]      Admission Summary:   79 yo man with h/o sleep disorder was BIBEMS to the ED from [facility] on 18 with confusion. He was admitted with acute encephalopathy, acute L1 fracture, rhabdomyolysis, and acute hypoxic and hypercapnic respiratory failure.      Interval history / Subjective:   Pt awaiting decision from POA regarding d/c   Encourage wearing TLSO brace  No new issues , d/w CM      Assessment & Plan:     Persistent back pain  - due to lumbar stenosis and L1 compression fracture  - changed to scheduled TID APAP; continue prn Percocet with hold parameters  - lidocaine patches x2  - strongly advised to wear TLSO brace but nonadherent per staff; able to ambulate to bathroom with assistance    Acute hypoxic and hypercapnic respiratory failure (POA)  - resolved; weaned off O2 and satting well on RA    LLL CAP (POA) - as per admission CT  - BCx  negative  - UCx  negative  - s/p azithromycin, Zosyn, LVQ  - resolved    Acute COPD exacerbation - resolved  prn nebs    Rhabdomyolysis (POA) - resolved with IVF; CK WNL    Acute L1 compression fracture (POA) - Ortho consulted and signed off  - conservative management with TLSO brace, PT/OT, OOB, pain control  - nonadherent with wearing brace  - f/u with Dr Kareem Klein outpatient  - vitamin D WNL    L4-L5 spinal stenosis - pain control; plan as above    Acute metabolic encephalopathy (POA) - resolved to baseline    Chronic nicotine dependence - cessation counseling    Hypokalemia - replete prn  Hyperphosphatemia - unknown etiology; monitor    Code status: Full  DVT prophylaxis: heparin     Care Plan discussed with: Patient/Family, Nurse and   Disposition: Likely to group home once niece/financial POA makes a decision on place. Pt defers to his niece for placement decisions. Hospital Problems  Date Reviewed: 1/11/2018          Codes Class Noted POA    COPD (chronic obstructive pulmonary disease) ([facility] ICD-10-CM: J44.9  ICD-9-CM: 942  1/11/2018 Unknown        Back pain ICD-10-CM: M54.9  ICD-9-CM: 724.5  1/11/2018 Unknown        Altered mental status ICD-10-CM: R41.82  ICD-9-CM: 780.97  1/11/2018 Unknown        * (Principal)Pneumonia ICD-10-CM: J18.9  ICD-9-CM: 486  1/11/2018 Unknown        Lumbar spinal stenosis ICD-10-CM: M48.061  ICD-9-CM: 724.02  1/11/2018 Unknown        Closed L1 vertebral fracture ([facility] ICD-10-CM: S32.019A  ICD-9-CM: 805.4  1/11/2018 Unknown        Acute respiratory failure with hypoxemia [facility]) ICD-10-CM: J96.01  ICD-9-CM: 518.81  1/11/2018 Unknown            Review of Systems:   Pertinent items are noted in HPI. Vital Signs:    Last 24hrs VS reviewed since prior progress note.  Most recent are:  Visit Vitals    /69    Pulse 99    Temp 98.3 °F (36.8 °C)    Resp 18    Ht 5' 9\" (1.753 m)    Wt 57.5 kg (126 lb 12.8 oz)    SpO2 97%    BMI 18.73 kg/m2       Intake/Output Summary (Last 24 hours) at 02/07/18 1051  Last data filed at 02/07/18 0230   Gross per 24 hour   Intake              250 ml   Output              125 ml   Net              125 ml      Physical Examination:     Gen: awake, NAD, flat affect  HENT: NCAT, oral mucosa benign  Eyes: PERRL, anicteric sclera  CVS: regular rhythm, no m/g/r appreciated, no peripheral edema, +pulses  Resp: CTAB, no w/r/r  Abd: + BS, soft, NT/ND  Neuro: AAOx3, responds appropriately to questions and commands    Data Review:    Review and/or order of clinical lab test  Review and/or order of tests in the radiology section of CPT  Review and/or order of tests in the medicine section of CPT    Labs:     Recent Labs      02/06/18   0410  02/05/18   0516   WBC  4.9  4.9   HGB  12.2  12.3   HCT  36.0*  35.7*   PLT  201  191 Recent Labs      02/06/18   0410  02/05/18   0516  02/04/18   1855   NA  138  137   --    K  4.3  4.2   --    CL  101  100   --    CO2  29  31   --    BUN  21*  20   --    CREA  0.87  0.86   --    GLU  101*  95   --    CA  9.3  9.3   --    PHOS   --    --   4.7     No results for input(s): SGOT, GPT, ALT, AP, TBIL, TBILI, TP, ALB, GLOB, GGT, AML, LPSE in the last 72 hours. No lab exists for component: AMYP, HLPSE  No results for input(s): INR, PTP, APTT in the last 72 hours. No lab exists for component: INREXT, INREXT   No results for input(s): FE, TIBC, PSAT, FERR in the last 72 hours. No results found for: FOL, RBCF   No results for input(s): PH, PCO2, PO2 in the last 72 hours. No results for input(s): CPK, CKNDX, TROIQ in the last 72 hours.     No lab exists for component: CPKMB  No results found for: CHOL, CHOLX, CHLST, CHOLV, HDL, LDL, LDLC, DLDLP, TGLX, TRIGL, TRIGP, CHHD, CHHDX  Lab Results   Component Value Date/Time    Glucose (POC) 116 (H) 02/05/2018 06:20 AM    Glucose (POC) 102 (H) 01/31/2018 12:03 PM    Glucose (POC) 91 01/20/2018 06:52 AM    Glucose (POC) 136 (H) 01/17/2018 11:46 AM    Glucose (POC) 97 01/12/2018 10:18 PM     Lab Results   Component Value Date/Time    Color DARK YELLOW 01/11/2018 02:27 AM    Appearance CLOUDY (A) 01/11/2018 02:27 AM    Specific gravity 1.027 01/11/2018 02:27 AM    pH (UA) 5.0 01/11/2018 02:27 AM    Protein 30 (A) 01/11/2018 02:27 AM    Glucose NEGATIVE  01/11/2018 02:27 AM    Ketone NEGATIVE  01/11/2018 02:27 AM    Bilirubin NEGATIVE  01/11/2018 02:27 AM    Urobilinogen 0.2 01/11/2018 02:27 AM    Nitrites NEGATIVE  01/11/2018 02:27 AM    Leukocyte Esterase NEGATIVE  01/11/2018 02:27 AM    Epithelial cells FEW 01/11/2018 02:27 AM    Bacteria NEGATIVE  01/11/2018 02:27 AM    WBC 0-4 01/11/2018 02:27 AM    RBC 0-5 01/11/2018 02:27 AM     Medications Reviewed:     Current Facility-Administered Medications   Medication Dose Route Frequency    acetaminophen (TYLENOL) tablet 1,000 mg  1,000 mg Oral Q8H    lidocaine (LIDODERM) 5 % patch 2 Patch  2 Patch TransDERmal Q24H    nicotine (NICODERM CQ) 14 mg/24 hr patch 1 Patch  1 Patch TransDERmal DAILY    heparin (porcine) injection 5,000 Units  5,000 Units SubCUTAneous Q8H    polyethylene glycol (MIRALAX) packet 17 g  17 g Oral DAILY PRN    risperiDONE (RisperDAL) tablet 2 mg  2 mg Oral QHS    temazepam (RESTORIL) capsule 30 mg  30 mg Oral QHS    traMADol (ULTRAM) tablet 50 mg  50 mg Oral Q8H PRN    traZODone (DESYREL) tablet 150 mg  150 mg Oral QHS    oxyCODONE-acetaminophen (PERCOCET) 5-325 mg per tablet 1 Tab  1 Tab Oral Q6H PRN    sodium chloride (NS) flush 5-10 mL  5-10 mL IntraVENous Q8H    sodium chloride (NS) flush 5-10 mL  5-10 mL IntraVENous PRN     ______________________________________________________________________  EXPECTED LENGTH OF STAY: 4d 12h  ACTUAL LENGTH OF STAY:          27                 Evonne Granados MD

## 2018-02-07 NOTE — PROGRESS NOTES
NUTRITION COMPLETE ASSESSMENT    RECOMMENDATIONS:   1. Continue current diet, Supplements (specify)  2. Please document po intake in flow sheets  3. Daily weights     Interventions/Plan:   Food/Nutrient Delivery:           RD to follow po intake, wt status, increase supplements    Assessment:   Reason for Assessment:   [x]Reassessment     Diet: Regular  Supplements: Ensure BID  Nutritionally Significant Medications: [x] Reviewed & Includes: none  Meal Intake: Patient Vitals for the past 100 hrs:   % Diet Eaten   02/06/18 1600 100 %   02/05/18 1600 80 %   02/03/18 1356 80 %   02/03/18 1006 100 %       Subjective:  Pt sleeping; unable to arouse    Objective:  Pt seen for follow up. Pt admitted with pneumonia, AMS, acute L1 fx, rhabdomyolysis, respiratory failure. Reviewed chart, spoke with RN. Per EHR pt consuming % meals, although noted ~50% bfast eaten today, 100% Ensure. Pt wt has remained stable over past week but still down since admission. Will add another Ensure per day; Ensure TID provides 1050 kcal, 60g protein meeting 58% caloric, 87% protein needs. Pt currently awaiting  [facility] to [facility] RD to follow po intake, wt status. Estimated Nutrition Needs:   Kcals/day: 8245 Kcals/day (6145-4094 kcal/day (MSJ x 1.2-1.3 + 250))  Protein: 69 g (69-75g/day (1.2-1.3g/kg))  Fluid: 1900 ml (1mL/kcal)     Based On: [place] Jeor  Weight Used: Actual wt    Pt expected to meet estimated nutrient needs:  [x]   Yes     []  No  [] Unable to predict at this time    Nutrition Diagnosis:   1.  Unintended weight loss related to unsure etiology as evidenced by pt with 9 lb wt loss since admission    Goals:      Pt will consume 75% meals, supplements over next 5-7 days; wt maintenance     Monitoring & Evaluation:    - Total energy intake, Liquid meal replacement   - Weight/weight change   -      Previous Nutrition Goals Met:  N/A  Previous Recommendations:      N/A    Education & Discharge Needs:   [x] None Identified   [] Identified and addressed    [] Participated in care plan, discharge planning, and/or interdisciplinary rounds        Cultural, Mosque and ethnic food preferences identified:   None    Skin Integrity: [x]Intact  []Other  Edema: [x]None []Other  Last BM: 2/6/2018  Food Allergies: []None [x]Other- shellfish    Anthropometrics:    Weight Loss Metrics 2/6/2018 1/11/2018   Today's Wt 126 lb 12.8 oz -   BMI - 18.73 kg/m2      Last 3 Recorded Weights in this Encounter    01/28/18 0719 02/03/18 0222 02/06/18 0358   Weight: 57.7 kg (127 lb 1.6 oz) 60.3 kg (133 lb) 57.5 kg (126 lb 12.8 oz)      Weight Source: Bed  Height: 5' 9\" (175.3 cm),    Body mass index is 18.73 kg/(m^2). ,     ,      Labs:  Lab Results   Component Value Date/Time    Sodium 138 02/06/2018 04:10 AM    Potassium 4.3 02/06/2018 04:10 AM    Chloride 101 02/06/2018 04:10 AM    CO2 29 02/06/2018 04:10 AM    Glucose 101 (H) 02/06/2018 04:10 AM    BUN 21 (H) 02/06/2018 04:10 AM    Creatinine 0.87 02/06/2018 04:10 AM    Calcium 9.3 02/06/2018 04:10 AM    Magnesium 1.8 02/04/2018 09:30 AM    Phosphorus 4.7 02/04/2018 06:55 PM    Albumin 3.0 (L) 02/04/2018 09:30 AM     No results found for: HBA1C, HGBE8, LQT6NZFZ, COJ1TSTE  Lab Results   Component Value Date/Time    Glucose 101 (H) 02/06/2018 04:10 AM    Glucose (POC) 116 (H) 02/05/2018 06:20 AM      Lab Results   Component Value Date/Time    ALT (SGPT) 35 02/04/2018 09:30 AM    AST (SGOT) 19 02/04/2018 09:30 AM    Alk.  phosphatase 73 02/04/2018 09:30 AM    Bilirubin, total 0.7 02/04/2018 09:30 AM        Urbano Gatica RD

## 2018-02-07 NOTE — PROGRESS NOTES
Bedside shift change report given to [id number] (oncoming nurse) by Jung Hayes RN (offgoing nurse). Report included the following information SBAR, Kardex, ED Summary, Intake/Output, MAR, Accordion and Recent Results.

## 2018-02-08 PROCEDURE — 74011250637 HC RX REV CODE- 250/637: Performed by: INTERNAL MEDICINE

## 2018-02-08 PROCEDURE — 65270000032 HC RM SEMIPRIVATE

## 2018-02-08 PROCEDURE — 74011250636 HC RX REV CODE- 250/636: Performed by: INTERNAL MEDICINE

## 2018-02-08 RX ADMIN — HEPARIN SODIUM 5000 UNITS: 5000 INJECTION, SOLUTION INTRAVENOUS; SUBCUTANEOUS at 03:52

## 2018-02-08 RX ADMIN — HEPARIN SODIUM 5000 UNITS: 5000 INJECTION, SOLUTION INTRAVENOUS; SUBCUTANEOUS at 19:32

## 2018-02-08 RX ADMIN — TEMAZEPAM 30 MG: 15 CAPSULE ORAL at 21:04

## 2018-02-08 RX ADMIN — Medication 10 ML: at 21:04

## 2018-02-08 RX ADMIN — HEPARIN SODIUM 5000 UNITS: 5000 INJECTION, SOLUTION INTRAVENOUS; SUBCUTANEOUS at 13:49

## 2018-02-08 RX ADMIN — OXYCODONE AND ACETAMINOPHEN 1 TABLET: 5; 325 TABLET ORAL at 03:02

## 2018-02-08 RX ADMIN — Medication 10 ML: at 13:50

## 2018-02-08 RX ADMIN — RISPERIDONE 2 MG: 1 TABLET ORAL at 21:01

## 2018-02-08 RX ADMIN — ACETAMINOPHEN 1000 MG: 500 TABLET, FILM COATED ORAL at 07:02

## 2018-02-08 RX ADMIN — TRAZODONE HYDROCHLORIDE 150 MG: 100 TABLET ORAL at 21:05

## 2018-02-08 RX ADMIN — Medication 10 ML: at 07:02

## 2018-02-08 RX ADMIN — OXYCODONE AND ACETAMINOPHEN 1 TABLET: 5; 325 TABLET ORAL at 08:59

## 2018-02-08 RX ADMIN — ACETAMINOPHEN 1000 MG: 500 TABLET, FILM COATED ORAL at 21:00

## 2018-02-08 RX ADMIN — ACETAMINOPHEN 1000 MG: 500 TABLET, FILM COATED ORAL at 13:49

## 2018-02-08 NOTE — PROGRESS NOTES
Hospitalist Progress Note  Pantera Hong MD  Answering service: [phone] OR [phone] from in house phone      Date of Service:  2018  NAME:  Jenny Oliveira  :  1940  MRN:  [id number]      Admission Summary:   79 yo man with h/o sleep disorder was BIBEMS to the ED from [facility] on 18 with confusion. He was admitted with acute encephalopathy, acute L1 fracture, rhabdomyolysis, and acute hypoxic and hypercapnic respiratory failure.      Interval history / Subjective:   Pt awaiting decision from POA regarding d/c   Encourage wearing TLSO brace  No new issues , d/w CM has a place to go   Will d/c soon     Assessment & Plan:     Persistent back pain  - due to lumbar stenosis and L1 compression fracture  - changed to scheduled TID APAP; continue prn Percocet with hold parameters  - lidocaine patches x2  - strongly advised to wear TLSO brace but nonadherent per staff; able to ambulate to bathroom with assistance    Acute hypoxic and hypercapnic respiratory failure (POA)  - resolved; weaned off O2 and satting well on RA    LLL CAP (POA) - as per admission CT  - BCx  negative  - UCx  negative  - s/p azithromycin, Zosyn, LVQ  - resolved    Acute COPD exacerbation - resolved  prn nebs    Rhabdomyolysis (POA) - resolved with IVF; CK WNL    Acute L1 compression fracture (POA) - Ortho consulted and signed off  - conservative management with TLSO brace, PT/OT, OOB, pain control  - nonadherent with wearing brace  - f/u with Dr Crow Jarvis outpatient  - vitamin D WNL    L4-L5 spinal stenosis - pain control; plan as above    Acute metabolic encephalopathy (POA) - resolved to baseline    Chronic nicotine dependence - cessation counseling    Hypokalemia - replete prn  Hyperphosphatemia - unknown etiology; monitor    Code status: Full  DVT prophylaxis: heparin     Care Plan discussed with: Patient/Family, Nurse and Case Manager  Disposition: Likely to group home once niece/financial POA makes a decision on place. Pt defers to his niece for placement decisions. Hospital Problems  Date Reviewed: 1/11/2018          Codes Class Noted POA    COPD (chronic obstructive pulmonary disease) ([facility] ICD-10-CM: J44.9  ICD-9-CM: 883  1/11/2018 Unknown        Back pain ICD-10-CM: M54.9  ICD-9-CM: 724.5  1/11/2018 Unknown        Altered mental status ICD-10-CM: R41.82  ICD-9-CM: 780.97  1/11/2018 Unknown        * (Principal)Pneumonia ICD-10-CM: J18.9  ICD-9-CM: 486  1/11/2018 Unknown        Lumbar spinal stenosis ICD-10-CM: M48.061  ICD-9-CM: 724.02  1/11/2018 Unknown        Closed L1 vertebral fracture ([facility] ICD-10-CM: S32.019A  ICD-9-CM: 805.4  1/11/2018 Unknown        Acute respiratory failure with hypoxemia [facility]) ICD-10-CM: J96.01  ICD-9-CM: 518.81  1/11/2018 Unknown            Review of Systems:   Pertinent items are noted in HPI. Vital Signs:    Last 24hrs VS reviewed since prior progress note.  Most recent are:  Visit Vitals    /70 (BP 1 Location: Left arm, BP Patient Position: At rest)    Pulse 96    Temp 98.5 °F (36.9 °C)    Resp 18    Ht 5' 9\" (1.753 m)    Wt 57.5 kg (126 lb 12.8 oz)    SpO2 94%    BMI 18.73 kg/m2       Intake/Output Summary (Last 24 hours) at 02/08/18 1025  Last data filed at 02/08/18 1002   Gross per 24 hour   Intake              910 ml   Output              250 ml   Net              660 ml      Physical Examination:     Gen: awake, NAD, flat affect  HENT: NCAT, oral mucosa benign  Eyes: PERRL, anicteric sclera  CVS: regular rhythm, no m/g/r appreciated, no peripheral edema, +pulses  Resp: CTAB, no w/r/r  Abd: + BS, soft, NT/ND  Neuro: AAOx3, responds appropriately to questions and commands    Data Review:    Review and/or order of clinical lab test  Review and/or order of tests in the radiology section of CPT  Review and/or order of tests in the medicine section of CPT    Labs:     Recent Labs 02/06/18   0410   WBC  4.9   HGB  12.2   HCT  36.0*   PLT  201     Recent Labs      02/06/18   0410   NA  138   K  4.3   CL  101   CO2  29   BUN  21*   CREA  0.87   GLU  101*   CA  9.3     No results for input(s): SGOT, GPT, ALT, AP, TBIL, TBILI, TP, ALB, GLOB, GGT, AML, LPSE in the last 72 hours. No lab exists for component: AMYP, HLPSE  No results for input(s): INR, PTP, APTT in the last 72 hours. No lab exists for component: INREXT, INREXT   No results for input(s): FE, TIBC, PSAT, FERR in the last 72 hours. No results found for: FOL, RBCF   No results for input(s): PH, PCO2, PO2 in the last 72 hours. No results for input(s): CPK, CKNDX, TROIQ in the last 72 hours.     No lab exists for component: CPKMB  No results found for: CHOL, CHOLX, CHLST, CHOLV, HDL, LDL, LDLC, DLDLP, TGLX, TRIGL, TRIGP, CHHD, CHHDX  Lab Results   Component Value Date/Time    Glucose (POC) 116 (H) 02/05/2018 06:20 AM    Glucose (POC) 102 (H) 01/31/2018 12:03 PM    Glucose (POC) 91 01/20/2018 06:52 AM    Glucose (POC) 136 (H) 01/17/2018 11:46 AM    Glucose (POC) 97 01/12/2018 10:18 PM     Lab Results   Component Value Date/Time    Color DARK YELLOW 01/11/2018 02:27 AM    Appearance CLOUDY (A) 01/11/2018 02:27 AM    Specific gravity 1.027 01/11/2018 02:27 AM    pH (UA) 5.0 01/11/2018 02:27 AM    Protein 30 (A) 01/11/2018 02:27 AM    Glucose NEGATIVE  01/11/2018 02:27 AM    Ketone NEGATIVE  01/11/2018 02:27 AM    Bilirubin NEGATIVE  01/11/2018 02:27 AM    Urobilinogen 0.2 01/11/2018 02:27 AM    Nitrites NEGATIVE  01/11/2018 02:27 AM    Leukocyte Esterase NEGATIVE  01/11/2018 02:27 AM    Epithelial cells FEW 01/11/2018 02:27 AM    Bacteria NEGATIVE  01/11/2018 02:27 AM    WBC 0-4 01/11/2018 02:27 AM    RBC 0-5 01/11/2018 02:27 AM     Medications Reviewed:     Current Facility-Administered Medications   Medication Dose Route Frequency    acetaminophen (TYLENOL) tablet 1,000 mg  1,000 mg Oral Q8H    lidocaine (LIDODERM) 5 % patch 2 Patch  2 Patch TransDERmal Q24H    nicotine (NICODERM CQ) 14 mg/24 hr patch 1 Patch  1 Patch TransDERmal DAILY    heparin (porcine) injection 5,000 Units  5,000 Units SubCUTAneous Q8H    polyethylene glycol (MIRALAX) packet 17 g  17 g Oral DAILY PRN    risperiDONE (RisperDAL) tablet 2 mg  2 mg Oral QHS    temazepam (RESTORIL) capsule 30 mg  30 mg Oral QHS    traMADol (ULTRAM) tablet 50 mg  50 mg Oral Q8H PRN    traZODone (DESYREL) tablet 150 mg  150 mg Oral QHS    oxyCODONE-acetaminophen (PERCOCET) 5-325 mg per tablet 1 Tab  1 Tab Oral Q6H PRN    sodium chloride (NS) flush 5-10 mL  5-10 mL IntraVENous Q8H    sodium chloride (NS) flush 5-10 mL  5-10 mL IntraVENous PRN     ______________________________________________________________________  EXPECTED LENGTH OF STAY: 4d 12h  ACTUAL LENGTH OF STAY:          28                 Lucero Fenton MD

## 2018-02-09 ENCOUNTER — APPOINTMENT (OUTPATIENT)
Dept: GENERAL RADIOLOGY | Age: 78
DRG: 193 | End: 2018-02-09
Attending: HOSPITALIST
Payer: MEDICARE

## 2018-02-09 LAB
ANION GAP SERPL CALC-SCNC: 7 MMOL/L (ref 5–15)
APPEARANCE UR: CLEAR
ARTERIAL PATENCY WRIST A: NO
BACTERIA URNS QL MICRO: NEGATIVE /HPF
BASE EXCESS BLD CALC-SCNC: 6 MMOL/L
BDY SITE: ABNORMAL
BILIRUB UR QL: NEGATIVE
BUN SERPL-MCNC: 21 MG/DL (ref 6–20)
BUN/CREAT SERPL: 30 (ref 12–20)
CALCIUM SERPL-MCNC: 8.7 MG/DL (ref 8.5–10.1)
CHLORIDE SERPL-SCNC: 104 MMOL/L (ref 97–108)
CO2 SERPL-SCNC: 29 MMOL/L (ref 21–32)
COLOR UR: NORMAL
CREAT SERPL-MCNC: 0.71 MG/DL (ref 0.7–1.3)
EPITH CASTS URNS QL MICRO: NORMAL /LPF
ERYTHROCYTE [DISTWIDTH] IN BLOOD BY AUTOMATED COUNT: 12.3 % (ref 11.5–14.5)
GAS FLOW.O2 O2 DELIVERY SYS: ABNORMAL L/MIN
GLUCOSE SERPL-MCNC: 122 MG/DL (ref 65–100)
GLUCOSE UR STRIP.AUTO-MCNC: NEGATIVE MG/DL
HCO3 BLD-SCNC: 30.4 MMOL/L (ref 22–26)
HCT VFR BLD AUTO: 35.4 % (ref 36.6–50.3)
HGB BLD-MCNC: 12 G/DL (ref 12.1–17)
HGB UR QL STRIP: NEGATIVE
HYALINE CASTS URNS QL MICRO: NORMAL /LPF (ref 0–5)
KETONES UR QL STRIP.AUTO: NEGATIVE MG/DL
LEUKOCYTE ESTERASE UR QL STRIP.AUTO: NEGATIVE
MCH RBC QN AUTO: 34.8 PG (ref 26–34)
MCHC RBC AUTO-ENTMCNC: 33.9 G/DL (ref 30–36.5)
MCV RBC AUTO: 102.6 FL (ref 80–99)
NITRITE UR QL STRIP.AUTO: NEGATIVE
NRBC # BLD: 0 K/UL (ref 0–0.01)
NRBC BLD-RTO: 0 PER 100 WBC
PCO2 BLD: 48.5 MMHG (ref 35–45)
PH BLD: 7.41 [PH] (ref 7.35–7.45)
PH UR STRIP: 6 [PH] (ref 5–8)
PLATELET # BLD AUTO: 224 K/UL (ref 150–400)
PMV BLD AUTO: 9.1 FL (ref 8.9–12.9)
PO2 BLD: 72 MMHG (ref 80–100)
POTASSIUM SERPL-SCNC: 3.9 MMOL/L (ref 3.5–5.1)
PROT UR STRIP-MCNC: NEGATIVE MG/DL
RBC # BLD AUTO: 3.45 M/UL (ref 4.1–5.7)
RBC #/AREA URNS HPF: NORMAL /HPF (ref 0–5)
SAO2 % BLD: 94 % (ref 92–97)
SODIUM SERPL-SCNC: 140 MMOL/L (ref 136–145)
SP GR UR REFRACTOMETRY: 1.03 (ref 1–1.03)
SPECIMEN TYPE: ABNORMAL
UA: UC IF INDICATED,UAUC: NORMAL
UROBILINOGEN UR QL STRIP.AUTO: 1 EU/DL (ref 0.2–1)
WBC # BLD AUTO: 5.2 K/UL (ref 4.1–11.1)
WBC URNS QL MICRO: NORMAL /HPF (ref 0–4)

## 2018-02-09 PROCEDURE — 74011250636 HC RX REV CODE- 250/636: Performed by: HOSPITALIST

## 2018-02-09 PROCEDURE — 85027 COMPLETE CBC AUTOMATED: CPT | Performed by: HOSPITALIST

## 2018-02-09 PROCEDURE — 77010033678 HC OXYGEN DAILY

## 2018-02-09 PROCEDURE — 74011250637 HC RX REV CODE- 250/637: Performed by: INTERNAL MEDICINE

## 2018-02-09 PROCEDURE — 82803 BLOOD GASES ANY COMBINATION: CPT

## 2018-02-09 PROCEDURE — 36600 WITHDRAWAL OF ARTERIAL BLOOD: CPT

## 2018-02-09 PROCEDURE — 81001 URINALYSIS AUTO W/SCOPE: CPT | Performed by: HOSPITALIST

## 2018-02-09 PROCEDURE — 65270000032 HC RM SEMIPRIVATE

## 2018-02-09 PROCEDURE — 74011000250 HC RX REV CODE- 250: Performed by: HOSPITALIST

## 2018-02-09 PROCEDURE — 97116 GAIT TRAINING THERAPY: CPT

## 2018-02-09 PROCEDURE — 80048 BASIC METABOLIC PNL TOTAL CA: CPT | Performed by: HOSPITALIST

## 2018-02-09 PROCEDURE — 71045 X-RAY EXAM CHEST 1 VIEW: CPT

## 2018-02-09 PROCEDURE — 97535 SELF CARE MNGMENT TRAINING: CPT

## 2018-02-09 PROCEDURE — 94640 AIRWAY INHALATION TREATMENT: CPT

## 2018-02-09 PROCEDURE — 74011250636 HC RX REV CODE- 250/636: Performed by: INTERNAL MEDICINE

## 2018-02-09 PROCEDURE — 94664 DEMO&/EVAL PT USE INHALER: CPT

## 2018-02-09 PROCEDURE — 36415 COLL VENOUS BLD VENIPUNCTURE: CPT | Performed by: HOSPITALIST

## 2018-02-09 RX ORDER — LIDOCAINE 50 MG/G
1 PATCH TOPICAL EVERY 24 HOURS
Status: DISCONTINUED | OUTPATIENT
Start: 2018-02-09 | End: 2018-02-09

## 2018-02-09 RX ORDER — SODIUM CHLORIDE 9 MG/ML
100 INJECTION, SOLUTION INTRAVENOUS CONTINUOUS
Status: DISCONTINUED | OUTPATIENT
Start: 2018-02-09 | End: 2018-02-10

## 2018-02-09 RX ORDER — IPRATROPIUM BROMIDE AND ALBUTEROL SULFATE 2.5; .5 MG/3ML; MG/3ML
3 SOLUTION RESPIRATORY (INHALATION)
Status: DISCONTINUED | OUTPATIENT
Start: 2018-02-09 | End: 2018-02-10

## 2018-02-09 RX ADMIN — TRAMADOL HYDROCHLORIDE 50 MG: 50 TABLET, FILM COATED ORAL at 09:57

## 2018-02-09 RX ADMIN — SODIUM CHLORIDE 100 ML/HR: 900 INJECTION, SOLUTION INTRAVENOUS at 14:03

## 2018-02-09 RX ADMIN — SODIUM CHLORIDE 250 ML: 900 INJECTION, SOLUTION INTRAVENOUS at 14:03

## 2018-02-09 RX ADMIN — TEMAZEPAM 30 MG: 15 CAPSULE ORAL at 21:31

## 2018-02-09 RX ADMIN — ACETAMINOPHEN 1000 MG: 500 TABLET, FILM COATED ORAL at 13:52

## 2018-02-09 RX ADMIN — ACETAMINOPHEN 1000 MG: 500 TABLET, FILM COATED ORAL at 06:59

## 2018-02-09 RX ADMIN — Medication 10 ML: at 07:01

## 2018-02-09 RX ADMIN — OXYCODONE AND ACETAMINOPHEN 1 TABLET: 5; 325 TABLET ORAL at 08:19

## 2018-02-09 RX ADMIN — HEPARIN SODIUM 5000 UNITS: 5000 INJECTION, SOLUTION INTRAVENOUS; SUBCUTANEOUS at 04:56

## 2018-02-09 RX ADMIN — SODIUM CHLORIDE 100 ML/HR: 900 INJECTION, SOLUTION INTRAVENOUS at 19:01

## 2018-02-09 RX ADMIN — Medication 10 ML: at 21:29

## 2018-02-09 RX ADMIN — Medication 10 ML: at 13:56

## 2018-02-09 RX ADMIN — HEPARIN SODIUM 5000 UNITS: 5000 INJECTION, SOLUTION INTRAVENOUS; SUBCUTANEOUS at 13:53

## 2018-02-09 RX ADMIN — RISPERIDONE 2 MG: 1 TABLET ORAL at 21:32

## 2018-02-09 RX ADMIN — ACETAMINOPHEN 1000 MG: 500 TABLET, FILM COATED ORAL at 21:30

## 2018-02-09 RX ADMIN — IPRATROPIUM BROMIDE AND ALBUTEROL SULFATE 3 ML: .5; 3 SOLUTION RESPIRATORY (INHALATION) at 20:03

## 2018-02-09 RX ADMIN — OXYCODONE AND ACETAMINOPHEN 1 TABLET: 5; 325 TABLET ORAL at 16:11

## 2018-02-09 RX ADMIN — TRAZODONE HYDROCHLORIDE 150 MG: 100 TABLET ORAL at 21:31

## 2018-02-09 RX ADMIN — HEPARIN SODIUM 5000 UNITS: 5000 INJECTION, SOLUTION INTRAVENOUS; SUBCUTANEOUS at 19:08

## 2018-02-09 NOTE — PROGRESS NOTES
Pt mews 4; r/t resp and hr; discussed with Dr Lyle Laws and vd order for abg, cbc, bmp, urine, 250ml bolus and ivf ns 100/hr; pt denies pain at this time; pt resting quietly no signs of distress or c/o sob; will cont to monitor.

## 2018-02-09 NOTE — PROGRESS NOTES
Hospitalist Progress Note  Prudence Russell MD  Answering service: [phone] OR [phone] from in house phone      Date of Service:  2018  NAME:  Elicia Soto  :  1940  MRN:  [id number]      Admission Summary:   77 yo man with h/o sleep disorder was BIBEMS to the ED from [facility] on 18 with confusion. He was admitted with acute encephalopathy, acute L1 fracture, rhabdomyolysis, and acute hypoxic and hypercapnic respiratory failure.      Interval history / Subjective:   Pt awaiting decision from POA regarding d/c   Encourage wearing TLSO brace  Little tachy, RR high per RN   Pt does not voice complains      Assessment & Plan:     Persistent back pain  - due to lumbar stenosis and L1 compression fracture  - changed to scheduled TID APAP; continue prn Percocet with hold parameters  - lidocaine patches x2  - strongly advised to wear TLSO brace but nonadherent per staff; able to ambulate to bathroom with assistance    Acute hypoxic and hypercapnic respiratory failure (POA)  - resolved; weaned off O2 and satting well on RA  - breathing fast today CXR - no acute issues  - will give IV fluids and breathing treatment  - also seems dry will give IVF    LLL CAP (POA) - as per admission CT  - BCx  negative  - UCx  negative  - s/p azithromycin, Zosyn, LVQ  - resolved    Acute COPD exacerbation - resolved  prn nebs    Rhabdomyolysis (POA) - resolved with IVF; CK WNL    Acute L1 compression fracture (POA) - Ortho consulted and signed off  - conservative management with TLSO brace, PT/OT, OOB, pain control  - nonadherent with wearing brace  - f/u with Dr Nik Cassidy outpatient  - vitamin D WNL    L4-L5 spinal stenosis - pain control; plan as above    Acute metabolic encephalopathy (POA) - resolved to baseline    Chronic nicotine dependence - cessation counseling    Hypokalemia - replete prn  Hyperphosphatemia - unknown etiology; monitor    Code status: Full  DVT prophylaxis: heparin     Care Plan discussed with: Patient/Family, Nurse and   Disposition: Likely to group home once niece/financial POA makes a decision on place. Pt defers to his niece for placement decisions. Hospital Problems  Date Reviewed: 1/11/2018          Codes Class Noted POA    COPD (chronic obstructive pulmonary disease) ([facility] ICD-10-CM: J44.9  ICD-9-CM: 977  1/11/2018 Unknown        Back pain ICD-10-CM: M54.9  ICD-9-CM: 724.5  1/11/2018 Unknown        Altered mental status ICD-10-CM: R41.82  ICD-9-CM: 780.97  1/11/2018 Unknown        * (Principal)Pneumonia ICD-10-CM: J18.9  ICD-9-CM: 486  1/11/2018 Unknown        Lumbar spinal stenosis ICD-10-CM: M48.061  ICD-9-CM: 724.02  1/11/2018 Unknown        Closed L1 vertebral fracture ([facility] ICD-10-CM: S32.019A  ICD-9-CM: 805.4  1/11/2018 Unknown        Acute respiratory failure with hypoxemia [facility]) ICD-10-CM: J96.01  ICD-9-CM: 518.81  1/11/2018 Unknown            Review of Systems:   Pertinent items are noted in HPI. Vital Signs:    Last 24hrs VS reviewed since prior progress note.  Most recent are:  Visit Vitals    /70 (BP 1 Location: Right arm)    Pulse (!) 107    Temp 98.8 °F (37.1 °C)    Resp 30    Ht 5' 9\" (1.753 m)    Wt 57.5 kg (126 lb 12.8 oz)    SpO2 95%    BMI 18.73 kg/m2       Intake/Output Summary (Last 24 hours) at 02/09/18 1224  Last data filed at 02/09/18 1036   Gross per 24 hour   Intake              550 ml   Output               50 ml   Net              500 ml      Physical Examination:     Gen: awake, NAD, flat affect  HENT: NCAT, oral mucosa benign  Eyes: PERRL, anicteric sclera  CVS: regular rhythm, no m/g/r appreciated, no peripheral edema, +pulses  Resp: CTAB, no w/r/r  Abd: + BS, soft, NT/ND  Neuro: AAOx3, responds appropriately to questions and commands    Data Review:    Review and/or order of clinical lab test  Review and/or order of tests in the radiology section of CPT  Review and/or order of tests in the medicine section of CPT    Labs:     No results for input(s): WBC, HGB, HCT, PLT, HGBEXT, HCTEXT, PLTEXT, HGBEXT, HCTEXT, PLTEXT in the last 72 hours. No results for input(s): NA, K, CL, CO2, BUN, CREA, GLU, CA, MG, PHOS, URICA in the last 72 hours. No results for input(s): SGOT, GPT, ALT, AP, TBIL, TBILI, TP, ALB, GLOB, GGT, AML, LPSE in the last 72 hours. No lab exists for component: AMYP, HLPSE  No results for input(s): INR, PTP, APTT in the last 72 hours. No lab exists for component: INREXT, INREXT   No results for input(s): FE, TIBC, PSAT, FERR in the last 72 hours. No results found for: FOL, RBCF   No results for input(s): PH, PCO2, PO2 in the last 72 hours. No results for input(s): CPK, CKNDX, TROIQ in the last 72 hours.     No lab exists for component: CPKMB  No results found for: CHOL, CHOLX, CHLST, CHOLV, HDL, LDL, LDLC, DLDLP, TGLX, TRIGL, TRIGP, CHHD, CHHDX  Lab Results   Component Value Date/Time    Glucose (POC) 116 (H) 02/05/2018 06:20 AM    Glucose (POC) 102 (H) 01/31/2018 12:03 PM    Glucose (POC) 91 01/20/2018 06:52 AM    Glucose (POC) 136 (H) 01/17/2018 11:46 AM    Glucose (POC) 97 01/12/2018 10:18 PM     Lab Results   Component Value Date/Time    Color DARK YELLOW 01/11/2018 02:27 AM    Appearance CLOUDY (A) 01/11/2018 02:27 AM    Specific gravity 1.027 01/11/2018 02:27 AM    pH (UA) 5.0 01/11/2018 02:27 AM    Protein 30 (A) 01/11/2018 02:27 AM    Glucose NEGATIVE  01/11/2018 02:27 AM    Ketone NEGATIVE  01/11/2018 02:27 AM    Bilirubin NEGATIVE  01/11/2018 02:27 AM    Urobilinogen 0.2 01/11/2018 02:27 AM    Nitrites NEGATIVE  01/11/2018 02:27 AM    Leukocyte Esterase NEGATIVE  01/11/2018 02:27 AM    Epithelial cells FEW 01/11/2018 02:27 AM    Bacteria NEGATIVE  01/11/2018 02:27 AM    WBC 0-4 01/11/2018 02:27 AM    RBC 0-5 01/11/2018 02:27 AM     Medications Reviewed:     Current Facility-Administered Medications   Medication Dose Route Frequency  sodium chloride 0.9 % bolus infusion 250 mL  250 mL IntraVENous ONCE    0.9% sodium chloride infusion  100 mL/hr IntraVENous CONTINUOUS    albuterol-ipratropium (DUO-NEB) 2.5 MG-0.5 MG/3 ML  3 mL Nebulization Q6H PRN    acetaminophen (TYLENOL) tablet 1,000 mg  1,000 mg Oral Q8H    lidocaine (LIDODERM) 5 % patch 2 Patch  2 Patch TransDERmal Q24H    nicotine (NICODERM CQ) 14 mg/24 hr patch 1 Patch  1 Patch TransDERmal DAILY    heparin (porcine) injection 5,000 Units  5,000 Units SubCUTAneous Q8H    polyethylene glycol (MIRALAX) packet 17 g  17 g Oral DAILY PRN    risperiDONE (RisperDAL) tablet 2 mg  2 mg Oral QHS    temazepam (RESTORIL) capsule 30 mg  30 mg Oral QHS    traMADol (ULTRAM) tablet 50 mg  50 mg Oral Q8H PRN    traZODone (DESYREL) tablet 150 mg  150 mg Oral QHS    oxyCODONE-acetaminophen (PERCOCET) 5-325 mg per tablet 1 Tab  1 Tab Oral Q6H PRN    sodium chloride (NS) flush 5-10 mL  5-10 mL IntraVENous Q8H    sodium chloride (NS) flush 5-10 mL  5-10 mL IntraVENous PRN     ______________________________________________________________________  EXPECTED LENGTH OF STAY: 4d 12h  ACTUAL LENGTH OF STAY:          29                 Kj Dexter MD

## 2018-02-09 NOTE — PROGRESS NOTES
Problem: Mobility Impaired (Adult and Pediatric)  Goal: *Acute Goals and Plan of Care (Insert Text)  Physical Therapy Goals  Updated 1/29/2018  Initiated 1/11/2018  Goals reassessed 1/18/18 and remain appropriate over next 7 days  1. Patient will move from supine to sit and sit to supine  in bed with supervision/set-up within 7 day(s). MET  2. Patient will transfer from bed to chair and chair to bed with supervision/set-up using the least restrictive device within 7 day(s). 3.  Patient will perform sit to stand with modified independence within 7 day(s). 4.  Patient will ambulate with supervision/set-up for 250 feet with the least restrictive device within 7 day(s). 5.  Patient will ascend/descend 12 stairs with 1 handrail(s) with supervision/set-up within 7 day(s). 6.  Patient will independently don and doff quick draw TLSO within 7 days. Physical Therapy Goals    Initiated 1/11/2018  Updated: 2/5/2018      1. Patient will move from supine to sit and sit to supine  in bed with supervision/set-up within 7 day(s). MET  2. Patient will transfer from bed to chair and chair to bed with supervision/set-up using the least restrictive device within 7 day(s). 3.  Patient will perform sit to stand with modified independence within 7 day(s). MET  4. Patient will ambulate with supervision/set-up for 250 feet with the least restrictive device within 7 day(s). 5.  Patient will ascend/descend 12 stairs with 1 handrail(s) with supervision/set-up within 7 day(s). 6.  Patient will independently don and doff quick draw TLSO within 7 days.      physical Therapy TREATMENT  Patient: Zachery Figueredo (89 y.o. male)  Date: 2/9/2018  Diagnosis: Pneumonia  Altered mental status  Closed L1 vertebral fracture (HCC)  Back pain  Acute respiratory failure with hypoxemia (HCC)  COPD (chronic obstructive pulmonary disease) (HCC)  Lumbar spinal stenosis Pneumonia       Precautions: Back, Fall, Bed Alarm (Beaver B: reads lips; smokes)  Chart, physical therapy assessment, plan of care and goals were reviewed. ASSESSMENT:  Pt was able to increase gait tolerance. Pt requiring CGA to supervision with no LOB. Pt had a more erect posture. Pt reports pain but did not increase with gait. Pt was able to assist with donning TLSO. Pt may benefit from HHPT at group home. Progression toward goals:  [x]      Improving appropriately and progressing toward goals  []      Improving slowly and progressing toward goals  []      Not making progress toward goals and plan of care will be adjusted     PLAN:  Patient continues to benefit from skilled intervention to address the above impairments. Continue treatment per established plan of care. Discharge Recommendations: HHPT at group home  Further Equipment Recommendations for Discharge:  none     SUBJECTIVE:   Patient stated no change.  when asked increase pain with gait. .     OBJECTIVE DATA SUMMARY:   Critical Behavior:  Neurologic State: Alert  Orientation Level: Oriented X4  Cognition: Follows commands  Safety/Judgement: Awareness of environment  Functional Mobility Training:  Bed Mobility:     Supine to Sit: Supervision  Sit to Supine: Supervision            Transfers:  Sit to Stand: Supervision  Stand to Sit: Supervision                             Balance:  Sitting: Intact  Standing: Impaired  Standing - Static: Good  Standing - Dynamic : Fair  Ambulation/Gait Training:  Distance (ft): 200 Feet (ft)  Assistive Device: Brace/Splint  Ambulation - Level of Assistance: Stand-by asssistance;Contact guard assistance        Gait Abnormalities: Decreased step clearance        Base of Support: Narrowed        Step Length: Left shortened;Right shortened                   Stairs:             Neuro Re-Education:    Therapeutic Exercises:     Pain:  Pain Scale 1: Numeric (0 - 10)  Pain Intensity 1: 8  Pain Location 1: Back  Pain Orientation 1: Posterior  Pain Description 1: Aching  Pain Intervention(s) 1: Heat applied  Activity Tolerance:   Limited   Please refer to the flowsheet for vital signs taken during this treatment.   After treatment:   [] Patient left in no apparent distress sitting up in chair  [x] Patient left in no apparent distress in bed  [x] Call bell left within reach  [x] Nursing notified  [] Caregiver present  [x] Bed alarm activated    COMMUNICATION/COLLABORATION:   The patients plan of care was discussed with: Registered Nurse    Stephan Edmond PTA   Time Calculation: 13 mins

## 2018-02-09 NOTE — PROGRESS NOTES
Received a voicemail call from Gaye Paredes, Nurse  at [facility], P#[phone]. She explained that the medical director at [facility] would like to issue a notice of non-coverage. Left a voicemail response that they are welcome to issue this notice of non-coverage and offered to assist with this process as needed. Care Management will continue to follow his disposition.    KANDICE Jimenez

## 2018-02-09 NOTE — PROGRESS NOTES
Mews continues to be a 4 r/t HR and RR, Spoke to Dr. Danni Gonzalez. Pt has only voided 50ml of dark concentrated urine since 0700. Received order to start a bolus of 250ml NS. Then will begin NS 100ml/hr. Will continue to monitor pt.

## 2018-02-09 NOTE — PROGRESS NOTES
CM spoke with pt's niece via phone and she is expecting a bed to be available on Wednesday of next week. Pt's niece will confirm with facility on bed availability and let CM know. Pt will need new PCP appointment at discharge. Pt was followed By Christiane Rodriguez NP phone # [phone] prior to this hospital stay. CM placed call to office and left voicemail for return call for fax # to fax release of information form.   Jorge Neri RN CRM

## 2018-02-09 NOTE — PROGRESS NOTES
Mews at 4 r/t respirations and HR. Medicated for pain of 8/10 and reassessed one hour later. Pt continues to complain of pain 8/10. Spoke Dr. Cody Cunningham received order for Krystina Valle at pt request and chest X-ray. Will apply lidocaine patch per order. Will continue to monitor.

## 2018-02-09 NOTE — PROGRESS NOTES
Bedside and Verbal shift change report given to Octavio Mota RN (oncoming nurse) by Niya Lujan RN (offgoing nurse). Report included the following information SBAR, Kardex, Procedure Summary, Intake/Output, MAR, Accordion and Recent Results.

## 2018-02-09 NOTE — PROGRESS NOTES
Problem: Falls - Risk of  Goal: *Absence of Falls  Document Alberto Fall Risk and appropriate interventions in the flowsheet. Outcome: Progressing Towards Goal  Fall Risk Interventions:  Mobility Interventions: Utilize walker, cane, or other assitive device (Use back brace when ambulating halls.)    Mentation Interventions:  Toileting rounds, Update white board    Medication Interventions: Patient to call before getting OOB    Elimination Interventions: Call light in reach, Toileting schedule/hourly rounds, Urinal in reach    History of Falls Interventions: Room close to nurse's station

## 2018-02-10 ENCOUNTER — APPOINTMENT (OUTPATIENT)
Dept: CT IMAGING | Age: 78
DRG: 193 | End: 2018-02-10
Attending: HOSPITALIST
Payer: MEDICARE

## 2018-02-10 LAB
ARTERIAL PATENCY WRIST A: YES
BASE EXCESS BLD CALC-SCNC: 6 MMOL/L
BDY SITE: ABNORMAL
GAS FLOW.O2 O2 DELIVERY SYS: ABNORMAL L/MIN
GAS FLOW.O2 SETTING OXYMISER: 2 L/M
HCO3 BLD-SCNC: 30.5 MMOL/L (ref 22–26)
PCO2 BLD: 45.9 MMHG (ref 35–45)
PH BLD: 7.43 [PH] (ref 7.35–7.45)
PO2 BLD: 89 MMHG (ref 80–100)
SAO2 % BLD: 97 % (ref 92–97)
SPECIMEN TYPE: ABNORMAL

## 2018-02-10 PROCEDURE — 74011250637 HC RX REV CODE- 250/637: Performed by: INTERNAL MEDICINE

## 2018-02-10 PROCEDURE — 82803 BLOOD GASES ANY COMBINATION: CPT

## 2018-02-10 PROCEDURE — 74011250636 HC RX REV CODE- 250/636: Performed by: HOSPITALIST

## 2018-02-10 PROCEDURE — 65660000000 HC RM CCU STEPDOWN

## 2018-02-10 PROCEDURE — 74011000250 HC RX REV CODE- 250: Performed by: HOSPITALIST

## 2018-02-10 PROCEDURE — 74011250636 HC RX REV CODE- 250/636: Performed by: INTERNAL MEDICINE

## 2018-02-10 PROCEDURE — 94640 AIRWAY INHALATION TREATMENT: CPT

## 2018-02-10 PROCEDURE — 36600 WITHDRAWAL OF ARTERIAL BLOOD: CPT

## 2018-02-10 PROCEDURE — 74011636320 HC RX REV CODE- 636/320: Performed by: HOSPITALIST

## 2018-02-10 PROCEDURE — 71275 CT ANGIOGRAPHY CHEST: CPT

## 2018-02-10 PROCEDURE — 74011000258 HC RX REV CODE- 258: Performed by: HOSPITALIST

## 2018-02-10 PROCEDURE — 74011250637 HC RX REV CODE- 250/637: Performed by: HOSPITALIST

## 2018-02-10 RX ORDER — BUDESONIDE 0.5 MG/2ML
500 INHALANT ORAL
Status: DISCONTINUED | OUTPATIENT
Start: 2018-02-10 | End: 2018-02-15 | Stop reason: HOSPADM

## 2018-02-10 RX ORDER — SODIUM CHLORIDE 9 MG/ML
75 INJECTION, SOLUTION INTRAVENOUS CONTINUOUS
Status: DISCONTINUED | OUTPATIENT
Start: 2018-02-10 | End: 2018-02-13

## 2018-02-10 RX ORDER — ALPRAZOLAM 0.25 MG/1
0.25 TABLET ORAL
Status: DISCONTINUED | OUTPATIENT
Start: 2018-02-10 | End: 2018-02-15 | Stop reason: HOSPADM

## 2018-02-10 RX ORDER — SODIUM CHLORIDE 0.9 % (FLUSH) 0.9 %
10 SYRINGE (ML) INJECTION
Status: COMPLETED | OUTPATIENT
Start: 2018-02-10 | End: 2018-02-10

## 2018-02-10 RX ORDER — IPRATROPIUM BROMIDE AND ALBUTEROL SULFATE 2.5; .5 MG/3ML; MG/3ML
3 SOLUTION RESPIRATORY (INHALATION)
Status: DISCONTINUED | OUTPATIENT
Start: 2018-02-10 | End: 2018-02-13

## 2018-02-10 RX ORDER — FUROSEMIDE 40 MG/1
40 TABLET ORAL DAILY
Status: DISCONTINUED | OUTPATIENT
Start: 2018-02-10 | End: 2018-02-10

## 2018-02-10 RX ORDER — SODIUM CHLORIDE 9 MG/ML
75 INJECTION, SOLUTION INTRAVENOUS CONTINUOUS
Status: DISPENSED | OUTPATIENT
Start: 2018-02-10 | End: 2018-02-10

## 2018-02-10 RX ADMIN — Medication 10 ML: at 21:28

## 2018-02-10 RX ADMIN — Medication 10 ML: at 13:14

## 2018-02-10 RX ADMIN — FUROSEMIDE 40 MG: 40 TABLET ORAL at 09:39

## 2018-02-10 RX ADMIN — IPRATROPIUM BROMIDE AND ALBUTEROL SULFATE 3 ML: .5; 3 SOLUTION RESPIRATORY (INHALATION) at 16:52

## 2018-02-10 RX ADMIN — HEPARIN SODIUM 5000 UNITS: 5000 INJECTION, SOLUTION INTRAVENOUS; SUBCUTANEOUS at 11:57

## 2018-02-10 RX ADMIN — IOPAMIDOL 80 ML: 755 INJECTION, SOLUTION INTRAVENOUS at 18:00

## 2018-02-10 RX ADMIN — Medication 10 ML: at 07:15

## 2018-02-10 RX ADMIN — SODIUM CHLORIDE 75 ML/HR: 900 INJECTION, SOLUTION INTRAVENOUS at 11:44

## 2018-02-10 RX ADMIN — TRAZODONE HYDROCHLORIDE 150 MG: 100 TABLET ORAL at 21:25

## 2018-02-10 RX ADMIN — BUDESONIDE 500 MCG: 0.5 INHALANT RESPIRATORY (INHALATION) at 20:56

## 2018-02-10 RX ADMIN — HEPARIN SODIUM 5000 UNITS: 5000 INJECTION, SOLUTION INTRAVENOUS; SUBCUTANEOUS at 03:49

## 2018-02-10 RX ADMIN — TEMAZEPAM 30 MG: 15 CAPSULE ORAL at 21:25

## 2018-02-10 RX ADMIN — HEPARIN SODIUM 5000 UNITS: 5000 INJECTION, SOLUTION INTRAVENOUS; SUBCUTANEOUS at 20:18

## 2018-02-10 RX ADMIN — TRAMADOL HYDROCHLORIDE 50 MG: 50 TABLET, FILM COATED ORAL at 08:33

## 2018-02-10 RX ADMIN — SODIUM CHLORIDE 75 ML/HR: 900 INJECTION, SOLUTION INTRAVENOUS at 16:57

## 2018-02-10 RX ADMIN — ACETAMINOPHEN 1000 MG: 500 TABLET, FILM COATED ORAL at 07:14

## 2018-02-10 RX ADMIN — SODIUM CHLORIDE 100 ML/HR: 900 INJECTION, SOLUTION INTRAVENOUS at 04:38

## 2018-02-10 RX ADMIN — IPRATROPIUM BROMIDE AND ALBUTEROL SULFATE 3 ML: .5; 3 SOLUTION RESPIRATORY (INHALATION) at 08:38

## 2018-02-10 RX ADMIN — RISPERIDONE 2 MG: 1 TABLET ORAL at 21:25

## 2018-02-10 RX ADMIN — TRAMADOL HYDROCHLORIDE 50 MG: 50 TABLET, FILM COATED ORAL at 16:52

## 2018-02-10 RX ADMIN — ACETAMINOPHEN 1000 MG: 500 TABLET, FILM COATED ORAL at 21:25

## 2018-02-10 RX ADMIN — Medication 10 ML: at 18:00

## 2018-02-10 RX ADMIN — OXYCODONE AND ACETAMINOPHEN 1 TABLET: 5; 325 TABLET ORAL at 04:47

## 2018-02-10 RX ADMIN — SODIUM CHLORIDE 100 ML: 900 INJECTION, SOLUTION INTRAVENOUS at 18:00

## 2018-02-10 RX ADMIN — OXYCODONE AND ACETAMINOPHEN 1 TABLET: 5; 325 TABLET ORAL at 11:57

## 2018-02-10 RX ADMIN — IPRATROPIUM BROMIDE AND ALBUTEROL SULFATE 3 ML: .5; 3 SOLUTION RESPIRATORY (INHALATION) at 20:56

## 2018-02-10 NOTE — PROGRESS NOTES
Hospitalist Progress Note  Mireya New MD  Answering service: [phone] OR [phone] from in house phone      Date of Service:  2/10/2018  NAME:  Marti Alberto  :  1940  MRN:  [id number]      Admission Summary:   77 yo man with h/o sleep disorder was BIBEMS to the ED from [facility] on 18 with confusion. He was admitted with acute encephalopathy, acute L1 fracture, rhabdomyolysis, and acute hypoxic and hypercapnic respiratory failure.      Interval history / Subjective:   Pt awaiting decision from POA regarding d/c   Encourage wearing TLSO brace  Pt resp rate 32 recorded , he has COPD , he is mostly bed bound   Pt does not voice complains will r/o PE     Assessment & Plan:     Persistent back pain  - due to lumbar stenosis and L1 compression fracture  - changed to scheduled TID APAP; continue prn Percocet with hold parameters  - lidocaine patches x2  - strongly advised to wear TLSO brace but nonadherent per staff; able to ambulate to bathroom with assistance    Acute hypoxic and hypercapnic respiratory failure (POA)  - Pt has increased SOB will R/O PE  - Pt on O2 O2 and satting well on RA 2L NC  - breathing fast today CXR - no acute issues  - will give IV fluids and breathing treatment  - hold lasix for today     LLL CAP (POA) - as per admission CT  - BCx  negative  - UCx  negative  - s/p azithromycin, Zosyn, LVQ  - resolved    Acute COPD exacerbation - resolved  prn nebs added Pulmicort / duo neb     Rhabdomyolysis (POA) - resolved with IVF; CK WNL    Acute L1 compression fracture (POA) - Ortho consulted and signed off  - conservative management with TLSO brace, PT/OT, OOB, pain control  - nonadherent with wearing brace  - f/u with Dr Dioni Miller outpatient  - vitamin D WNL    L4-L5 spinal stenosis - pain control; plan as above    Acute metabolic encephalopathy (POA) - resolved to baseline    Chronic nicotine dependence - cessation counseling    Hypokalemia - replete prn  Hyperphosphatemia - unknown etiology; monitor    Code status: Full  DVT prophylaxis: heparin     Care Plan discussed with: Patient/Family, Nurse and   Disposition: Likely to group home once niece/financial POA makes a decision on place. Pt defers to his niece for placement decisions. Hospital Problems  Date Reviewed: 1/11/2018          Codes Class Noted POA    COPD (chronic obstructive pulmonary disease) ([facility] ICD-10-CM: J44.9  ICD-9-CM: 416  1/11/2018 Unknown        Back pain ICD-10-CM: M54.9  ICD-9-CM: 724.5  1/11/2018 Unknown        Altered mental status ICD-10-CM: R41.82  ICD-9-CM: 780.97  1/11/2018 Unknown        * (Principal)Pneumonia ICD-10-CM: J18.9  ICD-9-CM: 486  1/11/2018 Unknown        Lumbar spinal stenosis ICD-10-CM: M48.061  ICD-9-CM: 724.02  1/11/2018 Unknown        Closed L1 vertebral fracture ([facility] ICD-10-CM: S32.019A  ICD-9-CM: 805.4  1/11/2018 Unknown        Acute respiratory failure with hypoxemia [facility]) ICD-10-CM: J96.01  ICD-9-CM: 518.81  1/11/2018 Unknown            Review of Systems:   Pertinent items are noted in HPI. Vital Signs:    Last 24hrs VS reviewed since prior progress note.  Most recent are:  Visit Vitals    /64 (BP 1 Location: Right arm, BP Patient Position: At rest)    Pulse (!) 104    Temp 97.5 °F (36.4 °C)    Resp (!) 32    Ht 5' 9\" (1.753 m)    Wt 60.7 kg (133 lb 14.4 oz)    SpO2 99%    BMI 19.77 kg/m2       Intake/Output Summary (Last 24 hours) at 02/10/18 0936  Last data filed at 02/10/18 0935   Gross per 24 hour   Intake          2033.33 ml   Output              800 ml   Net          1233.33 ml      Physical Examination:     Gen: awake, NAD, aao x 3  HENT: NCAT, oral mucosa benign  Eyes: PERRL, anicteric sclera  CVS: regular rhythm, no m/g/r appreciated, no peripheral edema, +pulses  Resp: CTAB,   Abd: + BS, soft, NT/ND  Neuro: AAOx3, responds appropriately to questions and commands    Data Review:    Review and/or order of clinical lab test  Review and/or order of tests in the radiology section of CPT  Review and/or order of tests in the medicine section of CPT    Labs:     Recent Labs      02/09/18   1245   WBC  5.2   HGB  12.0*   HCT  35.4*   PLT  224     Recent Labs      02/09/18   1245   NA  140   K  3.9   CL  104   CO2  29   BUN  21*   CREA  0.71   GLU  122*   CA  8.7     No results for input(s): SGOT, GPT, ALT, AP, TBIL, TBILI, TP, ALB, GLOB, GGT, AML, LPSE in the last 72 hours. No lab exists for component: AMYP, HLPSE  No results for input(s): INR, PTP, APTT in the last 72 hours. No lab exists for component: INREXT, INREXT   No results for input(s): FE, TIBC, PSAT, FERR in the last 72 hours. No results found for: FOL, RBCF   No results for input(s): PH, PCO2, PO2 in the last 72 hours. No results for input(s): CPK, CKNDX, TROIQ in the last 72 hours.     No lab exists for component: CPKMB  No results found for: CHOL, CHOLX, CHLST, CHOLV, HDL, LDL, LDLC, DLDLP, TGLX, TRIGL, TRIGP, CHHD, CHHDX  Lab Results   Component Value Date/Time    Glucose (POC) 116 (H) 02/05/2018 06:20 AM    Glucose (POC) 102 (H) 01/31/2018 12:03 PM    Glucose (POC) 91 01/20/2018 06:52 AM    Glucose (POC) 136 (H) 01/17/2018 11:46 AM    Glucose (POC) 97 01/12/2018 10:18 PM     Lab Results   Component Value Date/Time    Color YELLOW/STRAW 02/09/2018 01:22 PM    Appearance CLEAR 02/09/2018 01:22 PM    Specific gravity 1.028 02/09/2018 01:22 PM    pH (UA) 6.0 02/09/2018 01:22 PM    Protein NEGATIVE  02/09/2018 01:22 PM    Glucose NEGATIVE  02/09/2018 01:22 PM    Ketone NEGATIVE  02/09/2018 01:22 PM    Bilirubin NEGATIVE  02/09/2018 01:22 PM    Urobilinogen 1.0 02/09/2018 01:22 PM    Nitrites NEGATIVE  02/09/2018 01:22 PM    Leukocyte Esterase NEGATIVE  02/09/2018 01:22 PM    Epithelial cells FEW 02/09/2018 01:22 PM    Bacteria NEGATIVE  02/09/2018 01:22 PM    WBC 0-4 02/09/2018 01:22 PM    RBC 0-5 02/09/2018 01:22 PM Medications Reviewed:     Current Facility-Administered Medications   Medication Dose Route Frequency    furosemide (LASIX) tablet 40 mg  40 mg Oral DAILY    albuterol-ipratropium (DUO-NEB) 2.5 MG-0.5 MG/3 ML  3 mL Nebulization Q6H RT    budesonide (PULMICORT) 500 mcg/2 ml nebulizer suspension  500 mcg Nebulization BID RT    acetaminophen (TYLENOL) tablet 1,000 mg  1,000 mg Oral Q8H    lidocaine (LIDODERM) 5 % patch 2 Patch  2 Patch TransDERmal Q24H    nicotine (NICODERM CQ) 14 mg/24 hr patch 1 Patch  1 Patch TransDERmal DAILY    heparin (porcine) injection 5,000 Units  5,000 Units SubCUTAneous Q8H    polyethylene glycol (MIRALAX) packet 17 g  17 g Oral DAILY PRN    risperiDONE (RisperDAL) tablet 2 mg  2 mg Oral QHS    temazepam (RESTORIL) capsule 30 mg  30 mg Oral QHS    traMADol (ULTRAM) tablet 50 mg  50 mg Oral Q8H PRN    traZODone (DESYREL) tablet 150 mg  150 mg Oral QHS    oxyCODONE-acetaminophen (PERCOCET) 5-325 mg per tablet 1 Tab  1 Tab Oral Q6H PRN    sodium chloride (NS) flush 5-10 mL  5-10 mL IntraVENous Q8H    sodium chloride (NS) flush 5-10 mL  5-10 mL IntraVENous PRN     ______________________________________________________________________  EXPECTED LENGTH OF STAY: 4d 12h  ACTUAL LENGTH OF STAY:          30                 Joy Peña MD

## 2018-02-10 NOTE — PROGRESS NOTES
2 attempts to give report on transfer; nurse busy first attempt and now the room hasnt been cleaned yet; was informed they will call me back soon.

## 2018-02-10 NOTE — PROGRESS NOTES
TRANSFER - OUT REPORT:    Verbal report given to Dexter Baer RN(name) on Katey Cramer  being transferred to [facility](unit) for change in patient condition(sob)       Report consisted of patients Situation, Background, Assessment and   Recommendations(SBAR). Information from the following report(s) SBAR, MAR and Recent Results was reviewed with the receiving nurse. Lines:   Peripheral IV 01/29/18 Left Forearm (Active)   Site Assessment Clean, dry, & intact 2/10/2018 10:08 AM   Phlebitis Assessment 0 2/10/2018 10:08 AM   Infiltration Assessment 0 2/10/2018 10:08 AM   Dressing Status Clean, dry, & intact 2/10/2018 10:08 AM   Dressing Type Transparent 2/10/2018 10:08 AM   Hub Color/Line Status Infusing 2/10/2018 10:08 AM   Action Taken Open ports on tubing capped 2/9/2018  1:24 AM   Alcohol Cap Used Yes 2/9/2018 10:36 AM        Opportunity for questions and clarification was provided.       Patient transported with:   Registered Nurse  Tech

## 2018-02-10 NOTE — PROGRESS NOTES
Mews 4; r/t resp and HR pt c/o 5/10 back pain and feels \"short of breath\" pt sounds diminished but no wheezing noted; pt medicated with tramadol per order and presently receiving breathing treatment. Will notify MD and continue to monitor.

## 2018-02-10 NOTE — PROGRESS NOTES
TRANSFER - IN REPORT:    Verbal report received from Nohelia Hodges RN(name) on Margaret Greer  being received from 5E(unit) for routine progression of care      Report consisted of patients Situation, Background, Assessment and   Recommendations(SBAR). Information from the following report(s) SBAR, Intake/Output, MAR, Recent Results and Cardiac Rhythm Sinus Tach was reviewed with the receiving nurse. Opportunity for questions and clarification was provided. Assessment completed upon patients arrival to unit and care assumed.

## 2018-02-10 NOTE — PROGRESS NOTES
Bedside shift change report given to Atul (oncoming nurse) by Tegan Smith (offgoing nurse). Report included the following information SBAR.

## 2018-02-10 NOTE — PROGRESS NOTES
Primary Nurse Aston Landry RN and Marek RN performed a dual skin assessment on this patient No impairment noted  Renato score is 18

## 2018-02-10 NOTE — PROGRESS NOTES
Mews 4; r/t HR and resp; pt rcvd neb trmnt and still c/o sob; admin 2L O2 via NC;  rounded with Dr Helen Mason and rcvd order for Lasix , ABGs, CTA and to transfer to telemetry.

## 2018-02-11 PROCEDURE — 74011250637 HC RX REV CODE- 250/637: Performed by: INTERNAL MEDICINE

## 2018-02-11 PROCEDURE — 94640 AIRWAY INHALATION TREATMENT: CPT

## 2018-02-11 PROCEDURE — 74011250636 HC RX REV CODE- 250/636: Performed by: INTERNAL MEDICINE

## 2018-02-11 PROCEDURE — 74011000250 HC RX REV CODE- 250: Performed by: HOSPITALIST

## 2018-02-11 PROCEDURE — 65660000000 HC RM CCU STEPDOWN

## 2018-02-11 PROCEDURE — 74011250636 HC RX REV CODE- 250/636: Performed by: HOSPITALIST

## 2018-02-11 RX ADMIN — Medication 10 ML: at 06:30

## 2018-02-11 RX ADMIN — TEMAZEPAM 30 MG: 15 CAPSULE ORAL at 22:01

## 2018-02-11 RX ADMIN — RISPERIDONE 2 MG: 1 TABLET ORAL at 23:56

## 2018-02-11 RX ADMIN — IPRATROPIUM BROMIDE AND ALBUTEROL SULFATE 3 ML: .5; 3 SOLUTION RESPIRATORY (INHALATION) at 20:36

## 2018-02-11 RX ADMIN — TRAZODONE HYDROCHLORIDE 150 MG: 100 TABLET ORAL at 22:00

## 2018-02-11 RX ADMIN — SODIUM CHLORIDE 75 ML/HR: 900 INJECTION, SOLUTION INTRAVENOUS at 06:24

## 2018-02-11 RX ADMIN — OXYCODONE AND ACETAMINOPHEN 1 TABLET: 5; 325 TABLET ORAL at 22:00

## 2018-02-11 RX ADMIN — ACETAMINOPHEN 1000 MG: 500 TABLET, FILM COATED ORAL at 23:55

## 2018-02-11 RX ADMIN — ACETAMINOPHEN 1000 MG: 500 TABLET, FILM COATED ORAL at 06:24

## 2018-02-11 RX ADMIN — BUDESONIDE 500 MCG: 0.5 INHALANT RESPIRATORY (INHALATION) at 20:36

## 2018-02-11 RX ADMIN — BUDESONIDE 500 MCG: 0.5 INHALANT RESPIRATORY (INHALATION) at 08:28

## 2018-02-11 RX ADMIN — OXYCODONE AND ACETAMINOPHEN 1 TABLET: 5; 325 TABLET ORAL at 14:31

## 2018-02-11 RX ADMIN — OXYCODONE AND ACETAMINOPHEN 1 TABLET: 5; 325 TABLET ORAL at 03:23

## 2018-02-11 RX ADMIN — HEPARIN SODIUM 5000 UNITS: 5000 INJECTION, SOLUTION INTRAVENOUS; SUBCUTANEOUS at 12:58

## 2018-02-11 RX ADMIN — Medication 10 ML: at 14:00

## 2018-02-11 RX ADMIN — IPRATROPIUM BROMIDE AND ALBUTEROL SULFATE 3 ML: .5; 3 SOLUTION RESPIRATORY (INHALATION) at 08:28

## 2018-02-11 RX ADMIN — ACETAMINOPHEN 1000 MG: 500 TABLET, FILM COATED ORAL at 15:04

## 2018-02-11 RX ADMIN — HEPARIN SODIUM 5000 UNITS: 5000 INJECTION, SOLUTION INTRAVENOUS; SUBCUTANEOUS at 03:25

## 2018-02-11 RX ADMIN — HEPARIN SODIUM 5000 UNITS: 5000 INJECTION, SOLUTION INTRAVENOUS; SUBCUTANEOUS at 19:37

## 2018-02-11 RX ADMIN — Medication 10 ML: at 23:56

## 2018-02-11 RX ADMIN — IPRATROPIUM BROMIDE AND ALBUTEROL SULFATE 3 ML: .5; 3 SOLUTION RESPIRATORY (INHALATION) at 01:37

## 2018-02-11 NOTE — PROGRESS NOTES
Problem: Falls - Risk of  Goal: *Absence of Falls  Document Alberto Fall Risk and appropriate interventions in the flowsheet.    Outcome: Progressing Towards Goal  Fall Risk Interventions:  Mobility Interventions: Bed/chair exit alarm    Mentation Interventions: Bed/chair exit alarm    Medication Interventions: Patient to call before getting OOB, Teach patient to arise slowly    Elimination Interventions: Patient to call for help with toileting needs    History of Falls Interventions: Bed/chair exit alarm

## 2018-02-11 NOTE — PROGRESS NOTES
Hospitalist Progress Note  Huma Kamara MD  Answering service: [phone] OR [phone] from in house phone      Date of Service:  2018  NAME:  Marialuisa Richardson YOB: 1940  MRN:  [id number]      Admission Summary:   77 yo man with h/o sleep disorder was BIBEMS to the ED from [facility] on 18 with confusion. He was admitted with acute encephalopathy, acute L1 fracture, rhabdomyolysis, and acute hypoxic and hypercapnic respiratory failure.      Interval history / Subjective:   Pt awaiting decision from POA regarding d/c   Encourage wearing TLSO brace  Pt resp rate was high got CT - no PE  Pt is getting weaker and drained due to prolonged hospitalization, he should be d/c'd soon to avoid further deterioration from exposure to hospital environment      Assessment & Plan:     Persistent back pain  - due to lumbar stenosis and L1 compression fracture  - changed to scheduled TID APAP; continue prn Percocet with hold parameters  - lidocaine patches x2  - strongly advised to wear TLSO brace but nonadherent per staff; able to ambulate to bathroom with assistance    Acute hypoxic and hypercapnic respiratory failure (POA)  - Pt has increased SOB - CT 2/10 - NO PE  - Pt on O2 O2 and satting well on RA 2L NC  - CXR - no acute issues  - will give IV fluids and breathing treatment  - chest CTA    LLL CAP (POA) - as per admission CT  - BCx  negative  - UCx  negative  - s/p azithromycin, Zosyn, LVQ  - resolved    Acute COPD exacerbation - resolved  prn nebs added Pulmicort / duo neb     Rhabdomyolysis (POA) - resolved with IVF; CK WNL    Acute L1 compression fracture (POA) - Ortho consulted and signed off  - conservative management with TLSO brace, PT/OT, OOB, pain control  - nonadherent with wearing brace  - f/u with Dr Mohit Guillen outpatient  - vitamin D WNL    L4-L5 spinal stenosis - pain control; plan as above    Acute metabolic encephalopathy (POA) - resolved to baseline    Chronic nicotine dependence - cessation counseling    Hypokalemia - replete prn  Hyperphosphatemia - unknown etiology; monitor    Code status: Full  DVT prophylaxis: heparin     Care Plan discussed with: Patient/Family, Nurse and   Disposition: Likely to group home once niece/financial POA makes a decision on place. Pt defers to his niece for placement decisions. Hospital Problems  Date Reviewed: 1/11/2018          Codes Class Noted POA    COPD (chronic obstructive pulmonary disease) ([facility] ICD-10-CM: J44.9  ICD-9-CM: 101  1/11/2018 Unknown        Back pain ICD-10-CM: M54.9  ICD-9-CM: 724.5  1/11/2018 Unknown        Altered mental status ICD-10-CM: R41.82  ICD-9-CM: 780.97  1/11/2018 Unknown        * (Principal)Pneumonia ICD-10-CM: J18.9  ICD-9-CM: 486  1/11/2018 Unknown        Lumbar spinal stenosis ICD-10-CM: M48.061  ICD-9-CM: 724.02  1/11/2018 Unknown        Closed L1 vertebral fracture ([facility] ICD-10-CM: S32.019A  ICD-9-CM: 805.4  1/11/2018 Unknown        Acute respiratory failure with hypoxemia [facility]) ICD-10-CM: J96.01  ICD-9-CM: 518.81  1/11/2018 Unknown            Review of Systems:   Pertinent items are noted in HPI. Vital Signs:    Last 24hrs VS reviewed since prior progress note.  Most recent are:  Visit Vitals    /69    Pulse (!) 57    Temp 98.1 °F (36.7 °C)    Resp 16    Ht 5' 9\" (1.753 m)    Wt 58.7 kg (129 lb 8 oz)    SpO2 96%    BMI 19.12 kg/m2       Intake/Output Summary (Last 24 hours) at 02/11/18 0948  Last data filed at 02/11/18 0631   Gross per 24 hour   Intake          1316.25 ml   Output              955 ml   Net           361.25 ml      Physical Examination:     Gen: awake, NAD, aao x 3  HENT: NCAT, oral mucosa benign  Eyes: PERRL, anicteric sclera  CVS: regular rhythm, no m/g/r appreciated, no peripheral edema, +pulses  Resp: CTAB,   Abd: + BS, soft, NT/ND  Neuro: AAOx3, responds appropriately to questions and commands    Data Review:    Review and/or order of clinical lab test  Review and/or order of tests in the radiology section of CPT  Review and/or order of tests in the medicine section of CPT    Labs:     Recent Labs      02/09/18   1245   WBC  5.2   HGB  12.0*   HCT  35.4*   PLT  224     Recent Labs      02/09/18   1245   NA  140   K  3.9   CL  104   CO2  29   BUN  21*   CREA  0.71   GLU  122*   CA  8.7     No results for input(s): SGOT, GPT, ALT, AP, TBIL, TBILI, TP, ALB, GLOB, GGT, AML, LPSE in the last 72 hours. No lab exists for component: AMYP, HLPSE  No results for input(s): INR, PTP, APTT in the last 72 hours. No lab exists for component: INREXT, INREXT   No results for input(s): FE, TIBC, PSAT, FERR in the last 72 hours. No results found for: FOL, RBCF   No results for input(s): PH, PCO2, PO2 in the last 72 hours. No results for input(s): CPK, CKNDX, TROIQ in the last 72 hours.     No lab exists for component: CPKMB  No results found for: CHOL, CHOLX, CHLST, CHOLV, HDL, LDL, LDLC, DLDLP, TGLX, TRIGL, TRIGP, CHHD, CHHDX  Lab Results   Component Value Date/Time    Glucose (POC) 116 (H) 02/05/2018 06:20 AM    Glucose (POC) 102 (H) 01/31/2018 12:03 PM    Glucose (POC) 91 01/20/2018 06:52 AM    Glucose (POC) 136 (H) 01/17/2018 11:46 AM    Glucose (POC) 97 01/12/2018 10:18 PM     Lab Results   Component Value Date/Time    Color YELLOW/STRAW 02/09/2018 01:22 PM    Appearance CLEAR 02/09/2018 01:22 PM    Specific gravity 1.028 02/09/2018 01:22 PM    pH (UA) 6.0 02/09/2018 01:22 PM    Protein NEGATIVE  02/09/2018 01:22 PM    Glucose NEGATIVE  02/09/2018 01:22 PM    Ketone NEGATIVE  02/09/2018 01:22 PM    Bilirubin NEGATIVE  02/09/2018 01:22 PM    Urobilinogen 1.0 02/09/2018 01:22 PM    Nitrites NEGATIVE  02/09/2018 01:22 PM    Leukocyte Esterase NEGATIVE  02/09/2018 01:22 PM    Epithelial cells FEW 02/09/2018 01:22 PM    Bacteria NEGATIVE  02/09/2018 01:22 PM    WBC 0-4 02/09/2018 01:22 PM    RBC 0-5 02/09/2018 01:22 PM     Medications Reviewed:     Current Facility-Administered Medications   Medication Dose Route Frequency    albuterol-ipratropium (DUO-NEB) 2.5 MG-0.5 MG/3 ML  3 mL Nebulization Q6H RT    budesonide (PULMICORT) 500 mcg/2 ml nebulizer suspension  500 mcg Nebulization BID RT    ALPRAZolam (XANAX) tablet 0.25 mg  0.25 mg Oral TID PRN    0.9% sodium chloride infusion  75 mL/hr IntraVENous CONTINUOUS    acetaminophen (TYLENOL) tablet 1,000 mg  1,000 mg Oral Q8H    lidocaine (LIDODERM) 5 % patch 2 Patch  2 Patch TransDERmal Q24H    nicotine (NICODERM CQ) 14 mg/24 hr patch 1 Patch  1 Patch TransDERmal DAILY    heparin (porcine) injection 5,000 Units  5,000 Units SubCUTAneous Q8H    polyethylene glycol (MIRALAX) packet 17 g  17 g Oral DAILY PRN    risperiDONE (RisperDAL) tablet 2 mg  2 mg Oral QHS    temazepam (RESTORIL) capsule 30 mg  30 mg Oral QHS    traMADol (ULTRAM) tablet 50 mg  50 mg Oral Q8H PRN    traZODone (DESYREL) tablet 150 mg  150 mg Oral QHS    oxyCODONE-acetaminophen (PERCOCET) 5-325 mg per tablet 1 Tab  1 Tab Oral Q6H PRN    sodium chloride (NS) flush 5-10 mL  5-10 mL IntraVENous Q8H    sodium chloride (NS) flush 5-10 mL  5-10 mL IntraVENous PRN     ______________________________________________________________________  EXPECTED LENGTH OF STAY: 4d 12h  ACTUAL LENGTH OF STAY:          Remberto Hanley MD

## 2018-02-11 NOTE — PROGRESS NOTES
TRANSFER - OUT REPORT:    Verbal report given to Lela VINES(name) on Andreafski Erps  being transferred to (unit) for routine progression of care       Report consisted of patients Situation, Background, Assessment and   Recommendations(SBAR). Information from the following report(s) SBAR, Intake/Output, MAR, Recent Results and Cardiac Rhythm NSR/sinus Tach was reviewed with the receiving nurse. Lines:   Peripheral IV 01/29/18 Left Forearm (Active)   Site Assessment Clean, dry, & intact 2/11/2018  8:38 AM   Phlebitis Assessment 0 2/11/2018  8:38 AM   Infiltration Assessment 0 2/11/2018  8:38 AM   Dressing Status Clean, dry, & intact 2/11/2018  8:38 AM   Dressing Type Tape;Transparent 2/11/2018  8:38 AM   Hub Color/Line Status Infusing 2/11/2018  8:38 AM   Action Taken Open ports on tubing capped 2/11/2018  8:38 AM   Alcohol Cap Used Yes 2/11/2018  8:38 AM        Opportunity for questions and clarification was provided.       Patient transported with:   [email or web address]

## 2018-02-11 NOTE — ROUTINE PROCESS
Bedside shift change report given to Ehsan Cleaning RN and JASMYN Castellanos (oncoming nurse) by Sumi Sterling RN (offgoing nurse). Report included the following information SBAR, Procedure Summary, Intake/Output, MAR and Recent Results.

## 2018-02-11 NOTE — PROGRESS NOTES
Bedside shift change report given to Max Torres RN (oncoming nurse) by Walter Mcfarland (offgoing nurse). Report included the following information SBAR, Intake/Output, MAR, Recent Results and Cardiac Rhythm NSR.

## 2018-02-11 NOTE — ROUTINE PROCESS
Primary Nurse Harsha Fraser RN and Elizabeth Vazquez RN performed a dual skin assessment on this patient No impairment noted  Renato score is 18

## 2018-02-11 NOTE — ROUTINE PROCESS
TRANSFER - IN REPORT:    Verbal report received from Joya(name) on Robbie Colon  being received from [facility](unit) for routine progression of care      Report consisted of patients Situation, Background, Assessment and   Recommendations(SBAR). Information from the following report(s) SBAR and Kardex was reviewed with the receiving nurse. Opportunity for questions and clarification was provided. Assessment completed upon patients arrival to unit and care assumed.

## 2018-02-11 NOTE — PROGRESS NOTES
02/10/18 2007   Vitals   Temp 98.4 °F (36.9 °C)   Temp Source Oral   Pulse (Heart Rate) 90   Heart Rate Source Monitor   Resp Rate 30  (RN notified)   O2 Sat (%) 96 %   Level of Consciousness Alert   /67   MAP (Calculated) 84   BP 1 Location Right arm   BP 1 Method Automatic   BP Patient Position At rest   MEWS Score 3   Will continue to monitor respirations

## 2018-02-12 PROCEDURE — 74011250637 HC RX REV CODE- 250/637: Performed by: INTERNAL MEDICINE

## 2018-02-12 PROCEDURE — 65660000000 HC RM CCU STEPDOWN

## 2018-02-12 PROCEDURE — 74011000250 HC RX REV CODE- 250: Performed by: HOSPITALIST

## 2018-02-12 PROCEDURE — 94640 AIRWAY INHALATION TREATMENT: CPT

## 2018-02-12 PROCEDURE — 74011250636 HC RX REV CODE- 250/636: Performed by: INTERNAL MEDICINE

## 2018-02-12 PROCEDURE — 97116 GAIT TRAINING THERAPY: CPT

## 2018-02-12 RX ADMIN — Medication 10 ML: at 14:24

## 2018-02-12 RX ADMIN — BUDESONIDE 500 MCG: 0.5 INHALANT RESPIRATORY (INHALATION) at 09:26

## 2018-02-12 RX ADMIN — ACETAMINOPHEN 1000 MG: 500 TABLET, FILM COATED ORAL at 07:51

## 2018-02-12 RX ADMIN — Medication 10 ML: at 21:11

## 2018-02-12 RX ADMIN — IPRATROPIUM BROMIDE AND ALBUTEROL SULFATE 3 ML: .5; 3 SOLUTION RESPIRATORY (INHALATION) at 09:26

## 2018-02-12 RX ADMIN — Medication 10 ML: at 06:20

## 2018-02-12 RX ADMIN — TRAMADOL HYDROCHLORIDE 50 MG: 50 TABLET, FILM COATED ORAL at 09:34

## 2018-02-12 RX ADMIN — IPRATROPIUM BROMIDE AND ALBUTEROL SULFATE 3 ML: .5; 3 SOLUTION RESPIRATORY (INHALATION) at 13:46

## 2018-02-12 RX ADMIN — IPRATROPIUM BROMIDE AND ALBUTEROL SULFATE 3 ML: .5; 3 SOLUTION RESPIRATORY (INHALATION) at 02:09

## 2018-02-12 RX ADMIN — TRAMADOL HYDROCHLORIDE 50 MG: 50 TABLET, FILM COATED ORAL at 18:58

## 2018-02-12 RX ADMIN — TRAZODONE HYDROCHLORIDE 150 MG: 100 TABLET ORAL at 21:11

## 2018-02-12 RX ADMIN — RISPERIDONE 2 MG: 1 TABLET ORAL at 21:10

## 2018-02-12 RX ADMIN — HEPARIN SODIUM 5000 UNITS: 5000 INJECTION, SOLUTION INTRAVENOUS; SUBCUTANEOUS at 06:00

## 2018-02-12 RX ADMIN — TEMAZEPAM 30 MG: 15 CAPSULE ORAL at 21:10

## 2018-02-12 RX ADMIN — HEPARIN SODIUM 5000 UNITS: 5000 INJECTION, SOLUTION INTRAVENOUS; SUBCUTANEOUS at 14:23

## 2018-02-12 RX ADMIN — HEPARIN SODIUM 5000 UNITS: 5000 INJECTION, SOLUTION INTRAVENOUS; SUBCUTANEOUS at 21:11

## 2018-02-12 RX ADMIN — ACETAMINOPHEN 1000 MG: 500 TABLET, FILM COATED ORAL at 21:11

## 2018-02-12 NOTE — PROGRESS NOTES
Problem: Mobility Impaired (Adult and Pediatric)  Goal: *Acute Goals and Plan of Care (Insert Text)  Physical Therapy Goals  Updated 1/29/2018  Initiated 1/11/2018  Goals reassessed 1/18/18 and remain appropriate over next 7 days  Goals reassessed 2/12/18 and remain appropriate over next 7 days  1. Patient will move from supine to sit and sit to supine  in bed with supervision/set-up within 7 day(s). MET  2. Patient will transfer from bed to chair and chair to bed with supervision/set-up using the least restrictive device within 7 day(s). 3.  Patient will perform sit to stand with modified independence within 7 day(s). 4.  Patient will ambulate with supervision/set-up for 250 feet with the least restrictive device within 7 day(s). 5.  Patient will ascend/descend 12 stairs with 1 handrail(s) with supervision/set-up within 7 day(s). 6.  Patient will independently don and doff quick draw TLSO within 7 days. Physical Therapy Goals    Initiated 1/11/2018  Updated: 2/5/2018      1. Patient will move from supine to sit and sit to supine  in bed with supervision/set-up within 7 day(s). MET  2. Patient will transfer from bed to chair and chair to bed with supervision/set-up using the least restrictive device within 7 day(s). 3.  Patient will perform sit to stand with modified independence within 7 day(s). MET  4. Patient will ambulate with supervision/set-up for 250 feet with the least restrictive device within 7 day(s). 5.  Patient will ascend/descend 12 stairs with 1 handrail(s) with supervision/set-up within 7 day(s). 6.  Patient will independently don and doff quick draw TLSO within 7 days.      physical Therapy TREATMENT: WEEKLY REASSESSMENT  Patient: Katey Cramer (31 y.o. male)  Date: 2/12/2018  Diagnosis: Pneumonia  Altered mental status  Closed L1 vertebral fracture (HCC)  Back pain  Acute respiratory failure with hypoxemia (HCC)  COPD (chronic obstructive pulmonary disease) (HCC)  Lumbar spinal stenosis Pneumonia       Precautions: Back, Fall, Bed Alarm ([place]: reads lips; smokes)  Chart, physical therapy assessment, plan of care and goals were reviewed. ASSESSMENT:  Patient continues to require CGA to supervision for mobility, anticipate this may be baseline. Patient now require supplemental O2 at rest and with activity. Currently requiring 2L and O2 sats 94-97% at rest and with activity. Ambulates with fast nader and requires cues for slowing down for SOB and safety. Anterior COG for ambulation noted with fair ability to correct with cues. Patient's HR 136bpm with ambulation, recovers to 106bpm when returned to rest.  Able to state 1/3 back precautions, all were reviewed. Continue to recommend HHPT at group home. Patient's progression toward goals since last assessment: no goals met, limited by safety awareness, now on supplemental O2 at rest and with activity, elevated HR     PLAN:  Goals have been updated based on progression since last assessment. Patient continues to benefit from skilled intervention to address the above impairments. Continue to follow the patient 3 times a week to address goals. Planned Interventions:  [x]              Bed Mobility Training             [x]       Neuromuscular Re-Education  [x]              Transfer Training                   []       Orthotic/Prosthetic Training  [x]              Gait Training                         []       Modalities  [x]              Therapeutic Exercises           []       Edema Management/Control  [x]              Therapeutic Activities            [x]       Patient and Family Training/Education  []              Other (comment):  Discharge Recommendations: Home Health  Further Equipment Recommendations for Discharge: None     SUBJECTIVE:   Patient stated No bending.     OBJECTIVE DATA SUMMARY:   Critical Behavior:  Neurologic State: Alert  Orientation Level: Oriented X4  Cognition: Follows commands  Safety/Judgement: Awareness of environment    Strength:                          Functional Mobility Training:  Bed Mobility:     Supine to Sit: Supervision  Sit to Supine: Supervision           Transfers:  Sit to Stand: Supervision  Stand to Sit: Supervision  Stand Pivot Transfers: Contact guard assistance                          Balance:  Sitting: Intact  Standing: Impaired  Standing - Static: Good  Standing - Dynamic : Fair  Ambulation/Gait Training:  Distance (ft): 225 Feet (ft)  Assistive Device: Brace/Splint  Ambulation - Level of Assistance: Contact guard assistance        Gait Abnormalities: Decreased step clearance        Base of Support: Narrowed; Center of gravity altered (anterior COG)     Speed/Courtney: Pace decreased (<100 feet/min)  Step Length: Right shortened;Left shortened                    Stairs:           Neuro Re-Education:    Therapeutic Exercises:     Pain:  Pain Scale 1: Numeric (0 - 10)  Pain Intensity 1: 2  Pain Location 1: Back  Pain Orientation 1: Lower  Pain Description 1: Aching  Pain Intervention(s) 1: Medication (see MAR)  Activity Tolerance:   Requires 2L at rest and with activity, HR up to 136bpm with activity  Please refer to the flowsheet for vital signs taken during this treatment.   After treatment:   []  Patient left in no apparent distress sitting up in chair  [x]  Patient left in no apparent distress in bed  [x]  Call bell left within reach  [x]  Nursing notified  []  Caregiver present  [x]  Bed alarm activated    COMMUNICATION/COLLABORATION:   The patients plan of care was discussed with: Registered Nurse    Luís Jensen, PT   Time Calculation: 19 mins

## 2018-02-12 NOTE — PROGRESS NOTES
Hospitalist Progress Note  Alvaro Erickson MD  Answering service: [phone] OR [phone] from in house phone      Date of Service:  2018  NAME:  Margaret Greer  :  1940  MRN:  [id number]      Admission Summary:   77 yo man with h/o sleep disorder was BIBEMS to the ED from [facility] on 18 with confusion. He was admitted with acute encephalopathy, acute L1 fracture, rhabdomyolysis, and acute hypoxic and hypercapnic respiratory failure.      Interval history / Subjective:   Pt awaiting decision from POA regarding d/c , no new updated   Encourage wearing TLSO brace  Pt resp rate was high got CT - no PE  Pt is getting weaker and drained due to prolonged hospitalization, he should be d/c'd soon to avoid further deterioration from exposure to hospital environment      Assessment & Plan:     Persistent back pain  - due to lumbar stenosis and L1 compression fracture  - changed to scheduled TID APAP; continue prn Percocet with hold parameters  - lidocaine patches x2  - strongly advised to wear TLSO brace but nonadherent per staff; able to ambulate to bathroom with assistance    Acute hypoxic and hypercapnic respiratory failure (POA)  - Pt has increased SOB - CT 2/10 - NO PE  - Pt on O2 O2 and satting well on RA 2L NC  - CXR - no acute issues  - cont breathing treatment  - chest CTA no PE    LLL CAP (POA) - as per admission CT  - BCx  negative  - UCx  negative  - s/p azithromycin, Zosyn, LVQ  - resolved    Acute COPD exacerbation - resolved  prn nebs added Pulmicort / duo neb     Rhabdomyolysis (POA) - resolved with IVF; CK WNL    Acute L1 compression fracture (POA) - Ortho consulted and signed off  - conservative management with TLSO brace, PT/OT, OOB, pain control  - nonadherent with wearing brace  - f/u with Dr Guicho Hoyt outpatient  - vitamin D WNL    L4-L5 spinal stenosis - pain control; plan as above    Acute metabolic encephalopathy (POA) - resolved to baseline    Chronic nicotine dependence - cessation counseling    Hypokalemia - replete prn  Hyperphosphatemia - unknown etiology; monitor    Code status: Full  DVT prophylaxis: heparin     Care Plan discussed with: Patient/Family, Nurse and   Disposition: Likely to group home once niece/financial POA makes a decision on place. Pt defers to his niece for placement decisions. No update yet      Hospital Problems  Date Reviewed: 1/11/2018          Codes Class Noted POA    COPD (chronic obstructive pulmonary disease) ([facility] ICD-10-CM: J44.9  ICD-9-CM: 379  1/11/2018 Unknown        Back pain ICD-10-CM: M54.9  ICD-9-CM: 724.5  1/11/2018 Unknown        Altered mental status ICD-10-CM: R41.82  ICD-9-CM: 780.97  1/11/2018 Unknown        * (Principal)Pneumonia ICD-10-CM: J18.9  ICD-9-CM: 486  1/11/2018 Unknown        Lumbar spinal stenosis ICD-10-CM: M48.061  ICD-9-CM: 724.02  1/11/2018 Unknown        Closed L1 vertebral fracture ([facility] ICD-10-CM: S32.019A  ICD-9-CM: 805.4  1/11/2018 Unknown        Acute respiratory failure with hypoxemia [facility]) ICD-10-CM: J96.01  ICD-9-CM: 518.81  1/11/2018 Unknown            Review of Systems:   Pertinent items are noted in HPI. Vital Signs:    Last 24hrs VS reviewed since prior progress note.  Most recent are:  Visit Vitals    /82 (BP 1 Location: Left arm, BP Patient Position: At rest;Head of bed elevated (Comment degrees))    Pulse (!) 103    Temp 97.6 °F (36.4 °C)    Resp 18    Ht 5' 9\" (1.753 m)    Wt 57.8 kg (127 lb 8 oz)    SpO2 98%    BMI 18.83 kg/m2       Intake/Output Summary (Last 24 hours) at 02/12/18 1231  Last data filed at 02/12/18 0914   Gross per 24 hour   Intake                0 ml   Output              850 ml   Net             -850 ml      Physical Examination:     Gen: awake, NAD, aao x 3  HENT: NCAT, oral mucosa benign  Eyes: PERRL, anicteric sclera  CVS: regular rhythm,   Resp: CTAB,   Abd: + BS, soft, NT/ND  Neuro: AAOx3, responds appropriately to questions and commands    Data Review:    Review and/or order of clinical lab test  Review and/or order of tests in the radiology section of CPT  Review and/or order of tests in the medicine section of CPT    Labs:     Recent Labs      02/09/18   1245   WBC  5.2   HGB  12.0*   HCT  35.4*   PLT  224     Recent Labs      02/09/18   1245   NA  140   K  3.9   CL  104   CO2  29   BUN  21*   CREA  0.71   GLU  122*   CA  8.7     No results for input(s): SGOT, GPT, ALT, AP, TBIL, TBILI, TP, ALB, GLOB, GGT, AML, LPSE in the last 72 hours. No lab exists for component: AMYP, HLPSE  No results for input(s): INR, PTP, APTT in the last 72 hours. No lab exists for component: INREXT, INREXT   No results for input(s): FE, TIBC, PSAT, FERR in the last 72 hours. No results found for: FOL, RBCF   No results for input(s): PH, PCO2, PO2 in the last 72 hours. No results for input(s): CPK, CKNDX, TROIQ in the last 72 hours.     No lab exists for component: CPKMB  No results found for: CHOL, CHOLX, CHLST, CHOLV, HDL, LDL, LDLC, DLDLP, TGLX, TRIGL, TRIGP, CHHD, CHHDX  Lab Results   Component Value Date/Time    Glucose (POC) 116 (H) 02/05/2018 06:20 AM    Glucose (POC) 102 (H) 01/31/2018 12:03 PM    Glucose (POC) 91 01/20/2018 06:52 AM    Glucose (POC) 136 (H) 01/17/2018 11:46 AM    Glucose (POC) 97 01/12/2018 10:18 PM     Lab Results   Component Value Date/Time    Color YELLOW/STRAW 02/09/2018 01:22 PM    Appearance CLEAR 02/09/2018 01:22 PM    Specific gravity 1.028 02/09/2018 01:22 PM    pH (UA) 6.0 02/09/2018 01:22 PM    Protein NEGATIVE  02/09/2018 01:22 PM    Glucose NEGATIVE  02/09/2018 01:22 PM    Ketone NEGATIVE  02/09/2018 01:22 PM    Bilirubin NEGATIVE  02/09/2018 01:22 PM    Urobilinogen 1.0 02/09/2018 01:22 PM    Nitrites NEGATIVE  02/09/2018 01:22 PM    Leukocyte Esterase NEGATIVE  02/09/2018 01:22 PM    Epithelial cells FEW 02/09/2018 01:22 PM    Bacteria NEGATIVE 02/09/2018 01:22 PM    WBC 0-4 02/09/2018 01:22 PM    RBC 0-5 02/09/2018 01:22 PM     Medications Reviewed:     Current Facility-Administered Medications   Medication Dose Route Frequency    albuterol-ipratropium (DUO-NEB) 2.5 MG-0.5 MG/3 ML  3 mL Nebulization Q6H RT    budesonide (PULMICORT) 500 mcg/2 ml nebulizer suspension  500 mcg Nebulization BID RT    ALPRAZolam (XANAX) tablet 0.25 mg  0.25 mg Oral TID PRN    acetaminophen (TYLENOL) tablet 1,000 mg  1,000 mg Oral Q8H    lidocaine (LIDODERM) 5 % patch 2 Patch  2 Patch TransDERmal Q24H    nicotine (NICODERM CQ) 14 mg/24 hr patch 1 Patch  1 Patch TransDERmal DAILY    heparin (porcine) injection 5,000 Units  5,000 Units SubCUTAneous Q8H    polyethylene glycol (MIRALAX) packet 17 g  17 g Oral DAILY PRN    risperiDONE (RisperDAL) tablet 2 mg  2 mg Oral QHS    temazepam (RESTORIL) capsule 30 mg  30 mg Oral QHS    traMADol (ULTRAM) tablet 50 mg  50 mg Oral Q8H PRN    traZODone (DESYREL) tablet 150 mg  150 mg Oral QHS    oxyCODONE-acetaminophen (PERCOCET) 5-325 mg per tablet 1 Tab  1 Tab Oral Q6H PRN    sodium chloride (NS) flush 5-10 mL  5-10 mL IntraVENous Q8H    sodium chloride (NS) flush 5-10 mL  5-10 mL IntraVENous PRN     ______________________________________________________________________  EXPECTED LENGTH OF STAY: 4d 12h  ACTUAL LENGTH OF STAY:          32                 Mansi Peck MD

## 2018-02-13 ENCOUNTER — APPOINTMENT (OUTPATIENT)
Dept: GENERAL RADIOLOGY | Age: 78
DRG: 193 | End: 2018-02-13
Attending: INTERNAL MEDICINE
Payer: MEDICARE

## 2018-02-13 PROCEDURE — 74011250637 HC RX REV CODE- 250/637: Performed by: INTERNAL MEDICINE

## 2018-02-13 PROCEDURE — 74011000250 HC RX REV CODE- 250: Performed by: INTERNAL MEDICINE

## 2018-02-13 PROCEDURE — 74011250636 HC RX REV CODE- 250/636: Performed by: INTERNAL MEDICINE

## 2018-02-13 PROCEDURE — 65270000032 HC RM SEMIPRIVATE

## 2018-02-13 PROCEDURE — 94640 AIRWAY INHALATION TREATMENT: CPT

## 2018-02-13 PROCEDURE — 74011000250 HC RX REV CODE- 250: Performed by: HOSPITALIST

## 2018-02-13 PROCEDURE — 97116 GAIT TRAINING THERAPY: CPT

## 2018-02-13 PROCEDURE — 71045 X-RAY EXAM CHEST 1 VIEW: CPT

## 2018-02-13 PROCEDURE — 77010033678 HC OXYGEN DAILY

## 2018-02-13 RX ORDER — GUAIFENESIN 600 MG/1
600 TABLET, EXTENDED RELEASE ORAL EVERY 12 HOURS
Status: DISCONTINUED | OUTPATIENT
Start: 2018-02-13 | End: 2018-02-15 | Stop reason: HOSPADM

## 2018-02-13 RX ORDER — AZITHROMYCIN 250 MG/1
500 TABLET, FILM COATED ORAL DAILY
Status: DISCONTINUED | OUTPATIENT
Start: 2018-02-13 | End: 2018-02-15 | Stop reason: HOSPADM

## 2018-02-13 RX ORDER — HEPARIN SODIUM 5000 [USP'U]/ML
5000 INJECTION, SOLUTION INTRAVENOUS; SUBCUTANEOUS EVERY 12 HOURS
Status: DISCONTINUED | OUTPATIENT
Start: 2018-02-13 | End: 2018-02-15 | Stop reason: HOSPADM

## 2018-02-13 RX ORDER — IPRATROPIUM BROMIDE 0.5 MG/2.5ML
0.5 SOLUTION RESPIRATORY (INHALATION)
Status: DISCONTINUED | OUTPATIENT
Start: 2018-02-13 | End: 2018-02-13

## 2018-02-13 RX ORDER — IPRATROPIUM BROMIDE AND ALBUTEROL SULFATE 2.5; .5 MG/3ML; MG/3ML
3 SOLUTION RESPIRATORY (INHALATION)
Status: DISCONTINUED | OUTPATIENT
Start: 2018-02-13 | End: 2018-02-14

## 2018-02-13 RX ORDER — CETIRIZINE HCL 10 MG
5 TABLET ORAL DAILY
Status: DISCONTINUED | OUTPATIENT
Start: 2018-02-13 | End: 2018-02-15 | Stop reason: HOSPADM

## 2018-02-13 RX ADMIN — Medication 10 ML: at 05:56

## 2018-02-13 RX ADMIN — METHYLPREDNISOLONE SODIUM SUCCINATE 60 MG: 125 INJECTION, POWDER, FOR SOLUTION INTRAMUSCULAR; INTRAVENOUS at 23:17

## 2018-02-13 RX ADMIN — OXYCODONE AND ACETAMINOPHEN 1 TABLET: 5; 325 TABLET ORAL at 08:42

## 2018-02-13 RX ADMIN — TRAMADOL HYDROCHLORIDE 50 MG: 50 TABLET, FILM COATED ORAL at 02:56

## 2018-02-13 RX ADMIN — ACETAMINOPHEN 1000 MG: 500 TABLET, FILM COATED ORAL at 05:55

## 2018-02-13 RX ADMIN — TRAMADOL HYDROCHLORIDE 50 MG: 50 TABLET, FILM COATED ORAL at 11:04

## 2018-02-13 RX ADMIN — ACETAMINOPHEN 1000 MG: 500 TABLET, FILM COATED ORAL at 22:03

## 2018-02-13 RX ADMIN — ACETAMINOPHEN 1000 MG: 500 TABLET, FILM COATED ORAL at 13:40

## 2018-02-13 RX ADMIN — BUDESONIDE 500 MCG: 0.5 INHALANT RESPIRATORY (INHALATION) at 09:32

## 2018-02-13 RX ADMIN — IPRATROPIUM BROMIDE AND ALBUTEROL SULFATE 3 ML: .5; 3 SOLUTION RESPIRATORY (INHALATION) at 15:55

## 2018-02-13 RX ADMIN — TEMAZEPAM 30 MG: 15 CAPSULE ORAL at 22:03

## 2018-02-13 RX ADMIN — GUAIFENESIN 600 MG: 600 TABLET, EXTENDED RELEASE ORAL at 22:03

## 2018-02-13 RX ADMIN — METHYLPREDNISOLONE SODIUM SUCCINATE 60 MG: 125 INJECTION, POWDER, FOR SOLUTION INTRAMUSCULAR; INTRAVENOUS at 18:32

## 2018-02-13 RX ADMIN — RISPERIDONE 2 MG: 1 TABLET ORAL at 22:03

## 2018-02-13 RX ADMIN — BUDESONIDE 500 MCG: 0.5 INHALANT RESPIRATORY (INHALATION) at 22:30

## 2018-02-13 RX ADMIN — HEPARIN SODIUM 5000 UNITS: 5000 INJECTION, SOLUTION INTRAVENOUS; SUBCUTANEOUS at 18:37

## 2018-02-13 RX ADMIN — IPRATROPIUM BROMIDE AND ALBUTEROL SULFATE 3 ML: .5; 3 SOLUTION RESPIRATORY (INHALATION) at 09:32

## 2018-02-13 RX ADMIN — IPRATROPIUM BROMIDE AND ALBUTEROL SULFATE 3 ML: .5; 3 SOLUTION RESPIRATORY (INHALATION) at 22:29

## 2018-02-13 RX ADMIN — Medication 10 ML: at 13:40

## 2018-02-13 RX ADMIN — HEPARIN SODIUM 5000 UNITS: 5000 INJECTION, SOLUTION INTRAVENOUS; SUBCUTANEOUS at 05:56

## 2018-02-13 RX ADMIN — CETIRIZINE HYDROCHLORIDE 5 MG: 10 TABLET, FILM COATED ORAL at 15:17

## 2018-02-13 RX ADMIN — AZITHROMYCIN 500 MG: 250 TABLET, FILM COATED ORAL at 18:31

## 2018-02-13 RX ADMIN — GUAIFENESIN 600 MG: 600 TABLET, EXTENDED RELEASE ORAL at 13:40

## 2018-02-13 RX ADMIN — TRAZODONE HYDROCHLORIDE 150 MG: 100 TABLET ORAL at 22:03

## 2018-02-13 RX ADMIN — Medication 10 ML: at 22:04

## 2018-02-13 RX ADMIN — IPRATROPIUM BROMIDE AND ALBUTEROL SULFATE 3 ML: .5; 3 SOLUTION RESPIRATORY (INHALATION) at 01:46

## 2018-02-13 NOTE — PROGRESS NOTES
CM spoke with niece and bed will be available tomorrow at his new facility. CM spoke with pt's Mental Health provider, Noreen Garces NP phone # [phone], and she will continue to follow pt at his new facility. CM called and spoke with niece to inform her of this. CM sent request to Case Management Specialist for new PCP appointment. Henry Peters.  JASMYN CRM

## 2018-02-13 NOTE — PROGRESS NOTES
02/13/18 1159   Vital Signs   Temp 97.6 °F (36.4 °C)   Temp Source Oral   Pulse (Heart Rate) 93   Resp Rate 30   O2 Sat (%) 96 %   Level of Consciousness Alert   /70   MAP (Calculated) 89   BP 1 Method Automatic   BP 1 Location Right arm   BP Patient Position At rest   MEWS Score 3   Pain 1   Pain Scale 1 Numeric (0 - 10)   Pain Intensity 1 5   Patient Stated Pain Goal 5   Pain Reassessment 1 Yes       Notified Dr. Pablo Harris of MEWS score 3. Pt stating he feels short of breath and states he has some chest tightness with breathing. Denies chest pain. Lung sounds clear. MD to assess pt at bedside. Per Dr. Pablo Harris, hold 1400 scheduled doses of Azithromcin and solu-medrol until pulmonologist assess patient. Will notify on coming RN of plan. Bedside shift change report given to Ty Berrios (oncoming nurse) by Leonardo Chung (offgoing nurse). Report included the following information SBAR, Kardex, Intake/Output, MAR, Recent Results, Med Rec Status and Cardiac Rhythm  .

## 2018-02-13 NOTE — PROGRESS NOTES
Hospitalist Progress Note  Stella Payne MD  Answering service: [phone] from in house phone      Date of Service:  2018  NAME:  Marychuy Danielson  :  1940  MRN:  [id number]      Admission Summary:   79 yo man with h/o sleep disorder was BIBEMS to the ED from [facility] on 18 with confusion. He was admitted with acute encephalopathy, acute L1 fracture, rhabdomyolysis, and acute hypoxic and hypercapnic respiratory failure.      Interval history / Subjective:   C/o SOB, chest congestion, 5/10 back pain     Assessment & Plan:     Persistent back pain  - due to lumbar stenosis and L1 compression fracture  - continue scheduled TID APAP, prn Percocet, prn tramadol, lidocaine patches x2  - strongly advised to wear TLSO brace but nonadherent per staff; able to ambulate to bathroom with assistance     Acute hypoxic and hypercapnic respiratory failure (POA)  - initially resolved but now tachypnic, tachycardic, c/o SOB, chest congestion  - satting well on RA  - CTA chest 2/10 no PE  - change nebs to Atrovent due to tachycardia  - pulmonary toilet; add guaifenesin, OOB, IS  - consult PCCM for possible bronchoscopy     LLL CAP (POA) - as per admission CT  - BCx  negative  - UCx  negative  - s/p azithromycin, Zosyn, LVQ  - resolved     Acute COPD exacerbation - resolved       Rhabdomyolysis (POA) - resolved with IVF; CK WNL     Acute L1 compression fracture (POA) - Ortho consulted and signed off  - conservative management with TLSO brace, PT/OT, OOB, pain control  - nonadherent with wearing brace  - f/u with Dr Aurelia Earl outpatient  - vitamin D WNL     L4-L5 spinal stenosis - pain control; plan as above     Acute metabolic encephalopathy (POA) - resolved to baseline     Chronic nicotine dependence - cessation counseling     Hypokalemia - replete prn  Hyperphosphatemia - unknown etiology    Code status: full  DVT prophylaxis: change heparin q12    Care Plan discussed with: Patient/Family, Nurse and   Disposition: Likely to group home once niece/financial POA makes a decision on place. Pt defers to his niece for placement decisions. and TBD     Hospital Problems  Date Reviewed: 1/11/2018          Codes Class Noted POA    COPD (chronic obstructive pulmonary disease) ([facility] ICD-10-CM: J44.9  ICD-9-CM: 918  1/11/2018 Unknown        Back pain ICD-10-CM: M54.9  ICD-9-CM: 724.5  1/11/2018 Unknown        Altered mental status ICD-10-CM: R41.82  ICD-9-CM: 780.97  1/11/2018 Unknown        * (Principal)Pneumonia ICD-10-CM: J18.9  ICD-9-CM: 805  1/11/2018 Unknown        Lumbar spinal stenosis ICD-10-CM: M48.061  ICD-9-CM: 724.02  1/11/2018 Unknown        Closed L1 vertebral fracture ([facility] ICD-10-CM: S32.019A  ICD-9-CM: 805.4  1/11/2018 Unknown        Acute respiratory failure with hypoxemia [facility]) ICD-10-CM: J96.01  ICD-9-CM: 518.81  1/11/2018 Unknown            Review of Systems:   Pertinent items are noted in HPI. Vital Signs:    Last 24hrs VS reviewed since prior progress note.  Most recent are:  Visit Vitals    /70 (BP 1 Location: Right arm, BP Patient Position: At rest)    Pulse 93    Temp 97.6 °F (36.4 °C)    Resp 30    Ht 5' 9\" (1.753 m)    Wt 61.2 kg (134 lb 14.4 oz)    SpO2 96%    BMI 19.92 kg/m2       Intake/Output Summary (Last 24 hours) at 02/13/18 1212  Last data filed at 02/13/18 0309   Gross per 24 hour   Intake                0 ml   Output              550 ml   Net             -550 ml      Physical Examination:     Gen: awake, NAD, flat affect  HENT: NCAT, oral mucosa benign  Eyes: PERRL, anicteric sclera  CVS: regular rhythm, no m/g/r appreciated, no peripheral edema, +pulses  Resp: tachypnic, CTAB, no w/r/r  Abd: + BS, soft, NT/ND  Neuro: AAOx3, responds appropriately to questions and commands    Data Review:    Review and/or order of clinical lab test  Review and/or order of tests in the radiology section of CPT  Review and/or order of tests in the medicine section of CPT    Labs:   No results for input(s): WBC, HGB, HCT, PLT, HGBEXT, HCTEXT, PLTEXT in the last 72 hours. No results for input(s): NA, K, CL, CO2, BUN, CREA, GLU, CA, MG, PHOS, URICA in the last 72 hours. No results for input(s): SGOT, GPT, ALT, AP, TBIL, TBILI, TP, ALB, GLOB, GGT, AML, LPSE in the last 72 hours. No lab exists for component: AMYP, HLPSE  No results for input(s): INR, PTP, APTT in the last 72 hours. No lab exists for component: INREXT   No results for input(s): FE, TIBC, PSAT, FERR in the last 72 hours. No results found for: FOL, RBCF   No results for input(s): PH, PCO2, PO2 in the last 72 hours. No results for input(s): CPK, CKNDX, TROIQ in the last 72 hours.     No lab exists for component: CPKMB  No results found for: CHOL, CHOLX, CHLST, CHOLV, HDL, LDL, LDLC, DLDLP, TGLX, TRIGL, TRIGP, CHHD, CHHDX  Lab Results   Component Value Date/Time    Glucose (POC) 116 (H) 02/05/2018 06:20 AM    Glucose (POC) 102 (H) 01/31/2018 12:03 PM    Glucose (POC) 91 01/20/2018 06:52 AM    Glucose (POC) 136 (H) 01/17/2018 11:46 AM    Glucose (POC) 97 01/12/2018 10:18 PM     Lab Results   Component Value Date/Time    Color YELLOW/STRAW 02/09/2018 01:22 PM    Appearance CLEAR 02/09/2018 01:22 PM    Specific gravity 1.028 02/09/2018 01:22 PM    pH (UA) 6.0 02/09/2018 01:22 PM    Protein NEGATIVE  02/09/2018 01:22 PM    Glucose NEGATIVE  02/09/2018 01:22 PM    Ketone NEGATIVE  02/09/2018 01:22 PM    Bilirubin NEGATIVE  02/09/2018 01:22 PM    Urobilinogen 1.0 02/09/2018 01:22 PM    Nitrites NEGATIVE  02/09/2018 01:22 PM    Leukocyte Esterase NEGATIVE  02/09/2018 01:22 PM    Epithelial cells FEW 02/09/2018 01:22 PM    Bacteria NEGATIVE  02/09/2018 01:22 PM    WBC 0-4 02/09/2018 01:22 PM    RBC 0-5 02/09/2018 01:22 PM     Medications Reviewed:     Current Facility-Administered Medications   Medication Dose Route Frequency    ipratropium (ATROVENT) 0.02 % nebulizer solution 0.5 mg  0.5 mg Nebulization Q4H RT    budesonide (PULMICORT) 500 mcg/2 ml nebulizer suspension  500 mcg Nebulization BID RT    ALPRAZolam (XANAX) tablet 0.25 mg  0.25 mg Oral TID PRN    acetaminophen (TYLENOL) tablet 1,000 mg  1,000 mg Oral Q8H    lidocaine (LIDODERM) 5 % patch 2 Patch  2 Patch TransDERmal Q24H    nicotine (NICODERM CQ) 14 mg/24 hr patch 1 Patch  1 Patch TransDERmal DAILY    heparin (porcine) injection 5,000 Units  5,000 Units SubCUTAneous Q8H    polyethylene glycol (MIRALAX) packet 17 g  17 g Oral DAILY PRN    risperiDONE (RisperDAL) tablet 2 mg  2 mg Oral QHS    temazepam (RESTORIL) capsule 30 mg  30 mg Oral QHS    traMADol (ULTRAM) tablet 50 mg  50 mg Oral Q8H PRN    traZODone (DESYREL) tablet 150 mg  150 mg Oral QHS    oxyCODONE-acetaminophen (PERCOCET) 5-325 mg per tablet 1 Tab  1 Tab Oral Q6H PRN    sodium chloride (NS) flush 5-10 mL  5-10 mL IntraVENous Q8H    sodium chloride (NS) flush 5-10 mL  5-10 mL IntraVENous PRN     ______________________________________________________________________  EXPECTED LENGTH OF STAY: 4d 12h  ACTUAL LENGTH OF STAY:          [id number] Nico Jung MD

## 2018-02-13 NOTE — CONSULTS
[facility]    INTERVAL HISTORY / SUBJECTIVE:  Hospital Day: 34     2/13/2018    Reason For Consult:   Vivian Magallanes is a 78 y.o. BLACK OR  male is admitted on 1/11/2018 by Lesly Chavira MD for whom we were asked see in consultation for Dyspnea. Chief complaint of continuous moderate dyspnea on exertion over several days. Course has been gradually worsening and is exacerbated by any exercise but relieved by nothing. ROS:  Cough - Other than noted above, a 12 point review of systems is negative for any other constitutional, opthalmologic, ENT, cardiovascular, pulmonary, gastrointestinal, urinary, neurologic, psychiatric, lymphatic, hematologic, oncologic,  integument or musculoskeletal issues. Patient is a 42-year-old -American male-ex-smoker were asked to see for dyspnea of moderate duration. He notes cough with sputum production. CT was visualized and show hyperinflation changes of COPD. Small area of patchy infiltrate in the right lung would appear to be chronic. There could be MATTEO. The left lower lobe showed dpendent areas with incarcerated sputum. After risks benefits and alternatives were discussed with the patient, he is agreeable to conscious sedation for bronchoscopy and removal of secretions and cultures. He will be n.p.o. after midnight and procedure will be done tomorrow morning.     ASSESSMENT and PLAN:    · Abnormal CT scan ---> NPO after MN - Bronch --> Abx and steroids - Nebs - Pulmonary Toilet      Patient Active Problem List   Diagnosis Code    COPD (chronic obstructive pulmonary disease) ([facility]) J44.9    Back pain M54.9    Altered mental status R41.82    Pneumonia J18.9    Lumbar spinal stenosis M48.061    Closed L1 vertebral fracture ([facility]) S32.019A    Acute respiratory failure with hypoxemia ([facility]) J96.01         Medical Decision Making Today  · Acute or chronic illness that poses a threat to life or bodily function  · Review and Order of Radiology tests  · Review and Order of Medicine tests  · Independent visualization of Image      VITALS    Visit Vitals    /70 (BP 1 Location: Right arm, BP Patient Position: At rest)    Pulse 93    Temp 97.6 °F (36.4 °C)    Resp 30    Ht 5' 9\" (1.753 m)    Wt 61.2 kg (134 lb 14.4 oz)    SpO2 96%    BMI 19.92 kg/m2        Temp (24hrs), Av.3 °F (36.8 °C), Min:97.6 °F (36.4 °C), Max:99 °F (37.2 °C)                   Intake/Output Summary (Last 24 hours) at 18 1504  Last data filed at 18 0309   Gross per 24 hour   Intake                0 ml   Output              500 ml   Net             -500 ml         Other:      GEN: Nontoxic Nondistressed   EYE: Anicteric Noninjected   ENT: Moist No Thrush   CARD: Regular No murmurs   RESP: Clear Equal BS   GI: NABS Nontender   : Clear Urine Normal Genitalia   SKEL: WD WN No Clubbing   SKIN: Perfused No drug rash   NEURO: A & O x3 Nonfocal   PSYCH: Nonagitated Good Insight   LYMPH: No ESTELA No edema       No results for input(s): NA, K, CL, CO2, BUN, CREA, GLU, CA, MG, PHOS, LAC, TBIL, TBILI, GPT, ALT, SGOT, AP, TP, ALB, GLOB, AML, LPSE in the last 72 hours. No results for input(s): WBC, HGB, PLT, INR, APTT, HGBEXT, PLTEXT in the last 72 hours. No lab exists for component: FIB, DDMER, INREXT    No results for input(s): PHI, PCO2I, PO2I, HCO3I, FIO2I in the last 72 hours. XRAY Result:    Results from Hospital Encounter encounter on 18   XR CHEST PORT   Narrative Indication: Tachypnea, shortness of breath    Comparison: 2018    Portable exam of the chest obtained at 1241 demonstrates normal heart size. There is no acute process in the lung fields. The lungs are hyperinflated. Scarring is stable at the left lung base. The osseous structures are  unremarkable. Impression Impression: No acute process. COPD.           CT Result:    Results from Hospital Encounter encounter on 18   CTA CHEST W OR W WO CONT   Narrative EXAM:  CT angiography chest    INDICATION:  Dyspnea on exertion. COMPARISON: Chest radiograph 2/9/2018. CT chest 1/11/2018    TECHNIQUE: Helical thin section chest CT following uneventful intravenous  administration of nonionic contrast according to departmental PE protocol. Coronal and sagittal reformats were performed. 3D/MIP post processing was  performed. CT dose reduction was achieved through use of a standardized protocol  tailored for this examination and automatic exposure control for dose  modulation. Adaptive statistical iterative reconstruction (ASIR) was utilized. FINDINGS: This is a good quality study for the evaluation of pulmonary embolism  to the first subsegmental arterial level. There is no pulmonary embolism to this  level. The aorta and main pulmonary artery are normal in caliber. Cardiac size is  within normal limits. No pericardial effusion. No lymphadenopathy by imaging  size criteria. Marked diffuse centrilobular emphysema is again there is a smaller but more  dense opacity in the left lower lobe measuring 1.0 x 2.0 cm (series 3, image  29). Debris is again seen in the left lower lobe bronchioles. No pleural  effusion or pneumothorax. Central airways are unremarkable. Limited images of the upper abdomen are within normal limits. There is mildly  increased sclerosis of the superior endplate at T8 which may represent  age-indeterminate minimal compression. This is new since 1/11/2018. There is no  aggressive bony lesion. Impression IMPRESSION:   1. There is no acute pulmonary embolism. 2. Debris is again seen in the left lower lobe bronchioles with a smaller but  more dense opacity in the left lower lobe and may represent nonspecific  infection, inflammation, or atelectasis. 3. Marked diffuse centrilobular emphysema. PMH:  has a past medical history of Sleep disorder. PSH:   has no past surgical history on file.      FHX: Family history is unknown by patient. SHX: has an unknown smoking status. He has never used smokeless tobacco. He reports that he does not drink alcohol or use illicit drugs.     ALL:   Allergies   Allergen Reactions    Shellfish Derived Unable to Obtain     Per SNF paperwork        MEDS:   [x] Reviewed  [] Not reviewed    Current Facility-Administered Medications   Medication Dose Route Frequency    guaiFENesin ER (MUCINEX) tablet 600 mg  600 mg Oral Q12H    heparin (porcine) injection 5,000 Units  5,000 Units SubCUTAneous Q12H    albuterol-ipratropium (DUO-NEB) 2.5 MG-0.5 MG/3 ML  3 mL Nebulization Q4H RT    methylPREDNISolone (PF) (SOLU-MEDROL) injection 60 mg  60 mg IntraVENous Q6H    azithromycin (ZITHROMAX) tablet 500 mg  500 mg Oral DAILY    cetirizine (ZYRTEC) tablet 5 mg  5 mg Oral DAILY    budesonide (PULMICORT) 500 mcg/2 ml nebulizer suspension  500 mcg Nebulization BID RT    ALPRAZolam (XANAX) tablet 0.25 mg  0.25 mg Oral TID PRN    acetaminophen (TYLENOL) tablet 1,000 mg  1,000 mg Oral Q8H    lidocaine (LIDODERM) 5 % patch 2 Patch  2 Patch TransDERmal Q24H    nicotine (NICODERM CQ) 14 mg/24 hr patch 1 Patch  1 Patch TransDERmal DAILY    polyethylene glycol (MIRALAX) packet 17 g  17 g Oral DAILY PRN    risperiDONE (RisperDAL) tablet 2 mg  2 mg Oral QHS    temazepam (RESTORIL) capsule 30 mg  30 mg Oral QHS    traMADol (ULTRAM) tablet 50 mg  50 mg Oral Q8H PRN    traZODone (DESYREL) tablet 150 mg  150 mg Oral QHS    oxyCODONE-acetaminophen (PERCOCET) 5-325 mg per tablet 1 Tab  1 Tab Oral Q6H PRN    sodium chloride (NS) flush 5-10 mL  5-10 mL IntraVENous Q8H    sodium chloride (NS) flush 5-10 mL  5-10 mL IntraVENous PRN       Giovanni De Leon MD [facility]

## 2018-02-13 NOTE — PROGRESS NOTES
Bedside and Verbal shift change report given to Adrián Rojas (oncoming nurse) by Carmel Diaz (offgoing nurse). Report included the following information SBAR, Kardex, ED Summary, Intake/Output, MAR and Cardiac Rhythm NSR.

## 2018-02-14 LAB
ANION GAP SERPL CALC-SCNC: 8 MMOL/L (ref 5–15)
APPEARANCE FLD: ABNORMAL
BUN SERPL-MCNC: 19 MG/DL (ref 6–20)
BUN/CREAT SERPL: 25 (ref 12–20)
CALCIUM SERPL-MCNC: 9.5 MG/DL (ref 8.5–10.1)
CHLORIDE SERPL-SCNC: 99 MMOL/L (ref 97–108)
CO2 SERPL-SCNC: 29 MMOL/L (ref 21–32)
COLOR FLD: ABNORMAL
CREAT SERPL-MCNC: 0.76 MG/DL (ref 0.7–1.3)
ERYTHROCYTE [DISTWIDTH] IN BLOOD BY AUTOMATED COUNT: 12 % (ref 11.5–14.5)
GLUCOSE SERPL-MCNC: 154 MG/DL (ref 65–100)
HCT VFR BLD AUTO: 36.7 % (ref 36.6–50.3)
HGB BLD-MCNC: 12.6 G/DL (ref 12.1–17)
LYMPHOCYTES NFR FLD: 1 %
MCH RBC QN AUTO: 34.9 PG (ref 26–34)
MCHC RBC AUTO-ENTMCNC: 34.3 G/DL (ref 30–36.5)
MCV RBC AUTO: 101.7 FL (ref 80–99)
NEUTROPHILS NFR FLD: 99 %
NRBC # BLD: 0 K/UL (ref 0–0.01)
NRBC BLD-RTO: 0 PER 100 WBC
NUC CELL # FLD: 2140 /CU MM
PLATELET # BLD AUTO: 236 K/UL (ref 150–400)
PMV BLD AUTO: 9.4 FL (ref 8.9–12.9)
POTASSIUM SERPL-SCNC: 4.4 MMOL/L (ref 3.5–5.1)
RBC # BLD AUTO: 3.61 M/UL (ref 4.1–5.7)
RBC # FLD: >100 /CU MM
SODIUM SERPL-SCNC: 136 MMOL/L (ref 136–145)
SPECIMEN SOURCE FLD: ABNORMAL
WBC # BLD AUTO: 5.6 K/UL (ref 4.1–11.1)

## 2018-02-14 PROCEDURE — 65270000032 HC RM SEMIPRIVATE

## 2018-02-14 PROCEDURE — 74011000250 HC RX REV CODE- 250: Performed by: INTERNAL MEDICINE

## 2018-02-14 PROCEDURE — 36415 COLL VENOUS BLD VENIPUNCTURE: CPT | Performed by: INTERNAL MEDICINE

## 2018-02-14 PROCEDURE — 74011250637 HC RX REV CODE- 250/637: Performed by: INTERNAL MEDICINE

## 2018-02-14 PROCEDURE — 76040000019: Performed by: INTERNAL MEDICINE

## 2018-02-14 PROCEDURE — 74011250636 HC RX REV CODE- 250/636: Performed by: INTERNAL MEDICINE

## 2018-02-14 PROCEDURE — 94664 DEMO&/EVAL PT USE INHALER: CPT

## 2018-02-14 PROCEDURE — 87252 VIRUS INOCULATION TISSUE: CPT | Performed by: INTERNAL MEDICINE

## 2018-02-14 PROCEDURE — 85027 COMPLETE CBC AUTOMATED: CPT | Performed by: INTERNAL MEDICINE

## 2018-02-14 PROCEDURE — 80048 BASIC METABOLIC PNL TOTAL CA: CPT | Performed by: INTERNAL MEDICINE

## 2018-02-14 PROCEDURE — 74011000250 HC RX REV CODE- 250: Performed by: HOSPITALIST

## 2018-02-14 PROCEDURE — 87102 FUNGUS ISOLATION CULTURE: CPT | Performed by: INTERNAL MEDICINE

## 2018-02-14 PROCEDURE — 87070 CULTURE OTHR SPECIMN AEROBIC: CPT | Performed by: INTERNAL MEDICINE

## 2018-02-14 PROCEDURE — 94640 AIRWAY INHALATION TREATMENT: CPT

## 2018-02-14 PROCEDURE — 74011250636 HC RX REV CODE- 250/636

## 2018-02-14 PROCEDURE — 87116 MYCOBACTERIA CULTURE: CPT | Performed by: INTERNAL MEDICINE

## 2018-02-14 PROCEDURE — 0BCB8ZZ EXTIRPATION OF MATTER FROM LEFT LOWER LOBE BRONCHUS, VIA NATURAL OR ARTIFICIAL OPENING ENDOSCOPIC: ICD-10-PCS | Performed by: INTERNAL MEDICINE

## 2018-02-14 PROCEDURE — 89050 BODY FLUID CELL COUNT: CPT | Performed by: INTERNAL MEDICINE

## 2018-02-14 RX ORDER — LIDOCAINE HYDROCHLORIDE 20 MG/ML
INJECTION, SOLUTION INFILTRATION; PERINEURAL
Status: DISPENSED
Start: 2018-02-14 | End: 2018-02-14

## 2018-02-14 RX ORDER — SODIUM CHLORIDE 0.9 % (FLUSH) 0.9 %
5-10 SYRINGE (ML) INJECTION AS NEEDED
Status: DISCONTINUED | OUTPATIENT
Start: 2018-02-14 | End: 2018-02-15 | Stop reason: HOSPADM

## 2018-02-14 RX ORDER — MIDAZOLAM HYDROCHLORIDE 1 MG/ML
5 INJECTION, SOLUTION INTRAMUSCULAR; INTRAVENOUS
Status: DISCONTINUED | OUTPATIENT
Start: 2018-02-14 | End: 2018-02-14 | Stop reason: HOSPADM

## 2018-02-14 RX ORDER — LIDOCAINE HYDROCHLORIDE 20 MG/ML
INJECTION, SOLUTION INFILTRATION; PERINEURAL AS NEEDED
Status: DISCONTINUED | OUTPATIENT
Start: 2018-02-14 | End: 2018-02-15 | Stop reason: HOSPADM

## 2018-02-14 RX ORDER — LIDOCAINE HYDROCHLORIDE 10 MG/ML
INJECTION INFILTRATION; PERINEURAL AS NEEDED
Status: DISCONTINUED | OUTPATIENT
Start: 2018-02-14 | End: 2018-02-15 | Stop reason: HOSPADM

## 2018-02-14 RX ORDER — MIDAZOLAM HYDROCHLORIDE 1 MG/ML
INJECTION, SOLUTION INTRAMUSCULAR; INTRAVENOUS
Status: DISPENSED
Start: 2018-02-14 | End: 2018-02-14

## 2018-02-14 RX ORDER — SODIUM CHLORIDE 0.9 % (FLUSH) 0.9 %
5-10 SYRINGE (ML) INJECTION EVERY 8 HOURS
Status: DISCONTINUED | OUTPATIENT
Start: 2018-02-14 | End: 2018-02-15 | Stop reason: HOSPADM

## 2018-02-14 RX ORDER — FENTANYL CITRATE 50 UG/ML
INJECTION, SOLUTION INTRAMUSCULAR; INTRAVENOUS
Status: DISPENSED
Start: 2018-02-14 | End: 2018-02-14

## 2018-02-14 RX ORDER — SODIUM CHLORIDE 0.9 % (FLUSH) 0.9 %
SYRINGE (ML) INJECTION
Status: DISPENSED
Start: 2018-02-14 | End: 2018-02-14

## 2018-02-14 RX ORDER — IPRATROPIUM BROMIDE 0.5 MG/2.5ML
0.5 SOLUTION RESPIRATORY (INHALATION)
Status: DISCONTINUED | OUTPATIENT
Start: 2018-02-14 | End: 2018-02-15 | Stop reason: HOSPADM

## 2018-02-14 RX ORDER — FENTANYL CITRATE 50 UG/ML
100 INJECTION, SOLUTION INTRAMUSCULAR; INTRAVENOUS
Status: DISCONTINUED | OUTPATIENT
Start: 2018-02-14 | End: 2018-02-14 | Stop reason: HOSPADM

## 2018-02-14 RX ORDER — SODIUM CHLORIDE 9 MG/ML
50 INJECTION, SOLUTION INTRAVENOUS CONTINUOUS
Status: DISPENSED | OUTPATIENT
Start: 2018-02-14 | End: 2018-02-15

## 2018-02-14 RX ORDER — LIDOCAINE HYDROCHLORIDE 10 MG/ML
INJECTION INFILTRATION; PERINEURAL
Status: DISPENSED
Start: 2018-02-14 | End: 2018-02-14

## 2018-02-14 RX ADMIN — HEPARIN SODIUM 5000 UNITS: 5000 INJECTION, SOLUTION INTRAVENOUS; SUBCUTANEOUS at 17:44

## 2018-02-14 RX ADMIN — IPRATROPIUM BROMIDE AND ALBUTEROL SULFATE 3 ML: .5; 3 SOLUTION RESPIRATORY (INHALATION) at 04:18

## 2018-02-14 RX ADMIN — ACETAMINOPHEN 1000 MG: 500 TABLET, FILM COATED ORAL at 13:58

## 2018-02-14 RX ADMIN — RISPERIDONE 2 MG: 1 TABLET ORAL at 22:13

## 2018-02-14 RX ADMIN — Medication 10 ML: at 13:28

## 2018-02-14 RX ADMIN — ACETAMINOPHEN 1000 MG: 500 TABLET, FILM COATED ORAL at 22:13

## 2018-02-14 RX ADMIN — IPRATROPIUM BROMIDE 0.5 MG: 0.5 SOLUTION RESPIRATORY (INHALATION) at 17:21

## 2018-02-14 RX ADMIN — ACETAMINOPHEN 1000 MG: 500 TABLET, FILM COATED ORAL at 06:08

## 2018-02-14 RX ADMIN — TEMAZEPAM 30 MG: 15 CAPSULE ORAL at 22:13

## 2018-02-14 RX ADMIN — GUAIFENESIN 600 MG: 600 TABLET, EXTENDED RELEASE ORAL at 22:13

## 2018-02-14 RX ADMIN — METHYLPREDNISOLONE SODIUM SUCCINATE 60 MG: 125 INJECTION, POWDER, FOR SOLUTION INTRAMUSCULAR; INTRAVENOUS at 17:43

## 2018-02-14 RX ADMIN — AZITHROMYCIN 500 MG: 250 TABLET, FILM COATED ORAL at 08:42

## 2018-02-14 RX ADMIN — OXYCODONE AND ACETAMINOPHEN 1 TABLET: 5; 325 TABLET ORAL at 07:49

## 2018-02-14 RX ADMIN — BUDESONIDE 500 MCG: 0.5 INHALANT RESPIRATORY (INHALATION) at 08:32

## 2018-02-14 RX ADMIN — HEPARIN SODIUM 5000 UNITS: 5000 INJECTION, SOLUTION INTRAVENOUS; SUBCUTANEOUS at 06:09

## 2018-02-14 RX ADMIN — BUDESONIDE 500 MCG: 0.5 INHALANT RESPIRATORY (INHALATION) at 20:15

## 2018-02-14 RX ADMIN — TRAMADOL HYDROCHLORIDE 50 MG: 50 TABLET, FILM COATED ORAL at 01:49

## 2018-02-14 RX ADMIN — TRAZODONE HYDROCHLORIDE 150 MG: 100 TABLET ORAL at 22:13

## 2018-02-14 RX ADMIN — Medication 10 ML: at 06:09

## 2018-02-14 RX ADMIN — IPRATROPIUM BROMIDE AND ALBUTEROL SULFATE 3 ML: .5; 3 SOLUTION RESPIRATORY (INHALATION) at 08:32

## 2018-02-14 RX ADMIN — GUAIFENESIN 600 MG: 600 TABLET, EXTENDED RELEASE ORAL at 08:42

## 2018-02-14 RX ADMIN — IPRATROPIUM BROMIDE 0.5 MG: 0.5 SOLUTION RESPIRATORY (INHALATION) at 23:58

## 2018-02-14 RX ADMIN — IPRATROPIUM BROMIDE 0.5 MG: 0.5 SOLUTION RESPIRATORY (INHALATION) at 20:15

## 2018-02-14 RX ADMIN — CETIRIZINE HYDROCHLORIDE 5 MG: 10 TABLET, FILM COATED ORAL at 08:43

## 2018-02-14 RX ADMIN — IPRATROPIUM BROMIDE 0.5 MG: 0.5 SOLUTION RESPIRATORY (INHALATION) at 12:12

## 2018-02-14 RX ADMIN — METHYLPREDNISOLONE SODIUM SUCCINATE 60 MG: 125 INJECTION, POWDER, FOR SOLUTION INTRAMUSCULAR; INTRAVENOUS at 13:28

## 2018-02-14 RX ADMIN — TRAMADOL HYDROCHLORIDE 50 MG: 50 TABLET, FILM COATED ORAL at 15:13

## 2018-02-14 RX ADMIN — METHYLPREDNISOLONE SODIUM SUCCINATE 60 MG: 125 INJECTION, POWDER, FOR SOLUTION INTRAMUSCULAR; INTRAVENOUS at 06:09

## 2018-02-14 NOTE — PROGRESS NOTES
TRANSFER - OUT REPORT:    Verbal report given to Martha (name) on Mona Stanton  being transferred to 11  (unit) for routine post - op       Report consisted of patients Situation, Background, Assessment and   Recommendations(SBAR). Information from the following report(s) SBAR, Kardex, Intake/Output, MAR and Recent Results was reviewed with the receiving nurse. Lines:   Peripheral IV 01/29/18 Left Forearm (Active)   Site Assessment Clean, dry, & intact 2/13/2018  8:11 PM   Phlebitis Assessment 0 2/13/2018  8:11 PM   Infiltration Assessment 0 2/13/2018  8:11 PM   Dressing Status Clean, dry, & intact 2/13/2018  8:11 PM   Dressing Type Transparent 2/13/2018  8:11 PM   Hub Color/Line Status Capped;Flushed 2/13/2018  8:11 PM   Action Taken Open ports on tubing capped 2/13/2018  8:11 PM   Alcohol Cap Used Yes 2/13/2018  8:11 PM        Opportunity for questions and clarification was provided. 0.5 mg IV versed and 12.5 mcg IV fentanyl given for procedure, patient tolerated procedure well. Denies chest pain, denies SOB.

## 2018-02-14 NOTE — PROCEDURES
Hospital: [facility]   Name: Larry Lockwood   : 1940   MRN: [id number]   Code: Full Code       Procedure:  Diagnostic bronchoscopy  With washout of mucus in bronchi - Conscious sedation    Preoperative Diagnosis:  abnormal chest x-ray    Postoperative Diagnosis:   · Mucus clot in bronchi - LLL    Specimens:  1. Specimen sent for routine culture, AFB, fungal culture Viral.  CDMP query Nurse THIS WAS NOT A BAL    Procedure:   After answering all questions and full information as to risks, benefits, alternatives and goals, consent was obtained from Patient. A timeout was taken at bedside in the presence of the operative team to confirm the patient's identity and planned procedure. There was agreement and the bronchscope was introduced orally with use of a bite block. Findings as noted below:    -- Pharynx:  Normal    -- Vocal cords: Normal    -- Trachea: Normal    -- Lisbeth: Normal    -- RUL:  Normal     -- RML:  Normal     -- RLL:  Normal    -- JARETT:  Normal    -- LLL:  abnormal findings -  Mucus clot in bronchi - removed with wash ---> Yellow color    Complications: none           Disposition: PACU - hemodynamically stable. Condition: stable    ASA Score: ASA 2 - Mild systemic disease     Mallampati Score: II (soft palate, uvula, fauces visible)    Estimated Blood Loss: None    Anesthesia / Sedation:   Moderate conscious sedation was administered by endoscopy RN under the direct supervision of endoscopist.  HR, EKG, SpO2, BP , adequacy of ventilation was monitored. Total physcian intraservice time was > 15 minutes. Topical sedation to nares, mouth, and tracheobronchial tree with lidocaine and Moderate sedation with Fentanyl 12.5  mcg and Versed 0.5mg was used      Monitoring:  Vital signs monitored by attending RN as well as pulse oximetry.        Implants:   none           Surgeon: Doni Sunshine MD, [facility]

## 2018-02-14 NOTE — PROGRESS NOTES
Hospitalist Progress Note  Cora Vasquez MD  Answering service: [phone] from in house phone      Date of Service:  2018  NAME:  Zaira East  :  1940  MRN:  [id number]      Admission Summary:   77 yo man with h/o sleep disorder was BIBEMS to the ED from [facility] on 18 with confusion. He was admitted with acute encephalopathy, acute L1 fracture, rhabdomyolysis, and acute hypoxic and hypercapnic respiratory failure.      Interval history / Subjective:   States he's feeling better, SOB improved but c/o HA and 8/10 back pain; s/p bronchoscopy with mucus plug removed from LLL; still tachycardic; d/w nurse     Assessment & Plan:     Persistent back pain  - due to lumbar stenosis and L1 compression fracture  - continue scheduled TID APAP, prn Percocet, prn tramadol, lidocaine patches x2  - strongly advised to wear TLSO brace but nonadherent per staff; able to ambulate to bathroom with assistance     Acute hypoxic and hypercapnic respiratory failure (POA)  - initially resolved but now tachypnic, tachycardic, c/o SOB, chest congestion  - satting well on RA  - CTA chest 2/10 no PE  - changed nebs to Atrovent due to tachycardia  - pulmonary toilet; guaifenesin, OOB, IS  - PCCM consulted  - s/p bronchoscopy  with mucus clot removed from LLL  - now on azithromycin and IV steroid     LLL CAP (POA) - as per admission CT  - BCx  negative  - UCx  negative  - s/p azithromycin, Zosyn, LVQ  - resolved     Acute COPD exacerbation - resolved       Rhabdomyolysis (POA) - resolved with IVF; CK WNL     Acute L1 compression fracture (POA) - Ortho consulted and signed off  - conservative management with TLSO brace, PT/OT, OOB, pain control  - nonadherent with wearing brace  - f/u with Dr Boris Kidd outpatient  - vitamin D WNL     L4-L5 spinal stenosis - pain control; plan as above     Acute metabolic encephalopathy (POA) - resolved to baseline     Chronic nicotine dependence - cessation counseling     Hypokalemia - replete prn  Hyperphosphatemia - unknown etiology    Code status: full  DVT prophylaxis: change heparin q12    Care Plan discussed with: Patient/Family, Nurse and   Disposition: Has bed and authorization for [facility] when medically stable, likely later today or tomorrw     Hospital Problems  Date Reviewed: 1/11/2018          Codes Class Noted POA    COPD (chronic obstructive pulmonary disease) ([facility] ICD-10-CM: J44.9  ICD-9-CM: 496  1/11/2018 Unknown        Back pain ICD-10-CM: M54.9  ICD-9-CM: 724.5  1/11/2018 Unknown        Altered mental status ICD-10-CM: R41.82  ICD-9-CM: 780.97  1/11/2018 Unknown        * (Principal)Pneumonia ICD-10-CM: J18.9  ICD-9-CM: 486  1/11/2018 Unknown        Lumbar spinal stenosis ICD-10-CM: M48.061  ICD-9-CM: 724.02  1/11/2018 Unknown        Closed L1 vertebral fracture ([facility] ICD-10-CM: S32.019A  ICD-9-CM: 805.4  1/11/2018 Unknown        Acute respiratory failure with hypoxemia [facility]) ICD-10-CM: J96.01  ICD-9-CM: 518.81  1/11/2018 Unknown            Review of Systems:   Pertinent items are noted in HPI. Vital Signs:    Last 24hrs VS reviewed since prior progress note.  Most recent are:  Visit Vitals    /85    Pulse (!) 104    Temp 98.1 °F (36.7 °C)    Resp 20    Ht 5' 9\" (1.753 m)    Wt 61.2 kg (134 lb 14.4 oz)    SpO2 96%    BMI 19.92 kg/m2       Intake/Output Summary (Last 24 hours) at 02/14/18 1052  Last data filed at 02/13/18 2206   Gross per 24 hour   Intake                0 ml   Output              835 ml   Net             -835 ml      Physical Examination:     Gen: awake, NAD, flat affect  HENT: NCAT, oral mucosa benign  Eyes: PERRL, anicteric sclera  CVS: tachycardic, regular rhythm, no m/g/r appreciated, no peripheral edema, +pulses  Resp: tachypnic, left basilar rales, no wheeze  Abd: + BS, soft, NT/ND  Neuro: AAOx3, responds appropriately to questions and commands    Data Review:    Review and/or order of clinical lab test  Review and/or order of tests in the radiology section of CPT  Review and/or order of tests in the medicine section of CPT    Labs:     Recent Labs      02/14/18   0248   WBC  5.6   HGB  12.6   HCT  36.7   PLT  236     Recent Labs      02/14/18   0248   NA  136   K  4.4   CL  99   CO2  29   BUN  19   CREA  0.76   GLU  154*   CA  9.5     No results for input(s): SGOT, GPT, ALT, AP, TBIL, TBILI, TP, ALB, GLOB, GGT, AML, LPSE in the last 72 hours. No lab exists for component: AMYP, HLPSE  No results for input(s): INR, PTP, APTT in the last 72 hours. No lab exists for component: INREXT, INREXT   No results for input(s): FE, TIBC, PSAT, FERR in the last 72 hours. No results found for: FOL, RBCF   No results for input(s): PH, PCO2, PO2 in the last 72 hours. No results for input(s): CPK, CKNDX, TROIQ in the last 72 hours.     No lab exists for component: CPKMB  No results found for: CHOL, CHOLX, CHLST, CHOLV, HDL, LDL, LDLC, DLDLP, TGLX, TRIGL, TRIGP, CHHD, CHHDX  Lab Results   Component Value Date/Time    Glucose (POC) 116 (H) 02/05/2018 06:20 AM    Glucose (POC) 102 (H) 01/31/2018 12:03 PM    Glucose (POC) 91 01/20/2018 06:52 AM    Glucose (POC) 136 (H) 01/17/2018 11:46 AM    Glucose (POC) 97 01/12/2018 10:18 PM     Lab Results   Component Value Date/Time    Color YELLOW/STRAW 02/09/2018 01:22 PM    Appearance CLEAR 02/09/2018 01:22 PM    Specific gravity 1.028 02/09/2018 01:22 PM    pH (UA) 6.0 02/09/2018 01:22 PM    Protein NEGATIVE  02/09/2018 01:22 PM    Glucose NEGATIVE  02/09/2018 01:22 PM    Ketone NEGATIVE  02/09/2018 01:22 PM    Bilirubin NEGATIVE  02/09/2018 01:22 PM    Urobilinogen 1.0 02/09/2018 01:22 PM    Nitrites NEGATIVE  02/09/2018 01:22 PM    Leukocyte Esterase NEGATIVE  02/09/2018 01:22 PM    Epithelial cells FEW 02/09/2018 01:22 PM    Bacteria NEGATIVE  02/09/2018 01:22 PM    WBC 0-4 02/09/2018 01:22 PM    RBC 0-5 02/09/2018 01:22 PM     Medications Reviewed:     Current Facility-Administered Medications   Medication Dose Route Frequency    lidocaine (XYLOCAINE) 20 mg/mL (2 %) injection        lidocaine (XYLOCAINE) 10 mg/mL (1 %) injection        benzocaine (HURRICANE) 20 % spray        sodium chloride (NS) flush 5-10 mL  5-10 mL IntraVENous Q8H    sodium chloride (NS) flush 5-10 mL  5-10 mL IntraVENous PRN    sodium chloride (NS) 0.9 % flush        fentaNYL citrate (PF) 50 mcg/mL injection        midazolam (VERSED) 1 mg/mL injection        lidocaine (XYLOCAINE) 10 mg/mL (1 %) injection    PRN    lidocaine (XYLOCAINE) 20 mg/mL (2 %) injection    PRN    benzocaine (HURRICANE) 20 % spray    PRN    guaiFENesin ER (MUCINEX) tablet 600 mg  600 mg Oral Q12H    heparin (porcine) injection 5,000 Units  5,000 Units SubCUTAneous Q12H    albuterol-ipratropium (DUO-NEB) 2.5 MG-0.5 MG/3 ML  3 mL Nebulization Q4H RT    methylPREDNISolone (PF) (SOLU-MEDROL) injection 60 mg  60 mg IntraVENous Q6H    azithromycin (ZITHROMAX) tablet 500 mg  500 mg Oral DAILY    cetirizine (ZYRTEC) tablet 5 mg  5 mg Oral DAILY    budesonide (PULMICORT) 500 mcg/2 ml nebulizer suspension  500 mcg Nebulization BID RT    ALPRAZolam (XANAX) tablet 0.25 mg  0.25 mg Oral TID PRN    acetaminophen (TYLENOL) tablet 1,000 mg  1,000 mg Oral Q8H    lidocaine (LIDODERM) 5 % patch 2 Patch  2 Patch TransDERmal Q24H    nicotine (NICODERM CQ) 14 mg/24 hr patch 1 Patch  1 Patch TransDERmal DAILY    polyethylene glycol (MIRALAX) packet 17 g  17 g Oral DAILY PRN    risperiDONE (RisperDAL) tablet 2 mg  2 mg Oral QHS    temazepam (RESTORIL) capsule 30 mg  30 mg Oral QHS    traMADol (ULTRAM) tablet 50 mg  50 mg Oral Q8H PRN    traZODone (DESYREL) tablet 150 mg  150 mg Oral QHS    oxyCODONE-acetaminophen (PERCOCET) 5-325 mg per tablet 1 Tab  1 Tab Oral Q6H PRN    sodium chloride (NS) flush 5-10 mL  5-10 mL IntraVENous Q8H    sodium chloride (NS) flush 5-10 mL  5-10 mL IntraVENous PRN     ______________________________________________________________________  EXPECTED LENGTH OF STAY: 4d 12h  ACTUAL LENGTH OF STAY:          [id number] [facility], [place]

## 2018-02-14 NOTE — PROGRESS NOTES
Patient listed as not having a primary care physician. Hospital follow-up PCP transitional care appointment has been scheduled with YANCY Nix  for Wednesday, 2/21/18 at 8:45 a.m. Patient needs to arrive 20 minutes early with picture ID, insurance card and a listing of all medications. Pending patient discharge.   Mehreen Smith, Care Management Specialist.

## 2018-02-14 NOTE — PROGRESS NOTES
[facility]    INTERVAL HISTORY / SUBJECTIVE:  Hospital Day: 35     2018      No Events overnight    ASSESSMENT and PLAN:    · Abnormal CT scan ---> Patient examined today. Stable for bronchoscopy. After risks benefits and alternatives discussed with patient he is agreeable to therapeutic/diagnostic bronchoscopy. Proceeding.   Continue Abx steroids and Nebs pending cultures    Patient Active Problem List   Diagnosis Code    COPD (chronic obstructive pulmonary disease) ([facility]) J44.9    Back pain M54.9    Altered mental status R41.82    Pneumonia J18.9    Lumbar spinal stenosis M48.061    Closed L1 vertebral fracture ([facility]) S32.019A    Acute respiratory failure with hypoxemia ([facility]) J96.01         Medical Decision Making Today  · Acute or chronic illness that poses a threat to life or bodily function  · Review and Order of Radiology tests  · Review and Order of Medicine tests  · Independent visualization of Image      VITALS    Visit Vitals    /80    Pulse (!) 104    Temp 98 °F (36.7 °C)    Resp 18    Ht 5' 9\" (1.753 m)    Wt 61.2 kg (134 lb 14.4 oz)    SpO2 97%    BMI 19.92 kg/m2        Temp (24hrs), Av.5 °F (36.9 °C), Min:97.6 °F (36.4 °C), Max:99 °F (37.2 °C)                   Intake/Output Summary (Last 24 hours) at 18 0940  Last data filed at 18 2206   Gross per 24 hour   Intake                0 ml   Output              835 ml   Net             -835 ml         Other:      GEN: Nontoxic Nondistressed   EYE: Anicteric Noninjected   ENT: Moist No Thrush   CARD: Regular No murmurs   RESP: Clear Equal BS   GI: NABS Nontender   : Clear Urine Normal Genitalia   SKEL: WD WN No Clubbing   SKIN: Perfused No drug rash   NEURO: A & O x3 Nonfocal   PSYCH: Nonagitated Good Insight   LYMPH: No ESTELA No edema       Recent Labs      18   0248   NA  136   K  4.4   CL  99   CO2  29   BUN  19   CREA  0.76   GLU  154*   CA  9.5       Recent Labs      18   0248 WBC  5.6   HGB  12.6   PLT  236       No results for input(s): PHI, PCO2I, PO2I, HCO3I, FIO2I in the last 72 hours. XRAY Result:    Results from Hospital Encounter encounter on 01/11/18   XR CHEST PORT   Narrative Indication: Tachypnea, shortness of breath    Comparison: 2/9/2018    Portable exam of the chest obtained at 1241 demonstrates normal heart size. There is no acute process in the lung fields. The lungs are hyperinflated. Scarring is stable at the left lung base. The osseous structures are  unremarkable. Impression Impression: No acute process. COPD. CT Result:    Results from Hospital Encounter encounter on 01/11/18   CTA CHEST W OR W WO CONT   Narrative EXAM:  CT angiography chest    INDICATION:  Dyspnea on exertion. COMPARISON: Chest radiograph 2/9/2018. CT chest 1/11/2018    TECHNIQUE: Helical thin section chest CT following uneventful intravenous  administration of nonionic contrast according to departmental PE protocol. Coronal and sagittal reformats were performed. 3D/MIP post processing was  performed. CT dose reduction was achieved through use of a standardized protocol  tailored for this examination and automatic exposure control for dose  modulation. Adaptive statistical iterative reconstruction (ASIR) was utilized. FINDINGS: This is a good quality study for the evaluation of pulmonary embolism  to the first subsegmental arterial level. There is no pulmonary embolism to this  level. The aorta and main pulmonary artery are normal in caliber. Cardiac size is  within normal limits. No pericardial effusion. No lymphadenopathy by imaging  size criteria. Marked diffuse centrilobular emphysema is again there is a smaller but more  dense opacity in the left lower lobe measuring 1.0 x 2.0 cm (series 3, image  29). Debris is again seen in the left lower lobe bronchioles. No pleural  effusion or pneumothorax. Central airways are unremarkable.     Limited images of the upper abdomen are within normal limits. There is mildly  increased sclerosis of the superior endplate at T8 which may represent  age-indeterminate minimal compression. This is new since 1/11/2018. There is no  aggressive bony lesion. Impression IMPRESSION:   1. There is no acute pulmonary embolism. 2. Debris is again seen in the left lower lobe bronchioles with a smaller but  more dense opacity in the left lower lobe and may represent nonspecific  infection, inflammation, or atelectasis. 3. Marked diffuse centrilobular emphysema. PMH:  has a past medical history of Sleep disorder. PSH:   has no past surgical history on file. FHX: Family history is unknown by patient. SHX: has an unknown smoking status. He has never used smokeless tobacco. He reports that he does not drink alcohol or use illicit drugs.     ALL:   Allergies   Allergen Reactions    Shellfish Derived Unable to Obtain     Per SNF paperwork        MEDS:   [x] Reviewed  [] Not reviewed    Current Facility-Administered Medications   Medication Dose Route Frequency    lidocaine (XYLOCAINE) 20 mg/mL (2 %) injection        lidocaine (XYLOCAINE) 10 mg/mL (1 %) injection        benzocaine (HURRICANE) 20 % spray        sodium chloride (NS) flush 5-10 mL  5-10 mL IntraVENous Q8H    sodium chloride (NS) flush 5-10 mL  5-10 mL IntraVENous PRN    midazolam (VERSED) injection 5 mg  5 mg IntraVENous Multiple    fentaNYL citrate (PF) injection 100 mcg  100 mcg IntraVENous Multiple    sodium chloride (NS) 0.9 % flush        fentaNYL citrate (PF) 50 mcg/mL injection        midazolam (VERSED) 1 mg/mL injection        guaiFENesin ER (MUCINEX) tablet 600 mg  600 mg Oral Q12H    heparin (porcine) injection 5,000 Units  5,000 Units SubCUTAneous Q12H    albuterol-ipratropium (DUO-NEB) 2.5 MG-0.5 MG/3 ML  3 mL Nebulization Q4H RT    methylPREDNISolone (PF) (SOLU-MEDROL) injection 60 mg  60 mg IntraVENous Q6H    azithromycin (ZITHROMAX) tablet 500 mg  500 mg Oral DAILY    cetirizine (ZYRTEC) tablet 5 mg  5 mg Oral DAILY    budesonide (PULMICORT) 500 mcg/2 ml nebulizer suspension  500 mcg Nebulization BID RT    ALPRAZolam (XANAX) tablet 0.25 mg  0.25 mg Oral TID PRN    acetaminophen (TYLENOL) tablet 1,000 mg  1,000 mg Oral Q8H    lidocaine (LIDODERM) 5 % patch 2 Patch  2 Patch TransDERmal Q24H    nicotine (NICODERM CQ) 14 mg/24 hr patch 1 Patch  1 Patch TransDERmal DAILY    polyethylene glycol (MIRALAX) packet 17 g  17 g Oral DAILY PRN    risperiDONE (RisperDAL) tablet 2 mg  2 mg Oral QHS    temazepam (RESTORIL) capsule 30 mg  30 mg Oral QHS    traMADol (ULTRAM) tablet 50 mg  50 mg Oral Q8H PRN    traZODone (DESYREL) tablet 150 mg  150 mg Oral QHS    oxyCODONE-acetaminophen (PERCOCET) 5-325 mg per tablet 1 Tab  1 Tab Oral Q6H PRN    sodium chloride (NS) flush 5-10 mL  5-10 mL IntraVENous Q8H    sodium chloride (NS) flush 5-10 mL  5-10 mL IntraVENous PRN       Reshma Teran MD [facility]

## 2018-02-14 NOTE — PROGRESS NOTES
NUTRITION COMPLETE ASSESSMENT    RECOMMENDATIONS:   1. Continue current diet, Supplements (specify)  2. Please document po intake in flow sheets  3. Daily weights     Interventions/Plan:   Food/Nutrient Delivery:           RD to follow po intake, wt status. Assessment:   Reason for Assessment:   [x]Reassessment     Diet: Regular  Supplements: Ensure TID  Nutritionally Significant Medications: [x] Reviewed & Includes: methylprednisone,   Meal Intake: No data found. Current Hospitalization:   Fluid Restriction:  none  Appetite: Good  PO Ability: Independent Average po intake:%  Average supplements intake:  100%      Subjective:  \"I'm eating fine. \"    Objective:  Pt seen for reassessment. Pt admitted with acute encephalopathy,  pneumonia, AMS, acute L1 fx, rhabdomyolysis, respiratory failure. PMHx: sleep disorder. Reviewed chart, spoke with pt. Pt states eating well- EHR incomplete with po intake; states drinking 100% Ensure; no snacks desired. Noted 8 lb wt increase over past week (unsure of wt accuracy). Noted elevated blood sugars- no hx of DM, but pt on steroids. [facility] currently [facility]     RD to follow po intake, wt status. Estimated Nutrition Needs:   Kcals/day: 2250 Kcals/day (7362-3083 kcal/day (MSJ x 1.2-1.3 + 250))  Protein: 69 g (69-75g/day (1.2-1.3g/kg))  Fluid: 1900 ml (1mL/kcal)  Based On: [place] St Jeor  Weight Used: Actual wt    Pt expected to meet estimated nutrient needs:  []   Yes     []  No [x] Unable to predict at this time    Nutrition Diagnosis:   1.  No nutritional diagnosis at this time     Goals:      Pt will continue to consume at least 75% meals ,supplements over next 5-7 days     Monitoring & Evaluation:    - Total energy intake, Liquid meal replacement   - Weight/weight change   -      Previous Nutrition Goals Met:   N/A  Previous Recommendations:    N/A    Education & Discharge Needs:   [x] None Identified   [] Identified and addressed    [] Participated in care plan, discharge planning, and/or interdisciplinary rounds        Cultural, Latter day and ethnic food preferences identified: None    Skin Integrity: [x]Intact  []Other  Edema: [x]None []Other  Last BM: 2/14/2018  Food Allergies: []None [x]Other- shellfish  Diet Restrictions: Cultural/Protestant Preference(s): None     Anthropometrics:    Weight Loss Metrics 2/13/2018 1/11/2018   Today's Wt 134 lb 14.4 oz -   BMI - 19.92 kg/m2      Weight Source: Bed  Height: 5' 9\" (175.3 cm),    Body mass index is 19.92 kg/(m^2).    ,     ,      Labs:    Lab Results   Component Value Date/Time    Sodium 136 02/14/2018 02:48 AM    Potassium 4.4 02/14/2018 02:48 AM    Chloride 99 02/14/2018 02:48 AM    CO2 29 02/14/2018 02:48 AM    Glucose 154 (H) 02/14/2018 02:48 AM    BUN 19 02/14/2018 02:48 AM    Creatinine 0.76 02/14/2018 02:48 AM    Calcium 9.5 02/14/2018 02:48 AM    Magnesium 1.8 02/04/2018 09:30 AM    Phosphorus 4.7 02/04/2018 06:55 PM    Albumin 3.0 (L) 02/04/2018 09:30 AM     No results found for: HBA1C, HGBE8, [id number], [id number]      Kiara Sorensen RD

## 2018-02-15 VITALS
DIASTOLIC BLOOD PRESSURE: 69 MMHG | WEIGHT: 134.9 LBS | HEIGHT: 69 IN | HEART RATE: 106 BPM | BODY MASS INDEX: 19.98 KG/M2 | OXYGEN SATURATION: 96 % | SYSTOLIC BLOOD PRESSURE: 138 MMHG | TEMPERATURE: 98.6 F | RESPIRATION RATE: 18 BRPM

## 2018-02-15 PROBLEM — J44.1 CHRONIC OBSTRUCTIVE PULMONARY DISEASE WITH ACUTE EXACERBATION (HCC): Status: RESOLVED | Noted: 2018-01-11 | Resolved: 2018-02-15

## 2018-02-15 PROBLEM — M62.82 RHABDOMYOLYSIS: Status: RESOLVED | Noted: 2018-01-11 | Resolved: 2018-02-15

## 2018-02-15 PROBLEM — G47.00 INSOMNIA: Chronic | Status: ACTIVE | Noted: 2018-02-15

## 2018-02-15 PROBLEM — R00.0 TACHYCARDIA: Status: ACTIVE | Noted: 2018-02-15

## 2018-02-15 PROBLEM — F41.9 ANXIETY: Chronic | Status: ACTIVE | Noted: 2018-02-15

## 2018-02-15 PROBLEM — F17.200 NICOTINE DEPENDENCE: Chronic | Status: ACTIVE | Noted: 2018-02-15

## 2018-02-15 PROBLEM — G93.40 ACUTE ENCEPHALOPATHY: Status: RESOLVED | Noted: 2018-01-11 | Resolved: 2018-02-15

## 2018-02-15 LAB
ANION GAP SERPL CALC-SCNC: 9 MMOL/L (ref 5–15)
BASOPHILS # BLD: 0 K/UL (ref 0–0.1)
BASOPHILS NFR BLD: 0 % (ref 0–1)
BUN SERPL-MCNC: 20 MG/DL (ref 6–20)
BUN/CREAT SERPL: 28 (ref 12–20)
CALCIUM SERPL-MCNC: 9.5 MG/DL (ref 8.5–10.1)
CHLORIDE SERPL-SCNC: 98 MMOL/L (ref 97–108)
CO2 SERPL-SCNC: 27 MMOL/L (ref 21–32)
CREAT SERPL-MCNC: 0.72 MG/DL (ref 0.7–1.3)
DIFFERENTIAL METHOD BLD: ABNORMAL
EOSINOPHIL # BLD: 0 K/UL (ref 0–0.4)
EOSINOPHIL NFR BLD: 0 % (ref 0–7)
ERYTHROCYTE [DISTWIDTH] IN BLOOD BY AUTOMATED COUNT: 12.6 % (ref 11.5–14.5)
GLUCOSE SERPL-MCNC: 148 MG/DL (ref 65–100)
HCT VFR BLD AUTO: 34.8 % (ref 36.6–50.3)
HGB BLD-MCNC: 12.3 G/DL (ref 12.1–17)
IMM GRANULOCYTES # BLD: 0.1 K/UL (ref 0–0.04)
IMM GRANULOCYTES NFR BLD AUTO: 1 % (ref 0–0.5)
LYMPHOCYTES # BLD: 0.6 K/UL (ref 0.8–3.5)
LYMPHOCYTES NFR BLD: 6 % (ref 12–49)
MCH RBC QN AUTO: 35 PG (ref 26–34)
MCHC RBC AUTO-ENTMCNC: 35.3 G/DL (ref 30–36.5)
MCV RBC AUTO: 99.1 FL (ref 80–99)
MONOCYTES # BLD: 0.2 K/UL (ref 0–1)
MONOCYTES NFR BLD: 2 % (ref 5–13)
NEUTS SEG # BLD: 9.4 K/UL (ref 1.8–8)
NEUTS SEG NFR BLD: 91 % (ref 32–75)
NRBC # BLD: 0 K/UL (ref 0–0.01)
NRBC BLD-RTO: 0 PER 100 WBC
PLATELET # BLD AUTO: 229 K/UL (ref 150–400)
PMV BLD AUTO: 9.4 FL (ref 8.9–12.9)
POTASSIUM SERPL-SCNC: 4.5 MMOL/L (ref 3.5–5.1)
RBC # BLD AUTO: 3.51 M/UL (ref 4.1–5.7)
RBC MORPH BLD: ABNORMAL
SODIUM SERPL-SCNC: 134 MMOL/L (ref 136–145)
WBC # BLD AUTO: 10.3 K/UL (ref 4.1–11.1)

## 2018-02-15 PROCEDURE — 74011250637 HC RX REV CODE- 250/637: Performed by: INTERNAL MEDICINE

## 2018-02-15 PROCEDURE — 74011000250 HC RX REV CODE- 250: Performed by: HOSPITALIST

## 2018-02-15 PROCEDURE — 74011636637 HC RX REV CODE- 636/637: Performed by: INTERNAL MEDICINE

## 2018-02-15 PROCEDURE — 74011000250 HC RX REV CODE- 250: Performed by: INTERNAL MEDICINE

## 2018-02-15 PROCEDURE — 94640 AIRWAY INHALATION TREATMENT: CPT

## 2018-02-15 PROCEDURE — 36415 COLL VENOUS BLD VENIPUNCTURE: CPT | Performed by: INTERNAL MEDICINE

## 2018-02-15 PROCEDURE — 80048 BASIC METABOLIC PNL TOTAL CA: CPT | Performed by: INTERNAL MEDICINE

## 2018-02-15 PROCEDURE — 74011250636 HC RX REV CODE- 250/636: Performed by: INTERNAL MEDICINE

## 2018-02-15 PROCEDURE — 85025 COMPLETE CBC W/AUTO DIFF WBC: CPT | Performed by: INTERNAL MEDICINE

## 2018-02-15 RX ORDER — DILTIAZEM HYDROCHLORIDE 120 MG/1
120 CAPSULE, COATED, EXTENDED RELEASE ORAL DAILY
Status: DISCONTINUED | OUTPATIENT
Start: 2018-02-15 | End: 2018-02-15 | Stop reason: HOSPADM

## 2018-02-15 RX ORDER — IBUPROFEN 200 MG
1 TABLET ORAL DAILY
Qty: 30 PATCH | Refills: 0 | Status: SHIPPED | OUTPATIENT
Start: 2018-02-15 | End: 2018-02-21 | Stop reason: SDUPTHER

## 2018-02-15 RX ORDER — PREDNISONE 20 MG/1
20 TABLET ORAL 2 TIMES DAILY WITH MEALS
Status: DISCONTINUED | OUTPATIENT
Start: 2018-02-15 | End: 2018-02-15 | Stop reason: HOSPADM

## 2018-02-15 RX ORDER — TRAZODONE HYDROCHLORIDE 150 MG/1
150 TABLET ORAL
Qty: 30 TAB | Refills: 0 | Status: SHIPPED | OUTPATIENT
Start: 2018-02-15 | End: 2018-04-27

## 2018-02-15 RX ORDER — OXYCODONE AND ACETAMINOPHEN 5; 325 MG/1; MG/1
1 TABLET ORAL
Qty: 10 TAB | Refills: 0 | Status: SHIPPED | OUTPATIENT
Start: 2018-02-15 | End: 2018-02-21

## 2018-02-15 RX ORDER — AZITHROMYCIN 250 MG/1
500 TABLET, FILM COATED ORAL DAILY
Qty: 6 TAB | Refills: 0 | Status: SHIPPED | OUTPATIENT
Start: 2018-02-15 | End: 2018-02-17

## 2018-02-15 RX ORDER — POLYETHYLENE GLYCOL 3350 17 G/17G
17 POWDER, FOR SOLUTION ORAL
Qty: 30 PACKET | Refills: 0 | Status: SHIPPED | OUTPATIENT
Start: 2018-02-15 | End: 2018-03-23

## 2018-02-15 RX ORDER — RISPERIDONE 2 MG/1
2 TABLET, FILM COATED ORAL
Qty: 30 TAB | Refills: 0 | Status: SHIPPED | OUTPATIENT
Start: 2018-02-15 | End: 2018-04-27

## 2018-02-15 RX ORDER — CETIRIZINE HYDROCHLORIDE 5 MG/1
5 TABLET ORAL DAILY
Qty: 30 TAB | Refills: 0 | Status: SHIPPED | OUTPATIENT
Start: 2018-02-15 | End: 2018-03-23

## 2018-02-15 RX ORDER — ACETAMINOPHEN 500 MG
1000 TABLET ORAL
Qty: 20 TAB | Refills: 0 | Status: SHIPPED | OUTPATIENT
Start: 2018-02-15 | End: 2018-03-23

## 2018-02-15 RX ORDER — ALPRAZOLAM 0.25 MG/1
0.25 TABLET ORAL
Qty: 10 TAB | Refills: 0 | Status: SHIPPED | OUTPATIENT
Start: 2018-02-15 | End: 2018-02-21

## 2018-02-15 RX ORDER — DILTIAZEM HYDROCHLORIDE 120 MG/1
120 CAPSULE, COATED, EXTENDED RELEASE ORAL DAILY
Qty: 30 CAP | Refills: 0 | Status: SHIPPED | OUTPATIENT
Start: 2018-02-15 | End: 2018-04-27 | Stop reason: ALTCHOICE

## 2018-02-15 RX ORDER — TRAMADOL HYDROCHLORIDE 50 MG/1
50 TABLET ORAL
Qty: 20 TAB | Refills: 0 | Status: SHIPPED | OUTPATIENT
Start: 2018-02-15 | End: 2018-03-23

## 2018-02-15 RX ORDER — TEMAZEPAM 30 MG/1
30 CAPSULE ORAL
Qty: 30 CAP | Refills: 0 | Status: SHIPPED | OUTPATIENT
Start: 2018-02-15 | End: 2018-04-27

## 2018-02-15 RX ORDER — LIDOCAINE 50 MG/G
PATCH TOPICAL
Qty: 30 EACH | Refills: 0 | Status: SHIPPED | OUTPATIENT
Start: 2018-02-15 | End: 2018-02-21 | Stop reason: SDUPTHER

## 2018-02-15 RX ORDER — PREDNISONE 5 MG/1
TABLET ORAL
Qty: 14 TAB | Refills: 0 | Status: SHIPPED | OUTPATIENT
Start: 2018-02-15 | End: 2018-03-23

## 2018-02-15 RX ADMIN — ACETAMINOPHEN 1000 MG: 500 TABLET, FILM COATED ORAL at 06:47

## 2018-02-15 RX ADMIN — IPRATROPIUM BROMIDE 0.5 MG: 0.5 SOLUTION RESPIRATORY (INHALATION) at 09:51

## 2018-02-15 RX ADMIN — PREDNISONE 20 MG: 20 TABLET ORAL at 12:03

## 2018-02-15 RX ADMIN — AZITHROMYCIN 500 MG: 250 TABLET, FILM COATED ORAL at 10:16

## 2018-02-15 RX ADMIN — SODIUM CHLORIDE 50 ML/HR: 900 INJECTION, SOLUTION INTRAVENOUS at 00:05

## 2018-02-15 RX ADMIN — CETIRIZINE HYDROCHLORIDE 5 MG: 10 TABLET, FILM COATED ORAL at 10:17

## 2018-02-15 RX ADMIN — HEPARIN SODIUM 5000 UNITS: 5000 INJECTION, SOLUTION INTRAVENOUS; SUBCUTANEOUS at 06:48

## 2018-02-15 RX ADMIN — BUDESONIDE 500 MCG: 0.5 INHALANT RESPIRATORY (INHALATION) at 09:51

## 2018-02-15 RX ADMIN — DILTIAZEM HYDROCHLORIDE 120 MG: 120 CAPSULE, COATED, EXTENDED RELEASE ORAL at 12:04

## 2018-02-15 RX ADMIN — Medication 10 ML: at 06:48

## 2018-02-15 RX ADMIN — METHYLPREDNISOLONE SODIUM SUCCINATE 60 MG: 125 INJECTION, POWDER, FOR SOLUTION INTRAMUSCULAR; INTRAVENOUS at 00:05

## 2018-02-15 RX ADMIN — OXYCODONE AND ACETAMINOPHEN 1 TABLET: 5; 325 TABLET ORAL at 12:34

## 2018-02-15 RX ADMIN — TRAMADOL HYDROCHLORIDE 50 MG: 50 TABLET, FILM COATED ORAL at 06:47

## 2018-02-15 RX ADMIN — GUAIFENESIN 600 MG: 600 TABLET, EXTENDED RELEASE ORAL at 10:16

## 2018-02-15 RX ADMIN — METHYLPREDNISOLONE SODIUM SUCCINATE 60 MG: 125 INJECTION, POWDER, FOR SOLUTION INTRAMUSCULAR; INTRAVENOUS at 06:47

## 2018-02-15 NOTE — PROGRESS NOTES
[facility]    INTERVAL HISTORY / SUBJECTIVE:  Hospital Day: 36     2/15/2018      No Events overnight    Overall, patient reports feeling better. Bronchoscopy cultures are pending. A lot of neutrophils were noted on bronchial wash. Continue current care. ASSESSMENT and PLAN:    · Abnormal CT scan --->  Follow up on bronch cultures    Patient Active Problem List   Diagnosis Code    Back pain M54.9    CAP (community acquired pneumonia) J18.9    Lumbar spinal stenosis M48.061    Closed L1 vertebral fracture ([facility]) S32.019A    Acute respiratory failure with hypoxemia ([facility]) J96.01    Nicotine dependence F17.200    Tachycardia R00.0    Insomnia G47.00    Anxiety F41.9         Medical Decision Making Today  · Acute or chronic illness that poses a threat to life or bodily function  · Review and Order of Radiology tests  · Review and Order of Medicine tests  · Independent visualization of Image      VITALS    Visit Vitals    /85 (BP Patient Position: Standing)    Pulse (!) 126    Temp 98.6 °F (37 °C)    Resp 18    Ht 5' 9\" (1.753 m)    Wt 61.2 kg (134 lb 14.4 oz)    SpO2 91%    BMI 19.92 kg/m2        Temp (24hrs), Av.6 °F (37 °C), Min:98.5 °F (36.9 °C), Max:98.6 °F (37 °C)                 No intake or output data in the 24 hours ending 02/15/18 1252      Other:      GEN: Nontoxic Nondistressed   EYE: Anicteric Noninjected   ENT: Moist No Thrush   CARD: Regular No murmurs   RESP: Clear Equal BS   GI: NABS Nontender   : Clear Urine Normal Genitalia   SKEL: WD WN No Clubbing   SKIN: Perfused No drug rash   NEURO: A & O x3 Nonfocal   PSYCH: Nonagitated Good Insight   LYMPH: No ESTELA No edema       Recent Labs      02/15/18   0356   NA  134*   K  4.5   CL  98   CO2  27   BUN  20   CREA  0.72   GLU  148*   CA  9.5       Recent Labs      02/15/18   0356   WBC  10.3   HGB  12.3   PLT  229       No results for input(s): PHI, PCO2I, PO2I, HCO3I, FIO2I in the last 72 hours.     Lolita Stoddard Result:    Results from Hospital Encounter encounter on 01/11/18   XR CHEST PORT   Narrative Indication: Tachypnea, shortness of breath    Comparison: 2/9/2018    Portable exam of the chest obtained at 1241 demonstrates normal heart size. There is no acute process in the lung fields. The lungs are hyperinflated. Scarring is stable at the left lung base. The osseous structures are  unremarkable. Impression Impression: No acute process. COPD. CT Result:    Results from Hospital Encounter encounter on 01/11/18   CTA CHEST W OR W WO CONT   Narrative EXAM:  CT angiography chest    INDICATION:  Dyspnea on exertion. COMPARISON: Chest radiograph 2/9/2018. CT chest 1/11/2018    TECHNIQUE: Helical thin section chest CT following uneventful intravenous  administration of nonionic contrast according to departmental PE protocol. Coronal and sagittal reformats were performed. 3D/MIP post processing was  performed. CT dose reduction was achieved through use of a standardized protocol  tailored for this examination and automatic exposure control for dose  modulation. Adaptive statistical iterative reconstruction (ASIR) was utilized. FINDINGS: This is a good quality study for the evaluation of pulmonary embolism  to the first subsegmental arterial level. There is no pulmonary embolism to this  level. The aorta and main pulmonary artery are normal in caliber. Cardiac size is  within normal limits. No pericardial effusion. No lymphadenopathy by imaging  size criteria. Marked diffuse centrilobular emphysema is again there is a smaller but more  dense opacity in the left lower lobe measuring 1.0 x 2.0 cm (series 3, image  29). Debris is again seen in the left lower lobe bronchioles. No pleural  effusion or pneumothorax. Central airways are unremarkable. Limited images of the upper abdomen are within normal limits.  There is mildly  increased sclerosis of the superior endplate at T8 which may represent  age-indeterminate minimal compression. This is new since 1/11/2018. There is no  aggressive bony lesion. Impression IMPRESSION:   1. There is no acute pulmonary embolism. 2. Debris is again seen in the left lower lobe bronchioles with a smaller but  more dense opacity in the left lower lobe and may represent nonspecific  infection, inflammation, or atelectasis. 3. Marked diffuse centrilobular emphysema. PMH:  has a past medical history of Sleep disorder. PSH:   has no past surgical history on file. FHX: Family history is unknown by patient. SHX: has an unknown smoking status. He has never used smokeless tobacco. He reports that he does not drink alcohol or use illicit drugs.     ALL:   Allergies   Allergen Reactions    Shellfish Derived Unable to Obtain     Per SNF paperwork        MEDS:   [x] Reviewed  [] Not reviewed    Current Facility-Administered Medications   Medication Dose Route Frequency    predniSONE (DELTASONE) tablet 20 mg  20 mg Oral BID WITH MEALS    dilTIAZem CD (CARDIZEM CD) capsule 120 mg  120 mg Oral DAILY    sodium chloride (NS) flush 5-10 mL  5-10 mL IntraVENous Q8H    sodium chloride (NS) flush 5-10 mL  5-10 mL IntraVENous PRN    lidocaine (XYLOCAINE) 10 mg/mL (1 %) injection    PRN    lidocaine (XYLOCAINE) 20 mg/mL (2 %) injection    PRN    benzocaine (HURRICANE) 20 % spray    PRN    ipratropium (ATROVENT) 0.02 % nebulizer solution 0.5 mg  0.5 mg Nebulization Q4H RT    guaiFENesin ER (MUCINEX) tablet 600 mg  600 mg Oral Q12H    heparin (porcine) injection 5,000 Units  5,000 Units SubCUTAneous Q12H    azithromycin (ZITHROMAX) tablet 500 mg  500 mg Oral DAILY    cetirizine (ZYRTEC) tablet 5 mg  5 mg Oral DAILY    budesonide (PULMICORT) 500 mcg/2 ml nebulizer suspension  500 mcg Nebulization BID RT    ALPRAZolam (XANAX) tablet 0.25 mg  0.25 mg Oral TID PRN    acetaminophen (TYLENOL) tablet 1,000 mg  1,000 mg Oral Q8H    lidocaine (LIDODERM) 5 % patch 2 Patch  2 Patch TransDERmal Q24H    nicotine (NICODERM CQ) 14 mg/24 hr patch 1 Patch  1 Patch TransDERmal DAILY    polyethylene glycol (MIRALAX) packet 17 g  17 g Oral DAILY PRN    risperiDONE (RisperDAL) tablet 2 mg  2 mg Oral QHS    temazepam (RESTORIL) capsule 30 mg  30 mg Oral QHS    traMADol (ULTRAM) tablet 50 mg  50 mg Oral Q8H PRN    traZODone (DESYREL) tablet 150 mg  150 mg Oral QHS    oxyCODONE-acetaminophen (PERCOCET) 5-325 mg per tablet 1 Tab  1 Tab Oral Q6H PRN    sodium chloride (NS) flush 5-10 mL  5-10 mL IntraVENous Q8H    sodium chloride (NS) flush 5-10 mL  5-10 mL IntraVENous PRN       Rafiq Boles MD [facility]

## 2018-02-15 NOTE — DISCHARGE SUMMARY
Discharge Summary       PATIENT ID: Marychuy Danielson  MRN: [id number]   YOB: 1940    DATE OF ADMISSION: 1/11/2018 12:00 AM    DATE OF DISCHARGE: 2/15/18   PRIMARY CARE PROVIDER: romario Graham NP     ATTENDING PHYSICIAN: Stella Payne MD, S  DISCHARGING PROVIDER: Stella Payne MD, S    To contact this individual call [phone] and ask the  to page. If unavailable ask to be transferred the Adult Hospitalist Department. CONSULTATIONS: IP CONSULT TO HOSPITALIST  IP CONSULT TO ORTHOPEDIC SURGERY  IP CONSULT TO PULMONOLOGY    PROCEDURES/SURGERIES: Procedure(s):  2/14 BRONCHOSCOPY with washout of mucus in bronchi  2/10 CTA chest   1/11 CTA chest  1/11 CT head wo contrast  1/11 CT L-spine wo contrast    ADMITTING DIAGNOSES & HOSPITAL COURSE:   77 yo man with h/o sleep disorder was BIBEMS to the ED from [facility] on 1/11/18 with confusion. He was admitted with acute encephalopathy, acute L1 fracture, rhabdomyolysis, and acute hypoxic and hypercapnic respiratory failure.      PATIENT IS A HIGH RISK FOR READMISSION DUE TO MULTIPLE MEDICAL ISSUES.     Persistent back pain  - due to lumbar stenosis and L1 compression fracture  - continue scheduled TID APAP, prn Percocet, prn tramadol, lidocaine patches x2  - strongly advised to wear TLSO brace but nonadherent per staff; able to ambulate to bathroom with assistance      Acute hypoxic and hypercapnic respiratory failure (POA)  - initially resolved but now tachypnic, tachycardic, c/o SOB, chest congestion  - satting well on RA  - CTA chest 2/10 no PE  - changed nebs to Atrovent due to tachycardia  - pulmonary toilet; guaifenesin, OOB, IS  - PCCM consulted  - s/p bronchoscopy 2/14 with mucus clot removed from LLL; bronch cultures pending  - now on azithromycin and steroid      LLL CAP (POA) - as per admission CT  - BCx 1/11 negative  - UCx 1/11 negative  - s/p azithromycin, Zosyn, LVQ  - resolved      Acute COPD exacerbation - resolved        Rhabdomyolysis (POA) - resolved with IVF; CK WNL      Acute L1 compression fracture (POA) - Ortho consulted and signed off  - conservative management with TLSO brace, PT/OT, OOB, pain control  - nonadherent with wearing brace  - f/u with Dr Genia Walker outpatient  - vitamin D WNL      L4-L5 spinal stenosis - pain control; plan as above      Acute metabolic encephalopathy (POA) - resolved to baseline      Chronic nicotine dependence - cessation counseling      Hypokalemia - replete prn  Hyperphosphatemia - unknown etiology    Persistent tachycardia - asymptomatic but will start diltiazem CD     Code status: full      DISCHARGE DIAGNOSES / PLAN:      Stable for discharge to [facility] home. Follow up with new PCP and Orthopedic Surgery. PENDING TEST RESULTS:   At the time of discharge the following test results are still pendin/14 bronch cultures    FOLLOW UP APPOINTMENTS:    Follow-up Information     Follow up With Details Comments Contact Info    Huma Valera NP Call Call to make appointment for medication management. Phone # [phone]    Angélica May NP On 2018 Hospital follow up new  PCP appointment on Wednesday, 18 @ 8:45 a.m. Patient needs to arrive 20 early with picture ID, insurance card and listing of all medications. [address]      Atif Catalan MD In 2 weeks Orthopedic Surgery; hospital follow up [address] [facility]  [address]  [phone]             ADDITIONAL CARE RECOMMENDATIONS:   1. Take medications as prescribed. 2. Keep appointment(s) as recommended/scheduled. 3. Stop smoking. 4. MUST WEAR TLSO BRACE WHEN AMBULATING. 5. Walk as much and as often as you can. Please stay out of bed for all meals.   6. Use Tylenol first as needed for mild-moderate pain, then tramadol as needed for moderate-severe pain, then if pain is absolutely unrelieved then Percocet can be used for severe pain. Please be careful to avoid overdosing of pain medications. 7. Patient is requesting higher sleep medication but he is already on highest doses of Restoril and trazodone. He needs to follow up with his new PCP about medications for insomnia. 8. Patient is a high fall risk. DIET: Cardiac Diet    Oral Nutritional Supplements: Ensure Enlive with breakfast and dinner    ACTIVITY: PT/OT per [address] [place] [address]: none    EQUIPMENT needed: TLSO brace when ambulating as as per home PT/OT    DISCHARGE MEDICATIONS:  Current Discharge Medication List      START taking these medications    Details   acetaminophen (TYLENOL) 500 mg tablet Take 2 Tabs by mouth every eight (8) hours as needed for Pain. Indications: BACK PAIN  Qty: 20 Tab, Refills: 0      ALPRAZolam (XANAX) 0.25 mg tablet Take 1 Tab by mouth three (3) times daily as needed for Anxiety. Max Daily Amount: 0.75 mg. Indications: anxiety  Qty: 10 Tab, Refills: 0    Associated Diagnoses: Anxiety      azithromycin (ZITHROMAX) 250 mg tablet Take 2 Tabs by mouth daily for 2 doses. Qty: 6 Tab, Refills: 0      cetirizine (ZYRTEC) 5 mg tablet Take 1 Tab by mouth daily. Qty: 30 Tab, Refills: 0      dilTIAZem CD (CARDIZEM CD) 120 mg ER capsule Take 1 Cap by mouth daily. Indications: persistent tachycardia  Qty: 30 Cap, Refills: 0      lidocaine (LIDODERM) 5 % Apply patch to the affected area for 12 hours a day and remove for 12 hours a day. Qty: 30 Each, Refills: 0    Associated Diagnoses: Closed fracture of first lumbar vertebra, unspecified fracture morphology, initial encounter ([facility] [address]      nicotine (NICODERM CQ) 14 mg/24 hr patch 1 Patch by TransDERmal route daily for 30 days. Indications: Smoking Cessation  Qty: 30 Patch, Refills: 0      oxyCODONE-acetaminophen (PERCOCET) 5-325 mg per tablet Take 1 Tab by mouth every six (6) hours as needed. Max Daily Amount: 4 Tabs.   Qty: 10 Tab, Refills: 0    Associated Diagnoses: Spinal stenosis of lumbar region, unspecified whether neurogenic claudication present      polyethylene glycol (MIRALAX) 17 gram packet Take 1 Packet by mouth daily as needed. Indications: constipation  Qty: 30 Packet, Refills: 0      predniSONE (DELTASONE) 5 mg tablet Take 10mg BID x2 days then 5mg BID x2 days then 5mg daily x2 days  Qty: 14 Tab, Refills: 0         CONTINUE these medications which have CHANGED    Details   risperiDONE (RISPERDAL) 2 mg tablet Take 1 Tab by mouth nightly. Qty: 30 Tab, Refills: 0      temazepam (RESTORIL) 30 mg capsule Take 1 Cap by mouth nightly. Max Daily Amount: 30 mg. Indications: Insomnia  Qty: 30 Cap, Refills: 0    Associated Diagnoses: Insomnia, unspecified type      traMADol (ULTRAM) 50 mg tablet Take 1 Tab by mouth every eight (8) hours as needed for Pain. Max Daily Amount: 150 mg.  Qty: 20 Tab, Refills: 0    Associated Diagnoses: Chronic low back pain, unspecified back pain laterality, with sciatica presence unspecified      traZODone (DESYREL) 150 mg tablet Take 1 Tab by mouth nightly. Indications: insomnia associated with depression  Qty: 30 Tab, Refills: 0           NOTIFY YOUR PHYSICIAN FOR ANY OF THE FOLLOWING:   Fever over 101 degrees for 24 hours. Chest pain, shortness of breath, fever, chills, nausea, vomiting, diarrhea, change in mentation, falling, weakness, bleeding. Severe pain or pain not relieved by medications. Or, any other signs or symptoms that you may have questions about.     DISPOSITION:    Home With:   OT  PT x HH  RN       Long term SNF/Inpatient Rehab    Independent/assisted living    Hospice    Other:       PATIENT CONDITION AT DISCHARGE:     Functional status    Poor    x Deconditioned     Independent      Cognition    x Lucid     Forgetful     Dementia      Catheters/lines (plus indication)    Horner     PICC     PEG    x None      Code status   x  Full code     DNR      PHYSICAL EXAMINATION AT DISCHARGE:  Visit Vitals    /81    Pulse (!) 104    Temp 98.6 °F (37 °C)    Resp 18    Ht 5' 9\" (1.753 m)    Wt 61.2 kg (134 lb 14.4 oz)    SpO2 91%    BMI 19.92 kg/m2     Gen: awake, NAD, flat affect  HENT: NCAT, oral mucosa benign  Eyes: PERRL, anicteric sclera  CVS: tachycardic, regular rhythm, no m/g/r appreciated, no peripheral edema, +pulses  Resp: tachypnic, left basilar rales, no wheeze  Abd: + BS, soft, NT/ND  Neuro: AAOx3, responds appropriately to questions and commands    CHRONIC MEDICAL DIAGNOSES:  Problem List as of 2/15/2018  Date Reviewed: 2/15/2018          Codes Class Noted - Resolved    Nicotine dependence (Chronic) ICD-10-CM: S12.691  ICD-9-CM: 305.1  2/15/2018 - Present        Tachycardia ICD-10-CM: R00.0  ICD-9-CM: 785.0  2/15/2018 - Present        Insomnia (Chronic) ICD-10-CM: G47.00  ICD-9-CM: 780.52  2/15/2018 - Present        Anxiety (Chronic) ICD-10-CM: F41.9  ICD-9-CM: 300.00  2/15/2018 - Present        Back pain ICD-10-CM: M54.9  ICD-9-CM: 724.5  1/11/2018 - Present        * (Principal)CAP (community acquired pneumonia) ICD-10-CM: J18.9  ICD-9-CM: 486  1/11/2018 - Present        Lumbar spinal stenosis ICD-10-CM: M48.061  ICD-9-CM: 724.02  1/11/2018 - Present        Closed L1 vertebral fracture (HCC) ICD-10-CM: S32.019A  ICD-9-CM: 805.4  1/11/2018 - Present        Acute respiratory failure with hypoxemia (HCC) ICD-10-CM: J96.01  ICD-9-CM: 518.81  1/11/2018 - Present        RESOLVED: Chronic obstructive pulmonary disease with acute exacerbation (HCC) ICD-10-CM: J44.1  ICD-9-CM: 491.21  1/11/2018 - 2/15/2018        RESOLVED: Acute encephalopathy ICD-10-CM: G93.40  ICD-9-CM: 348.30  1/11/2018 - 2/15/2018        RESOLVED: Rhabdomyolysis ICD-10-CM: F41.86  ICD-9-CM: 728.88  1/11/2018 - 2/15/2018              Greater than 30 minutes were spent with the patient on counseling and coordination of care.     Signed:   Sanjuana Good MD  2/15/2018  10:14 AM

## 2018-02-15 NOTE — PROGRESS NOTES
Trevor contacted niece, Anderson Fishman ([phone]) to inform of readiness for discharge. She is unable to take him today due to her work schedule, but after the attending spoke with her, she was agreeable to TREVOR arranging transport. Trevor spoke with Christa Scott at [facility], M#[phone] who confirmed they can accept today. Trevor faxed discharge instructions to them (fax: [phone]) and contacted [facility] [phone] [address] They will  at 3 pm, as was the earliest they could come. Home is needed for PT,OT, SN. A referral was sent to [facility] and they stated they could not accept. A referral was sent to [facility]; waiting for response. [employer] , ACM    2:00 p: [facility]  does not accept patients' insurance. Referral sent to At home care, [facility] health and [facility]; waiting for response. [employer] , ACM    2:50 p: None of the other home health agencies accept patients' insurance, including [facility]. Trevor spoke with MD about this and she has changed the orders so that it is just for PT/OT so that [facility] home care can accommodate. Trevor contacted [facility] and spoke with the liaison. However, since patient will not see his new PCP for another week, he will not be able to have home health until he sees his PCP.   [employer] , [place]

## 2018-02-15 NOTE — DISCHARGE INSTRUCTIONS
ADDITIONAL CARE RECOMMENDATIONS:   1. Take medications as prescribed. 2. Keep appointment(s) as recommended/scheduled. 3. Stop smoking. 4. MUST WEAR TLSO BRACE WHEN AMBULATING. 5. Walk as much and as often as you can. Please stay out of bed for all meals. 6. Use Tylenol first as needed for mild-moderate pain, then tramadol as needed for moderate-severe pain, then if pain is absolutely unrelieved then Percocet can be used for severe pain. Please be careful to avoid overdosing of pain medications. 7. Patient is requesting higher sleep medication but he is already on highest doses of Restoril and trazodone. He needs to follow up with his new PCP about medications for insomnia. 8. Patient is a high fall risk. DIET: Cardiac Diet    Oral Nutritional Supplements: Ensure Enlive with breakfast and dinner    ACTIVITY: PT/OT per Home Health    WOUND CARE: none    EQUIPMENT needed: TLSO brace when ambulating as as per home PT/OT       Compression Fracture of the Spine: Care Instructions  Your Care Instructions    A compression fracture happens when the front part of a spinal bone breaks and collapses. A fall or other accident can cause it. A minor injury or moving the wrong way can cause a break if you have thin or brittle bones (osteoporosis). These types of breaks will heal in 8 to 10 weeks. You will need rest and pain medicines. Your doctor may recommend physical therapy. Some doctors recommend that certain people with compression fractures wear braces. Your doctor also may treat thin or brittle bones. You may need surgery if you have lasting pain or if the bone presses on the spinal cord or nerves. You heal best when you take good care of yourself. Eat a variety of healthy foods, and don't smoke. Follow-up care is a key part of your treatment and safety. Be sure to make and go to all appointments, and call your doctor if you are having problems.  It's also a good idea to know your test results and keep a list of the medicines you take. How can you care for yourself at home? · Be safe with medicines. Read and follow all instructions on the label. ¨ If the doctor gave you a prescription medicine for pain, take it as prescribed. ¨ If you are not taking a prescription pain medicine, ask your doctor if you can take an over-the-counter medicine. · Talk to your doctor about how to make your bones stronger. You may need medicines or a change in what you eat. · Be active only as directed by your doctor. When should you call for help? Call 911 anytime you think you may need emergency care. For example, call if:  ? · You are unable to move an arm or a leg at all. ?Call your doctor now or seek immediate medical care if:  ? · You have new or worse symptoms in your arms, legs, belly, or buttocks. Symptoms may include:  ¨ Numbness or tingling. ¨ Weakness. ¨ Pain. ? · You lose bladder or bowel control. ? · You have belly pain, bloating, vomiting, or nausea. ? Watch closely for changes in your health, and be sure to contact your doctor if:  ? · You do not get better as expected. Where can you learn more? Go to http://lubna-adwoa.info/. Enter P445 in the search box to learn more about \"Compression Fracture of the Spine: Care Instructions. \"  Current as of: March 21, 2017  Content Version: 11.4  © 7101-0033 [email or web address]. Care instructions adapted under license by [employer] (which disclaims liability or warranty for this information). If you have questions about a medical condition or this instruction, always ask your healthcare professional. [place] [address] any warranty or liability for your use of this information. Trazodone (By mouth)   Trazodone (TRAZ-oh-done)  Treats depression. Brand Name(s):   There may be other brand names for this medicine. When This Medicine Should Not Be Used: This medicine is not right for everyone.  Do not use it if you had an allergic reaction to trazodone. How to Use This Medicine:   Tablet, Long Acting Tablet  · Take your medicine as directed. Your dose may need to be changed several times to find what works best for you. · Regular tablet: Take it with or shortly after a meal or light snack. · Extended-release tablet: Take it at the same time each day, preferably at bedtime, without food. · The tablet can be swallowed whole, or you may break the tablet in half along the score line. Do not break the tablet unless your doctor tells you to. Do not crush or chew the tablet. · This medicine should come with a Medication Guide. Ask your pharmacist for a copy if you do not have one. · Missed dose: Take a dose as soon as you remember. If it is almost time for your next dose, wait until then and take a regular dose. Do not take extra medicine to make up for a missed dose. · Store the medicine in a closed container at room temperature, away from heat, moisture, and direct light. Drugs and Foods to Avoid:   Ask your doctor or pharmacist before using any other medicine, including over-the-counter medicines, vitamins, and herbal products. · Do not use trazodone if you currently take an MAO inhibitor (MAOI) or have used an MAOI in the past 14 days. · Tell your doctor if you also use any of the following:  ¨ Carbamazepine, digoxin, phenytoin, indinavir, ritonavir, buspirone, fentanyl, lithium, tryptophan, Harlan's wort, tramadol  ¨ Medicine to treat a fungal infection (such as itraconazole, ketoconazole), a diuretic (water pill), blood pressure medicine, an NSAID pain or arthritis medicine (such as aspirin, celecoxib, diclofenac, ibuprofen, naproxen), a blood thinner (such as warfarin), other medicine for depression, or triptan medicine to treat migraine headaches  · Do not drink alcohol while you are using this medicine. · Tell your doctor if you use anything else that makes you sleepy.  Some examples are allergy medicine, narcotic pain medicine, and alcohol. Warnings While Using This Medicine:   · Tell your doctor if you are pregnant or breastfeeding, or if you have kidney disease, liver disease, bleeding problems, glaucoma, heart disease, heart rhythm problems, or low blood pressure. Tell your doctor if you recently had a heart attack. · For some children, teenagers, and young adults, this medicine may increase mental or emotional problems. This may lead to thoughts of suicide and violence. Talk with your doctor right away if you have any thoughts or behavior changes that concern you. Tell your doctor if you or anyone in your family has a history of bipolar disorder or suicide attempts. · This medicine may cause the following problems:  ¨ Serotonin syndrome (more likely when used with certain other medicines)  ¨ Heart rhythm problems (QT prolongation)  ¨ Low sodium levels  ¨ Higher risk of bleeding  · Do not stop using this medicine suddenly. Your doctor will need to slowly decrease your dose before you stop it completely. · This medicine may make you dizzy or drowsy. Do not drive or do anything that could be dangerous until you know how this medicine affects you. Stand or sit up slowly if you are dizzy. · Tell any doctor or dentist who treats you that you are using this medicine. You may need to stop using this medicine several days before you have surgery or medical tests. · Your doctor will check your progress and the effects of this medicine at regular visits. Keep all appointments. · Keep all medicine out of the reach of children. Never share your medicine with anyone.   Possible Side Effects While Using This Medicine:   Call your doctor right away if you notice any of these side effects:  · Allergic reaction: Itching or hives, swelling in your face or hands, swelling or tingling in your mouth or throat, chest tightness, trouble breathing  · Anxiety, restlessness, fever, sweating, muscle spasms, nausea, vomiting, diarrhea, seeing or hearing things that are not there  · Confusion, weakness, muscle twitching  · Fast, pounding, or uneven heartbeat  · Lightheadedness, dizziness, fainting  · Painful, prolonged erection of your penis  · Sudden increase in energy, feeling irritable, trouble sleeping  · Thoughts of hurting yourself or others, unusual behavior  · Unusual bleeding or bruising  If you notice these less serious side effects, talk with your doctor:   · Constipation, mild nausea  · Dry mouth  · Eye pain, vision changes, seeing halos around lights  · Headache  · Sleepiness or unusual drowsiness  If you notice other side effects that you think are caused by this medicine, tell your doctor. Call your doctor for medical advice about side effects. You may report side effects to FDA at 9-788-NET-5834  © 2017 [facility][note 3] Information is for End User's use only and may not be sold, redistributed or otherwise used for commercial purposes. The above information is an  only. It is not intended as medical advice for individual conditions or treatments. Talk to your doctor, nurse or pharmacist before following any medical regimen to see if it is safe and effective for you.            Low Back Pain: Exercises  Your Care Instructions  Here are some examples of typical rehabilitation exercises for your condition. Start each exercise slowly. Ease off the exercise if you start to have pain. Your doctor or physical therapist will tell you when you can start these exercises and which ones will work best for you. How to do the exercises  Press-up    1. Lie on your stomach, supporting your body with your forearms. 2. Press your elbows down into the floor to raise your upper back. As you do this, relax your stomach muscles and allow your back to arch without using your back muscles. As your press up, do not let your hips or pelvis come off the floor. 3. Hold for 15 to 30 seconds, then relax.   4. Repeat 2 to 4 times. Alternate arm and leg (bird dog) exercise    Do this exercise slowly. Try to keep your body straight at all times, and do not let one hip drop lower than the other. 1. Start on the floor, on your hands and knees. 2. Tighten your belly muscles. 3. Raise one leg off the floor, and hold it straight out behind you. Be careful not to let your hip drop down, because that will twist your trunk. 4. Hold for about 6 seconds, then lower your leg and switch to the other leg. 5. Repeat 8 to 12 times on each leg. 6. Over time, work up to holding for 10 to 30 seconds each time. 7. If you feel stable and secure with your leg raised, try raising the opposite arm straight out in front of you at the same time. Knee-to-chest exercise    1. Lie on your back with your knees bent and your feet flat on the floor. 2. Bring one knee to your chest, keeping the other foot flat on the floor (or keeping the other leg straight, whichever feels better on your lower back). 3. Keep your lower back pressed to the floor. Hold for at least 15 to 30 seconds. 4. Relax, and lower the knee to the starting position. 5. Repeat with the other leg. Repeat 2 to 4 times with each leg. 6. To get more stretch, put your other leg flat on the floor while pulling your knee to your chest.  Curl-ups    1. Lie on the floor on your back with your knees bent at a 90-degree angle. Your feet should be flat on the floor, about 12 inches from your buttocks. 2. Cross your arms over your chest. If this bothers your neck, try putting your hands behind your neck (not your head), with your elbows spread apart. 3. Slowly tighten your belly muscles and raise your shoulder blades off the floor. 4. Keep your head in line with your body, and do not press your chin to your chest.  5. Hold this position for 1 or 2 seconds, then slowly lower yourself back down to the floor. 6. Repeat 8 to 12 times. Pelvic tilt exercise    1.  Lie on your back with your knees bent.  2. \"Brace\" your stomach. This means to tighten your muscles by pulling in and imagining your belly button moving toward your spine. You should feel like your back is pressing to the floor and your hips and pelvis are rocking back. 3. Hold for about 6 seconds while you breathe smoothly. 4. Repeat 8 to 12 times. Heel dig bridging    1. Lie on your back with both knees bent and your ankles bent so that only your heels are digging into the floor. Your knees should be bent about 90 degrees. 2. Then push your heels into the floor, squeeze your buttocks, and lift your hips off the floor until your shoulders, hips, and knees are all in a straight line. 3. Hold for about 6 seconds as you continue to breathe normally, and then slowly lower your hips back down to the floor and rest for up to 10 seconds. 4. Do 8 to 12 repetitions. Hamstring stretch in doorway    1. Lie on your back in a doorway, with one leg through the open door. 2. Slide your leg up the wall to straighten your knee. You should feel a gentle stretch down the back of your leg. 3. Hold the stretch for at least 15 to 30 seconds. Do not arch your back, point your toes, or bend either knee. Keep one heel touching the floor and the other heel touching the wall. 4. Repeat with your other leg. 5. Do 2 to 4 times for each leg. Hip flexor stretch    1. Kneel on the floor with one knee bent and one leg behind you. Place your forward knee over your foot. Keep your other knee touching the floor. 2. Slowly push your hips forward until you feel a stretch in the upper thigh of your rear leg. 3. Hold the stretch for at least 15 to 30 seconds. Repeat with your other leg. 4. Do 2 to 4 times on each side. Wall sit    1. Stand with your back 10 to 12 inches away from a wall. 2. Lean into the wall until your back is flat against it. 3. Slowly slide down until your knees are slightly bent, pressing your lower back into the wall.   4. Hold for about 6 seconds, then slide back up the wall. 5. Repeat 8 to 12 times. Follow-up care is a key part of your treatment and safety. Be sure to make and go to all appointments, and call your doctor if you are having problems. It's also a good idea to know your test results and keep a list of the medicines you take. Where can you learn more? Go to http://lubna-adwoa.info/. Enter Y516 in the search box to learn more about \"Low Back Pain: Exercises. \"  Current as of: March 21, 2017  Content Version: 11.4  © 3540-9444 [employer]. Care instructions adapted under license by [employer] (which disclaims liability or warranty for this information). If you have questions about a medical condition or this instruction, always ask your healthcare professional. [address] any warranty or liability for your use of this information. Learning About Safe Use of Long-Acting Opioids  Introduction    Long-acting opioids relieve moderate to severe pain. They are also called extended-release opioids. Opioids relieve pain by changing the way your body feels pain. They don't cure a health problem. They help you manage the pain. If you take a lot of short-acting pain medicine, your doctor may give you long-acting opioids. They help you avoid the ups and downs in pain relief that you may have with short-acting medicine. Opioids are strong medicines. They can help you manage pain when you use them the right way. But if you misuse them, they can cause serious harm and even death. If you decide to take opioids, here are some things to remember. · Keep your doctor informed. You can get addicted to opioids. The risk is higher if you have a history of substance use. Your doctor will monitor you closely for signs of misuse and addiction and to figure out when you no longer need to take opioids. · Make a treatment plan.  The goal of your plan is to be able to function and do the things you need to do, even if you still have some pain. You might be able to manage your pain with other non-opioid options like physical therapy, relaxation, or over-the-counter pain medicines. · Be aware of the side effects. Opioids can cause serious side effects, such as constipation, dry mouth, and nausea. And over time, you may need a higher dose to get pain relief. This is called tolerance. Your body also gets used to opioids. This is called physical dependence. If you suddenly stop taking them, you may have withdrawal symptoms. Examples  · Fentanyl patch (Duragesic)  · Methadone (Dolophine)  · Morphine (Carie)  · Oxycodone controlled-release (OxyContin)  Safety tips  If you need to take opioids to manage your pain, remember these safety tips. · Follow directions carefully. It's easy to misuse opioids if you take a dose other than what's prescribed by your doctor. This can lead to overdose and even death. Even sharing them with someone they weren't meant for is misuse. · Be cautious. Opioids may affect your judgment and decision making. Do not drive or operate machinery until you can think clearly. Talk with your doctor about when it is safe to drive. · Reduce the risk of drug interactions. Opioids can be dangerous if you take them with alcohol or with certain drugs like sleeping pills and muscle relaxers. Make sure your doctor knows about all the other medicines you take, including over-the-counter medicines. Don't start any new medicines before you talk to your doctor or pharmacist.  · Keep others safe. Store opioids in a safe and secure place. Make sure that pets, children, friends, and family can't get to them. When you're done using opioids, make sure to properly dispose of them. You can either use a community drug take-back program or your drugstore's mail-back program. If one of these programs isn't available, you can flush opioid skin patches and unused opioid pills down the toilet.   · Reduce the risk of overdose. Misuse of opioids can be very dangerous. Protect yourself by asking your doctor about a naloxone rescue kit. It can help you-and even save your life-if you take too much of an opioid. Possible side effects  All medicines have side effects. But many people don't feel the side effects, or they are able to deal with them. You may:  · Feel confused or have a hard time thinking clearly. · Be constipated. · Feel faint, dizzy, or lightheaded. · Feel drowsy. · Feel sick to your stomach or vomit. · Have an allergic reaction. Where can you learn more? Go to http://lubna-adwoa.info/. Enter O105 in the search box to learn more about \"Learning About Safe Use of Long-Acting Opioids. \"  Current as of: October 14, 2016  Content Version: 11.4  © 2987-7600 [employer]. Care instructions adapted under license by [employer] (which disclaims liability or warranty for this information). If you have questions about a medical condition or this instruction, always ask your healthcare professional. [place] [address] any warranty or liability for your use of this information.

## 2018-02-15 NOTE — PROGRESS NOTES
Hospitalist Progress Note  Lanny Felix MD  Answering service: [phone] from in house phone      Date of Service:  2/15/2018  NAME:  Reinaldo Stevens  :  1940  MRN:  [id number]      Admission Summary:   79 yo man with h/o sleep disorder was BIBEMS to the ED from [facility] on 18 with confusion. He was admitted with acute encephalopathy, acute L1 fracture, rhabdomyolysis, and acute hypoxic and hypercapnic respiratory failure.      Interval history / Subjective:   Feeling better; still with 5/10 back pain and asking for higher dose sleep medication; denies CP, SOB, cough, palpitations/racing heartbeat; still tachycardic; d/w nurse     Assessment & Plan:     Persistent back pain  - due to lumbar stenosis and L1 compression fracture  - continue scheduled TID APAP, prn Percocet, prn tramadol, lidocaine patches x2  - strongly advised to wear TLSO brace but nonadherent per staff; able to ambulate to bathroom with assistance     Acute hypoxic and hypercapnic respiratory failure (POA)  - initially resolved but now tachypnic, tachycardic, c/o SOB, chest congestion  - satting well on RA  - CTA chest 2/10 no PE  - changed nebs to Atrovent due to tachycardia  - pulmonary toilet; guaifenesin, OOB, IS  - PCCM consulted  - s/p bronchoscopy  with mucus clot removed from LLL; bronch cultures pending  - now on azithromycin and steroid     LLL CAP (POA) - as per admission CT  - BCx  negative  - UCx  negative  - s/p azithromycin, Zosyn, LVQ  - resolved     Acute COPD exacerbation - resolved       Rhabdomyolysis (POA) - resolved with IVF; CK WNL     Acute L1 compression fracture (POA) - Ortho consulted and signed off  - conservative management with TLSO brace, PT/OT, OOB, pain control  - nonadherent with wearing brace  - f/u with Dr Ryan Guevara outpatient  - vitamin D WNL     L4-L5 spinal stenosis - pain control; plan as above     Acute metabolic encephalopathy (POA) - resolved to baseline     Chronic nicotine dependence - cessation counseling     Hypokalemia - replete prn  Hyperphosphatemia - unknown etiology; resolved    Anxiety - continue current treatment    Insomnia - patient already on highest doses of temazepam and trazodone as well as prn Percocet and tramadol for pain. I do not feel comfortable increasing his insomnia medication. He has been advised to follow up with his new PCP. Code status: full  DVT prophylaxis: change heparin q12    Care Plan discussed with: Patient/Family, Nurse and   Disposition: Has bed and authorization for [facility] group home and can be discharged today as he is medically stable. Hospital Problems  Date Reviewed: 2/15/2018          Codes Class Noted POA    Nicotine dependence (Chronic) ICD-10-CM: F17.200  ICD-9-CM: 305.1  2/15/2018 Yes        Tachycardia ICD-10-CM: R00.0  ICD-9-CM: 785.0  2/15/2018 Unknown        Insomnia (Chronic) ICD-10-CM: G47.00  ICD-9-CM: 780.52  2/15/2018 Yes        Anxiety (Chronic) ICD-10-CM: F41.9  ICD-9-CM: 300.00  2/15/2018 Yes        Back pain ICD-10-CM: M54.9  ICD-9-CM: 724.5  1/11/2018 Unknown        * (Principal)CAP (community acquired pneumonia) ICD-10-CM: J18.9  ICD-9-CM: 032  1/11/2018 Yes        Lumbar spinal stenosis ICD-10-CM: M48.061  ICD-9-CM: 724.02  1/11/2018 Unknown        Closed L1 vertebral fracture ([facility] [address] ICD-10-CM: S32.019A  ICD-9-CM: 805.4  1/11/2018 Unknown        Acute respiratory failure with hypoxemia [facility]) ICD-10-CM: J96.01  ICD-9-CM: 518.81  1/11/2018 Unknown            Review of Systems:   Pertinent items are noted in HPI. Vital Signs:    Last 24hrs VS reviewed since prior progress note.  Most recent are:  Visit Vitals    /81    Pulse (!) 104    Temp 98.6 °F (37 °C)    Resp 18    Ht 5' 9\" (1.753 m)    Wt 61.2 kg (134 lb 14.4 oz)    SpO2 91%    BMI 19.92 kg/m2     No intake or output data in the 24 hours ending 02/15/18 1029     Physical Examination:     Gen: awake, NAD, flat affect  HENT: NCAT, oral mucosa benign  Eyes: PERRL, anicteric sclera  CVS: tachycardic, regular rhythm, no m/g/r appreciated, no peripheral edema, +pulses  Resp: tachypnic, left basilar rales, no wheeze  Abd: + BS, soft, NT/ND  Neuro: AAOx3, responds appropriately to questions and commands    Data Review:    Review and/or order of clinical lab test  Review and/or order of tests in the radiology section of CPT  Review and/or order of tests in the medicine section of [facility]    Labs:     Recent Labs      02/15/18   0356  02/14/18   0248   WBC  10.3  5.6   HGB  12.3  12.6   HCT  34.8*  36.7   PLT  229  236     Recent Labs      02/15/18   0356  02/14/18   0248   NA  134*  136   K  4.5  4.4   CL  98  99   CO2  27  29   BUN  20  19   CREA  0.72  0.76   GLU  148*  154*   CA  9.5  9.5     No results for input(s): SGOT, GPT, ALT, AP, TBIL, TBILI, TP, ALB, GLOB, GGT, AML, LPSE in the last 72 hours. No lab exists for component: AMYP, HLPSE  No results for input(s): INR, PTP, APTT in the last 72 hours. No lab exists for component: INREXT, INREXT   No results for input(s): FE, TIBC, PSAT, FERR in the last 72 hours. No results found for: FOL, RBCF   No results for input(s): PH, PCO2, PO2 in the last 72 hours. No results for input(s): CPK, CKNDX, TROIQ in the last 72 hours.     No lab exists for component: CPKMB  No results found for: CHOL, CHOLX, CHLST, CHOLV, HDL, LDL, LDLC, DLDLP, TGLX, TRIGL, TRIGP, CHHD, CHHDX  Lab Results   Component Value Date/Time    Glucose (POC) 116 (H) 02/05/2018 06:20 AM    Glucose (POC) 102 (H) 01/31/2018 12:03 PM    Glucose (POC) 91 01/20/2018 06:52 AM    Glucose (POC) 136 (H) 01/17/2018 11:46 AM    Glucose (POC) 97 01/12/2018 10:18 PM     Lab Results   Component Value Date/Time    Color YELLOW/STRAW 02/09/2018 01:22 PM    Appearance CLEAR 02/09/2018 01:22 PM    Specific gravity 1.028 02/09/2018 01:22 PM    pH (UA) 6.0 02/09/2018 01:22 PM    Protein NEGATIVE  02/09/2018 01:22 PM    Glucose NEGATIVE  02/09/2018 01:22 PM    Ketone NEGATIVE  02/09/2018 01:22 PM    Bilirubin NEGATIVE  02/09/2018 01:22 PM    Urobilinogen 1.0 02/09/2018 01:22 PM    Nitrites NEGATIVE  02/09/2018 01:22 PM    Leukocyte Esterase NEGATIVE  02/09/2018 01:22 PM    Epithelial cells FEW 02/09/2018 01:22 PM    Bacteria NEGATIVE  02/09/2018 01:22 PM    WBC 0-4 02/09/2018 01:22 PM    RBC 0-5 02/09/2018 01:22 PM     Medications Reviewed:     Current Facility-Administered Medications   Medication Dose Route Frequency    predniSONE (DELTASONE) tablet 20 mg  20 mg Oral BID WITH MEALS    dilTIAZem CD (CARDIZEM CD) capsule 120 mg  120 mg Oral DAILY    sodium chloride (NS) flush 5-10 mL  5-10 mL IntraVENous Q8H    sodium chloride (NS) flush 5-10 mL  5-10 mL IntraVENous PRN    lidocaine (XYLOCAINE) 10 mg/mL (1 %) injection    PRN    lidocaine (XYLOCAINE) 20 mg/mL (2 %) injection    PRN    benzocaine (HURRICANE) 20 % spray    PRN    ipratropium (ATROVENT) 0.02 % nebulizer solution 0.5 mg  0.5 mg Nebulization Q4H RT    guaiFENesin ER (MUCINEX) tablet 600 mg  600 mg Oral Q12H    heparin (porcine) injection 5,000 Units  5,000 Units SubCUTAneous Q12H    azithromycin (ZITHROMAX) tablet 500 mg  500 mg Oral DAILY    cetirizine (ZYRTEC) tablet 5 mg  5 mg Oral DAILY    budesonide (PULMICORT) 500 mcg/2 ml nebulizer suspension  500 mcg Nebulization BID RT    ALPRAZolam (XANAX) tablet 0.25 mg  0.25 mg Oral TID PRN    acetaminophen (TYLENOL) tablet 1,000 mg  1,000 mg Oral Q8H    lidocaine (LIDODERM) 5 % patch 2 Patch  2 Patch TransDERmal Q24H    nicotine (NICODERM CQ) 14 mg/24 hr patch 1 Patch  1 Patch TransDERmal DAILY    polyethylene glycol (MIRALAX) packet 17 g  17 g Oral DAILY PRN    risperiDONE (RisperDAL) tablet 2 mg  2 mg Oral QHS    temazepam (RESTORIL) capsule 30 mg  30 mg Oral QHS    traMADol (ULTRAM) tablet 50 mg  50 mg Oral Q8H PRN    traZODone (DESYREL) tablet 150 mg  150 mg Oral QHS    oxyCODONE-acetaminophen (PERCOCET) 5-325 mg per tablet 1 Tab  1 Tab Oral Q6H PRN    sodium chloride (NS) flush 5-10 mL  5-10 mL IntraVENous Q8H    sodium chloride (NS) flush 5-10 mL  5-10 mL IntraVENous PRN     ______________________________________________________________________  EXPECTED LENGTH OF STAY: 4d 12h  ACTUAL LENGTH OF STAY:          Khadra 7, MD

## 2018-02-16 LAB
BACTERIA SPEC CULT: NORMAL
GRAM STN SPEC: NORMAL
SERVICE CMNT-IMP: NORMAL

## 2018-02-19 ENCOUNTER — HOSPITAL ENCOUNTER (EMERGENCY)
Age: 78
Discharge: HOME | End: 2018-02-19
Attending: EMERGENCY MEDICINE
Payer: MEDICARE

## 2018-02-19 ENCOUNTER — APPOINTMENT (OUTPATIENT)
Dept: GENERAL RADIOLOGY | Age: 78
End: 2018-02-19
Attending: EMERGENCY MEDICINE
Payer: MEDICARE

## 2018-02-19 VITALS
TEMPERATURE: 97.9 F | HEIGHT: 72 IN | OXYGEN SATURATION: 95 % | BODY MASS INDEX: 19.37 KG/M2 | HEART RATE: 91 BPM | SYSTOLIC BLOOD PRESSURE: 143 MMHG | RESPIRATION RATE: 25 BRPM | WEIGHT: 143 LBS | DIASTOLIC BLOOD PRESSURE: 65 MMHG

## 2018-02-19 DIAGNOSIS — R07.9 CHEST PAIN, UNSPECIFIED TYPE: Primary | ICD-10-CM

## 2018-02-19 LAB
ALBUMIN SERPL-MCNC: 2.7 G/DL (ref 3.5–5)
ALBUMIN/GLOB SERPL: 0.8 {RATIO} (ref 1.1–2.2)
ALP SERPL-CCNC: 54 U/L (ref 45–117)
ALT SERPL-CCNC: 45 U/L (ref 12–78)
ANION GAP SERPL CALC-SCNC: 7 MMOL/L (ref 5–15)
AST SERPL-CCNC: 21 U/L (ref 15–37)
ATRIAL RATE: 76 BPM
BACTERIA SPEC CULT: ABNORMAL
BACTERIA SPEC CULT: ABNORMAL
BASOPHILS # BLD: 0 K/UL (ref 0–0.1)
BASOPHILS NFR BLD: 0 % (ref 0–1)
BILIRUB SERPL-MCNC: 1.5 MG/DL (ref 0.2–1)
BUN SERPL-MCNC: 15 MG/DL (ref 6–20)
BUN/CREAT SERPL: 24 (ref 12–20)
CALCIUM SERPL-MCNC: 7.5 MG/DL (ref 8.5–10.1)
CALCULATED P AXIS, ECG09: 79 DEGREES
CALCULATED R AXIS, ECG10: 60 DEGREES
CALCULATED T AXIS, ECG11: 65 DEGREES
CHLORIDE SERPL-SCNC: 111 MMOL/L (ref 97–108)
CO2 SERPL-SCNC: 25 MMOL/L (ref 21–32)
CREAT SERPL-MCNC: 0.63 MG/DL (ref 0.7–1.3)
DIAGNOSIS, 93000: NORMAL
DIFFERENTIAL METHOD BLD: ABNORMAL
EOSINOPHIL # BLD: 0 K/UL (ref 0–0.4)
EOSINOPHIL NFR BLD: 0 % (ref 0–7)
ERYTHROCYTE [DISTWIDTH] IN BLOOD BY AUTOMATED COUNT: 12.6 % (ref 11.5–14.5)
GLOBULIN SER CALC-MCNC: 3.4 G/DL (ref 2–4)
GLUCOSE SERPL-MCNC: 82 MG/DL (ref 65–100)
HCT VFR BLD AUTO: 37.7 % (ref 36.6–50.3)
HGB BLD-MCNC: 12.9 G/DL (ref 12.1–17)
IMM GRANULOCYTES # BLD: 0.1 K/UL (ref 0–0.04)
IMM GRANULOCYTES NFR BLD AUTO: 1 % (ref 0–0.5)
LYMPHOCYTES # BLD: 1 K/UL (ref 0.8–3.5)
LYMPHOCYTES NFR BLD: 9 % (ref 12–49)
MCH RBC QN AUTO: 35.5 PG (ref 26–34)
MCHC RBC AUTO-ENTMCNC: 34.2 G/DL (ref 30–36.5)
MCV RBC AUTO: 103.9 FL (ref 80–99)
MONOCYTES # BLD: 0.7 K/UL (ref 0–1)
MONOCYTES NFR BLD: 7 % (ref 5–13)
NEUTS SEG # BLD: 8.4 K/UL (ref 1.8–8)
NEUTS SEG NFR BLD: 83 % (ref 32–75)
NRBC # BLD: 0 K/UL (ref 0–0.01)
NRBC BLD-RTO: 0 PER 100 WBC
P-R INTERVAL, ECG05: 124 MS
PLATELET # BLD AUTO: 163 K/UL (ref 150–400)
PMV BLD AUTO: 9.1 FL (ref 8.9–12.9)
POTASSIUM SERPL-SCNC: 3.4 MMOL/L (ref 3.5–5.1)
PROT SERPL-MCNC: 6.1 G/DL (ref 6.4–8.2)
Q-T INTERVAL, ECG07: 336 MS
QRS DURATION, ECG06: 66 MS
QTC CALCULATION (BEZET), ECG08: 378 MS
RBC # BLD AUTO: 3.63 M/UL (ref 4.1–5.7)
SERVICE CMNT-IMP: ABNORMAL
SODIUM SERPL-SCNC: 143 MMOL/L (ref 136–145)
TROPONIN I SERPL-MCNC: <0.04 NG/ML
TROPONIN I SERPL-MCNC: <0.04 NG/ML
VENTRICULAR RATE, ECG03: 76 BPM
WBC # BLD AUTO: 10.1 K/UL (ref 4.1–11.1)

## 2018-02-19 PROCEDURE — 93005 ELECTROCARDIOGRAM TRACING: CPT

## 2018-02-19 PROCEDURE — 99285 EMERGENCY DEPT VISIT HI MDM: CPT

## 2018-02-19 PROCEDURE — 80053 COMPREHEN METABOLIC PANEL: CPT | Performed by: EMERGENCY MEDICINE

## 2018-02-19 PROCEDURE — 84484 ASSAY OF TROPONIN QUANT: CPT | Performed by: EMERGENCY MEDICINE

## 2018-02-19 PROCEDURE — 71045 X-RAY EXAM CHEST 1 VIEW: CPT

## 2018-02-19 PROCEDURE — 85025 COMPLETE CBC W/AUTO DIFF WBC: CPT | Performed by: EMERGENCY MEDICINE

## 2018-02-19 PROCEDURE — 36415 COLL VENOUS BLD VENIPUNCTURE: CPT | Performed by: EMERGENCY MEDICINE

## 2018-02-19 RX ORDER — ASPIRIN 325 MG
325 TABLET ORAL
Status: DISCONTINUED | OUTPATIENT
Start: 2018-02-19 | End: 2018-02-19 | Stop reason: SDUPTHER

## 2018-02-19 NOTE — ED NOTES
Bedside and Verbal shift change report given to Jna Quiroga and Brent Hathaway RN (oncoming nurse) by Jeane Anderson (offgoing nurse). Report included the following information SBAR, ED Summary, Intake/Output, MAR and Recent Results.

## 2018-02-19 NOTE — ED NOTES
[facility] in bed. No distress, VSS. Call bell within reach, bed at low position. Bedrails up.

## 2018-02-19 NOTE — ED NOTES
Spoke with DERRELL Marques), ETA \"within the hour\". Pt VSS, resting. Reviewed with [facility] pt is poor historian, has intermittent confusion and is a fall risk.

## 2018-02-19 NOTE — DISCHARGE INSTRUCTIONS
Chest Pain: Care Instructions  Your Care Instructions    There are many things that can cause chest pain. Some are not serious and will get better on their own in a few days. But some kinds of chest pain need more testing and treatment. Your doctor may have recommended a follow-up visit in the next 8 to 12 hours. If you are not getting better, you may need more tests or treatment. Even though your doctor has released you, you still need to watch for any problems. The doctor carefully checked you, but sometimes problems can develop later. If you have new symptoms or if your symptoms do not get better, get medical care right away. If you have worse or different chest pain or pressure that lasts more than 5 minutes or you passed out (lost consciousness), call 911 or seek other emergency help right away. A medical visit is only one step in your treatment. Even if you feel better, you still need to do what your doctor recommends, such as going to all suggested follow-up appointments and taking medicines exactly as directed. This will help you recover and help prevent future problems. How can you care for yourself at home? · Rest until you feel better. · Take your medicine exactly as prescribed. Call your doctor if you think you are having a problem with your medicine. · Do not drive after taking a prescription pain medicine. When should you call for help? Call 911 if:  ? · You passed out (lost consciousness). ? · You have severe difficulty breathing. ? · You have symptoms of a heart attack. These may include:  ¨ Chest pain or pressure, or a strange feeling in your chest.  ¨ Sweating. ¨ Shortness of breath. ¨ Nausea or vomiting. ¨ Pain, pressure, or a strange feeling in your back, neck, jaw, or upper belly or in one or both shoulders or arms. ¨ Lightheadedness or sudden weakness. ¨ A fast or irregular heartbeat.   After you call 911, the  may tell you to chew 1 adult-strength or 2 to 4 low-dose aspirin. Wait for an ambulance. Do not try to drive yourself. ?Call your doctor today if:  ? · You have any trouble breathing. ? · Your chest pain gets worse. ? · You are dizzy or lightheaded, or you feel like you may faint. ? · You are not getting better as expected. ? · You are having new or different chest pain. Where can you learn more? Go to http://lubna-adwoa.info/. Enter A120 in the search box to learn more about \"Chest Pain: Care Instructions. \"  Current as of: March 20, 2017  Content Version: 11.4  © 8880-6347 [employer]. Care instructions adapted under license by [employer] (which disclaims liability or warranty for this information). If you have questions about a medical condition or this instruction, always ask your healthcare professional. [place] [address] any warranty or liability for your use of this information. We hope that we have addressed all of your medical concerns. The examination and treatment you received in the Emergency Department were for an emergent problem and were not intended as complete care. It is important that you follow up with your healthcare provider(s) for ongoing care. If your symptoms worsen or do not improve as expected, and you are unable to reach your usual health care provider(s), you should return to the Emergency Department. Today's healthcare is undergoing tremendous change, and patient satisfaction surveys are one of the many tools to assess the quality of medical care. You may receive a survey from the [employer] organization regarding your experience in the Emergency Department. I hope that your experience has been completely positive, particularly the medical care that I provided. As such, please participate in the survey; anything less than excellent does not meet my expectations or intentions.         [facility] participate in nationally recognized quality of care measures. If your blood pressure is greater than 120/80, as reported below, we urge that you seek medical care to address the potential of high blood pressure, commonly known as hypertension. Hypertension can be hereditary or can be caused by certain medical conditions, pain, stress, or \"white coat syndrome. \"       Please make an appointment with your health care provider(s) for follow up of your Emergency Department visit. VITALS:   Patient Vitals for the past 8 hrs:   Temp Pulse Resp BP SpO2   02/19/18 1400 - (!) 101 17 139/83 98 %   02/19/18 1330 - 70 21 129/74 97 %   02/19/18 1300 - 68 27 160/65 97 %   02/19/18 1230 - 65 29 143/76 97 %   02/19/18 1200 - 80 29 141/66 97 %   02/19/18 1145 - (!) 59 16 137/69 97 %   02/19/18 1130 - 72 24 132/78 96 %   02/19/18 1126 - - - - 96 %   02/19/18 1118 98.5 °F (36.9 °C) 93 16 140/82 96 %          Thank you for allowing us to provide you with medical care today. We realize that you have many choices for your emergency care needs. Please choose us in the future for any continued health care needs. Paola Stark, [address] [place] [address].   Office: [phone]            Recent Results (from the past 24 hour(s))   EKG, 12 LEAD, INITIAL    Collection Time: 02/19/18 11:19 AM   Result Value Ref Range    Ventricular Rate 76 BPM    Atrial Rate 76 BPM    P-R Interval 124 ms    QRS Duration 66 ms    Q-T Interval 336 ms    QTC Calculation (Bezet) 378 ms    Calculated P Axis 79 degrees    Calculated R Axis 60 degrees    Calculated T Axis 65 degrees    Diagnosis       Normal sinus rhythm with sinus arrhythmia  When compared with ECG of 11-JAN-2018 02:13,  ST now depressed in Inferior leads  ST now depressed in Anterior leads    Confirmed by Vivek Charles M.D., Collene Corpus ([id number]) on 2/19/2018 12:13:12 PM     CBC WITH AUTOMATED DIFF    Collection Time: 02/19/18 11:23 AM   Result Value Ref Range    WBC 10.1 4.1 - 11.1 K/uL    RBC 3.63 (L) 4.10 - 5.70 M/uL    HGB 12.9 12.1 - 17.0 g/dL    HCT 37.7 36.6 - 50.3 %    .9 (H) 80.0 - 99.0 FL    MCH 35.5 (H) 26.0 - 34.0 PG    MCHC 34.2 30.0 - 36.5 g/dL    RDW 12.6 11.5 - 14.5 %    PLATELET 546 987 - 428 K/uL    MPV 9.1 8.9 - 12.9 FL    NRBC 0.0 0  WBC    ABSOLUTE NRBC 0.00 0.00 - 0.01 K/uL    NEUTROPHILS 83 (H) 32 - 75 %    LYMPHOCYTES 9 (L) 12 - 49 %    MONOCYTES 7 5 - 13 %    EOSINOPHILS 0 0 - 7 %    BASOPHILS 0 0 - 1 %    IMMATURE GRANULOCYTES 1 (H) 0.0 - 0.5 %    ABS. NEUTROPHILS 8.4 (H) 1.8 - 8.0 K/UL    ABS. LYMPHOCYTES 1.0 0.8 - 3.5 K/UL    ABS. MONOCYTES 0.7 0.0 - 1.0 K/UL    ABS. EOSINOPHILS 0.0 0.0 - 0.4 K/UL    ABS. BASOPHILS 0.0 0.0 - 0.1 K/UL    ABS. IMM. GRANS. 0.1 (H) 0.00 - 0.04 K/UL    DF AUTOMATED     METABOLIC PANEL, COMPREHENSIVE    Collection Time: 02/19/18 11:23 AM   Result Value Ref Range    Sodium 143 136 - 145 mmol/L    Potassium 3.4 (L) 3.5 - 5.1 mmol/L    Chloride 111 (H) 97 - 108 mmol/L    CO2 25 21 - 32 mmol/L    Anion gap 7 5 - 15 mmol/L    Glucose 82 65 - 100 mg/dL    BUN 15 6 - 20 MG/DL    Creatinine 0.63 (L) 0.70 - 1.30 MG/DL    BUN/Creatinine ratio 24 (H) 12 - 20      GFR est AA >60 >60 ml/min/1.73m2    GFR est non-AA >60 >60 ml/min/1.73m2    Calcium 7.5 (L) 8.5 - 10.1 MG/DL    Bilirubin, total 1.5 (H) 0.2 - 1.0 MG/DL    ALT (SGPT) 45 12 - 78 U/L    AST (SGOT) 21 15 - 37 U/L    Alk.  phosphatase 54 45 - 117 U/L    Protein, total 6.1 (L) 6.4 - 8.2 g/dL    Albumin 2.7 (L) 3.5 - 5.0 g/dL    Globulin 3.4 2.0 - 4.0 g/dL    A-G Ratio 0.8 (L) 1.1 - 2.2     TROPONIN I    Collection Time: 02/19/18 11:23 AM   Result Value Ref Range    Troponin-I, Qt. <0.04 <0.05 ng/mL   TROPONIN I    Collection Time: 02/19/18  1:37 PM   Result Value Ref Range    Troponin-I, Qt. <0.04 <0.05 ng/mL       Xr Chest Port    Result Date: 2/19/2018  EXAM:  XR CHEST PORT INDICATION:  Chest pain COMPARISON:  2/13/2018 FINDINGS: A portable AP radiograph of the chest was obtained at 1212 hours. The patient is on a cardiac monitor. The lungs are hyperinflated but clear. The cardiac and mediastinal contours and pulmonary vascularity are normal.  The bones and soft tissues are grossly within normal limits. IMPRESSION: Lung hyperinflation. No infiltrate.

## 2018-02-19 NOTE — ED TRIAGE NOTES
Pt arrives from group home c/o midsternal CP since last night. Pt denies CP on arrival to ED. Rated CP 2/10 on EMS arrival. Pt given 324 of Aspirin prior to EMS arrival. Denies SOB. Seen in ED last Friday for fall. Pt is a poor historian.

## 2018-02-19 NOTE — ED PROVIDER NOTES
HPI Comments: 70 y.o. male with past medical history significant for insomnia, depression, anxiety, chronic confusion who presents from group home with chief complaint of chest pain. Patient states that he has been having midsternal chest pain since last night which has since relieved. He reports having 2/10 chest pain while en route. He was given 324 mg ASA prior to EMS arrival. He denies having any SOB. Patient was last seen at [facility] on Friday (3 days ago) for evaluation after a fall and was admitted. Patient is a poor historian. There are no other acute medical concerns at this time. Social hx: no EtOH use, no drug use  PCP: Bill Cheng MD        The history is provided by the patient. No  was used. Past Medical History:   Diagnosis Date    Anxiety     Chronic confusion     Depression     Poor historian     Sleep disorder     insomnia       History reviewed. No pertinent surgical history. Family History:   Problem Relation Age of Onset    Family history unknown: Yes       Social History     Social History    Marital status:      Spouse name: N/A    Number of children: N/A    Years of education: N/A     Occupational History    Not on file. Social History Main Topics    Smoking status: Unknown If Ever Smoked    Smokeless tobacco: Never Used    Alcohol use No    Drug use: No    Sexual activity: Not on file     Other Topics Concern    Not on file     Social History Narrative         ALLERGIES: Shellfish derived    Review of Systems   Constitutional: Negative for chills, diaphoresis and fever. HENT: Negative for congestion, postnasal drip, rhinorrhea and sore throat. Eyes: Negative for photophobia, discharge, redness and visual disturbance. Respiratory: Negative for cough, chest tightness, shortness of breath and wheezing. Cardiovascular: Positive for chest pain. Negative for palpitations and leg swelling.    Gastrointestinal: Negative for abdominal distention, abdominal pain, blood in stool, constipation, diarrhea, nausea and vomiting. Genitourinary: Negative for difficulty urinating, dysuria, frequency, hematuria and urgency. Musculoskeletal: Negative for arthralgias, back pain, joint swelling and myalgias. Skin: Negative for color change and rash. Neurological: Negative for dizziness, speech difficulty, weakness, light-headedness, numbness and headaches. Psychiatric/Behavioral: Negative for confusion. The patient is not nervous/anxious. All other systems reviewed and are negative. Vitals:    02/19/18 1330 02/19/18 1400 02/19/18 1430 02/19/18 1500   BP: 129/74 139/83 157/66 143/65   Pulse: 70 (!) 101 (!) 106 91   Resp: 21 17 (!) 39 25   Temp:    97.9 °F (36.6 °C)   SpO2: 97% 98% 92% 95%   Weight:       Height:                Physical Exam   Constitutional: He is oriented to person, place, and time. No distress. HENT:   Head: Normocephalic and atraumatic. Right Ear: External ear normal.   Left Ear: External ear normal.   Nose: Nose normal.   Mouth/Throat: Oropharynx is clear and moist.   Eyes: Conjunctivae and EOM are normal. Pupils are equal, round, and reactive to light. No scleral icterus. Neck: Normal range of motion. Neck supple. No JVD present. No tracheal deviation present. No thyromegaly present. Cardiovascular: Normal rate, regular rhythm and normal heart sounds. Exam reveals no gallop and no friction rub. No murmur heard. Pulmonary/Chest: Effort normal and breath sounds normal. No respiratory distress. He has no wheezes. He has no rales. He exhibits no tenderness. Abdominal: Soft. Bowel sounds are normal. He exhibits no distension. There is no tenderness. There is no rebound. Musculoskeletal: Normal range of motion. He exhibits no edema or tenderness. Lymphadenopathy:     He has no cervical adenopathy. Neurological: He is alert and oriented to person, place, and time. He has normal strength.  He displays no atrophy and no tremor. No cranial nerve deficit. He exhibits normal muscle tone. Coordination and gait normal.   Skin: Skin is warm and dry. No rash noted. He is not diaphoretic. No erythema. Psychiatric:   Flat affect. Nursing note and vitals reviewed. MDM  Number of Diagnoses or Management Options  Chest pain, unspecified type:   Diagnosis management comments: Pt afebrile and nontoxic appearing. Vitals, labs, physical exam, and imaging studies all unremarkable. HEART score 3. Low index of suspicion for stroke, aneurysm, SAH, meningitis, PE, PTX, ACS, esophageal rupture, dissection, pancreatitis, appendicitis, cholecystitis/cholagnitis, bowel obstruction/perforation, sepsis or any other acute life threatening condition. Will treat symptomatically and advise close follow up with cardiologist  Reviewed treatment plan with attending and they agree.   Mair Sherman PA-C        ED Course       Procedures

## 2018-02-21 ENCOUNTER — OFFICE VISIT (OUTPATIENT)
Dept: INTERNAL MEDICINE CLINIC | Age: 78
End: 2018-02-21

## 2018-02-21 VITALS
BODY MASS INDEX: 16.89 KG/M2 | SYSTOLIC BLOOD PRESSURE: 113 MMHG | HEIGHT: 72 IN | RESPIRATION RATE: 18 BRPM | OXYGEN SATURATION: 96 % | WEIGHT: 124.7 LBS | TEMPERATURE: 98.7 F | HEART RATE: 112 BPM | DIASTOLIC BLOOD PRESSURE: 60 MMHG

## 2018-02-21 DIAGNOSIS — Z23 ENCOUNTER FOR IMMUNIZATION: ICD-10-CM

## 2018-02-21 DIAGNOSIS — E63.9 NUTRITION DISORDER: ICD-10-CM

## 2018-02-21 DIAGNOSIS — R00.0 TACHYCARDIA: ICD-10-CM

## 2018-02-21 DIAGNOSIS — R94.31 ABNORMAL EKG: ICD-10-CM

## 2018-02-21 DIAGNOSIS — R52 PAIN: ICD-10-CM

## 2018-02-21 DIAGNOSIS — F99 PSYCHIATRIC DISORDER: ICD-10-CM

## 2018-02-21 DIAGNOSIS — Z72.0 TOBACCO USE: ICD-10-CM

## 2018-02-21 DIAGNOSIS — S32.019A CLOSED FRACTURE OF FIRST LUMBAR VERTEBRA, UNSPECIFIED FRACTURE MORPHOLOGY, INITIAL ENCOUNTER (HCC): ICD-10-CM

## 2018-02-21 DIAGNOSIS — R07.9 CHEST PAIN, UNSPECIFIED TYPE: ICD-10-CM

## 2018-02-21 DIAGNOSIS — J44.9 CHRONIC OBSTRUCTIVE PULMONARY DISEASE, UNSPECIFIED COPD TYPE (HCC): Primary | ICD-10-CM

## 2018-02-21 RX ORDER — BUDESONIDE 0.25 MG/2ML
INHALANT ORAL
COMMUNITY
End: 2018-04-27 | Stop reason: ALTCHOICE

## 2018-02-21 RX ORDER — IPRATROPIUM BROMIDE 0.5 MG/2.5ML
0.5 SOLUTION RESPIRATORY (INHALATION)
Qty: 12 VIAL | Refills: 1 | Status: SHIPPED | OUTPATIENT
Start: 2018-02-21 | End: 2018-04-27 | Stop reason: ALTCHOICE

## 2018-02-21 RX ORDER — IBUPROFEN 200 MG
1 TABLET ORAL DAILY
Qty: 30 PATCH | Refills: 0 | Status: SHIPPED | OUTPATIENT
Start: 2018-02-21 | End: 2018-03-23

## 2018-02-21 RX ORDER — LIDOCAINE 50 MG/G
PATCH TOPICAL
Qty: 30 EACH | Refills: 0 | Status: SHIPPED | OUTPATIENT
Start: 2018-02-21 | End: 2018-03-23

## 2018-02-21 NOTE — PROGRESS NOTES
Chief Complaint   Patient presents with   [facility] Follow Up     Transitional Care     1. Have you been to the ER, urgent care clinic since your last visit? Hospitalized since your last visit? Yes Reason for visit: chst pain    2. Have you seen or consulted any other health care providers outside of the [address] [place] [address] since your last visit? Include any pap smears or colon screening.  No

## 2018-02-21 NOTE — PROGRESS NOTES
Hospital Follow Up (Transitional Care)       Subjective:   HPI     Patient presents for [facility] visit post hospitalization 1/11/18-2/15/18 for respiratory failure, COPD exacerbation, tachycardia, encephalopathy, LLL CAP, rhabdomyelitis. He presents with staff member of home where he lives. ED visit 2/19/18 for chest pain. He denies current chest pain. Patient reports he stopped smoking 3 weeks ago; uses Nicoderm. He repotrs the following history and medical concerns:  Staff member is concerned with his weight and decreased appetite; requesting Ensure. Referral advised by hospital staff have not been followed through with; no appts. Staff member also states she does not understand his psychiatric disorder and believes a psych referral would be a good idea. She states \"they diagnosed him with anxiety, but really it was probably a language barrier, not anxiety. \"    Depression/anxiety Review:  Patient has reported hx of depression, anxiety and insomnia. He takes several medications that he and staff don't quite understand use of. He denies recurrent thoughts of death and suicidal thoughts without plan. He has had a fall (L1 fracture). Questionable if this could related to medications. COPD REVIEW:  The patient is being seen for follow up of COPD,the patient has been unstable,wheezing has been reported. He was diagnosed during hospitalization 1/11/18-2/15/18. Oxygen: He currently is not on home oxygen therapy. Symptoms: chronic cough is reported   Patient stopped smoking cigarettes 3 wks ago. Past Medical History:   Diagnosis Date    Anxiety     Chronic confusion     Chronic obstructive pulmonary disease (HCC)     Depression     Poor historian     Sleep disorder     insomnia       History reviewed. No pertinent surgical history. Prior to Admission medications    Medication Sig Start Date End Date Taking?  Authorizing Provider   acetaminophen (TYLENOL) 500 mg tablet Take 2 Tabs by mouth every eight (8) hours as needed for Pain. Indications: BACK PAIN 2/15/18  Yes Luis Bliss MD   risperiDONE (RISPERDAL) 2 mg tablet Take 1 Tab by mouth nightly. Patient taking differently: Take 2 mg by mouth nightly. Indications: now getting respidone injection 2/15/18  Yes Luis Bliss MD   temazepam (RESTORIL) 30 mg capsule Take 1 Cap by mouth nightly. Max Daily Amount: 30 mg. Indications: Insomnia 2/15/18  Yes Luis Bliss MD   traMADol (ULTRAM) 50 mg tablet Take 1 Tab by mouth every eight (8) hours as needed for Pain. Max Daily Amount: 150 mg. 2/15/18  Yes Luis Bliss MD   traZODone (DESYREL) 150 mg tablet Take 1 Tab by mouth nightly. Indications: insomnia associated with depression 2/15/18  Yes Luis Bliss MD   cetirizine (ZYRTEC) 5 mg tablet Take 1 Tab by mouth daily. 2/15/18  Yes Luis Bliss MD   polyethylene glycol (MIRALAX) 17 gram packet Take 1 Packet by mouth daily as needed. Indications: constipation 2/15/18  Yes Luis Bliss MD   predniSONE (DELTASONE) 5 mg tablet Take 10mg BID x2 days then 5mg BID x2 days then 5mg daily x2 days 2/15/18  Yes Luis Bliss MD   budesonide (PULMICORT) 0.25 mg/2 mL nbsp by Nebulization route. Historical Provider   ipratropium (ATROVENT) 0.02 % soln 2.5 mL by Nebulization route every four (4) hours as needed. 2/21/18   Ayanna Mendoza NP   ipratropium (ATROVENT HFA) 17 mcg/actuation inhaler Take 1 Puff by inhalation every four (4) hours as needed for Wheezing. 2/21/18   Ayanna Mendoza NP   lidocaine (LIDODERM) 5 % Apply patch to the affected area for 12 hours a day and remove for 12 hours a day. 2/21/18   Ayanna Mendoza NP   nicotine (NICODERM CQ) 14 mg/24 hr patch 1 Patch by TransDERmal route daily for 30 days. Indications: Smoking Cessation 2/21/18 3/23/18  Ayanna Mendoza NP   food supplemt, lactose-reduced (ENSURE) liqd Take 237 mL by mouth three (3) times daily.  2/21/18   Ayanna Mendoza NP   dilTIAZem CD (CARDIZEM CD) 120 mg ER capsule Take 1 Cap by mouth daily. Indications: persistent tachycardia 2/15/18   Dilma Levi MD         Allergies   Allergen Reactions    Pollen Extracts Sneezing    Shellfish Derived Unable to Obtain     Per SNF paperwork        Social History     Social History    Marital status: UNKNOWN     Spouse name: N/A    Number of children: N/A    Years of education: N/A     Occupational History    Not on file. Social History Main Topics    Smoking status: Former Smoker     Packs/day: 1.00     Types: Cigarettes    Smokeless tobacco: Never Used      Comment: every 2 days    Alcohol use No    Drug use: No    Sexual activity: No     Other Topics Concern    Not on file     Social History Narrative        Family History   Problem Relation Age of Onset    Cancer Mother      colon ancer    Cancer Father      lung cancer    Cancer Brother      lung cancer        Review of Systems   Constitutional: Positive for malaise/fatigue and weight loss. Negative for chills, diaphoresis and fever. HENT: Negative for congestion, ear discharge, ear pain, hearing loss, nosebleeds, sinus pain, sore throat and tinnitus. Eyes: Negative for blurred vision, pain, discharge and redness. Respiratory: Positive for cough, sputum production and shortness of breath. Negative for wheezing. Mild SOB with exertion. Moist cough. Cardiovascular: Negative for chest pain, palpitations and leg swelling. Gastrointestinal: Negative for abdominal pain, constipation, diarrhea, heartburn, nausea and vomiting. Genitourinary: Negative for dysuria and flank pain. Musculoskeletal: Positive for back pain and myalgias. Skin: Negative for itching and rash. Neurological: Positive for weakness. Negative for dizziness, tingling, tremors and headaches. Psychiatric/Behavioral: Positive for depression. Negative for hallucinations, substance abuse and suicidal ideas. The patient is nervous/anxious and has insomnia.         Assessment/ Plan:     Vitals: 02/21/18 0837   BP: 113/60   Pulse: (!) 112   Resp: 18   Temp: 98.7 °F (37.1 °C)   SpO2: 96%   Weight: 124 lb 11.2 oz (56.6 kg)   Height: 6' (1.829 m)   PainSc:   5   PainLoc: Back        Physical Exam   Constitutional:   Thin, frail. HENT:   Head: Normocephalic and atraumatic. Right Ear: External ear normal.   Left Ear: External ear normal.   Nose: Nose normal.   Mouth/Throat: Oropharynx is clear and moist. No oropharyngeal exudate. Runny nose   Eyes: Conjunctivae are normal. Pupils are equal, round, and reactive to light. Right eye exhibits no discharge. Left eye exhibits no discharge. Neck: Normal range of motion. Neck supple. Cardiovascular: Regular rhythm and normal heart sounds. tachycardia   Pulmonary/Chest: Effort normal and breath sounds normal. No respiratory distress. He has no wheezes. He has no rales. He exhibits no tenderness. Moist cough   Abdominal: Soft. Bowel sounds are normal.   Musculoskeletal: Normal range of motion. He exhibits no edema or tenderness. Neurological: He is alert. He has normal reflexes. Skin: Skin is warm and dry. Nursing note and vitals reviewed. ICD-10-CM ICD-9-CM    1. Chronic obstructive pulmonary disease, unspecified COPD type ([facility]) J44.9 496 ipratropium (ATROVENT) 0.02 % soln      ipratropium (ATROVENT HFA) 17 mcg/actuation inhaler      REFERRAL TO PEDIATRIC PULMONOLOGY   2. Closed fracture of first lumbar vertebra, unspecified fracture morphology, initial encounter ([facility]) S32.019A 805.4 lidocaine (LIDODERM) 5 %      REFERRAL TO ORTHOPEDICS   3. Pain R52 780.96 lidocaine (LIDODERM) 5 %   4. Tobacco use Z72.0 305.1 nicotine (NICODERM CQ) 14 mg/24 hr patch   5. Nutrition disorder E63.9 269.9 food supplemt, lactose-reduced (ENSURE) liqd   6. Psychiatric disorder F99 300.9 REFERRAL TO PSYCHIATRY   7. Chest pain, unspecified type R07.9 786.50 REFERRAL TO CARDIOLOGY   8. Abnormal EKG R94.31 794.31 REFERRAL TO CARDIOLOGY   9.  Tachycardia R00.0 785.0 REFERRAL TO CARDIOLOGY   10. Encounter for immunization Z23 V03.89 CANCELED: INFLUENZA VIRUS VACCINE, HIGH DOSE SEASONAL, PRESERVATIVE FREE        Orders Placed This Encounter    REFERRAL TO ORTHOPEDICS     Referral Priority:   Routine     Referral Type:   Consultation     Referral Reason:   Specialty Services Required     Referral Location:   [facility]     Referred to Provider:   Harry Moran MD     Requested Specialty:   Orthopedic Surgery   [facility]     Referral Priority:   Routine     Referral Type:   Behavioral Health     Referral Reason:   Specialty Services Required     Referred to Provider:   Reese Kayser, MD    REFERRAL TO CARDIOLOGY     Referral Priority:   Routine     Referral Type:   Consultation     Referral Reason:   Specialty Services Required     Referred to Provider:   Michelle Roberts MD     Requested Specialty:   Cardiology    REFERRAL TO PEDIATRIC PULMONOLOGY     Referral Priority:   Routine     Referral Type:   Consultation     Referral Reason:   Specialty Services Required     Referral Location:   [facility] [address].     Referred to Provider:   Sigrid Layton MD     Requested Specialty:   Pulmonary Disease    budesonide (PULMICORT) 0.25 mg/2 mL nbsp     Sig: by Nebulization route.  ipratropium (ATROVENT) 0.02 % soln     Si.5 mL by Nebulization route every four (4) hours as needed. Dispense:  12 Vial     Refill:  1    ipratropium (ATROVENT HFA) 17 mcg/actuation inhaler     Sig: Take 1 Puff by inhalation every four (4) hours as needed for Wheezing. Dispense:  1 Inhaler     Refill:  2    lidocaine (LIDODERM) 5 %     Sig: Apply patch to the affected area for 12 hours a day and remove for 12 hours a day. Dispense:  30 Each     Refill:  0    nicotine (NICODERM CQ) 14 mg/24 hr patch     Si Patch by TransDERmal route daily for 30 days.  Indications: Smoking Cessation     Dispense:  30 Patch     Refill:  0    food supplemt, lactose-reduced (ENSURE) liqd     Sig: Take 237 mL by mouth three (3) times daily. Dispense:  1 Can     Refill:  2        Assessment/ Plan:     I have reviewed the patient's medical history in detail and updated the computerized patient record. Referrals given for orthopedics, pulmonology, psychiatry, and cardiology. Instructed use of Ensure meal supplement to help with nutrition and weight gain. We had a prolonged discussion about these complex clinical issues and went over the various important aspects to consider. All questions were answered. Advised him to call back, return to office, or go to ER if symptoms do not improve, change in nature, or persist. Schedule f/u in 2 wks for Medicare Wellness visit and labs. He was given an after visit summary or informed of [employer] which includes patient instructions, diagnoses, current medications, & vitals. He expressed understanding with the diagnosis and plan and was given the opportunity to ask questions.      [place], [facility]

## 2018-02-21 NOTE — MR AVS SNAPSHOT
[address] [place] [address],[address] [place] [id number] 
[phone] Patient: Lucero Mendez MRN: [id number] [facility]:1/96/9839 Visit Information Date & Time Provider Department Dept. Phone Encounter #  
 2/21/2018  8:45 AM Rebekah Glass NP [facility] [phone] [id number] Follow-up Instructions Return in about 2 weeks (around 3/7/2018), or if symptoms worsen or fail to improve, for Medicare wellness and labs. Upcoming Health Maintenance Date Due DTaP/Tdap/Td series (1 - Tdap) 2/17/1963 ZOSTER VACCINE AGE 60> 12/17/2001 GLAUCOMA SCREENING Q2Y 2/17/2007 Pneumococcal 65+ Low/Medium Risk (1 of 2 - PCV13) 2/17/2007 MEDICARE YEARLY EXAM 2/17/2007 Influenza Age 5 to Adult 8/1/2017 Allergies as of 2/21/2018  Review Complete On: 2/21/2018 By: Rebekah Glass NP Severity Noted Reaction Type Reactions Pollen Extracts  02/21/2018    Sneezing Shellfish Derived  01/11/2018    Unable to Obtain Per SNF paperwork Current Immunizations  Never Reviewed No immunizations on file. Not reviewed this visit You Were Diagnosed With   
  
 Codes Comments Chronic obstructive pulmonary disease, unspecified COPD type ([facility]    -  Primary ICD-10-CM: J44.9 ICD-9-CM: 143 Closed fracture of first lumbar vertebra, unspecified fracture morphology, initial encounter ([facility]     ICD-10-CM: S32.019A 
ICD-9-CM: 805.4 Pain     ICD-10-CM: R52 ICD-9-CM: 780.96 Tobacco use     ICD-10-CM: Z72.0 ICD-9-CM: 305.1 Nutrition disorder     ICD-10-CM: E63.9 ICD-9-CM: 269.9 Psychiatric disorder     ICD-10-CM: F99 
ICD-9-CM: 300.9 Chest pain, unspecified type     ICD-10-CM: R07.9 ICD-9-CM: 786.50 Abnormal EKG     ICD-10-CM: R94.31 
ICD-9-CM: 794.31 Tachycardia     ICD-10-CM: R00.0 ICD-9-CM: 785.0 Encounter for immunization     ICD-10-CM: Z01 ICD-9-CM: V03.89   
  
 Vitals BP Pulse Temp Resp Height(growth percentile) Weight(growth percentile) 113/60 (BP 1 Location: Left arm, BP Patient Position: Sitting) (!) 112 98.7 °F (37.1 °C) 18 6' (1.829 m) 124 lb 11.2 oz (56.6 kg) SpO2 BMI Smoking Status 96% 16.91 kg/m2 Former Smoker Vitals History BMI and BSA Data Body Mass Index Body Surface Area  
 16.91 kg/m 2 1.7 m 2 Preferred Pharmacy Pharmacy Name Phone [phone] [facility] [phone] Your Updated Medication List  
  
   
This list is accurate as of 2/21/18 10:13 AM.  Always use your most recent med list.  
  
  
  
  
 acetaminophen 500 mg tablet Commonly known as:  TYLENOL Take 2 Tabs by mouth every eight (8) hours as needed for Pain. Indications: BACK PAIN  
  
 cetirizine 5 mg tablet Commonly known as:  ZYRTEC Take 1 Tab by mouth daily. dilTIAZem  mg ER capsule Commonly known as:  CARDIZEM CD Take 1 Cap by mouth daily. Indications: persistent tachycardia  
  
 food supplemt, lactose-reduced Liqd Commonly known as:  ENSURE Take 237 mL by mouth three (3) times daily. * ipratropium 0.02 % Soln Commonly known as:  ATROVENT  
2.5 mL by Nebulization route every four (4) hours as needed. * ipratropium 17 mcg/actuation inhaler Commonly known as:  ATROVENT HFA Take 1 Puff by inhalation every four (4) hours as needed for Wheezing. lidocaine 5 % Commonly known as:  Santi [place] Apply patch to the affected area for 12 hours a day and remove for 12 hours a day. nicotine 14 mg/24 hr patch Commonly known as:  NICODERM CQ  
1 Patch by TransDERmal route daily for 30 days. Indications: Smoking Cessation  
  
 polyethylene glycol 17 gram packet Commonly known as:  Jennifer Canter Take 1 Packet by mouth daily as needed. Indications: constipation  
  
 predniSONE 5 mg tablet Commonly known as:  Collette Shaper  
 Take 10mg BID x2 days then 5mg BID x2 days then 5mg daily x2 days PULMICORT 0.25 mg/2 mL [facility] Generic drug:  budesonide  
by Nebulization route. risperiDONE 2 mg tablet Commonly known as:  RisperDAL Take 1 Tab by mouth nightly. temazepam 30 mg capsule Commonly known as:  RESTORIL Take 1 Cap by mouth nightly. Max Daily Amount: 30 mg. Indications: Insomnia  
  
 traMADol 50 mg tablet Commonly known as:  ULTRAM  
Take 1 Tab by mouth every eight (8) hours as needed for Pain. Max Daily Amount: 150 mg.  
  
 traZODone 150 mg tablet Commonly known as:  Jose Stahley Take 1 Tab by mouth nightly. Indications: insomnia associated with depression * Notice: This list has 2 medication(s) that are the same as other medications prescribed for you. Read the directions carefully, and ask your doctor or other care provider to review them with you. Prescriptions Sent to Pharmacy Refills  
 ipratropium (ATROVENT) 0.02 % soln 1 Si.5 mL by Nebulization route every four (4) hours as needed. Class: Normal  
 [email or web address] #: [phone] Route: Nebulization  
 ipratropium (ATROVENT HFA) 17 mcg/actuation inhaler 2 Sig: Take 1 Puff by inhalation every four (4) hours as needed for Wheezing. Class: Normal  
 Pharmacy: [email or web address] [address] [place] [address] [facility] #: [phone] Route: Inhalation  
 lidocaine (LIDODERM) 5 % 0 Sig: Apply patch to the affected area for 12 hours a day and remove for 12 hours a day. Class: Normal  
 Pharmacy: [address] [facility] [address] [facility] Ph #: [phone]  
 nicotine (NICODERM CQ) 14 mg/24 hr patch 0 Si Patch by TransDERmal route daily for 30 days. Indications: Smoking Cessation Class: Normal  
 [email or web address] #: [phone]  Route: TransDERmal  
 food supplemt, lactose-reduced (ENSURE) liqd 2 Sig: Take 237 mL by mouth three (3) times daily. Class: Normal  
 Pharmacy: [facility] #: [phone] Route: Oral  
  
We Performed the Following REFERRAL TO CARDIOLOGY [ETM24 Custom] Comments:  
 Abnormal EKG, chest pain, tachycardia REFERRAL TO ORTHOPEDICS [LMH073 Custom] Comments:  
 Evaluate and treat L1 compression fracture REFERRAL TO PEDIATRIC PULMONOLOGY [WYF12 Custom] Comments:  
 Evaluate and treat new onset COPD. REFERRAL TO PSYCHIATRY [REF91 Custom] Comments:  
 Evaluate and treat psychiatric disorder. He takes several psychotropic meds and does not know why. Follow-up Instructions Return in about 2 weeks (around 3/7/2018), or if symptoms worsen or fail to improve, for Medicare wellness and labs. Referral Information Referral ID Referred By Referred To  
  
 [id number] Yeni HOYT [facility]   
   [address] Visits Status Start Date End Date 1 New Request 2/21/18 2/21/19 If your referral has a status of pending review or denied, additional information will be sent to support the outcome of this decision. Referral ID Referred By Referred To  
 [id number] Derek Ghosh, [address], [place]  
   [address] [facility] [address] [place] [address] Phone: [phone] Fax: [phone] Visits Status Start Date End Date 1 New Request 2/21/18 2/21/19 If your referral has a status of pending review or denied, additional information will be sent to support the outcome of this decision. Referral ID Referred By Referred To  
 [id number] Derek Ghosh, [address] [place] [address] [address] Phone: [phone] Fax: [phone] Visits Status Start Date End Date 1 New Request 2/21/18 2/21/19 If your referral has a status of pending review or denied, additional information will be sent to support the outcome of this decision. Referral ID Referred By Referred To  
 [id number] Krys HOYT [facility] [address] [facility] Visits Status Start Date End Date 1 New Request 2/21/18 2/21/19 If your referral has a status of pending review or denied, additional information will be sent to support the outcome of this decision. Patient Instructions Chronic Obstructive Pulmonary Disease (COPD): Care Instructions Your Care Instructions Chronic obstructive pulmonary disease (COPD) is a general term for a group of lung diseases, including emphysema and chronic bronchitis. People with COPD have decreased airflow in and out of the lungs, which makes it hard to breathe. The airways also can get clogged with thick mucus. Cigarette smoking is a major cause of COPD. Although there is no cure for COPD, you can slow its progress. Following your treatment plan and taking care of yourself can help you feel better and live longer. Follow-up care is a key part of your treatment and safety. Be sure to make and go to all appointments, and call your doctor if you are having problems. It's also a good idea to know your test results and keep a list of the medicines you take. How can you care for yourself at home? ?Staying healthy ? · Do not smoke. This is the most important step you can take to prevent more damage to your lungs. If you need help quitting, talk to your doctor about stop-smoking programs and medicines. These can increase your chances of quitting for good. ? · Avoid colds and flu. Get a pneumococcal vaccine shot. If you have had one before, ask your doctor whether you need a second dose. Get the flu vaccine every fall. If you must be around people with colds or the flu, wash your hands often. ? · Avoid secondhand smoke, air pollution, and high altitudes. Also avoid cold, dry air and hot, humid air. Stay at home with your windows closed when air pollution is bad. ?Medicines and oxygen therapy ? · Take your medicines exactly as prescribed. Call your doctor if you think you are having a problem with your medicine. ? · You may be taking medicines such as: ¨ Bronchodilators. These help open your airways and make breathing easier. Bronchodilators are either short-acting (work for 6 to 9 hours) or long-acting (work for 24 hours). You inhale most bronchodilators, so they start to act quickly. Always carry your quick-relief inhaler with you in case you need it while you are away from home. ¨ Corticosteroids (prednisone, budesonide). These reduce airway inflammation. They come in pill or inhaled form. You must take these medicines every day for them to work well. ? · A spacer may help you get more inhaled medicine to your lungs. Ask your doctor or pharmacist if a spacer is right for you. If it is, ask how to use it properly. ? · Do not take any vitamins, over-the-counter medicine, or herbal products without talking to your doctor first.  
? · If your doctor prescribed antibiotics, take them as directed. Do not stop taking them just because you feel better. You need to take the full course of antibiotics. ? · Oxygen therapy boosts the amount of oxygen in your blood and helps you breathe easier. Use the flow rate your doctor has recommended, and do not change it without talking to your doctor first.  
Activity ? · Get regular exercise. Walking is an easy way to get exercise. Start out slowly, and walk a little more each day. ? · Pay attention to your breathing. You are exercising too hard if you cannot talk while you are exercising. ? · Take short rest breaks when doing household chores and other activities.   
? · Learn breathing methods-such as breathing through pursed lips-to help you become less short of breath. ? · If your doctor has not set you up with a pulmonary rehabilitation program, talk to him or her about whether rehab is right for you. Rehab includes exercise programs, education about your disease and how to manage it, help with diet and other changes, and emotional support. Diet ? · Eat regular, healthy meals. Use bronchodilators about 1 hour before you eat to make it easier to eat. Eat several small meals instead of three large ones. Drink beverages at the end of the meal. Avoid foods that are hard to chew. ? · Eat foods that contain protein so that you do not lose muscle mass. ? · Talk with your doctor if you gain too much weight or if you lose weight without trying. ?Mental health ? · Talk to your family, friends, or a therapist about your feelings. It is normal to feel frightened, angry, hopeless, helpless, and even guilty. Talking openly about bad feelings can help you cope. If these feelings last, talk to your doctor. When should you call for help? Call 911 anytime you think you may need emergency care. For example, call if: 
? · You have severe trouble breathing. ?Call your doctor now or seek immediate medical care if: 
? · You have new or worse trouble breathing. ? · You cough up blood. ? · You have a fever. ? Watch closely for changes in your health, and be sure to contact your doctor if: 
? · You cough more deeply or more often, especially if you notice more mucus or a change in the color of your mucus. ? · You have new or worse swelling in your legs or belly. ? · You are not getting better as expected. Where can you learn more? Go to http://lubna-adwoa.info/. Lamar Hathaway in the search box to learn more about \"Chronic Obstructive Pulmonary Disease (COPD): Care Instructions. \" Current as of: May 12, 2017 Content Version: 11.4 © 1879-5212 Healthwise, Incorporated.  Care instructions adapted under license by [facility] DAISY Harris (which disclaims liability or warranty for this information). If you have questions about a medical condition or this instruction, always ask your healthcare professional. [address] any warranty or liability for your use of this information. Influenza (Flu) Vaccine: Care Instructions Your Care Instructions Influenza (flu) is an infection in the lungs and breathing passages. It is caused by the influenza virus. There are different strains, or types, of the flu virus every year. The flu comes on quickly. It can cause a cough, stuffy nose, fever, chills, tiredness, and aches and pains. These symptoms may last up to 10 days. The flu can make you feel very sick, but most of the time it doesn't lead to other problems. But it can cause serious problems in people who are older or who have a long-term illness, such as heart disease or diabetes. You can help prevent the flu by getting a flu vaccine every year, as soon as it is available. You cannot get the flu from the vaccine. The vaccine prevents most cases of the flu. But even when the vaccine doesn't prevent the flu, it can make symptoms less severe and reduce the chance of problems from the flu. Sometimes, young children and people who have an immune system problem may have a slight fever or muscle aches or pains 6 to 12 hours after getting the shot. These symptoms usually last 1 or 2 days. Follow-up care is a key part of your treatment and safety. Be sure to make and go to all appointments, and call your doctor if you are having problems. It's also a good idea to know your test results and keep a list of the medicines you take. Who should get the flu vaccine? Everyone age 7 months or older should get a flu vaccine each year. It lowers the chance of getting and spreading the flu.  The vaccine is very important for people who are at high risk for getting other health problems from the flu. This includes: · Anyone 48years of age or older. · People who live in a long-term care center, such as a nursing home. · All children 6 months through 25years of age. · Adults and children 6 months and older who have long-term heart or lung problems, such as asthma. · Adults and children 6 months and older who needed medical care or were in a hospital during the past year because of diabetes, chronic kidney disease, or a weak immune system (including HIV or AIDS). · Women who will be pregnant during the flu season. · People who have any condition that can make it hard to breathe or swallow (such as a brain injury or muscle disorders). · People who can give the flu to others who are at high risk for problems from the flu. This includes all health care workers and close contacts of people age 72 or older. Who should not get the flu vaccine? The person who gives the vaccine may tell you not to get it if you: 
· Have a severe allergy to eggs or any part of the vaccine. · Have had a severe reaction to a flu vaccine in the past. 
· Have had Guillain-Barré syndrome (GBS). · Are sick with a fever. (Get the vaccine when symptoms are gone.) How can you care for yourself at home? · If you or your child has a sore arm or a slight fever after the shot, take an over-the-counter pain medicine, such as acetaminophen (Tylenol) or ibuprofen (Advil, Motrin). Read and follow all instructions on the label. Do not give aspirin to anyone younger than 20. It has been linked to Reye syndrome, a serious illness. · Do not take two or more pain medicines at the same time unless the doctor told you to. Many pain medicines have acetaminophen, which is Tylenol. Too much acetaminophen (Tylenol) can be harmful. When should you call for help? Call 911 anytime you think you may need emergency care. For example, call if after getting the flu vaccine: ? · You have symptoms of a severe reaction to the flu vaccine. Symptoms of a severe reaction may include: ¨ Severe difficulty breathing. ¨ Sudden raised, red areas (hives) all over your body. ¨ Severe lightheadedness. ?Call your doctor now or seek immediate medical care if after getting the flu vaccine: 
? · You think you are having a reaction to the flu vaccine, such as a new fever. ? Watch closely for changes in your health, and be sure to contact your doctor if you have any problems. Where can you learn more? Go to http://lubna-adwoa.info/. Enter J247 in the search box to learn more about \"Influenza (Flu) Vaccine: Care Instructions. \" Current as of: September 24, 2016 Content Version: 11.4 © 9537-0769 [employer]. Care instructions adapted under license by [employer] (which disclaims liability or warranty for this information). If you have questions about a medical condition or this instruction, always ask your healthcare professional. [place] [address] any warranty or liability for your use of this information. Learning About the 0-to-10 Pain Scale What is the 0-to-10 pain scale? Everyone feels pain differently. A pain scale is one way for a person to measure his or her pain so that the doctor can plan how best to manage it. The pain scale helps the doctor keep track of how well your treatment plan is working to reduce your pain and help you do daily tasks. Most pain scales use numbers from 0 to 10. A score of 0 means no pain, and 10 means the worst pain you have ever known. Your medical team will help you manage your pain in a variety of ways. Pain management may include changing your position, using ice or heat, or taking medicine. The pain scale · 0 = No pain. · 1 = Pain is very mild, barely noticeable. Most of the time you don't think about it. · 2 = Minor pain. It's annoying. You may have stronger twinges now and then. · 3 = Noticeable pain. It may distract you, but you can get used to it. · 4 = Moderate pain. If you are involved in an activity, you're able to ignore the pain for a while. But it is still distracting. · 5 = Moderately strong pain. You can't ignore it for more than a few minutes. But with effort you can still work or do some social activities. · 6 = Moderately stronger pain. You avoid some of your normal daily activities. You have trouble concentrating. · 7 = Strong pain. It keeps you from doing normal activities. · 8 = Very strong pain. It's hard to do anything at all. · 9 = Pain that is very hard to bear. You can't carry on a conversation. · 10 = Worst pain possible. Follow-up care is a key part of your treatment and safety. Be sure to make and go to all appointments, and call your doctor if you are having problems. It's also a good idea to know your test results and keep a list of the medicines you take. Where can you learn more? Go to http://lubna-adwoa.info/. Enter F381 in the search box to learn more about \"Learning About the 0-to-10 Pain Scale. \" Current as of: October 14, 2016 Content Version: 11.4 © 3507-7142 Healthwise, Incorporated. Care instructions adapted under license by [employer] (which disclaims liability or warranty for this information). If you have questions about a medical condition or this instruction, always ask your healthcare professional. [place] [address] any warranty or liability for your use of this information. Stopping Smoking: Care Instructions Your Care Instructions Cigarette smokers crave the nicotine in cigarettes. Giving it up is much harder than simply changing a habit. Your body has to stop craving the nicotine. It is hard to quit, but you can do it. There are many tools that people use to quit smoking. You may find that combining tools works best for you. There are several steps to quitting. First you get ready to quit. Then you get support to help you. After that, you learn new skills and behaviors to become a nonsmoker. For many people, a necessary step is getting and using medicine. Your doctor will help you set up the plan that best meets your needs. You may want to attend a smoking cessation program to help you quit smoking. When you choose a program, look for one that has proven success. Ask your doctor for ideas. You will greatly increase your chances of success if you take medicine as well as get counseling or join a cessation program. 
Some of the changes you feel when you first quit tobacco are uncomfortable. Your body will miss the nicotine at first, and you may feel short-tempered and grumpy. You may have trouble sleeping or concentrating. Medicine can help you deal with these symptoms. You may struggle with changing your smoking habits and rituals. The last step is the tricky one: Be prepared for the smoking urge to continue for a time. This is a lot to deal with, but keep at it. You will feel better. Follow-up care is a key part of your treatment and safety. Be sure to make and go to all appointments, and call your doctor if you are having problems. It's also a good idea to know your test results and keep a list of the medicines you take. How can you care for yourself at home? · Ask your family, friends, and coworkers for support. You have a better chance of quitting if you have help and support. · Join a support group, such as Nicotine Anonymous, for people who are trying to quit smoking. · Consider signing up for a smoking cessation program, such as the American Lung Association's Freedom from Smoking program. 
· Set a quit date. Pick your date carefully so that it is not right in the middle of a big deadline or stressful time. Once you quit, do not even take a puff.  Get rid of all ashtrays and lighters after your last cigarette. Clean your house and your clothes so that they do not smell of smoke. · Learn how to be a nonsmoker. Think about ways you can avoid those things that make you reach for a cigarette. ¨ Avoid situations that put you at greatest risk for smoking. For some people, it is hard to have a drink with friends without smoking. For others, they might skip a coffee break with coworkers who smoke. ¨ Change your daily routine. Take a different route to work or eat a meal in a different place. · Cut down on stress. Calm yourself or release tension by doing an activity you enjoy, such as reading a book, taking a hot bath, or gardening. · Talk to your doctor or pharmacist about nicotine replacement therapy, which replaces the nicotine in your body. You still get nicotine but you do not use tobacco. Nicotine replacement products help you slowly reduce the amount of nicotine you need. These products come in several forms, many of them available over-the-counter: ¨ Nicotine patches ¨ Nicotine gum and lozenges ¨ Nicotine inhaler · Ask your doctor about bupropion (Wellbutrin) or varenicline (Chantix), which are prescription medicines. They do not contain nicotine. They help you by reducing withdrawal symptoms, such as stress and anxiety. · Some people find hypnosis, acupuncture, and massage helpful for ending the smoking habit. · Eat a healthy diet and get regular exercise. Having healthy habits will help your body move past its craving for nicotine. · Be prepared to keep trying. Most people are not successful the first few times they try to quit. Do not get mad at yourself if you smoke again. Make a list of things you learned and think about when you want to try again, such as next week, next month, or next year. Where can you learn more? Go to http://lubna-adwoa.info/. Enter U953 in the search box to learn more about \"Stopping Smoking: Care Instructions. \" Current as of: March 20, 2017 Content Version: 11.4 © 3546-9879 [employer]wise, [employer]. Care instructions adapted under license by [employer] (which disclaims liability or warranty for this information). If you have questions about a medical condition or this instruction, always ask your healthcare professional. [address] any warranty or liability for your use of this information. Introducing [facility]! [facility] introduces [email or web address] patient portal. Now you can access parts of your medical record, email your doctor's office, and request medication refills online. 1. In your internet browser, go to [email or web address] 2. Click on the First Time User? Click Here link in the Sign In box. You will see the New Member Sign Up page. 3. Enter your [email or web address] Access Code exactly as it appears below. You will not need to use this code after youve completed the sign-up process. If you do not sign up before the expiration date, you must request a new code. · [email or web address] Access Code: [email or web address] Expires: 5/10/2018  3:06 PM 
 
4. Enter the last four digits of your Social Security Number (xxxx) and Date of Birth (mm/dd/yyyy) as indicated and click Submit. You will be taken to the next sign-up page. 5. Create a [email or web address] ID. This will be your [email or web address] login ID and cannot be changed, so think of one that is secure and easy to remember. 6. Create a [email or web address] password. You can change your password at any time. 7. Enter your Password Reset Question and Answer. This can be used at a later time if you forget your password. 8. Enter your e-mail address. You will receive e-mail notification when new information is available in [address] 9. Click Sign Up. You can now view and download portions of your medical record. 10. Click the Download Summary menu link to download a portable copy of your medical information. If you have questions, please visit the Frequently Asked Questions section of the [employer] website. Remember, [employer] is NOT to be used for urgent needs. For medical emergencies, dial 911. Now available from your iPhone and Android! Please provide this summary of care documentation to your next provider. Your primary care clinician is listed as Phys Other. If you have any questions after today's visit, please call [phone].

## 2018-02-21 NOTE — PATIENT INSTRUCTIONS
Chronic Obstructive Pulmonary Disease (COPD): Care Instructions  Your Care Instructions    Chronic obstructive pulmonary disease (COPD) is a general term for a group of lung diseases, including emphysema and chronic bronchitis. People with COPD have decreased airflow in and out of the lungs, which makes it hard to breathe. The airways also can get clogged with thick mucus. Cigarette smoking is a major cause of COPD. Although there is no cure for COPD, you can slow its progress. Following your treatment plan and taking care of yourself can help you feel better and live longer. Follow-up care is a key part of your treatment and safety. Be sure to make and go to all appointments, and call your doctor if you are having problems. It's also a good idea to know your test results and keep a list of the medicines you take. How can you care for yourself at home? ?Staying healthy  ? · Do not smoke. This is the most important step you can take to prevent more damage to your lungs. If you need help quitting, talk to your doctor about stop-smoking programs and medicines. These can increase your chances of quitting for good. ? · Avoid colds and flu. Get a pneumococcal vaccine shot. If you have had one before, ask your doctor whether you need a second dose. Get the flu vaccine every fall. If you must be around people with colds or the flu, wash your hands often. ? · Avoid secondhand smoke, air pollution, and high altitudes. Also avoid cold, dry air and hot, humid air. Stay at home with your windows closed when air pollution is bad. ?Medicines and oxygen therapy  ? · Take your medicines exactly as prescribed. Call your doctor if you think you are having a problem with your medicine. ? · You may be taking medicines such as:  ¨ Bronchodilators. These help open your airways and make breathing easier. Bronchodilators are either short-acting (work for 6 to 9 hours) or long-acting (work for 24 hours).  You inhale most bronchodilators, so they start to act quickly. Always carry your quick-relief inhaler with you in case you need it while you are away from home. ¨ Corticosteroids (prednisone, budesonide). These reduce airway inflammation. They come in pill or inhaled form. You must take these medicines every day for them to work well. ? · A spacer may help you get more inhaled medicine to your lungs. Ask your doctor or pharmacist if a spacer is right for you. If it is, ask how to use it properly. ? · Do not take any vitamins, over-the-counter medicine, or herbal products without talking to your doctor first.   ? · If your doctor prescribed antibiotics, take them as directed. Do not stop taking them just because you feel better. You need to take the full course of antibiotics. ? · Oxygen therapy boosts the amount of oxygen in your blood and helps you breathe easier. Use the flow rate your doctor has recommended, and do not change it without talking to your doctor first.   Activity  ? · Get regular exercise. Walking is an easy way to get exercise. Start out slowly, and walk a little more each day. ? · Pay attention to your breathing. You are exercising too hard if you cannot talk while you are exercising. ? · Take short rest breaks when doing household chores and other activities. ? · Learn breathing methods-such as breathing through pursed lips-to help you become less short of breath. ? · If your doctor has not set you up with a pulmonary rehabilitation program, talk to him or her about whether rehab is right for you. Rehab includes exercise programs, education about your disease and how to manage it, help with diet and other changes, and emotional support. Diet  ? · Eat regular, healthy meals. Use bronchodilators about 1 hour before you eat to make it easier to eat. Eat several small meals instead of three large ones. Drink beverages at the end of the meal. Avoid foods that are hard to chew.    ? · Eat foods that contain protein so that you do not lose muscle mass. ? · Talk with your doctor if you gain too much weight or if you lose weight without trying. ?Mental health  ? · Talk to your family, friends, or a therapist about your feelings. It is normal to feel frightened, angry, hopeless, helpless, and even guilty. Talking openly about bad feelings can help you cope. If these feelings last, talk to your doctor. When should you call for help? Call 911 anytime you think you may need emergency care. For example, call if:  ? · You have severe trouble breathing. ?Call your doctor now or seek immediate medical care if:  ? · You have new or worse trouble breathing. ? · You cough up blood. ? · You have a fever. ? Watch closely for changes in your health, and be sure to contact your doctor if:  ? · You cough more deeply or more often, especially if you notice more mucus or a change in the color of your mucus. ? · You have new or worse swelling in your legs or belly. ? · You are not getting better as expected. Where can you learn more? Go to http://lubnaEyepicadwoa.info/. Chad Tiwari in the search box to learn more about \"Chronic Obstructive Pulmonary Disease (COPD): Care Instructions. \"  Current as of: May 12, 2017  Content Version: 11.4  © 2431-2272 [employer]. Care instructions adapted under license by [employer] (which disclaims liability or warranty for this information). If you have questions about a medical condition or this instruction, always ask your healthcare professional. [place] [address] any warranty or liability for your use of this information. Influenza (Flu) Vaccine: Care Instructions  Your Care Instructions    Influenza (flu) is an infection in the lungs and breathing passages. It is caused by the influenza virus. There are different strains, or types, of the flu virus every year. The flu comes on quickly.  It can cause a cough, stuffy nose, fever, chills, tiredness, and aches and pains. These symptoms may last up to 10 days. The flu can make you feel very sick, but most of the time it doesn't lead to other problems. But it can cause serious problems in people who are older or who have a long-term illness, such as heart disease or diabetes. You can help prevent the flu by getting a flu vaccine every year, as soon as it is available. You cannot get the flu from the vaccine. The vaccine prevents most cases of the flu. But even when the vaccine doesn't prevent the flu, it can make symptoms less severe and reduce the chance of problems from the flu. Sometimes, young children and people who have an immune system problem may have a slight fever or muscle aches or pains 6 to 12 hours after getting the shot. These symptoms usually last 1 or 2 days. Follow-up care is a key part of your treatment and safety. Be sure to make and go to all appointments, and call your doctor if you are having problems. It's also a good idea to know your test results and keep a list of the medicines you take. Who should get the flu vaccine? Everyone age 7 months or older should get a flu vaccine each year. It lowers the chance of getting and spreading the flu. The vaccine is very important for people who are at high risk for getting other health problems from the flu. This includes:  · Anyone 48years of age or older. · People who live in a long-term care center, such as a nursing home. · All children 6 months through 25years of age. · Adults and children 6 months and older who have long-term heart or lung problems, such as asthma. · Adults and children 6 months and older who needed medical care or were in a hospital during the past year because of diabetes, chronic kidney disease, or a weak immune system (including HIV or AIDS). · Women who will be pregnant during the flu season.   · People who have any condition that can make it hard to breathe or swallow (such as a brain injury or muscle disorders). · People who can give the flu to others who are at high risk for problems from the flu. This includes all health care workers and close contacts of people age 72 or older. Who should not get the flu vaccine? The person who gives the vaccine may tell you not to get it if you:  · Have a severe allergy to eggs or any part of the vaccine. · Have had a severe reaction to a flu vaccine in the past.  · Have had Guillain-Barré syndrome (GBS). · Are sick with a fever. (Get the vaccine when symptoms are gone.)  How can you care for yourself at home? · If you or your child has a sore arm or a slight fever after the shot, take an over-the-counter pain medicine, such as acetaminophen (Tylenol) or ibuprofen (Advil, Motrin). Read and follow all instructions on the label. Do not give aspirin to anyone younger than 20. It has been linked to Reye syndrome, a serious illness. · Do not take two or more pain medicines at the same time unless the doctor told you to. Many pain medicines have acetaminophen, which is Tylenol. Too much acetaminophen (Tylenol) can be harmful. When should you call for help? Call 911 anytime you think you may need emergency care. For example, call if after getting the flu vaccine:  ? · You have symptoms of a severe reaction to the flu vaccine. Symptoms of a severe reaction may include:  ¨ Severe difficulty breathing. ¨ Sudden raised, red areas (hives) all over your body. ¨ Severe lightheadedness. ?Call your doctor now or seek immediate medical care if after getting the flu vaccine:  ? · You think you are having a reaction to the flu vaccine, such as a new fever. ? Watch closely for changes in your health, and be sure to contact your doctor if you have any problems. Where can you learn more? Go to http://lubna-adwoa.info/. Enter Z103 in the search box to learn more about \"Influenza (Flu) Vaccine: Care Instructions. \"  Current as of: September 24, 2016  Content Version: 11.4  © 6377-5119 [employer]. Care instructions adapted under license by [employer] (which disclaims liability or warranty for this information). If you have questions about a medical condition or this instruction, always ask your healthcare professional. [address] any warranty or liability for your use of this information. Learning About the 0-to-10 Pain Scale  What is the 0-to-10 pain scale? Everyone feels pain differently. A pain scale is one way for a person to measure his or her pain so that the doctor can plan how best to manage it. The pain scale helps the doctor keep track of how well your treatment plan is working to reduce your pain and help you do daily tasks. Most pain scales use numbers from 0 to 10. A score of 0 means no pain, and 10 means the worst pain you have ever known. Your medical team will help you manage your pain in a variety of ways. Pain management may include changing your position, using ice or heat, or taking medicine. The pain scale  · 0 = No pain. · 1 = Pain is very mild, barely noticeable. Most of the time you don't think about it. · 2 = Minor pain. It's annoying. You may have stronger twinges now and then. · 3 = Noticeable pain. It may distract you, but you can get used to it. · 4 = Moderate pain. If you are involved in an activity, you're able to ignore the pain for a while. But it is still distracting. · 5 = Moderately strong pain. You can't ignore it for more than a few minutes. But with effort you can still work or do some social activities. · 6 = Moderately stronger pain. You avoid some of your normal daily activities. You have trouble concentrating. · 7 = Strong pain. It keeps you from doing normal activities. · 8 = Very strong pain. It's hard to do anything at all. · 9 = Pain that is very hard to bear. You can't carry on a conversation.   · 10 = Worst pain possible. Follow-up care is a key part of your treatment and safety. Be sure to make and go to all appointments, and call your doctor if you are having problems. It's also a good idea to know your test results and keep a list of the medicines you take. Where can you learn more? Go to http://lubna-adwoa.info/. Enter Q805 in the search box to learn more about \"Learning About the 0-to-10 Pain Scale. \"  Current as of: October 14, 2016  Content Version: 11.4  © 3540-0774 [employer]. Care instructions adapted under license by [employer] (which disclaims liability or warranty for this information). If you have questions about a medical condition or this instruction, always ask your healthcare professional. [address] any warranty or liability for your use of this information. Stopping Smoking: Care Instructions  Your Care Instructions    Cigarette smokers crave the nicotine in cigarettes. Giving it up is much harder than simply changing a habit. Your body has to stop craving the nicotine. It is hard to quit, but you can do it. There are many tools that people use to quit smoking. You may find that combining tools works best for you. There are several steps to quitting. First you get ready to quit. Then you get support to help you. After that, you learn new skills and behaviors to become a nonsmoker. For many people, a necessary step is getting and using medicine. Your doctor will help you set up the plan that best meets your needs. You may want to attend a smoking cessation program to help you quit smoking. When you choose a program, look for one that has proven success. Ask your doctor for ideas. You will greatly increase your chances of success if you take medicine as well as get counseling or join a cessation program.  Some of the changes you feel when you first quit tobacco are uncomfortable.  Your body will miss the nicotine at first, and you may feel short-tempered and grumpy. You may have trouble sleeping or concentrating. Medicine can help you deal with these symptoms. You may struggle with changing your smoking habits and rituals. The last step is the tricky one: Be prepared for the smoking urge to continue for a time. This is a lot to deal with, but keep at it. You will feel better. Follow-up care is a key part of your treatment and safety. Be sure to make and go to all appointments, and call your doctor if you are having problems. It's also a good idea to know your test results and keep a list of the medicines you take. How can you care for yourself at home? · Ask your family, friends, and coworkers for support. You have a better chance of quitting if you have help and support. · Join a support group, such as Nicotine Anonymous, for people who are trying to quit smoking. · Consider signing up for a smoking cessation program, such as the American Lung Association's Freedom from Smoking program.  · Set a quit date. Pick your date carefully so that it is not right in the middle of a big deadline or stressful time. Once you quit, do not even take a puff. Get rid of all ashtrays and lighters after your last cigarette. Clean your house and your clothes so that they do not smell of smoke. · Learn how to be a nonsmoker. Think about ways you can avoid those things that make you reach for a cigarette. ¨ Avoid situations that put you at greatest risk for smoking. For some people, it is hard to have a drink with friends without smoking. For others, they might skip a coffee break with coworkers who smoke. ¨ Change your daily routine. Take a different route to work or eat a meal in a different place. · Cut down on stress. Calm yourself or release tension by doing an activity you enjoy, such as reading a book, taking a hot bath, or gardening.   · Talk to your doctor or pharmacist about nicotine replacement therapy, which replaces the nicotine in your body. You still get nicotine but you do not use tobacco. Nicotine replacement products help you slowly reduce the amount of nicotine you need. These products come in several forms, many of them available over-the-counter:  ¨ Nicotine patches  ¨ Nicotine gum and lozenges  ¨ Nicotine inhaler  · Ask your doctor about bupropion (Wellbutrin) or varenicline (Chantix), which are prescription medicines. They do not contain nicotine. They help you by reducing withdrawal symptoms, such as stress and anxiety. · Some people find hypnosis, acupuncture, and massage helpful for ending the smoking habit. · Eat a healthy diet and get regular exercise. Having healthy habits will help your body move past its craving for nicotine. · Be prepared to keep trying. Most people are not successful the first few times they try to quit. Do not get mad at yourself if you smoke again. Make a list of things you learned and think about when you want to try again, such as next week, next month, or next year. Where can you learn more? Go to http://lubna-adwoa.info/. Enter Z105 in the search box to learn more about \"Stopping Smoking: Care Instructions. \"  Current as of: March 20, 2017  Content Version: 11.4  © 5060-7827 [employer]. Care instructions adapted under license by [employer] (which disclaims liability or warranty for this information). If you have questions about a medical condition or this instruction, always ask your healthcare professional. [place] [address] any warranty or liability for your use of this information.

## 2018-02-22 LAB
SPECIMEN SOURCE: NORMAL
VIRUS SPEC CULT: NORMAL

## 2018-03-02 ENCOUNTER — OFFICE VISIT (OUTPATIENT)
Dept: CARDIOLOGY CLINIC | Age: 78
End: 2018-03-02

## 2018-03-02 VITALS
RESPIRATION RATE: 16 BRPM | WEIGHT: 122 LBS | DIASTOLIC BLOOD PRESSURE: 60 MMHG | HEIGHT: 72 IN | OXYGEN SATURATION: 96 % | BODY MASS INDEX: 16.52 KG/M2 | SYSTOLIC BLOOD PRESSURE: 100 MMHG

## 2018-03-02 DIAGNOSIS — R07.9 CHEST PAIN, UNSPECIFIED TYPE: Primary | ICD-10-CM

## 2018-03-02 DIAGNOSIS — J96.01 ACUTE RESPIRATORY FAILURE WITH HYPOXEMIA (HCC): ICD-10-CM

## 2018-03-02 DIAGNOSIS — R00.0 TACHYCARDIA: ICD-10-CM

## 2018-03-02 RX ORDER — IPRATROPIUM BROMIDE AND ALBUTEROL SULFATE 2.5; .5 MG/3ML; MG/3ML
3 SOLUTION RESPIRATORY (INHALATION)
Qty: 30 NEBULE | Refills: 6 | Status: CANCELLED | OUTPATIENT
Start: 2018-03-02

## 2018-03-02 RX ORDER — IPRATROPIUM BROMIDE AND ALBUTEROL SULFATE 2.5; .5 MG/3ML; MG/3ML
3 SOLUTION RESPIRATORY (INHALATION)
COMMUNITY
End: 2018-04-27 | Stop reason: SDUPTHER

## 2018-03-02 NOTE — PROGRESS NOTES
[facility]: Victoria Cummings  [phone]  Requesting/referring provider: Christina Vasques NP  Reason for Consult: chest pain    HPI: Gloria Orourke, a 70y.o. year-old who presents for evaluation of chest pain, dyspnea, abnormal EKG. Recently admitted to the hospital for over a month for COPD and PNA. Then returned to the ER for evaluation of chest pain. Group home resident. Complained of midsternal chest pain 2/10 like and ache. Had a fall three days prior. No prior cardiac history or stress/echo. The pain is worse with coughing and he is short of breath all the time. The pain is right sided and achy. No radiation. No flutters or palpitations. d    Assessment/Plan:  1. Abnormal EKg- nonspecific  2. Hypokalemia  3. Chest pain- recommended stress testing and echo to evaluation for structural or ischemic heart disease. SOc + tob no etoh  FHx no early CAD  He  has a past medical history of Anxiety; Chronic confusion; Chronic obstructive pulmonary disease ([address] Depression; Poor historian; and Sleep disorder. Cardiovascular ROS: positive for - chest pain  Respiratory ROS: positive for - cough  Neurological ROS: no TIA or stroke symptoms  All other systems negative except as above. PE  There were no vitals filed for this visit. There is no height or weight on file to calculate BMI.    General appearance - alert, well appearing, and in no distress  Mental status - affect appropriate to mood  Eyes - sclera anicteric, moist mucous membranes  Neck - supple, no significant adenopathy  Lymphatics - no  lymphadenopathy  Chest - clear to auscultation, no wheezes, rales or rhonchi  Heart - normal rate, regular rhythm, normal S1, S2, no murmurs, rubs, clicks or gallops  Abdomen - soft, nontender, nondistended, no masses or organomegaly  Back exam - full range of motion, no tenderness  Neurological - cranial nerves II through XII grossly intact, no focal deficit  Musculoskeletal - no muscular tenderness noted, normal strength  Extremities - peripheral pulses normal, no pedal edema  Skin - normal coloration  no rashes    Recent Labs:  No results found for: CHOL, CHOLX, CHLST, CHOLV, 594080, HDL, LDL, LDLC, DLDLP, TGLX, TRIGL, TRIGP, CHHD, CHHDX  Lab Results   Component Value Date/Time    Creatinine 0.63 (L) 02/19/2018 11:23 AM     Lab Results   Component Value Date/Time    BUN 15 02/19/2018 11:23 AM     Lab Results   Component Value Date/Time    Potassium 3.4 (L) 02/19/2018 11:23 AM     No results found for: HBA1C, HGBE8, [id number]  Lab Results   Component Value Date/Time    HGB 12.9 02/19/2018 11:23 AM     Lab Results   Component Value Date/Time    PLATELET 443 55/68/1771 11:23 AM       Reviewed:  Past Medical History:   Diagnosis Date    Anxiety     Chronic confusion     Chronic obstructive pulmonary disease (HCC)     Depression     Poor historian     Sleep disorder     insomnia     History   Smoking Status    Former Smoker    Packs/day: 1.00    Types: Cigarettes   Smokeless Tobacco    Never Used     Comment: every 2 days     History   Alcohol Use No     Allergies   Allergen Reactions    Pollen Extracts Sneezing    Shellfish Derived Unable to Obtain     Per SNF paperwork       Current Outpatient Prescriptions   Medication Sig    budesonide (PULMICORT) 0.25 mg/2 mL nbsp by Nebulization route.  ipratropium (ATROVENT) 0.02 % soln 2.5 mL by Nebulization route every four (4) hours as needed.  ipratropium (ATROVENT HFA) 17 mcg/actuation inhaler Take 1 Puff by inhalation every four (4) hours as needed for Wheezing.  lidocaine (LIDODERM) 5 % Apply patch to the affected area for 12 hours a day and remove for 12 hours a day.  nicotine (NICODERM CQ) 14 mg/24 hr patch 1 Patch by TransDERmal route daily for 30 days. Indications: Smoking Cessation    food supplemt, lactose-reduced (ENSURE) liqd Take 237 mL by mouth three (3) times daily.     acetaminophen (TYLENOL) 500 mg tablet Take 2 Tabs by mouth every eight (8) hours as needed for Pain. Indications: BACK PAIN    risperiDONE (RISPERDAL) 2 mg tablet Take 1 Tab by mouth nightly. (Patient taking differently: Take 2 mg by mouth nightly. Indications: now getting respidone injection)    temazepam (RESTORIL) 30 mg capsule Take 1 Cap by mouth nightly. Max Daily Amount: 30 mg. Indications: Insomnia    traMADol (ULTRAM) 50 mg tablet Take 1 Tab by mouth every eight (8) hours as needed for Pain. Max Daily Amount: 150 mg.    traZODone (DESYREL) 150 mg tablet Take 1 Tab by mouth nightly. Indications: insomnia associated with depression    cetirizine (ZYRTEC) 5 mg tablet Take 1 Tab by mouth daily.  dilTIAZem CD (CARDIZEM CD) 120 mg ER capsule Take 1 Cap by mouth daily. Indications: persistent tachycardia    polyethylene glycol (MIRALAX) 17 gram packet Take 1 Packet by mouth daily as needed. Indications: constipation    predniSONE (DELTASONE) 5 mg tablet Take 10mg BID x2 days then 5mg BID x2 days then 5mg daily x2 days     No current facility-administered medications for this visit.         Louis King MD  [facility] [place]  [id number] [facility] [address]  [address]

## 2018-03-06 ENCOUNTER — TELEPHONE (OUTPATIENT)
Dept: CARDIOLOGY CLINIC | Age: 78
End: 2018-03-06

## 2018-03-23 ENCOUNTER — OFFICE VISIT (OUTPATIENT)
Dept: INTERNAL MEDICINE CLINIC | Age: 78
End: 2018-03-23

## 2018-03-23 VITALS
OXYGEN SATURATION: 97 % | HEART RATE: 79 BPM | BODY MASS INDEX: 16.66 KG/M2 | RESPIRATION RATE: 17 BRPM | TEMPERATURE: 98 F | SYSTOLIC BLOOD PRESSURE: 127 MMHG | DIASTOLIC BLOOD PRESSURE: 73 MMHG | HEIGHT: 72 IN | WEIGHT: 123 LBS

## 2018-03-23 DIAGNOSIS — I10 ESSENTIAL HYPERTENSION: ICD-10-CM

## 2018-03-23 DIAGNOSIS — J44.9 CHRONIC OBSTRUCTIVE PULMONARY DISEASE, UNSPECIFIED COPD TYPE (HCC): Primary | ICD-10-CM

## 2018-03-23 DIAGNOSIS — Z23 ENCOUNTER FOR IMMUNIZATION: ICD-10-CM

## 2018-03-23 NOTE — PATIENT INSTRUCTIONS
Chronic Obstructive Pulmonary Disease (COPD): Care Instructions  Your Care Instructions    Chronic obstructive pulmonary disease (COPD) is a general term for a group of lung diseases, including emphysema and chronic bronchitis. People with COPD have decreased airflow in and out of the lungs, which makes it hard to breathe. The airways also can get clogged with thick mucus. Cigarette smoking is a major cause of COPD. Although there is no cure for COPD, you can slow its progress. Following your treatment plan and taking care of yourself can help you feel better and live longer. Follow-up care is a key part of your treatment and safety. Be sure to make and go to all appointments, and call your doctor if you are having problems. It's also a good idea to know your test results and keep a list of the medicines you take. How can you care for yourself at home? ?Staying healthy  ? · Do not smoke. This is the most important step you can take to prevent more damage to your lungs. If you need help quitting, talk to your doctor about stop-smoking programs and medicines. These can increase your chances of quitting for good. ? · Avoid colds and flu. Get a pneumococcal vaccine shot. If you have had one before, ask your doctor whether you need a second dose. Get the flu vaccine every fall. If you must be around people with colds or the flu, wash your hands often. ? · Avoid secondhand smoke, air pollution, and high altitudes. Also avoid cold, dry air and hot, humid air. Stay at home with your windows closed when air pollution is bad. ?Medicines and oxygen therapy  ? · Take your medicines exactly as prescribed. Call your doctor if you think you are having a problem with your medicine. ? · You may be taking medicines such as:  ¨ Bronchodilators. These help open your airways and make breathing easier. Bronchodilators are either short-acting (work for 6 to 9 hours) or long-acting (work for 24 hours).  You inhale most bronchodilators, so they start to act quickly. Always carry your quick-relief inhaler with you in case you need it while you are away from home. ¨ Corticosteroids (prednisone, budesonide). These reduce airway inflammation. They come in pill or inhaled form. You must take these medicines every day for them to work well. ? · A spacer may help you get more inhaled medicine to your lungs. Ask your doctor or pharmacist if a spacer is right for you. If it is, ask how to use it properly. ? · Do not take any vitamins, over-the-counter medicine, or herbal products without talking to your doctor first.   ? · If your doctor prescribed antibiotics, take them as directed. Do not stop taking them just because you feel better. You need to take the full course of antibiotics. ? · Oxygen therapy boosts the amount of oxygen in your blood and helps you breathe easier. Use the flow rate your doctor has recommended, and do not change it without talking to your doctor first.   Activity  ? · Get regular exercise. Walking is an easy way to get exercise. Start out slowly, and walk a little more each day. ? · Pay attention to your breathing. You are exercising too hard if you cannot talk while you are exercising. ? · Take short rest breaks when doing household chores and other activities. ? · Learn breathing methods-such as breathing through pursed lips-to help you become less short of breath. ? · If your doctor has not set you up with a pulmonary rehabilitation program, talk to him or her about whether rehab is right for you. Rehab includes exercise programs, education about your disease and how to manage it, help with diet and other changes, and emotional support. Diet  ? · Eat regular, healthy meals. Use bronchodilators about 1 hour before you eat to make it easier to eat. Eat several small meals instead of three large ones. Drink beverages at the end of the meal. Avoid foods that are hard to chew.    ? · Eat foods that contain protein so that you do not lose muscle mass. ? · Talk with your doctor if you gain too much weight or if you lose weight without trying. ?Mental health  ? · Talk to your family, friends, or a therapist about your feelings. It is normal to feel frightened, angry, hopeless, helpless, and even guilty. Talking openly about bad feelings can help you cope. If these feelings last, talk to your doctor. When should you call for help? Call 911 anytime you think you may need emergency care. For example, call if:  ? · You have severe trouble breathing. ?Call your doctor now or seek immediate medical care if:  ? · You have new or worse trouble breathing. ? · You cough up blood. ? · You have a fever. ? Watch closely for changes in your health, and be sure to contact your doctor if:  ? · You cough more deeply or more often, especially if you notice more mucus or a change in the color of your mucus. ? · You have new or worse swelling in your legs or belly. ? · You are not getting better as expected. Where can you learn more? Go to http://lubna-adwoa.info/. Tanvi William in the search box to learn more about \"Chronic Obstructive Pulmonary Disease (COPD): Care Instructions. \"  Current as of: May 12, 2017  Content Version: 11.4  © 4936-5005 [employer]. Care instructions adapted under license by [employer] (which disclaims liability or warranty for this information). If you have questions about a medical condition or this instruction, always ask your healthcare professional. [place] [address] any warranty or liability for your use of this information. High Blood Pressure: Care Instructions  Your Care Instructions    If your blood pressure is usually above 140/90, you have high blood pressure, or hypertension. That means the top number is 140 or higher or the bottom number is 90 or higher, or both.   Despite what a lot of people think, high blood pressure usually doesn't cause headaches or make you feel dizzy or lightheaded. It usually has no symptoms. But it does increase your risk for heart attack, stroke, and kidney or eye damage. The higher your blood pressure, the more your risk increases. Your doctor will give you a goal for your blood pressure. Your goal will be based on your health and your age. An example of a goal is to keep your blood pressure below 140/90. Lifestyle changes, such as eating healthy and being active, are always important to help lower blood pressure. You might also take medicine to reach your blood pressure goal.  Follow-up care is a key part of your treatment and safety. Be sure to make and go to all appointments, and call your doctor if you are having problems. It's also a good idea to know your test results and keep a list of the medicines you take. How can you care for yourself at home? Medical treatment  · If you stop taking your medicine, your blood pressure will go back up. You may take one or more types of medicine to lower your blood pressure. Be safe with medicines. Take your medicine exactly as prescribed. Call your doctor if you think you are having a problem with your medicine. · Talk to your doctor before you start taking aspirin every day. Aspirin can help certain people lower their risk of a heart attack or stroke. But taking aspirin isn't right for everyone, because it can cause serious bleeding. · See your doctor regularly. You may need to see the doctor more often at first or until your blood pressure comes down. · If you are taking blood pressure medicine, talk to your doctor before you take decongestants or anti-inflammatory medicine, such as ibuprofen. Some of these medicines can raise blood pressure. · Learn how to check your blood pressure at home. Lifestyle changes  · Stay at a healthy weight.  This is especially important if you put on weight around the waist. Losing even 10 pounds can help you lower your blood pressure. · If your doctor recommends it, get more exercise. Walking is a good choice. Bit by bit, increase the amount you walk every day. Try for at least 30 minutes on most days of the week. You also may want to swim, bike, or do other activities. · Avoid or limit alcohol. Talk to your doctor about whether you can drink any alcohol. · Try to limit how much sodium you eat to less than 2,300 milligrams (mg) a day. Your doctor may ask you to try to eat less than 1,500 mg a day. · Eat plenty of fruits (such as bananas and oranges), vegetables, legumes, whole grains, and low-fat dairy products. · Lower the amount of saturated fat in your diet. Saturated fat is found in animal products such as milk, cheese, and meat. Limiting these foods may help you lose weight and also lower your risk for heart disease. · Do not smoke. Smoking increases your risk for heart attack and stroke. If you need help quitting, talk to your doctor about stop-smoking programs and medicines. These can increase your chances of quitting for good. When should you call for help? Call 911 anytime you think you may need emergency care. This may mean having symptoms that suggest that your blood pressure is causing a serious heart or blood vessel problem. Your blood pressure may be over 180/110. ? For example, call 911 if:  ? · You have symptoms of a heart attack. These may include:  ¨ Chest pain or pressure, or a strange feeling in the chest.  ¨ Sweating. ¨ Shortness of breath. ¨ Nausea or vomiting. ¨ Pain, pressure, or a strange feeling in the back, neck, jaw, or upper belly or in one or both shoulders or arms. ¨ Lightheadedness or sudden weakness. ¨ A fast or irregular heartbeat. ? · You have symptoms of a stroke. These may include:  ¨ Sudden numbness, tingling, weakness, or loss of movement in your face, arm, or leg, especially on only one side of your body. ¨ Sudden vision changes.   ¨ Sudden trouble speaking. ¨ Sudden confusion or trouble understanding simple statements. ¨ Sudden problems with walking or balance. ¨ A sudden, severe headache that is different from past headaches. ? · You have severe back or belly pain. ?Do not wait until your blood pressure comes down on its own. Get help right away. ?Call your doctor now or seek immediate care if:  ? · Your blood pressure is much higher than normal (such as 180/110 or higher), but you don't have symptoms. ? · You think high blood pressure is causing symptoms, such as:  ¨ Severe headache. ¨ Blurry vision. ? Watch closely for changes in your health, and be sure to contact your doctor if:  ? · Your blood pressure measures 140/90 or higher at least 2 times. That means the top number is 140 or higher or the bottom number is 90 or higher, or both. ? · You think you may be having side effects from your blood pressure medicine. ? · Your blood pressure is usually normal, but it goes above normal at least 2 times. Where can you learn more? Go to http://lubna-adwoa.info/. Enter F088 in the search box to learn more about \"High Blood Pressure: Care Instructions. \"  Current as of: September 21, 2016  Content Version: 11.4  © 6510-6618 [employer]. Care instructions adapted under license by [employer] (which disclaims liability or warranty for this information). If you have questions about a medical condition or this instruction, always ask your healthcare professional. [place] [address] any warranty or liability for your use of this information. Depression Treatment: Care Instructions  Your Care Instructions    Depression is a condition that affects the way you feel, think, and act. It causes symptoms such as low energy, loss of interest in daily activities, and sadness or grouchiness that goes on for a long time. Depression is very common and affects men and women of all ages.   Depression is a medical illness caused by changes in the natural chemicals in your brain. It is not a character flaw, and it does not mean that you are a bad or weak person. It does not mean that you are going crazy. It is important to know that depression can be treated. Medicines, counseling, and self-care can all help. Many people do not get help because they are embarrassed or think that they will get over the depression on their own. But some people do not get better without treatment. Follow-up care is a key part of your treatment and safety. Be sure to make and go to all appointments, and call your doctor if you are having problems. It's also a good idea to know your test results and keep a list of the medicines you take. How can you care for yourself at home? Learn about antidepressant medicines  Antidepressant medicines can improve or end the symptoms of depression. You may need to take the medicine for at least 6 months, and often longer. Keep taking your medicine even if you feel better. If you stop taking it too soon, your symptoms may come back or get worse. You may start to feel better within 1 to 3 weeks of taking antidepressant medicine. But it can take as many as 6 to 8 weeks to see more improvement. Talk to your doctor if you have problems with your medicine or if you do not notice any improvement after 3 weeks. Antidepressants can make you feel tired, dizzy, or nervous. Some people have dry mouth, constipation, headaches, sexual problems, an upset stomach, or diarrhea. Many of these side effects are mild and go away on their own after you take the medicine for a few weeks. Some may last longer. Talk to your doctor if side effects bother you too much. You might be able to try a different medicine. If you are pregnant or breastfeeding, talk to your doctor about what medicines you can take.   Learn about counseling  In many cases, counseling can work as well as medicines to treat mild to moderate depression. Counseling is done by licensed mental health providers, such as psychologists, social workers, and some types of nurses. It can be done in one-on-one sessions or in a group setting. Many people find group sessions helpful. Cognitive-behavioral therapy is a type of counseling. In this treatment therapy, you learn how to see and change unhelpful thinking styles that may be adding to your depression. Counseling and medicines often work well when used together. To manage depression  · Be physically active. Getting 30 minutes of exercise each day is good for your body and your mind. Begin slowly if it is hard for you to get started. If you already exercise, keep it up. · Plan something pleasant for yourself every day. Include activities that you have enjoyed in the past.  · Get enough sleep. Talk to your doctor if you have problems sleeping. · Eat a balanced diet. If you do not feel hungry, eat small snacks rather than large meals. · Do not drink alcohol, use illegal drugs, or take medicines that your doctor has not prescribed for you. They may interfere with your treatment. · Spend time with family and friends. It may help to speak openly about your depression with people you trust.  · Take your medicines exactly as prescribed. Call your doctor if you think you are having a problem with your medicine. · Do not make major life decisions while you are depressed. Depression may change the way you think. You will be able to make better decisions after you feel better. · Think positively. Challenge negative thoughts with statements such as \"I am hopeful\"; \"Things will get better\"; and \"I can ask for the help I need. \" Write down these statements and read them often, even if you don't believe them yet. · Be patient with yourself. It took time for your depression to develop, and it will take time for your symptoms to improve. Do not take on too much or be too hard on yourself.   · Learn all you can about depression from written and online materials. · Check out behavioral health classes to learn more about dealing with depression. · Keep the numbers for these national suicide hotlines: 9-250-472-TALK (7-761.737.5769) and 5-881-AGJIPQI (5-165.532.8066). If you or someone you know talks about suicide or feeling hopeless, get help right away. When should you call for help? Call 911 anytime you think you may need emergency care. For example, call if:  ? · You feel you cannot stop from hurting yourself or someone else. ?Call your doctor now or seek immediate medical care if:  ? · You hear voices. ? · You feel much more depressed. ? Watch closely for changes in your health, and be sure to contact your doctor if:  ? · You are having problems with your depression medicine. ? · You are not getting better as expected. Where can you learn more? Go to http://lubnaInteliWISE USAadwoa.info/. Enter F643 in the search box to learn more about \"Depression Treatment: Care Instructions. \"  Current as of: May 12, 2017  Content Version: 11.4  © 9063-7801 [employer]. Care instructions adapted under license by [employer] (which disclaims liability or warranty for this information). If you have questions about a medical condition or this instruction, always ask your healthcare professional. [place] [address] any warranty or liability for your use of this information. Anxiety Disorder: Care Instructions  Your Care Instructions    Anxiety is a normal reaction to stress. Difficult situations can cause you to have symptoms such as sweaty palms and a nervous feeling. In an anxiety disorder, the symptoms are far more severe. Constant worry, muscle tension, trouble sleeping, nausea and diarrhea, and other symptoms can make normal daily activities difficult or impossible. These symptoms may occur for no reason, and they can affect your work, school, or social life.  Medicines, counseling, and self-care can all help. Follow-up care is a key part of your treatment and safety. Be sure to make and go to all appointments, and call your doctor if you are having problems. It's also a good idea to know your test results and keep a list of the medicines you take. How can you care for yourself at home? · Take medicines exactly as directed. Call your doctor if you think you are having a problem with your medicine. · Go to your counseling sessions and follow-up appointments. · Recognize and accept your anxiety. Then, when you are in a situation that makes you anxious, say to yourself, \"This is not an emergency. I feel uncomfortable, but I am not in danger. I can keep going even if I feel anxious. \"  · Be kind to your body:  ¨ Relieve tension with exercise or a massage. ¨ Get enough rest.  ¨ Avoid alcohol, caffeine, nicotine, and illegal drugs. They can increase your anxiety level and cause sleep problems. ¨ Learn and do relaxation techniques. See below for more about these techniques. · Engage your mind. Get out and do something you enjoy. Go to a [employer] movie, or take a walk or hike. Plan your day. Having too much or too little to do can make you anxious. · Keep a record of your symptoms. Discuss your fears with a good friend or family member, or join a support group for people with similar problems. Talking to others sometimes relieves stress. · Get involved in social groups, or volunteer to help others. Being alone sometimes makes things seem worse than they are. · Get at least 30 minutes of exercise on most days of the week to relieve stress. Walking is a good choice. You also may want to do other activities, such as running, swimming, cycling, or playing tennis or team sports. Relaxation techniques  Do relaxation exercises 10 to 20 minutes a day. You can play soothing, relaxing music while you do them, if you wish.   · Tell others in your house that you are going to do your relaxation exercises. Ask them not to disturb you. · Find a comfortable place, away from all distractions and noise. · Lie down on your back, or sit with your back straight. · Focus on your breathing. Make it slow and steady. · Breathe in through your nose. Breathe out through either your nose or mouth. · Breathe deeply, filling up the area between your navel and your rib cage. Breathe so that your belly goes up and down. · Do not hold your breath. · Breathe like this for 5 to 10 minutes. Notice the feeling of calmness throughout your whole body. As you continue to breathe slowly and deeply, relax by doing the following for another 5 to 10 minutes:  · Tighten and relax each muscle group in your body. You can begin at your toes and work your way up to your head. · Imagine your muscle groups relaxing and becoming heavy. · Empty your mind of all thoughts. · Let yourself relax more and more deeply. · Become aware of the state of calmness that surrounds you. · When your relaxation time is over, you can bring yourself back to alertness by moving your fingers and toes and then your hands and feet and then stretching and moving your entire body. Sometimes people fall asleep during relaxation, but they usually wake up shortly afterward. · Always give yourself time to return to full alertness before you drive a car or do anything that might cause an accident if you are not fully alert. Never play a relaxation tape while you drive a car. When should you call for help? Call 911 anytime you think you may need emergency care. For example, call if:  ? · You feel you cannot stop from hurting yourself or someone else. ? Keep the numbers for these national suicide hotlines: 7-167-982-TALK (2-124.353.1013) and 7-259-TPTJBXX (3-148.120.2158). If you or someone you know talks about suicide or feeling hopeless, get help right away. ? Watch closely for changes in your health, and be sure to contact your doctor if:  ? · You have anxiety or fear that affects your life. ? · You have symptoms of anxiety that are new or different from those you had before. Where can you learn more? Go to http://lubna-adwoa.info/. Enter P754 in the search box to learn more about \"Anxiety Disorder: Care Instructions. \"  Current as of: May 12, 2017  Content Version: 11.4  © 6843-1771 [employer]. Care instructions adapted under license by [employer] (which disclaims liability or warranty for this information). If you have questions about a medical condition or this instruction, always ask your healthcare professional. [place] [address] any warranty or liability for your use of this information.

## 2018-03-23 NOTE — PROGRESS NOTES
Chief Complaint   Patient presents with    Medication Evaluation     1. Have you been to the ER, urgent care clinic since your last visit? Hospitalized since your last visit? No    2. Have you seen or consulted any other health care providers outside of the [address] [place] [address] since your last visit? Include any pap smears or colon screening.  No

## 2018-03-23 NOTE — MR AVS SNAPSHOT
[address] [facility] 
 
 
 [address] [facility],[address] [place] [id number] 
[phone] Patient: Marialuisa Richardson MRN: [id number] Visit Information Date & Time Provider Department Dept. Phone Encounter #  
 3/23/2018 11:00 AM Eladio Coyne NP [address] [facility] [phone] [id number] Follow-up Instructions Return in about 8 weeks (around 5/16/2018), or if symptoms worsen or fail to improve, for [employer] and labs. Your Appointments 3/30/2018 12:00 PM  
NUCLEAR MEDICINE with [facility] (DONNA SCHEDULING) Appt Note: appt  
 [address] [place] [address]  
[phone]  
  
   
 [address] [facility] [address] [address]  
  
    
 3/30/2018  2:00 PM  
ECHO CARDIOGRAMS 2D with Gayatri Peraza [facility] (DONNA SCHEDULING) Appt Note: appt  
 [address] [place] [address]  
[address] [place] [address]  
  
    
 4/27/2018 10:30 AM  
New Patient with Andrea Meyers MD  
[address] [place] [address] [address] [facility]) Appt Note: NP referred by Dr. J Carlos Mast- Nolvia Hernandez is on psychiatric meds, noone knows the dx\" - Vini Magallon made the appt, told to arrive at [address] [place] [address] [address] [id number] [place] [address] [facility] Upcoming Health Maintenance Date Due DTaP/Tdap/Td series (1 - Tdap) 2/17/1963 ZOSTER VACCINE AGE 60> 12/17/2001 GLAUCOMA SCREENING Q2Y 2/17/2007 Pneumococcal 65+ Low/Medium Risk (1 of 2 - PCV13) 2/17/2007 MEDICARE YEARLY EXAM 3/23/2018 Allergies as of 3/23/2018  Review Complete On: 3/23/2018 By: Eladio Coyne NP Severity Noted Reaction Type Reactions Pollen Extracts  02/21/2018    Sneezing Shellfish Derived  01/11/2018    Unable to Obtain Per SNF paperwork Current Immunizations  Never Reviewed Name Date Influenza High Dose Vaccine PF  Incomplete Not reviewed this visit You Were Diagnosed With   
  
 Codes Comments Chronic obstructive pulmonary disease, unspecified [facility] type ([facility]    -  Primary ICD-10-CM: J44.9 ICD-9-CM: 726 Encounter for immunization     ICD-10-CM: M00 ICD-9-CM: V03.89 Essential hypertension     ICD-10-CM: I10 
ICD-9-CM: 401.9 Vitals BP Pulse Temp Resp Height(growth percentile) Weight(growth percentile) 127/73 (BP 1 Location: Left arm, BP Patient Position: Sitting) 79 98 °F (36.7 °C) (Oral) 17 6' (1.829 m) 123 lb (55.8 kg) SpO2 BMI Smoking Status 97% 16.68 kg/m2 Former Smoker Vitals History BMI and BSA Data Body Mass Index Body Surface Area  
 16.68 kg/m 2 1.68 m 2 Preferred Pharmacy Pharmacy Name Phone [phone] [phone] Your Updated Medication List  
  
   
This list is accurate as of 3/23/18 12:31 PM.  Always use your most recent med list.  
  
  
  
  
 albuterol-ipratropium 2.5 mg-0.5 mg/3 ml Nebu Commonly known as:  DUO-NEB  
3 mL by Nebulization route every six (6) hours as needed. dilTIAZem  mg ER capsule Commonly known as:  CARDIZEM CD Take 1 Cap by mouth daily. Indications: persistent tachycardia * ipratropium 0.02 % Soln Commonly known as:  ATROVENT  
2.5 mL by Nebulization route every four (4) hours as needed. * ipratropium 17 mcg/actuation inhaler Commonly known as:  ATROVENT HFA Take 1 Puff by inhalation every six (6) hours as needed for Wheezing. NEBULIZER AND COMPRESSOR  
by Does Not Apply route. PULMICORT 0.25 mg/2 mL Nbsp Generic drug:  budesonide  
by Nebulization route. risperiDONE 2 mg tablet Commonly known as:  RisperDAL Take 1 Tab by mouth nightly. temazepam 30 mg capsule Commonly known as:  RESTORIL Take 1 Cap by mouth nightly. Max Daily Amount: 30 mg. Indications: Insomnia  
  
 traZODone 150 mg tablet Commonly known as:  [employer] Take 1 Tab by mouth nightly. Indications: insomnia associated with depression * Notice: This list has 2 medication(s) that are the same as other medications prescribed for you. Read the directions carefully, and ask your doctor or other care provider to review them with you. Prescriptions Sent to Pharmacy Refills  
 ipratropium (ATROVENT HFA) 17 mcg/actuation inhaler 2 Sig: Take 1 Puff by inhalation every six (6) hours as needed for Wheezing. Class: Normal  
 Pharmacy: [facility] #: [phone] Route: Inhalation We Performed the Following INFLUENZA VIRUS VACCINE, HIGH DOSE SEASONAL, PRESERVATIVE FREE [[address] CPT(R)] Follow-up Instructions Return in about 8 weeks (around 5/16/2018), or if symptoms worsen or fail to improve, for [employer] and labs. Patient Instructions Chronic Obstructive Pulmonary Disease (COPD): Care Instructions Your Care Instructions Chronic obstructive pulmonary disease (COPD) is a general term for a group of lung diseases, including emphysema and chronic bronchitis. People with COPD have decreased airflow in and out of the lungs, which makes it hard to breathe. The airways also can get clogged with thick mucus. Cigarette smoking is a major cause of COPD. Although there is no cure for COPD, you can slow its progress. Following your treatment plan and taking care of yourself can help you feel better and live longer. Follow-up care is a key part of your treatment and safety. Be sure to make and go to all appointments, and call your doctor if you are having problems.  It's also a good idea to know your test results and keep a list of the medicines you take. How can you care for yourself at home? ?Staying healthy ? · Do not smoke. This is the most important step you can take to prevent more damage to your lungs. If you need help quitting, talk to your doctor about stop-smoking programs and medicines. These can increase your chances of quitting for good. ? · Avoid colds and flu. Get a pneumococcal vaccine shot. If you have had one before, ask your doctor whether you need a second dose. Get the flu vaccine every fall. If you must be around people with colds or the flu, wash your hands often. ? · Avoid secondhand smoke, air pollution, and high altitudes. Also avoid cold, dry air and hot, humid air. Stay at home with your windows closed when air pollution is bad. ?Medicines and oxygen therapy ? · Take your medicines exactly as prescribed. Call your doctor if you think you are having a problem with your medicine. ? · You may be taking medicines such as: ¨ Bronchodilators. These help open your airways and make breathing easier. Bronchodilators are either short-acting (work for 6 to 9 hours) or long-acting (work for 24 hours). You inhale most bronchodilators, so they start to act quickly. Always carry your quick-relief inhaler with you in case you need it while you are away from home. ¨ Corticosteroids (prednisone, budesonide). These reduce airway inflammation. They come in pill or inhaled form. You must take these medicines every day for them to work well. ? · A spacer may help you get more inhaled medicine to your lungs. Ask your doctor or pharmacist if a spacer is right for you. If it is, ask how to use it properly. ? · Do not take any vitamins, over-the-counter medicine, or herbal products without talking to your doctor first.  
? · If your doctor prescribed antibiotics, take them as directed. Do not stop taking them just because you feel better. You need to take the full course of antibiotics. ? · Oxygen therapy boosts the amount of oxygen in your blood and helps you breathe easier. Use the flow rate your doctor has recommended, and do not change it without talking to your doctor first.  
Activity ? · Get regular exercise. Walking is an easy way to get exercise. Start out slowly, and walk a little more each day. ? · Pay attention to your breathing. You are exercising too hard if you cannot talk while you are exercising. ? · Take short rest breaks when doing household chores and other activities. ? · Learn breathing methods-such as breathing through pursed lips-to help you become less short of breath. ? · If your doctor has not set you up with a pulmonary rehabilitation program, talk to him or her about whether rehab is right for you. Rehab includes exercise programs, education about your disease and how to manage it, help with diet and other changes, and emotional support. Diet ? · Eat regular, healthy meals. Use bronchodilators about 1 hour before you eat to make it easier to eat. Eat several small meals instead of three large ones. Drink beverages at the end of the meal. Avoid foods that are hard to chew. ? · Eat foods that contain protein so that you do not lose muscle mass. ? · Talk with your doctor if you gain too much weight or if you lose weight without trying. ?Mental health ? · Talk to your family, friends, or a therapist about your feelings. It is normal to feel frightened, angry, hopeless, helpless, and even guilty. Talking openly about bad feelings can help you cope. If these feelings last, talk to your doctor. When should you call for help? Call 911 anytime you think you may need emergency care. For example, call if: 
? · You have severe trouble breathing. ?Call your doctor now or seek immediate medical care if: 
? · You have new or worse trouble breathing. ? · You cough up blood. ? · You have a fever. ?Watch closely for changes in your health, and be sure to contact your doctor if: 
? · You cough more deeply or more often, especially if you notice more mucus or a change in the color of your mucus. ? · You have new or worse swelling in your legs or belly. ? · You are not getting better as expected. Where can you learn more? Go to http://lubna-adwoa.info/. Tony Ambrosio in the search box to learn more about \"Chronic Obstructive Pulmonary Disease (COPD): Care Instructions. \" Current as of: May 12, 2017 Content Version: 11.4 © 0331-0135 [employer]ricida. Care instructions adapted under license by [employer] (which disclaims liability or warranty for this information). If you have questions about a medical condition or this instruction, always ask your healthcare professional. [address] any warranty or liability for your use of this information. High Blood Pressure: Care Instructions Your Care Instructions If your blood pressure is usually above 140/90, you have high blood pressure, or hypertension. That means the top number is 140 or higher or the bottom number is 90 or higher, or both. Despite what a lot of people think, high blood pressure usually doesn't cause headaches or make you feel dizzy or lightheaded. It usually has no symptoms. But it does increase your risk for heart attack, stroke, and kidney or eye damage. The higher your blood pressure, the more your risk increases. Your doctor will give you a goal for your blood pressure. Your goal will be based on your health and your age. An example of a goal is to keep your blood pressure below 140/90. Lifestyle changes, such as eating healthy and being active, are always important to help lower blood pressure. You might also take medicine to reach your blood pressure goal. 
Follow-up care is a key part of your treatment and safety.  Be sure to make and go to all appointments, and call your doctor if you are having problems. It's also a good idea to know your test results and keep a list of the medicines you take. How can you care for yourself at home? Medical treatment · If you stop taking your medicine, your blood pressure will go back up. You may take one or more types of medicine to lower your blood pressure. Be safe with medicines. Take your medicine exactly as prescribed. Call your doctor if you think you are having a problem with your medicine. · Talk to your doctor before you start taking aspirin every day. Aspirin can help certain people lower their risk of a heart attack or stroke. But taking aspirin isn't right for everyone, because it can cause serious bleeding. · See your doctor regularly. You may need to see the doctor more often at first or until your blood pressure comes down. · If you are taking blood pressure medicine, talk to your doctor before you take decongestants or anti-inflammatory medicine, such as ibuprofen. Some of these medicines can raise blood pressure. · Learn how to check your blood pressure at home. Lifestyle changes · Stay at a healthy weight. This is especially important if you put on weight around the waist. Losing even 10 pounds can help you lower your blood pressure. · If your doctor recommends it, get more exercise. Walking is a good choice. Bit by bit, increase the amount you walk every day. Try for at least 30 minutes on most days of the week. You also may want to swim, bike, or do other activities. · Avoid or limit alcohol. Talk to your doctor about whether you can drink any alcohol. · Try to limit how much sodium you eat to less than 2,300 milligrams (mg) a day. Your doctor may ask you to try to eat less than 1,500 mg a day. · Eat plenty of fruits (such as bananas and oranges), vegetables, legumes, whole grains, and low-fat dairy products. · Lower the amount of saturated fat in your diet. Saturated fat is found in animal products such as milk, cheese, and meat. Limiting these foods may help you lose weight and also lower your risk for heart disease. · Do not smoke. Smoking increases your risk for heart attack and stroke. If you need help quitting, talk to your doctor about stop-smoking programs and medicines. These can increase your chances of quitting for good. When should you call for help? Call 911 anytime you think you may need emergency care. This may mean having symptoms that suggest that your blood pressure is causing a serious heart or blood vessel problem. Your blood pressure may be over 180/110. ? For example, call 911 if: 
? · You have symptoms of a heart attack. These may include: ¨ Chest pain or pressure, or a strange feeling in the chest. 
¨ Sweating. ¨ Shortness of breath. ¨ Nausea or vomiting. ¨ Pain, pressure, or a strange feeling in the back, neck, jaw, or upper belly or in one or both shoulders or arms. ¨ Lightheadedness or sudden weakness. ¨ A fast or irregular heartbeat. ? · You have symptoms of a stroke. These may include: 
¨ Sudden numbness, tingling, weakness, or loss of movement in your face, arm, or leg, especially on only one side of your body. ¨ Sudden vision changes. ¨ Sudden trouble speaking. ¨ Sudden confusion or trouble understanding simple statements. ¨ Sudden problems with walking or balance. ¨ A sudden, severe headache that is different from past headaches. ? · You have severe back or belly pain. ?Do not wait until your blood pressure comes down on its own. Get help right away. ?Call your doctor now or seek immediate care if: 
? · Your blood pressure is much higher than normal (such as 180/110 or higher), but you don't have symptoms. ? · You think high blood pressure is causing symptoms, such as: ¨ Severe headache. ¨ Blurry vision. ?Watch closely for changes in your health, and be sure to contact your doctor if: 
? · Your blood pressure measures 140/90 or higher at least 2 times. That means the top number is 140 or higher or the bottom number is 90 or higher, or both. ? · You think you may be having side effects from your blood pressure medicine. ? · Your blood pressure is usually normal, but it goes above normal at least 2 times. Where can you learn more? Go to http://lubna-adwoa.info/. Enter V065 in the search box to learn more about \"High Blood Pressure: Care Instructions. \" Current as of: September 21, 2016 Content Version: 11.4 © 4192-3360 [employer]. Care instructions adapted under license by [employer]er (which disclaims liability or warranty for this information). If you have questions about a medical condition or this instruction, always ask your healthcare professional. [place] [address] any warranty or liability for your use of this information. Depression Treatment: Care Instructions Your Care Instructions Depression is a condition that affects the way you feel, think, and act. It causes symptoms such as low energy, loss of interest in daily activities, and sadness or grouchiness that goes on for a long time. Depression is very common and affects men and women of all ages. Depression is a medical illness caused by changes in the natural chemicals in your brain. It is not a character flaw, and it does not mean that you are a bad or weak person. It does not mean that you are going crazy. It is important to know that depression can be treated. Medicines, counseling, and self-care can all help. Many people do not get help because they are embarrassed or think that they will get over the depression on their own. But some people do not get better without treatment. Follow-up care is a key part of your treatment and safety.  Be sure to make and go to all appointments, and call your doctor if you are having problems. It's also a good idea to know your test results and keep a list of the medicines you take. How can you care for yourself at home? Learn about antidepressant medicines Antidepressant medicines can improve or end the symptoms of depression. You may need to take the medicine for at least 6 months, and often longer. Keep taking your medicine even if you feel better. If you stop taking it too soon, your symptoms may come back or get worse. You may start to feel better within 1 to 3 weeks of taking antidepressant medicine. But it can take as many as 6 to 8 weeks to see more improvement. Talk to your doctor if you have problems with your medicine or if you do not notice any improvement after 3 weeks. Antidepressants can make you feel tired, dizzy, or nervous. Some people have dry mouth, constipation, headaches, sexual problems, an upset stomach, or diarrhea. Many of these side effects are mild and go away on their own after you take the medicine for a few weeks. Some may last longer. Talk to your doctor if side effects bother you too much. You might be able to try a different medicine. If you are pregnant or breastfeeding, talk to your doctor about what medicines you can take. Learn about counseling In many cases, counseling can work as well as medicines to treat mild to moderate depression. Counseling is done by licensed mental health providers, such as psychologists, social workers, and some types of nurses. It can be done in one-on-one sessions or in a group setting. Many people find group sessions helpful. Cognitive-behavioral therapy is a type of counseling. In this treatment therapy, you learn how to see and change unhelpful thinking styles that may be adding to your depression. Counseling and medicines often work well when used together. To manage depression · Be physically active.  Getting 30 minutes of exercise each day is good for your body and your mind. Begin slowly if it is hard for you to get started. If you already exercise, keep it up. · Plan something pleasant for yourself every day. Include activities that you have enjoyed in the past. 
· Get enough sleep. Talk to your doctor if you have problems sleeping. · Eat a balanced diet. If you do not feel hungry, eat small snacks rather than large meals. · Do not drink alcohol, use illegal drugs, or take medicines that your doctor has not prescribed for you. They may interfere with your treatment. · Spend time with family and friends. It may help to speak openly about your depression with people you trust. 
· Take your medicines exactly as prescribed. Call your doctor if you think you are having a problem with your medicine. · Do not make major life decisions while you are depressed. Depression may change the way you think. You will be able to make better decisions after you feel better. · Think positively. Challenge negative thoughts with statements such as \"I am hopeful\"; \"Things will get better\"; and \"I can ask for the help I need. \" Write down these statements and read them often, even if you don't believe them yet. · Be patient with yourself. It took time for your depression to develop, and it will take time for your symptoms to improve. Do not take on too much or be too hard on yourself. · Learn all you can about depression from written and online materials. · Check out behavioral health classes to learn more about dealing with depression. · Keep the numbers for these national suicide hotlines: 3-703-937-TALK (0-521.288.5128) and 4-930-RQFZXVX (3-764.112.4498). If you or someone you know talks about suicide or feeling hopeless, get help right away. When should you call for help? Call 911 anytime you think you may need emergency care. For example, call if: 
? · You feel you cannot stop from hurting yourself or someone else. ?Call your doctor now or seek immediate medical care if: 
? · You hear voices. ? · You feel much more depressed. ? Watch closely for changes in your health, and be sure to contact your doctor if: 
? · You are having problems with your depression medicine. ? · You are not getting better as expected. Where can you learn more? Go to http://lubna-adwoa.info/. Enter Y507 in the search box to learn more about \"Depression Treatment: Care Instructions. \" Current as of: May 12, 2017 Content Version: 11.4 © 4183-5806 Cswitch. Care instructions adapted under license by Pulmocide (which disclaims liability or warranty for this information). If you have questions about a medical condition or this instruction, always ask your healthcare professional. [address] any warranty or liability for your use of this information. Anxiety Disorder: Care Instructions Your Care Instructions Anxiety is a normal reaction to stress. Difficult situations can cause you to have symptoms such as sweaty palms and a nervous feeling. In an anxiety disorder, the symptoms are far more severe. Constant worry, muscle tension, trouble sleeping, nausea and diarrhea, and other symptoms can make normal daily activities difficult or impossible. These symptoms may occur for no reason, and they can affect your work, school, or social life. Medicines, counseling, and self-care can all help. Follow-up care is a key part of your treatment and safety. Be sure to make and go to all appointments, and call your doctor if you are having problems. It's also a good idea to know your test results and keep a list of the medicines you take. How can you care for yourself at home? · Take medicines exactly as directed. Call your doctor if you think you are having a problem with your medicine. · Go to your counseling sessions and follow-up appointments. · Recognize and accept your anxiety. Then, when you are in a situation that makes you anxious, say to yourself, \"This is not an emergency. I feel uncomfortable, but I am not in danger. I can keep going even if I feel anxious. \" · Be kind to your body: ¨ Relieve tension with exercise or a massage. ¨ Get enough rest. 
¨ Avoid alcohol, caffeine, nicotine, and illegal drugs. They can increase your anxiety level and cause sleep problems. ¨ Learn and do relaxation techniques. See below for more about these techniques. · Engage your mind. Get out and do something you enjoy. Go to a [employer] movie, or take a walk or hike. Plan your day. Having too much or too little to do can make you anxious. · Keep a record of your symptoms. Discuss your fears with a good friend or family member, or join a support group for people with similar problems. Talking to others sometimes relieves stress. · Get involved in social groups, or volunteer to help others. Being alone sometimes makes things seem worse than they are. · Get at least 30 minutes of exercise on most days of the week to relieve stress. Walking is a good choice. You also may want to do other activities, such as running, swimming, cycling, or playing tennis or team sports. Relaxation techniques Do relaxation exercises 10 to 20 minutes a day. You can play soothing, relaxing music while you do them, if you wish. · Tell others in your house that you are going to do your relaxation exercises. Ask them not to disturb you. · Find a comfortable place, away from all distractions and noise. · Lie down on your back, or sit with your back straight. · Focus on your breathing. Make it slow and steady. · Breathe in through your nose. Breathe out through either your nose or mouth. · Breathe deeply, filling up the area between your navel and your rib cage. Breathe so that your belly goes up and down. · Do not hold your breath. · Breathe like this for 5 to 10 minutes. Notice the feeling of calmness throughout your whole body. As you continue to breathe slowly and deeply, relax by doing the following for another 5 to 10 minutes: · Tighten and relax each muscle group in your body. You can begin at your toes and work your way up to your head. · Imagine your muscle groups relaxing and becoming heavy. · Empty your mind of all thoughts. · Let yourself relax more and more deeply. · Become aware of the state of calmness that surrounds you. · When your relaxation time is over, you can bring yourself back to alertness by moving your fingers and toes and then your hands and feet and then stretching and moving your entire body. Sometimes people fall asleep during relaxation, but they usually wake up shortly afterward. · Always give yourself time to return to full alertness before you drive a car or do anything that might cause an accident if you are not fully alert. Never play a relaxation tape while you drive a car. When should you call for help? Call 911 anytime you think you may need emergency care. For example, call if: 
? · You feel you cannot stop from hurting yourself or someone else. ? Keep the numbers for these national suicide hotlines: 6-607-436-TALK (0-800-456-835.551.5277) and 0-435-CPQUTPZ (2-678.525.5432). If you or someone you know talks about suicide or feeling hopeless, get help right away. ? Watch closely for changes in your health, and be sure to contact your doctor if: 
? · You have anxiety or fear that affects your life. ? · You have symptoms of anxiety that are new or different from those you had before. Where can you learn more? Go to http://lubna-adwoa.info/. Enter P754 in the search box to learn more about \"Anxiety Disorder: Care Instructions. \" Current as of: May 12, 2017 Content Version: 11.4 © 3752-8344 Healthwise, Incorporated.  Care instructions adapted under license by [address] (which disclaims liability or warranty for this information). If you have questions about a medical condition or this instruction, always ask your healthcare professional. [address] any warranty or liability for your use of this information. Introducing \[facility] [place] [facility]\"" & HEALTH SERVICES! Felix Gómez introduces Deeplink patient portal. Now you can access parts of your medical record, email your doctor's office, and request medication refills online. 1. In your internet browser, go to [email or web address] 2. Click on the First Time User? Click Here link in the Sign In box. You will see the New Member Sign Up page. 3. Enter your Deeplink Access Code exactly as it appears below. You will not need to use this code after youve completed the sign-up process. If you do not sign up before the expiration date, you must request a new code. · Deeplink Access Code: [email or web address] Expires: 5/10/2018  4:06 PM 
 
4. Enter the last four digits of your Social Security Number (xxxx) and Date of Birth (mm/dd/yyyy) as indicated and click Submit. You will be taken to the next sign-up page. 5. Create a Deeplink ID. This will be your Deeplink login ID and cannot be changed, so think of one that is secure and easy to remember. 6. Create a Deeplink password. You can change your password at any time. 7. Enter your Password Reset Question and Answer. This can be used at a later time if you forget your password. 8. Enter your e-mail address. You will receive e-mail notification when new information is available in [address] 9. Click Sign Up. You can now view and download portions of your medical record. 10. Click the Download Summary menu link to download a portable copy of your medical information. If you have questions, please visit the Frequently Asked Questions section of the Deeplink website.  Remember, Deeplink is NOT to be used for urgent needs. For medical emergencies, dial 911. Now available from your iPhone and Android! Please provide this summary of care documentation to your next provider. Your primary care clinician is listed as Phys Other. If you have any questions after today's visit, please call [phone].

## 2018-03-23 NOTE — LETTER
3/23/2018 12:22 PM 
 
Mr. Jaleesa Verma [place]hea [id number] To Whom It May Concern: Mr. Ca Hickman requires Ipatropium inhaler as part of his treatment for COPD. Please see his attached visit note. He is to be evaluated by a pulmonary specialist on 4/17/18. Sincerely, Eladio Fields NP

## 2018-03-23 NOTE — PROGRESS NOTES
Nael Lopez who present for routine immunizations. He denies any symptoms , reactions or allergies that would exclude them from being immunized today. Risks and adverse reactions were discussed and the VIS was given to them. All questions were addressed. He was observed for 15 min post injection. There were no reactions observed.     Dakota Stanton LPN

## 2018-03-24 NOTE — PROGRESS NOTES
Medication Evaluation (insurance will not pay for lidoderm patch; inhaler) and Immunization/Injection       Subjective:   HPI     70year old presents with staff member of [facility] for medication evaluation. Insurance declined to cover full cost of Lidocaine patch and inhaler. Staff member requesting notes and letter so insurance might cover full cost. She forgot the papers concerning coverage of inhaler. Patient has been doing well. Has appt with pulmonology, Dr. Carly Boucher on 4/17/18, and with psychiatry, Dr. Julio C Harmon on 4/27/18. He was evaluated by cardiology and needs further testing. Medicare wellness exam and labs are being re-scheduled. Past Medical History:   Diagnosis Date    Anxiety     Chronic confusion     Chronic obstructive pulmonary disease (HCC)     Depression     Poor historian     Sleep disorder     insomnia       History reviewed. No pertinent surgical history. Prior to Admission medications    Medication Sig Start Date End Date Taking? Authorizing Provider   ipratropium (ATROVENT HFA) 17 mcg/actuation inhaler Take 1 Puff by inhalation every six (6) hours as needed for Wheezing. 3/23/18  Yes Elizabeth Al NP   ipratropium (ATROVENT) 0.02 % soln 2.5 mL by Nebulization route every four (4) hours as needed. 2/21/18  Yes Elizabeth Al NP   risperiDONE (RISPERDAL) 2 mg tablet Take 1 Tab by mouth nightly. Patient taking differently: Take 2 mg by mouth nightly. Indications: now getting respidone injection 2/15/18  Yes Alma Stratton MD   temazepam (RESTORIL) 30 mg capsule Take 1 Cap by mouth nightly. Max Daily Amount: 30 mg. Indications: Insomnia 2/15/18  Yes Alma Stratton MD   traZODone (DESYREL) 150 mg tablet Take 1 Tab by mouth nightly. Indications: insomnia associated with depression 2/15/18  Yes Alma Stratton MD   dilTIAZem CD (CARDIZEM CD) 120 mg ER capsule Take 1 Cap by mouth daily.  Indications: persistent tachycardia 2/15/18  Yes Alma Stratton MD   albuterol-ipratropium (Ya Dross) 2.5 mg-0.5 mg/3 ml nebu 3 mL by Nebulization route every six (6) hours as needed. Historical Provider   NEBULIZER AND COMPRESSOR by Does Not Apply route. Historical Provider   budesonide (PULMICORT) 0.25 mg/2 mL nbsp by Nebulization route. Historical Provider        Allergies   Allergen Reactions    Pollen Extracts Sneezing    Shellfish Derived Unable to Obtain     Per SNF paperwork        Social History     Social History    Marital status: UNKNOWN     Spouse name: N/A    Number of children: N/A    Years of education: N/A     Occupational History    Not on file. Social History Main Topics    Smoking status: Former Smoker     Packs/day: 1.00     Types: Cigarettes    Smokeless tobacco: Never Used      Comment: every 2 days    Alcohol use No    Drug use: No    Sexual activity: No     Other Topics Concern    Not on file     Social History Narrative        Family History   Problem Relation Age of Onset    Cancer Mother      colon ancer    Cancer Father      lung cancer    Cancer Brother      lung cancer          Review of Systems   Constitutional: Negative for chills, fever, malaise/fatigue and weight loss. HENT: Negative for congestion. Eyes: Negative for discharge and redness. Respiratory: Negative for cough, shortness of breath and wheezing. Cardiovascular: Negative for chest pain and palpitations. Gastrointestinal: Negative for nausea and vomiting. Genitourinary: Negative for dysuria. Musculoskeletal: Negative for myalgias. Skin: Negative for rash. Neurological: Negative for dizziness and headaches. Psychiatric/Behavioral: Positive for depression. Objective:     Vitals:    03/23/18 1107   BP: 127/73   Pulse: 79   Resp: 17   Temp: 98 °F (36.7 °C)   TempSrc: Oral   SpO2: 97%   Weight: 123 lb (55.8 kg)   Height: 6' (1.829 m)   PainSc:   8   PainLoc: Back        Physical Exam   Constitutional: He appears well-nourished.    underweight   HENT:   Head: Normocephalic and atraumatic. Eyes: Conjunctivae are normal. Pupils are equal, round, and reactive to light. Neck: Normal range of motion. Neck supple. Cardiovascular: Normal rate, regular rhythm and normal heart sounds. Pulmonary/Chest: Effort normal and breath sounds normal. No respiratory distress. He has no wheezes. He has no rales. He exhibits no tenderness. Musculoskeletal: Normal range of motion. Neurological: He is alert. Skin: Skin is warm and dry. Psychiatric: He has a normal mood and affect. Nursing note and vitals reviewed. Assessment/ Plan:       ICD-10-CM ICD-9-CM    1. Chronic obstructive pulmonary disease, unspecified COPD type ([facility]) J44.9 496 ipratropium (ATROVENT HFA) 17 mcg/actuation inhaler   2. Encounter for immunization Z23 V03.89 INFLUENZA VIRUS VACCINE, HIGH DOSE SEASONAL, PRESERVATIVE FREE   3. Essential hypertension I10 401.9         Orders Placed This Encounter    INFLUENZA VIRUS VACCINE, HIGH DOSE SEASONAL, PRESERVATIVE FREE    ipratropium (ATROVENT HFA) 17 mcg/actuation inhaler     Sig: Take 1 Puff by inhalation every six (6) hours as needed for Wheezing. Dispense:  1 Inhaler     Refill:  2        I have reviewed the patient's medical history in detail and updated the computerized patient record. Extensive medication review performed. Notes and letter given to assist in coverage of Lidocaine patch and ipatropium inhaler. We had a prolonged discussion about these complex clinical issues and went over the various important aspects to consider. All questions were answered. Advised him to call back, return to office, or go to ER if symptoms do not improve, change in nature, or persist, otherwise RTO around 5/16/18 for Medicare wellness exam and labs. He was given an after visit summary or informed of BABADU Access which includes patient instructions, diagnoses, current medications, & vitals.   He expressed understanding with the diagnosis and plan and was given the opportunity to ask questions.     Apolinar Rodriguez, RAHEL

## 2018-03-27 ENCOUNTER — PATIENT OUTREACH (OUTPATIENT)
Dept: INTERNAL MEDICINE CLINIC | Age: 78
End: 2018-03-27

## 2018-03-28 NOTE — PROGRESS NOTES
Case Management    Navigator received referral from the patient's niece, Ghulam Parish. Ms. Adriana Hyde desires to have an advanced directive discussion for the patient. Navigator contacted the listed number for the patient and was connected to Ms. William. HIPAA was verified with name and . Ms. Adriana Hyde wishes to schedule the advance for a later time. Today she wishes to take care of her request to refill the patient's behavioral health medications. She states that she has not seen the patient's behavioral health provider at his new group home yet. Navigator contacted the behavioral health provider that was listed in the inpatient care management notes, Mary Orosco NP phone # [phone]. I reached the  in her office, Lainey Stevens. I explained the reason for the call regarding medications refills. The patient's HIPAA was verified with name and . Oksana confirmed Mary Orosco NP was following this patient for behavioral health needs. Oksana continued that it would not be a problem refilling the : Risperdal 2mg by mouth at bed time, Restoril 30mg by mouth every evening and the Desyrel 150mg by mouth nightly. I provided Oksana with contact information for the listed pharmacy and the patient's group home. She expressed thanks and ended the call. Navigator has notified Naman Dias NP that the medication request was sent to the behavioral provider, Mary Orosco NP. [place] has contacted [facility] [place]. The patient's HIPAA was verified with name and . I notified Ms. Benjamin of the pending medication refill. She expressed thanks for the assistance and ended the call. Plan: Will contact the patient's niece in 5-7 days regarding the scheduling for introduction to [employer].

## 2018-03-30 ENCOUNTER — CLINICAL SUPPORT (OUTPATIENT)
Dept: CARDIOLOGY CLINIC | Age: 78
End: 2018-03-30

## 2018-03-30 ENCOUNTER — TELEPHONE (OUTPATIENT)
Dept: INTERNAL MEDICINE CLINIC | Age: 78
End: 2018-03-30

## 2018-03-30 DIAGNOSIS — I07.1 TRICUSPID VALVE INSUFFICIENCY, UNSPECIFIED ETIOLOGY: ICD-10-CM

## 2018-03-30 DIAGNOSIS — R00.0 TACHYCARDIA: Primary | ICD-10-CM

## 2018-03-30 DIAGNOSIS — J96.01 ACUTE RESPIRATORY FAILURE WITH HYPOXEMIA (HCC): ICD-10-CM

## 2018-03-30 DIAGNOSIS — R07.9 CHEST PAIN, UNSPECIFIED TYPE: ICD-10-CM

## 2018-03-30 DIAGNOSIS — R07.9 CHEST PAIN, UNSPECIFIED TYPE: Primary | ICD-10-CM

## 2018-03-30 LAB
ACID FAST STN SPEC: NEGATIVE
MYCOBACTERIUM SPEC QL CULT: NEGATIVE
SPECIMEN PREPARATION: NORMAL
SPECIMEN SOURCE: NORMAL

## 2018-03-30 RX ORDER — ALBUTEROL SULFATE 90 UG/1
1 AEROSOL, METERED RESPIRATORY (INHALATION)
Qty: 1 INHALER | Refills: 3 | Status: ON HOLD | OUTPATIENT
Start: 2018-03-30 | End: 2019-01-01 | Stop reason: SDUPTHER

## 2018-03-30 NOTE — PROGRESS NOTES
See scanned document. Ordering Doctor:Dr. Neel Kim  Reading Doctor:Dr. Neel Kim    Patient tolerated procedure well.

## 2018-04-03 ENCOUNTER — TELEPHONE (OUTPATIENT)
Dept: CARDIOLOGY CLINIC | Age: 78
End: 2018-04-03

## 2018-04-03 NOTE — TELEPHONE ENCOUNTER
Called patient. Spoke with his niece, verified identity. Informed her of the following normal Nuclear stress test results per Dr. Wendi Bauer.

## 2018-04-19 NOTE — PROGRESS NOTES
Received communication from Dr. Bisi Guan, pulmonology re: patient visit on 4/17/18. Patient will need CT scan of the thorax without contrast and prescribed Spiriva Respimat 2.5 mcg/ACT, 2 puffs once daily, 1 inhaler with 6 refills. He isto follow-up in one month with Dr. Avi Whitehead.

## 2018-04-27 ENCOUNTER — OFFICE VISIT (OUTPATIENT)
Dept: BEHAVIORAL/MENTAL HEALTH CLINIC | Age: 78
End: 2018-04-27

## 2018-04-27 VITALS
SYSTOLIC BLOOD PRESSURE: 116 MMHG | HEART RATE: 102 BPM | WEIGHT: 129 LBS | HEIGHT: 72 IN | DIASTOLIC BLOOD PRESSURE: 80 MMHG | BODY MASS INDEX: 17.47 KG/M2

## 2018-04-27 DIAGNOSIS — G47.00 INSOMNIA, UNSPECIFIED TYPE: Chronic | ICD-10-CM

## 2018-04-27 DIAGNOSIS — G31.84 MCI (MILD COGNITIVE IMPAIRMENT) WITH MEMORY LOSS: ICD-10-CM

## 2018-04-27 DIAGNOSIS — F41.8 DEPRESSION WITH ANXIETY: Primary | ICD-10-CM

## 2018-04-27 PROBLEM — R00.0 TACHYCARDIA: Status: RESOLVED | Noted: 2018-02-15 | Resolved: 2018-04-27

## 2018-04-27 PROBLEM — F41.9 ANXIETY: Chronic | Status: RESOLVED | Noted: 2018-02-15 | Resolved: 2018-04-27

## 2018-04-27 PROBLEM — J96.01 ACUTE RESPIRATORY FAILURE WITH HYPOXEMIA (HCC): Status: RESOLVED | Noted: 2018-01-11 | Resolved: 2018-04-27

## 2018-04-27 PROBLEM — J18.9 CAP (COMMUNITY ACQUIRED PNEUMONIA): Status: RESOLVED | Noted: 2018-01-11 | Resolved: 2018-04-27

## 2018-04-27 PROBLEM — F17.200 NICOTINE DEPENDENCE: Chronic | Status: RESOLVED | Noted: 2018-02-15 | Resolved: 2018-04-27

## 2018-04-27 RX ORDER — MIRTAZAPINE 15 MG/1
15 TABLET, FILM COATED ORAL
Qty: 30 TAB | Refills: 1 | Status: SHIPPED | OUTPATIENT
Start: 2018-04-27 | End: 2018-07-09 | Stop reason: SDUPTHER

## 2018-04-27 NOTE — PROGRESS NOTES
Ambulatory Initial Psychiatric Evaluation     Chief Complaint:   Chief Complaint   Patient presents with    New Patient     Referred by Naa De La Rosa NP,  as patient is on psych meds without proper diagnosis        History of Present Illness: Phuong Duckworth is a 70 y.o. , [facility]  male who presents with history of taking multiple psychotropic medication without proper diagnosis was seen today at the request of her FNP and his niece/POA, to establish his mental health treatment here. Patient has been on multiple psychotropic medication including Risperdal, trazodone, temazepam and also Risperdal Consta to few months ago. Patient is not on any psychotropic medication at this time and the BUN was wondering whether he needs to be on some medication especially for his  for sleep. He was brought by his niece from his new group home, [facility], where he has been residing since February of this year. According to the niece patient has done much better in this group home and appears to be at his baseline level of functioning. Patient was very calm and spoke very little and most of the history was provided by his niece. According to his niece, patient was doing well until 7 years ago when he retired from his job as a  and got  from his wife at the same time. 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 He was started on some medication and was placed in assisted living facility where he did not do that well. He stayed there till 1 year ago and then was sent to the hospital because of his behavioral issue which included a smoking in his room against the policy of the group home.   Last August he was taken to the emergency room at [facility] with a similar policy violation of  smoking and was started on Risperdal and Risperdal Consta for noncompliance. Within few months side effect of Risperdal caught up with him and he started to fall regularly. He was taken to [facility] and was found to have pneumonia in January of this year and also had fracture of lumbar spine which was not displaced. Patient was treated for the pneumonia and his fracture was left alone. His niece got him a smaller group home where he has been residing since discharge from [facility].  He ran out of his medications 6 weeks ago completely and has been doing okay except for sleep. Patient was cooperative and pleasant and responded to my questions with brief answers. He reported that he is feeling well and not depressed. He is not sleeping that well. His appetite is good and is picking up his weight. He is medically stable and did not report any physical complaints. Patient does not appear to be psychotic or manic. Patient denies any suicidal or homicidal ideation. Patient denies any obsessive thinking or compulsive behaviors. He reported that he likes his current residential place and has been talking to the other residents there. This is a small group home with 5 presidents only. Patient denies any illicit drug or alcohol abuse. He has stopped smoking 2 months ago. Patient does exhibit mild cognitive impairment. He is able to perform his ADLs. He ambulates independently. He is continent of bladder and bowel. His self-care is good. There is no history of any behavioral issues in the new group home. Patient has multiple medical problems including COPD, low back pain, fracture L1. Patient is medically stable at this time. Scales:    MOCA: 18/29 - MCI vs: Dementia  GDS: 5/15 - mild depression  HAM-A: 5 - WNL  MDQ: negative    Past Psychiatric History:  As per HPI patient does not have any clear diagnosis of mental health problems but has been on multiple psychotropic medications over past many years.   Patient does not recall getting any mental health diagnosis. Patient has not been on any psychotropic medication for past few months. He does have diagnoses of insomnia, anxiety, depression spread over the [facility] chart. Patient has never been hospitalized for mental health reasons. Patient has not received [address] or ECT. Past history of substance use:   Patient has history of abusing marijuana. He reported that he has not used any in the last 2 years. Patient started smoking at a very young age and has been heavy smoker in the past.  He reported that he has stopped smoking 2 months ago. Patient denies abusing alcohol or any other illicit drug. Social History:   Patient is  for about 7 years. He has one son. Patient was  for 16 years prior to divorce. Patient is a retired  and has worked for 13 years prior to his alf 7 years ago. Patient is from [facility]. He has high school education. Patient denies any history of mental illness in the family. Patient denies any problem with the law. Patient denies any history of childhood abuse of any kind. Family History:   Family History   Problem Relation Age of Onset    Cancer Mother      colon ancer    Cancer Father      lung cancer    Cancer Brother      lung cancer        Past Medical History:   Past Medical History:   Diagnosis Date    Anxiety     Chronic confusion     Chronic obstructive pulmonary disease ([facility] [address]     Depression     Poor historian     Sleep disorder     insomnia         Allergies: Allergies   Allergen Reactions    Pollen Extracts Sneezing    Shellfish Derived Unable to Obtain     Per SNF paperwork        Medication List:   Current Outpatient Prescriptions   Medication Sig Dispense Refill    mirtazapine (REMERON) 15 mg tablet Take 1 Tab by mouth nightly.  30 Tab 1    albuterol (PROVENTIL HFA, VENTOLIN HFA, PROAIR HFA) 90 mcg/actuation inhaler Take 1 Puff by inhalation every four (4) hours as needed for Wheezing. 1 Inhaler 3        ROS:  Constitutional: positive for fatigue and weight loss  Eyes: positive for contacts/glasses  Ears, nose, mouth, throat, and face: negative for hearing loss  Respiratory: negative for cough or asthma  Cardiovascular: negative for chest pain, palpitations  Gastrointestinal: negative for reflux symptoms and constipation  Genitourinary:negative for frequency and urinary incontinence  Musculoskeletal:negative for myalgias and arthralgias  Neurological: positive for memory problems  Behavioral/Psych: positive for anxiety, depression and sleep disturbance, negative for SI or HI  Endocrine: negative for diabetic symptoms including polyuria and polydipsia    Psychiatric/Mental Status Examination:     MENTAL STATUS EXAM:  Sensorium  oriented to time, place and person   Orientation person, place, time/date, situation, day of week, month of year and year   Relations cooperative and passive   Eye Contact appropriate   Appearance:  age appropriate and casually dressed   Motor Behavior:  within normal limits   Speech:  normal pitch and normal volume   Vocabulary average   Thought Process: goal directed and logical   Thought Content free of delusions and free of hallucinations   Suicidal ideations none   Homicidal ideations none   Mood:  euthymic   Affect:  anxious and constricted   Memory recent  impaired   Memory remote:  adequate   Concentration:  impaired   Abstraction:  concrete   Insight:  fair   Reliability fair   Judgment:  fair       Assessement & Diagnoses: This is a 51-year-old , [place] male, with vague history of mental health problems, COPD, smoking and THC abuse in remission, was seen today to establish his mental health treatment here. Patient is cooperative and pleasant. He has mild insomnia and mild cognitive impairment. He appears to be recovering back to his baseline level of functioning in a new, small group home.   Patient does not have any significant behavioral health issues at this time. Primary diagnosis: Depression with anxiety-mild, insomnia, mild cognitive impairment versus dementia, history of THC and nicotine dependency- in remission    Secondary diagnosis: No significant personality disorder noted    Tertiary diagnosis: COPD    Strength & Weaknesses: Cooperative and pleasant, supportive niece, good living condition, medically stable. Treatment Plan:   1. Medication: discontinue Risperdal, temazepam, and trazodone. Started low-dose Remeron. 2. Discussed: the potential medication side effects  dry mouth, GI disturbance, weight gain, somnolence  patient given opportunity to ask questions     3. Psychotherapy: No psychotherapy recommended at this time. 4. Medical: Continue with PCP    5. Education: Patient and niece were educated on patient's diagnosis, prognosis and treatment plan. Patient was encouraged to walk daily, engage in intellectual/spiritual activities. Patient was given an activity folder to practice some intellectual stimulation on a daily basis. 6. Return to Clinic: Follow-up Disposition:  Return in about 2 months (around 6/27/2018). The risk versus benefits of treatment were discussed and side effects explained. Patient Patience Gordon agreed with plan. Patient/niece instructed to call with any side effects.      Time spent with Patient/niece:  30 to 74 minutes    Aditi Simons MD  4/27/2018

## 2018-04-27 NOTE — MR AVS SNAPSHOT
[address] [facility] 
[phone] Patient: Marietta Joy MRN: [id number] MKP:1/88/9284 Visit Information Date & Time Provider Department Dept. Phone Encounter #  
 4/27/2018 10:30 AM Blanche Bliss, [address] [facility] [phone] [id number] Follow-up Instructions Return in about 2 months (around 6/27/2018). Your Appointments 4/27/2018 10:30 AM  
New Patient with Blanche Bliss MD  
[address]) Appt Note: NP referred by Dr. Saul Morris- Annelise Akhtar is on psychiatric meds, noone knows the dx\" - Triny Garcia made the appt, told to arrive at 10am; confirmed with aroldo, told to arrive at [address] [facility] [address] [facility]  
[phone]  
  
   
 [facility] [address] [phone]  
  
    
 6/28/2018  1:30 PM  
ESTABLISHED PATIENT with Blanche Bliss MD  
[address] AT [facility] [address] [facility]) Appt Note: 2 month f/u  
 [address] [facility] [address] [facility] [address] [phone]  
[address] [address] [facility] Upcoming Health Maintenance Date Due DTaP/Tdap/Td series (1 - Tdap) 2/17/1963 ZOSTER VACCINE AGE 60> 12/17/2001 GLAUCOMA SCREENING Q2Y 2/17/2007 Pneumococcal 65+ Low/Medium Risk (1 of 2 - PCV13) 2/17/2007 MEDICARE YEARLY EXAM 3/27/2018 Influenza Age 5 to Adult 8/1/2018 Allergies as of 4/27/2018  Review Complete On: 4/27/2018 By: Maria L Fink Severity Noted Reaction Type Reactions Pollen Extracts  02/21/2018    Sneezing Shellfish Derived  01/11/2018    Unable to Obtain Per SNF paperwork Current Immunizations  Never Reviewed Name Date Influenza High Dose Vaccine PF 3/23/2018 Not reviewed this visit You Were Diagnosed With   
  
 Codes Comments Depression with anxiety    -  Primary ICD-10-CM: F41.8 ICD-9-CM: 300.4 Insomnia, unspecified type     ICD-10-CM: G47.00 ICD-9-CM: 780.52 Vitals BP Pulse Height(growth percentile) Weight(growth percentile) BMI Smoking Status 116/80 (!) 102 6' (1.829 m) 129 lb (58.5 kg) 17.5 kg/m2 Former Smoker Vitals History BMI and BSA Data Body Mass Index Body Surface Area  
 17.5 kg/m 2 1.72 m 2 Preferred Pharmacy Pharmacy Name Phone [phone] Wahl [phone] Your Updated Medication List  
  
   
This list is accurate as of 4/27/18 10:28 AM.  Always use your most recent med list.  
  
  
  
  
 albuterol 90 mcg/actuation inhaler Commonly known as:  PROVENTIL HFA, VENTOLIN HFA, PROAIR HFA Take 1 Puff by inhalation every four (4) hours as needed for Wheezing. mirtazapine 15 mg tablet Commonly known as:  Unk Lust Take 1 Tab by mouth nightly. Prescriptions Sent to Pharmacy Refills  
 mirtazapine (REMERON) 15 mg tablet 1 Sig: Take 1 Tab by mouth nightly. Class: Normal  
 Pharmacy: [address] #: [phone] Route: Oral  
  
Follow-up Instructions Return in about 2 months (around 6/27/2018). To-Do List   
 05/04/2018 11:00 AM  
  Appointment with [facility] ER 2 at [facility] RAD [address] ([phone]) NON-CONTRAST STUDY: 1. Bring any non [facility] facility films/images pertaining to the area of interest with you on the day of appointment. 2. Check in at registration at least 30 minutes before appt time unless you were instructed to do otherwise. 3. If you have to drink oral contrast please pick it up any weekday prior to your appointment, if you cannot please check in 2 hrs  before appt time. Introducing [facility]!    
 [facility] [employer] introduces [email or web address] patient portal. Now you can access parts of your medical record, email your doctor's office, and request medication refills online. 1. In your internet browser, go to [email or web address] 2. Click on the First Time User? Click Here link in the Sign In box. You will see the New Member Sign Up page. 3. Enter your [employer] Access Code exactly as it appears below. You will not need to use this code after youve completed the sign-up process. If you do not sign up before the expiration date, you must request a new code. · [employer] Access Code: [email or web address] Expires: 5/10/2018  4:06 PM 
 
4. Enter the last four digits of your Social Security Number (xxxx) and Date of Birth (mm/dd/yyyy) as indicated and click Submit. You will be taken to the next sign-up page. 5. Create a [employer] ID. This will be your [employer] login ID and cannot be changed, so think of one that is secure and easy to remember. 6. Create a [employer] password. You can change your password at any time. 7. Enter your Password Reset Question and Answer. This can be used at a later time if you forget your password. 8. Enter your e-mail address. You will receive e-mail notification when new information is available in [address] 9. Click Sign Up. You can now view and download portions of your medical record. 10. Click the Download Summary menu link to download a portable copy of your medical information. If you have questions, please visit the Frequently Asked Questions section of the [employer] website. Remember, [employer] is NOT to be used for urgent needs. For medical emergencies, dial 911. Now available from your iPhone and Android! Please provide this summary of care documentation to your next provider. Your primary care clinician is listed as Elizabeth Al. If you have any questions after today's visit, please call [phone].

## 2018-05-04 ENCOUNTER — HOSPITAL ENCOUNTER (OUTPATIENT)
Dept: CT IMAGING | Age: 78
Discharge: HOME | End: 2018-05-04
Attending: INTERNAL MEDICINE
Payer: MEDICARE

## 2018-05-04 DIAGNOSIS — R93.89 ABNORMAL COMPUTED TOMOGRAPHY SCAN: ICD-10-CM

## 2018-05-04 PROCEDURE — 71250 CT THORAX DX C-: CPT

## 2018-08-10 ENCOUNTER — HOSPITAL ENCOUNTER (OUTPATIENT)
Dept: CT IMAGING | Age: 78
Discharge: HOME | End: 2018-08-10
Attending: NURSE PRACTITIONER
Payer: MEDICARE

## 2018-08-10 DIAGNOSIS — R93.89 ABNORMAL COMPUTED TOMOGRAPHY SCAN: ICD-10-CM

## 2018-08-10 PROCEDURE — 71250 CT THORAX DX C-: CPT

## 2018-08-27 ENCOUNTER — OFFICE VISIT (OUTPATIENT)
Dept: BEHAVIORAL/MENTAL HEALTH CLINIC | Age: 78
End: 2018-08-27

## 2018-08-27 VITALS
OXYGEN SATURATION: 92 % | HEIGHT: 72 IN | BODY MASS INDEX: 18.83 KG/M2 | SYSTOLIC BLOOD PRESSURE: 140 MMHG | DIASTOLIC BLOOD PRESSURE: 66 MMHG | WEIGHT: 139 LBS | HEART RATE: 103 BPM | TEMPERATURE: 98.5 F | RESPIRATION RATE: 12 BRPM

## 2018-08-27 DIAGNOSIS — F41.8 DEPRESSION WITH ANXIETY: Primary | ICD-10-CM

## 2018-08-27 DIAGNOSIS — G47.00 INSOMNIA, UNSPECIFIED TYPE: Chronic | ICD-10-CM

## 2018-08-27 DIAGNOSIS — G31.84 MCI (MILD COGNITIVE IMPAIRMENT) WITH MEMORY LOSS: ICD-10-CM

## 2018-08-27 RX ORDER — CHOLECALCIFEROL (VITAMIN D3) 125 MCG
5 CAPSULE ORAL
Qty: 30 TAB | Refills: 3 | Status: SHIPPED | OUTPATIENT
Start: 2018-08-27 | End: 2019-01-01 | Stop reason: SDUPTHER

## 2018-08-27 RX ORDER — MIRTAZAPINE 15 MG/1
15 TABLET, FILM COATED ORAL
Qty: 30 TAB | Refills: 3 | Status: SHIPPED | OUTPATIENT
Start: 2018-08-27 | End: 2019-01-01 | Stop reason: SDUPTHER

## 2018-08-27 NOTE — PROGRESS NOTES
Psychiatric Outpatient Progress Note    Account Number:  [de-identified]  Name: Linda Pozo    SUBJECTIVE:   CHIEF COMPLAINT:  Linda Pozo is a 70 y.o. , [facility] [place] male and was seen today for first follow-up of psychiatric condition and psychotropic medication management. He was brought by his niece from his group home. HPI:    Donnie Hong reports the following psychiatric symptoms:  depression, anxiety and insomnia. The symptoms have been present for years and are of moderate severity. The symptoms occur daily. Pt was seen in the presence of his niece. He reported that he is doing better but still not sleeping that well. Compliant with his new medication, Remeron. Tolerating it well. Denies any side effects. He has gained weight and is pleased about it. Denies any psychosis or melissa. He likes his new, small group home per his niece. Pt has gained 10 lbs. BMI = 19. BP is WNL. HR is high, not symptomatic. Initial Assessement & Diagnoses(4/27/18): This is a 70-year-old , [place] male, with vague history of mental health problems, COPD, smoking and THC abuse in remission, was seen today to establish his mental health treatment here. Patient is cooperative and pleasant. He has mild insomnia and mild cognitive impairment. He appears to be recovering back to his baseline level of functioning in a new, small group home. Patient does not have any significant behavioral health issues at this time. Primary diagnosis: Depression with anxiety-mild, insomnia, mild cognitive impairment versus dementia, history of THC and nicotine dependency- in remission  Secondary diagnosis: No significant personality disorder noted  Tertiary diagnosis: COPD  Strength & Weaknesses: Cooperative and pleasant, supportive niece, good living condition, medically stable. Treatment Plan:   Medication: discontinue Risperdal, temazepam, and trazodone. Started low-dose Remeron.     Contributing factors include: new group home     Patient denies SI/HI/SIB. Side Effects:  Weight gain     Fam/Soc Hx (from Ni with updates):    Patient is  for about 7 years. He has one son. Patient was  for 16 years prior to divorce. Patient is a retired  and has worked for 13 years prior to his senior living 7 years ago. Patient is from [place]. He has high school education. Patient denies any history of mental illness in the family. Patient denies any problem with the law. Patient denies any history of childhood abuse of any kind.     REVIEW OF SYSTEMS:  Constitutional: positive for fatigue and weight gain  Eyes: positive for contacts/glasses  Ears, nose, mouth, throat, and face: positive for hearing loss  Respiratory: negative for cough or wheezing  Cardiovascular: negative for chest pain, palpitations  Gastrointestinal: negative for reflux symptoms and constipation  Genitourinary:negative for frequency and urinary incontinence  Musculoskeletal:positive for arthralgias  Neurological: positive for memory problems  Behavioral/Psych: positive for anxiety and sleep disturbance, negative for SI or HI    Visit Vitals    /66 (BP 1 Location: Left arm, BP Patient Position: Sitting)    Pulse (!) 103    Temp 98.5 °F (36.9 °C) (Oral)    Resp 12    Ht 6' (1.829 m)    Wt 63 kg (139 lb)    SpO2 92%    BMI 18.85 kg/m2     Scales: ( 4/27/18)   MOCA: 18/29 - MCI vs: Dementia  GDS: 5/15 - mild depression  HAM-A: 5 - WNL  MDQ: negative    OBJECTIVE:                 Mental Status exam: WNL except for      Sensorium  oriented to time, place and person   Relations cooperative and passive    Eye Contact    appropriate   Appearance:  age appropriate, casually dressed, disheveled and poor hygiene   Motor Behavior/Gait:  within normal limits   Speech:  normal pitch and normal volume   Thought Process: goal directed and logical   Thought Content free of delusions and free of hallucinations   Suicidal ideations none Homicidal ideations none   Mood:  euthymic   Affect:  anxious   Memory recent  impaired   Memory remote:  adequate   Concentration:  impaired   Abstraction:  concrete   Insight:  fair   Reliability fair   Judgment:  fair       MEDICAL DECISION MAKING  Data: pertinent labs, imaging, medical records and diagnostic tests reviewed and incorporated in diagnosis and treatment plan    Allergies   Allergen Reactions    Pollen Extracts Sneezing    Shellfish Derived Unable to Obtain     Per SNF paperwork        Current Outpatient Prescriptions   Medication Sig Dispense Refill    mirtazapine (REMERON) 15 mg tablet Take 1 Tab by mouth nightly. 30 Tab 3    melatonin tab tablet Take 1 Tab by mouth nightly. 30 Tab 3    albuterol (PROVENTIL HFA, VENTOLIN HFA, PROAIR HFA) 90 mcg/actuation inhaler Take 1 Puff by inhalation every four (4) hours as needed for Wheezing. 1 Inhaler 3          Problems addressed today:    ICD-10-CM ICD-9-CM    1. Depression with anxiety F41.8 300.4    2. MCI (mild cognitive impairment) with memory loss G31.84 331.83      780.93    3. Insomnia, unspecified type G47.00 780.52        Assessment:   Lukasz Bhakta  is a 70 y.o.  male  is responding to treatment. Symptoms are stable. Patient denies SI/HI/SIB. No evidence of AH/VH or delusions. Risk Scoring- chronic illnesses and prescription drug management    Treatment Plan:  1. Medications:          Medication Changes/Adjustments: Start melatonin, continue Remeron in the current dose. Current Outpatient Prescriptions   Medication Sig Dispense Refill    mirtazapine (REMERON) 15 mg tablet Take 1 Tab by mouth nightly. 30 Tab 3    melatonin tab tablet Take 1 Tab by mouth nightly. 30 Tab 3    albuterol (PROVENTIL HFA, VENTOLIN HFA, PROAIR HFA) 90 mcg/actuation inhaler Take 1 Puff by inhalation every four (4) hours as needed for Wheezing.  1 Inhaler 3                  The following regarding medications was addressed:    (The risks and benefits of the proposed medication; the potential medication side effects ie    dry mouth, weight gain, GI upset, headache; patient given opportunity to ask questions)       2. Counseling and coordination of care including instructions for treatment, risks/benefits, risk factor reduction and patient/family education. He agrees with the plan. Patient instructed to call with any side effects, questions or issues. PSYCHOTHERAPY:  approx 20 minutes  Type:  Supportive/Solution Focused psychotherapy provided  Focus:     Current problems:   Housing issues   Medical issues    Psychoeducation provided:  Psych medications    Treatment plan reviewed with patient-including diagnosis and medications    Annetta Napier is slowly progressing. Follow-up Disposition:  Return in about 4 months (around 12/27/2018).       Trever Cheema MD  8/27/2018

## 2018-08-27 NOTE — PROGRESS NOTES
1. Have you been to the ER, urgent care clinic since your last visit? Hospitalized since your last visit? No    2. Have you seen or consulted any other health care providers outside of the [address] [place] [address] since your last visit? Include any pap smears or colon screening. No     Chief Complaint   Patient presents with    Medication Management     71 y/o male reports to office for follow up.

## 2018-08-27 NOTE — MR AVS SNAPSHOT
[id number] [facility] [address] [address] 
[phone] Patient: Kristal Randhawa MRN: [id number] ZRA:[birth year] Visit Information Date & Time Provider Department Dept. Phone Encounter #  
 8/27/2018  3:00 PM Zehra Harper MD [facility] [phone] [id number] Follow-up Instructions Return in about 4 months (around 12/27/2018). Upcoming Health Maintenance Date Due DTaP/Tdap/Td series (1 - Tdap) 2/17/1963 ZOSTER VACCINE AGE 60> 12/17/2001 GLAUCOMA SCREENING Q2Y 2/17/2007 Pneumococcal 65+ Low/Medium Risk (1 of 2 - PCV13) 2/17/2007 MEDICARE YEARLY EXAM 3/27/2018 Influenza Age 5 to Adult 8/1/2018 Allergies as of 8/27/2018  Review Complete On: 8/27/2018 By: Jameel Skinner Proud Severity Noted Reaction Type Reactions Pollen Extracts  02/21/2018    Sneezing Shellfish Derived  01/11/2018    Unable to Obtain Per SNF paperwork Current Immunizations  Never Reviewed Name Date Influenza High Dose Vaccine PF 3/23/2018 Not reviewed this visit You Were Diagnosed With   
  
 Codes Comments Depression with anxiety    -  Primary ICD-10-CM: F41.8 ICD-9-CM: 300.4 MCI (mild cognitive impairment) with memory loss     ICD-10-CM: G31.84 ICD-9-CM: 331.83, 780.93 Insomnia, unspecified type     ICD-10-CM: G47.00 ICD-9-CM: 780.52 Vitals BP Pulse Temp Resp Height(growth percentile) Weight(growth percentile) 140/66 (BP 1 Location: Left arm, BP Patient Position: Sitting) (!) 103 98.5 °F (36.9 °C) (Oral) 12 6' (1.829 m) 139 lb (63 kg) SpO2 BMI Smoking Status 92% 18.85 kg/m2 Former Smoker Vitals History BMI and BSA Data Body Mass Index Body Surface Area  
 18.85 kg/m 2 1.79 m 2 Preferred Pharmacy Pharmacy Name Phone [address], [address] [place] [phone] Your Updated Medication List  
  
   
This list is accurate as of 8/27/18  3:13 PM.  Always use your most recent med list.  
  
  
  
  
 albuterol 90 mcg/actuation inhaler Commonly known as:  PROVENTIL HFA, VENTOLIN HFA, PROAIR HFA Take 1 Puff by inhalation every four (4) hours as needed for Wheezing.  
  
 melatonin Tab tablet Take 1 Tab by mouth nightly. mirtazapine 15 mg tablet Commonly known as:  Charles Sitter Take 1 Tab by mouth nightly. Prescriptions Sent to Pharmacy Refills  
 mirtazapine (REMERON) 15 mg tablet 3 Sig: Take 1 Tab by mouth nightly. Class: Normal  
 Pharmacy: [address] [place] [address], [address] [place] [address] Dr Ph #: [phone] Route: Oral  
 melatonin tab tablet 3 Sig: Take 1 Tab by mouth nightly. Class: Normal  
 Pharmacy: [address] [place] [address], [address] [place] [address] Dr Ph #: [phone] Route: Oral  
  
Follow-up Instructions Return in about 4 months (around 12/27/2018). Introducing [facility]! Dear Dragan Garg: Thank you for requesting a [employer] account. Our records indicate that you already have an active [employer] account. You can access your account anytime at [email or web address] Did you know that you can access your hospital and ER discharge instructions at any time in [employer]? You can also review all of your test results from your hospital stay or ER visit. Additional Information If you have questions, please visit the Frequently Asked Questions section of the [employer] website at [email or web address] Remember, [employer] is NOT to be used for urgent needs. For medical emergencies, dial 911. Now available from your iPhone and Android! Please provide this summary of care documentation to your next provider. Your primary care clinician is listed as Elizabeth Al. If you have any questions after today's visit, please call [phone].

## 2018-09-22 ENCOUNTER — HOSPITAL ENCOUNTER (OUTPATIENT)
Dept: CT IMAGING | Age: 78
Discharge: HOME | End: 2018-09-22
Payer: MEDICARE

## 2018-09-22 DIAGNOSIS — R93.89 ABNORMAL COMPUTED TOMOGRAPHY SCAN: ICD-10-CM

## 2018-09-22 PROCEDURE — 71250 CT THORAX DX C-: CPT

## 2019-01-01 ENCOUNTER — ANESTHESIA EVENT (OUTPATIENT)
Dept: ENDOSCOPY | Age: 79
DRG: 163 | End: 2019-01-01
Payer: MEDICARE

## 2019-01-01 ENCOUNTER — APPOINTMENT (OUTPATIENT)
Dept: GENERAL RADIOLOGY | Age: 79
DRG: 163 | End: 2019-01-01
Attending: INTERNAL MEDICINE
Payer: MEDICARE

## 2019-01-01 ENCOUNTER — HOSPITAL ENCOUNTER (OUTPATIENT)
Age: 79
Setting detail: OUTPATIENT SURGERY
Discharge: HOME | DRG: 163 | End: 2019-10-31
Attending: INTERNAL MEDICINE | Admitting: INTERNAL MEDICINE
Payer: MEDICARE

## 2019-01-01 ENCOUNTER — ANESTHESIA (OUTPATIENT)
Dept: SURGERY | Age: 79
DRG: 163 | End: 2019-01-01
Payer: MEDICARE

## 2019-01-01 ENCOUNTER — APPOINTMENT (OUTPATIENT)
Dept: GENERAL RADIOLOGY | Age: 79
DRG: 163 | End: 2019-01-01
Attending: PHYSICIAN ASSISTANT
Payer: MEDICARE

## 2019-01-01 ENCOUNTER — APPOINTMENT (OUTPATIENT)
Dept: NON INVASIVE DIAGNOSTICS | Age: 79
DRG: 163 | End: 2019-01-01
Attending: HOSPITALIST
Payer: MEDICARE

## 2019-01-01 ENCOUNTER — APPOINTMENT (OUTPATIENT)
Dept: GENERAL RADIOLOGY | Age: 79
DRG: 163 | End: 2019-01-01
Attending: HOSPITALIST
Payer: MEDICARE

## 2019-01-01 ENCOUNTER — ANESTHESIA EVENT (OUTPATIENT)
Dept: ENDOSCOPY | Age: 79
End: 2019-01-01
Payer: MEDICARE

## 2019-01-01 ENCOUNTER — APPOINTMENT (OUTPATIENT)
Dept: ULTRASOUND IMAGING | Age: 79
DRG: 163 | End: 2019-01-01
Attending: HOSPITALIST
Payer: MEDICARE

## 2019-01-01 ENCOUNTER — HOSPITAL ENCOUNTER (OUTPATIENT)
Dept: CT IMAGING | Age: 79
Discharge: HOME | End: 2019-03-22
Attending: INTERNAL MEDICINE
Payer: MEDICARE

## 2019-01-01 ENCOUNTER — APPOINTMENT (OUTPATIENT)
Dept: ULTRASOUND IMAGING | Age: 79
DRG: 163 | End: 2019-01-01
Attending: INTERNAL MEDICINE
Payer: MEDICARE

## 2019-01-01 ENCOUNTER — ANESTHESIA (OUTPATIENT)
Dept: ENDOSCOPY | Age: 79
DRG: 163 | End: 2019-01-01
Payer: MEDICARE

## 2019-01-01 ENCOUNTER — DOCUMENTATION ONLY (OUTPATIENT)
Dept: ONCOLOGY | Age: 79
End: 2019-01-01

## 2019-01-01 ENCOUNTER — APPOINTMENT (OUTPATIENT)
Dept: CT IMAGING | Age: 79
DRG: 163 | End: 2019-01-01
Attending: INTERNAL MEDICINE
Payer: MEDICARE

## 2019-01-01 ENCOUNTER — APPOINTMENT (OUTPATIENT)
Dept: CT IMAGING | Age: 79
DRG: 189 | End: 2019-01-01
Attending: INTERNAL MEDICINE
Payer: MEDICARE

## 2019-01-01 ENCOUNTER — HOSPITAL ENCOUNTER (INPATIENT)
Age: 79
LOS: 12 days | DRG: 163 | End: 2019-11-13
Attending: STUDENT IN AN ORGANIZED HEALTH CARE EDUCATION/TRAINING PROGRAM | Admitting: HOSPITALIST
Payer: MEDICARE

## 2019-01-01 ENCOUNTER — APPOINTMENT (OUTPATIENT)
Dept: GENERAL RADIOLOGY | Age: 79
DRG: 163 | End: 2019-01-01
Attending: THORACIC SURGERY (CARDIOTHORACIC VASCULAR SURGERY)
Payer: MEDICARE

## 2019-01-01 ENCOUNTER — APPOINTMENT (OUTPATIENT)
Dept: GENERAL RADIOLOGY | Age: 79
DRG: 163 | End: 2019-01-01
Attending: NURSE PRACTITIONER
Payer: MEDICARE

## 2019-01-01 ENCOUNTER — HOSPITAL ENCOUNTER (INPATIENT)
Age: 79
LOS: 2 days | Discharge: HOME HEALTH | DRG: 189 | End: 2019-10-25
Attending: EMERGENCY MEDICINE | Admitting: INTERNAL MEDICINE
Payer: MEDICARE

## 2019-01-01 ENCOUNTER — APPOINTMENT (OUTPATIENT)
Dept: CT IMAGING | Age: 79
DRG: 189 | End: 2019-01-01
Attending: PHYSICIAN ASSISTANT
Payer: MEDICARE

## 2019-01-01 ENCOUNTER — HOSPITAL ENCOUNTER (OUTPATIENT)
Dept: PET IMAGING | Age: 79
Discharge: HOME | End: 2019-09-23
Attending: PHYSICIAN ASSISTANT
Payer: MEDICARE

## 2019-01-01 ENCOUNTER — ANESTHESIA EVENT (OUTPATIENT)
Dept: SURGERY | Age: 79
DRG: 163 | End: 2019-01-01
Payer: MEDICARE

## 2019-01-01 ENCOUNTER — APPOINTMENT (OUTPATIENT)
Dept: NON INVASIVE DIAGNOSTICS | Age: 79
DRG: 163 | End: 2019-01-01
Attending: INTERNAL MEDICINE
Payer: MEDICARE

## 2019-01-01 ENCOUNTER — HOSPITAL ENCOUNTER (OUTPATIENT)
Age: 79
Setting detail: OUTPATIENT SURGERY
Discharge: HOME | End: 2019-07-31
Attending: INTERNAL MEDICINE | Admitting: INTERNAL MEDICINE
Payer: MEDICARE

## 2019-01-01 ENCOUNTER — APPOINTMENT (OUTPATIENT)
Dept: GENERAL RADIOLOGY | Age: 79
DRG: 163 | End: 2019-01-01
Attending: STUDENT IN AN ORGANIZED HEALTH CARE EDUCATION/TRAINING PROGRAM
Payer: MEDICARE

## 2019-01-01 ENCOUNTER — OFFICE VISIT (OUTPATIENT)
Dept: BEHAVIORAL/MENTAL HEALTH CLINIC | Age: 79
End: 2019-01-01

## 2019-01-01 ENCOUNTER — APPOINTMENT (OUTPATIENT)
Dept: GENERAL RADIOLOGY | Age: 79
DRG: 189 | End: 2019-01-01
Attending: EMERGENCY MEDICINE
Payer: MEDICARE

## 2019-01-01 ENCOUNTER — TELEPHONE (OUTPATIENT)
Dept: ONCOLOGY | Age: 79
End: 2019-01-01

## 2019-01-01 ENCOUNTER — ANESTHESIA (OUTPATIENT)
Dept: ENDOSCOPY | Age: 79
End: 2019-01-01
Payer: MEDICARE

## 2019-01-01 ENCOUNTER — PATIENT OUTREACH (OUTPATIENT)
Dept: FAMILY MEDICINE CLINIC | Age: 79
End: 2019-01-01

## 2019-01-01 ENCOUNTER — HOSPITAL ENCOUNTER (OUTPATIENT)
Dept: CT IMAGING | Age: 79
Discharge: HOME | End: 2019-08-20
Attending: INTERNAL MEDICINE
Payer: MEDICARE

## 2019-01-01 ENCOUNTER — OFFICE VISIT (OUTPATIENT)
Dept: INTERNAL MEDICINE CLINIC | Age: 79
End: 2019-01-01

## 2019-01-01 VITALS
WEIGHT: 132 LBS | HEART RATE: 79 BPM | TEMPERATURE: 99.3 F | HEIGHT: 72 IN | BODY MASS INDEX: 17.88 KG/M2 | OXYGEN SATURATION: 96 % | SYSTOLIC BLOOD PRESSURE: 108 MMHG | DIASTOLIC BLOOD PRESSURE: 60 MMHG

## 2019-01-01 VITALS
OXYGEN SATURATION: 94 % | DIASTOLIC BLOOD PRESSURE: 82 MMHG | TEMPERATURE: 97.7 F | RESPIRATION RATE: 18 BRPM | SYSTOLIC BLOOD PRESSURE: 141 MMHG | WEIGHT: 138 LBS | BODY MASS INDEX: 18.72 KG/M2 | HEART RATE: 78 BPM

## 2019-01-01 VITALS
HEART RATE: 91 BPM | RESPIRATION RATE: 18 BRPM | HEIGHT: 72 IN | BODY MASS INDEX: 17.38 KG/M2 | DIASTOLIC BLOOD PRESSURE: 78 MMHG | TEMPERATURE: 98.7 F | OXYGEN SATURATION: 97 % | WEIGHT: 128.31 LBS | SYSTOLIC BLOOD PRESSURE: 129 MMHG

## 2019-01-01 VITALS — BODY MASS INDEX: 18.77 KG/M2 | HEIGHT: 72 IN | WEIGHT: 138.6 LBS

## 2019-01-01 VITALS
OXYGEN SATURATION: 93 % | SYSTOLIC BLOOD PRESSURE: 132 MMHG | RESPIRATION RATE: 18 BRPM | HEIGHT: 72 IN | HEART RATE: 102 BPM | BODY MASS INDEX: 19.61 KG/M2 | WEIGHT: 144.8 LBS | TEMPERATURE: 98.4 F | DIASTOLIC BLOOD PRESSURE: 82 MMHG

## 2019-01-01 VITALS
HEART RATE: 119 BPM | SYSTOLIC BLOOD PRESSURE: 58 MMHG | BODY MASS INDEX: 20.07 KG/M2 | TEMPERATURE: 99.6 F | WEIGHT: 148.15 LBS | DIASTOLIC BLOOD PRESSURE: 36 MMHG | RESPIRATION RATE: 34 BRPM | OXYGEN SATURATION: 91 % | HEIGHT: 72 IN

## 2019-01-01 VITALS — BODY MASS INDEX: 18.69 KG/M2 | HEIGHT: 72 IN | WEIGHT: 138 LBS

## 2019-01-01 DIAGNOSIS — F17.211 CIGARETTE NICOTINE DEPENDENCE IN REMISSION: ICD-10-CM

## 2019-01-01 DIAGNOSIS — R53.81 DEBILITY: ICD-10-CM

## 2019-01-01 DIAGNOSIS — R06.02 SHORTNESS OF BREATH: ICD-10-CM

## 2019-01-01 DIAGNOSIS — I95.9 HYPOTENSION, UNSPECIFIED HYPOTENSION TYPE: ICD-10-CM

## 2019-01-01 DIAGNOSIS — N41.9 PROSTATITIS, UNSPECIFIED PROSTATITIS TYPE: Primary | ICD-10-CM

## 2019-01-01 DIAGNOSIS — G47.00 INSOMNIA, UNSPECIFIED TYPE: ICD-10-CM

## 2019-01-01 DIAGNOSIS — Z00.00 HEALTHCARE MAINTENANCE: ICD-10-CM

## 2019-01-01 DIAGNOSIS — R54 FRAIL ELDERLY: ICD-10-CM

## 2019-01-01 DIAGNOSIS — F12.21 CANNABIS DEPENDENCE IN REMISSION (HCC): ICD-10-CM

## 2019-01-01 DIAGNOSIS — Z12.5 SCREENING FOR PROSTATE CANCER: ICD-10-CM

## 2019-01-01 DIAGNOSIS — R91.8 LUNG MASS: ICD-10-CM

## 2019-01-01 DIAGNOSIS — R91.8 MULTIPLE PULMONARY NODULES: ICD-10-CM

## 2019-01-01 DIAGNOSIS — F32.A MILD DEPRESSION: Primary | ICD-10-CM

## 2019-01-01 DIAGNOSIS — Z13.1 SCREENING FOR DIABETES MELLITUS: ICD-10-CM

## 2019-01-01 DIAGNOSIS — J93.9 PNEUMOTHORAX ON LEFT: Primary | ICD-10-CM

## 2019-01-01 DIAGNOSIS — Z00.00 ENCOUNTER FOR MEDICARE ANNUAL WELLNESS EXAM: ICD-10-CM

## 2019-01-01 DIAGNOSIS — Z12.11 SCREEN FOR COLON CANCER: ICD-10-CM

## 2019-01-01 DIAGNOSIS — R09.02 HYPOXEMIA: Primary | ICD-10-CM

## 2019-01-01 DIAGNOSIS — R91.8 MULTIPLE NODULES OF LUNG: ICD-10-CM

## 2019-01-01 DIAGNOSIS — H53.8 BLURRED VISION, BILATERAL: ICD-10-CM

## 2019-01-01 DIAGNOSIS — Z13.5 GLAUCOMA SCREENING: ICD-10-CM

## 2019-01-01 DIAGNOSIS — C34.90 NON-SMALL CELL LUNG CANCER, UNSPECIFIED LATERALITY (HCC): ICD-10-CM

## 2019-01-01 DIAGNOSIS — F41.9 MILD ANXIETY: ICD-10-CM

## 2019-01-01 DIAGNOSIS — J44.9 CHRONIC OBSTRUCTIVE PULMONARY DISEASE, UNSPECIFIED COPD TYPE (HCC): ICD-10-CM

## 2019-01-01 DIAGNOSIS — E55.9 VITAMIN D DEFICIENCY: ICD-10-CM

## 2019-01-01 DIAGNOSIS — J43.9 PULMONARY EMPHYSEMA, UNSPECIFIED EMPHYSEMA TYPE (HCC): ICD-10-CM

## 2019-01-01 DIAGNOSIS — F41.9 ANXIETY: ICD-10-CM

## 2019-01-01 DIAGNOSIS — Z23 ENCOUNTER FOR IMMUNIZATION: ICD-10-CM

## 2019-01-01 DIAGNOSIS — Z13.220 SCREENING FOR CHOLESTEROL LEVEL: ICD-10-CM

## 2019-01-01 DIAGNOSIS — Z71.89 GOALS OF CARE, COUNSELING/DISCUSSION: ICD-10-CM

## 2019-01-01 DIAGNOSIS — R97.20 ELEVATED PSA: ICD-10-CM

## 2019-01-01 LAB
25(OH)D3+25(OH)D2 SERPL-MCNC: 16.7 NG/ML (ref 30–100)
ABO + RH BLD: NORMAL
ABO + RH BLD: NORMAL
ALBUMIN SERPL-MCNC: 1.9 G/DL (ref 3.5–5)
ALBUMIN SERPL-MCNC: 2.1 G/DL (ref 3.5–5)
ALBUMIN SERPL-MCNC: 2.2 G/DL (ref 3.5–5)
ALBUMIN SERPL-MCNC: 2.2 G/DL (ref 3.5–5)
ALBUMIN SERPL-MCNC: 2.4 G/DL (ref 3.5–5)
ALBUMIN SERPL-MCNC: 2.4 G/DL (ref 3.5–5)
ALBUMIN SERPL-MCNC: 2.8 G/DL (ref 3.5–5)
ALBUMIN SERPL-MCNC: 2.8 G/DL (ref 3.5–5)
ALBUMIN SERPL-MCNC: 3.1 G/DL (ref 3.5–5)
ALBUMIN SERPL-MCNC: 3.5 G/DL (ref 3.5–5)
ALBUMIN SERPL-MCNC: 3.6 G/DL (ref 3.5–5)
ALBUMIN SERPL-MCNC: 4.2 G/DL (ref 3.5–4.8)
ALBUMIN/GLOB SERPL: 0.7 {RATIO} (ref 1.1–2.2)
ALBUMIN/GLOB SERPL: 0.8 {RATIO} (ref 1.1–2.2)
ALBUMIN/GLOB SERPL: 1 {RATIO} (ref 1.1–2.2)
ALBUMIN/GLOB SERPL: 1.1 {RATIO} (ref 1.1–2.2)
ALBUMIN/GLOB SERPL: 1.3 {RATIO} (ref 1.1–2.2)
ALBUMIN/GLOB SERPL: 1.3 {RATIO} (ref 1.1–2.2)
ALBUMIN/GLOB SERPL: 1.6 {RATIO} (ref 1.2–2.2)
ALP SERPL-CCNC: 102 IU/L (ref 39–117)
ALP SERPL-CCNC: 61 U/L (ref 45–117)
ALP SERPL-CCNC: 66 U/L (ref 45–117)
ALP SERPL-CCNC: 69 U/L (ref 45–117)
ALP SERPL-CCNC: 70 U/L (ref 45–117)
ALP SERPL-CCNC: 72 U/L (ref 45–117)
ALP SERPL-CCNC: 77 U/L (ref 45–117)
ALP SERPL-CCNC: 81 U/L (ref 45–117)
ALP SERPL-CCNC: 85 U/L (ref 45–117)
ALP SERPL-CCNC: 90 U/L (ref 45–117)
ALP SERPL-CCNC: 94 U/L (ref 45–117)
ALP SERPL-CCNC: 98 U/L (ref 45–117)
ALT SERPL-CCNC: 1029 U/L (ref 12–78)
ALT SERPL-CCNC: 1277 U/L (ref 12–78)
ALT SERPL-CCNC: 1400 U/L (ref 12–78)
ALT SERPL-CCNC: 16 IU/L (ref 0–44)
ALT SERPL-CCNC: 1732 U/L (ref 12–78)
ALT SERPL-CCNC: 300 U/L (ref 12–78)
ALT SERPL-CCNC: 306 U/L (ref 12–78)
ALT SERPL-CCNC: 375 U/L (ref 12–78)
ALT SERPL-CCNC: 538 U/L (ref 12–78)
ALT SERPL-CCNC: 582 U/L (ref 12–78)
ALT SERPL-CCNC: 813 U/L (ref 12–78)
ALT SERPL-CCNC: 875 U/L (ref 12–78)
ANION GAP BLD CALC-SCNC: 10 MMOL/L (ref 10–20)
ANION GAP SERPL CALC-SCNC: 10 MMOL/L (ref 5–15)
ANION GAP SERPL CALC-SCNC: 10 MMOL/L (ref 5–15)
ANION GAP SERPL CALC-SCNC: 12 MMOL/L (ref 5–15)
ANION GAP SERPL CALC-SCNC: 2 MMOL/L (ref 5–15)
ANION GAP SERPL CALC-SCNC: 3 MMOL/L (ref 5–15)
ANION GAP SERPL CALC-SCNC: 4 MMOL/L (ref 5–15)
ANION GAP SERPL CALC-SCNC: 5 MMOL/L (ref 5–15)
ANION GAP SERPL CALC-SCNC: 6 MMOL/L (ref 5–15)
ANION GAP SERPL CALC-SCNC: 8 MMOL/L (ref 5–15)
APTT PPP: 31 SEC (ref 22.1–32)
APTT PPP: 34.5 SEC (ref 22.1–32)
APTT PPP: 36.7 SEC (ref 22.1–32)
ARTERIAL PATENCY WRIST A: ABNORMAL
ARTERIAL PATENCY WRIST A: YES
AST SERPL-CCNC: 129 U/L (ref 15–37)
AST SERPL-CCNC: 1304 U/L (ref 15–37)
AST SERPL-CCNC: 1556 U/L (ref 15–37)
AST SERPL-CCNC: 204 U/L (ref 15–37)
AST SERPL-CCNC: 22 IU/L (ref 0–40)
AST SERPL-CCNC: 258 U/L (ref 15–37)
AST SERPL-CCNC: 286 U/L (ref 15–37)
AST SERPL-CCNC: 329 U/L (ref 15–37)
AST SERPL-CCNC: 60 U/L (ref 15–37)
AST SERPL-CCNC: 75 U/L (ref 15–37)
AST SERPL-CCNC: 93 U/L (ref 15–37)
AST SERPL-CCNC: 96 U/L (ref 15–37)
ATRIAL RATE: 100 BPM
ATRIAL RATE: 101 BPM
ATRIAL RATE: 116 BPM
ATRIAL RATE: 83 BPM
ATRIAL RATE: 86 BPM
ATRIAL RATE: 96 BPM
ATRIAL RATE: 99 BPM
BACTERIA SPEC CULT: ABNORMAL
BACTERIA SPEC CULT: NORMAL
BASE DEFICIT BLD-SCNC: 1 MMOL/L
BASE DEFICIT BLD-SCNC: 2 MMOL/L
BASE DEFICIT BLD-SCNC: 2 MMOL/L
BASE EXCESS BLD CALC-SCNC: 0 MMOL/L
BASE EXCESS BLD CALC-SCNC: 2 MMOL/L
BASE EXCESS BLD CALC-SCNC: 5 MMOL/L
BASE EXCESS BLDV CALC-SCNC: 1 MMOL/L
BASOPHILS # BLD AUTO: 0 X10E3/UL (ref 0–0.2)
BASOPHILS # BLD: 0 K/UL (ref 0–0.1)
BASOPHILS # BLD: 0.3 K/UL (ref 0–0.1)
BASOPHILS NFR BLD AUTO: 0 %
BASOPHILS NFR BLD: 0 % (ref 0–1)
BASOPHILS NFR BLD: 1 % (ref 0–1)
BDY SITE: ABNORMAL
BILIRUB DIRECT SERPL-MCNC: 0.5 MG/DL (ref 0–0.2)
BILIRUB DIRECT SERPL-MCNC: 1.1 MG/DL (ref 0–0.2)
BILIRUB DIRECT SERPL-MCNC: 1.4 MG/DL (ref 0–0.2)
BILIRUB SERPL-MCNC: 0.9 MG/DL (ref 0–1.2)
BILIRUB SERPL-MCNC: 1.2 MG/DL (ref 0.2–1)
BILIRUB SERPL-MCNC: 1.6 MG/DL (ref 0.2–1)
BILIRUB SERPL-MCNC: 1.6 MG/DL (ref 0.2–1)
BILIRUB SERPL-MCNC: 2 MG/DL (ref 0.2–1)
BILIRUB SERPL-MCNC: 2.1 MG/DL (ref 0.2–1)
BILIRUB SERPL-MCNC: 2.3 MG/DL (ref 0.2–1)
BILIRUB SERPL-MCNC: 2.4 MG/DL (ref 0.2–1)
BILIRUB SERPL-MCNC: 2.7 MG/DL (ref 0.2–1)
BILIRUB SERPL-MCNC: 3.1 MG/DL (ref 0.2–1)
BILIRUB SERPL-MCNC: 3.3 MG/DL (ref 0.2–1)
BILIRUB SERPL-MCNC: 4 MG/DL (ref 0.2–1)
BILIRUB UR QL STRIP: NEGATIVE
BLD PROD TYP BPU: NORMAL
BLD PROD TYP BPU: NORMAL
BLOOD GROUP ANTIBODIES SERPL: NORMAL
BLOOD GROUP ANTIBODIES SERPL: NORMAL
BNP SERPL-MCNC: 126 PG/ML
BNP SERPL-MCNC: 4149 PG/ML
BNP SERPL-MCNC: 4688 PG/ML
BPU ID: NORMAL
BPU ID: NORMAL
BUN BLD-MCNC: 26 MG/DL (ref 9–20)
BUN SERPL-MCNC: 14 MG/DL (ref 6–20)
BUN SERPL-MCNC: 15 MG/DL (ref 8–27)
BUN SERPL-MCNC: 23 MG/DL (ref 6–20)
BUN SERPL-MCNC: 24 MG/DL (ref 6–20)
BUN SERPL-MCNC: 25 MG/DL (ref 6–20)
BUN SERPL-MCNC: 30 MG/DL (ref 6–20)
BUN SERPL-MCNC: 35 MG/DL (ref 6–20)
BUN SERPL-MCNC: 45 MG/DL (ref 6–20)
BUN SERPL-MCNC: 49 MG/DL (ref 6–20)
BUN SERPL-MCNC: 50 MG/DL (ref 6–20)
BUN SERPL-MCNC: 52 MG/DL (ref 6–20)
BUN SERPL-MCNC: 52 MG/DL (ref 6–20)
BUN SERPL-MCNC: 54 MG/DL (ref 6–20)
BUN SERPL-MCNC: 55 MG/DL (ref 6–20)
BUN SERPL-MCNC: 56 MG/DL (ref 6–20)
BUN/CREAT SERPL: 14 (ref 12–20)
BUN/CREAT SERPL: 17 (ref 10–24)
BUN/CREAT SERPL: 18 (ref 12–20)
BUN/CREAT SERPL: 23 (ref 12–20)
BUN/CREAT SERPL: 24 (ref 12–20)
BUN/CREAT SERPL: 25 (ref 12–20)
BUN/CREAT SERPL: 29 (ref 12–20)
BUN/CREAT SERPL: 31 (ref 12–20)
BUN/CREAT SERPL: 31 (ref 12–20)
BUN/CREAT SERPL: 32 (ref 12–20)
BUN/CREAT SERPL: 32 (ref 12–20)
BUN/CREAT SERPL: 33 (ref 12–20)
BUN/CREAT SERPL: 35 (ref 12–20)
BUN/CREAT SERPL: 36 (ref 12–20)
BUN/CREAT SERPL: 39 (ref 12–20)
CA-I BLD-MCNC: 1.06 MMOL/L (ref 1.12–1.32)
CALCIUM SERPL-MCNC: 7.2 MG/DL (ref 8.5–10.1)
CALCIUM SERPL-MCNC: 7.3 MG/DL (ref 8.5–10.1)
CALCIUM SERPL-MCNC: 7.4 MG/DL (ref 8.5–10.1)
CALCIUM SERPL-MCNC: 7.6 MG/DL (ref 8.5–10.1)
CALCIUM SERPL-MCNC: 7.7 MG/DL (ref 8.5–10.1)
CALCIUM SERPL-MCNC: 7.7 MG/DL (ref 8.5–10.1)
CALCIUM SERPL-MCNC: 8 MG/DL (ref 8.5–10.1)
CALCIUM SERPL-MCNC: 8 MG/DL (ref 8.5–10.1)
CALCIUM SERPL-MCNC: 8.3 MG/DL (ref 8.5–10.1)
CALCIUM SERPL-MCNC: 8.5 MG/DL (ref 8.5–10.1)
CALCIUM SERPL-MCNC: 8.6 MG/DL (ref 8.5–10.1)
CALCIUM SERPL-MCNC: 8.9 MG/DL (ref 8.5–10.1)
CALCIUM SERPL-MCNC: 9.1 MG/DL (ref 8.6–10.2)
CALCULATED P AXIS, ECG09: 76 DEGREES
CALCULATED P AXIS, ECG09: 77 DEGREES
CALCULATED P AXIS, ECG09: 82 DEGREES
CALCULATED P AXIS, ECG09: 84 DEGREES
CALCULATED P AXIS, ECG09: 86 DEGREES
CALCULATED P AXIS, ECG09: 87 DEGREES
CALCULATED R AXIS, ECG10: -54 DEGREES
CALCULATED R AXIS, ECG10: -76 DEGREES
CALCULATED R AXIS, ECG10: 14 DEGREES
CALCULATED R AXIS, ECG10: 47 DEGREES
CALCULATED R AXIS, ECG10: 65 DEGREES
CALCULATED R AXIS, ECG10: 66 DEGREES
CALCULATED R AXIS, ECG10: 74 DEGREES
CALCULATED T AXIS, ECG11: -141 DEGREES
CALCULATED T AXIS, ECG11: -30 DEGREES
CALCULATED T AXIS, ECG11: 32 DEGREES
CALCULATED T AXIS, ECG11: 55 DEGREES
CALCULATED T AXIS, ECG11: 6 DEGREES
CALCULATED T AXIS, ECG11: 71 DEGREES
CALCULATED T AXIS, ECG11: 87 DEGREES
CHLORIDE BLD-SCNC: 100 MMOL/L (ref 98–107)
CHLORIDE SERPL-SCNC: 103 MMOL/L (ref 97–108)
CHLORIDE SERPL-SCNC: 104 MMOL/L (ref 97–108)
CHLORIDE SERPL-SCNC: 105 MMOL/L (ref 96–106)
CHLORIDE SERPL-SCNC: 105 MMOL/L (ref 97–108)
CHLORIDE SERPL-SCNC: 105 MMOL/L (ref 97–108)
CHLORIDE SERPL-SCNC: 107 MMOL/L (ref 97–108)
CHLORIDE SERPL-SCNC: 107 MMOL/L (ref 97–108)
CHLORIDE SERPL-SCNC: 108 MMOL/L (ref 97–108)
CHLORIDE SERPL-SCNC: 111 MMOL/L (ref 97–108)
CHLORIDE SERPL-SCNC: 112 MMOL/L (ref 97–108)
CHLORIDE SERPL-SCNC: 113 MMOL/L (ref 97–108)
CHLORIDE SERPL-SCNC: 113 MMOL/L (ref 97–108)
CHLORIDE SERPL-SCNC: 115 MMOL/L (ref 97–108)
CHOLEST SERPL-MCNC: 186 MG/DL
CK MB CFR SERPL CALC: 2.2 % (ref 0–2.5)
CK MB SERPL-MCNC: 3.7 NG/ML (ref 5–25)
CK SERPL-CCNC: 170 U/L (ref 39–308)
CMV DNA # BAL NAA+PROBE: NOT DETECTED IU/ML
CO2 BLD-SCNC: 28 MMOL/L (ref 21–32)
CO2 SERPL-SCNC: 19 MMOL/L (ref 21–32)
CO2 SERPL-SCNC: 20 MMOL/L (ref 21–32)
CO2 SERPL-SCNC: 21 MMOL/L (ref 21–32)
CO2 SERPL-SCNC: 22 MMOL/L (ref 21–32)
CO2 SERPL-SCNC: 23 MMOL/L (ref 20–29)
CO2 SERPL-SCNC: 23 MMOL/L (ref 21–32)
CO2 SERPL-SCNC: 23 MMOL/L (ref 21–32)
CO2 SERPL-SCNC: 27 MMOL/L (ref 21–32)
CO2 SERPL-SCNC: 28 MMOL/L (ref 21–32)
CO2 SERPL-SCNC: 28 MMOL/L (ref 21–32)
CO2 SERPL-SCNC: 29 MMOL/L (ref 21–32)
CO2 SERPL-SCNC: 31 MMOL/L (ref 21–32)
CO2 SERPL-SCNC: 31 MMOL/L (ref 21–32)
CO2 SERPL-SCNC: 32 MMOL/L (ref 21–32)
CO2 SERPL-SCNC: 33 MMOL/L (ref 21–32)
COMMENT, HOLDF: NORMAL
CREAT BLD-MCNC: 1 MG/DL (ref 0.6–1.3)
CREAT SERPL-MCNC: 0.75 MG/DL (ref 0.7–1.3)
CREAT SERPL-MCNC: 0.88 MG/DL (ref 0.76–1.27)
CREAT SERPL-MCNC: 0.91 MG/DL (ref 0.7–1.3)
CREAT SERPL-MCNC: 0.97 MG/DL (ref 0.7–1.3)
CREAT SERPL-MCNC: 0.99 MG/DL (ref 0.7–1.3)
CREAT SERPL-MCNC: 1.28 MG/DL (ref 0.7–1.3)
CREAT SERPL-MCNC: 1.39 MG/DL (ref 0.7–1.3)
CREAT SERPL-MCNC: 1.4 MG/DL (ref 0.7–1.3)
CREAT SERPL-MCNC: 1.43 MG/DL (ref 0.7–1.3)
CREAT SERPL-MCNC: 1.48 MG/DL (ref 0.7–1.3)
CREAT SERPL-MCNC: 1.5 MG/DL (ref 0.7–1.3)
CREAT SERPL-MCNC: 1.59 MG/DL (ref 0.7–1.3)
CREAT SERPL-MCNC: 1.78 MG/DL (ref 0.7–1.3)
CREAT SERPL-MCNC: 1.8 MG/DL (ref 0.7–1.3)
CREAT SERPL-MCNC: 2.29 MG/DL (ref 0.7–1.3)
CROSSMATCH RESULT,%XM: NORMAL
CROSSMATCH RESULT,%XM: NORMAL
DATE LAST DOSE: ABNORMAL
DIAGNOSIS, 93000: NORMAL
DIFFERENTIAL METHOD BLD: ABNORMAL
ECHO AO ROOT DIAM: 3.06 CM
ECHO AO ROOT DIAM: 3.46 CM
ECHO EST RA PRESSURE: 10 MMHG
ECHO LV INTERNAL DIMENSION DIASTOLIC: 3.04 CM (ref 4.2–5.9)
ECHO LV INTERNAL DIMENSION DIASTOLIC: 3.12 CM (ref 4.2–5.9)
ECHO LV INTERNAL DIMENSION SYSTOLIC: 2.16 CM
ECHO LV INTERNAL DIMENSION SYSTOLIC: 2.32 CM
ECHO LV IVSD: 0.72 CM (ref 0.6–1)
ECHO LV IVSD: 1.19 CM (ref 0.6–1)
ECHO LV MASS 2D: 61.9 G (ref 88–224)
ECHO LV MASS 2D: 91.9 G (ref 88–224)
ECHO LV MASS INDEX 2D: 33 G/M2 (ref 49–115)
ECHO LV MASS INDEX 2D: 51.1 G/M2 (ref 49–115)
ECHO LV POSTERIOR WALL DIASTOLIC: 0.78 CM (ref 0.6–1)
ECHO LV POSTERIOR WALL DIASTOLIC: 0.9 CM (ref 0.6–1)
ECHO LVOT DIAM: 1.66 CM
ECHO PULMONARY ARTERY SYSTOLIC PRESSURE (PASP): 32.5 MMHG
ECHO RIGHT VENTRICULAR SYSTOLIC PRESSURE (RVSP): 32.5 MMHG
ECHO RV INTERNAL DIMENSION: 3.33 CM
ECHO RV INTERNAL DIMENSION: 4.44 CM
ECHO TV REGURGITANT MAX VELOCITY: 237.03 CM/S
ECHO TV REGURGITANT PEAK GRADIENT: 22.5 MMHG
EOSINOPHIL # BLD AUTO: 0.1 X10E3/UL (ref 0–0.4)
EOSINOPHIL # BLD: 0 K/UL (ref 0–0.4)
EOSINOPHIL # BLD: 0.1 K/UL (ref 0–0.4)
EOSINOPHIL NFR BLD AUTO: 1 %
EOSINOPHIL NFR BLD: 0 % (ref 0–7)
EOSINOPHIL NFR BLD: 1 % (ref 0–7)
ERYTHROCYTE [DISTWIDTH] IN BLOOD BY AUTOMATED COUNT: 13.1 % (ref 12.3–15.4)
ERYTHROCYTE [DISTWIDTH] IN BLOOD BY AUTOMATED COUNT: 14.1 % (ref 11.5–14.5)
ERYTHROCYTE [DISTWIDTH] IN BLOOD BY AUTOMATED COUNT: 14.2 % (ref 11.5–14.5)
ERYTHROCYTE [DISTWIDTH] IN BLOOD BY AUTOMATED COUNT: 14.2 % (ref 11.5–14.5)
ERYTHROCYTE [DISTWIDTH] IN BLOOD BY AUTOMATED COUNT: 14.3 % (ref 11.5–14.5)
ERYTHROCYTE [DISTWIDTH] IN BLOOD BY AUTOMATED COUNT: 14.8 % (ref 11.5–14.5)
ERYTHROCYTE [DISTWIDTH] IN BLOOD BY AUTOMATED COUNT: 15.1 % (ref 11.5–14.5)
ERYTHROCYTE [DISTWIDTH] IN BLOOD BY AUTOMATED COUNT: 15.2 % (ref 11.5–14.5)
ERYTHROCYTE [DISTWIDTH] IN BLOOD BY AUTOMATED COUNT: 15.9 % (ref 11.5–14.5)
ERYTHROCYTE [DISTWIDTH] IN BLOOD BY AUTOMATED COUNT: 17.3 % (ref 11.5–14.5)
ERYTHROCYTE [DISTWIDTH] IN BLOOD BY AUTOMATED COUNT: 20.3 % (ref 11.5–14.5)
ERYTHROCYTE [DISTWIDTH] IN BLOOD BY AUTOMATED COUNT: 22.3 % (ref 11.5–14.5)
ERYTHROCYTE [DISTWIDTH] IN BLOOD BY AUTOMATED COUNT: 23.1 % (ref 11.5–14.5)
ERYTHROCYTE [DISTWIDTH] IN BLOOD BY AUTOMATED COUNT: 23.5 % (ref 11.5–14.5)
ERYTHROCYTE [DISTWIDTH] IN BLOOD BY AUTOMATED COUNT: 25 % (ref 11.5–14.5)
FIBRINOGEN PPP-MCNC: 139 MG/DL (ref 200–475)
FIBRINOGEN PPP-MCNC: 141 MG/DL (ref 200–475)
GAS FLOW.O2 O2 DELIVERY SYS: ABNORMAL L/MIN
GAS FLOW.O2 SETTING OXYMISER: 14 BPM
GAS FLOW.O2 SETTING OXYMISER: 3 L/M
GAS FLOW.O2 SETTING OXYMISER: 30 L/M
GAS FLOW.O2 SETTING OXYMISER: 6 L/M
GLOBULIN SER CALC-MCNC: 1.8 G/DL (ref 2–4)
GLOBULIN SER CALC-MCNC: 2.1 G/DL (ref 2–4)
GLOBULIN SER CALC-MCNC: 2.2 G/DL (ref 2–4)
GLOBULIN SER CALC-MCNC: 2.5 G/DL (ref 2–4)
GLOBULIN SER CALC-MCNC: 2.5 G/DL (ref 2–4)
GLOBULIN SER CALC-MCNC: 2.6 G/DL (ref 2–4)
GLOBULIN SER CALC-MCNC: 2.7 G/DL (ref 1.5–4.5)
GLOBULIN SER CALC-MCNC: 2.7 G/DL (ref 2–4)
GLOBULIN SER CALC-MCNC: 2.7 G/DL (ref 2–4)
GLOBULIN SER CALC-MCNC: 2.8 G/DL (ref 2–4)
GLOBULIN SER CALC-MCNC: 2.9 G/DL (ref 2–4)
GLOBULIN SER CALC-MCNC: 3.3 G/DL (ref 2–4)
GLUCOSE BLD STRIP.AUTO-MCNC: 126 MG/DL (ref 65–100)
GLUCOSE BLD STRIP.AUTO-MCNC: 138 MG/DL (ref 65–100)
GLUCOSE BLD STRIP.AUTO-MCNC: 139 MG/DL (ref 65–100)
GLUCOSE BLD STRIP.AUTO-MCNC: 147 MG/DL (ref 65–100)
GLUCOSE BLD STRIP.AUTO-MCNC: 155 MG/DL (ref 65–100)
GLUCOSE BLD STRIP.AUTO-MCNC: 160 MG/DL (ref 65–100)
GLUCOSE BLD STRIP.AUTO-MCNC: 161 MG/DL (ref 65–100)
GLUCOSE BLD STRIP.AUTO-MCNC: 163 MG/DL (ref 65–100)
GLUCOSE BLD STRIP.AUTO-MCNC: 170 MG/DL (ref 65–100)
GLUCOSE BLD STRIP.AUTO-MCNC: 180 MG/DL (ref 65–100)
GLUCOSE BLD STRIP.AUTO-MCNC: 181 MG/DL (ref 65–100)
GLUCOSE BLD STRIP.AUTO-MCNC: 185 MG/DL (ref 65–100)
GLUCOSE BLD STRIP.AUTO-MCNC: 186 MG/DL (ref 65–100)
GLUCOSE BLD STRIP.AUTO-MCNC: 187 MG/DL (ref 65–100)
GLUCOSE BLD STRIP.AUTO-MCNC: 193 MG/DL (ref 65–100)
GLUCOSE BLD STRIP.AUTO-MCNC: 196 MG/DL (ref 65–100)
GLUCOSE BLD STRIP.AUTO-MCNC: 198 MG/DL (ref 65–100)
GLUCOSE BLD STRIP.AUTO-MCNC: 214 MG/DL (ref 65–100)
GLUCOSE BLD STRIP.AUTO-MCNC: 224 MG/DL (ref 65–100)
GLUCOSE BLD STRIP.AUTO-MCNC: 249 MG/DL (ref 65–100)
GLUCOSE BLD STRIP.AUTO-MCNC: 254 MG/DL (ref 65–100)
GLUCOSE BLD STRIP.AUTO-MCNC: 269 MG/DL (ref 65–100)
GLUCOSE BLD STRIP.AUTO-MCNC: 288 MG/DL (ref 65–100)
GLUCOSE BLD STRIP.AUTO-MCNC: 289 MG/DL (ref 65–100)
GLUCOSE BLD-MCNC: 234 MG/DL (ref 65–100)
GLUCOSE SERPL-MCNC: 124 MG/DL (ref 65–100)
GLUCOSE SERPL-MCNC: 131 MG/DL (ref 65–100)
GLUCOSE SERPL-MCNC: 152 MG/DL (ref 65–100)
GLUCOSE SERPL-MCNC: 160 MG/DL (ref 65–100)
GLUCOSE SERPL-MCNC: 162 MG/DL (ref 65–100)
GLUCOSE SERPL-MCNC: 165 MG/DL (ref 65–100)
GLUCOSE SERPL-MCNC: 167 MG/DL (ref 65–100)
GLUCOSE SERPL-MCNC: 176 MG/DL (ref 65–100)
GLUCOSE SERPL-MCNC: 181 MG/DL (ref 65–100)
GLUCOSE SERPL-MCNC: 214 MG/DL (ref 65–100)
GLUCOSE SERPL-MCNC: 237 MG/DL (ref 65–100)
GLUCOSE SERPL-MCNC: 240 MG/DL (ref 65–100)
GLUCOSE SERPL-MCNC: 69 MG/DL (ref 65–99)
GLUCOSE SERPL-MCNC: 93 MG/DL (ref 65–100)
GLUCOSE SERPL-MCNC: 99 MG/DL (ref 65–100)
GLUCOSE UR-MCNC: NEGATIVE MG/DL
GRAM STN SPEC: ABNORMAL
GRAM STN SPEC: ABNORMAL
GRAM STN SPEC: NORMAL
HAV IGM SER QL: NONREACTIVE
HBA1C MFR BLD HPLC: 5.2 %
HBV CORE IGM SER QL: NONREACTIVE
HBV SURFACE AG SER QL: <0.1 INDEX
HBV SURFACE AG SER QL: NEGATIVE
HCO3 BLD-SCNC: 23.3 MMOL/L (ref 22–26)
HCO3 BLD-SCNC: 23.4 MMOL/L (ref 22–26)
HCO3 BLD-SCNC: 24.2 MMOL/L (ref 22–26)
HCO3 BLD-SCNC: 26.4 MMOL/L (ref 22–26)
HCO3 BLD-SCNC: 26.5 MMOL/L (ref 22–26)
HCO3 BLD-SCNC: 29 MMOL/L (ref 22–26)
HCO3 BLDV-SCNC: 26.5 MMOL/L (ref 23–28)
HCT VFR BLD AUTO: 20 % (ref 36.6–50.3)
HCT VFR BLD AUTO: 21.5 % (ref 36.6–50.3)
HCT VFR BLD AUTO: 22.1 % (ref 36.6–50.3)
HCT VFR BLD AUTO: 22.7 % (ref 36.6–50.3)
HCT VFR BLD AUTO: 22.8 % (ref 36.6–50.3)
HCT VFR BLD AUTO: 22.9 % (ref 36.6–50.3)
HCT VFR BLD AUTO: 23.4 % (ref 36.6–50.3)
HCT VFR BLD AUTO: 29.8 % (ref 36.6–50.3)
HCT VFR BLD AUTO: 30 % (ref 36.6–50.3)
HCT VFR BLD AUTO: 37 % (ref 36.6–50.3)
HCT VFR BLD AUTO: 38.9 % (ref 36.6–50.3)
HCT VFR BLD AUTO: 39.5 % (ref 36.6–50.3)
HCT VFR BLD AUTO: 42.1 % (ref 36.6–50.3)
HCT VFR BLD AUTO: 44.6 % (ref 37.5–51)
HCT VFR BLD AUTO: 46.4 % (ref 36.6–50.3)
HCT VFR BLD CALC: 26 % (ref 36.6–50.3)
HCV AB SERPL QL IA: NONREACTIVE
HCV COMMENT,HCGAC: NORMAL
HDLC SERPL-MCNC: 56 MG/DL
HEMOCCULT STL QL IA: NEGATIVE
HGB BLD-MCNC: 12.2 G/DL (ref 12.1–17)
HGB BLD-MCNC: 12.9 G/DL (ref 12.1–17)
HGB BLD-MCNC: 13.1 G/DL (ref 12.1–17)
HGB BLD-MCNC: 14.1 G/DL (ref 12.1–17)
HGB BLD-MCNC: 14.3 G/DL (ref 12.1–17)
HGB BLD-MCNC: 14.6 G/DL (ref 13–17.7)
HGB BLD-MCNC: 6.5 G/DL (ref 12.1–17)
HGB BLD-MCNC: 6.7 G/DL (ref 12.1–17)
HGB BLD-MCNC: 6.8 G/DL (ref 12.1–17)
HGB BLD-MCNC: 6.8 G/DL (ref 12.1–17)
HGB BLD-MCNC: 6.9 G/DL (ref 12.1–17)
HGB BLD-MCNC: 7.2 G/DL (ref 12.1–17)
HGB BLD-MCNC: 7.2 G/DL (ref 12.1–17)
HGB BLD-MCNC: 9.8 G/DL (ref 12.1–17)
HGB BLD-MCNC: 9.9 G/DL (ref 12.1–17)
HSV1 DNA # BAL NAA+PROBE: NOT DETECTED COPIES/ML
HSV2 DNA # BAL NAA+PROBE: NOT DETECTED COPIES/ML
IMM GRANULOCYTES # BLD AUTO: 0 K/UL
IMM GRANULOCYTES # BLD AUTO: 0 K/UL (ref 0–0.04)
IMM GRANULOCYTES # BLD AUTO: 0 X10E3/UL (ref 0–0.1)
IMM GRANULOCYTES # BLD AUTO: 0.1 K/UL (ref 0–0.04)
IMM GRANULOCYTES # BLD AUTO: 0.1 K/UL (ref 0–0.04)
IMM GRANULOCYTES # BLD AUTO: 0.2 K/UL (ref 0–0.04)
IMM GRANULOCYTES # BLD AUTO: 0.2 K/UL (ref 0–0.04)
IMM GRANULOCYTES # BLD AUTO: 0.3 K/UL (ref 0–0.04)
IMM GRANULOCYTES # BLD AUTO: 0.6 K/UL (ref 0–0.04)
IMM GRANULOCYTES # BLD AUTO: 0.6 K/UL (ref 0–0.04)
IMM GRANULOCYTES # BLD AUTO: 1.2 K/UL (ref 0–0.04)
IMM GRANULOCYTES NFR BLD AUTO: 0 %
IMM GRANULOCYTES NFR BLD AUTO: 0 % (ref 0–0.5)
IMM GRANULOCYTES NFR BLD AUTO: 1 % (ref 0–0.5)
IMM GRANULOCYTES NFR BLD AUTO: 2 % (ref 0–0.5)
IMM GRANULOCYTES NFR BLD AUTO: 2 % (ref 0–0.5)
IMM GRANULOCYTES NFR BLD AUTO: 4 % (ref 0–0.5)
INR PPP: 1.2 (ref 0.9–1.1)
INR PPP: 1.3 (ref 0.9–1.1)
INR PPP: 1.3 (ref 0.9–1.1)
INR PPP: 1.9 (ref 0.9–1.1)
KETONES P FAST UR STRIP-MCNC: NEGATIVE MG/DL
LACTATE SERPL-SCNC: 2.4 MMOL/L (ref 0.4–2)
LACTATE SERPL-SCNC: 3.1 MMOL/L (ref 0.4–2)
LACTATE SERPL-SCNC: 8.3 MMOL/L (ref 0.4–2)
LDL CHOLESTEROL POC: 67 MG/DL
LVFS 2D: 23.4 %
LVFS 2D: 30.71 %
LYMPHOCYTES # BLD AUTO: 1.6 X10E3/UL (ref 0.7–3.1)
LYMPHOCYTES # BLD: 0 K/UL (ref 0.8–3.5)
LYMPHOCYTES # BLD: 0.3 K/UL (ref 0.8–3.5)
LYMPHOCYTES # BLD: 0.5 K/UL (ref 0.8–3.5)
LYMPHOCYTES # BLD: 0.5 K/UL (ref 0.8–3.5)
LYMPHOCYTES # BLD: 0.6 K/UL (ref 0.8–3.5)
LYMPHOCYTES # BLD: 0.7 K/UL (ref 0.8–3.5)
LYMPHOCYTES # BLD: 1 K/UL (ref 0.8–3.5)
LYMPHOCYTES NFR BLD AUTO: 30 %
LYMPHOCYTES NFR BLD: 0 % (ref 12–49)
LYMPHOCYTES NFR BLD: 1 % (ref 12–49)
LYMPHOCYTES NFR BLD: 16 % (ref 12–49)
LYMPHOCYTES NFR BLD: 2 % (ref 12–49)
LYMPHOCYTES NFR BLD: 3 % (ref 12–49)
LYMPHOCYTES NFR BLD: 7 % (ref 12–49)
Lab: NORMAL
MAGNESIUM SERPL-MCNC: 2 MG/DL (ref 1.6–2.4)
MAGNESIUM SERPL-MCNC: 2.1 MG/DL (ref 1.6–2.4)
MAGNESIUM SERPL-MCNC: 2.3 MG/DL (ref 1.6–2.4)
MAGNESIUM SERPL-MCNC: 2.3 MG/DL (ref 1.6–2.4)
MCH RBC QN AUTO: 32.7 PG (ref 26.6–33)
MCH RBC QN AUTO: 35.6 PG (ref 26–34)
MCH RBC QN AUTO: 36 PG (ref 26–34)
MCH RBC QN AUTO: 36 PG (ref 26–34)
MCH RBC QN AUTO: 36.2 PG (ref 26–34)
MCH RBC QN AUTO: 36.4 PG (ref 26–34)
MCH RBC QN AUTO: 36.4 PG (ref 26–34)
MCH RBC QN AUTO: 36.5 PG (ref 26–34)
MCH RBC QN AUTO: 36.9 PG (ref 26–34)
MCH RBC QN AUTO: 37 PG (ref 26–34)
MCH RBC QN AUTO: 37.1 PG (ref 26–34)
MCH RBC QN AUTO: 37.2 PG (ref 26–34)
MCH RBC QN AUTO: 37.2 PG (ref 26–34)
MCHC RBC AUTO-ENTMCNC: 29.4 G/DL (ref 30–36.5)
MCHC RBC AUTO-ENTMCNC: 30 G/DL (ref 30–36.5)
MCHC RBC AUTO-ENTMCNC: 30.1 G/DL (ref 30–36.5)
MCHC RBC AUTO-ENTMCNC: 30.8 G/DL (ref 30–36.5)
MCHC RBC AUTO-ENTMCNC: 30.8 G/DL (ref 30–36.5)
MCHC RBC AUTO-ENTMCNC: 31.6 G/DL (ref 30–36.5)
MCHC RBC AUTO-ENTMCNC: 32.5 G/DL (ref 30–36.5)
MCHC RBC AUTO-ENTMCNC: 32.6 G/DL (ref 30–36.5)
MCHC RBC AUTO-ENTMCNC: 32.7 G/DL (ref 31.5–35.7)
MCHC RBC AUTO-ENTMCNC: 32.9 G/DL (ref 30–36.5)
MCHC RBC AUTO-ENTMCNC: 33 G/DL (ref 30–36.5)
MCHC RBC AUTO-ENTMCNC: 33 G/DL (ref 30–36.5)
MCHC RBC AUTO-ENTMCNC: 33.2 G/DL (ref 30–36.5)
MCHC RBC AUTO-ENTMCNC: 33.2 G/DL (ref 30–36.5)
MCHC RBC AUTO-ENTMCNC: 33.5 G/DL (ref 30–36.5)
MCV RBC AUTO: 100 FL (ref 79–97)
MCV RBC AUTO: 107.4 FL (ref 80–99)
MCV RBC AUTO: 107.9 FL (ref 80–99)
MCV RBC AUTO: 109.3 FL (ref 80–99)
MCV RBC AUTO: 109.7 FL (ref 80–99)
MCV RBC AUTO: 110 FL (ref 80–99)
MCV RBC AUTO: 112.8 FL (ref 80–99)
MCV RBC AUTO: 113.3 FL (ref 80–99)
MCV RBC AUTO: 114.3 FL (ref 80–99)
MCV RBC AUTO: 116.8 FL (ref 80–99)
MCV RBC AUTO: 116.9 FL (ref 80–99)
MCV RBC AUTO: 118.2 FL (ref 80–99)
MCV RBC AUTO: 121.2 FL (ref 80–99)
MCV RBC AUTO: 123.2 FL (ref 80–99)
MCV RBC AUTO: 124 FL (ref 80–99)
METAMYELOCYTES NFR BLD MANUAL: 1 %
METAMYELOCYTES NFR BLD MANUAL: 1 %
MONOCYTES # BLD AUTO: 0.6 X10E3/UL (ref 0.1–0.9)
MONOCYTES # BLD: 0.5 K/UL (ref 0–1)
MONOCYTES # BLD: 0.6 K/UL (ref 0–1)
MONOCYTES # BLD: 0.6 K/UL (ref 0–1)
MONOCYTES # BLD: 0.7 K/UL (ref 0–1)
MONOCYTES # BLD: 0.8 K/UL (ref 0–1)
MONOCYTES # BLD: 0.8 K/UL (ref 0–1)
MONOCYTES # BLD: 1 K/UL (ref 0–1)
MONOCYTES # BLD: 1.1 K/UL (ref 0–1)
MONOCYTES # BLD: 1.2 K/UL (ref 0–1)
MONOCYTES # BLD: 1.4 K/UL (ref 0–1)
MONOCYTES # BLD: 1.5 K/UL (ref 0–1)
MONOCYTES # BLD: 2.5 K/UL (ref 0–1)
MONOCYTES NFR BLD AUTO: 11 %
MONOCYTES NFR BLD: 2 % (ref 5–13)
MONOCYTES NFR BLD: 3 % (ref 5–13)
MONOCYTES NFR BLD: 4 % (ref 5–13)
MONOCYTES NFR BLD: 5 % (ref 5–13)
MONOCYTES NFR BLD: 6 % (ref 5–13)
MONOCYTES NFR BLD: 8 % (ref 5–13)
MONOCYTES NFR BLD: 8 % (ref 5–13)
MONOCYTES NFR BLD: 9 % (ref 5–13)
MYELOCYTES NFR BLD MANUAL: 1 %
NEUTROPHILS # BLD AUTO: 3 X10E3/UL (ref 1.4–7)
NEUTROPHILS NFR BLD AUTO: 58 %
NEUTS BAND NFR BLD MANUAL: 1 % (ref 0–6)
NEUTS BAND NFR BLD MANUAL: 6 % (ref 0–6)
NEUTS SEG # BLD: 14.2 K/UL (ref 1.8–8)
NEUTS SEG # BLD: 16.9 K/UL (ref 1.8–8)
NEUTS SEG # BLD: 25.1 K/UL (ref 1.8–8)
NEUTS SEG # BLD: 25.6 K/UL (ref 1.8–8)
NEUTS SEG # BLD: 26.1 K/UL (ref 1.8–8)
NEUTS SEG # BLD: 27.7 K/UL (ref 1.8–8)
NEUTS SEG # BLD: 28.5 K/UL (ref 1.8–8)
NEUTS SEG # BLD: 28.6 K/UL (ref 1.8–8)
NEUTS SEG # BLD: 30.5 K/UL (ref 1.8–8)
NEUTS SEG # BLD: 4.8 K/UL (ref 1.8–8)
NEUTS SEG # BLD: 9 K/UL (ref 1.8–8)
NEUTS SEG # BLD: 9.1 K/UL (ref 1.8–8)
NEUTS SEG NFR BLD: 74 % (ref 32–75)
NEUTS SEG NFR BLD: 84 % (ref 32–75)
NEUTS SEG NFR BLD: 87 % (ref 32–75)
NEUTS SEG NFR BLD: 90 % (ref 32–75)
NEUTS SEG NFR BLD: 91 % (ref 32–75)
NEUTS SEG NFR BLD: 91 % (ref 32–75)
NEUTS SEG NFR BLD: 92 % (ref 32–75)
NEUTS SEG NFR BLD: 93 % (ref 32–75)
NEUTS SEG NFR BLD: 93 % (ref 32–75)
NEUTS SEG NFR BLD: 96 % (ref 32–75)
NRBC # BLD: 0 K/UL (ref 0–0.01)
NRBC # BLD: 0.02 K/UL (ref 0–0.01)
NRBC # BLD: 0.03 K/UL (ref 0–0.01)
NRBC # BLD: 0.03 K/UL (ref 0–0.01)
NRBC # BLD: 0.08 K/UL (ref 0–0.01)
NRBC # BLD: 0.55 K/UL (ref 0–0.01)
NRBC # BLD: 0.91 K/UL (ref 0–0.01)
NRBC # BLD: 1.63 K/UL (ref 0–0.01)
NRBC # BLD: 2.41 K/UL (ref 0–0.01)
NRBC # BLD: 2.78 K/UL (ref 0–0.01)
NRBC # BLD: 2.93 K/UL (ref 0–0.01)
NRBC # BLD: 6.23 K/UL (ref 0–0.01)
NRBC BLD-RTO: 0 PER 100 WBC
NRBC BLD-RTO: 0.1 PER 100 WBC
NRBC BLD-RTO: 0.1 PER 100 WBC
NRBC BLD-RTO: 0.2 PER 100 WBC
NRBC BLD-RTO: 0.3 PER 100 WBC
NRBC BLD-RTO: 19.8 PER 100 WBC
NRBC BLD-RTO: 2 PER 100 WBC
NRBC BLD-RTO: 3.2 PER 100 WBC
NRBC BLD-RTO: 4.2 PER 100 WBC
NRBC BLD-RTO: 7.6 PER 100 WBC
NRBC BLD-RTO: 9 PER 100 WBC
NRBC BLD-RTO: 9.3 PER 100 WBC
O2/TOTAL GAS SETTING VFR VENT: 0.32 %
O2/TOTAL GAS SETTING VFR VENT: 0.4 %
O2/TOTAL GAS SETTING VFR VENT: 100 %
O2/TOTAL GAS SETTING VFR VENT: 100 %
O2/TOTAL GAS SETTING VFR VENT: 40 %
P-R INTERVAL, ECG05: 118 MS
P-R INTERVAL, ECG05: 118 MS
P-R INTERVAL, ECG05: 120 MS
P-R INTERVAL, ECG05: 120 MS
P-R INTERVAL, ECG05: 124 MS
P-R INTERVAL, ECG05: 126 MS
P-R INTERVAL, ECG05: 128 MS
PCO2 BLD: 33.8 MMHG (ref 35–45)
PCO2 BLD: 37.3 MMHG (ref 35–45)
PCO2 BLD: 38.8 MMHG (ref 35–45)
PCO2 BLD: 40.9 MMHG (ref 35–45)
PCO2 BLD: 41.3 MMHG (ref 35–45)
PCO2 BLD: 68.1 MMHG (ref 35–45)
PCO2 BLDV: 48.9 MMHG (ref 41–51)
PEEP RESPIRATORY: 2 CMH2O
PEEP RESPIRATORY: 5 CMH2O
PEEP RESPIRATORY: 5 CMH2O
PH BLD: 7.2 [PH] (ref 7.35–7.45)
PH BLD: 7.36 [PH] (ref 7.35–7.45)
PH BLD: 7.42 [PH] (ref 7.35–7.45)
PH BLD: 7.44 [PH] (ref 7.35–7.45)
PH BLD: 7.45 [PH] (ref 7.35–7.45)
PH BLD: 7.46 [PH] (ref 7.35–7.45)
PH BLDV: 7.34 [PH] (ref 7.32–7.42)
PH UR STRIP: 5 [PH] (ref 4.6–8)
PHOSPHATE SERPL-MCNC: 3.7 MG/DL (ref 2.6–4.7)
PHOSPHATE SERPL-MCNC: 4.1 MG/DL (ref 2.6–4.7)
PHOSPHATE SERPL-MCNC: 4.2 MG/DL (ref 2.6–4.7)
PHOSPHATE SERPL-MCNC: 4.5 MG/DL (ref 2.6–4.7)
PIP ISTAT,IPIP: 17
PLATELET # BLD AUTO: 106 K/UL (ref 150–400)
PLATELET # BLD AUTO: 124 K/UL (ref 150–400)
PLATELET # BLD AUTO: 126 K/UL (ref 150–400)
PLATELET # BLD AUTO: 129 K/UL (ref 150–400)
PLATELET # BLD AUTO: 151 K/UL (ref 150–400)
PLATELET # BLD AUTO: 193 K/UL (ref 150–400)
PLATELET # BLD AUTO: 209 X10E3/UL (ref 150–450)
PLATELET # BLD AUTO: 40 K/UL (ref 150–400)
PLATELET # BLD AUTO: 45 K/UL (ref 150–400)
PLATELET # BLD AUTO: 47 K/UL (ref 150–400)
PLATELET # BLD AUTO: 47 K/UL (ref 150–400)
PLATELET # BLD AUTO: 53 K/UL (ref 150–400)
PLATELET # BLD AUTO: 63 K/UL (ref 150–400)
PLATELET # BLD AUTO: 75 K/UL (ref 150–400)
PLATELET # BLD AUTO: 76 K/UL (ref 150–400)
PLATELET COMMENTS,PCOM: ABNORMAL
PMV BLD AUTO: 10 FL (ref 8.9–12.9)
PMV BLD AUTO: 10.9 FL (ref 8.9–12.9)
PMV BLD AUTO: 11.4 FL (ref 8.9–12.9)
PMV BLD AUTO: 12.2 FL (ref 8.9–12.9)
PMV BLD AUTO: 12.2 FL (ref 8.9–12.9)
PMV BLD AUTO: ABNORMAL FL (ref 8.9–12.9)
PMV BLD AUTO: ABNORMAL FL (ref 8.9–12.9)
PO2 BLD: 215 MMHG (ref 80–100)
PO2 BLD: 290 MMHG (ref 80–100)
PO2 BLD: 45 MMHG (ref 80–100)
PO2 BLD: 54 MMHG (ref 80–100)
PO2 BLD: 63 MMHG (ref 80–100)
PO2 BLD: 72 MMHG (ref 80–100)
PO2 BLDV: <19 MMHG (ref 25–40)
POTASSIUM BLD-SCNC: 5.2 MMOL/L (ref 3.5–5.1)
POTASSIUM SERPL-SCNC: 3.2 MMOL/L (ref 3.5–5.1)
POTASSIUM SERPL-SCNC: 3.6 MMOL/L (ref 3.5–5.1)
POTASSIUM SERPL-SCNC: 3.6 MMOL/L (ref 3.5–5.1)
POTASSIUM SERPL-SCNC: 3.7 MMOL/L (ref 3.5–5.1)
POTASSIUM SERPL-SCNC: 3.8 MMOL/L (ref 3.5–5.1)
POTASSIUM SERPL-SCNC: 3.9 MMOL/L (ref 3.5–5.1)
POTASSIUM SERPL-SCNC: 4.2 MMOL/L (ref 3.5–5.1)
POTASSIUM SERPL-SCNC: 4.3 MMOL/L (ref 3.5–5.1)
POTASSIUM SERPL-SCNC: 4.5 MMOL/L (ref 3.5–5.1)
POTASSIUM SERPL-SCNC: 4.5 MMOL/L (ref 3.5–5.2)
POTASSIUM SERPL-SCNC: 4.6 MMOL/L (ref 3.5–5.1)
POTASSIUM SERPL-SCNC: 4.7 MMOL/L (ref 3.5–5.1)
POTASSIUM SERPL-SCNC: 5 MMOL/L (ref 3.5–5.1)
POTASSIUM SERPL-SCNC: 5.4 MMOL/L (ref 3.5–5.1)
POTASSIUM SERPL-SCNC: 5.6 MMOL/L (ref 3.5–5.1)
PRESSURE SUPPORT SETTING VENT: 5 CMH2O
PROT SERPL-MCNC: 4.2 G/DL (ref 6.4–8.2)
PROT SERPL-MCNC: 4.3 G/DL (ref 6.4–8.2)
PROT SERPL-MCNC: 4.4 G/DL (ref 6.4–8.2)
PROT SERPL-MCNC: 4.6 G/DL (ref 6.4–8.2)
PROT SERPL-MCNC: 4.6 G/DL (ref 6.4–8.2)
PROT SERPL-MCNC: 4.9 G/DL (ref 6.4–8.2)
PROT SERPL-MCNC: 5.4 G/DL (ref 6.4–8.2)
PROT SERPL-MCNC: 5.6 G/DL (ref 6.4–8.2)
PROT SERPL-MCNC: 6 G/DL (ref 6.4–8.2)
PROT SERPL-MCNC: 6.2 G/DL (ref 6.4–8.2)
PROT SERPL-MCNC: 6.9 G/DL (ref 6.4–8.2)
PROT SERPL-MCNC: 6.9 G/DL (ref 6–8.5)
PROT UR QL STRIP: NEGATIVE
PROTHROMBIN TIME: 11.9 SEC (ref 9–11.1)
PROTHROMBIN TIME: 13 SEC (ref 9–11.1)
PROTHROMBIN TIME: 13.3 SEC (ref 9–11.1)
PROTHROMBIN TIME: 18.6 SEC (ref 9–11.1)
PSA SERPL-MCNC: 7.5 NG/ML (ref 0–4)
Q-T INTERVAL, ECG07: 310 MS
Q-T INTERVAL, ECG07: 344 MS
Q-T INTERVAL, ECG07: 348 MS
Q-T INTERVAL, ECG07: 350 MS
Q-T INTERVAL, ECG07: 354 MS
Q-T INTERVAL, ECG07: 364 MS
Q-T INTERVAL, ECG07: 430 MS
QRS DURATION, ECG06: 70 MS
QRS DURATION, ECG06: 76 MS
QRS DURATION, ECG06: 80 MS
QRS DURATION, ECG06: 82 MS
QRS DURATION, ECG06: 82 MS
QRS DURATION, ECG06: 84 MS
QRS DURATION, ECG06: 86 MS
QTC CALCULATION (BEZET), ECG08: 427 MS
QTC CALCULATION (BEZET), ECG08: 430 MS
QTC CALCULATION (BEZET), ECG08: 439 MS
QTC CALCULATION (BEZET), ECG08: 441 MS
QTC CALCULATION (BEZET), ECG08: 451 MS
QTC CALCULATION (BEZET), ECG08: 459 MS
QTC CALCULATION (BEZET), ECG08: 514 MS
RBC # BLD AUTO: 1.75 M/UL (ref 4.1–5.7)
RBC # BLD AUTO: 1.83 M/UL (ref 4.1–5.7)
RBC # BLD AUTO: 1.84 M/UL (ref 4.1–5.7)
RBC # BLD AUTO: 1.85 M/UL (ref 4.1–5.7)
RBC # BLD AUTO: 1.89 M/UL (ref 4.1–5.7)
RBC # BLD AUTO: 1.95 M/UL (ref 4.1–5.7)
RBC # BLD AUTO: 1.98 M/UL (ref 4.1–5.7)
RBC # BLD AUTO: 2.66 M/UL (ref 4.1–5.7)
RBC # BLD AUTO: 2.71 M/UL (ref 4.1–5.7)
RBC # BLD AUTO: 3.43 M/UL (ref 4.1–5.7)
RBC # BLD AUTO: 3.56 M/UL (ref 4.1–5.7)
RBC # BLD AUTO: 3.6 M/UL (ref 4.1–5.7)
RBC # BLD AUTO: 3.92 M/UL (ref 4.1–5.7)
RBC # BLD AUTO: 3.97 M/UL (ref 4.1–5.7)
RBC # BLD AUTO: 4.46 X10E6/UL (ref 4.14–5.8)
RBC MORPH BLD: ABNORMAL
REFERENCE LAB,REFLB: NORMAL
REPORTED DOSE,DOSE: ABNORMAL UNITS
REPORTED DOSE/TIME,TMG: ABNORMAL
RSV AG SPEC QL IF: NEGATIVE
SAMPLES BEING HELD,HOLD: NORMAL
SAO2 % BLD: 100 % (ref 92–97)
SAO2 % BLD: 100 % (ref 92–97)
SAO2 % BLD: 83 % (ref 92–97)
SAO2 % BLD: 89 % (ref 92–97)
SAO2 % BLD: 93 % (ref 92–97)
SAO2 % BLD: 95 % (ref 92–97)
SERVICE CMNT-IMP: ABNORMAL
SERVICE CMNT-IMP: NORMAL
SODIUM BLD-SCNC: 132 MMOL/L (ref 136–145)
SODIUM SERPL-SCNC: 135 MMOL/L (ref 136–145)
SODIUM SERPL-SCNC: 136 MMOL/L (ref 136–145)
SODIUM SERPL-SCNC: 136 MMOL/L (ref 136–145)
SODIUM SERPL-SCNC: 138 MMOL/L (ref 136–145)
SODIUM SERPL-SCNC: 138 MMOL/L (ref 136–145)
SODIUM SERPL-SCNC: 140 MMOL/L (ref 136–145)
SODIUM SERPL-SCNC: 142 MMOL/L (ref 136–145)
SODIUM SERPL-SCNC: 142 MMOL/L (ref 136–145)
SODIUM SERPL-SCNC: 143 MMOL/L (ref 134–144)
SODIUM SERPL-SCNC: 143 MMOL/L (ref 136–145)
SODIUM SERPL-SCNC: 146 MMOL/L (ref 136–145)
SODIUM SERPL-SCNC: 146 MMOL/L (ref 136–145)
SODIUM SERPL-SCNC: 147 MMOL/L (ref 136–145)
SODIUM SERPL-SCNC: 148 MMOL/L (ref 136–145)
SODIUM SERPL-SCNC: 149 MMOL/L (ref 136–145)
SP GR UR STRIP: 1.02 (ref 1–1.03)
SP1: NORMAL
SP2: NORMAL
SP3: NORMAL
SPECIMEN EXP DATE BLD: NORMAL
SPECIMEN EXP DATE BLD: NORMAL
SPECIMEN SOURCE: NORMAL
SPECIMEN SOURCE: NORMAL
SPECIMEN TYPE: ABNORMAL
STATUS OF UNIT,%ST: NORMAL
STATUS OF UNIT,%ST: NORMAL
TCHOL/HDL RATIO (POC): 3.3
TEST DESCRIPTION:,ATST: NORMAL
THERAPEUTIC RANGE,PTTT: ABNORMAL SECS (ref 58–77)
TOTAL RESP. RATE, ITRR: 12
TOTAL RESP. RATE, ITRR: 16
TOTAL RESP. RATE, ITRR: 21
TOTAL RESP. RATE, ITRR: 29
TRIGL SERPL-MCNC: 313 MG/DL
TROPONIN I SERPL-MCNC: 0.18 NG/ML
TROPONIN I SERPL-MCNC: 0.21 NG/ML
TROPONIN I SERPL-MCNC: 0.38 NG/ML
TROPONIN I SERPL-MCNC: 0.39 NG/ML
TROPONIN I SERPL-MCNC: 0.55 NG/ML
TROPONIN I SERPL-MCNC: 0.56 NG/ML
TROPONIN I SERPL-MCNC: 0.94 NG/ML
TROPONIN I SERPL-MCNC: 1.12 NG/ML
UA UROBILINOGEN AMB POC: NORMAL (ref 0.2–1)
UNIT DIVISION, %UDIV: 0
UNIT DIVISION, %UDIV: 0
URINALYSIS CLARITY POC: CLEAR
URINALYSIS COLOR POC: YELLOW
URINE BLOOD POC: NEGATIVE
URINE LEUKOCYTES POC: NORMAL
URINE NITRITES POC: NEGATIVE
VANCOMYCIN SERPL-MCNC: 7.3 UG/ML
VANCOMYCIN TROUGH SERPL-MCNC: 10.4 UG/ML (ref 5–10)
VENTILATION MODE VENT: ABNORMAL
VENTRICULAR RATE, ECG03: 100 BPM
VENTRICULAR RATE, ECG03: 101 BPM
VENTRICULAR RATE, ECG03: 116 BPM
VENTRICULAR RATE, ECG03: 83 BPM
VENTRICULAR RATE, ECG03: 86 BPM
VENTRICULAR RATE, ECG03: 96 BPM
VENTRICULAR RATE, ECG03: 99 BPM
VIRUS SPEC CULT: NORMAL
VLDLC SERPL CALC-MCNC: 130 MG/DL
VT SETTING VENT: 450 ML
VT SETTING VENT: 500 ML
WBC # BLD AUTO: 10 K/UL (ref 4.1–11.1)
WBC # BLD AUTO: 10.6 K/UL (ref 4.1–11.1)
WBC # BLD AUTO: 15.9 K/UL (ref 4.1–11.1)
WBC # BLD AUTO: 18.3 K/UL (ref 4.1–11.1)
WBC # BLD AUTO: 27.1 K/UL (ref 4.1–11.1)
WBC # BLD AUTO: 27.6 K/UL (ref 4.1–11.1)
WBC # BLD AUTO: 28.1 K/UL (ref 4.1–11.1)
WBC # BLD AUTO: 30.1 K/UL (ref 4.1–11.1)
WBC # BLD AUTO: 30.9 K/UL (ref 4.1–11.1)
WBC # BLD AUTO: 31.4 K/UL (ref 4.1–11.1)
WBC # BLD AUTO: 31.4 K/UL (ref 4.1–11.1)
WBC # BLD AUTO: 31.7 K/UL (ref 4.1–11.1)
WBC # BLD AUTO: 38.5 K/UL (ref 4.1–11.1)
WBC # BLD AUTO: 5.2 X10E3/UL (ref 3.4–10.8)
WBC # BLD AUTO: 6.5 K/UL (ref 4.1–11.1)

## 2019-01-01 PROCEDURE — 74011250636 HC RX REV CODE- 250/636: Performed by: INTERNAL MEDICINE

## 2019-01-01 PROCEDURE — 0B9G8ZX DRAINAGE OF LEFT UPPER LUNG LOBE, VIA NATURAL OR ARTIFICIAL OPENING ENDOSCOPIC, DIAGNOSTIC: ICD-10-PCS | Performed by: INTERNAL MEDICINE

## 2019-01-01 PROCEDURE — 82803 BLOOD GASES ANY COMBINATION: CPT

## 2019-01-01 PROCEDURE — 74011000250 HC RX REV CODE- 250: Performed by: INTERNAL MEDICINE

## 2019-01-01 PROCEDURE — 85027 COMPLETE CBC AUTOMATED: CPT

## 2019-01-01 PROCEDURE — 88360 TUMOR IMMUNOHISTOCHEM/MANUAL: CPT

## 2019-01-01 PROCEDURE — 36415 COLL VENOUS BLD VENIPUNCTURE: CPT

## 2019-01-01 PROCEDURE — BB4CZZZ ULTRASONOGRAPHY OF MEDIASTINUM: ICD-10-PCS | Performed by: INTERNAL MEDICINE

## 2019-01-01 PROCEDURE — 85025 COMPLETE CBC W/AUTO DIFF WBC: CPT

## 2019-01-01 PROCEDURE — 83880 ASSAY OF NATRIURETIC PEPTIDE: CPT

## 2019-01-01 PROCEDURE — 77030013140 HC MSK NEB VYRM -A

## 2019-01-01 PROCEDURE — 74011250636 HC RX REV CODE- 250/636: Performed by: HOSPITALIST

## 2019-01-01 PROCEDURE — 71046 X-RAY EXAM CHEST 2 VIEWS: CPT

## 2019-01-01 PROCEDURE — 74011636637 HC RX REV CODE- 636/637: Performed by: INTERNAL MEDICINE

## 2019-01-01 PROCEDURE — 84484 ASSAY OF TROPONIN QUANT: CPT

## 2019-01-01 PROCEDURE — 85730 THROMBOPLASTIN TIME PARTIAL: CPT

## 2019-01-01 PROCEDURE — 94003 VENT MGMT INPAT SUBQ DAY: CPT

## 2019-01-01 PROCEDURE — 0B5P4ZZ DESTRUCTION OF LEFT PLEURA, PERCUTANEOUS ENDOSCOPIC APPROACH: ICD-10-PCS | Performed by: THORACIC SURGERY (CARDIOTHORACIC VASCULAR SURGERY)

## 2019-01-01 PROCEDURE — 86923 COMPATIBILITY TEST ELECTRIC: CPT

## 2019-01-01 PROCEDURE — 87186 SC STD MICRODIL/AGAR DIL: CPT

## 2019-01-01 PROCEDURE — 87147 CULTURE TYPE IMMUNOLOGIC: CPT

## 2019-01-01 PROCEDURE — 82962 GLUCOSE BLOOD TEST: CPT

## 2019-01-01 PROCEDURE — 88333 PATH CONSLTJ SURG CYTO XM 1: CPT

## 2019-01-01 PROCEDURE — 0B9J8ZX DRAINAGE OF LEFT LOWER LUNG LOBE, VIA NATURAL OR ARTIFICIAL OPENING ENDOSCOPIC, DIAGNOSTIC: ICD-10-PCS | Performed by: INTERNAL MEDICINE

## 2019-01-01 PROCEDURE — 77010033678 HC OXYGEN DAILY

## 2019-01-01 PROCEDURE — 74011250636 HC RX REV CODE- 250/636: Performed by: NURSE ANESTHETIST, CERTIFIED REGISTERED

## 2019-01-01 PROCEDURE — 74011250636 HC RX REV CODE- 250/636: Performed by: THORACIC SURGERY (CARDIOTHORACIC VASCULAR SURGERY)

## 2019-01-01 PROCEDURE — 83735 ASSAY OF MAGNESIUM: CPT

## 2019-01-01 PROCEDURE — 80048 BASIC METABOLIC PNL TOTAL CA: CPT

## 2019-01-01 PROCEDURE — 87280 RESPIRATORY SYNCYTIAL AG IF: CPT

## 2019-01-01 PROCEDURE — 76040000007: Performed by: INTERNAL MEDICINE

## 2019-01-01 PROCEDURE — 65660000001 HC RM ICU INTERMED STEPDOWN

## 2019-01-01 PROCEDURE — 77030012794 HC KT NSL CANN/HGH TRAN -A

## 2019-01-01 PROCEDURE — 74011250637 HC RX REV CODE- 250/637: Performed by: PHYSICIAN ASSISTANT

## 2019-01-01 PROCEDURE — 94640 AIRWAY INHALATION TREATMENT: CPT

## 2019-01-01 PROCEDURE — 74011000250 HC RX REV CODE- 250: Performed by: STUDENT IN AN ORGANIZED HEALTH CARE EDUCATION/TRAINING PROGRAM

## 2019-01-01 PROCEDURE — 77030018836 HC SOL IRR NACL ICUM -A: Performed by: THORACIC SURGERY (CARDIOTHORACIC VASCULAR SURGERY)

## 2019-01-01 PROCEDURE — 74011000250 HC RX REV CODE- 250: Performed by: NURSE ANESTHETIST, CERTIFIED REGISTERED

## 2019-01-01 PROCEDURE — 76705 ECHO EXAM OF ABDOMEN: CPT

## 2019-01-01 PROCEDURE — 86900 BLOOD TYPING SEROLOGIC ABO: CPT

## 2019-01-01 PROCEDURE — 71045 X-RAY EXAM CHEST 1 VIEW: CPT

## 2019-01-01 PROCEDURE — 77030008671 HC TU ENDO/BRNC CUF COVD -B: Performed by: ANESTHESIOLOGY

## 2019-01-01 PROCEDURE — 87205 SMEAR GRAM STAIN: CPT

## 2019-01-01 PROCEDURE — 80053 COMPREHEN METABOLIC PANEL: CPT

## 2019-01-01 PROCEDURE — 77030016151 HC PROTCTR LNS DFOG COVD -B: Performed by: THORACIC SURGERY (CARDIOTHORACIC VASCULAR SURGERY)

## 2019-01-01 PROCEDURE — 84100 ASSAY OF PHOSPHORUS: CPT

## 2019-01-01 PROCEDURE — 0W9B30Z DRAINAGE OF LEFT PLEURAL CAVITY WITH DRAINAGE DEVICE, PERCUTANEOUS APPROACH: ICD-10-PCS | Performed by: THORACIC SURGERY (CARDIOTHORACIC VASCULAR SURGERY)

## 2019-01-01 PROCEDURE — 87116 MYCOBACTERIA CULTURE: CPT

## 2019-01-01 PROCEDURE — 87075 CULTR BACTERIA EXCEPT BLOOD: CPT

## 2019-01-01 PROCEDURE — 85610 PROTHROMBIN TIME: CPT

## 2019-01-01 PROCEDURE — 03HY32Z INSERTION OF MONITORING DEVICE INTO UPPER ARTERY, PERCUTANEOUS APPROACH: ICD-10-PCS | Performed by: ANESTHESIOLOGY

## 2019-01-01 PROCEDURE — 74011250636 HC RX REV CODE- 250/636: Performed by: PHYSICAL MEDICINE & REHABILITATION

## 2019-01-01 PROCEDURE — 74011636320 HC RX REV CODE- 636/320: Performed by: RADIOLOGY

## 2019-01-01 PROCEDURE — 74011250636 HC RX REV CODE- 250/636: Performed by: STUDENT IN AN ORGANIZED HEALTH CARE EDUCATION/TRAINING PROGRAM

## 2019-01-01 PROCEDURE — 77030039266 HC ADH SKN EXOFIN S2SG -A: Performed by: THORACIC SURGERY (CARDIOTHORACIC VASCULAR SURGERY)

## 2019-01-01 PROCEDURE — 65610000003 HC RM ICU SURGICAL

## 2019-01-01 PROCEDURE — 74011250637 HC RX REV CODE- 250/637: Performed by: HOSPITALIST

## 2019-01-01 PROCEDURE — 77030021593 HC FCPS BIOP ENDOSC BSC -A: Performed by: INTERNAL MEDICINE

## 2019-01-01 PROCEDURE — 74011000258 HC RX REV CODE- 258: Performed by: INTERNAL MEDICINE

## 2019-01-01 PROCEDURE — 77030012390 HC DRN CHST BTL GTNG -B

## 2019-01-01 PROCEDURE — 74011250636 HC RX REV CODE- 250/636: Performed by: EMERGENCY MEDICINE

## 2019-01-01 PROCEDURE — C1729 CATH, DRAINAGE: HCPCS

## 2019-01-01 PROCEDURE — 71250 CT THORAX DX C-: CPT

## 2019-01-01 PROCEDURE — 94002 VENT MGMT INPAT INIT DAY: CPT

## 2019-01-01 PROCEDURE — 88172 CYTP DX EVAL FNA 1ST EA SITE: CPT

## 2019-01-01 PROCEDURE — 77030008684 HC TU ET CUF COVD -B: Performed by: NURSE ANESTHETIST, CERTIFIED REGISTERED

## 2019-01-01 PROCEDURE — 36600 WITHDRAWAL OF ARTERIAL BLOOD: CPT

## 2019-01-01 PROCEDURE — 80202 ASSAY OF VANCOMYCIN: CPT

## 2019-01-01 PROCEDURE — 0BTG4ZZ RESECTION OF LEFT UPPER LUNG LOBE, PERCUTANEOUS ENDOSCOPIC APPROACH: ICD-10-PCS | Performed by: THORACIC SURGERY (CARDIOTHORACIC VASCULAR SURGERY)

## 2019-01-01 PROCEDURE — 77030002996 HC SUT SLK J&J -A: Performed by: THORACIC SURGERY (CARDIOTHORACIC VASCULAR SURGERY)

## 2019-01-01 PROCEDURE — 77030022473 HC HNDL ENDO GIA UNIV USDA -C: Performed by: THORACIC SURGERY (CARDIOTHORACIC VASCULAR SURGERY)

## 2019-01-01 PROCEDURE — 88305 TISSUE EXAM BY PATHOLOGIST: CPT

## 2019-01-01 PROCEDURE — 74011000258 HC RX REV CODE- 258: Performed by: HOSPITALIST

## 2019-01-01 PROCEDURE — 76060000032 HC ANESTHESIA 0.5 TO 1 HR: Performed by: INTERNAL MEDICINE

## 2019-01-01 PROCEDURE — 93005 ELECTROCARDIOGRAM TRACING: CPT

## 2019-01-01 PROCEDURE — 65610000006 HC RM INTENSIVE CARE

## 2019-01-01 PROCEDURE — A9552 F18 FDG: HCPCS

## 2019-01-01 PROCEDURE — 76060000039 HC ANESTHESIA 4 TO 4.5 HR: Performed by: THORACIC SURGERY (CARDIOTHORACIC VASCULAR SURGERY)

## 2019-01-01 PROCEDURE — 87798 DETECT AGENT NOS DNA AMP: CPT

## 2019-01-01 PROCEDURE — 76770 US EXAM ABDO BACK WALL COMP: CPT

## 2019-01-01 PROCEDURE — 74018 RADEX ABDOMEN 1 VIEW: CPT

## 2019-01-01 PROCEDURE — 94762 N-INVAS EAR/PLS OXIMTRY CONT: CPT

## 2019-01-01 PROCEDURE — 88307 TISSUE EXAM BY PATHOLOGIST: CPT

## 2019-01-01 PROCEDURE — 99285 EMERGENCY DEPT VISIT HI MDM: CPT

## 2019-01-01 PROCEDURE — 82550 ASSAY OF CK (CPK): CPT

## 2019-01-01 PROCEDURE — 0BH17EZ INSERTION OF ENDOTRACHEAL AIRWAY INTO TRACHEA, VIA NATURAL OR ARTIFICIAL OPENING: ICD-10-PCS | Performed by: THORACIC SURGERY (CARDIOTHORACIC VASCULAR SURGERY)

## 2019-01-01 PROCEDURE — 74011000258 HC RX REV CODE- 258: Performed by: RADIOLOGY

## 2019-01-01 PROCEDURE — 74176 CT ABD & PELVIS W/O CONTRAST: CPT

## 2019-01-01 PROCEDURE — 87070 CULTURE OTHR SPECIMN AEROBIC: CPT

## 2019-01-01 PROCEDURE — 65660000000 HC RM CCU STEPDOWN

## 2019-01-01 PROCEDURE — 74011000250 HC RX REV CODE- 250: Performed by: PHYSICAL MEDICINE & REHABILITATION

## 2019-01-01 PROCEDURE — 77010033711 HC HIGH FLOW OXYGEN

## 2019-01-01 PROCEDURE — 77030011264 HC ELECTRD BLD EXT COVD -A: Performed by: THORACIC SURGERY (CARDIOTHORACIC VASCULAR SURGERY)

## 2019-01-01 PROCEDURE — 83605 ASSAY OF LACTIC ACID: CPT

## 2019-01-01 PROCEDURE — 76040000019: Performed by: INTERNAL MEDICINE

## 2019-01-01 PROCEDURE — 74011250636 HC RX REV CODE- 250/636

## 2019-01-01 PROCEDURE — 76040000020: Performed by: INTERNAL MEDICINE

## 2019-01-01 PROCEDURE — 87252 VIRUS INOCULATION TISSUE: CPT

## 2019-01-01 PROCEDURE — 93308 TTE F-UP OR LMTD: CPT

## 2019-01-01 PROCEDURE — 77030018846 HC SOL IRR STRL H20 ICUM -A

## 2019-01-01 PROCEDURE — 77030026438 HC STYL ET INTUB CARD -A: Performed by: NURSE ANESTHETIST, CERTIFIED REGISTERED

## 2019-01-01 PROCEDURE — 77030003666 HC NDL SPINAL BD -A: Performed by: THORACIC SURGERY (CARDIOTHORACIC VASCULAR SURGERY)

## 2019-01-01 PROCEDURE — C1751 CATH, INF, PER/CENT/MIDLINE: HCPCS

## 2019-01-01 PROCEDURE — 80074 ACUTE HEPATITIS PANEL: CPT

## 2019-01-01 PROCEDURE — 85384 FIBRINOGEN ACTIVITY: CPT

## 2019-01-01 PROCEDURE — C9113 INJ PANTOPRAZOLE SODIUM, VIA: HCPCS | Performed by: INTERNAL MEDICINE

## 2019-01-01 PROCEDURE — 77030002916 HC SUT ETHLN J&J -A: Performed by: THORACIC SURGERY (CARDIOTHORACIC VASCULAR SURGERY)

## 2019-01-01 PROCEDURE — 74011250636 HC RX REV CODE- 250/636: Performed by: ANESTHESIOLOGY

## 2019-01-01 PROCEDURE — 71275 CT ANGIOGRAPHY CHEST: CPT

## 2019-01-01 PROCEDURE — 65270000032 HC RM SEMIPRIVATE

## 2019-01-01 PROCEDURE — 5A1955Z RESPIRATORY VENTILATION, GREATER THAN 96 CONSECUTIVE HOURS: ICD-10-PCS | Performed by: THORACIC SURGERY (CARDIOTHORACIC VASCULAR SURGERY)

## 2019-01-01 PROCEDURE — 83520 IMMUNOASSAY QUANT NOS NONAB: CPT

## 2019-01-01 PROCEDURE — 94664 DEMO&/EVAL PT USE INHALER: CPT

## 2019-01-01 PROCEDURE — 74011250637 HC RX REV CODE- 250/637: Performed by: SPECIALIST

## 2019-01-01 PROCEDURE — 87176 TISSUE HOMOGENIZATION CULTR: CPT

## 2019-01-01 PROCEDURE — 77030040361 HC SLV COMPR DVT MDII -B: Performed by: THORACIC SURGERY (CARDIOTHORACIC VASCULAR SURGERY)

## 2019-01-01 PROCEDURE — 0BTJ4ZZ RESECTION OF LEFT LOWER LUNG LOBE, PERCUTANEOUS ENDOSCOPIC APPROACH: ICD-10-PCS | Performed by: THORACIC SURGERY (CARDIOTHORACIC VASCULAR SURGERY)

## 2019-01-01 PROCEDURE — 65270000029 HC RM PRIVATE

## 2019-01-01 PROCEDURE — 74011250636 HC RX REV CODE- 250/636: Performed by: NURSE PRACTITIONER

## 2019-01-01 PROCEDURE — 77030021392 HC DEV RETRV POLYP BSC -B: Performed by: INTERNAL MEDICINE

## 2019-01-01 PROCEDURE — 77030018846 HC SOL IRR STRL H20 ICUM -A: Performed by: THORACIC SURGERY (CARDIOTHORACIC VASCULAR SURGERY)

## 2019-01-01 PROCEDURE — 76010000134 HC OR TIME 3.5 TO 4 HR: Performed by: THORACIC SURGERY (CARDIOTHORACIC VASCULAR SURGERY)

## 2019-01-01 PROCEDURE — 87103 BLOOD FUNGUS CULTURE: CPT

## 2019-01-01 PROCEDURE — 77030011640 HC PAD GRND REM COVD -A: Performed by: THORACIC SURGERY (CARDIOTHORACIC VASCULAR SURGERY)

## 2019-01-01 PROCEDURE — 87102 FUNGUS ISOLATION CULTURE: CPT

## 2019-01-01 PROCEDURE — P9047 ALBUMIN (HUMAN), 25%, 50ML: HCPCS | Performed by: INTERNAL MEDICINE

## 2019-01-01 PROCEDURE — 74011000250 HC RX REV CODE- 250: Performed by: HOSPITALIST

## 2019-01-01 PROCEDURE — 77030008771 HC TU NG SALEM SUMP -A

## 2019-01-01 PROCEDURE — 74011250637 HC RX REV CODE- 250/637: Performed by: INTERNAL MEDICINE

## 2019-01-01 PROCEDURE — 80047 BASIC METABLC PNL IONIZED CA: CPT

## 2019-01-01 PROCEDURE — 80076 HEPATIC FUNCTION PANEL: CPT

## 2019-01-01 PROCEDURE — 74011250637 HC RX REV CODE- 250/637: Performed by: ANESTHESIOLOGY

## 2019-01-01 PROCEDURE — C1729 CATH, DRAINAGE: HCPCS | Performed by: THORACIC SURGERY (CARDIOTHORACIC VASCULAR SURGERY)

## 2019-01-01 PROCEDURE — 90471 IMMUNIZATION ADMIN: CPT

## 2019-01-01 PROCEDURE — 77030027138 HC INCENT SPIROMETER -A

## 2019-01-01 PROCEDURE — 77030012390 HC DRN CHST BTL GTNG -B: Performed by: THORACIC SURGERY (CARDIOTHORACIC VASCULAR SURGERY)

## 2019-01-01 PROCEDURE — 77030029684 HC NEB SM VOL KT MONA -A

## 2019-01-01 PROCEDURE — 90686 IIV4 VACC NO PRSV 0.5 ML IM: CPT | Performed by: INTERNAL MEDICINE

## 2019-01-01 PROCEDURE — 02HV33Z INSERTION OF INFUSION DEVICE INTO SUPERIOR VENA CAVA, PERCUTANEOUS APPROACH: ICD-10-PCS | Performed by: ANESTHESIOLOGY

## 2019-01-01 PROCEDURE — 96374 THER/PROPH/DIAG INJ IV PUSH: CPT

## 2019-01-01 PROCEDURE — 88173 CYTOPATH EVAL FNA REPORT: CPT

## 2019-01-01 PROCEDURE — 81235 EGFR GENE COM VARIANTS: CPT

## 2019-01-01 PROCEDURE — 74011000258 HC RX REV CODE- 258: Performed by: EMERGENCY MEDICINE

## 2019-01-01 PROCEDURE — 88112 CYTOPATH CELL ENHANCE TECH: CPT

## 2019-01-01 PROCEDURE — 87077 CULTURE AEROBIC IDENTIFY: CPT

## 2019-01-01 PROCEDURE — 96365 THER/PROPH/DIAG IV INF INIT: CPT

## 2019-01-01 PROCEDURE — 77030037367 HC STPLR ENDO TRI-STPLR COVD -D: Performed by: THORACIC SURGERY (CARDIOTHORACIC VASCULAR SURGERY)

## 2019-01-01 PROCEDURE — 87040 BLOOD CULTURE FOR BACTERIA: CPT

## 2019-01-01 PROCEDURE — 74011250636 HC RX REV CODE- 250/636: Performed by: PHYSICIAN ASSISTANT

## 2019-01-01 PROCEDURE — 74011000250 HC RX REV CODE- 250

## 2019-01-01 PROCEDURE — 74011000250 HC RX REV CODE- 250: Performed by: THORACIC SURGERY (CARDIOTHORACIC VASCULAR SURGERY)

## 2019-01-01 PROCEDURE — 76060000034 HC ANESTHESIA 1.5 TO 2 HR: Performed by: INTERNAL MEDICINE

## 2019-01-01 PROCEDURE — 77030011223 HC DEV LIG POLYLP OCOA -B: Performed by: INTERNAL MEDICINE

## 2019-01-01 PROCEDURE — 77030018673: Performed by: THORACIC SURGERY (CARDIOTHORACIC VASCULAR SURGERY)

## 2019-01-01 PROCEDURE — 77030012879 HC MSK CPAP FLL FAC PHIL -B

## 2019-01-01 PROCEDURE — 77030031139 HC SUT VCRL2 J&J -A: Performed by: THORACIC SURGERY (CARDIOTHORACIC VASCULAR SURGERY)

## 2019-01-01 RX ORDER — SODIUM CHLORIDE 0.9 % (FLUSH) 0.9 %
5-40 SYRINGE (ML) INJECTION EVERY 8 HOURS
Status: DISCONTINUED | OUTPATIENT
Start: 2019-01-01 | End: 2019-01-01 | Stop reason: HOSPADM

## 2019-01-01 RX ORDER — HYDROCORTISONE SODIUM SUCCINATE 100 MG/2ML
50 INJECTION, POWDER, FOR SOLUTION INTRAMUSCULAR; INTRAVENOUS EVERY 12 HOURS
Status: COMPLETED | OUTPATIENT
Start: 2019-01-01 | End: 2019-01-01

## 2019-01-01 RX ORDER — IPRATROPIUM BROMIDE AND ALBUTEROL SULFATE 2.5; .5 MG/3ML; MG/3ML
3 SOLUTION RESPIRATORY (INHALATION)
Status: DISCONTINUED | OUTPATIENT
Start: 2019-01-01 | End: 2019-01-01

## 2019-01-01 RX ORDER — SODIUM CHLORIDE 9 MG/ML
75 INJECTION, SOLUTION INTRAVENOUS CONTINUOUS
Status: DISCONTINUED | OUTPATIENT
Start: 2019-01-01 | End: 2019-01-01

## 2019-01-01 RX ORDER — DIPHENHYDRAMINE HYDROCHLORIDE 50 MG/ML
12.5 INJECTION, SOLUTION INTRAMUSCULAR; INTRAVENOUS AS NEEDED
Status: CANCELLED | OUTPATIENT
Start: 2019-01-01 | End: 2019-01-01

## 2019-01-01 RX ORDER — MIRTAZAPINE 15 MG/1
15 TABLET, FILM COATED ORAL
Qty: 30 TAB | Refills: 2 | Status: SHIPPED | OUTPATIENT
Start: 2019-01-01 | End: 2019-01-01 | Stop reason: SDUPTHER

## 2019-01-01 RX ORDER — PROPOFOL 10 MG/ML
INJECTION, EMULSION INTRAVENOUS AS NEEDED
Status: DISCONTINUED | OUTPATIENT
Start: 2019-01-01 | End: 2019-01-01 | Stop reason: HOSPADM

## 2019-01-01 RX ORDER — HYDROCORTISONE SODIUM SUCCINATE 100 MG/2ML
25 INJECTION, POWDER, FOR SOLUTION INTRAMUSCULAR; INTRAVENOUS EVERY 12 HOURS
Status: DISCONTINUED | OUTPATIENT
Start: 2019-01-01 | End: 2019-01-01

## 2019-01-01 RX ORDER — FLUCONAZOLE 2 MG/ML
400 INJECTION, SOLUTION INTRAVENOUS ONCE
Status: COMPLETED | OUTPATIENT
Start: 2019-01-01 | End: 2019-01-01

## 2019-01-01 RX ORDER — SODIUM CHLORIDE 450 MG/100ML
75 INJECTION, SOLUTION INTRAVENOUS CONTINUOUS
Status: DISCONTINUED | OUTPATIENT
Start: 2019-01-01 | End: 2019-01-01

## 2019-01-01 RX ORDER — GUAIFENESIN 100 MG/5ML
81 LIQUID (ML) ORAL DAILY
Status: DISCONTINUED | OUTPATIENT
Start: 2019-01-01 | End: 2019-01-01

## 2019-01-01 RX ORDER — GLYCOPYRROLATE 0.2 MG/ML
INJECTION INTRAMUSCULAR; INTRAVENOUS AS NEEDED
Status: DISCONTINUED | OUTPATIENT
Start: 2019-01-01 | End: 2019-01-01 | Stop reason: HOSPADM

## 2019-01-01 RX ORDER — ROCURONIUM BROMIDE 10 MG/ML
INJECTION, SOLUTION INTRAVENOUS AS NEEDED
Status: DISCONTINUED | OUTPATIENT
Start: 2019-01-01 | End: 2019-01-01 | Stop reason: HOSPADM

## 2019-01-01 RX ORDER — IBUPROFEN 200 MG
400 TABLET ORAL
COMMUNITY

## 2019-01-01 RX ORDER — FENTANYL CITRATE 50 UG/ML
200 INJECTION, SOLUTION INTRAMUSCULAR; INTRAVENOUS
Status: DISCONTINUED | OUTPATIENT
Start: 2019-01-01 | End: 2019-01-01 | Stop reason: HOSPADM

## 2019-01-01 RX ORDER — CHOLECALCIFEROL (VITAMIN D3) 125 MCG
5 CAPSULE ORAL
Qty: 30 TAB | Refills: 2 | OUTPATIENT
Start: 2019-01-01

## 2019-01-01 RX ORDER — SODIUM CHLORIDE 0.9 % (FLUSH) 0.9 %
5-40 SYRINGE (ML) INJECTION AS NEEDED
Status: DISCONTINUED | OUTPATIENT
Start: 2019-01-01 | End: 2019-01-01 | Stop reason: HOSPADM

## 2019-01-01 RX ORDER — SODIUM CHLORIDE 9 MG/ML
INJECTION, SOLUTION INTRAVENOUS
Status: DISCONTINUED | OUTPATIENT
Start: 2019-01-01 | End: 2019-01-01 | Stop reason: HOSPADM

## 2019-01-01 RX ORDER — OXYCODONE AND ACETAMINOPHEN 5; 325 MG/1; MG/1
2 TABLET ORAL
Status: DISCONTINUED | OUTPATIENT
Start: 2019-01-01 | End: 2019-01-01

## 2019-01-01 RX ORDER — DEXTROSE MONOHYDRATE 100 MG/ML
0-250 INJECTION, SOLUTION INTRAVENOUS AS NEEDED
Status: DISCONTINUED | OUTPATIENT
Start: 2019-01-01 | End: 2019-01-01

## 2019-01-01 RX ORDER — SODIUM CHLORIDE 9 MG/ML
150 INJECTION, SOLUTION INTRAVENOUS CONTINUOUS
Status: DISCONTINUED | OUTPATIENT
Start: 2019-01-01 | End: 2019-01-01 | Stop reason: HOSPADM

## 2019-01-01 RX ORDER — METOPROLOL TARTRATE 25 MG/1
25 TABLET, FILM COATED ORAL EVERY 12 HOURS
Status: DISCONTINUED | OUTPATIENT
Start: 2019-01-01 | End: 2019-01-01

## 2019-01-01 RX ORDER — POTASSIUM CHLORIDE 29.8 MG/ML
INJECTION INTRAVENOUS
Status: COMPLETED
Start: 2019-01-01 | End: 2019-01-01

## 2019-01-01 RX ORDER — NOREPINEPHRINE BITARTRATE/D5W 8 MG/250ML
.5-3 PLASTIC BAG, INJECTION (ML) INTRAVENOUS
Status: DISCONTINUED | OUTPATIENT
Start: 2019-01-01 | End: 2019-01-01

## 2019-01-01 RX ORDER — MIRTAZAPINE 15 MG/1
15 TABLET, FILM COATED ORAL
Qty: 30 TAB | Refills: 2 | OUTPATIENT
Start: 2019-01-01

## 2019-01-01 RX ORDER — LIDOCAINE HYDROCHLORIDE 20 MG/ML
INJECTION, SOLUTION EPIDURAL; INFILTRATION; INTRACAUDAL; PERINEURAL AS NEEDED
Status: DISCONTINUED | OUTPATIENT
Start: 2019-01-01 | End: 2019-01-01 | Stop reason: HOSPADM

## 2019-01-01 RX ORDER — DEXAMETHASONE SODIUM PHOSPHATE 4 MG/ML
INJECTION, SOLUTION INTRA-ARTICULAR; INTRALESIONAL; INTRAMUSCULAR; INTRAVENOUS; SOFT TISSUE AS NEEDED
Status: DISCONTINUED | OUTPATIENT
Start: 2019-01-01 | End: 2019-01-01 | Stop reason: HOSPADM

## 2019-01-01 RX ORDER — SODIUM CHLORIDE 9 MG/ML
250 INJECTION, SOLUTION INTRAVENOUS AS NEEDED
Status: DISCONTINUED | OUTPATIENT
Start: 2019-01-01 | End: 2019-01-01

## 2019-01-01 RX ORDER — FLUCONAZOLE 2 MG/ML
200 INJECTION, SOLUTION INTRAVENOUS ONCE
Status: DISCONTINUED | OUTPATIENT
Start: 2019-01-01 | End: 2019-01-01

## 2019-01-01 RX ORDER — DEXTROMETHORPHAN/PSEUDOEPHED 2.5-7.5/.8
1.2 DROPS ORAL
Status: DISCONTINUED | OUTPATIENT
Start: 2019-01-01 | End: 2019-01-01 | Stop reason: HOSPADM

## 2019-01-01 RX ORDER — HYDRALAZINE HYDROCHLORIDE 20 MG/ML
10 INJECTION INTRAMUSCULAR; INTRAVENOUS
Status: DISCONTINUED | OUTPATIENT
Start: 2019-01-01 | End: 2019-11-14 | Stop reason: HOSPADM

## 2019-01-01 RX ORDER — HYDROCORTISONE SODIUM SUCCINATE 100 MG/2ML
50 INJECTION, POWDER, FOR SOLUTION INTRAMUSCULAR; INTRAVENOUS EVERY 8 HOURS
Status: DISCONTINUED | OUTPATIENT
Start: 2019-01-01 | End: 2019-01-01

## 2019-01-01 RX ORDER — SODIUM CHLORIDE 0.9 % (FLUSH) 0.9 %
5-40 SYRINGE (ML) INJECTION AS NEEDED
Status: DISCONTINUED | OUTPATIENT
Start: 2019-01-01 | End: 2019-11-14 | Stop reason: HOSPADM

## 2019-01-01 RX ORDER — MORPHINE SULFATE 2 MG/ML
1 INJECTION, SOLUTION INTRAMUSCULAR; INTRAVENOUS
Status: DISCONTINUED | OUTPATIENT
Start: 2019-01-01 | End: 2019-11-14 | Stop reason: HOSPADM

## 2019-01-01 RX ORDER — SODIUM CHLORIDE 9 MG/ML
125 INJECTION, SOLUTION INTRAVENOUS CONTINUOUS
Status: DISCONTINUED | OUTPATIENT
Start: 2019-01-01 | End: 2019-01-01 | Stop reason: SDUPTHER

## 2019-01-01 RX ORDER — MIDAZOLAM HYDROCHLORIDE 1 MG/ML
0.5 INJECTION, SOLUTION INTRAMUSCULAR; INTRAVENOUS
Status: CANCELLED | OUTPATIENT
Start: 2019-01-01

## 2019-01-01 RX ORDER — ACETAMINOPHEN 650 MG/1
650 SUPPOSITORY RECTAL
Status: DISCONTINUED | OUTPATIENT
Start: 2019-01-01 | End: 2019-01-01

## 2019-01-01 RX ORDER — PROPOFOL 10 MG/ML
0-50 VIAL (ML) INTRAVENOUS
Status: DISCONTINUED | OUTPATIENT
Start: 2019-01-01 | End: 2019-01-01

## 2019-01-01 RX ORDER — SUCCINYLCHOLINE CHLORIDE 20 MG/ML
INJECTION INTRAMUSCULAR; INTRAVENOUS AS NEEDED
Status: DISCONTINUED | OUTPATIENT
Start: 2019-01-01 | End: 2019-01-01 | Stop reason: HOSPADM

## 2019-01-01 RX ORDER — SODIUM CHLORIDE 0.9 % (FLUSH) 0.9 %
5-40 SYRINGE (ML) INJECTION EVERY 8 HOURS
Status: DISCONTINUED | OUTPATIENT
Start: 2019-01-01 | End: 2019-01-01

## 2019-01-01 RX ORDER — MAGNESIUM SULFATE 100 %
4 CRYSTALS MISCELLANEOUS AS NEEDED
Status: DISCONTINUED | OUTPATIENT
Start: 2019-01-01 | End: 2019-11-14 | Stop reason: HOSPADM

## 2019-01-01 RX ORDER — DEXAMETHASONE SODIUM PHOSPHATE 4 MG/ML
2 INJECTION, SOLUTION INTRA-ARTICULAR; INTRALESIONAL; INTRAMUSCULAR; INTRAVENOUS; SOFT TISSUE
Status: DISCONTINUED | OUTPATIENT
Start: 2019-01-01 | End: 2019-11-14 | Stop reason: HOSPADM

## 2019-01-01 RX ORDER — EPINEPHRINE 0.1 MG/ML
1 INJECTION INTRACARDIAC; INTRAVENOUS
Status: DISCONTINUED | OUTPATIENT
Start: 2019-01-01 | End: 2019-01-01 | Stop reason: HOSPADM

## 2019-01-01 RX ORDER — HYDROCODONE BITARTRATE AND ACETAMINOPHEN 5; 325 MG/1; MG/1
1 TABLET ORAL
Status: DISCONTINUED | OUTPATIENT
Start: 2019-01-01 | End: 2019-01-01

## 2019-01-01 RX ORDER — MIRTAZAPINE 15 MG/1
15 TABLET, FILM COATED ORAL
Status: DISCONTINUED | OUTPATIENT
Start: 2019-01-01 | End: 2019-01-01

## 2019-01-01 RX ORDER — HYDROMORPHONE HYDROCHLORIDE 1 MG/ML
1 INJECTION, SOLUTION INTRAMUSCULAR; INTRAVENOUS; SUBCUTANEOUS
Status: DISCONTINUED | OUTPATIENT
Start: 2019-01-01 | End: 2019-01-01

## 2019-01-01 RX ORDER — AMOXICILLIN AND CLAVULANATE POTASSIUM 875; 125 MG/1; MG/1
1 TABLET, FILM COATED ORAL EVERY 12 HOURS
Qty: 14 TAB | Refills: 0 | Status: SHIPPED | OUTPATIENT
Start: 2019-01-01 | End: 2019-01-01

## 2019-01-01 RX ORDER — ATROPINE SULFATE 0.1 MG/ML
0.5 INJECTION INTRAVENOUS
Status: DISCONTINUED | OUTPATIENT
Start: 2019-01-01 | End: 2019-01-01 | Stop reason: HOSPADM

## 2019-01-01 RX ORDER — LORAZEPAM 2 MG/ML
1 INJECTION INTRAMUSCULAR
Status: DISCONTINUED | OUTPATIENT
Start: 2019-01-01 | End: 2019-11-14 | Stop reason: HOSPADM

## 2019-01-01 RX ORDER — FENTANYL CITRATE 50 UG/ML
50 INJECTION, SOLUTION INTRAMUSCULAR; INTRAVENOUS AS NEEDED
Status: DISCONTINUED | OUTPATIENT
Start: 2019-01-01 | End: 2019-01-01 | Stop reason: HOSPADM

## 2019-01-01 RX ORDER — HYDROMORPHONE HYDROCHLORIDE 2 MG/1
0.5 TABLET ORAL
Status: DISCONTINUED | OUTPATIENT
Start: 2019-01-01 | End: 2019-01-01

## 2019-01-01 RX ORDER — HYDROMORPHONE HYDROCHLORIDE 1 MG/ML
1 INJECTION, SOLUTION INTRAMUSCULAR; INTRAVENOUS; SUBCUTANEOUS
Status: DISCONTINUED | OUTPATIENT
Start: 2019-01-01 | End: 2019-01-01 | Stop reason: SDUPTHER

## 2019-01-01 RX ORDER — LEVOFLOXACIN 5 MG/ML
750 INJECTION, SOLUTION INTRAVENOUS ONCE
Status: COMPLETED | OUTPATIENT
Start: 2019-01-01 | End: 2019-01-01

## 2019-01-01 RX ORDER — HYDROMORPHONE HYDROCHLORIDE 1 MG/ML
1 INJECTION, SOLUTION INTRAMUSCULAR; INTRAVENOUS; SUBCUTANEOUS EVERY 4 HOURS
Status: DISCONTINUED | OUTPATIENT
Start: 2019-01-01 | End: 2019-11-14 | Stop reason: HOSPADM

## 2019-01-01 RX ORDER — ERGOCALCIFEROL 1.25 MG/1
50000 CAPSULE ORAL
Qty: 4 CAP | Refills: 2 | Status: SHIPPED | OUTPATIENT
Start: 2019-01-01 | End: 2019-01-01

## 2019-01-01 RX ORDER — FLUCONAZOLE 2 MG/ML
200 INJECTION, SOLUTION INTRAVENOUS EVERY 24 HOURS
Status: DISCONTINUED | OUTPATIENT
Start: 2019-01-01 | End: 2019-01-01

## 2019-01-01 RX ORDER — LIDOCAINE HYDROCHLORIDE 10 MG/ML
0.1 INJECTION, SOLUTION EPIDURAL; INFILTRATION; INTRACAUDAL; PERINEURAL AS NEEDED
Status: DISCONTINUED | OUTPATIENT
Start: 2019-01-01 | End: 2019-01-01 | Stop reason: HOSPADM

## 2019-01-01 RX ORDER — PREDNISONE 20 MG/1
20 TABLET ORAL
Status: DISCONTINUED | OUTPATIENT
Start: 2019-01-01 | End: 2019-01-01

## 2019-01-01 RX ORDER — PREDNISONE 10 MG/1
TABLET ORAL
Qty: 32 TAB | Refills: 0 | Status: SHIPPED | OUTPATIENT
Start: 2019-01-01

## 2019-01-01 RX ORDER — MIDAZOLAM HYDROCHLORIDE 1 MG/ML
1 INJECTION, SOLUTION INTRAMUSCULAR; INTRAVENOUS AS NEEDED
Status: DISCONTINUED | OUTPATIENT
Start: 2019-01-01 | End: 2019-01-01 | Stop reason: HOSPADM

## 2019-01-01 RX ORDER — CIPROFLOXACIN 500 MG/1
500 TABLET ORAL 2 TIMES DAILY
Qty: 14 TAB | Refills: 0 | Status: SHIPPED | OUTPATIENT
Start: 2019-01-01 | End: 2019-01-01

## 2019-01-01 RX ORDER — LIDOCAINE HYDROCHLORIDE AND EPINEPHRINE 10; 10 MG/ML; UG/ML
INJECTION, SOLUTION INFILTRATION; PERINEURAL
Status: DISCONTINUED
Start: 2019-01-01 | End: 2019-01-01 | Stop reason: WASHOUT

## 2019-01-01 RX ORDER — OXYCODONE AND ACETAMINOPHEN 5; 325 MG/1; MG/1
1 TABLET ORAL AS NEEDED
Status: CANCELLED | OUTPATIENT
Start: 2019-01-01

## 2019-01-01 RX ORDER — MIRTAZAPINE 15 MG/1
15 TABLET, FILM COATED ORAL
Qty: 30 TAB | Refills: 0 | Status: SHIPPED | OUTPATIENT
Start: 2019-01-01 | End: 2019-01-01 | Stop reason: SDUPTHER

## 2019-01-01 RX ORDER — AMOXICILLIN AND CLAVULANATE POTASSIUM 875; 125 MG/1; MG/1
1 TABLET, FILM COATED ORAL EVERY 12 HOURS
Status: DISCONTINUED | OUTPATIENT
Start: 2019-01-01 | End: 2019-01-01 | Stop reason: HOSPADM

## 2019-01-01 RX ORDER — CHOLECALCIFEROL (VITAMIN D3) 125 MCG
5 CAPSULE ORAL
Qty: 30 TAB | Refills: 0 | Status: SHIPPED | OUTPATIENT
Start: 2019-01-01 | End: 2019-01-01 | Stop reason: SDUPTHER

## 2019-01-01 RX ORDER — NEOSTIGMINE METHYLSULFATE 1 MG/ML
INJECTION INTRAVENOUS AS NEEDED
Status: DISCONTINUED | OUTPATIENT
Start: 2019-01-01 | End: 2019-01-01 | Stop reason: HOSPADM

## 2019-01-01 RX ORDER — DEXTROSE, SODIUM CHLORIDE, AND POTASSIUM CHLORIDE 5; .45; .075 G/100ML; G/100ML; G/100ML
25 INJECTION INTRAVENOUS CONTINUOUS
Status: DISCONTINUED | OUTPATIENT
Start: 2019-01-01 | End: 2019-01-01

## 2019-01-01 RX ORDER — GLYCOPYRROLATE 0.2 MG/ML
0.2 INJECTION INTRAMUSCULAR; INTRAVENOUS
Status: DISCONTINUED | OUTPATIENT
Start: 2019-01-01 | End: 2019-01-01

## 2019-01-01 RX ORDER — PROPOFOL 10 MG/ML
5-50 VIAL (ML) INTRAVENOUS
Status: DISCONTINUED | OUTPATIENT
Start: 2019-01-01 | End: 2019-01-01

## 2019-01-01 RX ORDER — INSULIN LISPRO 100 [IU]/ML
INJECTION, SOLUTION INTRAVENOUS; SUBCUTANEOUS EVERY 6 HOURS
Status: DISCONTINUED | OUTPATIENT
Start: 2019-01-01 | End: 2019-01-01

## 2019-01-01 RX ORDER — FENTANYL CITRATE 50 UG/ML
INJECTION, SOLUTION INTRAMUSCULAR; INTRAVENOUS AS NEEDED
Status: DISCONTINUED | OUTPATIENT
Start: 2019-01-01 | End: 2019-01-01 | Stop reason: HOSPADM

## 2019-01-01 RX ORDER — ALBUTEROL SULFATE 90 UG/1
1 AEROSOL, METERED RESPIRATORY (INHALATION)
Qty: 1 INHALER | Refills: 3 | Status: SHIPPED | OUTPATIENT
Start: 2019-01-01

## 2019-01-01 RX ORDER — PROPOFOL 10 MG/ML
INJECTION, EMULSION INTRAVENOUS
Status: DISCONTINUED | OUTPATIENT
Start: 2019-01-01 | End: 2019-01-01 | Stop reason: HOSPADM

## 2019-01-01 RX ORDER — ALBUTEROL SULFATE 0.83 MG/ML
2.5 SOLUTION RESPIRATORY (INHALATION)
Status: DISCONTINUED | OUTPATIENT
Start: 2019-01-01 | End: 2019-01-01

## 2019-01-01 RX ORDER — EPINEPHRINE 1 MG/ML
INJECTION, SOLUTION, CONCENTRATE INTRAVENOUS AS NEEDED
Status: DISCONTINUED | OUTPATIENT
Start: 2019-01-01 | End: 2019-01-01 | Stop reason: HOSPADM

## 2019-01-01 RX ORDER — SODIUM CHLORIDE, SODIUM LACTATE, POTASSIUM CHLORIDE, CALCIUM CHLORIDE 600; 310; 30; 20 MG/100ML; MG/100ML; MG/100ML; MG/100ML
125 INJECTION, SOLUTION INTRAVENOUS CONTINUOUS
Status: DISCONTINUED | OUTPATIENT
Start: 2019-01-01 | End: 2019-01-01 | Stop reason: HOSPADM

## 2019-01-01 RX ORDER — SODIUM CHLORIDE 0.9 % (FLUSH) 0.9 %
10 SYRINGE (ML) INJECTION
Status: COMPLETED | OUTPATIENT
Start: 2019-01-01 | End: 2019-01-01

## 2019-01-01 RX ORDER — CHOLECALCIFEROL (VITAMIN D3) 125 MCG
5 CAPSULE ORAL
Qty: 30 TAB | Refills: 2 | Status: SHIPPED | OUTPATIENT
Start: 2019-01-01

## 2019-01-01 RX ORDER — FENTANYL CITRATE 50 UG/ML
25 INJECTION, SOLUTION INTRAMUSCULAR; INTRAVENOUS
Status: CANCELLED | OUTPATIENT
Start: 2019-01-01

## 2019-01-01 RX ORDER — FENTANYL CITRATE 50 UG/ML
50 INJECTION, SOLUTION INTRAMUSCULAR; INTRAVENOUS
Status: COMPLETED | OUTPATIENT
Start: 2019-01-01 | End: 2019-01-01

## 2019-01-01 RX ORDER — CHOLECALCIFEROL (VITAMIN D3) 125 MCG
5 CAPSULE ORAL
Qty: 30 TAB | Refills: 2 | Status: SHIPPED | OUTPATIENT
Start: 2019-01-01 | End: 2019-01-01 | Stop reason: SDUPTHER

## 2019-01-01 RX ORDER — LORAZEPAM 2 MG/ML
1 INJECTION INTRAMUSCULAR EVERY 4 HOURS
Status: DISCONTINUED | OUTPATIENT
Start: 2019-01-01 | End: 2019-11-14 | Stop reason: HOSPADM

## 2019-01-01 RX ORDER — ONDANSETRON 2 MG/ML
INJECTION INTRAMUSCULAR; INTRAVENOUS AS NEEDED
Status: DISCONTINUED | OUTPATIENT
Start: 2019-01-01 | End: 2019-01-01 | Stop reason: HOSPADM

## 2019-01-01 RX ORDER — HYDROCORTISONE SODIUM SUCCINATE 100 MG/2ML
100 INJECTION, POWDER, FOR SOLUTION INTRAMUSCULAR; INTRAVENOUS EVERY 8 HOURS
Status: DISCONTINUED | OUTPATIENT
Start: 2019-01-01 | End: 2019-01-01

## 2019-01-01 RX ORDER — MIDAZOLAM HYDROCHLORIDE 1 MG/ML
.25-5 INJECTION, SOLUTION INTRAMUSCULAR; INTRAVENOUS
Status: DISCONTINUED | OUTPATIENT
Start: 2019-01-01 | End: 2019-01-01 | Stop reason: HOSPADM

## 2019-01-01 RX ORDER — CHLORHEXIDINE GLUCONATE 0.12 MG/ML
15 RINSE ORAL EVERY 12 HOURS
Status: DISCONTINUED | OUTPATIENT
Start: 2019-01-01 | End: 2019-01-01

## 2019-01-01 RX ORDER — ACETAMINOPHEN 325 MG/1
650 TABLET ORAL ONCE
Status: COMPLETED | OUTPATIENT
Start: 2019-01-01 | End: 2019-01-01

## 2019-01-01 RX ORDER — SCOLOPAMINE TRANSDERMAL SYSTEM 1 MG/1
1 PATCH, EXTENDED RELEASE TRANSDERMAL
Status: DISCONTINUED | OUTPATIENT
Start: 2019-01-01 | End: 2019-11-14 | Stop reason: HOSPADM

## 2019-01-01 RX ORDER — HYDROMORPHONE HYDROCHLORIDE 1 MG/ML
2 INJECTION, SOLUTION INTRAMUSCULAR; INTRAVENOUS; SUBCUTANEOUS
Status: DISCONTINUED | OUTPATIENT
Start: 2019-01-01 | End: 2019-11-14 | Stop reason: HOSPADM

## 2019-01-01 RX ORDER — SODIUM CHLORIDE 0.9 % (FLUSH) 0.9 %
5-40 SYRINGE (ML) INJECTION AS NEEDED
Status: CANCELLED | OUTPATIENT
Start: 2019-01-01

## 2019-01-01 RX ORDER — DEXTROSE MONOHYDRATE 50 MG/ML
75 INJECTION, SOLUTION INTRAVENOUS CONTINUOUS
Status: DISCONTINUED | OUTPATIENT
Start: 2019-01-01 | End: 2019-01-01

## 2019-01-01 RX ORDER — PHENYLEPHRINE HCL IN 0.9% NACL 0.4MG/10ML
SYRINGE (ML) INTRAVENOUS AS NEEDED
Status: DISCONTINUED | OUTPATIENT
Start: 2019-01-01 | End: 2019-01-01 | Stop reason: HOSPADM

## 2019-01-01 RX ORDER — MIRTAZAPINE 15 MG/1
15 TABLET, FILM COATED ORAL
Qty: 30 TAB | Refills: 2 | Status: SHIPPED | OUTPATIENT
Start: 2019-01-01

## 2019-01-01 RX ORDER — FUROSEMIDE 10 MG/ML
40 INJECTION INTRAMUSCULAR; INTRAVENOUS DAILY
Status: DISCONTINUED | OUTPATIENT
Start: 2019-01-01 | End: 2019-01-01

## 2019-01-01 RX ORDER — VANCOMYCIN 1.75 GRAM/500 ML IN 0.9 % SODIUM CHLORIDE INTRAVENOUS
1750 ONCE
Status: COMPLETED | OUTPATIENT
Start: 2019-01-01 | End: 2019-01-01

## 2019-01-01 RX ORDER — SODIUM BICARBONATE 1 MEQ/ML
SYRINGE (ML) INTRAVENOUS AS NEEDED
Status: DISCONTINUED | OUTPATIENT
Start: 2019-01-01 | End: 2019-01-01 | Stop reason: HOSPADM

## 2019-01-01 RX ORDER — IPRATROPIUM BROMIDE AND ALBUTEROL SULFATE 2.5; .5 MG/3ML; MG/3ML
3 SOLUTION RESPIRATORY (INHALATION)
Status: DISCONTINUED | OUTPATIENT
Start: 2019-01-01 | End: 2019-01-01 | Stop reason: HOSPADM

## 2019-01-01 RX ORDER — CEFAZOLIN SODIUM/WATER 2 G/20 ML
2 SYRINGE (ML) INTRAVENOUS
Status: COMPLETED | OUTPATIENT
Start: 2019-01-01 | End: 2019-01-01

## 2019-01-01 RX ORDER — SODIUM CHLORIDE 0.9 % (FLUSH) 0.9 %
5-40 SYRINGE (ML) INJECTION EVERY 8 HOURS
Status: CANCELLED | OUTPATIENT
Start: 2019-01-01

## 2019-01-01 RX ORDER — POTASSIUM CHLORIDE 29.8 MG/ML
20 INJECTION INTRAVENOUS ONCE
Status: COMPLETED | OUTPATIENT
Start: 2019-01-01 | End: 2019-01-01

## 2019-01-01 RX ORDER — ALBUMIN HUMAN 250 G/1000ML
12.5 SOLUTION INTRAVENOUS EVERY 6 HOURS
Status: COMPLETED | OUTPATIENT
Start: 2019-01-01 | End: 2019-01-01

## 2019-01-01 RX ORDER — SODIUM CHLORIDE 0.9 % (FLUSH) 0.9 %
SYRINGE (ML) INJECTION
Status: DISCONTINUED
Start: 2019-01-01 | End: 2019-01-01 | Stop reason: HOSPADM

## 2019-01-01 RX ORDER — GLYCOPYRROLATE 0.2 MG/ML
0.2 INJECTION INTRAMUSCULAR; INTRAVENOUS
Status: DISCONTINUED | OUTPATIENT
Start: 2019-01-01 | End: 2019-11-14 | Stop reason: HOSPADM

## 2019-01-01 RX ORDER — SODIUM CHLORIDE 0.9 % (FLUSH) 0.9 %
5-40 SYRINGE (ML) INJECTION AS NEEDED
Status: DISCONTINUED | OUTPATIENT
Start: 2019-01-01 | End: 2019-01-01

## 2019-01-01 RX ORDER — EPHEDRINE SULFATE/0.9% NACL/PF 50 MG/5 ML
SYRINGE (ML) INTRAVENOUS AS NEEDED
Status: DISCONTINUED | OUTPATIENT
Start: 2019-01-01 | End: 2019-01-01 | Stop reason: HOSPADM

## 2019-01-01 RX ORDER — SODIUM CHLORIDE 9 MG/ML
25 INJECTION, SOLUTION INTRAVENOUS CONTINUOUS
Status: DISCONTINUED | OUTPATIENT
Start: 2019-01-01 | End: 2019-01-01 | Stop reason: HOSPADM

## 2019-01-01 RX ORDER — BUMETANIDE 0.25 MG/ML
1 INJECTION INTRAMUSCULAR; INTRAVENOUS EVERY 12 HOURS
Status: COMPLETED | OUTPATIENT
Start: 2019-01-01 | End: 2019-01-01

## 2019-01-01 RX ORDER — SODIUM CHLORIDE, SODIUM LACTATE, POTASSIUM CHLORIDE, CALCIUM CHLORIDE 600; 310; 30; 20 MG/100ML; MG/100ML; MG/100ML; MG/100ML
125 INJECTION, SOLUTION INTRAVENOUS CONTINUOUS
Status: CANCELLED | OUTPATIENT
Start: 2019-01-01

## 2019-01-01 RX ORDER — LIDOCAINE HYDROCHLORIDE AND EPINEPHRINE 10; 10 MG/ML; UG/ML
1.5 INJECTION, SOLUTION INFILTRATION; PERINEURAL
Status: COMPLETED | OUTPATIENT
Start: 2019-01-01 | End: 2019-01-01

## 2019-01-01 RX ORDER — NALOXONE HYDROCHLORIDE 0.4 MG/ML
0.4 INJECTION, SOLUTION INTRAMUSCULAR; INTRAVENOUS; SUBCUTANEOUS AS NEEDED
Status: DISCONTINUED | OUTPATIENT
Start: 2019-01-01 | End: 2019-01-01

## 2019-01-01 RX ORDER — CHOLECALCIFEROL (VITAMIN D3) 125 MCG
5 CAPSULE ORAL
Qty: 30 TAB | Refills: 0 | OUTPATIENT
Start: 2019-01-01

## 2019-01-01 RX ORDER — FENTANYL CITRATE 50 UG/ML
25 INJECTION, SOLUTION INTRAMUSCULAR; INTRAVENOUS
Status: DISCONTINUED | OUTPATIENT
Start: 2019-01-01 | End: 2019-01-01

## 2019-01-01 RX ORDER — DEXMEDETOMIDINE HYDROCHLORIDE 100 UG/ML
INJECTION, SOLUTION INTRAVENOUS AS NEEDED
Status: DISCONTINUED | OUTPATIENT
Start: 2019-01-01 | End: 2019-01-01 | Stop reason: HOSPADM

## 2019-01-01 RX ORDER — SODIUM CHLORIDE, SODIUM LACTATE, POTASSIUM CHLORIDE, CALCIUM CHLORIDE 600; 310; 30; 20 MG/100ML; MG/100ML; MG/100ML; MG/100ML
125 INJECTION, SOLUTION INTRAVENOUS CONTINUOUS
Status: DISCONTINUED | OUTPATIENT
Start: 2019-01-01 | End: 2019-01-01

## 2019-01-01 RX ORDER — LORAZEPAM 2 MG/ML
1 INJECTION INTRAMUSCULAR
Status: DISCONTINUED | OUTPATIENT
Start: 2019-01-01 | End: 2019-01-01 | Stop reason: SDUPTHER

## 2019-01-01 RX ORDER — ONDANSETRON 2 MG/ML
4 INJECTION INTRAMUSCULAR; INTRAVENOUS AS NEEDED
Status: CANCELLED | OUTPATIENT
Start: 2019-01-01

## 2019-01-01 RX ORDER — MIRTAZAPINE 15 MG/1
15 TABLET, FILM COATED ORAL
Qty: 30 TAB | Refills: 0 | OUTPATIENT
Start: 2019-01-01

## 2019-01-01 RX ORDER — MORPHINE SULFATE 10 MG/ML
2 INJECTION, SOLUTION INTRAMUSCULAR; INTRAVENOUS
Status: CANCELLED | OUTPATIENT
Start: 2019-01-01

## 2019-01-01 RX ORDER — GLYCOPYRROLATE 0.2 MG/ML
0.2 INJECTION INTRAMUSCULAR; INTRAVENOUS 3 TIMES DAILY
Status: DISCONTINUED | OUTPATIENT
Start: 2019-01-01 | End: 2019-11-14 | Stop reason: HOSPADM

## 2019-01-01 RX ORDER — HYDROMORPHONE HYDROCHLORIDE 1 MG/ML
0.2 INJECTION, SOLUTION INTRAMUSCULAR; INTRAVENOUS; SUBCUTANEOUS
Status: CANCELLED | OUTPATIENT
Start: 2019-01-01

## 2019-01-01 RX ORDER — FENTANYL CITRATE 50 UG/ML
25-50 INJECTION, SOLUTION INTRAMUSCULAR; INTRAVENOUS
Status: DISCONTINUED | OUTPATIENT
Start: 2019-01-01 | End: 2019-11-14 | Stop reason: HOSPADM

## 2019-01-01 RX ORDER — NOREPINEPHRINE BITARTRATE/D5W 8 MG/250ML
.5-5 PLASTIC BAG, INJECTION (ML) INTRAVENOUS
Status: DISCONTINUED | OUTPATIENT
Start: 2019-01-01 | End: 2019-01-01

## 2019-01-01 RX ORDER — ONDANSETRON 2 MG/ML
4 INJECTION INTRAMUSCULAR; INTRAVENOUS
Status: DISCONTINUED | OUTPATIENT
Start: 2019-01-01 | End: 2019-01-01

## 2019-01-01 RX ADMIN — PROPOFOL 170 MG: 10 INJECTION, EMULSION INTRAVENOUS at 13:33

## 2019-01-01 RX ADMIN — CHLORHEXIDINE GLUCONATE 15 ML: 0.12 RINSE ORAL at 20:53

## 2019-01-01 RX ADMIN — PIPERACILLIN AND TAZOBACTAM 3.38 G: 3; .375 INJECTION, POWDER, FOR SOLUTION INTRAVENOUS at 18:00

## 2019-01-01 RX ADMIN — Medication 9 MCG/MIN: at 17:25

## 2019-01-01 RX ADMIN — PHENYLEPHRINE HYDROCHLORIDE 295 MCG/MIN: 10 INJECTION INTRAVENOUS at 01:00

## 2019-01-01 RX ADMIN — ALBUTEROL SULFATE 2.5 MG: 2.5 SOLUTION RESPIRATORY (INHALATION) at 04:30

## 2019-01-01 RX ADMIN — Medication 10 ML: at 06:24

## 2019-01-01 RX ADMIN — SODIUM CHLORIDE: 9 INJECTION, SOLUTION INTRAVENOUS at 12:36

## 2019-01-01 RX ADMIN — PHENYLEPHRINE HYDROCHLORIDE 80 MCG: 10 INJECTION INTRAVENOUS at 13:35

## 2019-01-01 RX ADMIN — HYDROMORPHONE HYDROCHLORIDE 1 MG: 1 INJECTION, SOLUTION INTRAMUSCULAR; INTRAVENOUS; SUBCUTANEOUS at 23:19

## 2019-01-01 RX ADMIN — INSULIN LISPRO 2 UNITS: 100 INJECTION, SOLUTION INTRAVENOUS; SUBCUTANEOUS at 07:22

## 2019-01-01 RX ADMIN — VANCOMYCIN HYDROCHLORIDE 1750 MG: 10 INJECTION, POWDER, LYOPHILIZED, FOR SOLUTION INTRAVENOUS at 15:07

## 2019-01-01 RX ADMIN — CHLORHEXIDINE GLUCONATE 15 ML: 0.12 RINSE ORAL at 15:22

## 2019-01-01 RX ADMIN — INSULIN LISPRO 5 UNITS: 100 INJECTION, SOLUTION INTRAVENOUS; SUBCUTANEOUS at 18:32

## 2019-01-01 RX ADMIN — Medication 50 MCG/HR: at 11:53

## 2019-01-01 RX ADMIN — FENTANYL CITRATE 50 MCG: 50 INJECTION, SOLUTION INTRAMUSCULAR; INTRAVENOUS at 13:33

## 2019-01-01 RX ADMIN — PIPERACILLIN AND TAZOBACTAM 3.38 G: 3; .375 INJECTION, POWDER, FOR SOLUTION INTRAVENOUS at 03:35

## 2019-01-01 RX ADMIN — ONDANSETRON HYDROCHLORIDE 4 MG: 2 INJECTION, SOLUTION INTRAMUSCULAR; INTRAVENOUS at 13:18

## 2019-01-01 RX ADMIN — Medication 10 ML: at 21:35

## 2019-01-01 RX ADMIN — Medication 10 ML: at 05:52

## 2019-01-01 RX ADMIN — IPRATROPIUM BROMIDE AND ALBUTEROL SULFATE 3 ML: .5; 3 SOLUTION RESPIRATORY (INHALATION) at 20:35

## 2019-01-01 RX ADMIN — PIPERACILLIN AND TAZOBACTAM 3.38 G: 3; .375 INJECTION, POWDER, FOR SOLUTION INTRAVENOUS at 18:19

## 2019-01-01 RX ADMIN — PROPOFOL 40 MCG/KG/MIN: 10 INJECTION, EMULSION INTRAVENOUS at 10:15

## 2019-01-01 RX ADMIN — ALBUMIN (HUMAN) 12.5 G: 0.25 INJECTION, SOLUTION INTRAVENOUS at 06:38

## 2019-01-01 RX ADMIN — PHENYLEPHRINE HYDROCHLORIDE 265 MCG/MIN: 10 INJECTION INTRAVENOUS at 23:01

## 2019-01-01 RX ADMIN — MIRTAZAPINE 15 MG: 15 TABLET, FILM COATED ORAL at 21:47

## 2019-01-01 RX ADMIN — CEFEPIME HYDROCHLORIDE 2 G: 2 INJECTION, POWDER, FOR SOLUTION INTRAVENOUS at 18:59

## 2019-01-01 RX ADMIN — HYDROCORTISONE SODIUM SUCCINATE 50 MG: 100 INJECTION, POWDER, FOR SOLUTION INTRAMUSCULAR; INTRAVENOUS at 13:37

## 2019-01-01 RX ADMIN — SUCCINYLCHOLINE CHLORIDE 20 MG: 20 INJECTION, SOLUTION INTRAMUSCULAR; INTRAVENOUS at 14:08

## 2019-01-01 RX ADMIN — HYDROMORPHONE HYDROCHLORIDE 1 MG: 1 INJECTION, SOLUTION INTRAMUSCULAR; INTRAVENOUS; SUBCUTANEOUS at 15:15

## 2019-01-01 RX ADMIN — METOPROLOL TARTRATE 25 MG: 25 TABLET ORAL at 20:28

## 2019-01-01 RX ADMIN — Medication 20 ML: at 13:34

## 2019-01-01 RX ADMIN — Medication 10 ML: at 16:00

## 2019-01-01 RX ADMIN — METHYLPREDNISOLONE SODIUM SUCCINATE 40 MG: 40 INJECTION, POWDER, FOR SOLUTION INTRAMUSCULAR; INTRAVENOUS at 12:23

## 2019-01-01 RX ADMIN — ASPIRIN 81 MG 81 MG: 81 TABLET ORAL at 08:53

## 2019-01-01 RX ADMIN — FLUCONAZOLE 200 MG: 200 INJECTION, SOLUTION INTRAVENOUS at 11:06

## 2019-01-01 RX ADMIN — IPRATROPIUM BROMIDE AND ALBUTEROL SULFATE 3 ML: .5; 3 SOLUTION RESPIRATORY (INHALATION) at 00:03

## 2019-01-01 RX ADMIN — EPINEPHRINE 5.5 MCG/MIN: 1 INJECTION PARENTERAL at 20:00

## 2019-01-01 RX ADMIN — PIPERACILLIN AND TAZOBACTAM 3.38 G: 3; .375 INJECTION, POWDER, FOR SOLUTION INTRAVENOUS at 11:02

## 2019-01-01 RX ADMIN — INSULIN LISPRO 2 UNITS: 100 INJECTION, SOLUTION INTRAVENOUS; SUBCUTANEOUS at 01:30

## 2019-01-01 RX ADMIN — Medication 10 ML: at 09:41

## 2019-01-01 RX ADMIN — PROPOFOL 20 MG: 10 INJECTION, EMULSION INTRAVENOUS at 12:54

## 2019-01-01 RX ADMIN — FAMOTIDINE 20 MG: 10 INJECTION, SOLUTION INTRAVENOUS at 18:51

## 2019-01-01 RX ADMIN — FENTANYL CITRATE 25 MCG: 50 INJECTION INTRAMUSCULAR; INTRAVENOUS at 17:23

## 2019-01-01 RX ADMIN — ASPIRIN 81 MG 81 MG: 81 TABLET ORAL at 20:28

## 2019-01-01 RX ADMIN — Medication 22 MCG/MIN: at 20:32

## 2019-01-01 RX ADMIN — INSULIN LISPRO 5 UNITS: 100 INJECTION, SOLUTION INTRAVENOUS; SUBCUTANEOUS at 06:37

## 2019-01-01 RX ADMIN — FENTANYL CITRATE 50 MCG: 50 INJECTION INTRAMUSCULAR; INTRAVENOUS at 09:15

## 2019-01-01 RX ADMIN — Medication 80 MCG: at 13:56

## 2019-01-01 RX ADMIN — FENTANYL CITRATE 25 MCG: 50 INJECTION, SOLUTION INTRAMUSCULAR; INTRAVENOUS at 06:24

## 2019-01-01 RX ADMIN — Medication 10 ML: at 21:09

## 2019-01-01 RX ADMIN — Medication 10 MCG/MIN: at 09:05

## 2019-01-01 RX ADMIN — FENTANYL CITRATE 25 MCG: 50 INJECTION, SOLUTION INTRAMUSCULAR; INTRAVENOUS at 03:31

## 2019-01-01 RX ADMIN — IPRATROPIUM BROMIDE AND ALBUTEROL SULFATE 3 ML: .5; 3 SOLUTION RESPIRATORY (INHALATION) at 19:37

## 2019-01-01 RX ADMIN — INSULIN LISPRO 5 UNITS: 100 INJECTION, SOLUTION INTRAVENOUS; SUBCUTANEOUS at 00:37

## 2019-01-01 RX ADMIN — SUCCINYLCHOLINE CHLORIDE 120 MG: 20 INJECTION, SOLUTION INTRAMUSCULAR; INTRAVENOUS at 12:10

## 2019-01-01 RX ADMIN — FAMOTIDINE 20 MG: 10 INJECTION, SOLUTION INTRAVENOUS at 18:33

## 2019-01-01 RX ADMIN — FENTANYL CITRATE 25 MCG: 50 INJECTION, SOLUTION INTRAMUSCULAR; INTRAVENOUS at 18:36

## 2019-01-01 RX ADMIN — IPRATROPIUM BROMIDE AND ALBUTEROL SULFATE 3 ML: .5; 3 SOLUTION RESPIRATORY (INHALATION) at 08:09

## 2019-01-01 RX ADMIN — Medication 10 ML: at 21:56

## 2019-01-01 RX ADMIN — HYDROCORTISONE SODIUM SUCCINATE 50 MG: 100 INJECTION, POWDER, FOR SOLUTION INTRAMUSCULAR; INTRAVENOUS at 07:30

## 2019-01-01 RX ADMIN — SODIUM CHLORIDE 100 ML: 900 INJECTION, SOLUTION INTRAVENOUS at 21:09

## 2019-01-01 RX ADMIN — METHYLPREDNISOLONE SODIUM SUCCINATE 40 MG: 40 INJECTION, POWDER, FOR SOLUTION INTRAMUSCULAR; INTRAVENOUS at 17:16

## 2019-01-01 RX ADMIN — FENTANYL CITRATE 25 MCG: 50 INJECTION, SOLUTION INTRAMUSCULAR; INTRAVENOUS at 21:02

## 2019-01-01 RX ADMIN — FENTANYL CITRATE 25 MCG: 50 INJECTION INTRAMUSCULAR; INTRAVENOUS at 11:37

## 2019-01-01 RX ADMIN — PIPERACILLIN AND TAZOBACTAM 3.38 G: 3; .375 INJECTION, POWDER, FOR SOLUTION INTRAVENOUS at 18:50

## 2019-01-01 RX ADMIN — METHYLPREDNISOLONE SODIUM SUCCINATE 40 MG: 40 INJECTION, POWDER, FOR SOLUTION INTRAMUSCULAR; INTRAVENOUS at 21:18

## 2019-01-01 RX ADMIN — POTASSIUM CHLORIDE 20 MEQ: 400 INJECTION, SOLUTION INTRAVENOUS at 09:25

## 2019-01-01 RX ADMIN — INSULIN LISPRO 2 UNITS: 100 INJECTION, SOLUTION INTRAVENOUS; SUBCUTANEOUS at 11:28

## 2019-01-01 RX ADMIN — SODIUM CHLORIDE 75 ML/HR: 450 INJECTION, SOLUTION INTRAVENOUS at 19:52

## 2019-01-01 RX ADMIN — CHLORHEXIDINE GLUCONATE 15 ML: 0.12 RINSE ORAL at 09:22

## 2019-01-01 RX ADMIN — VANCOMYCIN HYDROCHLORIDE 1000 MG: 1 INJECTION, POWDER, LYOPHILIZED, FOR SOLUTION INTRAVENOUS at 12:15

## 2019-01-01 RX ADMIN — INSULIN LISPRO 3 UNITS: 100 INJECTION, SOLUTION INTRAVENOUS; SUBCUTANEOUS at 18:50

## 2019-01-01 RX ADMIN — MIRTAZAPINE 15 MG: 15 TABLET, FILM COATED ORAL at 21:18

## 2019-01-01 RX ADMIN — PROPOFOL 40 MCG/KG/MIN: 10 INJECTION, EMULSION INTRAVENOUS at 11:57

## 2019-01-01 RX ADMIN — SODIUM CHLORIDE, SODIUM LACTATE, POTASSIUM CHLORIDE, AND CALCIUM CHLORIDE 75 ML/HR: 600; 310; 30; 20 INJECTION, SOLUTION INTRAVENOUS at 17:58

## 2019-01-01 RX ADMIN — Medication 10 ML: at 07:04

## 2019-01-01 RX ADMIN — Medication 80 MCG: at 13:31

## 2019-01-01 RX ADMIN — Medication 6 MCG/MIN: at 17:08

## 2019-01-01 RX ADMIN — DEXMEDETOMIDINE HYDROCHLORIDE 8 MCG: 100 INJECTION, SOLUTION, CONCENTRATE INTRAVENOUS at 13:08

## 2019-01-01 RX ADMIN — IPRATROPIUM BROMIDE AND ALBUTEROL SULFATE 3 ML: .5; 3 SOLUTION RESPIRATORY (INHALATION) at 19:43

## 2019-01-01 RX ADMIN — METHYLPREDNISOLONE SODIUM SUCCINATE 40 MG: 40 INJECTION, POWDER, FOR SOLUTION INTRAMUSCULAR; INTRAVENOUS at 20:48

## 2019-01-01 RX ADMIN — IPRATROPIUM BROMIDE AND ALBUTEROL SULFATE 3 ML: .5; 3 SOLUTION RESPIRATORY (INHALATION) at 05:16

## 2019-01-01 RX ADMIN — PHENYLEPHRINE HYDROCHLORIDE 60 MCG/MIN: 10 INJECTION INTRAVENOUS at 13:37

## 2019-01-01 RX ADMIN — IPRATROPIUM BROMIDE AND ALBUTEROL SULFATE 3 ML: .5; 3 SOLUTION RESPIRATORY (INHALATION) at 16:22

## 2019-01-01 RX ADMIN — GLYCOPYRROLATE 0.2 MG: 0.2 INJECTION, SOLUTION INTRAMUSCULAR; INTRAVENOUS at 16:00

## 2019-01-01 RX ADMIN — IPRATROPIUM BROMIDE AND ALBUTEROL SULFATE 3 ML: .5; 3 SOLUTION RESPIRATORY (INHALATION) at 16:27

## 2019-01-01 RX ADMIN — IPRATROPIUM BROMIDE AND ALBUTEROL SULFATE 3 ML: .5; 3 SOLUTION RESPIRATORY (INHALATION) at 15:33

## 2019-01-01 RX ADMIN — PROPOFOL 30 MG: 10 INJECTION, EMULSION INTRAVENOUS at 12:48

## 2019-01-01 RX ADMIN — EPINEPHRINE 4 MCG/MIN: 1 INJECTION PARENTERAL at 17:30

## 2019-01-01 RX ADMIN — METHYLPREDNISOLONE SODIUM SUCCINATE 40 MG: 40 INJECTION, POWDER, FOR SOLUTION INTRAMUSCULAR; INTRAVENOUS at 08:52

## 2019-01-01 RX ADMIN — FENTANYL CITRATE 50 MCG: 50 INJECTION INTRAMUSCULAR; INTRAVENOUS at 19:07

## 2019-01-01 RX ADMIN — LIDOCAINE HYDROCHLORIDE AND EPINEPHRINE 15 MG: 10; 10 INJECTION, SOLUTION INFILTRATION; PERINEURAL at 11:11

## 2019-01-01 RX ADMIN — FAMOTIDINE 20 MG: 10 INJECTION, SOLUTION INTRAVENOUS at 18:00

## 2019-01-01 RX ADMIN — METHYLPREDNISOLONE SODIUM SUCCINATE 40 MG: 40 INJECTION, POWDER, FOR SOLUTION INTRAMUSCULAR; INTRAVENOUS at 08:53

## 2019-01-01 RX ADMIN — PIPERACILLIN AND TAZOBACTAM 3.38 G: 3; .375 INJECTION, POWDER, FOR SOLUTION INTRAVENOUS at 03:31

## 2019-01-01 RX ADMIN — Medication 30 ML: at 15:20

## 2019-01-01 RX ADMIN — HYDROCORTISONE SODIUM SUCCINATE 50 MG: 100 INJECTION, POWDER, FOR SOLUTION INTRAMUSCULAR; INTRAVENOUS at 13:24

## 2019-01-01 RX ADMIN — PROPOFOL 45 MCG/KG/MIN: 10 INJECTION, EMULSION INTRAVENOUS at 20:37

## 2019-01-01 RX ADMIN — INSULIN LISPRO 2 UNITS: 100 INJECTION, SOLUTION INTRAVENOUS; SUBCUTANEOUS at 12:51

## 2019-01-01 RX ADMIN — INSULIN LISPRO 2 UNITS: 100 INJECTION, SOLUTION INTRAVENOUS; SUBCUTANEOUS at 00:18

## 2019-01-01 RX ADMIN — PROPOFOL 40 MCG/KG/MIN: 10 INJECTION, EMULSION INTRAVENOUS at 01:58

## 2019-01-01 RX ADMIN — BUMETANIDE 1 MG: 0.25 INJECTION INTRAMUSCULAR; INTRAVENOUS at 21:23

## 2019-01-01 RX ADMIN — Medication 10 ML: at 00:44

## 2019-01-01 RX ADMIN — METHYLPREDNISOLONE SODIUM SUCCINATE 40 MG: 40 INJECTION, POWDER, FOR SOLUTION INTRAMUSCULAR; INTRAVENOUS at 18:00

## 2019-01-01 RX ADMIN — METHYLPREDNISOLONE SODIUM SUCCINATE 40 MG: 40 INJECTION, POWDER, FOR SOLUTION INTRAMUSCULAR; INTRAVENOUS at 21:01

## 2019-01-01 RX ADMIN — FLUCONAZOLE 200 MG: 200 INJECTION, SOLUTION INTRAVENOUS at 11:02

## 2019-01-01 RX ADMIN — FENTANYL CITRATE 50 MCG: 50 INJECTION INTRAMUSCULAR; INTRAVENOUS at 21:23

## 2019-01-01 RX ADMIN — FENTANYL CITRATE 50 MCG: 50 INJECTION, SOLUTION INTRAMUSCULAR; INTRAVENOUS at 11:21

## 2019-01-01 RX ADMIN — FENTANYL CITRATE 25 MCG: 50 INJECTION, SOLUTION INTRAMUSCULAR; INTRAVENOUS at 15:09

## 2019-01-01 RX ADMIN — EPINEPHRINE 6 MCG/MIN: 1 INJECTION PARENTERAL at 09:19

## 2019-01-01 RX ADMIN — PROPOFOL 30 MCG/KG/MIN: 10 INJECTION, EMULSION INTRAVENOUS at 15:45

## 2019-01-01 RX ADMIN — Medication 9 MCG/MIN: at 11:50

## 2019-01-01 RX ADMIN — Medication 10 ML: at 16:44

## 2019-01-01 RX ADMIN — Medication 80 MCG: at 12:09

## 2019-01-01 RX ADMIN — PIPERACILLIN AND TAZOBACTAM 3.38 G: 3; .375 INJECTION, POWDER, FOR SOLUTION INTRAVENOUS at 03:30

## 2019-01-01 RX ADMIN — Medication 30 ML: at 22:00

## 2019-01-01 RX ADMIN — MIRTAZAPINE 15 MG: 15 TABLET, FILM COATED ORAL at 21:44

## 2019-01-01 RX ADMIN — FENTANYL CITRATE 50 MCG: 50 INJECTION INTRAMUSCULAR; INTRAVENOUS at 03:51

## 2019-01-01 RX ADMIN — METHYLPREDNISOLONE SODIUM SUCCINATE 40 MG: 40 INJECTION, POWDER, FOR SOLUTION INTRAMUSCULAR; INTRAVENOUS at 06:32

## 2019-01-01 RX ADMIN — HYDROCORTISONE SODIUM SUCCINATE 50 MG: 100 INJECTION, POWDER, FOR SOLUTION INTRAMUSCULAR; INTRAVENOUS at 00:20

## 2019-01-01 RX ADMIN — POTASSIUM BICARBONATE 20 MEQ: 782 TABLET, EFFERVESCENT ORAL at 11:48

## 2019-01-01 RX ADMIN — EPINEPHRINE 1 MCG/MIN: 1 INJECTION PARENTERAL at 20:46

## 2019-01-01 RX ADMIN — INSULIN LISPRO 5 UNITS: 100 INJECTION, SOLUTION INTRAVENOUS; SUBCUTANEOUS at 01:33

## 2019-01-01 RX ADMIN — Medication 10 ML: at 21:03

## 2019-01-01 RX ADMIN — MIRTAZAPINE 15 MG: 15 TABLET, FILM COATED ORAL at 21:01

## 2019-01-01 RX ADMIN — VANCOMYCIN HYDROCHLORIDE 1000 MG: 1 INJECTION, POWDER, LYOPHILIZED, FOR SOLUTION INTRAVENOUS at 00:11

## 2019-01-01 RX ADMIN — Medication 7 MCG/MIN: at 04:33

## 2019-01-01 RX ADMIN — PHENYLEPHRINE HYDROCHLORIDE 80 MCG/MIN: 10 INJECTION INTRAVENOUS at 16:24

## 2019-01-01 RX ADMIN — PIPERACILLIN AND TAZOBACTAM 3.38 G: 3; .375 INJECTION, POWDER, FOR SOLUTION INTRAVENOUS at 10:02

## 2019-01-01 RX ADMIN — FUROSEMIDE 40 MG: 10 INJECTION, SOLUTION INTRAMUSCULAR; INTRAVENOUS at 08:44

## 2019-01-01 RX ADMIN — VANCOMYCIN HYDROCHLORIDE 1000 MG: 1 INJECTION, POWDER, LYOPHILIZED, FOR SOLUTION INTRAVENOUS at 12:52

## 2019-01-01 RX ADMIN — METHYLPREDNISOLONE SODIUM SUCCINATE 40 MG: 40 INJECTION, POWDER, FOR SOLUTION INTRAMUSCULAR; INTRAVENOUS at 08:56

## 2019-01-01 RX ADMIN — PROPOFOL 40 MCG/KG/MIN: 10 INJECTION, EMULSION INTRAVENOUS at 11:56

## 2019-01-01 RX ADMIN — SUCCINYLCHOLINE CHLORIDE 140 MG: 20 INJECTION, SOLUTION INTRAMUSCULAR; INTRAVENOUS at 13:33

## 2019-01-01 RX ADMIN — CHLORHEXIDINE GLUCONATE 15 ML: 0.12 RINSE ORAL at 08:12

## 2019-01-01 RX ADMIN — Medication 10 ML: at 17:17

## 2019-01-01 RX ADMIN — PIPERACILLIN AND TAZOBACTAM 3.38 G: 3; .375 INJECTION, POWDER, FOR SOLUTION INTRAVENOUS at 12:05

## 2019-01-01 RX ADMIN — PROPOFOL 30 MCG/KG/MIN: 10 INJECTION, EMULSION INTRAVENOUS at 16:16

## 2019-01-01 RX ADMIN — Medication 2 G: at 12:52

## 2019-01-01 RX ADMIN — EPINEPHRINE 1 MG: 1 INJECTION INTRAMUSCULAR; INTRAVENOUS; SUBCUTANEOUS at 13:45

## 2019-01-01 RX ADMIN — IPRATROPIUM BROMIDE AND ALBUTEROL SULFATE 3 ML: .5; 3 SOLUTION RESPIRATORY (INHALATION) at 16:24

## 2019-01-01 RX ADMIN — Medication 10 ML: at 13:40

## 2019-01-01 RX ADMIN — METOPROLOL TARTRATE 25 MG: 25 TABLET ORAL at 21:46

## 2019-01-01 RX ADMIN — IPRATROPIUM BROMIDE AND ALBUTEROL SULFATE 3 ML: .5; 3 SOLUTION RESPIRATORY (INHALATION) at 12:45

## 2019-01-01 RX ADMIN — SODIUM CHLORIDE, SODIUM LACTATE, POTASSIUM CHLORIDE, AND CALCIUM CHLORIDE 125 ML/HR: 600; 310; 30; 20 INJECTION, SOLUTION INTRAVENOUS at 10:49

## 2019-01-01 RX ADMIN — HYDROCODONE BITARTRATE AND ACETAMINOPHEN 1 TABLET: 5; 325 TABLET ORAL at 17:43

## 2019-01-01 RX ADMIN — HYDROCORTISONE SODIUM SUCCINATE 100 MG: 100 INJECTION, POWDER, FOR SOLUTION INTRAMUSCULAR; INTRAVENOUS at 12:00

## 2019-01-01 RX ADMIN — PHENYLEPHRINE HYDROCHLORIDE 100 MCG/MIN: 10 INJECTION INTRAVENOUS at 12:15

## 2019-01-01 RX ADMIN — FUROSEMIDE 40 MG: 10 INJECTION, SOLUTION INTRAMUSCULAR; INTRAVENOUS at 08:50

## 2019-01-01 RX ADMIN — INSULIN LISPRO 2 UNITS: 100 INJECTION, SOLUTION INTRAVENOUS; SUBCUTANEOUS at 13:34

## 2019-01-01 RX ADMIN — IPRATROPIUM BROMIDE AND ALBUTEROL SULFATE 3 ML: .5; 3 SOLUTION RESPIRATORY (INHALATION) at 19:39

## 2019-01-01 RX ADMIN — PHENYLEPHRINE HYDROCHLORIDE 285 MCG/MIN: 10 INJECTION INTRAVENOUS at 06:30

## 2019-01-01 RX ADMIN — Medication 200 MCG: at 12:14

## 2019-01-01 RX ADMIN — IPRATROPIUM BROMIDE AND ALBUTEROL SULFATE 3 ML: .5; 3 SOLUTION RESPIRATORY (INHALATION) at 07:13

## 2019-01-01 RX ADMIN — IPRATROPIUM BROMIDE AND ALBUTEROL SULFATE 3 ML: .5; 3 SOLUTION RESPIRATORY (INHALATION) at 04:36

## 2019-01-01 RX ADMIN — IPRATROPIUM BROMIDE AND ALBUTEROL SULFATE 3 ML: .5; 3 SOLUTION RESPIRATORY (INHALATION) at 20:56

## 2019-01-01 RX ADMIN — IOHEXOL 50 ML: 240 INJECTION, SOLUTION INTRATHECAL; INTRAVASCULAR; INTRAVENOUS; ORAL at 20:00

## 2019-01-01 RX ADMIN — HYDROMORPHONE HYDROCHLORIDE 0.5 MG: 2 TABLET ORAL at 18:20

## 2019-01-01 RX ADMIN — INSULIN LISPRO 2 UNITS: 100 INJECTION, SOLUTION INTRAVENOUS; SUBCUTANEOUS at 13:22

## 2019-01-01 RX ADMIN — HYDROMORPHONE HYDROCHLORIDE 0.5 MG: 2 TABLET ORAL at 11:56

## 2019-01-01 RX ADMIN — IPRATROPIUM BROMIDE AND ALBUTEROL SULFATE 3 ML: .5; 3 SOLUTION RESPIRATORY (INHALATION) at 03:05

## 2019-01-01 RX ADMIN — Medication 20 MG: at 13:31

## 2019-01-01 RX ADMIN — METHYLPREDNISOLONE SODIUM SUCCINATE 40 MG: 40 INJECTION, POWDER, FOR SOLUTION INTRAMUSCULAR; INTRAVENOUS at 03:44

## 2019-01-01 RX ADMIN — FLUCONAZOLE 200 MG: 200 INJECTION, SOLUTION INTRAVENOUS at 10:38

## 2019-01-01 RX ADMIN — PHENYLEPHRINE HYDROCHLORIDE 265 MCG/MIN: 10 INJECTION INTRAVENOUS at 21:04

## 2019-01-01 RX ADMIN — ONDANSETRON HYDROCHLORIDE 4 MG: 2 INJECTION, SOLUTION INTRAMUSCULAR; INTRAVENOUS at 13:53

## 2019-01-01 RX ADMIN — METOPROLOL TARTRATE 25 MG: 25 TABLET ORAL at 08:56

## 2019-01-01 RX ADMIN — ASPIRIN 81 MG 81 MG: 81 TABLET ORAL at 08:38

## 2019-01-01 RX ADMIN — Medication 10 ML: at 05:17

## 2019-01-01 RX ADMIN — PIPERACILLIN AND TAZOBACTAM 3.38 G: 3; .375 INJECTION, POWDER, FOR SOLUTION INTRAVENOUS at 02:31

## 2019-01-01 RX ADMIN — ALBUTEROL SULFATE 2.5 MG: 2.5 SOLUTION RESPIRATORY (INHALATION) at 09:54

## 2019-01-01 RX ADMIN — HYDROCORTISONE SODIUM SUCCINATE 25 MG: 100 INJECTION, POWDER, FOR SOLUTION INTRAMUSCULAR; INTRAVENOUS at 21:35

## 2019-01-01 RX ADMIN — INSULIN LISPRO 2 UNITS: 100 INJECTION, SOLUTION INTRAVENOUS; SUBCUTANEOUS at 18:04

## 2019-01-01 RX ADMIN — SODIUM CHLORIDE 75 ML/HR: 900 INJECTION, SOLUTION INTRAVENOUS at 10:47

## 2019-01-01 RX ADMIN — MORPHINE SULFATE 1 MG: 2 INJECTION, SOLUTION INTRAMUSCULAR; INTRAVENOUS at 09:36

## 2019-01-01 RX ADMIN — IPRATROPIUM BROMIDE AND ALBUTEROL SULFATE 3 ML: .5; 3 SOLUTION RESPIRATORY (INHALATION) at 07:57

## 2019-01-01 RX ADMIN — HYDROCORTISONE SODIUM SUCCINATE 100 MG: 100 INJECTION, POWDER, FOR SOLUTION INTRAMUSCULAR; INTRAVENOUS at 21:50

## 2019-01-01 RX ADMIN — LEVOFLOXACIN 750 MG: 5 INJECTION, SOLUTION INTRAVENOUS at 20:34

## 2019-01-01 RX ADMIN — INSULIN LISPRO 2 UNITS: 100 INJECTION, SOLUTION INTRAVENOUS; SUBCUTANEOUS at 07:19

## 2019-01-01 RX ADMIN — BUMETANIDE 1 MG: 0.25 INJECTION INTRAMUSCULAR; INTRAVENOUS at 09:30

## 2019-01-01 RX ADMIN — Medication 10 ML: at 06:32

## 2019-01-01 RX ADMIN — INSULIN LISPRO 5 UNITS: 100 INJECTION, SOLUTION INTRAVENOUS; SUBCUTANEOUS at 07:39

## 2019-01-01 RX ADMIN — CHLORHEXIDINE GLUCONATE 15 ML: 0.12 RINSE ORAL at 21:56

## 2019-01-01 RX ADMIN — METHYLPREDNISOLONE SODIUM SUCCINATE 40 MG: 40 INJECTION, POWDER, FOR SOLUTION INTRAMUSCULAR; INTRAVENOUS at 03:27

## 2019-01-01 RX ADMIN — PROPOFOL 120 MG: 10 INJECTION, EMULSION INTRAVENOUS at 12:09

## 2019-01-01 RX ADMIN — Medication 10 ML: at 21:18

## 2019-01-01 RX ADMIN — IPRATROPIUM BROMIDE AND ALBUTEROL SULFATE 3 ML: .5; 3 SOLUTION RESPIRATORY (INHALATION) at 00:36

## 2019-01-01 RX ADMIN — IPRATROPIUM BROMIDE AND ALBUTEROL SULFATE 3 ML: .5; 3 SOLUTION RESPIRATORY (INHALATION) at 03:36

## 2019-01-01 RX ADMIN — INSULIN LISPRO 2 UNITS: 100 INJECTION, SOLUTION INTRAVENOUS; SUBCUTANEOUS at 00:31

## 2019-01-01 RX ADMIN — Medication 10 ML: at 05:25

## 2019-01-01 RX ADMIN — LIDOCAINE HYDROCHLORIDE 50 MG: 20 INJECTION, SOLUTION EPIDURAL; INFILTRATION; INTRACAUDAL; PERINEURAL at 12:41

## 2019-01-01 RX ADMIN — LORAZEPAM 1 MG: 2 INJECTION, SOLUTION INTRAMUSCULAR; INTRAVENOUS at 06:45

## 2019-01-01 RX ADMIN — IPRATROPIUM BROMIDE AND ALBUTEROL SULFATE 3 ML: .5; 3 SOLUTION RESPIRATORY (INHALATION) at 12:39

## 2019-01-01 RX ADMIN — FAMOTIDINE 20 MG: 10 INJECTION, SOLUTION INTRAVENOUS at 18:23

## 2019-01-01 RX ADMIN — CHLORHEXIDINE GLUCONATE 15 ML: 0.12 RINSE ORAL at 10:38

## 2019-01-01 RX ADMIN — IPRATROPIUM BROMIDE AND ALBUTEROL SULFATE 3 ML: .5; 3 SOLUTION RESPIRATORY (INHALATION) at 11:20

## 2019-01-01 RX ADMIN — SODIUM CHLORIDE 75 ML/HR: 450 INJECTION, SOLUTION INTRAVENOUS at 17:44

## 2019-01-01 RX ADMIN — IPRATROPIUM BROMIDE AND ALBUTEROL SULFATE 3 ML: .5; 3 SOLUTION RESPIRATORY (INHALATION) at 16:29

## 2019-01-01 RX ADMIN — ALBUMIN (HUMAN) 12.5 G: 0.25 INJECTION, SOLUTION INTRAVENOUS at 11:50

## 2019-01-01 RX ADMIN — VANCOMYCIN HYDROCHLORIDE 1000 MG: 1 INJECTION, POWDER, LYOPHILIZED, FOR SOLUTION INTRAVENOUS at 13:38

## 2019-01-01 RX ADMIN — FAMOTIDINE 20 MG: 10 INJECTION, SOLUTION INTRAVENOUS at 18:21

## 2019-01-01 RX ADMIN — LORAZEPAM 1 MG: 2 INJECTION, SOLUTION INTRAMUSCULAR; INTRAVENOUS at 19:21

## 2019-01-01 RX ADMIN — IPRATROPIUM BROMIDE AND ALBUTEROL SULFATE 3 ML: .5; 3 SOLUTION RESPIRATORY (INHALATION) at 23:32

## 2019-01-01 RX ADMIN — IPRATROPIUM BROMIDE AND ALBUTEROL SULFATE 3 ML: .5; 3 SOLUTION RESPIRATORY (INHALATION) at 07:33

## 2019-01-01 RX ADMIN — HYDROCORTISONE SODIUM SUCCINATE 100 MG: 100 INJECTION, POWDER, FOR SOLUTION INTRAMUSCULAR; INTRAVENOUS at 06:39

## 2019-01-01 RX ADMIN — METHYLPREDNISOLONE SODIUM SUCCINATE 40 MG: 40 INJECTION, POWDER, FOR SOLUTION INTRAMUSCULAR; INTRAVENOUS at 09:34

## 2019-01-01 RX ADMIN — EPINEPHRINE 5 MCG/MIN: 1 INJECTION PARENTERAL at 18:45

## 2019-01-01 RX ADMIN — CHLORHEXIDINE GLUCONATE 15 ML: 0.12 RINSE ORAL at 20:40

## 2019-01-01 RX ADMIN — HYDROMORPHONE HYDROCHLORIDE 1 MG: 1 INJECTION, SOLUTION INTRAMUSCULAR; INTRAVENOUS; SUBCUTANEOUS at 16:00

## 2019-01-01 RX ADMIN — Medication 10 ML: at 07:17

## 2019-01-01 RX ADMIN — METOPROLOL TARTRATE 25 MG: 25 TABLET ORAL at 21:01

## 2019-01-01 RX ADMIN — Medication 120 MCG: at 13:34

## 2019-01-01 RX ADMIN — CHLORHEXIDINE GLUCONATE 15 ML: 0.12 RINSE ORAL at 08:28

## 2019-01-01 RX ADMIN — Medication 10 ML: at 06:09

## 2019-01-01 RX ADMIN — VANCOMYCIN HYDROCHLORIDE 1000 MG: 1 INJECTION, POWDER, LYOPHILIZED, FOR SOLUTION INTRAVENOUS at 00:23

## 2019-01-01 RX ADMIN — Medication 10 ML: at 13:24

## 2019-01-01 RX ADMIN — VANCOMYCIN HYDROCHLORIDE 1000 MG: 1 INJECTION, POWDER, LYOPHILIZED, FOR SOLUTION INTRAVENOUS at 07:30

## 2019-01-01 RX ADMIN — SODIUM CHLORIDE: 900 INJECTION, SOLUTION INTRAVENOUS at 13:29

## 2019-01-01 RX ADMIN — CHLORHEXIDINE GLUCONATE 15 ML: 0.12 RINSE ORAL at 09:29

## 2019-01-01 RX ADMIN — IPRATROPIUM BROMIDE AND ALBUTEROL SULFATE 3 ML: .5; 3 SOLUTION RESPIRATORY (INHALATION) at 12:14

## 2019-01-01 RX ADMIN — ROCURONIUM BROMIDE 20 MG: 10 SOLUTION INTRAVENOUS at 14:35

## 2019-01-01 RX ADMIN — METHYLPREDNISOLONE SODIUM SUCCINATE 40 MG: 40 INJECTION, POWDER, FOR SOLUTION INTRAMUSCULAR; INTRAVENOUS at 15:25

## 2019-01-01 RX ADMIN — Medication 10 ML: at 06:50

## 2019-01-01 RX ADMIN — INSULIN LISPRO 2 UNITS: 100 INJECTION, SOLUTION INTRAVENOUS; SUBCUTANEOUS at 19:01

## 2019-01-01 RX ADMIN — Medication 120 MCG: at 12:11

## 2019-01-01 RX ADMIN — IPRATROPIUM BROMIDE AND ALBUTEROL SULFATE 3 ML: .5; 3 SOLUTION RESPIRATORY (INHALATION) at 05:49

## 2019-01-01 RX ADMIN — IPRATROPIUM BROMIDE AND ALBUTEROL SULFATE 3 ML: .5; 3 SOLUTION RESPIRATORY (INHALATION) at 03:35

## 2019-01-01 RX ADMIN — FENTANYL CITRATE 100 MCG: 50 INJECTION, SOLUTION INTRAMUSCULAR; INTRAVENOUS at 12:09

## 2019-01-01 RX ADMIN — Medication 10 ML: at 21:47

## 2019-01-01 RX ADMIN — IPRATROPIUM BROMIDE AND ALBUTEROL SULFATE 3 ML: .5; 3 SOLUTION RESPIRATORY (INHALATION) at 05:38

## 2019-01-01 RX ADMIN — Medication 10 MG: at 12:21

## 2019-01-01 RX ADMIN — METHYLPREDNISOLONE SODIUM SUCCINATE 40 MG: 40 INJECTION, POWDER, FOR SOLUTION INTRAMUSCULAR; INTRAVENOUS at 21:08

## 2019-01-01 RX ADMIN — PROPOFOL 10 MCG/KG/MIN: 10 INJECTION, EMULSION INTRAVENOUS at 22:24

## 2019-01-01 RX ADMIN — FENTANYL CITRATE 50 MCG: 50 INJECTION INTRAMUSCULAR; INTRAVENOUS at 16:47

## 2019-01-01 RX ADMIN — METOPROLOL TARTRATE 25 MG: 25 TABLET ORAL at 08:53

## 2019-01-01 RX ADMIN — FAMOTIDINE 20 MG: 10 INJECTION, SOLUTION INTRAVENOUS at 19:07

## 2019-01-01 RX ADMIN — PIPERACILLIN AND TAZOBACTAM 3.38 G: 3; .375 INJECTION, POWDER, FOR SOLUTION INTRAVENOUS at 03:10

## 2019-01-01 RX ADMIN — PIPERACILLIN AND TAZOBACTAM 3.38 G: 3; .375 INJECTION, POWDER, FOR SOLUTION INTRAVENOUS at 10:46

## 2019-01-01 RX ADMIN — ALBUMIN (HUMAN) 12.5 G: 0.25 INJECTION, SOLUTION INTRAVENOUS at 19:00

## 2019-01-01 RX ADMIN — HYDROMORPHONE HYDROCHLORIDE 1 MG: 1 INJECTION, SOLUTION INTRAMUSCULAR; INTRAVENOUS; SUBCUTANEOUS at 19:21

## 2019-01-01 RX ADMIN — PIPERACILLIN AND TAZOBACTAM 3.38 G: 3; .375 INJECTION, POWDER, FOR SOLUTION INTRAVENOUS at 02:55

## 2019-01-01 RX ADMIN — Medication 30 MG: at 13:34

## 2019-01-01 RX ADMIN — Medication 10 ML: at 21:05

## 2019-01-01 RX ADMIN — INSULIN LISPRO 2 UNITS: 100 INJECTION, SOLUTION INTRAVENOUS; SUBCUTANEOUS at 17:57

## 2019-01-01 RX ADMIN — CHLORHEXIDINE GLUCONATE 15 ML: 0.12 RINSE ORAL at 20:18

## 2019-01-01 RX ADMIN — POTASSIUM BICARBONATE 20 MEQ: 782 TABLET, EFFERVESCENT ORAL at 08:50

## 2019-01-01 RX ADMIN — FENTANYL CITRATE 50 MCG: 50 INJECTION INTRAMUSCULAR; INTRAVENOUS at 11:11

## 2019-01-01 RX ADMIN — IPRATROPIUM BROMIDE AND ALBUTEROL SULFATE 3 ML: .5; 3 SOLUTION RESPIRATORY (INHALATION) at 03:39

## 2019-01-01 RX ADMIN — ROCURONIUM BROMIDE 10 MG: 10 SOLUTION INTRAVENOUS at 12:09

## 2019-01-01 RX ADMIN — SODIUM CHLORIDE 1000 ML: 900 INJECTION, SOLUTION INTRAVENOUS at 11:20

## 2019-01-01 RX ADMIN — METHYLPREDNISOLONE SODIUM SUCCINATE 40 MG: 40 INJECTION, POWDER, FOR SOLUTION INTRAMUSCULAR; INTRAVENOUS at 12:02

## 2019-01-01 RX ADMIN — VANCOMYCIN HYDROCHLORIDE 1000 MG: 1 INJECTION, POWDER, LYOPHILIZED, FOR SOLUTION INTRAVENOUS at 00:38

## 2019-01-01 RX ADMIN — DEXTROSE MONOHYDRATE 75 ML/HR: 5 INJECTION, SOLUTION INTRAVENOUS at 10:09

## 2019-01-01 RX ADMIN — SUGAMMADEX 120 MG: 100 INJECTION, SOLUTION INTRAVENOUS at 15:09

## 2019-01-01 RX ADMIN — Medication 20 ML: at 13:22

## 2019-01-01 RX ADMIN — HYDROMORPHONE HYDROCHLORIDE 0.5 MG: 2 TABLET ORAL at 03:44

## 2019-01-01 RX ADMIN — CHLORHEXIDINE GLUCONATE 15 ML: 0.12 RINSE ORAL at 22:41

## 2019-01-01 RX ADMIN — PROPOFOL 30 MCG/KG/MIN: 10 INJECTION, EMULSION INTRAVENOUS at 07:30

## 2019-01-01 RX ADMIN — HYDROCORTISONE SODIUM SUCCINATE 100 MG: 100 INJECTION, POWDER, FOR SOLUTION INTRAMUSCULAR; INTRAVENOUS at 21:06

## 2019-01-01 RX ADMIN — DEXTROSE MONOHYDRATE: 5 INJECTION, SOLUTION INTRAVENOUS at 10:03

## 2019-01-01 RX ADMIN — PIPERACILLIN AND TAZOBACTAM 3.38 G: 3; .375 INJECTION, POWDER, FOR SOLUTION INTRAVENOUS at 18:33

## 2019-01-01 RX ADMIN — SODIUM CHLORIDE 75 ML/HR: 450 INJECTION, SOLUTION INTRAVENOUS at 17:11

## 2019-01-01 RX ADMIN — PHENYLEPHRINE HYDROCHLORIDE 295 MCG/MIN: 10 INJECTION INTRAVENOUS at 04:37

## 2019-01-01 RX ADMIN — Medication 10 MG: at 12:14

## 2019-01-01 RX ADMIN — PROPOFOL 40 MCG/KG/MIN: 10 INJECTION, EMULSION INTRAVENOUS at 21:29

## 2019-01-01 RX ADMIN — PROPOFOL 25 MCG/KG/MIN: 10 INJECTION, EMULSION INTRAVENOUS at 15:24

## 2019-01-01 RX ADMIN — METHYLPREDNISOLONE SODIUM SUCCINATE 40 MG: 40 INJECTION, POWDER, FOR SOLUTION INTRAMUSCULAR; INTRAVENOUS at 00:26

## 2019-01-01 RX ADMIN — Medication 10 MCG/MIN: at 21:49

## 2019-01-01 RX ADMIN — GLYCOPYRROLATE 0.1 MG: 0.2 INJECTION, SOLUTION INTRAMUSCULAR; INTRAVENOUS at 13:44

## 2019-01-01 RX ADMIN — Medication 10 ML: at 06:22

## 2019-01-01 RX ADMIN — METHYLPREDNISOLONE SODIUM SUCCINATE 40 MG: 40 INJECTION, POWDER, FOR SOLUTION INTRAMUSCULAR; INTRAVENOUS at 03:14

## 2019-01-01 RX ADMIN — IPRATROPIUM BROMIDE AND ALBUTEROL SULFATE 3 ML: .5; 3 SOLUTION RESPIRATORY (INHALATION) at 11:47

## 2019-01-01 RX ADMIN — EPINEPHRINE 3 MCG/MIN: 1 INJECTION PARENTERAL at 14:06

## 2019-01-01 RX ADMIN — METOPROLOL TARTRATE 25 MG: 25 TABLET ORAL at 09:34

## 2019-01-01 RX ADMIN — ROCURONIUM BROMIDE 5 MG: 10 SOLUTION INTRAVENOUS at 13:33

## 2019-01-01 RX ADMIN — CHLORHEXIDINE GLUCONATE 15 ML: 0.12 RINSE ORAL at 08:47

## 2019-01-01 RX ADMIN — ACETAMINOPHEN 650 MG: 650 SUPPOSITORY RECTAL at 19:04

## 2019-01-01 RX ADMIN — ACETAMINOPHEN 650 MG: 325 TABLET, FILM COATED ORAL at 10:43

## 2019-01-01 RX ADMIN — IPRATROPIUM BROMIDE AND ALBUTEROL SULFATE 3 ML: .5; 3 SOLUTION RESPIRATORY (INHALATION) at 00:38

## 2019-01-01 RX ADMIN — HYDROCODONE BITARTRATE AND ACETAMINOPHEN 1 TABLET: 5; 325 TABLET ORAL at 09:01

## 2019-01-01 RX ADMIN — FAMOTIDINE 20 MG: 10 INJECTION, SOLUTION INTRAVENOUS at 18:04

## 2019-01-01 RX ADMIN — FLUCONAZOLE 200 MG: 200 INJECTION, SOLUTION INTRAVENOUS at 11:17

## 2019-01-01 RX ADMIN — METHYLPREDNISOLONE SODIUM SUCCINATE 40 MG: 40 INJECTION, POWDER, FOR SOLUTION INTRAMUSCULAR; INTRAVENOUS at 14:30

## 2019-01-01 RX ADMIN — METOPROLOL TARTRATE 25 MG: 25 TABLET ORAL at 08:38

## 2019-01-01 RX ADMIN — LIDOCAINE HYDROCHLORIDE 80 MG: 20 INJECTION, SOLUTION EPIDURAL; INFILTRATION; INTRACAUDAL; PERINEURAL at 12:09

## 2019-01-01 RX ADMIN — IPRATROPIUM BROMIDE AND ALBUTEROL SULFATE 3 ML: .5; 3 SOLUTION RESPIRATORY (INHALATION) at 07:43

## 2019-01-01 RX ADMIN — SODIUM CHLORIDE, SODIUM LACTATE, POTASSIUM CHLORIDE, AND CALCIUM CHLORIDE 125 ML/HR: 600; 310; 30; 20 INJECTION, SOLUTION INTRAVENOUS at 11:04

## 2019-01-01 RX ADMIN — INSULIN LISPRO 2 UNITS: 100 INJECTION, SOLUTION INTRAVENOUS; SUBCUTANEOUS at 06:32

## 2019-01-01 RX ADMIN — Medication 10 ML: at 21:51

## 2019-01-01 RX ADMIN — Medication 10 ML: at 22:00

## 2019-01-01 RX ADMIN — IPRATROPIUM BROMIDE AND ALBUTEROL SULFATE 3 ML: .5; 3 SOLUTION RESPIRATORY (INHALATION) at 20:26

## 2019-01-01 RX ADMIN — FUROSEMIDE 40 MG: 10 INJECTION, SOLUTION INTRAMUSCULAR; INTRAVENOUS at 15:57

## 2019-01-01 RX ADMIN — FENTANYL CITRATE 50 MCG: 50 INJECTION INTRAMUSCULAR; INTRAVENOUS at 01:26

## 2019-01-01 RX ADMIN — ALBUMIN (HUMAN) 12.5 G: 0.25 INJECTION, SOLUTION INTRAVENOUS at 00:51

## 2019-01-01 RX ADMIN — HYDROCORTISONE SODIUM SUCCINATE 50 MG: 100 INJECTION, POWDER, FOR SOLUTION INTRAMUSCULAR; INTRAVENOUS at 00:39

## 2019-01-01 RX ADMIN — Medication 10 ML: at 21:23

## 2019-01-01 RX ADMIN — DEXTROSE MONOHYDRATE: 5 INJECTION, SOLUTION INTRAVENOUS at 05:52

## 2019-01-01 RX ADMIN — DEXMEDETOMIDINE HYDROCHLORIDE 4 MCG: 100 INJECTION, SOLUTION, CONCENTRATE INTRAVENOUS at 13:12

## 2019-01-01 RX ADMIN — METHYLPREDNISOLONE SODIUM SUCCINATE 40 MG: 40 INJECTION, POWDER, FOR SOLUTION INTRAMUSCULAR; INTRAVENOUS at 03:15

## 2019-01-01 RX ADMIN — SODIUM CHLORIDE 40 MG: 9 INJECTION INTRAMUSCULAR; INTRAVENOUS; SUBCUTANEOUS at 19:00

## 2019-01-01 RX ADMIN — FENTANYL CITRATE 50 MCG: 50 INJECTION INTRAMUSCULAR; INTRAVENOUS at 22:50

## 2019-01-01 RX ADMIN — LORAZEPAM 1 MG: 2 INJECTION, SOLUTION INTRAMUSCULAR; INTRAVENOUS at 06:32

## 2019-01-01 RX ADMIN — GLYCOPYRROLATE 0.6 MG: 0.2 INJECTION, SOLUTION INTRAMUSCULAR; INTRAVENOUS at 13:24

## 2019-01-01 RX ADMIN — GLYCOPYRROLATE 0.2 MG: 0.2 INJECTION, SOLUTION INTRAMUSCULAR; INTRAVENOUS at 11:11

## 2019-01-01 RX ADMIN — PROPOFOL 80 MG: 10 INJECTION, EMULSION INTRAVENOUS at 12:41

## 2019-01-01 RX ADMIN — DEXAMETHASONE SODIUM PHOSPHATE 8 MG: 4 INJECTION, SOLUTION INTRAMUSCULAR; INTRAVENOUS at 12:38

## 2019-01-01 RX ADMIN — LORAZEPAM 1 MG: 2 INJECTION, SOLUTION INTRAMUSCULAR; INTRAVENOUS at 08:28

## 2019-01-01 RX ADMIN — PHENYLEPHRINE HYDROCHLORIDE 250 MCG/MIN: 10 INJECTION INTRAVENOUS at 18:54

## 2019-01-01 RX ADMIN — Medication 10 ML: at 14:01

## 2019-01-01 RX ADMIN — POTASSIUM CHLORIDE 20 MEQ: 29.8 INJECTION INTRAVENOUS at 09:25

## 2019-01-01 RX ADMIN — PHENYLEPHRINE HYDROCHLORIDE 260 MCG/MIN: 10 INJECTION INTRAVENOUS at 19:11

## 2019-01-01 RX ADMIN — SODIUM CHLORIDE, SODIUM LACTATE, POTASSIUM CHLORIDE, AND CALCIUM CHLORIDE: 600; 310; 30; 20 INJECTION, SOLUTION INTRAVENOUS at 14:00

## 2019-01-01 RX ADMIN — Medication 80 MCG: at 13:59

## 2019-01-01 RX ADMIN — ONDANSETRON 4 MG: 2 INJECTION INTRAMUSCULAR; INTRAVENOUS at 09:13

## 2019-01-01 RX ADMIN — HYDROCODONE BITARTRATE AND ACETAMINOPHEN 1 TABLET: 5; 325 TABLET ORAL at 13:40

## 2019-01-01 RX ADMIN — SODIUM CHLORIDE 1000 ML: 900 INJECTION, SOLUTION INTRAVENOUS at 10:31

## 2019-01-01 RX ADMIN — INSULIN LISPRO 2 UNITS: 100 INJECTION, SOLUTION INTRAVENOUS; SUBCUTANEOUS at 00:51

## 2019-01-01 RX ADMIN — PIPERACILLIN AND TAZOBACTAM 3.38 G: 3; .375 INJECTION, POWDER, FOR SOLUTION INTRAVENOUS at 19:08

## 2019-01-01 RX ADMIN — METOPROLOL TARTRATE 25 MG: 25 TABLET ORAL at 21:18

## 2019-01-01 RX ADMIN — SODIUM CHLORIDE, SODIUM LACTATE, POTASSIUM CHLORIDE, AND CALCIUM CHLORIDE 75 ML/HR: 600; 310; 30; 20 INJECTION, SOLUTION INTRAVENOUS at 06:11

## 2019-01-01 RX ADMIN — HYDROMORPHONE HYDROCHLORIDE 1 MG: 1 INJECTION, SOLUTION INTRAMUSCULAR; INTRAVENOUS; SUBCUTANEOUS at 11:11

## 2019-01-01 RX ADMIN — IPRATROPIUM BROMIDE AND ALBUTEROL SULFATE 3 ML: .5; 3 SOLUTION RESPIRATORY (INHALATION) at 00:29

## 2019-01-01 RX ADMIN — SODIUM CHLORIDE 75 ML/HR: 450 INJECTION, SOLUTION INTRAVENOUS at 05:32

## 2019-01-01 RX ADMIN — HYDROCORTISONE SODIUM SUCCINATE 25 MG: 100 INJECTION, POWDER, FOR SOLUTION INTRAMUSCULAR; INTRAVENOUS at 08:44

## 2019-01-01 RX ADMIN — HYDROCORTISONE SODIUM SUCCINATE 100 MG: 100 INJECTION, POWDER, FOR SOLUTION INTRAMUSCULAR; INTRAVENOUS at 22:37

## 2019-01-01 RX ADMIN — PHENYLEPHRINE HYDROCHLORIDE 295 MCG/MIN: 10 INJECTION INTRAVENOUS at 02:52

## 2019-01-01 RX ADMIN — Medication 2 MCG/MIN: at 17:58

## 2019-01-01 RX ADMIN — Medication 10 ML: at 17:44

## 2019-01-01 RX ADMIN — METHYLPREDNISOLONE SODIUM SUCCINATE 40 MG: 40 INJECTION, POWDER, FOR SOLUTION INTRAMUSCULAR; INTRAVENOUS at 00:00

## 2019-01-01 RX ADMIN — AMOXICILLIN AND CLAVULANATE POTASSIUM 1 TABLET: 875; 125 TABLET, FILM COATED ORAL at 09:40

## 2019-01-01 RX ADMIN — IPRATROPIUM BROMIDE AND ALBUTEROL SULFATE 3 ML: .5; 3 SOLUTION RESPIRATORY (INHALATION) at 11:30

## 2019-01-01 RX ADMIN — ROCURONIUM BROMIDE 30 MG: 10 SOLUTION INTRAVENOUS at 12:19

## 2019-01-01 RX ADMIN — METHYLPREDNISOLONE SODIUM SUCCINATE 40 MG: 40 INJECTION, POWDER, FOR SOLUTION INTRAMUSCULAR; INTRAVENOUS at 14:01

## 2019-01-01 RX ADMIN — SODIUM CHLORIDE 75 ML/HR: 450 INJECTION, SOLUTION INTRAVENOUS at 02:52

## 2019-01-01 RX ADMIN — PHENYLEPHRINE HYDROCHLORIDE 30 MCG/MIN: 10 INJECTION INTRAVENOUS at 08:42

## 2019-01-01 RX ADMIN — ALBUTEROL SULFATE 2.5 MG: 2.5 SOLUTION RESPIRATORY (INHALATION) at 22:43

## 2019-01-01 RX ADMIN — IPRATROPIUM BROMIDE AND ALBUTEROL SULFATE 3 ML: .5; 3 SOLUTION RESPIRATORY (INHALATION) at 01:55

## 2019-01-01 RX ADMIN — IPRATROPIUM BROMIDE AND ALBUTEROL SULFATE 3 ML: .5; 3 SOLUTION RESPIRATORY (INHALATION) at 12:07

## 2019-01-01 RX ADMIN — DEXTROSE MONOHYDRATE, SODIUM CHLORIDE, AND POTASSIUM CHLORIDE 25 ML/HR: 50; 4.5; .745 INJECTION, SOLUTION INTRAVENOUS at 08:38

## 2019-01-01 RX ADMIN — IPRATROPIUM BROMIDE AND ALBUTEROL SULFATE 3 ML: .5; 3 SOLUTION RESPIRATORY (INHALATION) at 07:40

## 2019-01-01 RX ADMIN — IPRATROPIUM BROMIDE AND ALBUTEROL SULFATE 3 ML: .5; 3 SOLUTION RESPIRATORY (INHALATION) at 20:20

## 2019-01-01 RX ADMIN — PROPOFOL 30 MG: 10 INJECTION, EMULSION INTRAVENOUS at 12:44

## 2019-01-01 RX ADMIN — Medication 10 ML: at 21:44

## 2019-01-01 RX ADMIN — PROPOFOL 40 MCG/KG/MIN: 10 INJECTION, EMULSION INTRAVENOUS at 23:41

## 2019-01-01 RX ADMIN — HYDROCORTISONE SODIUM SUCCINATE 100 MG: 100 INJECTION, POWDER, FOR SOLUTION INTRAMUSCULAR; INTRAVENOUS at 05:16

## 2019-01-01 RX ADMIN — DEXTROSE MONOHYDRATE: 5 INJECTION, SOLUTION INTRAVENOUS at 04:00

## 2019-01-01 RX ADMIN — Medication 4 MCG/MIN: at 11:30

## 2019-01-01 RX ADMIN — Medication 10 ML: at 21:01

## 2019-01-01 RX ADMIN — PIPERACILLIN AND TAZOBACTAM 3.38 G: 3; .375 INJECTION, POWDER, FOR SOLUTION INTRAVENOUS at 11:27

## 2019-01-01 RX ADMIN — SODIUM CHLORIDE 125 ML/HR: 900 INJECTION, SOLUTION INTRAVENOUS at 11:34

## 2019-01-01 RX ADMIN — FENTANYL CITRATE 25 MCG: 50 INJECTION, SOLUTION INTRAMUSCULAR; INTRAVENOUS at 01:31

## 2019-01-01 RX ADMIN — HYDROCORTISONE SODIUM SUCCINATE 50 MG: 100 INJECTION, POWDER, FOR SOLUTION INTRAMUSCULAR; INTRAVENOUS at 21:23

## 2019-01-01 RX ADMIN — PIPERACILLIN AND TAZOBACTAM 3.38 G: 3; .375 INJECTION, POWDER, FOR SOLUTION INTRAVENOUS at 18:04

## 2019-01-01 RX ADMIN — Medication 50 MCG/MIN: at 20:45

## 2019-01-01 RX ADMIN — METHYLPREDNISOLONE SODIUM SUCCINATE 40 MG: 40 INJECTION, POWDER, FOR SOLUTION INTRAMUSCULAR; INTRAVENOUS at 06:50

## 2019-01-01 RX ADMIN — HYDROCORTISONE SODIUM SUCCINATE 100 MG: 100 INJECTION, POWDER, FOR SOLUTION INTRAMUSCULAR; INTRAVENOUS at 06:34

## 2019-01-01 RX ADMIN — NEOSTIGMINE METHYLSULFATE 4 MG: 1 INJECTION, SOLUTION INTRAMUSCULAR; INTRAVENOUS; SUBCUTANEOUS at 13:24

## 2019-01-01 RX ADMIN — PIPERACILLIN AND TAZOBACTAM 3.38 G: 3; .375 INJECTION, POWDER, FOR SOLUTION INTRAVENOUS at 11:38

## 2019-01-01 RX ADMIN — SODIUM CHLORIDE 75 ML/HR: 450 INJECTION, SOLUTION INTRAVENOUS at 08:19

## 2019-01-01 RX ADMIN — ASPIRIN 81 MG 81 MG: 81 TABLET ORAL at 09:34

## 2019-01-01 RX ADMIN — FENTANYL CITRATE 25 MCG: 50 INJECTION, SOLUTION INTRAMUSCULAR; INTRAVENOUS at 14:57

## 2019-01-01 RX ADMIN — Medication 10 ML: at 06:00

## 2019-01-01 RX ADMIN — SODIUM CHLORIDE 75 ML/HR: 450 INJECTION, SOLUTION INTRAVENOUS at 07:04

## 2019-01-01 RX ADMIN — HYDROCORTISONE SODIUM SUCCINATE 100 MG: 100 INJECTION, POWDER, FOR SOLUTION INTRAMUSCULAR; INTRAVENOUS at 13:22

## 2019-01-01 RX ADMIN — Medication 10 ML: at 07:30

## 2019-01-01 RX ADMIN — Medication 10 ML: at 22:41

## 2019-01-01 RX ADMIN — PROPOFOL 50 MCG/KG/MIN: 10 INJECTION, EMULSION INTRAVENOUS at 05:23

## 2019-01-01 RX ADMIN — PIPERACILLIN AND TAZOBACTAM 3.38 G: 3; .375 INJECTION, POWDER, FOR SOLUTION INTRAVENOUS at 12:21

## 2019-01-01 RX ADMIN — DEXTROSE MONOHYDRATE 75 ML/HR: 5 INJECTION, SOLUTION INTRAVENOUS at 00:11

## 2019-01-01 RX ADMIN — INFLUENZA VIRUS VACCINE 0.5 ML: 15; 15; 15; 15 SUSPENSION INTRAMUSCULAR at 07:18

## 2019-01-01 RX ADMIN — SODIUM CHLORIDE 125 ML/HR: 900 INJECTION, SOLUTION INTRAVENOUS at 01:26

## 2019-01-01 RX ADMIN — Medication 80 MCG: at 14:01

## 2019-01-01 RX ADMIN — SODIUM BICARBONATE 50 MEQ: 84 INJECTION, SOLUTION INTRAVENOUS at 13:45

## 2019-01-01 RX ADMIN — CHLORHEXIDINE GLUCONATE 15 ML: 0.12 RINSE ORAL at 21:23

## 2019-01-01 RX ADMIN — Medication 30 MG: at 12:18

## 2019-01-01 RX ADMIN — Medication 10 ML: at 15:53

## 2019-01-01 RX ADMIN — METHYLPREDNISOLONE SODIUM SUCCINATE 40 MG: 40 INJECTION, POWDER, FOR SOLUTION INTRAMUSCULAR; INTRAVENOUS at 08:39

## 2019-01-01 RX ADMIN — VANCOMYCIN HYDROCHLORIDE 1000 MG: 1 INJECTION, POWDER, LYOPHILIZED, FOR SOLUTION INTRAVENOUS at 17:57

## 2019-01-01 RX ADMIN — ROCURONIUM BROMIDE 10 MG: 10 SOLUTION INTRAVENOUS at 12:53

## 2019-01-01 RX ADMIN — IOPAMIDOL 80 ML: 755 INJECTION, SOLUTION INTRAVENOUS at 21:09

## 2019-01-01 RX ADMIN — INSULIN LISPRO 3 UNITS: 100 INJECTION, SOLUTION INTRAVENOUS; SUBCUTANEOUS at 12:12

## 2019-01-01 RX ADMIN — PHENYLEPHRINE HYDROCHLORIDE 80 MCG: 10 INJECTION INTRAVENOUS at 13:37

## 2019-01-01 RX ADMIN — ROCURONIUM BROMIDE 10 MG: 10 SOLUTION INTRAVENOUS at 13:08

## 2019-01-01 RX ADMIN — SODIUM CHLORIDE 125 ML/HR: 900 INJECTION, SOLUTION INTRAVENOUS at 17:25

## 2019-01-01 RX ADMIN — FLUCONAZOLE 400 MG: 400 INJECTION, SOLUTION INTRAVENOUS at 11:58

## 2019-01-01 RX ADMIN — HYDROCORTISONE SODIUM SUCCINATE 50 MG: 100 INJECTION, POWDER, FOR SOLUTION INTRAMUSCULAR; INTRAVENOUS at 14:41

## 2019-01-01 RX ADMIN — POTASSIUM BICARBONATE 20 MEQ: 782 TABLET, EFFERVESCENT ORAL at 18:23

## 2019-01-01 RX ADMIN — ALBUTEROL SULFATE: 2.5 SOLUTION RESPIRATORY (INHALATION) at 18:33

## 2019-01-01 RX ADMIN — IPRATROPIUM BROMIDE AND ALBUTEROL SULFATE 3 ML: .5; 3 SOLUTION RESPIRATORY (INHALATION) at 01:26

## 2019-01-01 RX ADMIN — Medication 10 ML: at 20:50

## 2019-01-01 RX ADMIN — SODIUM CHLORIDE 125 ML/HR: 900 INJECTION, SOLUTION INTRAVENOUS at 01:12

## 2019-01-01 RX ADMIN — IPRATROPIUM BROMIDE AND ALBUTEROL SULFATE 3 ML: .5; 3 SOLUTION RESPIRATORY (INHALATION) at 20:43

## 2019-01-01 RX ADMIN — PIPERACILLIN AND TAZOBACTAM 3.38 G: 3; .375 INJECTION, POWDER, FOR SOLUTION INTRAVENOUS at 03:03

## 2019-01-01 RX ADMIN — IPRATROPIUM BROMIDE AND ALBUTEROL SULFATE 3 ML: .5; 3 SOLUTION RESPIRATORY (INHALATION) at 16:21

## 2019-01-01 RX ADMIN — HYDROCORTISONE SODIUM SUCCINATE 100 MG: 100 INJECTION, POWDER, FOR SOLUTION INTRAMUSCULAR; INTRAVENOUS at 13:33

## 2019-01-01 RX ADMIN — HYDROCODONE BITARTRATE AND ACETAMINOPHEN 1 TABLET: 5; 325 TABLET ORAL at 11:46

## 2019-07-03 NOTE — PROGRESS NOTES
Chief Complaint   Patient presents with   [facility] 39 Visit     [address]     1. Have you been to the ER, urgent care clinic since your last visit? Hospitalized since your last visit? No    2. Have you seen or consulted any other health care providers outside of the [address] [place] [address] since your last visit? Include any pap smears or colon screening.  No

## 2019-07-03 NOTE — PROGRESS NOTES
Karey Cabot is a 70 y.o. male and presents with Annual Wellness Visit ([address] )  . Subjective:    Karey Cabot is a 70 y.o. male and presents for annual Medicare Wellness Visit. Problem List: Reviewed with patient and discussed risk factors. Patient Active Problem List   Diagnosis Code    Back pain M54.9    Lumbar spinal stenosis M48.061    Closed L1 vertebral fracture ([address] S32.019A    Insomnia G47.00    Depression with anxiety F41.8    MCI (mild cognitive impairment) with memory loss G31.84       Current medical providers:  Patient Care Team:  Jannie Winn NP as PCP - General (Nurse Practitioner)  Stewart Valle RN as Nurse Practitioner ([facility])    PSH: Reviewed with patient  History reviewed. No pertinent surgical history. SH: Reviewed with patient  Social History     Tobacco Use    Smoking status: Former Smoker     Packs/day: 1.00     Types: Cigarettes    Smokeless tobacco: Never Used    Tobacco comment: every 2 days   Substance Use Topics    Alcohol use: No    Drug use: No       FH: Reviewed with patient  Family History   Problem Relation Age of Onset    Cancer Mother         colon ancer    Cancer Father         lung cancer    Cancer Brother         lung cancer       Medications/Allergies: Reviewed with patient  Current Outpatient Medications on File Prior to Visit   Medication Sig Dispense Refill    melatonin tab tablet Take 1 Tab by mouth nightly. 30 Tab 2    mirtazapine (REMERON) 15 mg tablet Take 1 Tab by mouth nightly. 30 Tab 2    albuterol (PROVENTIL HFA, VENTOLIN HFA, PROAIR HFA) 90 mcg/actuation inhaler Take 1 Puff by inhalation every four (4) hours as needed for Wheezing. 1 Inhaler 3     No current facility-administered medications on file prior to visit.        Allergies   Allergen Reactions    Pollen Extracts Sneezing       Objective:  Visit Vitals  /82 (BP 1 Location: Left arm, BP Patient Position: Sitting)   Pulse (!) 102   Temp 98.4 °F (36.9 °C) (Oral)   Resp 18   Ht 6' (1.829 m)   Wt 144 lb 12.8 oz (65.7 kg)   SpO2 93%   BMI 19.64 kg/m²    Body mass index is 19.64 kg/m². Assessment of cognitive impairment: Alert and oriented x 2    Depression Screen:   3 most recent PHQ Screens 3/23/2018   PHQ Not Done Active Diagnosis of Depression or Bipolar Disorder     Depression Review:  Patient is seen for screen of depression,denies anhedonia, weight gain, insomnia, hypersomnia, psychomotor agitation, psychomotor retardation, fatigue, feelings of worthlessness/guilt, difficulty concentrating, hopelessness, impaired memory and recurrent thoughts of death Treatment includes no medication   She denies recurrent thoughts of death and suicidal thoughts without plan. Fall Risk Assessment:    Fall Risk Assessment, last 12 mths 7/3/2019   Able to walk? Yes   Fall in past 12 months? No   Fall with injury? -   Number of falls in past 12 months -   Fall Risk Score -       Functional Ability:   Does the patient exhibit a steady gait? yes   How long did it take the patient to get up and walk from a sitting position? seconds   Is the patient self reliant?  (ie can do own laundry, meals, household chores)  yes     Does the patient handle his/her own medications? yes     Does the patient handle his/her own money? yes     Is the patients home safe (ie good lighting, handrails on stairs and bath, etc.)? yes     Did you notice or did patient express any hearing difficulties? no     Did you notice or did patient express any vision difficulties?   no     Were distance and reading eye charts used? no       Advance Care Planning:   Patient was offered the opportunity to discuss advance care planning:  yes     Does patient have an Advance Directive:  no   If no, did you provide information on [facility] Connections?   yes       Plan:      Orders Placed This Encounter    TETANUS, DIPHTHERIA TOXOIDS AND ACELLULAR PERTUSSIS VACCINE (TDAP), IN INDIVIDS. >=7, IM    PSA SCREENING (SCREENING)    METABOLIC PANEL, COMPREHENSIVE    CBC WITH AUTOMATED DIFF    VITAMIN D, 25 HYDROXY    OCCULT BLOOD IMMUNOASSAY,DIAGNOSTIC    REFERRAL TO OPHTHALMOLOGY    REFERRAL TO GASTROENTEROLOGY    AMB POC LIPID PROFILE    AMB POC HEMOGLOBIN A1C    AMB POC URINALYSIS DIP STICK MANUAL W/O MICRO       Health Maintenance   Topic Date Due    GLAUCOMA SCREENING Q2Y  02/17/2007    Pneumococcal 65+ years (1 of 2 - PCV13) 02/17/2007    Shingrix Vaccine Age 50> (1 of 2) 05/15/2020 (Originally 2/17/1992)    Influenza Age 5 to Adult  08/01/2019    MEDICARE YEARLY EXAM  07/03/2020    DTaP/Tdap/Td series (2 - Td) 07/03/2029     Review of Systems  Constitutional: negative for fevers, chills, anorexia and weight loss  Eyes:   negative for visual disturbance and irritation  ENT:   negative for tinnitus,sore throat,nasal congestion,ear pains. hoarseness  Respiratory:  negative for cough, hemoptysis, dyspnea,wheezing  CV:   negative for chest pain, palpitations, lower extremity edema  GI:   negative for nausea, vomiting, diarrhea, abdominal pain,melena  Endo:               negative for polyuria,polydipsia,polyphagia,heat intolerance  Genitourinary: negative for frequency, dysuria and hematuria  Integument:  negative for rash and pruritus  Hematologic:  negative for easy bruising and gum/nose bleeding  Musculoskel: negative for myalgias, arthralgias, back pain, muscle weakness, joint pain  Neurological:  negative for headaches, dizziness, vertigo, memory problems and gait   Behavl/Psych: negative for feelings of anxiety, depression, mood changes    Past Medical History:   Diagnosis Date    Anxiety     Chronic confusion     Chronic obstructive pulmonary disease ([facility] [address]     Depression     Poor historian     Sleep disorder     insomnia     History reviewed. No pertinent surgical history.   Social History     Socioeconomic History    Marital status:      Spouse name: Not on file    Number of children: Not on file    Years of education: Not on file    Highest education level: Not on file   Tobacco Use    Smoking status: Former Smoker     Packs/day: 1.00     Types: Cigarettes    Smokeless tobacco: Never Used    Tobacco comment: every 2 days   Substance and Sexual Activity    Alcohol use: No    Drug use: No    Sexual activity: Never     Family History   Problem Relation Age of Onset    Cancer Mother         colon ancer    Cancer Father         lung cancer    Cancer Brother         lung cancer     Current Outpatient Medications   Medication Sig Dispense Refill    melatonin tab tablet Take 1 Tab by mouth nightly. 30 Tab 2    mirtazapine (REMERON) 15 mg tablet Take 1 Tab by mouth nightly. 30 Tab 2    albuterol (PROVENTIL HFA, VENTOLIN HFA, PROAIR HFA) 90 mcg/actuation inhaler Take 1 Puff by inhalation every four (4) hours as needed for Wheezing. 1 Inhaler 3     Allergies   Allergen Reactions    Pollen Extracts Sneezing       Objective:  Visit Vitals  /82 (BP 1 Location: Left arm, BP Patient Position: Sitting)   Pulse (!) 102   Temp 98.4 °F (36.9 °C) (Oral)   Resp 18   Ht 6' (1.829 m)   Wt 144 lb 12.8 oz (65.7 kg)   SpO2 93%   BMI 19.64 kg/m²     Physical Exam:   General appearance - alert, well appearing, and in no distress  Mental status - alert, oriented to person, place  EYE-ALYSON, EOMI, corneas normal, no foreign bodies  ENT-ENT exam normal, no neck nodes or sinus tenderness  Nose - normal and patent, no erythema, discharge or polyps  Mouth - mucous membranes moist, pharynx normal without lesions  Neck - supple, no significant adenopathy   Chest - clear to auscultation, no wheezes, rales or rhonchi, symmetric air entry   Heart - normal rate, regular rhythm, normal S1, S2, no murmurs, rubs, clicks or gallops   Abdomen - soft, nontender, nondistended, no masses or organomegaly  Lymph- no adenopathy palpable  Ext-peripheral pulses normal, no pedal edema, no clubbing or cyanosis  Skin-Warm and dry.  no hyperpigmentation, vitiligo, or suspicious lesions  Neuro -alert, oriented, normal speech, no focal findings or movement disorder noted  Neck-normal C-spine, no tenderness, full ROM without pain  Feet-no nail deformities or callus formation with good pulses noted      Results for orders placed or performed in visit on 07/03/19   AMB POC LIPID PROFILE   Result Value Ref Range    Cholesterol (POC) 186     Triglycerides (POC) 313     HDL Cholesterol (POC) 56     VLDL (POC) 130 MG/DL    LDL Cholesterol (POC) 67 MG/DL    TChol/HDL Ratio (POC) 3.3    AMB POC HEMOGLOBIN A1C   Result Value Ref Range    Hemoglobin A1c (POC) 5.2 %   AMB POC URINALYSIS DIP STICK MANUAL W/O MICRO   Result Value Ref Range    Color (UA POC) Yellow     Clarity (UA POC) Clear     Glucose (UA POC) Negative Negative    Bilirubin (UA POC) Negative Negative    Ketones (UA POC) Negative Negative    Specific gravity (UA POC) 1.020 1.001 - 1.035    Blood (UA POC) Negative Negative    pH (UA POC) 5.0 4.6 - 8.0    Protein (UA POC) Negative Negative    Urobilinogen (UA POC) 0.2 mg/dL 0.2 - 1    Nitrites (UA POC) Negative Negative    Leukocyte esterase (UA POC) Trace Negative       Assessment/Plan:    ICD-10-CM ICD-9-CM    1. Encounter for Medicare annual wellness exam Z00.00 V70.0 PSA SCREENING (SCREENING)      REFERRAL TO OPHTHALMOLOGY      REFERRAL TO GASTROENTEROLOGY   2. Screening for diabetes mellitus Z13.1 V77.1 AMB POC HEMOGLOBIN A1C      AMB POC URINALYSIS DIP STICK MANUAL W/O MICRO   3. Screening for cholesterol level Z13.220 V77.91 AMB POC LIPID PROFILE   4. Multiple nodules of lung B92.9 804.35 METABOLIC PANEL, COMPREHENSIVE      CBC WITH AUTOMATED DIFF   5. Pulmonary emphysema, unspecified emphysema type ([facility] [address] P26.6 937.8 METABOLIC PANEL, COMPREHENSIVE      CBC WITH AUTOMATED DIFF   6.  Insomnia, unspecified type R54.08 561.24 METABOLIC PANEL, COMPREHENSIVE      CBC WITH AUTOMATED DIFF   7. Screening for prostate cancer Z12.5 V76.44 PSA SCREENING (SCREENING)   8. Healthcare maintenance Z00.00 V70.0 AMB POC LIPID PROFILE      AMB POC HEMOGLOBIN A1C      PSA SCREENING (SCREENING)      METABOLIC PANEL, COMPREHENSIVE      CBC WITH AUTOMATED DIFF      VITAMIN D, 25 HYDROXY      REFERRAL TO GASTROENTEROLOGY      TETANUS, DIPHTHERIA TOXOIDS AND ACELLULAR PERTUSSIS VACCINE (TDAP), IN INDIVIDS. >=7, IM      OCCULT BLOOD IMMUNOASSAY,DIAGNOSTIC   9. Vitamin D deficiency E55.9 268.9 VITAMIN D, 25 HYDROXY   10. Anxiety F41.9 300.00    11. Glaucoma screening Z13.5 V80.1 REFERRAL TO OPHTHALMOLOGY   12. Blurred vision, bilateral H53.8 368.8 REFERRAL TO OPHTHALMOLOGY   13. Frail elderly R54 797    14. Screen for colon cancer Z12.11 V76.51 REFERRAL TO GASTROENTEROLOGY      OCCULT BLOOD IMMUNOASSAY,DIAGNOSTIC   15. Encounter for immunization Z23 V03.89 TETANUS, DIPHTHERIA TOXOIDS AND ACELLULAR PERTUSSIS VACCINE (TDAP), IN INDIVIDS. >=7, IM     Orders Placed This Encounter    TETANUS, DIPHTHERIA TOXOIDS AND ACELLULAR PERTUSSIS VACCINE (TDAP), IN INDIVIDS. >=7, IM    PSA SCREENING (SCREENING)    METABOLIC PANEL, COMPREHENSIVE    CBC WITH AUTOMATED DIFF    VITAMIN D, 25 HYDROXY    OCCULT BLOOD IMMUNOASSAY,DIAGNOSTIC    REFERRAL TO OPHTHALMOLOGY     Referral Priority:   Routine     Referral Type:   Consultation     Referral Reason:   Specialty Services Required     Referral Location:   [address] [place] [address]     Referred to Provider:   Rosana Hunter MD     Requested Specialty:   Ophthalmology    REFERRAL TO GASTROENTEROLOGY     Referral Priority:   Routine     Referral Type:   Consultation     Referral Reason:   Specialty Services Required     Referred to Provider:   Ronak Cunningham MD     Requested Specialty:   Gastroenterology    AMB POC LIPID PROFILE    AMB POC HEMOGLOBIN A1C    AMB POC URINALYSIS DIP STICK MANUAL W/O MICRO       Patient Instructions          Anxiety Disorder: Care Instructions  Your Care Instructions    Anxiety is a normal reaction to stress. Difficult situations can cause you to have symptoms such as sweaty palms and a nervous feeling. In an anxiety disorder, the symptoms are far more severe. Constant worry, muscle tension, trouble sleeping, nausea and diarrhea, and other symptoms can make normal daily activities difficult or impossible. These symptoms may occur for no reason, and they can affect your work, school, or social life. Medicines, counseling, and self-care can all help. Follow-up care is a key part of your treatment and safety. Be sure to make and go to all appointments, and call your doctor if you are having problems. It's also a good idea to know your test results and keep a list of the medicines you take. How can you care for yourself at home? · Take medicines exactly as directed. Call your doctor if you think you are having a problem with your medicine. · Go to your counseling sessions and follow-up appointments. · Recognize and accept your anxiety. Then, when you are in a situation that makes you anxious, say to yourself, \"This is not an emergency. I feel uncomfortable, but I am not in danger. I can keep going even if I feel anxious. \"  · Be kind to your body:  ? Relieve tension with exercise or a massage. ? Get enough rest.  ? Avoid alcohol, caffeine, nicotine, and illegal drugs. They can increase your anxiety level and cause sleep problems. ? Learn and do relaxation techniques. See below for more about these techniques. · Engage your mind. Get out and do something you enjoy. Go to a funny movie, or take a walk or hike. Plan your day. Having too much or too little to do can make you anxious. · Keep a record of your symptoms. Discuss your fears with a good friend or family member, or join a support group for people with similar problems. Talking to others sometimes relieves stress. · Get involved in social groups, or volunteer to help others. Being alone sometimes makes things seem worse than they are.   · Get at least 27 minutes of exercise on most days of the week to relieve stress. Walking is a good choice. You also may want to do other activities, such as running, swimming, cycling, or playing tennis or team sports. Relaxation techniques  Do relaxation exercises 10 to 20 minutes a day. You can play soothing, relaxing music while you do them, if you wish. · Tell others in your house that you are going to do your relaxation exercises. Ask them not to disturb you. · Find a comfortable place, away from all distractions and noise. · Lie down on your back, or sit with your back straight. · Focus on your breathing. Make it slow and steady. · Breathe in through your nose. Breathe out through either your nose or mouth. · Breathe deeply, filling up the area between your navel and your rib cage. Breathe so that your belly goes up and down. · Do not hold your breath. · Breathe like this for 5 to 10 minutes. Notice the feeling of calmness throughout your whole body. As you continue to breathe slowly and deeply, relax by doing the following for another 5 to 10 minutes:  · Tighten and relax each muscle group in your body. You can begin at your toes and work your way up to your head. · Imagine your muscle groups relaxing and becoming heavy. · Empty your mind of all thoughts. · Let yourself relax more and more deeply. · Become aware of the state of calmness that surrounds you. · When your relaxation time is over, you can bring yourself back to alertness by moving your fingers and toes and then your hands and feet and then stretching and moving your entire body. Sometimes people fall asleep during relaxation, but they usually wake up shortly afterward. · Always give yourself time to return to full alertness before you drive a car or do anything that might cause an accident if you are not fully alert. Never play a relaxation tape while you drive a car. When should you call for help?   Call 911 anytime you think you may need emergency care. For example, call if:    · You feel you cannot stop from hurting yourself or someone else.   Karlos Castro the numbers for these national suicide hotlines: 0-057-187-TALK (1-140.121.6429) and 3-043-BLYEDNY (3-484.410.9815). If you or someone you know talks about suicide or feeling hopeless, get help right away.   Watch closely for changes in your health, and be sure to contact your doctor if:    · You have anxiety or fear that affects your life.     · You have symptoms of anxiety that are new or different from those you had before. Where can you learn more? Go to http://lubna-adwoa.info/. Enter P754 in the search box to learn more about \"Anxiety Disorder: Care Instructions. \"  Current as of: September 11, 2018  Content Version: 11.9  © 5521-9653 [employer]. Care instructions adapted under license by [employer] (which disclaims liability or warranty for this information). If you have questions about a medical condition or this instruction, always ask your healthcare professional. [place] [address] any warranty or liability for your use of this information. Chronic Obstructive Pulmonary Disease (COPD): Care Instructions  Your Care Instructions    Chronic obstructive pulmonary disease (COPD) is a general term for a group of lung diseases, including emphysema and chronic bronchitis. People with COPD have decreased airflow in and out of the lungs, which makes it hard to breathe. The airways also can get clogged with thick mucus. Cigarette smoking is a major cause of COPD. Although there is no cure for COPD, you can slow its progress. Following your treatment plan and taking care of yourself can help you feel better and live longer. Follow-up care is a key part of your treatment and safety. Be sure to make and go to all appointments, and call your doctor if you are having problems.  It's also a good idea to know your test results and keep a list of the medicines you take. How can you care for yourself at home?   Staying healthy    · Do not smoke. This is the most important step you can take to prevent more damage to your lungs. If you need help quitting, talk to your doctor about stop-smoking programs and medicines. These can increase your chances of quitting for good.     · Avoid colds and flu. Get a pneumococcal vaccine shot. If you have had one before, ask your doctor whether you need a second dose. Get the flu vaccine every fall. If you must be around people with colds or the flu, wash your hands often.     · Avoid secondhand smoke, air pollution, and high altitudes. Also avoid cold, dry air and hot, humid air. Stay at home with your windows closed when air pollution is bad.    Medicines and oxygen therapy    · Take your medicines exactly as prescribed. Call your doctor if you think you are having a problem with your medicine.     · You may be taking medicines such as:  ? Bronchodilators. These help open your airways and make breathing easier. Bronchodilators are either short-acting (work for 6 to 9 hours) or long-acting (work for 24 hours). You inhale most bronchodilators, so they start to act quickly. Always carry your quick-relief inhaler with you in case you need it while you are away from home. ? Corticosteroids (prednisone, budesonide). These reduce airway inflammation. They come in pill or inhaled form. You must take these medicines every day for them to work well.     · A spacer may help you get more inhaled medicine to your lungs. Ask your doctor or pharmacist if a spacer is right for you. If it is, ask how to use it properly.     · Do not take any vitamins, over-the-counter medicine, or herbal products without talking to your doctor first.     · If your doctor prescribed antibiotics, take them as directed. Do not stop taking them just because you feel better.  You need to take the full course of antibiotics.     · Oxygen therapy boosts the amount of oxygen in your blood and helps you breathe easier. Use the flow rate your doctor has recommended, and do not change it without talking to your doctor first.   Activity    · Get regular exercise. Walking is an easy way to get exercise. Start out slowly, and walk a little more each day.     · Pay attention to your breathing. You are exercising too hard if you cannot talk while you are exercising.     · Take short rest breaks when doing household chores and other activities.     · Learn breathing methods--such as breathing through pursed lips--to help you become less short of breath.     · If your doctor has not set you up with a pulmonary rehabilitation program, talk to him or her about whether rehab is right for you. Rehab includes exercise programs, education about your disease and how to manage it, help with diet and other changes, and emotional support. Diet    · Eat regular, healthy meals. Use bronchodilators about 1 hour before you eat to make it easier to eat. Eat several small meals instead of three large ones. Drink beverages at the end of the meal. Avoid foods that are hard to chew.     · Eat foods that contain protein so that you do not lose muscle mass.     · Talk with your doctor if you gain too much weight or if you lose weight without trying.    Mental health    · Talk to your family, friends, or a therapist about your feelings. It is normal to feel frightened, angry, hopeless, helpless, and even guilty. Talking openly about bad feelings can help you cope. If these feelings last, talk to your doctor. When should you call for help? Call 911 anytime you think you may need emergency care.  For example, call if:    · You have severe trouble breathing.    Call your doctor now or seek immediate medical care if:    · You have new or worse trouble breathing.     · You cough up blood.     · You have a fever.    Watch closely for changes in your health, and be sure to contact your doctor if:    · You cough more deeply or more often, especially if you notice more mucus or a change in the color of your mucus.     · You have new or worse swelling in your legs or belly.     · You are not getting better as expected. Where can you learn more? Go to http://lubna-adwoa.info/. Vandana Greer in the search box to learn more about \"Chronic Obstructive Pulmonary Disease (COPD): Care Instructions. \"  Current as of: September 5, 2018  Content Version: 11.9  © 6622-1949 [employer]. Care instructions adapted under license by [employer] (which disclaims liability or warranty for this information). If you have questions about a medical condition or this instruction, always ask your healthcare professional. [address] any warranty or liability for your use of this information. Learning About Frailty  What is frailty? Older adults may tire quickly and move more slowly as they age. Everyday activities, like shopping or even getting dressed, can become hard to do. This may be a health problem called frailty. Experts think frailty develops because of changes in how your body works. These changes can be caused by aging, a disease, or both. Your organs may not do their jobs as well, and you may lose muscle. Frailty also involves:  · Changes in your body's endocrine system, which releases the hormones, or chemicals, that affect how your body functions. · Inflammation, or damage to the cells and tissues in your body. What are the symptoms? You may be frail if you have lost weight, are weak, or feel like you have low energy. The way you feel when you're frail may make you more likely to have depression. What happens when you become frail? When you are frail, you may have trouble doing everyday tasks, like getting dressed, eating, bathing, getting in or out of bed, and using the toilet. You may feel weak and off balance and worry about falling.   If you also have another health problem, your frailty may get worse quickly. How can you care for yourself? If you think you are becoming frail, see your doctor. There are things you and your doctor can do to prevent frailty or slow it down. If frailty is caused or made worse by another health problem, you and your doctor can treat the problem. Talk to your doctor about any medicines you're taking that might be making you feel tired. Many medicines, such as cold and allergy medicines, often cause fatigue. Eat healthy  Food gives you calories, which provide energy and can help stop weight loss. Here are some tips for eating well:  · Eat more. Getting calories may be more important than avoiding fat for other reasons. But talk to your doctor about any changes you'd like to make in what you eat. · Eat more protein. This may help you keep your muscles strong. · Try high-energy drinks, such as Boost, Ensure, or instant breakfast drinks. Smoothies, milk shakes, and milk also may help you limit weight loss. · If it's easier for you, eat smaller meals several times a day, rather than larger meals 3 times a day. Do not skip meals, especially breakfast.  Stay active  Talk to your doctor about exercises to help build your strength and balance. Examples include:  · Resistance exercises, such as pushing against a wall or sitting in a chair and raising your legs. These are an easy way to tone your muscles. · Walking, which can be good for your body. But ask your doctor how often and how much walking is best for you. · Estevan chi, which is moving in a slow, rhythmic way. This can help with muscle tone and balance. Prevent falls  If you are worried about falling, here are some things you can do:  · Keep your bones strong. Talk to your doctor to be sure you are getting enough vitamin D and calcium. · Take out raised doorway thresholds, and get rid of throw rugs.   · If stairs are a problem, put in handrails or a ramp.  · Arrange your furniture and electric cords to keep them out of walking paths. · Keep your house well lit, especially [place], and outside walkways. Use night-lights in hallways and bathrooms. · Use shower chairs and bath benches. Stay connected  When you feel tired, it's often easier to stay home and not see people. But it is important to connect with others and stay positive. Being with other people can help you feel good and may help you stay healthier as you age. Where can you learn more? Go to http://lubnaSpartzadwoa.info/. Enter L178 in the search box to learn more about \"Learning About Frailty. \"  Current as of: March 15, 2018  Content Version: 11.9  © 9147-0983 [employer]. Care instructions adapted under license by [employer] (which disclaims liability or warranty for this information). If you have questions about a medical condition or this instruction, always ask your healthcare professional. [place] [address] any warranty or liability for your use of this information. Learning About Emphysema  What is emphysema? Emphysema is damage to the air sacs in your lungs. In a healthy person, the tiny air sacs in the lungs are like balloons. As you breathe in and out, they get bigger and smaller to move air through your lungs. With emphysema, these air sacs lose their stretch. Less oxygen gets into your blood and you feel short of breath. Emphysema is a form of COPD (chronic obstructive pulmonary disease). Emphysema is usually caused by smoking. But chemical fumes, dust, or air pollution also can cause it over time. People who get it in their 35s or 45s may have a disorder that runs in families, called alpha-1 antitrypsin deficiency. But this is rare. What can you expect when you have emphysema? Emphysema gets worse over time. You cannot undo the damage to your lungs.   Over time, you may find that:  · You get short of breath even when you do simple things like get dressed or fix a meal.  · It is hard to eat or exercise. · You lose weight and feel weaker. But there are things you can do to prevent more damage and feel better. What are the symptoms? The main symptoms of emphysema are:  · A cough that will not go away. · Mucus that comes up when you cough. · Shortness of breath that gets worse when you exercise. At times, your symptoms may suddenly flare up and get much worse. This is a called an exacerbation (say \"egg-SALVADOR-mega-BAY-bar\"). When this happens, your usual symptoms quickly get worse and stay bad. This can be dangerous. You may have to go to the hospital.  How can you keep emphysema from getting worse? Don't smoke. That is the best way to keep emphysema from getting worse. If you already smoke, it is never too late to stop. If you need help quitting, talk to your doctor about stop-smoking programs and medicines. These can increase your chances of quitting for good. You can do other things to keep emphysema from getting worse:  · Avoid bad air. Air pollution, chemical fumes, and dust also can make emphysema worse. · Get a flu shot every year. A shot may keep the flu from turning into something more serious, like pneumonia. A flu shot also may lower your chances of having a flare-up. · Get a pneumococcal shot. A shot can prevent some of the serious complications of pneumonia. Ask your doctor how often you should get this shot. How is emphysema treated? Emphysema is treated with medicines and oxygen. You also can take steps at home to stay healthy and keep your condition from getting worse. Medicines and oxygen therapy  · You may be taking medicines such as:  ? Bronchodilators. These help open your airways and make breathing easier. Bronchodilators are either short-acting (work for 6 to 9 hours) or long-acting (work for 24 hours). You inhale most bronchodilators, so they start to act quickly.  Always carry your quick-relief inhaler with you in case you need it while you are away from home. ? Corticosteroids. These reduce airway inflammation. They come in pill or inhaled form. You must take these medicines every day for them to work well. ? Antibiotics. These medicines are used when you have a bacterial lung infection. · Take your medicines exactly as prescribed. Call your doctor if you think you are having a problem with your medicine. · Oxygen therapy boosts the amount of oxygen in your blood and helps you breathe easier. Use the flow rate your doctor has recommended, and do not change it without talking to your doctor first.  Other care at home  · If your doctor recommends it, get more exercise. Walking is a good choice. Bit by bit, increase the amount you walk every day. Try for at least 30 minutes on most days of the week. · Learn breathing methods--such as breathing through pursed lips--to help you become less short of breath. · If your doctor has not set you up with a pulmonary rehabilitation program, talk to him or her about whether rehab is right for you. Rehab includes exercise programs, education about your disease and how to manage it, help with diet and other changes, and emotional support. · Eat regular, healthy meals. Use bronchodilators about 1 hour before you eat to make it easier to eat. Eat several small meals instead of three large ones. Drink beverages at the end of the meal. Avoid foods that are hard to chew. Follow-up care is a key part of your treatment and safety. Be sure to make and go to all appointments, and call your doctor if you are having problems. It's also a good idea to know your test results and keep a list of the medicines you take. Where can you learn more? Go to http://lubna-adwoa.info/. Enter R419 in the search box to learn more about \"Learning About Emphysema. \"  Current as of: September 5, 2018  Content Version: 11.9  © 3562-8232 [employer]. Care instructions adapted under license by [employer] (which disclaims liability or warranty for this information). If you have questions about a medical condition or this instruction, always ask your healthcare professional. [address] any warranty or liability for your use of this information. Insomnia: Care Instructions  Your Care Instructions    Insomnia is the inability to sleep well. It is a common problem for most people at some time. Insomnia may make it hard for you to get to sleep, stay asleep, or sleep as long as you need to. This can make you tired and grouchy during the day. It can also make you forgetful, less effective at work, and unhappy. Insomnia can be caused by conditions such as depression or anxiety. Pain can also affect your ability to sleep. When these problems are solved, the insomnia usually clears up. But sometimes bad sleep habits can cause insomnia. If insomnia is affecting your work or your enjoyment of life, you can take steps to improve your sleep. Follow-up care is a key part of your treatment and safety. Be sure to make and go to all appointments, and call your doctor if you are having problems. It's also a good idea to know your test results and keep a list of the medicines you take. How can you care for yourself at home? What to avoid  · Do not have drinks with caffeine, such as coffee or black tea, for 8 hours before bed. · Do not smoke or use other types of tobacco near bedtime. Nicotine is a stimulant and can keep you awake. · Avoid drinking alcohol late in the evening, because it can cause you to wake in the middle of the night. · Do not eat a big meal close to bedtime. If you are hungry, eat a light snack. · Do not drink a lot of water close to bedtime, because the need to urinate may wake you up during the night. · Do not read or watch TV in bed. Use the bed only for sleeping and sexual activity.   What to try  · Go to bed at the same time every night, and wake up at the same time every morning. Do not take naps during the day. · Keep your bedroom quiet, dark, and cool. · Sleep on a comfortable pillow and mattress. · If watching the clock makes you anxious, turn it facing away from you so you cannot see the time. · If you worry when you lie down, start a worry book. Well before bedtime, write down your worries, and then set the book and your concerns aside. · Try meditation or other relaxation techniques before you go to bed. · If you cannot fall asleep, get up and go to another room until you feel sleepy. Do something relaxing. Repeat your bedtime routine before you go to bed again. · Make your house quiet and calm about an hour before bedtime. Turn down the lights, turn off the TV, log off the computer, and turn down the volume on music. This can help you relax after a busy day. When should you call for help? Watch closely for changes in your health, and be sure to contact your doctor if:    · Your efforts to improve your sleep do not work.     · Your insomnia gets worse.     · You have been feeling down, depressed, or hopeless or have lost interest in things that you usually enjoy. Where can you learn more? Go to http://lubna-adwoa.info/. Enter P513 in the search box to learn more about \"Insomnia: Care Instructions. \"  Current as of: June 28, 2018  Content Version: 11.9  © 7626-9921 [employer]. Care instructions adapted under license by [employer] (which disclaims liability or warranty for this information). If you have questions about a medical condition or this instruction, always ask your healthcare professional. [place] [address] any warranty or liability for your use of this information. Prostate-Specific Antigen (PSA) Test: About This Test  What is it?     A prostate-specific antigen (PSA) test measures the amount of PSA in your blood. PSA is released by a man's prostate gland into his blood. A high PSA level may mean that you have an enlargement, infection, or cancer of the prostate. Why is this test done? You may have this test to:  · Check for prostate cancer. · Watch prostate cancer and see if treatment is working. How can you prepare for the test?  Do not ejaculate during the 2 days before your PSA blood test, either during sex or masturbation. What happens before the test?  Tell your doctor if you have had a:  · Test to look at your bladder (cystoscopy) in the past several weeks. · Prostate biopsy in the past several weeks. · Prostate infection or urinary tract infection that has not gone away. · Tube (catheter) inserted into your bladder to drain urine recently. What happens during the test?  A health professional takes a sample of your blood. What happens after the test?  You can go back to your usual activities right away. When should you call for help? Watch closely for changes in your health, and be sure to contact your doctor if you have any questions about this test.  Follow-up care is a key part of your treatment and safety. Be sure to make and go to all appointments, and call your doctor if you are having problems. It's also a good idea to keep a list of the medicines you take. Ask your doctor when you can expect to have your test results. Where can you learn more? Go to http://lubna-adwoa.info/. Enter K646 in the search box to learn more about \"Prostate-Specific Antigen (PSA) Test: About This Test.\"  Current as of: March 27, 2018  Content Version: 11.9  © 9054-6159 [employer]. Care instructions adapted under license by [employer] (which disclaims liability or warranty for this information).  If you have questions about a medical condition or this instruction, always ask your healthcare professional. [place] [address] any warranty or liability for your use of this information. Well Visit, Over 72: Care Instructions  Your Care Instructions    Physical exams can help you stay healthy. Your doctor has checked your overall health and may have suggested ways to take good care of yourself. He or she also may have recommended tests. At home, you can help prevent illness with healthy eating, regular exercise, and other steps. Follow-up care is a key part of your treatment and safety. Be sure to make and go to all appointments, and call your doctor if you are having problems. It's also a good idea to know your test results and keep a list of the medicines you take. How can you care for yourself at home? · Reach and stay at a healthy weight. This will lower your risk for many problems, such as obesity, diabetes, heart disease, and high blood pressure. · Get at least 30 minutes of exercise on most days of the week. Walking is a good choice. You also may want to do other activities, such as running, swimming, cycling, or playing tennis or team sports. · Do not smoke. Smoking can make health problems worse. If you need help quitting, talk to your doctor about stop-smoking programs and medicines. These can increase your chances of quitting for good. · Protect your skin from too much sun. When you're outdoors from 10 a.m. to 4 p.m., stay in the shade or cover up with clothing and a hat with a wide brim. Wear sunglasses that block UV rays. Even when it's cloudy, put broad-spectrum sunscreen (SPF 30 or higher) on any exposed skin. · See a dentist one or two times a year for checkups and to have your teeth cleaned. · Wear a seat belt in the car. · Limit alcohol to 2 drinks a day for men and 1 drink a day for women. Too much alcohol can cause health problems. Follow your doctor's advice about when to have certain tests. These tests can spot problems early. For men and women  · Cholesterol.  Your doctor will tell you how often to have this done based on your overall health and other things that can increase your risk for heart attack and stroke. · Blood pressure. Have your blood pressure checked during a routine doctor visit. Your doctor will tell you how often to check your blood pressure based on your age, your blood pressure results, and other factors. · Diabetes. Ask your doctor whether you should have tests for diabetes. · Vision. Experts recommend that you have yearly exams for glaucoma and other age-related eye problems. · Hearing. Tell your doctor if you notice any change in your hearing. You can have tests to find out how well you hear. · Colon cancer tests. Keep having colon cancer tests as your doctor recommends. You can have one of several types of tests. · Heart attack and stroke risk. At least every 4 to 6 years, you should have your risk for heart attack and stroke assessed. Your doctor uses factors such as your age, blood pressure, cholesterol, and whether you smoke or have diabetes to show what your risk for a heart attack or stroke is over the next 10 years. · Osteoporosis. Talk to your doctor about whether you should have a bone density test to find out whether you have thinning bones. Also ask your doctor about whether you should take calcium and vitamin D supplements. For women  · Pap test and pelvic exam. You may no longer need a Pap test. Talk with your doctor about whether to stop or continue to have Pap tests. · Breast exam and mammogram. Ask how often you should have a mammogram, which is an X-ray of your breasts. A mammogram can spot breast cancer before it can be felt and when it is easiest to treat. · Thyroid disease. Talk to your doctor about whether to have your thyroid checked as part of a regular physical exam. Women have an increased chance of a thyroid problem. For men  · Prostate exam. Talk to your doctor about whether you should have a blood test (called a PSA test) for prostate cancer.  Experts disagree on whether men should have this test. Some experts recommend that you discuss the benefits and risks of the test with your doctor. · Abdominal aortic aneurysm. Ask your doctor whether you should have a test to check for an aneurysm. You may need a test if you ever smoked or if your parent, brother, sister, or child has had an aneurysm. When should you call for help? Watch closely for changes in your health, and be sure to contact your doctor if you have any problems or symptoms that concern you. Where can you learn more? Go to http://lubna-adwoa.info/. Enter F043 in the search box to learn more about \"Well Visit, Over 65: Care Instructions. \"  Current as of: March 28, 2018  Content Version: 11.9  © 5000-2660 [employer]. Care instructions adapted under license by [employer] (which disclaims liability or warranty for this information). If you have questions about a medical condition or this instruction, always ask your healthcare professional. [place] [address] any warranty or liability for your use of this information. Follow-up and Dispositions    · Return in about 3 months (around 10/3/2019), or if symptoms worsen or fail to improve, for f/u cholesterol (lipid panel); have labs drawn prior to next visit. Tdap administered today. RX for Shingrix vaccine given to patient to have administered by outside pharmacist.    Referral to GI for screening colonoscopy. Referral to ophthalmology for retinal and glaucoma screening. Daughter will bring patient another day for walk-in lab work and prefers to view results on [address] and call with questions/concerns. I have reviewed with the patient details of the assessment and plan and all questions were answered. Relevent patient education was performed    An After Visit Summary was printed and given to the patient and was given the opportunity to ask questions. Elizabeth Winter

## 2019-07-03 NOTE — PATIENT INSTRUCTIONS
Anxiety Disorder: Care Instructions  Your Care Instructions    Anxiety is a normal reaction to stress. Difficult situations can cause you to have symptoms such as sweaty palms and a nervous feeling. In an anxiety disorder, the symptoms are far more severe. Constant worry, muscle tension, trouble sleeping, nausea and diarrhea, and other symptoms can make normal daily activities difficult or impossible. These symptoms may occur for no reason, and they can affect your work, school, or social life. Medicines, counseling, and self-care can all help. Follow-up care is a key part of your treatment and safety. Be sure to make and go to all appointments, and call your doctor if you are having problems. It's also a good idea to know your test results and keep a list of the medicines you take. How can you care for yourself at home? · Take medicines exactly as directed. Call your doctor if you think you are having a problem with your medicine. · Go to your counseling sessions and follow-up appointments. · Recognize and accept your anxiety. Then, when you are in a situation that makes you anxious, say to yourself, \"This is not an emergency. I feel uncomfortable, but I am not in danger. I can keep going even if I feel anxious. \"  · Be kind to your body:  ? Relieve tension with exercise or a massage. ? Get enough rest.  ? Avoid alcohol, caffeine, nicotine, and illegal drugs. They can increase your anxiety level and cause sleep problems. ? Learn and do relaxation techniques. See below for more about these techniques. · Engage your mind. Get out and do something you enjoy. Go to a funny movie, or take a walk or hike. Plan your day. Having too much or too little to do can make you anxious. · Keep a record of your symptoms. Discuss your fears with a good friend or family member, or join a support group for people with similar problems. Talking to others sometimes relieves stress.   · Get involved in social groups, or volunteer to help others. Being alone sometimes makes things seem worse than they are. · Get at least 30 minutes of exercise on most days of the week to relieve stress. Walking is a good choice. You also may want to do other activities, such as running, swimming, cycling, or playing tennis or team sports. Relaxation techniques  Do relaxation exercises 10 to 20 minutes a day. You can play soothing, relaxing music while you do them, if you wish. · Tell others in your house that you are going to do your relaxation exercises. Ask them not to disturb you. · Find a comfortable place, away from all distractions and noise. · Lie down on your back, or sit with your back straight. · Focus on your breathing. Make it slow and steady. · Breathe in through your nose. Breathe out through either your nose or mouth. · Breathe deeply, filling up the area between your navel and your rib cage. Breathe so that your belly goes up and down. · Do not hold your breath. · Breathe like this for 5 to 10 minutes. Notice the feeling of calmness throughout your whole body. As you continue to breathe slowly and deeply, relax by doing the following for another 5 to 10 minutes:  · Tighten and relax each muscle group in your body. You can begin at your toes and work your way up to your head. · Imagine your muscle groups relaxing and becoming heavy. · Empty your mind of all thoughts. · Let yourself relax more and more deeply. · Become aware of the state of calmness that surrounds you. · When your relaxation time is over, you can bring yourself back to alertness by moving your fingers and toes and then your hands and feet and then stretching and moving your entire body. Sometimes people fall asleep during relaxation, but they usually wake up shortly afterward. · Always give yourself time to return to full alertness before you drive a car or do anything that might cause an accident if you are not fully alert.  Never play a relaxation tape while you drive a car. When should you call for help? Call 911 anytime you think you may need emergency care. For example, call if:    · You feel you cannot stop from hurting yourself or someone else.   Vincent Bowen the numbers for these national suicide hotlines: 7-918-871-TALK (1-684.804.2317) and 5-629-GVPWVKI (8-151.771.1694). If you or someone you know talks about suicide or feeling hopeless, get help right away.   Watch closely for changes in your health, and be sure to contact your doctor if:    · You have anxiety or fear that affects your life.     · You have symptoms of anxiety that are new or different from those you had before. Where can you learn more? Go to http://lubna-adwoa.info/. Enter P754 in the search box to learn more about \"Anxiety Disorder: Care Instructions. \"  Current as of: September 11, 2018  Content Version: 11.9  © 2527-5837 [employer]. Care instructions adapted under license by [employer] (which disclaims liability or warranty for this information). If you have questions about a medical condition or this instruction, always ask your healthcare professional. [place] [address] any warranty or liability for your use of this information. Chronic Obstructive Pulmonary Disease (COPD): Care Instructions  Your Care Instructions    Chronic obstructive pulmonary disease (COPD) is a general term for a group of lung diseases, including emphysema and chronic bronchitis. People with COPD have decreased airflow in and out of the lungs, which makes it hard to breathe. The airways also can get clogged with thick mucus. Cigarette smoking is a major cause of COPD. Although there is no cure for COPD, you can slow its progress. Following your treatment plan and taking care of yourself can help you feel better and live longer. Follow-up care is a key part of your treatment and safety.  Be sure to make and go to all appointments, and call your doctor if you are having problems. It's also a good idea to know your test results and keep a list of the medicines you take. How can you care for yourself at home?   Staying healthy    · Do not smoke. This is the most important step you can take to prevent more damage to your lungs. If you need help quitting, talk to your doctor about stop-smoking programs and medicines. These can increase your chances of quitting for good.     · Avoid colds and flu. Get a pneumococcal vaccine shot. If you have had one before, ask your doctor whether you need a second dose. Get the flu vaccine every fall. If you must be around people with colds or the flu, wash your hands often.     · Avoid secondhand smoke, air pollution, and high altitudes. Also avoid cold, dry air and hot, humid air. Stay at home with your windows closed when air pollution is bad.    Medicines and oxygen therapy    · Take your medicines exactly as prescribed. Call your doctor if you think you are having a problem with your medicine.     · You may be taking medicines such as:  ? Bronchodilators. These help open your airways and make breathing easier. Bronchodilators are either short-acting (work for 6 to 9 hours) or long-acting (work for 24 hours). You inhale most bronchodilators, so they start to act quickly. Always carry your quick-relief inhaler with you in case you need it while you are away from home. ? Corticosteroids (prednisone, budesonide). These reduce airway inflammation. They come in pill or inhaled form. You must take these medicines every day for them to work well.     · A spacer may help you get more inhaled medicine to your lungs. Ask your doctor or pharmacist if a spacer is right for you. If it is, ask how to use it properly.     · Do not take any vitamins, over-the-counter medicine, or herbal products without talking to your doctor first.     · If your doctor prescribed antibiotics, take them as directed.  Do not stop taking them just because you feel better. You need to take the full course of antibiotics.     · Oxygen therapy boosts the amount of oxygen in your blood and helps you breathe easier. Use the flow rate your doctor has recommended, and do not change it without talking to your doctor first.   Activity    · Get regular exercise. Walking is an easy way to get exercise. Start out slowly, and walk a little more each day.     · Pay attention to your breathing. You are exercising too hard if you cannot talk while you are exercising.     · Take short rest breaks when doing household chores and other activities.     · Learn breathing methods--such as breathing through pursed lips--to help you become less short of breath.     · If your doctor has not set you up with a pulmonary rehabilitation program, talk to him or her about whether rehab is right for you. Rehab includes exercise programs, education about your disease and how to manage it, help with diet and other changes, and emotional support. Diet    · Eat regular, healthy meals. Use bronchodilators about 1 hour before you eat to make it easier to eat. Eat several small meals instead of three large ones. Drink beverages at the end of the meal. Avoid foods that are hard to chew.     · Eat foods that contain protein so that you do not lose muscle mass.     · Talk with your doctor if you gain too much weight or if you lose weight without trying.    Mental health    · Talk to your family, friends, or a therapist about your feelings. It is normal to feel frightened, angry, hopeless, helpless, and even guilty. Talking openly about bad feelings can help you cope. If these feelings last, talk to your doctor. When should you call for help? Call 911 anytime you think you may need emergency care.  For example, call if:    · You have severe trouble breathing.    Call your doctor now or seek immediate medical care if:    · You have new or worse trouble breathing.     · You cough up blood.     · You have a fever.    Watch closely for changes in your health, and be sure to contact your doctor if:    · You cough more deeply or more often, especially if you notice more mucus or a change in the color of your mucus.     · You have new or worse swelling in your legs or belly.     · You are not getting better as expected. Where can you learn more? Go to http://lubna-adwoa.info/. Jerry Hogue in the search box to learn more about \"Chronic Obstructive Pulmonary Disease (COPD): Care Instructions. \"  Current as of: September 5, 2018  Content Version: 11.9  © 6664-1661 [employer]. Care instructions adapted under license by [employer] (which disclaims liability or warranty for this information). If you have questions about a medical condition or this instruction, always ask your healthcare professional. [address] any warranty or liability for your use of this information. Learning About Frailty  What is frailty? Older adults may tire quickly and move more slowly as they age. Everyday activities, like shopping or even getting dressed, can become hard to do. This may be a health problem called frailty. Experts think frailty develops because of changes in how your body works. These changes can be caused by aging, a disease, or both. Your organs may not do their jobs as well, and you may lose muscle. Frailty also involves:  · Changes in your body's endocrine system, which releases the hormones, or chemicals, that affect how your body functions. · Inflammation, or damage to the cells and tissues in your body. What are the symptoms? You may be frail if you have lost weight, are weak, or feel like you have low energy. The way you feel when you're frail may make you more likely to have depression. What happens when you become frail?   When you are frail, you may have trouble doing everyday tasks, like getting dressed, eating, bathing, getting in or out of bed, and using the toilet. You may feel weak and off balance and worry about falling. If you also have another health problem, your frailty may get worse quickly. How can you care for yourself? If you think you are becoming frail, see your doctor. There are things you and your doctor can do to prevent frailty or slow it down. If frailty is caused or made worse by another health problem, you and your doctor can treat the problem. Talk to your doctor about any medicines you're taking that might be making you feel tired. Many medicines, such as cold and allergy medicines, often cause fatigue. Eat healthy  Food gives you calories, which provide energy and can help stop weight loss. Here are some tips for eating well:  · Eat more. Getting calories may be more important than avoiding fat for other reasons. But talk to your doctor about any changes you'd like to make in what you eat. · Eat more protein. This may help you keep your muscles strong. · Try high-energy drinks, such as Boost, Ensure, or instant breakfast drinks. Smoothies, milk shakes, and milk also may help you limit weight loss. · If it's easier for you, eat smaller meals several times a day, rather than larger meals 3 times a day. Do not skip meals, especially breakfast.  Stay active  Talk to your doctor about exercises to help build your strength and balance. Examples include:  · Resistance exercises, such as pushing against a wall or sitting in a chair and raising your legs. These are an easy way to tone your muscles. · Walking, which can be good for your body. But ask your doctor how often and how much walking is best for you. · Estevan chi, which is moving in a slow, rhythmic way. This can help with muscle tone and balance. Prevent falls  If you are worried about falling, here are some things you can do:  · Keep your bones strong. Talk to your doctor to be sure you are getting enough vitamin D and calcium.   · Take out raised doorway thresholds, and get rid of throw rugs. · If stairs are a problem, put in handrails or a ramp. · Arrange your furniture and electric cords to keep them out of walking paths. · Keep your house well lit, especially Judieth Blades, and outside walkways. Use night-lights in hallways and bathrooms. · Use shower chairs and bath benches. Stay connected  When you feel tired, it's often easier to stay home and not see people. But it is important to connect with others and stay positive. Being with other people can help you feel good and may help you stay healthier as you age. Where can you learn more? Go to http://lubnaAnaplanadwoa.info/. Enter E826 in the search box to learn more about \"Learning About Frailty. \"  Current as of: March 15, 2018  Content Version: 11.9  © 8927-5743 [email or web address]. Care instructions adapted under license by [employer] (which disclaims liability or warranty for this information). If you have questions about a medical condition or this instruction, always ask your healthcare professional. [address] any warranty or liability for your use of this information. Learning About Emphysema  What is emphysema? Emphysema is damage to the air sacs in your lungs. In a healthy person, the tiny air sacs in the lungs are like balloons. As you breathe in and out, they get bigger and smaller to move air through your lungs. With emphysema, these air sacs lose their stretch. Less oxygen gets into your blood and you feel short of breath. Emphysema is a form of COPD (chronic obstructive pulmonary disease). Emphysema is usually caused by smoking. But chemical fumes, dust, or air pollution also can cause it over time. People who get it in their 35s or 45s may have a disorder that runs in families, called alpha-1 antitrypsin deficiency. But this is rare. What can you expect when you have emphysema? Emphysema gets worse over time.  You cannot undo the damage to your lungs. Over time, you may find that:  · You get short of breath even when you do simple things like get dressed or fix a meal.  · It is hard to eat or exercise. · You lose weight and feel weaker. But there are things you can do to prevent more damage and feel better. What are the symptoms? The main symptoms of emphysema are:  · A cough that will not go away. · Mucus that comes up when you cough. · Shortness of breath that gets worse when you exercise. At times, your symptoms may suddenly flare up and get much worse. This is a called an exacerbation (say \"egg-SALVADOR-mega-BAY-bar\"). When this happens, your usual symptoms quickly get worse and stay bad. This can be dangerous. You may have to go to the hospital.  How can you keep emphysema from getting worse? Don't smoke. That is the best way to keep emphysema from getting worse. If you already smoke, it is never too late to stop. If you need help quitting, talk to your doctor about stop-smoking programs and medicines. These can increase your chances of quitting for good. You can do other things to keep emphysema from getting worse:  · Avoid bad air. Air pollution, chemical fumes, and dust also can make emphysema worse. · Get a flu shot every year. A shot may keep the flu from turning into something more serious, like pneumonia. A flu shot also may lower your chances of having a flare-up. · Get a pneumococcal shot. A shot can prevent some of the serious complications of pneumonia. Ask your doctor how often you should get this shot. How is emphysema treated? Emphysema is treated with medicines and oxygen. You also can take steps at home to stay healthy and keep your condition from getting worse. Medicines and oxygen therapy  · You may be taking medicines such as:  ? Bronchodilators. These help open your airways and make breathing easier.  Bronchodilators are either short-acting (work for 6 to 9 hours) or long-acting (work for 25 hours). You inhale most bronchodilators, so they start to act quickly. Always carry your quick-relief inhaler with you in case you need it while you are away from home. ? Corticosteroids. These reduce airway inflammation. They come in pill or inhaled form. You must take these medicines every day for them to work well. ? Antibiotics. These medicines are used when you have a bacterial lung infection. · Take your medicines exactly as prescribed. Call your doctor if you think you are having a problem with your medicine. · Oxygen therapy boosts the amount of oxygen in your blood and helps you breathe easier. Use the flow rate your doctor has recommended, and do not change it without talking to your doctor first.  Other care at home  · If your doctor recommends it, get more exercise. Walking is a good choice. Bit by bit, increase the amount you walk every day. Try for at least 30 minutes on most days of the week. · Learn breathing methods--such as breathing through pursed lips--to help you become less short of breath. · If your doctor has not set you up with a pulmonary rehabilitation program, talk to him or her about whether rehab is right for you. Rehab includes exercise programs, education about your disease and how to manage it, help with diet and other changes, and emotional support. · Eat regular, healthy meals. Use bronchodilators about 1 hour before you eat to make it easier to eat. Eat several small meals instead of three large ones. Drink beverages at the end of the meal. Avoid foods that are hard to chew. Follow-up care is a key part of your treatment and safety. Be sure to make and go to all appointments, and call your doctor if you are having problems. It's also a good idea to know your test results and keep a list of the medicines you take. Where can you learn more? Go to http://lubna-adwoa.info/.   Enter W808 in the search box to learn more about \"Learning About Emphysema. \"  Current as of: September 5, 2018  Content Version: 11.9  © 2858-5229 [employer]. Care instructions adapted under license by [employer] (which disclaims liability or warranty for this information). If you have questions about a medical condition or this instruction, always ask your healthcare professional. [address] any warranty or liability for your use of this information. Insomnia: Care Instructions  Your Care Instructions    Insomnia is the inability to sleep well. It is a common problem for most people at some time. Insomnia may make it hard for you to get to sleep, stay asleep, or sleep as long as you need to. This can make you tired and grouchy during the day. It can also make you forgetful, less effective at work, and unhappy. Insomnia can be caused by conditions such as depression or anxiety. Pain can also affect your ability to sleep. When these problems are solved, the insomnia usually clears up. But sometimes bad sleep habits can cause insomnia. If insomnia is affecting your work or your enjoyment of life, you can take steps to improve your sleep. Follow-up care is a key part of your treatment and safety. Be sure to make and go to all appointments, and call your doctor if you are having problems. It's also a good idea to know your test results and keep a list of the medicines you take. How can you care for yourself at home? What to avoid  · Do not have drinks with caffeine, such as coffee or black tea, for 8 hours before bed. · Do not smoke or use other types of tobacco near bedtime. Nicotine is a stimulant and can keep you awake. · Avoid drinking alcohol late in the evening, because it can cause you to wake in the middle of the night. · Do not eat a big meal close to bedtime. If you are hungry, eat a light snack.   · Do not drink a lot of water close to bedtime, because the need to urinate may wake you up during the night.  · Do not read or watch TV in bed. Use the bed only for sleeping and sexual activity. What to try  · Go to bed at the same time every night, and wake up at the same time every morning. Do not take naps during the day. · Keep your bedroom quiet, dark, and cool. · Sleep on a comfortable pillow and mattress. · If watching the clock makes you anxious, turn it facing away from you so you cannot see the time. · If you worry when you lie down, start a worry book. Well before bedtime, write down your worries, and then set the book and your concerns aside. · Try meditation or other relaxation techniques before you go to bed. · If you cannot fall asleep, get up and go to another room until you feel sleepy. Do something relaxing. Repeat your bedtime routine before you go to bed again. · Make your house quiet and calm about an hour before bedtime. Turn down the lights, turn off the TV, log off the computer, and turn down the volume on music. This can help you relax after a busy day. When should you call for help? Watch closely for changes in your health, and be sure to contact your doctor if:    · Your efforts to improve your sleep do not work.     · Your insomnia gets worse.     · You have been feeling down, depressed, or hopeless or have lost interest in things that you usually enjoy. Where can you learn more? Go to http://lubna-adwoa.info/. Enter P513 in the search box to learn more about \"Insomnia: Care Instructions. \"  Current as of: June 28, 2018  Content Version: 11.9  © 9511-3257 [employer]. Care instructions adapted under license by [employer] (which disclaims liability or warranty for this information). If you have questions about a medical condition or this instruction, always ask your healthcare professional. [address] any warranty or liability for your use of this information.          Prostate-Specific Antigen (PSA) Test: About This Test  What is it? A prostate-specific antigen (PSA) test measures the amount of PSA in your blood. PSA is released by a man's prostate gland into his blood. A high PSA level may mean that you have an enlargement, infection, or cancer of the prostate. Why is this test done? You may have this test to:  · Check for prostate cancer. · Watch prostate cancer and see if treatment is working. How can you prepare for the test?  Do not ejaculate during the 2 days before your PSA blood test, either during sex or masturbation. What happens before the test?  Tell your doctor if you have had a:  · Test to look at your bladder (cystoscopy) in the past several weeks. · Prostate biopsy in the past several weeks. · Prostate infection or urinary tract infection that has not gone away. · Tube (catheter) inserted into your bladder to drain urine recently. What happens during the test?  A health professional takes a sample of your blood. What happens after the test?  You can go back to your usual activities right away. When should you call for help? Watch closely for changes in your health, and be sure to contact your doctor if you have any questions about this test.  Follow-up care is a key part of your treatment and safety. Be sure to make and go to all appointments, and call your doctor if you are having problems. It's also a good idea to keep a list of the medicines you take. Ask your doctor when you can expect to have your test results. Where can you learn more? Go to http://lubna-adwoa.info/. Enter M870 in the search box to learn more about \"Prostate-Specific Antigen (PSA) Test: About This Test.\"  Current as of: March 27, 2018  Content Version: 11.9  © 8576-5970 [employer]. Care instructions adapted under license by [employer] (which disclaims liability or warranty for this information).  If you have questions about a medical condition or this instruction, always ask your healthcare professional. [place] [address] any warranty or liability for your use of this information. Well Visit, Over 72: Care Instructions  Your Care Instructions    Physical exams can help you stay healthy. Your doctor has checked your overall health and may have suggested ways to take good care of yourself. He or she also may have recommended tests. At home, you can help prevent illness with healthy eating, regular exercise, and other steps. Follow-up care is a key part of your treatment and safety. Be sure to make and go to all appointments, and call your doctor if you are having problems. It's also a good idea to know your test results and keep a list of the medicines you take. How can you care for yourself at home? · Reach and stay at a healthy weight. This will lower your risk for many problems, such as obesity, diabetes, heart disease, and high blood pressure. · Get at least 30 minutes of exercise on most days of the week. Walking is a good choice. You also may want to do other activities, such as running, swimming, cycling, or playing tennis or team sports. · Do not smoke. Smoking can make health problems worse. If you need help quitting, talk to your doctor about stop-smoking programs and medicines. These can increase your chances of quitting for good. · Protect your skin from too much sun. When you're outdoors from 10 a.m. to 4 p.m., stay in the shade or cover up with clothing and a hat with a wide brim. Wear sunglasses that block UV rays. Even when it's cloudy, put broad-spectrum sunscreen (SPF 30 or higher) on any exposed skin. · See a dentist one or two times a year for checkups and to have your teeth cleaned. · Wear a seat belt in the car. · Limit alcohol to 2 drinks a day for men and 1 drink a day for women. Too much alcohol can cause health problems. Follow your doctor's advice about when to have certain tests.  These tests can spot problems early.  For men and women  · Cholesterol. Your doctor will tell you how often to have this done based on your overall health and other things that can increase your risk for heart attack and stroke. · Blood pressure. Have your blood pressure checked during a routine doctor visit. Your doctor will tell you how often to check your blood pressure based on your age, your blood pressure results, and other factors. · Diabetes. Ask your doctor whether you should have tests for diabetes. · Vision. Experts recommend that you have yearly exams for glaucoma and other age-related eye problems. · Hearing. Tell your doctor if you notice any change in your hearing. You can have tests to find out how well you hear. · Colon cancer tests. Keep having colon cancer tests as your doctor recommends. You can have one of several types of tests. · Heart attack and stroke risk. At least every 4 to 6 years, you should have your risk for heart attack and stroke assessed. Your doctor uses factors such as your age, blood pressure, cholesterol, and whether you smoke or have diabetes to show what your risk for a heart attack or stroke is over the next 10 years. · Osteoporosis. Talk to your doctor about whether you should have a bone density test to find out whether you have thinning bones. Also ask your doctor about whether you should take calcium and vitamin D supplements. For women  · Pap test and pelvic exam. You may no longer need a Pap test. Talk with your doctor about whether to stop or continue to have Pap tests. · Breast exam and mammogram. Ask how often you should have a mammogram, which is an X-ray of your breasts. A mammogram can spot breast cancer before it can be felt and when it is easiest to treat. · Thyroid disease. Talk to your doctor about whether to have your thyroid checked as part of a regular physical exam. Women have an increased chance of a thyroid problem.   For men  · Prostate exam. Talk to your doctor about whether you should have a blood test (called a PSA test) for prostate cancer. Experts disagree on whether men should have this test. Some experts recommend that you discuss the benefits and risks of the test with your doctor. · Abdominal aortic aneurysm. Ask your doctor whether you should have a test to check for an aneurysm. You may need a test if you ever smoked or if your parent, brother, sister, or child has had an aneurysm. When should you call for help? Watch closely for changes in your health, and be sure to contact your doctor if you have any problems or symptoms that concern you. Where can you learn more? Go to http://lubna-adwoa.info/. Enter X436 in the search box to learn more about \"Well Visit, Over 65: Care Instructions. \"  Current as of: March 28, 2018  Content Version: 11.9  © 2293-7419 [employer]. Care instructions adapted under license by [employer] (which disclaims liability or warranty for this information). If you have questions about a medical condition or this instruction, always ask your healthcare professional. [address] any warranty or liability for your use of this information.

## 2019-07-03 NOTE — ACP (ADVANCE CARE PLANNING)
Discussed with patient and daughter advanced health care directive. Written information on honoring choices discussed and given to them. Both were given the opportunity to ask questions.

## 2019-07-17 NOTE — PROGRESS NOTES
TC to patient to discuss lab results. Spoke with POA, his niece. Informed of low Vit D level and elevated PSA. Explained I sent rx for Ciprofloxacin 500 mg, 1 tab 2 times a day for 7 days  And Vit D 50, 000 units 1 cap once a week to pharmacy. Educated use, side effects and s/s allergic reaction. Will repeat Vit D level in 12 wks and PSA after completion of antibiotics. She verbalized understanding and was given an opportunity to ask questions.

## 2019-07-31 NOTE — H&P
[address]  [address] [facility] [place], [address]          Pre-procedure History and Physical       NAME:  Nicole Dobbs   :   1940   MRN:   [id number]     CHIEF COMPLAINT/HPI: See procedure note    PMH:  Past Medical History:   Diagnosis Date    Anxiety     Chronic confusion     Chronic obstructive pulmonary disease ([facility] [address]     Depression     pt has schizophrenia per son    Poor historian     Sleep disorder     insomnia       PSH:  History reviewed. No pertinent surgical history. Allergies: Allergies   Allergen Reactions    Pollen Extracts Sneezing       Home Medications:  Prior to Admission Medications   Prescriptions Last Dose Informant Patient Reported? Taking? albuterol (PROVENTIL HFA, VENTOLIN HFA, PROAIR HFA) 90 mcg/actuation inhaler 2019 at Unknown time  No Yes   Sig: Take 1 Puff by inhalation every four (4) hours as needed for Wheezing.   ergocalciferol (ERGOCALCIFEROL) 50,000 unit capsule Unknown at Unknown time  No No   Sig: Take 1 Cap by mouth every seven (7) days for 12 doses. melatonin tab tablet 2019 at Unknown time  No Yes   Sig: Take 1 Tab by mouth nightly. mirtazapine (REMERON) 15 mg tablet Unknown at Unknown time  No No   Sig: Take 1 Tab by mouth nightly.       Facility-Administered Medications: None       Hospital Medications:  Current Facility-Administered Medications   Medication Dose Route Frequency    0.9% sodium chloride infusion  150 mL/hr IntraVENous CONTINUOUS    sodium chloride (NS) flush 5-40 mL  5-40 mL IntraVENous Q8H    sodium chloride (NS) flush 5-40 mL  5-40 mL IntraVENous PRN    midazolam (VERSED) injection 0.25-5 mg  0.25-5 mg IntraVENous Multiple    fentaNYL citrate (PF) injection 200 mcg  200 mcg IntraVENous Multiple    simethicone (MYLICON) 58QL/9.3UB oral drops 80 mg  1.2 mL Oral Multiple    atropine injection 0.5 mg  0.5 mg IntraVENous ONCE PRN    EPINEPHrine (ADRENALIN) 0.1 mg/mL syringe 1 mg  1 mg Endoscopically ONCE PRN       Family History:  Family History   Problem Relation Age of Onset    Cancer Mother         colon ancer    Cancer Father         lung cancer    Cancer Brother         lung cancer       Social History:  Social History     Tobacco Use    Smoking status: Former Smoker     Packs/day: 1.00     Types: Cigarettes    Smokeless tobacco: Never Used    Tobacco comment: every 2 days   Substance Use Topics    Alcohol use: No         PHYSICAL EXAM PRIOR TO SEDATION:  General: Alert, in no acute distress    Lungs:            CTA bilaterally  Heart:  Normal S1, S2    Abdomen: Soft, Non distended, Non tender. Normoactive bowel sounds. Assessment:   Stable for sedation administration.   Date of last colonoscopy: 10 yrs, Polyps  No    Plan:   · Endoscopic procedure with sedation     Signed By: Kellen Srinivasan MD     7/31/2019  12:38 PM

## 2019-07-31 NOTE — ROUTINE PROCESS
Loren Snow  1940  [id number]    Situation:  Verbal report received from: Faye Wood  Procedure: Procedure(s):  COLONOSCOPY  ENDOSCOPIC POLYPECTOMY    Background:    Preoperative diagnosis: FAMILY HSITORY COLON CANCER  Postoperative diagnosis: 1. colon polyps    :  Dr. Orion Plasencia  Assistant(s): Endoscopy Technician-1: Eddie OTOOLE  Endoscopy RN-1: Aristeo Fish RN    Specimens:   ID Type Source Tests Collected by Time Destination   1 : ascending colon polyps Preservative Colon, Ascending  Jake Roa MD 7/31/2019 1248 Pathology   2 : descending colon polyp Preservative Colon, Descending  Jake Roa MD 7/31/2019 1300 Pathology     H. Pylori  no    Assessment:  Intra-procedure medications   Anesthesia gave intra-procedure sedation and medications, see anesthesia flow sheet yes    Intravenous fluids: NS@ KVO     Vital signs stable     Abdominal assessment: round and soft     Recommendation:  Discharge patient per MD order.   Family or Friend Son  Permission to share finding with family or friend yes

## 2019-07-31 NOTE — PROGRESS NOTES

## 2019-07-31 NOTE — ANESTHESIA POSTPROCEDURE EVALUATION
Post-Anesthesia Evaluation and Assessment Patient: Shoaib Finch MRN: [id number]  SSN: [id number] YOB: 1940  Age: 70 y.o. Sex: male I have evaluated the patient and they are stable and ready for discharge from the PACU. Cardiovascular Function/Vital Signs Visit Vitals /67 Pulse 78 Temp 36.5 °C (97.7 °F) Resp 20 Wt 62.6 kg (138 lb) SpO2 97% BMI 18.72 kg/m² Patient is status post MAC anesthesia for Procedure(s): 
COLONOSCOPY 
ENDOSCOPIC POLYPECTOMY. Nausea/Vomiting: None Postoperative hydration reviewed and adequate. Pain: 
Pain Scale 1: Numeric (0 - 10) (07/31/19 1314) Pain Intensity 1: 0 (07/31/19 1314) Managed Neurological Status: At baseline Mental Status, Level of Consciousness: Alert and  oriented to person, place, and time Pulmonary Status:  
O2 Device: Room air (07/31/19 1314) Adequate oxygenation and airway patent Complications related to anesthesia: None Post-anesthesia assessment completed. No concerns Signed By: Isabel Man MD   
 July 31, 2019 Procedure(s): 
COLONOSCOPY 
ENDOSCOPIC POLYPECTOMY. MAC 
 
<BSHSIANPOST> Vitals Value Taken Time /67 7/31/2019  1:14 PM  
Temp 36.5 °C (97.7 °F) 7/31/2019  1:14 PM  
Pulse 74 7/31/2019  1:15 PM  
Resp 18 7/31/2019  1:15 PM  
SpO2 86 % 7/31/2019  1:15 PM  
Vitals shown include unvalidated device data.

## 2019-07-31 NOTE — PROCEDURES
[facility] [address] Jose Wright M.D.  [place], [address] [facility]  [phone]               Colonoscopy Procedure Note    NAME: Brooklynn King  :  1940  MRN:  [id number]    Indications:   Screening colonoscopy     : Johan Langford MD    Referring Provider:  Pedro Davis NP    Medicines:  MAC anesthesia      Procedure Details:  After informed consent was obtained with all risks and benefits of the procedure explained and preprocedure exam completed, the patient was placed in the left lateral decubitus position. Universal protocol for patient identification was performed and documented in the nursing notes. Throughout the procedure, the patient's blood pressure was monitored at least every five minutes; pulse, and oxygen saturations were monitored continuously. All vital signs were documented in the nursing notes. A digital rectal exam was performed and was normal.  The Olympus videocolonoscope  was inserted in the rectum and carefully advanced to the cecum, which was identified by the ileocecal valve and appendiceal orifice. The colonoscope was slowly withdrawn with careful evaluation between folds. Retroflexion in the rectum was performed; findings and interventions are described below. Procedure start time, extent reached time/cecum time, and procedure end time are documented in the nursing notes. The quality of preparation was adequate.        Findings:   Rectum: normal  Sigmoid: normal  Descending Colon: 1  Sessile polyp(s), the largest 7 mm in size; s/p cold snare polypectomy  Transverse Colon: normal  Ascending Colon: 3  Sessile polyp(s), the largest 3 mm in size; s/p cold forceps polypectomy  Cecum: normal    Interventions:    4 complete polypectomy were performed using cold snare /forceps and the polyps were  retrieved    Specimens:   ID Type Source Tests Collected by Time Destination   1 : ascending colon polyps Preservative Colon, Ascending Yasemin Harris MD 7/31/2019 1248 Pathology   2 : descending colon polyp Preservative Colon, Descending  Yasemin Harris MD 7/31/2019 1300 Pathology       EBL:  None. Complications:   No immediate complications     Impression:  -Benign appearing colon polyps, removed as above. Recommendations:   -If adenoma is present, repeat colonoscopy in 3-5 years. If < 10 years, reason:  above average risk patient    Resume normal medication(s). Signed by:  Maddie Hines MD          7/31/2019  1:05 PM

## 2019-07-31 NOTE — DISCHARGE INSTRUCTIONS
[facility] [address] Cherelle Mcadams M.D.  [place], [address]   [phone]          COLONOSCOPY DISCHARGE INSTRUCTIONS    Nisha Peterson  [id number]  1940    DISCOMFORT:  Redness at IV site- apply warm compress to area; if redness or soreness persist- contact your physician  There may be a slight amount of blood passed from the rectum  Gaseous discomfort- walking, belching will help relieve any discomfort  You may not operate a vehicle for 12 hours  You may not engage in an occupation involving machinery or appliances for the  rest of today  You may not drink alcoholic beverages for at least 12 hours  Avoid making any critical decisions for at least 24 hours    DIET:   You may resume your normal diet, but some patients find that heavy or large  meals may lead to indigestion or vomiting. I suggest a light meal as first food  intake. I recommend a whole food, plant-based diet for your overall health. ACTIVITY:  You may resume your normal daily activities. It is recommended that you spend the remainder of the day resting - avoid any strenuous activity. CALL M.D. IF ANY SIGN OF:   Increasing pain, nausea, vomiting  Abdominal distension (swelling)  Significant bleeding (oral or rectal)  Fever   Pain in chest area  Shortness of breath    Additional Instructions:   Call Dr. Cherelle Mcadams if any questions or problems at [phone]   You should receive the biopsy results by phone or mail within 3 weeks, if not, call  my office for the results      Should have a repeat colonoscopy in 3-5 years based on pathology. Colonoscopy showed 4 polyps removed. Patient Education        Colon Polyps: Care Instructions  Your Care Instructions    Colon polyps are growths in the colon or the rectum. The cause of most colon polyps is not known, and most people who get them do not have any problems. But a certain kind can turn into cancer.  For this reason, regular testing for colon polyps is important for people as they get older. It is also important for anyone who has an increased risk for colon cancer. Polyps are usually found through routine colon cancer screening tests. Although most colon polyps are not cancerous, they are usually removed and then tested for cancer. Screening for colon cancer saves lives because the cancer can usually be cured if it is caught early. If you have a polyp that is the type that can turn into cancer, you may need more tests to examine your entire colon. The doctor will remove any other polyps that he or she finds, and you will be tested more often. Follow-up care is a key part of your treatment and safety. Be sure to make and go to all appointments, and call your doctor if you are having problems. It's also a good idea to know your test results and keep a list of the medicines you take. How can you care for yourself at home? Regular exams to look for colon polyps are the best way to prevent polyps from turning into colon cancer. These can include stool tests, sigmoidoscopy, colonoscopy, and CT colonography. Talk with your doctor about a testing schedule that is right for you. To prevent polyps  There is no home treatment that can prevent colon polyps. But these steps may help lower your risk for cancer. · Stay active. Being active can help you get to and stay at a healthy weight. Try to exercise on most days of the week. Walking is a good choice. · Eat well. Choose a variety of vegetables, fruits, legumes (such as peas and beans), fish, poultry, and whole grains. · Do not smoke. If you need help quitting, talk to your doctor about stop-smoking programs and medicines. These can increase your chances of quitting for good. · If you drink alcohol, limit how much you drink. Limit alcohol to 2 drinks a day for men and 1 drink a day for women. When should you call for help? Call your doctor now or seek immediate medical care if:    · You have severe belly pain.   · Your stools are maroon or very bloody.    Watch closely for changes in your health, and be sure to contact your doctor if:    · You have a fever.     · You have nausea or vomiting.     · You have a change in bowel habits (new constipation or diarrhea).     · Your symptoms get worse or are not improving as expected. Where can you learn more? Go to http://lubna-adwoa.info/. Enter 95 [id number] in the search box to learn more about \"Colon Polyps: Care Instructions. \"  Current as of: December 19, 2018  Content Version: 12.1  © 6796-4926 [employer]. Care instructions adapted under license by [employer] (which disclaims liability or warranty for this information). If you have questions about a medical condition or this instruction, always ask your healthcare professional. [address] any warranty or liability for your use of this information.

## 2019-07-31 NOTE — ANESTHESIA PREPROCEDURE EVALUATION
Relevant Problems No relevant active problems Anesthetic History No history of anesthetic complications Review of Systems / Medical History Patient summary reviewed, nursing notes reviewed and pertinent labs reviewed Pulmonary COPD: mild Neuro/Psych Within defined limits Cardiovascular Within defined limits GI/Hepatic/Renal 
Within defined limits Endo/Other Within defined limits Other Findings Physical Exam 
 
Airway Mallampati: II 
TM Distance: > 6 cm Neck ROM: normal range of motion Mouth opening: Normal 
 
 Cardiovascular Regular rate and rhythm,  S1 and S2 normal,  no murmur, click, rub, or gallop Dental 
No notable dental hx Pulmonary Breath sounds clear to auscultation Abdominal 
GI exam deferred Other Findings Anesthetic Plan ASA: 2 Anesthesia type: MAC Anesthetic plan and risks discussed with: Patient

## 2019-09-09 NOTE — PROGRESS NOTES
Psychiatric Outpatient Progress Note    Account Number:  @MRN@  Name: [unfilled]    Chief Complaint: Jp Gavin is a 70 y.o. , [place] Breezy (Ning Abhinav)  male was seen today for follow-up of psychiatric condition and therapy/ psychotropic medication managementReceived treatment from Dr Rosanna Giraldo MD since April 2018. Patient was transferred as her previous provider Dr Rosanna Giraldo MD,  has  left the practice. Last office visit was august 2018. He was brought by his niece from his group home. HPI:History and HPI Copied from Dr Mitzy Hahn  initial visit note,  & Diagnoses(4/27/18) and addended    This is a 79-year-old , [place] Breezy male, with vague history of mental health problems, COPD, smoking and THC abuse in remission, was seen today to establish his mental health treatment here. Patient has been on multiple psychotropic medication including Risperdal, trazodone, temazepam and also Risperdal Consta to few months ago. Patient is not on any psychotropic medication at this time and the BUN was wondering whether he needs to be on some medication especially for his  for sleep. He was brought by his niece from his new group home, [facility], where he has been residing since February of this year. According to his niece, patient was doing well until 7 years ago when he retired from his job as a  and got  from his wife at the same time. 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 He was started on some medication and was placed in assisted living facility where he did not do that well. He stayed there till 1 year ago and then was sent to the hospital because of his behavioral issue which included a smoking in his room against the policy of the group home.   Last August he was taken to the emergency room at [facility] with a similar policy violation of  smoking and was started on Risperdal and Risperdal Consta for noncompliance. Within few months side effect of Risperdal caught up with him and he started to fall regularly. He was taken to [facility] and was found to have pneumonia in January of this year and also had fracture of lumbar spine which was not displaced. Patient was treated for the pneumonia and his fracture was left alone. His niece got him a smaller group home where he has been residing since discharge from [facility].  He ran out of his medications 6 weeks ago completely and has been doing okay except for sleep. He appears to be recovering back to his baseline level of functioning in a new, small group home.  Patient does not have any significant behavioral health issues at this time. Primary diagnosis: Depression with anxiety-mild, insomnia, mild cognitive impairment versus dementia, history of THC and nicotine dependency- in remission  Secondary diagnosis: No significant personality disorder noted  Tertiary diagnosis: COPD  Strength & Weaknesses: Cooperative and pleasant, supportive niece, good living condition, medically stable. Treatment Plan:   Medication: discontinue Risperdal, temazepam, and trazodone.  Started low-dose Remeron ane melatonin. HPI :  Today he was seen alone and he came by taxi. He reported he lives in group home, his niece has POA and she pays his bills. Reported he is able to maintain his ADL's. Patient was cooperative and pleasant and responded to my questions with brief answers. He reported that he is feeling well and not depressed. Reported he is sleeping 6-8 hrs and Mirtazapine is benefiting. His appetite is good. He is medically stable and did not report any physical complaints. Patient does not appear to be psychotic or manic. Patient denies any suicidal or homicidal ideation. Patient denies any obsessive thinking or compulsive behaviors.   He reported that he likes his current residential place and has been talking to the other residents there. This is a small group home with 5 other residents only. Patient denies any illicit drug or alcohol abuse. He has stopped smoking 1 year now. Patient does exhibit mild cognitive impairment. He is able to perform his ADLs. He ambulates independently. He is continent of bladder and bowel. His self-care is good. There is no history of any behavioral issues in the new group home. Patient has multiple medical problems including: COPD, low back pain, fracture L1. Has low Vit D and is not on supplements. He is followed by Krystal Gan NP for his medical conditions. Patient is medically stable at this time. Scales: 09/09/19-   GDS- 0  Mood disorder questionnaire- negative  HAM- 4 - mild anxiety  MOCA: - 20/30 - cognitive impairment       Scales: 08/2018-   MOCA: 18/29 - MCI vs: Dementia  GDS: 5/15 - mild depression  HAM-A: 5 - WNL  MDQ: negative    Past Psychiatric History:  As per HPI patient does not have any clear diagnosis of mental health problems but has been on multiple psychotropic medications over past many years. Patient does not recall getting any mental health diagnosis. Patient has not been on any psychotropic medication for past few months. He does have diagnoses of insomnia, anxiety, depression spread over the [employer] chart. Patient has never been hospitalized for mental health reasons. Patient has not received [facility] or ECT. Past history of substance use:   Patient has history of abusing marijuana. He reported that he has not used any in the last 2 years. Patient started smoking at a very young age and has been heavy smoker in the past.  He reported that he has stopped smoking 2 months ago. Patient denies abusing alcohol or any other illicit drug. Social History:   Patient is  for about 7 years. He has one son. Patient was  for 16 years prior to divorce.   Patient is a retired  and has worked for 15 years prior to his detention 7 years ago. Patient is from [facility]. He has high school education. Patient denies any history of mental illness in the family. Patient denies any problem with the law. Patient denies any history of childhood abuse of any kind. Family History:   Family History   Problem Relation Age of Onset    Cancer Mother         colon ancer    Cancer Father         lung cancer    Cancer Brother         lung cancer        Past Medical History:   Past Medical History:   Diagnosis Date    Anxiety     Chronic confusion     Chronic obstructive pulmonary disease ([address]     Depression     pt has schizophrenia per son    Poor historian     Sleep disorder     insomnia         Allergies: Allergies   Allergen Reactions    Pollen Extracts Sneezing        Medication List:   Current Outpatient Medications   Medication Sig Dispense Refill    mirtazapine (REMERON) 15 mg tablet Take 1 Tab by mouth nightly. 30 Tab 2    melatonin tab tablet Take 1 Tab by mouth nightly. 30 Tab 2    ergocalciferol (ERGOCALCIFEROL) 50,000 unit capsule Take 1 Cap by mouth every seven (7) days for 12 doses. 4 Cap 2    albuterol (PROVENTIL HFA, VENTOLIN HFA, PROAIR HFA) 90 mcg/actuation inhaler Take 1 Puff by inhalation every four (4) hours as needed for Wheezing. (Patient taking differently: Take 1 Puff by inhalation daily. ) 1 Inhaler 3        ROS:  Constitutional: positive for fatigue and weight loss  Eyes: positive for contacts/glasses  Ears, nose, mouth, throat, and face: negative for hearing loss  Respiratory: negative for cough or asthma  Cardiovascular: negative for chest pain, palpitations  Gastrointestinal: negative for reflux symptoms and constipation  Genitourinary:negative for frequency and urinary incontinence  Musculoskeletal:negative for myalgias and arthralgias  Neurological: positive for memory problems  Behavioral/Psych: positive for anxiety, depression and sleep disturbance, negative for SI or HI  Endocrine: negative for diabetic symptoms including polyuria and polydipsia    Psychiatric/Mental Status Examination:     MENTAL STATUS EXAM:  Sensorium  oriented to time, place and person   Orientation person, place, time/date, situation, day of week, month of year and year   Relations cooperative and passive   Eye Contact appropriate   Appearance:  age appropriate and casually dressed   Motor Behavior:  within normal limits   Speech:  normal pitch and normal volume   Vocabulary average   Thought Process: goal directed and logical   Thought Content free of delusions and free of hallucinations   Suicidal ideations none   Homicidal ideations none   Mood:  euthymic   Affect:  anxious and constricted   Memory recent  impaired   Memory remote:  adequate   Concentration:  impaired   Abstraction:  concrete   Insight:  fair   Reliability fair   Judgment:  fair       Assessement & Diagnoses: This is a a67-year-old , [place] Breezy male, with vague history of mental health problems, COPD, smoking and THC abuse in remission, was seen today to establish his mental health treatment here. Patient is cooperative and pleasant. He has mild insomnia and mild cognitive impairment. reported he likes to read  books and walks 30 minutes alternate days. Symptoms are stable. Reports compliance. No gross psychosis or melissa. No side effects reported. Reviewed labs. Patient denies SI/HI/SIB. No evidence of AH/VH or delusions. .  Patient does not have any significant behavioral health issues at this time. Client  is responding to treatment and is tolerating treatment well. Psychoeducation, medication teaching, co-morbid illness and pertinent health factors to manage care were discussed. Overall, patient is stable at this time and will require ongoing medication management.      Possible organic causes contributing are: COPD, low back pain, fracture L1., Vit D deficiency  Reviewed medical admissions and discussed with the patient. Client is medically stable. Vitals stable  Risk Scoring- chronic illnesses and prescription drug management      Primary diagnosis: Depression with anxiety-mild, insomnia, mild cognitive impairment versus dementia, history of THC and nicotine dependency- in remission    Secondary diagnosis: No significant personality disorder noted    Tertiary diagnosis: COPD    Strength & Weaknesses: Cooperative and pleasant, supportive niece, good living condition, medically stable. Treatment Plan:   1. Medication:  Continue Remeron 15 mG hs                           Continue melatonin 5 mg Hs prn    2. Discussed: the potential medication side effects  dry mouth, GI disturbance, weight gain, somnolence  patient given opportunity to ask questions     3. Psychotherapy: No psychotherapy recommended at this time. 4. Medical: Continue with PCP    5. Education: Patient and niece were educated on patient's diagnosis, prognosis and treatment plan. Patient was encouraged to walk daily, engage in intellectual/spiritual activities. Patient was given an activity folder to practice some intellectual stimulation on a daily basis. 6. Return to Clinic:   Follow-up and Dispositions    · Return in about 3 months (around 12/9/2019) for med check and follow up. The risk versus benefits of treatment were discussed and side effects explained. Patient [facility] agreed with plan. Patient/niece instructed to call with any side effects.      Time spent with Patient/niece:  30 to 74 minutes    Terra Gamez NP  9/9/2019

## 2019-10-23 PROBLEM — R09.02 HYPOXIA: Status: ACTIVE | Noted: 2019-01-01

## 2019-10-23 NOTE — ED TRIAGE NOTES
1627: Patient arrives via EMS  From [address] [place] [address] office for decreased O2 sats. Patient states that he originally went to office to receive results of CT scan previously done. Staff noticed patient O2 at 83%. 2L NC applied, increased to 94% which is baseline for patient. + Intermittent SOB on exertion, Denies swelling, fever, chest pain, dizziness 1639: Paged RT in regard to VBG 
 
1644: RT at the bedside. 1709: Called XRAY to receive update on patient test. Informed patient is next. 1748: Patient requesting a diet tray, received verbal orders from ED MD. Tray ordered. 1832: Lights dimmed, blanket provided for comfort. 2023: Hospitalist at the bedside. 2044: TRANSFER - OUT REPORT: 
 
Verbal report given to Tarsha Ho RN (name) on Adam Escalera  being transferred to  (unit) for routine progression of care Report consisted of patients Situation, Background, Assessment and  
Recommendations(SBAR). Information from the following report(s) SBAR, Kardex, ED Summary, MAR, Recent Results and Cardiac Rhythm NSR was reviewed with the receiving nurse. Lines:  
Peripheral IV 10/23/19 Left Antecubital (Active) Site Assessment Clean, dry, & intact 10/23/2019  4:31 PM  
Phlebitis Assessment 0 10/23/2019  4:31 PM  
Infiltration Assessment 0 10/23/2019  4:31 PM  
Dressing Status Clean, dry, & intact 10/23/2019  4:31 PM  
  
 
Opportunity for questions and clarification was provided. Patient transported with: 
Hospital transport 2059: Patient to CT on 2L NC via stretcher accompanied by [employer]

## 2019-10-23 NOTE — ED PROVIDER NOTES
70 y.o. male with past medical history significant for insomnia, anxiety, chronic confusion, poor historian, COPD, and depression who presents via EMS with chief complaint of SOB. Pt was being seen to review his CT scan of his lungs to rule out lung CA when his O2 sats depleted to 83%. Upon giving the patient 2L of O2 it raised to 87%. At baseline, his O2 sat is 94%. Pt states his SOB has been getting gradually worse over the past year. Pt denies any swelling in the legs, fever, vomiting, or chills. There are no other acute medical concerns at this time. Social hx: Former Smoker, No EtOH use, No illicit drug use PCP: Kenzie Rodriguez NP Note written by Symone Polanco, as dictated by Kami Heller MD 4:33 PM 
 
 
The history is provided by the patient. No  was used. Past Medical History:  
Diagnosis Date  Anxiety  Chronic confusion  Chronic obstructive pulmonary disease ([facility] [address]  Depression   
 pt has schizophrenia per son  Poor historian  Sleep disorder   
 insomnia Past Surgical History:  
Procedure Laterality Date  COLONOSCOPY N/A 7/31/2019 COLONOSCOPY performed by Andrea Craig MD at [facility] ENDOSCOPY Family History:  
Problem Relation Age of Onset  Cancer Mother   
     colon ancer  Cancer Father   
     lung cancer  Cancer Brother   
     lung cancer Social History Socioeconomic History  Marital status:  Spouse name: Not on file  Number of children: Not on file  Years of education: Not on file  Highest education level: Not on file Occupational History  Not on file Social Needs  Financial resource strain: Not on file  Food insecurity:  
  Worry: Not on file Inability: Not on file  Transportation needs:  
  Medical: Not on file Non-medical: Not on file Tobacco Use  Smoking status: Former Smoker Packs/day: 1.00 Types: Cigarettes  Smokeless tobacco: Never Used  Tobacco comment: every 2 days Substance and Sexual Activity  Alcohol use: No  
 Drug use: No  
 Sexual activity: Never Lifestyle  Physical activity:  
  Days per week: Not on file Minutes per session: Not on file  Stress: Not on file Relationships  Social connections:  
  Talks on phone: Not on file Gets together: Not on file Attends Denominational service: Not on file Active member of club or organization: Not on file Attends meetings of clubs or organizations: Not on file Relationship status: Not on file  Intimate partner violence:  
  Fear of current or ex partner: Not on file Emotionally abused: Not on file Physically abused: Not on file Forced sexual activity: Not on file Other Topics Concern  Not on file Social History Narrative  Not on file ALLERGIES: Pollen extracts Review of Systems Constitutional: Negative for chills and fever. Respiratory: Positive for shortness of breath. Cardiovascular: Negative for chest pain and leg swelling. Gastrointestinal: Negative for abdominal pain, constipation, diarrhea and vomiting. Neurological: Negative for dizziness and light-headedness. All other systems reviewed and are negative. There were no vitals filed for this visit. Physical Exam  
Constitutional: He appears well-developed and well-nourished. No distress. HENT:  
Head: Normocephalic and atraumatic. Eyes: Pupils are equal, round, and reactive to light. Conjunctivae are normal.  
Neck: Normal range of motion. Cardiovascular: Regular rhythm. Tachycardia present. Pulmonary/Chest: Effort normal and breath sounds normal. Tachypnea noted. Course lung sounds in the left lobe, retractions when breathing Abdominal: Soft. He exhibits no distension. There is no tenderness. Musculoskeletal: Normal range of motion. He exhibits no edema. Neurological: He is alert. Skin: Skin is dry. Capillary refill takes less than 2 seconds. Nursing note and vitals reviewed. Note written by Symone Gibbons, as dictated by Qiana Maldonado MD 4:33 PM 
  
 
MDM Procedures Patient is being admitted to the hospital.  The results of their tests and reasons for their admission have been discussed with them and/or available family. They convey agreement and understanding for the need to be admitted and for their admission diagnosis. Consultation will be made now with the inpatient physician for hospitalization. Hospitalist Susanna for Admission 6:16 PM 
 
ED Room Number: CZ97/11 Patient Name and age:  Paulette Lujan 70 y.o.  male Working Diagnosis: 1. Hypoxemia Readmission: no 
Isolation Requirements:  no 
Recommended Level of Care:  med/surg Code Status:  Full Code Department:[facility] Adult ED - [phone] Other:  Had PET scan in September that was inconclusive, CXR today much worse but unsure if metastatic, infectious, or inflammatory

## 2019-10-24 NOTE — PROGRESS NOTES
TRANSFER - OUT REPORT: 
 
Verbal report given to Dahiana RN(name) on Shivam Riddle  being transferred to E(unit) for routine progression of care Report consisted of patients Situation, Background, Assessment and  
Recommendations(SBAR). Information from the following report(s) SBAR, MAR and Recent Results was reviewed with the receiving nurse. Lines:  
Peripheral IV 10/23/19 Left Antecubital (Active) Site Assessment Clean, dry, & intact 10/24/2019  4:00 PM  
Phlebitis Assessment 0 10/24/2019  4:00 PM  
Infiltration Assessment 0 10/24/2019  4:00 PM  
Dressing Status Clean, dry, & intact 10/24/2019  4:00 PM  
Dressing Type Transparent 10/24/2019  4:00 PM  
Hub Color/Line Status Pink;Capped 10/24/2019  4:00 PM  
Action Taken Open ports on tubing capped 10/24/2019  4:00 PM  
Alcohol Cap Used Yes 10/24/2019  4:00 PM  
  
 
Opportunity for questions and clarification was provided. Patient transported with: 
 [employer]

## 2019-10-24 NOTE — PROGRESS NOTES
Rounded on Nondenominational patients and provided Anointing of the Sick at request of patient Fr. Jerrell Burns

## 2019-10-24 NOTE — H&P
History and Physical 
Primary Care Provider: Verónica Jacobo NP Subjective:  
 
Collin Lipscomb is a 70 y.o. male with PMH significant for pulmonary nodules, COPD, Depression presents to the ER for further evaluation of hypoxia noted today. For several  Months patient has been experiencing CARD, shortness of breath at rest and fatigue but worsened in the last week. He went for routine follow up to his pulmonologist and was found to be hypoxic with O2 sats 83% RA. He was referred to ER For further evaluation. He denies any symptoms such as cough, chest pain, wheezing, hemoptysis, fever, chills or night sweats. On arrival to the ER today he was hypoxix requring supplemental O2 but otherwise asymptomatic. Review of Systems: 
General: + 10lb weight loss in the last month. no fever, no changes of  sleep HEENT: no headache, no vision changes, no nose discharge, no hearing changes RES: + SOB, CARD. no wheezing, no cough. CVS: no chest pain, no palpitation. Muscular: no joint swelling, no muscle pain, no leg swelling Skin: no rash, no itching GI: no vomiting, no diarrhea : no dysuria, no hematuria Hemo: no gum bleeding, no petechial  
Neuro:chronic right side weakness Endo: no polydipsia Psych: denied depression Past Medical History:  
Diagnosis Date  Anxiety  Chronic confusion  Chronic obstructive pulmonary disease ([address]  Depression   
 pt has schizophrenia per son  Poor historian  Sleep disorder   
 insomnia Past Surgical History:  
Procedure Laterality Date  COLONOSCOPY N/A 7/31/2019 COLONOSCOPY performed by Terrence Lemos MD at [facility] ENDOSCOPY Prior to Admission medications Medication Sig Start Date End Date Taking? Authorizing Provider  
mirtazapine (REMERON) 15 mg tablet Take 1 Tab by mouth nightly. 9/9/19   Terra Gamez NP  
melatonin tab tablet Take 1 Tab by mouth nightly.  9/9/19   Mohit Estevez NP  
 albuterol (PROVENTIL HFA, VENTOLIN HFA, PROAIR HFA) 90 mcg/actuation inhaler Take 1 Puff by inhalation every four (4) hours as needed for Wheezing. Patient taking differently: Take 1 Puff by inhalation daily. 3/30/18   Chrisandra Boeck, NP Allergies Allergen Reactions  Pollen Extracts Sneezing Family History Problem Relation Age of Onset  Cancer Mother   
     colon ancer  Cancer Father   
     lung cancer  Cancer Brother   
     lung cancer SOCIAL HISTORY: 
Patient resides at home Patient ambulates without assistance. Smoking history: Former tobacco user. Quit 1 year ago. Alcohol history: Denies active ETOH use but he has h/o ETOH abuse Objective:  
 
 
Physical Exam:  
 
General:          Alert, cooperative, no distress. Cachectic HEENT:           Atraumatic, anicteric sclerae, pink conjunctivae No oral ulcers, mucosa moist, throat clear, dentition fair Neck:               Supple, symmetrical,  thyroid: non tender Lungs:             Course sounds at the bases. Tachypnea. No Wheezing or Rhonchi. No rales. Chest wall:      No tenderness  No Accessory muscle use. Heart:              Regular  rhythm,  No  murmur   No edema Abdomen:        Soft, non-tender. Not distended. Bowel sounds normal 
Extremities:     No cyanosis. No clubbing,   
                        Skin turgor normal, Capillary refill normal, Radial dial pulse 2+ Skin:                Not pale. Not Jaundiced  No rashes Psych:             Not anxious or agitated. Neurologic:      Alert, moves all extremities, oriented X 3. ECG:  NSR. T wave inversion anterior leads. Data Review: All diagnostic labs and studies have been reviewed. Recent Results (from the past 24 hour(s)) EKG, 12 LEAD, INITIAL Collection Time: 10/23/19  4:42 PM  
Result Value Ref Range Ventricular Rate 83 BPM  
 Atrial Rate 83 BPM  
 P-R Interval 124 ms QRS Duration 82 ms Q-T Interval 364 ms QTC Calculation (Bezet) 427 ms Calculated P Axis 84 degrees Calculated R Axis 66 degrees Calculated T Axis 71 degrees Diagnosis Normal sinus rhythm T wave abnormality, consider anterior ischemia When compared with ECG of 19-FEB-2018 11:19, 
T wave inversion now evident in Anterior leads SAMPLES BEING HELD Collection Time: 10/23/19  4:45 PM  
Result Value Ref Range SAMPLES BEING HELD 1red,1blue COMMENT Add-on orders for these samples will be processed based on acceptable specimen integrity and analyte stability, which may vary by analyte. CBC WITH AUTOMATED DIFF Collection Time: 10/23/19  4:45 PM  
Result Value Ref Range WBC 6.5 4.1 - 11.1 K/uL  
 RBC 3.92 (L) 4.10 - 5.70 M/uL  
 HGB 14.1 12.1 - 17.0 g/dL HCT 42.1 36.6 - 50.3 % .4 (H) 80.0 - 99.0 FL  
 MCH 36.0 (H) 26.0 - 34.0 PG  
 MCHC 33.5 30.0 - 36.5 g/dL  
 RDW 15.1 (H) 11.5 - 14.5 % PLATELET 586 813 - 436 K/uL MPV 10.9 8.9 - 12.9 FL  
 NRBC 0.0 0  WBC ABSOLUTE NRBC 0.00 0.00 - 0.01 K/uL NEUTROPHILS 74 32 - 75 % LYMPHOCYTES 16 12 - 49 % MONOCYTES 9 5 - 13 % EOSINOPHILS 1 0 - 7 % BASOPHILS 0 0 - 1 % IMMATURE GRANULOCYTES 0 0.0 - 0.5 % ABS. NEUTROPHILS 4.8 1.8 - 8.0 K/UL  
 ABS. LYMPHOCYTES 1.0 0.8 - 3.5 K/UL  
 ABS. MONOCYTES 0.6 0.0 - 1.0 K/UL  
 ABS. EOSINOPHILS 0.1 0.0 - 0.4 K/UL  
 ABS. BASOPHILS 0.0 0.0 - 0.1 K/UL  
 ABS. IMM. GRANS. 0.0 0.00 - 0.04 K/UL  
 DF AUTOMATED METABOLIC PANEL, BASIC Collection Time: 10/23/19  4:45 PM  
Result Value Ref Range Sodium 140 136 - 145 mmol/L Potassium 3.6 3.5 - 5.1 mmol/L Chloride 105 97 - 108 mmol/L  
 CO2 27 21 - 32 mmol/L Anion gap 8 5 - 15 mmol/L Glucose 93 65 - 100 mg/dL BUN 14 6 - 20 MG/DL Creatinine 0.99 0.70 - 1.30 MG/DL  
 BUN/Creatinine ratio 14 12 - 20 GFR est AA >60 >60 ml/min/1.73m2 GFR est non-AA >60 >60 ml/min/1.73m2  Calcium 8.9 8.5 - 10.1 MG/DL  
 NT-PRO BNP Collection Time: 10/23/19  4:45 PM  
Result Value Ref Range NT pro- <450 PG/ML  
POC VENOUS BLOOD GAS Collection Time: 10/23/19  4:47 PM  
Result Value Ref Range Device: NASAL CANNULA Flow rate (POC) 3.0 L/M  
 FIO2 (POC) 0.32 %  
 pH, venous (POC) 7.342 7.32 - 7.42    
 pCO2, venous (POC) 48.9 41 - 51 MMHG  
 pO2, venous (POC) <19 (L) 25 - 40 mmHg HCO3, venous (POC) 26.5 23.0 - 28.0 MMOL/L Base excess, venous (POC) 1 mmol/L Allens test (POC) N/A Total resp. rate 21 Site OTHER Specimen type (POC) VENOUS BLOOD Chest x-ray Marisol Hoffman Xr Chest Pa Lat Result Date: 10/23/2019 IMPRESSION: Interstitial disease with lung nodules. The appearance suggests widespread metastasis or an atypical infectious process. Clinical and laboratory correlation is needed. Cta Chest W Or W Wo Cont Result Date: 10/23/2019 IMPRESSION: 1. No pulmonary embolus. 2. Interstitial disease with lung nodules and mediastinal and hilar adenopathy. The differential diagnosis includes metastatic disease or an atypical infectious process such as mycobacterium. Assessment:  
 
Active Problems: Hypoxia (10/23/2019) COPD Plan: 1. Acute hypoxic respiratory failure likely related to malignancy. No signs of infection at this time. No signs of PE on CT scan. - Pulmonary consult - Will add methylprednisolone 40mg IV q6 hours for now. - Add duoneb q4hr PRN for SOB, wheezing - Supplemental O2. Titrate to keep O2 sats 88-92% 2. COPD- could be contributing to hypoxia. plan as above. 3. Depression/?schizophrenia- No acute issues. continue with home medications. DVT prophy: Lovenox Code: full code FUNCTIONAL STATUS PRIOR TO HOSPITALIZATION : independent. No recent falls. Signed By: Henderson Lanes, MD   
 October 23, 2019

## 2019-10-24 NOTE — PROGRESS NOTES
Hospitalist Progress Note Tavares Topete NP Answering service: [phone] OR [phone] from in house phone [phone] Date of Service:  10/24/2019 NAME:  Donna Avila YOB: 1940 MRN:  [id number] Admission Summary:  
Pt was referred to the ED by his pulmonologist for evaluation of hypoxia (sats 83% in office). Per hx, pt has been CARD, shortness of breath and fatigue for several months but worsened in the last week PTA. He went for routine follow up to his pulmonologist and was found to be hypoxic with O2 sats 83% RA. Denied cough, chest pain, wheezing, hemoptysis, fever, chills or night sweats. Pmhx: pulmonary nodules, COPD, Depression Interval history / Subjective:  
Pt lying in bed, says he feeling ok. No SOB at rest but does with exertion. No pain. Assessment & Plan:  
 
Acute hypoxic respiratory failure likely related to malignancy:  
- No signs of infection at this time. No signs of PE on CT scan. - Pulmonary consulted, plans for biopsy tomorrow - On steroids 40mg IV q 6 hours - Duoneb q4hr PRN for SOB, wheezing 
- received 1 dose of levaquin on admit - not continued - Supplemental O2. Titrate to keep O2 sats 88-92%, currently on 5 L NC 
  
Hx COPD: plans as above. Has NOT been oxygen at home.  
  
Depression/Schizophrenia: No acute issues. continue with home medications. Code status: Full DVT prophylaxis: SCDs Care Plan discussed with: patient, attending MD 
Disposition: Home, lives in a group home Hospital Problems  Date Reviewed: 2018 Codes Class Noted POA Hypoxia ICD-10-CM: R09.02 
ICD-9-CM: 799.02  10/23/2019 Unknown Review of Systems:  
Denies HA. No chest pain or pressure. No SOB + on exertion. No GI complaints - ate good breakfast this am and has good appetite. Vital Signs:  
 Last 24hrs VS reviewed since prior progress note. Most recent are: 
Visit Vitals /52 (BP 1 Location: Right arm, BP Patient Position: At rest) Pulse 91 Temp 98.8 °F (37.1 °C) Resp 20 Wt 58.2 kg (128 lb 4.9 oz) SpO2 95% BMI 17.40 kg/m² Intake/Output Summary (Last 24 hours) at 10/24/2019 1523 Last data filed at 10/24/2019 1462 Gross per 24 hour Intake 240 ml Output 325 ml Net -85 ml Physical Examination:  
     
Constitutional:  No acute distress, cooperative, pleasant ENT:  Oral MM moist  
Resp:  CTA bilaterally. No accessory muscle use and on 5L NC  
CV:  Regular rhythm, normal rate, no murmurs. SR on tele. GI:  Soft, non distended, non tender. normoactive bowel sounds Musculoskeletal:  No edema, warm, 2+ pulses throughout Neurologic:  Moves all extremities. AAOx3 Data Review:  
Review and/or order of clinical lab test 
Review and/or order of tests in the radiology section of CPT Review and/or order of tests in the medicine section of CPT Labs:  
 
Recent Labs 10/23/19 
1645 WBC 6.5 HGB 14.1 HCT 42.1  Recent Labs 10/23/19 
1645   
K 3.6  CO2 27 BUN 14  
CREA 0.99 GLU 93  
CA 8.9 No results for input(s): SGOT, GPT, ALT, AP, TBIL, TBILI, TP, ALB, GLOB, GGT, AML, LPSE in the last 72 hours. No lab exists for component: AMYP, HLPSE No results for input(s): INR, PTP, APTT, INREXT in the last 72 hours. No results for input(s): FE, TIBC, PSAT, FERR in the last 72 hours. No results found for: FOL, RBCF No results for input(s): PH, PCO2, PO2 in the last 72 hours. No results for input(s): CPK, CKNDX, TROIQ in the last 72 hours. No lab exists for component: CPKMB No results found for: CHOL, CHOLX, CHLST, CHOLV, HDL, HDLP, LDL, LDLC, DLDLP, TGLX, TRIGL, TRIGP, CHHD, CHHDX Lab Results Component Value Date/Time  Glucose (POC) 116 (H) 02/05/2018 06:20 AM  
 Glucose (POC) 102 (H) 01/31/2018 12:03 PM  
 Glucose (POC) 91 01/20/2018 06:52 AM  
 Glucose (POC) 136 (H) 01/17/2018 11:46 AM  
 Glucose (POC) 97 01/12/2018 10:18 PM  
 
Lab Results Component Value Date/Time Color YELLOW/STRAW 02/09/2018 01:22 PM  
 Appearance CLEAR 02/09/2018 01:22 PM  
 Specific gravity 1.028 02/09/2018 01:22 PM  
 pH (UA) 6.0 02/09/2018 01:22 PM  
 Protein NEGATIVE  02/09/2018 01:22 PM  
 Glucose NEGATIVE  02/09/2018 01:22 PM  
 Ketone NEGATIVE  02/09/2018 01:22 PM  
 Bilirubin NEGATIVE  02/09/2018 01:22 PM  
 Urobilinogen 1.0 02/09/2018 01:22 PM  
 Nitrites NEGATIVE  02/09/2018 01:22 PM  
 Leukocyte Esterase NEGATIVE  02/09/2018 01:22 PM  
 Epithelial cells FEW 02/09/2018 01:22 PM  
 Bacteria NEGATIVE  02/09/2018 01:22 PM  
 WBC 0-4 02/09/2018 01:22 PM  
 RBC 0-5 02/09/2018 01:22 PM  
 
Medications Reviewed:  
 
Current Facility-Administered Medications Medication Dose Route Frequency  sodium chloride (NS) flush 5-40 mL  5-40 mL IntraVENous Q8H  
 sodium chloride (NS) flush 5-40 mL  5-40 mL IntraVENous PRN  
 albuterol-ipratropium (DUO-NEB) 2.5 MG-0.5 MG/3 ML  3 mL Nebulization Q4H PRN  
 methylPREDNISolone (PF) (SOLU-MEDROL) injection 40 mg  40 mg IntraVENous Q6H  
______________________________________________________________________ EXPECTED LENGTH OF STAY: 4d 21h ACTUAL LENGTH OF STAY:          1 Grace Bowman NP

## 2019-10-24 NOTE — PROGRESS NOTES
T.O.C.: 
 Pt expected to d/c to home Transport at d/c TBD HH needs TBD Emergency contact: Víctor Mann (# [phone]) Reason for Admission:   Hypoxemia RRAT Score:    8 Plan for utilizing home health:      TBD Current Advanced Directive/Advance Care Plan:  Pt does not have AMD, discussed reasons for having; pt denies wanting one. Transition of Care Plan:   Pt expected to d/c to home, transport at d/c TBD. Pt to follow up with PCP and Pulmonologist after d/c.                
 
 
 
 
CM met with pt at bedside, explained CM role, verified demographics, insurance, PCP Gregg Escalante NP # [phone]) and emergency contact Víctor Mannnabila, # [phone]. Pt states he lives in a 1 level home with 5 other men, with 4 steps to enter. Pt denies use of DME or financial concerns for medication and hospitalization. Pt states he does not have AMD and does not want the  to bring paperwork. Pt states he has not used any HH agencies. Pt unsure if anyone can transport him at d/c, states last time they sent him by ambulance. Pt denies any needs at this time. HH needs TBD. CM will follow pt for any identified d/c needs. Elke Lee RN Care Management Interventions PCP Verified by CM: Yes(2 months ago) Palliative Care Criteria Met (RRAT>21 & CHF Dx)?: No 
MyChart Signup: No 
Discharge Durable Medical Equipment: No 
Physical Therapy Consult: No 
Occupational Therapy Consult: No 
Speech Therapy Consult: No 
Current Support Network: (Pt states he lives in a home with 5 other men) Confirm Follow Up Transport: (Pt not sure if anyone can come get him) Plan discussed with Pt/Family/Caregiver: Yes Discharge Location Discharge Placement: Home

## 2019-10-24 NOTE — ROUTINE PROCESS
Bedside shift change report given to JASMYN Aranda (oncoming nurse) by Brenna Sosa RN (offgoing nurse). Report included the following information SBAR, Kardex, Procedure Summary, Intake/Output, MAR and Recent Results.

## 2019-10-24 NOTE — CONSULTS
Pulmonary, Critical Care, and Sleep Medicine~Consult Note Name: Shellie Gallardo MRN: [id number] : 1940 Hospital: [facility]  Date: 10/24/2019 9:39 AM Admission: 10/23/2019 Impression Plan 1. Abnormal CT scan: worsening nodular densities since at least 2018. worrisome for neoplastic process 2. Hx of smoking. Likely undefined COPD. 3. Psych hx; schizophrenia? 4. 2019 colonoscopy, adenomas only  1. Reviewed CT scan with attending. The lesions are to peripheral for angelika bronch. Some of the lesions in the RLL may be amendable to CT FNA. I called his niece to discuss this; left a voicemail. 2. O2 titration above 90% 3. Steroids 4. levaquin 5. Discussed with nursing Daily Progression: 
 
Consult Note requested by Dr Najma Ewing. Information obtained from the chart; patient is a poor historian. He presented for admission secondary to worsening dyspnea and hypoxemia. Offers no signs of bronchospasms nor signs of infection. CT scan revealed multiple bilateral and multilobar nodules that have progressed since 2018. There is reported to be a 10 lb weight loss as well. I have reviewed the labs and previous days notes. Review of systems not obtained due to patient factors. Past Medical History:  
Diagnosis Date  Anxiety  Chronic confusion  Chronic obstructive pulmonary disease ([facility] [address]  Depression   
 pt has schizophrenia per son  Poor historian  Sleep disorder   
 insomnia Past Surgical History:  
Procedure Laterality Date  COLONOSCOPY N/A 2019 COLONOSCOPY performed by Cody Vang MD at [facility] ENDOSCOPY Prior to Admission medications Medication Sig Start Date End Date Taking? Authorizing Provider  
mirtazapine (REMERON) 15 mg tablet Take 1 Tab by mouth nightly. 19   Terra Gamez NP  
melatonin tab tablet Take 1 Tab by mouth nightly.  19   Prosper Schneiders, NP  
 albuterol (PROVENTIL HFA, VENTOLIN HFA, PROAIR HFA) 90 mcg/actuation inhaler Take 1 Puff by inhalation every four (4) hours as needed for Wheezing. Patient taking differently: Take 1 Puff by inhalation daily. 3/30/18   Kirill Moore NP Allergies Allergen Reactions  Pollen Extracts Sneezing Social History Tobacco Use  Smoking status: Former Smoker Packs/day: 1.00 Types: Cigarettes  Smokeless tobacco: Never Used  Tobacco comment: every 2 days Substance Use Topics  Alcohol use: No  
  
Family History Problem Relation Age of Onset  Cancer Mother   
     colon ancer  Cancer Father   
     lung cancer  Cancer Brother   
     lung cancer OBJECTIVE: 
 
 Vital Signs: 
    
Visit Vitals /79 (BP 1 Location: Right arm, BP Patient Position: At rest) Pulse (!) 105 Temp 98.8 °F (37.1 °C) Resp 20 Wt 58.2 kg (128 lb 4.9 oz) SpO2 93% BMI 17.40 kg/m² Temp (24hrs), Av.6 °F (37 °C), Min:98 °F (36.7 °C), Max:99.1 °F (37.3 °C) Intake/Output:  
  Last shift: No intake/output data recorded. Last 3 shifts: 10/22 1901 - 10/24 0700 In: 240 [P.O.:240] Out: 325 [Urine:325] Intake/Output Summary (Last 24 hours) at 10/24/2019 5163 Last data filed at 10/24/2019 9571 Gross per 24 hour Intake 240 ml Output 325 ml Net -85 ml Physical Exam:                                       
Exam Findings Other General: No resp distress noted, appears stated age HEENT:  No ulcers, JVD not elevated, no cervical LAD Chest: No pectus deformity, normal chest rise b/l HEART:  RRR, no murmurs/rubs/gallops Lungs:  CTA b/l, no rhonchi/crackles/wheeze, diminished BS at bases ABD: Soft/NT, non rigid mildly distended EXT: No cyanosis/clubbing/edema, normal peripheral pulses Skin: No rashes or ulcers, no mottling Neuro: Alert, query orientation Medications: 
Current Facility-Administered Medications Medication Dose Route Frequency  sodium chloride (NS) flush 5-40 mL  5-40 mL IntraVENous Q8H  
 sodium chloride (NS) flush 5-40 mL  5-40 mL IntraVENous PRN  
 albuterol-ipratropium (DUO-NEB) 2.5 MG-0.5 MG/3 ML  3 mL Nebulization Q4H PRN  
 methylPREDNISolone (PF) (SOLU-MEDROL) injection 40 mg  40 mg IntraVENous Q6H Labs: ABG Recent Labs 10/23/19 
1647 FIO2I 0.32  
  
 
CBC Recent Labs 10/23/19 
1645 WBC 6.5 HGB 14.1 HCT 42.1  .4*  
MCH 36.0* Metabolic Panel Recent Labs 10/23/19 
1645   
K 3.6  CO2 27 GLU 93 BUN 14  
CREA 0.99 CA 8.9 Pertinent Labs eFr Deleon PA-C 
10/24/2019

## 2019-10-24 NOTE — PROGRESS NOTES
Problem: Pain Goal: *Control of Pain Outcome: Progressing Towards Goal 
Goal: *PALLIATIVE CARE:  Alleviation of Pain Outcome: Progressing Towards Goal 
  
Problem: Pain Goal: *Control of Pain Outcome: Progressing Towards Goal 
Goal: *PALLIATIVE CARE:  Alleviation of Pain Outcome: Progressing Towards Goal 
  
Problem: General Infection Care Plan (Adult and Pediatric) Goal: Improvement in signs and symptoms of infection Outcome: Progressing Towards Goal 
Goal: *Optimize nutritional status Outcome: Progressing Towards Goal 
  
Problem: Patient Education: Go to Patient Education Activity Goal: Patient/Family Education Outcome: Progressing Towards Goal 
  
Problem: Falls - Risk of 
Goal: *Absence of Falls Description Document Nhi Biggs Fall Risk and appropriate interventions in the flowsheet. Outcome: Progressing Towards Goal 
Note:  
Fall Risk Interventions:

## 2019-10-25 NOTE — PROGRESS NOTES
Pulmonary, Critical Care, and Sleep Medicine~Progress Note Name: Anjelica Hand MRN: [id number] : 1940 Hospital: [facility]  Date: 10/25/2019 9:39 AM Admission: 10/23/2019 Impression Plan 1. Abnormal CT scan: worsening nodular densities since at least . worrisome for neoplastic process 2. Hx of smoking. Likely undefined COPD. 3. Psych hx; schizophrenia? 4. 2019 colonoscopy, adenomas only  1. Spoke with Dr Aline Jimenez; Patient will be set up for Da bronch/EBUS next week. Can go home have this done as an outpatient therapy 2. O2 titration above 90% 3. Steroids 4. levaquin 5. Discussed with nursing 6. PRN over the weekend Daily Progression: 
 
stable Consult Note requested by Dr Jesika Zaragoza. Information obtained from the chart; patient is a poor historian. He presented for admission secondary to worsening dyspnea and hypoxemia. Offers no signs of bronchospasms nor signs of infection. CT scan revealed multiple bilateral and multilobar nodules that have progressed since 2018. There is reported to be a 10 lb weight loss as well. I have reviewed the labs and previous days notes. Review of systems not obtained due to patient factors. Past Medical History:  
Diagnosis Date  Anxiety  Chronic confusion  Chronic obstructive pulmonary disease ([facility] [address]  Depression   
 pt has schizophrenia per son  Poor historian  Sleep disorder   
 insomnia Past Surgical History:  
Procedure Laterality Date  COLONOSCOPY N/A 2019 COLONOSCOPY performed by Lynn Abreu MD at [facility] ENDOSCOPY Prior to Admission medications Medication Sig Start Date End Date Taking? Authorizing Provider  
mirtazapine (REMERON) 15 mg tablet Take 1 Tab by mouth nightly. 19   Terra Gamez NP  
melatonin tab tablet Take 1 Tab by mouth nightly.  19   Cj Mauricio NP  
 albuterol (PROVENTIL HFA, VENTOLIN HFA, PROAIR HFA) 90 mcg/actuation inhaler Take 1 Puff by inhalation every four (4) hours as needed for Wheezing. Patient taking differently: Take 1 Puff by inhalation daily. 3/30/18   Carroll Friedman NP Allergies Allergen Reactions  Pollen Extracts Sneezing Social History Tobacco Use  Smoking status: Former Smoker Packs/day: 1.00 Types: Cigarettes  Smokeless tobacco: Never Used  Tobacco comment: every 2 days Substance Use Topics  Alcohol use: No  
  
Family History Problem Relation Age of Onset  Cancer Mother   
     colon ancer  Cancer Father   
     lung cancer  Cancer Brother   
     lung cancer OBJECTIVE: 
 
 Vital Signs: 
    
Visit Vitals /64 Pulse 93 Temp 98.8 °F (37.1 °C) Resp 18 Ht 6' (1.829 m) Wt 58.2 kg (128 lb 4.9 oz) SpO2 94% BMI 17.40 kg/m² Temp (24hrs), Av.1 °F (37.3 °C), Min:98.3 °F (36.8 °C), Max:100.1 °F (37.8 °C) Intake/Output:  
  Last shift: 10/25 0701 - 10/25 1900 In: -  
Out: 200 [Urine:200] Last 3 shifts: 10/23 1901 - 10/25 0700 In: 467 [P.O.:467] Out: 825 [Urine:825] Intake/Output Summary (Last 24 hours) at 10/25/2019 1058 Last data filed at 10/25/2019 3757 Gross per 24 hour Intake 227 ml Output 700 ml Net -473 ml Physical Exam:                                       
Exam Findings Other General: No resp distress noted, appears stated age HEENT:  No ulcers, JVD not elevated, no cervical LAD Chest: No pectus deformity, normal chest rise b/l HEART:  RRR, no murmurs/rubs/gallops Lungs:  CTA b/l, no rhonchi/crackles/wheeze, diminished BS at bases ABD: Soft/NT, non rigid mildly distended EXT: No cyanosis/clubbing/edema, normal peripheral pulses Skin: No rashes or ulcers, no mottling Neuro: Alert, query orientation Medications: 
Current Facility-Administered Medications Medication Dose Route Frequency  amoxicillin-clavulanate (AUGMENTIN) 875-125 mg per tablet 1 Tab  1 Tab Oral Q12H  
 sodium chloride (NS) flush 5-40 mL  5-40 mL IntraVENous Q8H  
 sodium chloride (NS) flush 5-40 mL  5-40 mL IntraVENous PRN  
 albuterol-ipratropium (DUO-NEB) 2.5 MG-0.5 MG/3 ML  3 mL Nebulization Q4H PRN  
 methylPREDNISolone (PF) (SOLU-MEDROL) injection 40 mg  40 mg IntraVENous Q6H Labs: ABG Recent Labs 10/23/19 
1647 FIO2I 0.32  
  
 
CBC Recent Labs 10/23/19 
1645 WBC 6.5 HGB 14.1 HCT 42.1  .4*  
MCH 36.0* Metabolic Panel Recent Labs 10/23/19 
1645   
K 3.6  CO2 27 GLU 93 BUN 14  
CREA 0.99 CA 8.9 Pertinent Labs Chrissie Neely PA-C 
10/25/2019

## 2019-10-25 NOTE — ROUTINE PROCESS
I have reviewed discharge instructions with the patient and caregiver. The patient and caregiver verbalized understanding. If you experience chest pain call 911. Do not drive yourself. Discharge medications reviewed with patient and caregiver and appropriate educational materials and side effects teaching were provided. Time for questions was provided at this time. Patient declined a wheelchair at this time. Patient walked out with O2 at 4L with his niece and son.

## 2019-10-25 NOTE — DISCHARGE SUMMARY
Discharge Summary PATIENT ID: Deng Gordon MRN: [id number] YOB: 1940 DATE OF ADMISSION: 10/23/2019  4:21 PM   
DATE OF DISCHARGE: 10/25/2019 PRIMARY CARE PROVIDER: Ayaka Wong NP  
ATTENDING PHYSICIAN: Debby Ramos MD 
DISCHARGING PROVIDER: Tracie Burnett NP. To contact this individual call [phone] and ask the  to page. If unavailable ask to be transferred the Adult Hospitalist Department. CONSULTATIONS: IP CONSULT TO PULMONOLOGY 
 
ADMITTING DIAGNOSES & HOSPITAL COURSE:  
Pt was referred to the ED by his pulmonologist for evaluation of hypoxia (sats 83% in office). Per hx, pt has been CARD, shortness of breath and fatigue for several months but worsened in the last week PTA. He went for routine follow up to his pulmonologist and was found to be hypoxic with O2 sats 83% RA. Denied cough, chest pain, wheezing, hemoptysis, fever, chills or night sweats.  
  
Pmhx: pulmonary nodules, COPD, Depression DISCHARGE DIAGNOSES / PLAN:   
 
Acute hypoxic respiratory failure likely related to malignancy:  
- No signs of infection at this time. No signs of PE on CT scan. - Pulmonary consulted, originally planned for biopsy today but decision made to continue with abx and steroids and pt follow up next week for Bronch and EUS 
- On steroids 40mg IV q 6 hours, will discharge on oral steroids with taper - Albuterol inhaler on discharge for wheezing - Antibiotics x 7 days per pulmonary recommendations - Hypoxia: sats 86% on RA without ambulating, pt was symptomatic: feeling very dizzy. Pt was able to recover with 4 L NC. Hx COPD: plans as above. Has NOT been oxygen at home. - Needs Oxygen for COPD, Oxygen compancy may titrate oxygen to keep sats >90% 
  
Depression/Schizophrenia: No acute issues. Continue with home medications. 
   
 
FOLLOW UP APPOINTMENTS:   
Follow-up Information Follow up With Specialties Details Why Contact Info David Cade NP Nurse Practitioner   [address] [facility], [place],[address] [place] [address] 
[phone] Benito Ramos MD Internal Medicine, Pulmonary Disease In 1 week call to arrange outpatient Bronch and Bx [address] [facility] [address] [place] [address] 
[phone] ADDITIONAL CARE RECOMMENDATIONS:  
- take antibiotics as ordered, Augmentin x 7 days - Steroid taper as ordered: 
Prednisone 60 mg 10/26-10/27 Prednisone 40 mg 10/28-10/30 Prednisone 20 mg 10/31-11/2 Prednisone 10 mg 11/3-11/4 
 
- 4 L NC Oxygen continuously DIET: Regular Diet ACTIVITY: Activity as tolerated EQUIPMENT needed: Oxygen DISCHARGE MEDICATIONS: 
Current Discharge Medication List  
  
START taking these medications Details  
amoxicillin-clavulanate (AUGMENTIN) 875-125 mg per tablet Take 1 Tab by mouth every twelve (12) hours for 7 days. Qty: 14 Tab, Refills: 0  
  
predniSONE (DELTASONE) 10 mg tablet Take 60 mg (6 tablets) 10/26-10/27 then 40 mg (4 tablets) 10/28-10/30 then 20 mg (2 tablets) 10/31-11/2 then 10 mg (1 tablet) 11/3-11/4 then stop Qty: 32 Tab, Refills: 0 CONTINUE these medications which have CHANGED Details  
albuterol (PROVENTIL HFA, VENTOLIN HFA, PROAIR HFA) 90 mcg/actuation inhaler Take 1 Puff by inhalation every four (4) hours as needed for Wheezing. Qty: 1 Inhaler, Refills: 3 Associated Diagnoses: Chronic obstructive pulmonary disease, unspecified COPD type ([address] CONTINUE these medications which have NOT CHANGED Details  
mirtazapine (REMERON) 15 mg tablet Take 1 Tab by mouth nightly. Qty: 30 Tab, Refills: 2 Associated Diagnoses: Mild depression ([address] Mild anxiety  
  
melatonin tab tablet Take 1 Tab by mouth nightly. Qty: 30 Tab, Refills: 2 Associated Diagnoses: Insomnia, unspecified type NOTIFY YOUR PHYSICIAN FOR ANY OF THE FOLLOWING:  
Fever over 101 degrees for 24 hours. Chest pain, shortness of breath, fever, chills, nausea, vomiting, diarrhea, change in mentation, falling, weakness, bleeding. Severe pain or pain not relieved by medications. Or, any other signs or symptoms that you may have questions about. DISPOSITION: 
  Home With: 
 OT  PT  HH  RN  
  
 Long term SNF/Inpatient Rehab Independent/assisted living Hospice  
xx Other: Home with Oxygen PATIENT CONDITION AT DISCHARGE:  
Functional status Poor Deconditioned   
xx Independent Cognition  
xx  Lucid Forgetful Dementia Catheters/lines (plus indication) Horner PICC   
 PEG   
xx None Code status  
xx  Full code DNR   
 
PHYSICAL EXAMINATION AT DISCHARGE: 
/64   Pulse 93   Temp 98.8 °F (37.1 °C)   Resp 18   Ht 6' (1.829 m)   Wt 58.2 kg (128 lb 4.9 oz)   SpO2 94%   BMI 17.40 kg/m² Pt lying in bed, has no new complaints. Earlier in day discussed plan of care including likely need for oxygen at home. 1445: Called pts nabila, Vianey Drape and discussed plan of care over the phone. Plan for d/c today with home oxygen. General:          Alert, cooperative, no distress, appears stated age. HEENT:           MM moist.  
Lungs:             Expiratory wheezes, on oxygen via NC Chest wall:      No tenderness  No Accessory muscle use. Heart:              Regular  rhythm,  No  murmur Abdomen:        Soft, non-tender. Not distended. Bowel sounds normal 
Extremities:     No edema Psych:             Not anxious or agitated. Calm. Neurologic:      Alert, moves all extremities, answers questions appropriately and responds to commands CHRONIC MEDICAL DIAGNOSES: 
Problem List as of 10/25/2019 Date Reviewed: 4/27/2018 Codes Class Noted - Resolved Hypoxia ICD-10-CM: R09.02 
ICD-9-CM: 799.02  10/23/2019 - Present Depression with anxiety ICD-10-CM: F41.8 ICD-9-CM: 300.4  4/27/2018 - Present MCI (mild cognitive impairment) with memory loss ICD-10-CM: G31.84 ICD-9-CM: 331.83, 780.93  4/27/2018 - Present Insomnia (Chronic) ICD-10-CM: G47.00 ICD-9-CM: 780.52  2/15/2018 - Present Back pain ICD-10-CM: M54.9 ICD-9-CM: 724.5  1/11/2018 - Present Lumbar spinal stenosis ICD-10-CM: M48.061 
ICD-9-CM: 724.02  1/11/2018 - Present Closed L1 vertebral fracture [facility]) ICD-10-CM: N70.942B 
ICD-9-CM: 805.4  1/11/2018 - Present RESOLVED: Nicotine dependence (Chronic) ICD-10-CM: U69.563 ICD-9-CM: 305.1  2/15/2018 - 4/27/2018 RESOLVED: Tachycardia ICD-10-CM: R00.0 ICD-9-CM: 785.0  2/15/2018 - 4/27/2018 RESOLVED: Anxiety (Chronic) ICD-10-CM: F41.9 ICD-9-CM: 300.00  2/15/2018 - 4/27/2018 RESOLVED: Chronic obstructive pulmonary disease with acute exacerbation (HCC) ICD-10-CM: J44.1 ICD-9-CM: 491.21  1/11/2018 - 2/15/2018 RESOLVED: Acute encephalopathy ICD-10-CM: G93.40 ICD-9-CM: 348.30  1/11/2018 - 2/15/2018 RESOLVED: CAP (community acquired pneumonia) ICD-10-CM: J18.9 ICD-9-CM: 670  1/11/2018 - 4/27/2018 RESOLVED: Acute respiratory failure with hypoxemia ([address] ICD-10-CM: J96.01 
ICD-9-CM: 518.81  1/11/2018 - 4/27/2018 RESOLVED: Rhabdomyolysis ICD-10-CM: M76.32 ICD-9-CM: 728.88  1/11/2018 - 2/15/2018 Greater than 30 minutes were spent with the patient on counseling and coordination of care Signed:  
Deon Floyd NP 
10/25/2019 
2:53 PM

## 2019-10-25 NOTE — DISCHARGE INSTRUCTIONS
Discharge Instructions     PATIENT ID: Shellie Gallardo  MRN: [id number]   YOB: 1940    DATE OF ADMISSION: 10/23/2019  4:21 PM    DATE OF DISCHARGE: 10/25/2019  PRIMARY CARE PROVIDER: Chayo Nino NP   ATTENDING PHYSICIAN: Annel Echavarria*  DISCHARGING PROVIDER: Teodora Tan NP. To contact this individual call [phone] and ask the  to page. If unavailable ask to be transferred the Adult Hospitalist Department. DISCHARGE DIAGNOSES:  Acute on Chronic Hypoxic Respiratory Failure  COPD  Lung Nodules    CONSULTATIONS: IP CONSULT TO PULMONOLOGY    FOLLOW UP APPOINTMENTS:   Follow-up Information     Follow up With Specialties Details Why Contact Info    Chayo Nino NP Nurse Practitioner   [address] [facility]  [phone]      Douglass Lundborg, MD Internal Medicine, Pulmonary Disease In 1 week call to arrange outpatient Bronch and Bx [address] [facility]  [address] [address] [facility]  [phone]           ADDITIONAL CARE RECOMMENDATIONS:   - take antibiotics as ordered, Augmentin x 7 days  - Steroid taper as ordered:  Prednisone 60 mg 10/26-10/27  Prednisone 40 mg 10/28-10/30  Prednisone 20 mg 10/31-11/2  Prednisone 10 mg 11/3-11/4     - 4 L NC Oxygen continuously    Patient Education      Prednisone (predniSONE Intensol, Prednicot, Deltasone, Eric) - (By mouth)   Why this medicine is used:   Treats many diseases and conditions, especially problems related to inflammation.   Contact a nurse or doctor right away if you have:  · Rapid weight gain; swelling in your hands, ankles, or feet  · Severe stomach pain, nausea, vomiting, or red or black stools  · Depression, unusual thoughts, feelings, or behaviors, trouble sleeping  · Fever, chills, cough, sore throat, and body aches  · Trouble seeing, eye pain     Common side effects:  · Increased appetite, weight gain  · Round, puffy face  · Muscle pain, weakness  © [facility] Information is for End User's use only and may not be sold, redistributed or otherwise used for commercial purposes. Patient Education      Amoxicillin/Clavulanate Potassium (Augmentin, Augmentin ES-600, Augmentin XR) - (By mouth)   Why this medicine is used:   Treats infections. This medicine is a penicillin antibiotic. Contact a nurse or doctor right away if you have:  · Blistering, peeling, red skin rash  · Dark urine or pale stools, nausea, vomiting, loss of appetite, stomach pain, yellow eyes or skin  · Severe diarrhea, especially if bloody or ongoing     Common side effects:  · Diarrhea, nausea, vomiting  · Diaper rash  · Tooth discoloration (in children)  © 2017 [facility] Information is for End User's use only and may not be sold, redistributed or otherwise used for commercial purposes.        DIET: Regular Diet     ACTIVITY: Activity as tolerated     EQUIPMENT needed: Oxygen    · It is important that you take the medication exactly as they are prescribed. · Keep your medication in the bottles provided by the pharmacist and keep a list of the medication names, dosages, and times to be taken in your wallet. · Do not take other medications without consulting your doctor. NOTIFY YOUR PHYSICIAN FOR ANY OF THE FOLLOWING:   Fever over 101 degrees for 24 hours. Chest pain, shortness of breath, fever, chills, nausea, vomiting, diarrhea, change in mentation, falling, weakness, bleeding. Severe pain or pain not relieved by medications. Or, any other signs or symptoms that you may have questions about.     DISPOSITION:    Home With:   OT  PT  HH  RN       SNF/Inpatient Rehab/LTAC    Independent/assisted living    Hospice   xx Other: Home with oxygen     CDMP Checked:   Yes *x*     PROBLEM LIST Updated:  Yes *x*     Signed:   Sierra Manzanares NP  10/25/2019  3:20 PM

## 2019-10-25 NOTE — PROGRESS NOTES
Problem: Pain Goal: *Control of Pain 10/25/2019 1759 by Frank Lundy Outcome: Resolved/Met 
10/25/2019 1759 by Frank Lundy Outcome: Progressing Towards Goal 
10/25/2019 1758 by Frank Lundy Outcome: Progressing Towards Goal 
Goal: *PALLIATIVE CARE:  Alleviation of Pain 10/25/2019 1759 by Frank Lundy Outcome: Resolved/Met 
10/25/2019 1759 by Frank Lundy Outcome: Progressing Towards Goal 
  
Problem: General Infection Care Plan (Adult and Pediatric) Goal: Improvement in signs and symptoms of infection 10/25/2019 1759 by Frank Lundy Outcome: Resolved/Met 
10/25/2019 1759 by Frank Lundy Outcome: Progressing Towards Goal 
10/25/2019 1758 by Frank Lundy Outcome: Progressing Towards Goal 
Goal: *Optimize nutritional status 10/25/2019 1759 by Frank Lundy Outcome: Resolved/Met 
10/25/2019 1759 by Frank Lundy Outcome: Progressing Towards Goal 
  
Problem: Patient Education: Go to Patient Education Activity Goal: Patient/Family Education 10/25/2019 1759 by Frank Lundy Outcome: Resolved/Met 
10/25/2019 1759 by Frank Lundy Outcome: Progressing Towards Goal 
  
Problem: Falls - Risk of 
Goal: *Absence of Falls Description Document Venus Mars Fall Risk and appropriate interventions in the flowsheet. 10/25/2019 1759 by Frank Lundy Outcome: Resolved/Met Note:  
Fall Risk Interventions: 
Mobility Interventions: Strengthening exercises (ROM-active/passive), Communicate number of staff needed for ambulation/transfer 10/25/2019 1759 by Frank Lundy Outcome: Progressing Towards Goal 
Note:  
Fall Risk Interventions: 
Mobility Interventions: Strengthening exercises (ROM-active/passive), Communicate number of staff needed for ambulation/transfer Problem: Patient Education: Go to Patient Education Activity Goal: Patient/Family Education 10/25/2019 1759 by Huong Ann Outcome: Resolved/Met 
10/25/2019 1759 by Huong Ann Outcome: Progressing Towards Goal 
  
Problem: Discharge Planning Goal: *Discharge to safe environment 10/25/2019 1759 by Huong Ann Outcome: Resolved/Met 
10/25/2019 1759 by Huong Ann Outcome: Progressing Towards Goal 
  
Problem: Nutrition Deficit Goal: *Optimize nutritional status 10/25/2019 1759 by Huong Ann Outcome: Resolved/Met 
10/25/2019 1759 by Huong Ann Outcome: Progressing Towards Goal

## 2019-10-25 NOTE — PROGRESS NOTES
Spoke with Dr. Rudy Wong, CT procedure to be cancelled for today esophageal access is a better course of action. This nurse was asked to contact CT as both Dr. Mohamud Batres and this nurse are not able to discontinue the order on the EMR at this time. Dr. Mohamud Batres will follow up with plan as it becomes available. 1673: This nurse spoke with CT who moved back procedure so this nurse was able to discontinue the order.

## 2019-10-25 NOTE — PROGRESS NOTES
10/25/19 -  
JULIUS: 
- Patient's lung biopsy was canceled for today, 10/25 - Per Pulmonary, patient will be set up for a Da. Bronch/EBUS next week as an outpatient - Patient to continue with steroids and abx - Patient will likely require O2 for discharge; attending has ordered a 6 minute walk - Patient may be discharged today, 10/25 
- Patient may require transport assistance CRM: Juwan Mario MPH, [facility]; Z: [phone] 
 
14:45 -  
CM rounded on patient with bedside nursing. Upon standing on RA, patient de-SAT'ed to 86%. Patient has Home O2 Orders for 4L via NC. CM to submit referral to [address] via [employer]. CRM: Juwan Mario, [facility]; Z: [phone] 
 
15:30 -  
CM spoke with [address] Kathryn Cedeno). Portable O2 will be on site by 17:00. CM spoke wwith patient's niece Miachel Gowers) to confirm discharge disposition. Niece is to transport patient back to [facility] and will be on site by 15:00. Nibeka requested that [facility], Ketty Marques:  [phone], to discuss disposition. CM contacted Ketty Marques, who confirmed ability to receive patient back today, 10/25. CM discussed that [address] will deliver O2 concentrator at 18:30, and Ketty Marques expressed understanding. Ketty Marques stated that nursing does not need to call report. [facility] information is on AVS. CM to complete: Face Sheet, SBAR, KARDEX, and H&P. Packet is on hard chart. NURSING TO COMPLETE: MAR AND DISCHARGE. CRM: Juwan Mario, MPH, [address] [place] [address]; Z: [phone]

## 2019-10-25 NOTE — PROGRESS NOTES
Problem: Pain Goal: *Control of Pain Outcome: Progressing Towards Goal 
  
Problem: General Infection Care Plan (Adult and Pediatric) Goal: Improvement in signs and symptoms of infection Outcome: Progressing Towards Goal

## 2019-10-25 NOTE — ROUTINE PROCESS
This nurse spoke with Clark Nj the val dahl in regard to group home name and loaction. The patient has been staying at [facility] at [facility] [facility]. This nurse to start weening patient off of O2 at this time. 1420: Patient on RA at this time with desat in O2 from 94% to 90%. After one minute on 2L patient was not able to recover. With 4L patient recovered in 1 min to an O2 sat of 96%. 1427: On RA patient stood up to take walk and O2 dropped to 86%. Patient started pointing to his head and this nurse sat him down. At this time the patient stated that he was feeling very dizzy. The patient was put on NC at 5L with an O2 that improved to 94% after one minute.

## 2019-10-25 NOTE — PROGRESS NOTES
NUTRITION COMPLETE ASSESSMENT 
 
RECOMMENDATIONS:  
1. Encourage PO intake with all meals and Ensure BID 
 - continue to document % meals consumed - appreciate documentation today 2. Recheck Vitamin D and replete PRN - deficient in 7/2019 and no supplementation currently in place 3. Add daily MVI. Add 100mg thiamine x 7 days. 4. Weekly weights on standing scale Interventions/Plan:  
Food/Nutrient Delivery:  (double portions if requested) Commercial supplement(Ensure BID) Nutrition Education:    discussed supplemetns Assessment:  
Reason for Assessment: At Nutrition Risk Diet: cardiac Supplements: None Nutritionally Significant Medications: [x] Reviewed & Includes: Augmentin, solu-medrol Meal Intake:  
Patient Vitals for the past 100 hrs: 
 % Diet Eaten 10/25/19 1207 100 % 10/25/19 0815 100 % Pre-Hospitalization: 
Usual Appetite: Fair Diet at Home: regular Vitamins/Supplements: No 
 
Current Hospitalization:  
Appetite:   
PO Ability: Independent Average po intake:% Average supplements intake:    
  
Subjective: \"I can do some of the Ensure. I don't need double portions right now. \" 
 
Objective: 
Pt admitted for hypoxia. PMHx: depression, schizophrenia, COPD, spinal stenosis,  SOB on admit. CT showing worsening lung nodules with concerns for malignancy. Pulmonology following with possible d/c with outpatient workup. Steroids and abx rx. Pt visited today. He reports decreased intake with meals. Still eating 3 meals per day but protions less. Has not been drinking protein shakes at home but agreeable to Ensure which he has had on past admits. Will add Ensure Enlive BID (700kcal,40g protein). Pt with minimal interest in conversation so any diet education deferred for now. Wt chronically low but with severe wt loss of 8% x 2 months reported. Muscle and fat wasting observed.  Predict wt loss partially related to increased energy expenditure with COPD vs malignancy along with decreased appetite. Wt Readings from Last 10 Encounters:  
10/24/19 58.2 kg (128 lb 4.9 oz) 09/23/19 62.6 kg (138 lb) 09/09/19 62.9 kg (138 lb 9.6 oz) 07/31/19 62.6 kg (138 lb) 07/03/19 65.7 kg (144 lb 12.8 oz) 08/27/18 63 kg (139 lb) 04/27/18 58.5 kg (129 lb)  
03/23/18 55.8 kg (123 lb) 03/02/18 55.3 kg (122 lb)  
02/21/18 56.6 kg (124 lb 11.2 oz) Patient meets criteria for Severe Acute on Chronic Protein Calorie Malnutrition as evidenced by:  
ASPEN Malnutrition Criteria Acute Illness, Chronic Illness, or Social/Enviornmental: Acute illness Energy Intake: <75% est energy req for > 7 days Weight Loss: Greater than 7.5% x 3 mos Body Fat Loss: Moderate Muscle Mass Loss: Moderate ASPEN Malnutrition Score - Acute Illness: 19 Acute Illness - Malnutrition Diagnosis: Severe malnutrition. Estimated Nutrition Needs:  
Kcals/day: 8244 Kcals/day(1870-2120kcal) Protein: 81 g(81-93g (1.4-1.6g/kg)) Fluid: 1900 ml(1ml/kcal) Based On: Saline St Jeor(x 1.4 + 250kcal) Weight Used: Actual wt(58.2kg) Pt expected to meet estimated nutrient needs:  []   Yes     []  No [] Unable to predict at this time Nutrition Diagnosis: 1. Malnutrition related to inadequate oral intake; increased energy expenditure as evidenced by severe wt loss with poor PO PTA; likely malignancy/COPD; muscle/fat wasting Goals:   
 Consumption of at least 75% meals and 2 Ensure daily in 5-7 days; wt maintenance/gain Monitoring & Evaluation: - Total energy intake, Liquid meal replacement, Protein intake - Weight/weight change, GI, Vitamin profile Previous Nutrition Goals Met:   N/A Previous Recommendations:    N/A Education & Discharge Needs: 
 [x] None Identified 
 [] Identified and addressed [x] Participated in care plan, discharge planning, and/or interdisciplinary rounds Cultural, Advent and ethnic food preferences identified: None Skin Integrity: [x]Intact  []Other Edema: [x]None []Other Last BM: 10/23 Food Allergies: [x]None []Other Diet Restrictions: Cultural/Buddhism Preference(s): None Anthropometrics:   
Weight Loss Metrics 10/24/2019 9/23/2019 9/9/2019 7/31/2019 7/3/2019 8/27/2018 4/27/2018 Today's Wt 128 lb 4.9 oz 138 lb 138 lb 9.6 oz 138 lb 144 lb 12.8 oz 139 lb 129 lb BMI 17.4 kg/m2 18.72 kg/m2 18.8 kg/m2 18.72 kg/m2 19.64 kg/m2 18.85 kg/m2 17.5 kg/m2 Weight Source: Standing scale (comment) Height: 6' (182.9 cm), Height Source: (drivers license) Body mass index is 17.4 kg/m². IBW : 80.7 kg (178 lb), % IBW (Calculated): 72.08 % Usual Body Weight: 62.6 kg (138 lb),   
 
Labs:   
Lab Results Component Value Date/Time Sodium 140 10/23/2019 04:45 PM  
 Potassium 3.6 10/23/2019 04:45 PM  
 Chloride 105 10/23/2019 04:45 PM  
 CO2 27 10/23/2019 04:45 PM  
 Glucose 93 10/23/2019 04:45 PM  
 BUN 14 10/23/2019 04:45 PM  
 Creatinine 0.99 10/23/2019 04:45 PM  
 Calcium 8.9 10/23/2019 04:45 PM  
 Magnesium 1.8 02/04/2018 09:30 AM  
 Phosphorus 4.7 02/04/2018 06:55 PM  
 Albumin 4.2 07/16/2019 10:42 AM  
 
Lab Results Component Value Date/Time Hemoglobin A1c (POC) 5.2 07/03/2019 01:00 PM  
 
 
Kelley Bradshaw, RD [address] , Pager #[phone] or [phone]

## 2019-10-25 NOTE — PROGRESS NOTES
Bedside shift change report given to JASMYN Brito (oncoming nurse) by Lauren Alfonso RN (offgoing nurse). Report included the following information SBAR, Kardex, Intake/Output and MAR.

## 2019-10-25 NOTE — PROGRESS NOTES
Bedside and Verbal shift change report given to Aneesh Cornejo RN (oncoming nurse) by Elda Jay RN (offgoing nurse). Report included the following information SBAR, Kardex, Procedure Summary, Intake/Output, MAR and Recent Results.

## 2019-10-28 NOTE — Clinical Note
Unable to reach patient and/or Pardeep Christianson (on [address]) via phone. JULIUS visit is not scheduled at this time.

## 2019-10-28 NOTE — PROGRESS NOTES
10-28-19: Care Transitions Nurse left message for patient and left message for Mary Braun (on [facility]) requesting call back. 10-29-19: CTN received incoming message left by patient's niece, Mary Braun, stating that she was \"his point of contact\" in her message. CTN left message for patient's nieceMary (on [facility]). 10-30-19: CTN left messages for patient's niece, Mary Braun (on [facility]), at phone numbers listed as Gricelda's home and alternative phone numbers. CTN has not received response from patient in regards to message left for him on 10-28-19.  
 
11-1-19 at 9:44am: CTN is unable to reach patient and/or Mary Braun (on [facility]) via phone. First and second outreach attempts have been unsuccessful. CTN will attempt to reach patient/Gricelda Law again in the near future. CTN will continue to follow.

## 2019-10-31 NOTE — PROCEDURES
Pre anesthesia assessment was performed. History and physical on the chart. Informed consent for bronchoscopy with general anesthesia was obtained from the patient. The proposed procedure and possible alternatives including the option of no procedure were discussed. The benefits of the proposed procedure and its alternatives were also discussed. The nature and probability of risks of the proposed procedure and its alternatives were discussed. After our discussion, the patient gave informed consent to undergo the procedure and wished to proceed. Dr Eduardo Carr was present for the past of the case. Assistant: 
1) Brandi Barron RT. 2) Moon Glover, JASMYN 
  
A time out was performed prior to the start of procedure and the procedure was verified in the presence of bronchoscopist, RN and anesthesiologist. ASA assessment documented by anesthesiologist. Patients heart rate, BP, respiratory rate and oxygen saturations were monitored during the procedure. RSI was performed and patient was intubated by anesthesiologist. 
 
Inspection Bronchoscopy: After patient was adequately sedated the diagnostic bronchoscope was introduced through the ETT and advanced to the sanjay. The left and right tracheobronchial tree was examined upto sub segmental level. No endobronchial lesion. EBUS assisted 4L LN TBNA: The EBUS was inserted and advanced to trachea. Rotated to 9 oclock and EBUS activated. The 4 L LN was identified and 3 passes were made. Atypical cells seen on sample 3 on EDY. 
 
[employer] Navigational Bronchoscopy system TBBx LLL nodules: The inspection bronchoscope was inserted in the ETT and advanced to the sanjay. Registration was performed with forceps holding the sanjay. The right side secondary sanjay was used to confirm the site. The bronchoscope was navigated to the left upper lobe and navigated to JARETT nodules. Unable to reach the nodules.  The Da bronch was than inserted in LLL superior segment and two biopsies were performed followed by 4 more biopsies in the LLL basal segment. Patient tolerated the procedure well. He was extubated and transferred to PACU for recovery. No immediate complications. Sample collected: 
-EBUS assisted 4 L LN TBNA 25 x 3. 
-Navigational Bronchoscopy assisted LLL nodules TBBx x 6. 
-Navigational Bronchoscopy assisted BAL for cytology/ cultures. Complication: None. Implants: None.

## 2019-10-31 NOTE — PROGRESS NOTES
PCCM: 
Pearl grullon. History and physical has been reviewed. The patient has been examined.  There have been no significant clinical changes since the completion of the originally dated History and Physical.

## 2019-10-31 NOTE — ANESTHESIA PREPROCEDURE EVALUATION
Relevant Problems No relevant active problems Anesthetic History No history of anesthetic complications Review of Systems / Medical History Patient summary reviewed, nursing notes reviewed and pertinent labs reviewed Pulmonary Within defined limits COPD Neuro/Psych Within defined limits Psychiatric history Cardiovascular Within defined limits GI/Hepatic/Renal 
Within defined limits Endo/Other Within defined limits Other Findings Physical Exam 
 
Airway Mallampati: II 
TM Distance: > 6 cm Neck ROM: normal range of motion Mouth opening: Normal 
 
 Cardiovascular Regular rate and rhythm,  S1 and S2 normal,  no murmur, click, rub, or gallop Dental 
No notable dental hx Pulmonary Breath sounds clear to auscultation Abdominal 
GI exam deferred Other Findings Anesthetic Plan ASA: 3 Anesthesia type: general 
 
 
 
 
Induction: Intravenous Anesthetic plan and risks discussed with: Patient

## 2019-10-31 NOTE — PROGRESS NOTES
Spoke with MD Keyonna Grace and he had read X-Ray and advised pt could be D/C'ed. Discharge instructions given to pt's son at bedside.

## 2019-10-31 NOTE — PROGRESS NOTES
Patient Discharge Instructions Hawa Denny / [id number] : 1940 Admitted 10/31/2019 Discharged: 10/31/2019 4:09 PM  
 
ACUTE DIAGNOSES: 
Abnormal chest imaging CHRONIC MEDICAL DIAGNOSES: 
Patient Active Problem List  
Diagnosis Code  Back pain M54.9  Lumbar spinal stenosis M48.061  Closed L1 vertebral fracture ([facility] [address] S32.019A  Insomnia G47.00  Depression with anxiety F41.8  MCI (mild cognitive impairment) with memory loss G31.84  
 Hypoxia R09.02  
 
 
DISCHARGE MEDICATIONS:  
Current Discharge Medication List  
  
 
 
· It is important that you take the medication exactly as they are prescribed. · Keep your medication in the bottles provided by the pharmacist and keep a list of the medication names, dosages, and times to be taken in your wallet. · Do not take other medications without consulting your doctor. DIET:  As tolerated. ACTIVITY: as tolerated. ADDITIONAL INFORMATION: If you experience any of the following symptoms then please call your primary care physician or return to the emergency room if you cannot get hold of your doctor: Fever, chills, nausea, vomiting, diarrhea, change in mentation, falling, bleeding, shortness of breath. SPECIAL INSTRUCTIONS: 
 
FOLLOW UP CARE: 
Dr. Kelsey Montgomery you are to call and set up an appointment to see them in 2 weeks. Follow-up in 2 weeks Information obtained by : 
I understand that if any problems occur once I am at home I am to contact my physician. I understand and acknowledge receipt of the instructions indicated above. Physician's or R.N.'s Signature                                                                  Date/Time Patient or Representative Signature                                                          Date/Time

## 2019-10-31 NOTE — PERIOP NOTES
Pt transferred to endoscopy to recover. VSS. Continues on 4-6 lpm of oxygen dependent on compliance with leaving nasal cannula in place. Pt's son Rosa Bull at bedside. Briefed on procedure and recovery. Opportunity for questions provided. SBAR report given to Lisbeth Burnham RN.

## 2019-10-31 NOTE — PERIOP NOTES
Pt arrived to the facility with niece/POA, Suhail Stapleton. Brings 2 portable tanks of oxygen with him and is initially on 4 lpm via NC. Written consent obtained from the pt in the presence of niece/POA. Family denies any questions at this time.

## 2019-10-31 NOTE — ANESTHESIA POSTPROCEDURE EVALUATION
Procedure(s): BRONCHOSCOPY NAVIGATIONAL 
ENDOSCOPIC BRONCHOSCOPY ULTRASOUND (EBUS) TRANSBRONCHIAL BIOPSY. general 
 
<BSHSIANPOST> Vitals Value Taken Time /53 10/31/2019  3:32 PM  
Temp Pulse 80 10/31/2019  3:32 PM  
Resp 15 10/31/2019  3:32 PM  
SpO2 95 % 10/31/2019  3:32 PM

## 2019-11-01 PROBLEM — J93.9 PNEUMOTHORAX ON LEFT: Status: ACTIVE | Noted: 2019-01-01

## 2019-11-01 PROBLEM — J96.90 RESPIRATORY FAILURE (HCC): Status: ACTIVE | Noted: 2019-01-01

## 2019-11-01 PROBLEM — R06.02 SHORTNESS OF BREATH: Status: ACTIVE | Noted: 2019-01-01

## 2019-11-01 NOTE — ED PROVIDER NOTES
70 y.o. male with past medical history significant for anxiety, depression and COPD who presents via EMS from his group home with chief complaint of shortness of breath. Per EMS the patient began feeling intermittently short of breath last night, and is now consistently short of breath. He has low O2 saturation even with supplemental O2. He is on 4L NC O2 at baseline. Pt denies chest pain, and other pain. He was given 10mg decadron by EMS. There are no other acute medical concerns at this time. Social hx: former tobaccos smoker, denies EtOH 
 
PCP: Vianey Valencia NP 
Pulmonologist: Homero Duke MD 
 
Old chart reviewed: Pt has a bronchoscopy yesterday (10/31/19). He has pulmonary nodules and mediastinal lymphadenopathy Full history, physical exam, and ROS unable to be obtained due to:  Severe respiratory distress. Note written by Symone Shetty, as dictated by Racheal Santana MD 10:51 AM 
 
 
 
 
The history is provided by the patient and the EMS personnel. No  was used. Past Medical History:  
Diagnosis Date  Anxiety  Chronic confusion  Chronic obstructive pulmonary disease ([address] 4L cont. O2  
 Depression   
 pt has schizophrenia per son  Poor historian  Sleep disorder   
 insomnia Past Surgical History:  
Procedure Laterality Date  COLONOSCOPY N/A 7/31/2019 COLONOSCOPY performed by Medora Gitelman, MD at [facility] ENDOSCOPY Family History:  
Problem Relation Age of Onset  Cancer Mother   
     colon ancer  Cancer Father   
     lung cancer  Cancer Brother   
     lung cancer Social History Socioeconomic History  Marital status:  Spouse name: Not on file  Number of children: Not on file  Years of education: Not on file  Highest education level: Not on file Occupational History  Not on file Social Needs  Financial resource strain: Not on file  Food insecurity: Worry: Not on file Inability: Not on file  Transportation needs:  
  Medical: Not on file Non-medical: Not on file Tobacco Use  Smoking status: Former Smoker Packs/day: 1.00 Types: Cigarettes  Smokeless tobacco: Never Used  Tobacco comment: every 2 days Substance and Sexual Activity  Alcohol use: No  
 Drug use: No  
 Sexual activity: Never Lifestyle  Physical activity:  
  Days per week: Not on file Minutes per session: Not on file  Stress: Not on file Relationships  Social connections:  
  Talks on phone: Not on file Gets together: Not on file Attends Mosque service: Not on file Active member of club or organization: Not on file Attends meetings of clubs or organizations: Not on file Relationship status: Not on file  Intimate partner violence:  
  Fear of current or ex partner: Not on file Emotionally abused: Not on file Physically abused: Not on file Forced sexual activity: Not on file Other Topics Concern  Not on file Social History Narrative  Not on file ALLERGIES: Pollen extracts Review of Systems Unable to perform ROS: Severe respiratory distress There were no vitals filed for this visit. Physical Exam  
Constitutional: He is oriented to person, place, and time. He appears well-developed. Nasal cannula in place. Thin, somnolent HENT:  
Head: Normocephalic and atraumatic. Eyes: Conjunctivae and EOM are normal.  
Neck: Normal range of motion. Neck supple. Cardiovascular: Normal heart sounds. Tachycardia present. Pulmonary/Chest: He is in respiratory distress. He has decreased breath sounds (L). Abdominal: Soft. There is no tenderness. There is no guarding. Musculoskeletal: Normal range of motion. He exhibits no edema. Neurological: He is alert and oriented to person, place, and time. He exhibits normal muscle tone. Skin: Skin is warm and dry. Vitals reviewed. Note written by Symone Bustillos, as dictated by Young Dance, MD 11:00 AM 
 
 
MDM Number of Diagnoses or Management Options Pneumothorax on left: new and requires workup Risk of Complications, Morbidity, and/or Mortality Presenting problems: high Diagnostic procedures: high Management options: high Critical Care Total time providing critical care: 30-74 minutes (Total critical care time spend exclusive of procedures:  38 minutes. ) Procedures CONSULT NOTE: 
11:14 AM Young Dance, MD spoke with Dr. Lorena Cam, Consult for thoracic surgery. Discussed available diagnostic tests and clinical findings. Dr. Lorena Cam is in ED and will consult on the pt. Hospitalist Brad Espinosa for Admission 12:02 PM 
 
ED Room Number: LV89/58 Patient Name and age:  Radha Hsu 70 y.o.  male Working Diagnosis: 1. Pneumothorax on left Readmission: no 
Isolation Requirements:  no 
Recommended Level of Care:  med/surg Code Status:  Full Other:  Chest tube placed by thoracic surgery, recommend suction for 24 hours. Department:[facility] Adult ED - [phone] ED EKG interpretation: 12:15pm 
Rhythm: sinus tachycardia; and regular .  Rate (approx.): 116; Axis: normal; ST/T wave: T wave abnormality anterior leads, concerning for ischemia, this is a change from 10/23/19 EKG; normal interval. 
 
Note written by Symone Bustillos, as dictated by Young Dance, MD 2:52 PM

## 2019-11-01 NOTE — H&P
[address]  HISTORY AND PHYSICAL Name:  aLna Gonsalez 
MR#:  [id number] :  1940 ACCOUNT #:  [de-identified] ADMIT DATE:  2019 ADMITTING ATTENDING:  Alize Navarro MD 
 
PRIMARY CARE PHYSICIAN:  Danitza Johnson NP 
 
CHIEF COMPLAINT:  Shortness of breath. HISTORY OF PRESENT ILLNESS:  This is a 61-year-old male, who was recently admitted and discharged on 10/25/2019 with a discharge diagnosis of acute hypoxic respiratory failure likely related to malignancy and COPD. At the time of the discharge, the plan was to follow the patient as outpatient and get a biopsy yesterday and get a bronchoscopy and lung biopsy. Since yesterday night, the patient started having worsening shortness of breath (the patient at baseline is on 4 liters of oxygen). The patient's shortness of breath continued to get worse and today, the patient was brought in to the ER. No reported chest pain, unusual cough, fever, nausea, vomiting. No changes in bowel movement. In the ER, the patient was found to be having left tension pneumothorax and emergent Thoracic Surgery consult was done and the patient had a chest tube put in. PAST MEDICAL HISTORY: 
1. Shortness of breath. 2.  Insomnia. 3.  Depression. 4.  COPD. PAST SURGICAL HISTORY:  Colonoscopy. ALLERGIES:  NO KNOWN DRUG ALLERGIES. HOME MEDICATIONS:  The patient is on the following medications: 1. Albuterol 1 puff q. 4 hours p.r.n. 
2.  Augmentin 1 p.o. b.i.d. 3.  Advil 400 mg p.o. p.r.n. 
4.  Melatonin 1 tablet p.o. at bedtime. 5.  Remeron 15 mg p.o. at bedtime. 6.  Prednisone 60 mg with a taper. SOCIAL HISTORY:  The patient is a former smoker, quit about 2 years ago; nonalcoholic; nondrug abuser. FAMILY HISTORY:  Not significant for coronary artery disease. REVIEW OF SYSTEMS:  Pertinent positive as above. Rest of review of systems were done. There were all negative, except for above.  
 
PHYSICAL EXAMINATION: 
 VITAL SIGNS:  At the time when the patient was seen, the patient's vitals were as follows:  Blood pressure 134/79, pulse 101, afebrile, respiratory rate 20, saturating 99% on oxygen mask. GENERAL:  Not in any acute distress, comfortably lying on bed. HEENT:  Head:  Normocephalic, atraumatic. Eyes:  PERRLA, EOMI. Ears:  Bilaterally hearing normal.  No growth. Nose:  No polyps. No bleeding. Mouth:  Dry mucous membranes. Decent oral hygiene. NECK:  Supple. No JVD. No thyromegaly. RESPIRATORY:  Lungs, decreased breath sounds on the left side. No other adventitious breath sounds. CARDIOVASCULAR:  S1 and S2 normal.  No murmurs, rubs, or gallops. GI:  Bowel sounds present. Soft, nontender, nondistended. NEUROLOGICAL:  Cranial nerves II through XII intact. Motor 5/5 bilaterally in upper limbs and lower limbs. Sensory normal. 
PSYCHIATRIC:  Mood and affect appropriate. Alert, awake, oriented x3. LABORATORY AND RADIOLOGICAL RESULTS:  WBC 10.0, hemoglobin 14.3, hematocrit 46, and a platelet count of 303. Sodium 142, potassium 5.4, chloride 108, bicarb 23, BUN 25, creatinine 1.4, ALT of 300, AST of 286. Initial chest x-ray showed large left tension pneumothorax. Later on, the x-ray showed some improvement in the left pneumothorax following chest tube placement. EKG showed sinus tachycardia, T-wave abnormality. ASSESSMENT AND PLAN:  This is a 81-year-old male with past medical history of chronic obstructive pulmonary disease, schizophrenia, depression, who comes over here from group home with worsening shortness of breath after getting lung biopsy and was found to have tension pneumothorax. 1.  Tension pneumothorax. We will admit the patient as inpatient. The patient has already received the chest tube. We greatly appreciated Thoracic Surgery. We will continue the chest tube. We will repeat a chest x-ray tomorrow. We will assess the need of weaning the patient of it. 2.  Bilateral pulmonary nodules, status post lung biopsy. Follow the biopsy results. 3.  Acute respiratory failure, likely secondary to above, currently on oxygen mask. Wean oxygen as tolerated. 4.  Schizophrenia. We will continue his home medications. 5.  Transaminitis. Follow the workup. He would avoid any hepatotoxic medication. 6.  Hyperkalemia. Monitor closely. 7.  Elevated troponin. We will cycle the troponin and would get an echo and would consult the Cardiology. 8.  The patient is full code. I spent about 50 minutes in taking care of the patient. Petra Gillespie MD 
 
 
PG/[id number]/ 
D:  11/01/2019 14:48 T:  11/01/2019 16:09 
JOB #:  [id number]

## 2019-11-01 NOTE — CONSULTS
Thoracic Surgery ER Consultation Admit Date: 11/1/2019 Reason for Consultation: Left Tension Pneumothorax HPI: 
Jil Serna is a 70 y.o. male with PMH as noted below who we are asked to see emergently for a left tension PTX following Left lung biopsy yesterday. Patient presents via EMS from [facility] with complaints of SOB. Patient has a recent diagnosis of COPD and was in the [address] [place] [address] Ave lab yesterday and had biopsies taken of his left lung. Of note, at baseline patient is on 4L NC, patient arrives on CPAP. 10 mg IM decadron given en route as well as a nurys-neb treatment. Patient Active Problem List  
 Diagnosis Date Noted  Pneumothorax on left 11/01/2019  Shortness of breath 11/01/2019  Hypoxia 10/23/2019  Depression with anxiety 04/27/2018  MCI (mild cognitive impairment) with memory loss 04/27/2018  Insomnia 02/15/2018  Back pain 01/11/2018  Lumbar spinal stenosis 01/11/2018  Closed L1 vertebral fracture ([facility] [address] 01/11/2018 Past Medical History:  
Diagnosis Date  Anxiety  Chronic confusion  Chronic obstructive pulmonary disease ([address] 4L cont. O2  
 Depression   
 pt has schizophrenia per son  Poor historian  Sleep disorder   
 insomnia Past Surgical History:  
Procedure Laterality Date  COLONOSCOPY N/A 7/31/2019 COLONOSCOPY performed by Aubrey Curry MD at [facility] ENDOSCOPY Social History Tobacco Use  Smoking status: Former Smoker Packs/day: 1.00 Types: Cigarettes  Smokeless tobacco: Never Used  Tobacco comment: every 2 days Substance Use Topics  Alcohol use: No  
  
Family History Problem Relation Age of Onset  Cancer Mother   
     colon ancer  Cancer Father   
     lung cancer  Cancer Brother   
     lung cancer Prior to Admission medications Medication Sig Start Date End Date Taking? Authorizing Provider albuterol (PROVENTIL HFA, VENTOLIN HFA, PROAIR HFA) 90 mcg/actuation inhaler Take 1 Puff by inhalation every four (4) hours as needed for Wheezing. 10/25/19   Irving Rain NP  
amoxicillin-clavulanate (AUGMENTIN) 875-125 mg per tablet Take 1 Tab by mouth every twelve (12) hours for 7 days. 10/25/19 11/1/19  Irving Rain NP  
predniSONE (DELTASONE) 10 mg tablet Take 60 mg (6 tablets) 10/26-10/27 then 40 mg (4 tablets) 10/28-10/30 then 20 mg (2 tablets) 10/31-11/2 then 10 mg (1 tablet) 11/3-11/4 then stop 10/25/19   Irving Rain NP  
mirtazapine (REMERON) 15 mg tablet Take 1 Tab by mouth nightly. 9/9/19   Terra Gamez NP  
melatonin tab tablet Take 1 Tab by mouth nightly. 9/9/19   Mack Chamber, NP Allergies Allergen Reactions  Pollen Extracts Sneezing Subjective:  
 
Review of Systems: A comprehensive review of systems was negative except for that written in the History of Present Illness. Objective:  
 
Blood pressure 142/60, pulse (!) 125, resp. rate (!) 43, SpO2 95 %. Recent Results (from the past 24 hour(s)) CULTURE, RESPIRATORY/SPUTUM/BRONCH W GRAM STAIN Collection Time: 10/31/19  3:15 PM  
Result Value Ref Range Special Requests: NO SPECIAL REQUESTS    
 GRAM STAIN RARE WBCS SEEN    
 GRAM STAIN NO ORGANISMS SEEN Culture result: PENDING   
SAMPLES BEING HELD Collection Time: 11/01/19 11:07 AM  
Result Value Ref Range SAMPLES BEING HELD  1 RED, 1 BLUE   
 COMMENT Add-on orders for these samples will be processed based on acceptable specimen integrity and analyte stability, which may vary by analyte. SAMPLES BEING HELD Collection Time: 11/01/19 11:07 AM  
Result Value Ref Range SAMPLES BEING HELD  1 RED, 1 BLUE   
 COMMENT Add-on orders for these samples will be processed based on acceptable specimen integrity and analyte stability, which may vary by analyte. CXR (11/1/2019) IMPRESSION 
 Impression: Large left tension pneumothorax. _ 
 
____________________ Physical Exam:  
 
General:  Disoriented, mumbling, markedly short of breath on CPAP Eyes:   Sclera clear. Throat: Lips, mucosa, and tongue normal.  
Neck: +JVD, symmetrical, trachea midline. Lungs:   Dec BS left side, use of accessory muscles Heart:  Tachy, Regular rhythm. Abdomen:   Soft, flat Extremities: Extremities thin, atraumatic, no cyanosis or edema. Skin: W/d/i. Assessment:  
Active Problems: 
  Pneumothorax on left (11/1/2019) Shortness of breath (11/1/2019) Plan:  
 
Emergent Left pigtail chest tube placement Repeat CXR Leave CT to -20 suction IS Recommend admission to Hospitalist team 
 
Thank you for including us in the care of your patient. Total time spent with patient: 30 minutes. Signed By: BRANDON Mejía November 1, 2019

## 2019-11-01 NOTE — CONSULTS
Name: [facility]: [facility] FABBY  
: 1940 Admit Date: 2019 Phone: [phone]  Room: [address] [place] [address] PCP: Abdlukadir Walker NP  MRN: [id number] Date: 2019  Code: Full Code HPI: 
 
1:36 PM    
 
History was obtained from patient and medical records. I was asked by Dr Joseph Chowdary to see Ranelsi Briseno in consultation for a chief complaint of shortness of breath. History of Present Illness: 70year old male with past medical history of abnormal ct chest with bilateral pulmonary nodules and mediastinal lymphadenopathy who underwent a navigational LLL transbronchial biopsy and 4L LN needle aspirate under EBUS guidance. He presented to [facility] with increasing shortness of breath starting at 8 pm and worse this morning. CXR with left side tension PTX s/p chest tube by Dr Joseph Chowdary. Patient has complaints of some pain at the local site. He is still kept on 100% Non rebreather mask. sats are 100%. Past Medical History:  
Diagnosis Date  Anxiety  Chronic confusion  Chronic obstructive pulmonary disease ([address] 4L cont. O2  
 Depression   
 pt has schizophrenia per son  Poor historian  Sleep disorder   
 insomnia Past Surgical History:  
Procedure Laterality Date  COLONOSCOPY N/A 2019 COLONOSCOPY performed by Clau Flores MD at [facility] ENDOSCOPY Family History Problem Relation Age of Onset  Cancer Mother   
     colon ancer  Cancer Father   
     lung cancer  Cancer Brother   
     lung cancer Social History Tobacco Use  Smoking status: Former Smoker Packs/day: 1.00 Types: Cigarettes  Smokeless tobacco: Never Used  Tobacco comment: every 2 days Substance Use Topics  Alcohol use: No  
 
 
Allergies Allergen Reactions  Pollen Extracts Sneezing Current Facility-Administered Medications Medication Dose Route Frequency  albuterol 5mg / ipratropium 0.5mg neb solution  1 Dose Nebulization NOW  sodium chloride (NS) flush 5-40 mL  5-40 mL IntraVENous Q8H  
 sodium chloride (NS) flush 5-40 mL  5-40 mL IntraVENous PRN  
 HYDROcodone-acetaminophen (NORCO) 5-325 mg per tablet 1 Tab  1 Tab Oral Q4H PRN  
 ondansetron (ZOFRAN) injection 4 mg  4 mg IntraVENous Q4H PRN Current Outpatient Medications Medication Sig  ibuprofen (ADVIL) 200 mg tablet Take 400 mg by mouth daily as needed for Pain.  albuterol (PROVENTIL HFA, VENTOLIN HFA, PROAIR HFA) 90 mcg/actuation inhaler Take 1 Puff by inhalation every four (4) hours as needed for Wheezing.  amoxicillin-clavulanate (AUGMENTIN) 875-125 mg per tablet Take 1 Tab by mouth every twelve (12) hours for 7 days.  predniSONE (DELTASONE) 10 mg tablet Take 60 mg (6 tablets) 10/26-10/27 then 40 mg (4 tablets) 10/28-10/30 then 20 mg (2 tablets) 10/31-11/2 then 10 mg (1 tablet) 11/3-11/4 then stop  mirtazapine (REMERON) 15 mg tablet Take 1 Tab by mouth nightly.  melatonin tab tablet Take 1 Tab by mouth nightly. REVIEW OF SYSTEMS Negative except as stated in the HPI. Physical Exam:  
Visit Vitals /70 Pulse (!) 121 Temp 98.5 °F (36.9 °C) Resp (!) 32 SpO2 96% General:  Alert, cooperative, no distress, appears stated age. Head:  Normocephalic, without obvious abnormality, atraumatic. Eyes:  Conjunctivae. Nose: Nares normal. Septum midline. Throat: Lips, mucosa, and tongue normal. Teeth and gums normal.  
Neck: Supple, symmetrical, trachea midline. Lungs:   Decreased air entry on the left. Heart:  Regular rate and rhythm, S1, S2 normal, no murmur, click, rub or gallop. Abdomen:   Soft, non-tender. Bowel sounds normal. No masses,  No organomegaly. Extremities: Extremities normal, atraumatic, no cyanosis or edema. Pulses: 2+ and symmetric all extremities. Skin: Skin color, texture, turgor normal. No rashes or lesions Neurologic: Grossly nonfocal  
 
 
Lab Results Component Value Date/Time Sodium 143 11/01/2019 11:07 AM  
 Potassium 5.4 (H) 11/01/2019 11:07 AM  
 Chloride 108 11/01/2019 11:07 AM  
 CO2 23 11/01/2019 11:07 AM  
 BUN 25 (H) 11/01/2019 11:07 AM  
 Creatinine 1.40 (H) 11/01/2019 11:07 AM  
 Glucose 160 (H) 11/01/2019 11:07 AM  
 Calcium 8.5 11/01/2019 11:07 AM  
 Magnesium 1.8 02/04/2018 09:30 AM  
 Phosphorus 4.7 02/04/2018 06:55 PM  
 
 
Lab Results Component Value Date/Time WBC 10.0 11/01/2019 11:07 AM  
 HGB 14.3 11/01/2019 11:07 AM  
 PLATELET 878 58/32/6354 11:07 AM  
 .9 (H) 11/01/2019 11:07 AM  
 
 
Lab Results Component Value Date/Time  
 aPTT 49.7 (H) 01/11/2018 01:19 AM  
 AST (SGOT) 286 (H) 11/01/2019 11:07 AM  
 Alk. phosphatase 90 11/01/2019 11:07 AM  
 Protein, total 6.9 11/01/2019 11:07 AM  
 Albumin 3.6 11/01/2019 11:07 AM  
 Globulin 3.3 11/01/2019 11:07 AM  
 
 
Lab Results Component Value Date/Time TSH 1.43 01/11/2018 01:19 AM  
  
 
Lab Results Component Value Date/Time CK 80 01/31/2018 02:00 AM  
 CK-MB Index 0.5 01/11/2018 01:19 AM  
 Troponin-I, Qt. 0.94 (H) 11/01/2019 11:07 AM  
  
 
Lab Results Component Value Date/Time Culture result: PENDING 10/31/2019 03:15 PM  
 Culture result: HEAVY NORMAL RESPIRATORY RANJAN 02/14/2018 09:30 AM  
 Culture result: MODERATE CANDIDA ALBICANS (A) 02/14/2018 09:30 AM  
 Culture result:  02/14/2018 09:30 AM  
  CULTURE WILL BE HELD FOR 4 WEEKS. IF THERE IS ADDITIONAL FUNGAL GROWTH, A NEW REPORT WILL FOLLOW. Lab Results Component Value Date/Time CK 80 01/31/2018 02:00 AM  
 CK 90 01/30/2018 02:42 AM  
 
 
Lab Results Component Value Date/Time  Color YELLOW/STRAW 02/09/2018 01:22 PM  
 Appearance CLEAR 02/09/2018 01:22 PM  
 pH (UA) 6.0 02/09/2018 01:22 PM  
 Protein NEGATIVE  02/09/2018 01:22 PM  
 Glucose NEGATIVE  02/09/2018 01:22 PM  
 Ketone NEGATIVE  02/09/2018 01:22 PM  
 Bilirubin NEGATIVE  02/09/2018 01:22 PM  
 Blood NEGATIVE  02/09/2018 01:22 PM  
 Urobilinogen 1.0 02/09/2018 01:22 PM  
 Nitrites NEGATIVE  02/09/2018 01:22 PM  
 Leukocyte Esterase NEGATIVE  02/09/2018 01:22 PM  
 WBC 0-4 02/09/2018 01:22 PM  
 RBC 0-5 02/09/2018 01:22 PM  
 Bacteria NEGATIVE  02/09/2018 01:22 PM  
 
 
IMPRESSION: 
=========== 
-Left tension pneumothorax post LLL lung bx. -Hypoxemia. 
-abnormal CT Chest with bilateral pulmonary nodule; s/p Da Bronch and TBBx LLL. -mediastinal lymphadenopathy; s/p EBUS 4L LN. 
-COPD with FEV1 1.31 L (46%). -Nicotine dependence with 1/3 ppd x 55 years. PLAN: 
===== 
-chest tube to wall suction. CTS following. 
-O2 supplementation to keep sats above 92%. -Bronchodilator. 
-if path negative will talk to CTS about wedge resection and VATS pleurodesis, especially if the air leak persist. 
 
Will follow. Thank you for the consult.  
 
Wilfredo Perez MD

## 2019-11-01 NOTE — PROGRESS NOTES
1724: /88, pt complaining of L rib pain, pt given prn pain medicine, discussed BP with Dr. Evan Whitney, prn hydralazine order received if pain medicine does not bring BP down. 1800: Pt with increased work of breathing, decreased breath sounds on the left and wheezing on the right, chest tube intact and attached to suction per order however a significant air leak is noted, small amount of crepitus noted around insertion site. Dr. Evan Whitney notified of above, orders received for chest xray. RT paged for prn breathing treatment. 1830: Kiel Padron CCU RN, and Juliet Thibodeaux nurse at bedside, pt's position changed, chest tube bubbling once pt was repositioned. Thoracic surgery paged. 1910: Received phone call from Dr. Jong Gibson with thoracic surgery, Dr. Jong Gibson notified of above and chest xray findings, orders received to continue monitoring. Pt resting in bed and requesting to eat, pt set up with isis. Problem: Breathing Pattern - Ineffective Goal: *Absence of hypoxia Outcome: Not Progressing Towards Goal 
 Pt with increased work of breathing and increased respiratory rate, Dr. Evan Whitney notified, orders received. Bedside shift change report given to Trey VINES (oncoming nurse) by Antonio Chavarria RN (offgoing nurse). Report included the following information SBAR, Kardex, ED Summary, Intake/Output, MAR, Recent Results, Med Rec Status, Cardiac Rhythm Sinus Tach, Alarm Parameters  and Quality Measures.

## 2019-11-01 NOTE — ED TRIAGE NOTES
Patient presents from [facility] home with complaints of SOB. Patient has a recent diagnosis of COPD and was in the [address] [place] [address] lab yesterday and had biopsies taken of his left lung. At baseline patient is on 4L NC, patient arrives on CPAP. 10 mg IM decadron given en route as well as a nurys-neb treatmnet

## 2019-11-01 NOTE — ED NOTES
1108: Dr. Mateus Briones at bedside at this time 1119: Time out performed at this time 1129: chest tube in place

## 2019-11-01 NOTE — PROCEDURES
[address] [facility] [address],  [address] [place], [address] 
[phone] 
____________________________________________________________ Operative Report Procedure: Chest Tube Thoracostomy (CPT Code: 08826) Surgeon: Sheila Mcrae Preoperative Diagnosis / Indication: Traumatic pneumothorax / hemothorax / pleural effusiontension pneumothorax Postoperative Diagnosis: same Medications: 15cc's of 1% lidocaine EBL: minimal 
 
Technique: After Informed consent was obtained the patients left chest was prepped and draped in a sterile fashion. Time out was performed and all radiographs reviewed. 15ml 1% lidocaine infiltrated as local.  Using seldinger technique an 8.5Fr pigtail was placed in the 6th intercostal space anterior axillary line. Good rush of air obtained. Sutured in place. Bandaged. Patient tolerated the procedure well. No immediate complications. CXR pending.

## 2019-11-01 NOTE — PROGRESS NOTES
Admission Medication Reconciliation: 
 
Information obtained from:  Patient RxQuery data available¹: Yes Comments/Recommendations: Updated PTA meds/reviewed patient's allergies. 1)  I completed the medication reconciliation with the patient. He reports taking Augmentin and 20mg of prednisone this morning. He should be finishing the Augmentin course today. The prednisone taper is scheduled to finish on 11/4. 
 
2)  Medication changes (since last review): Added - Ibuprofen 200mg 2 tab PO daily PRN Adjusted 
- None Removed 
- None 3)  No new allergies to note. ¹RxQuery pharmacy benefit data reflects medications filled and processed through the patient's insurance, however  
this data does NOT capture whether the medication was picked up or is currently being taken by the patient. Allergies:  Pollen extracts Significant PMH/Disease States:  
Past Medical History:  
Diagnosis Date Anxiety Chronic confusion Chronic obstructive pulmonary disease (HCC) 4L cont. O2 Depression   
 pt has schizophrenia per son Poor historian Sleep disorder   
 insomnia Chief Complaint for this Admission: Chief Complaint Patient presents with Shortness of Breath Prior to Admission Medications:  
Prior to Admission Medications Prescriptions Last Dose Informant Patient Reported? Taking? albuterol (PROVENTIL HFA, VENTOLIN HFA, PROAIR HFA) 90 mcg/actuation inhaler   No Yes Sig: Take 1 Puff by inhalation every four (4) hours as needed for Wheezing. amoxicillin-clavulanate (AUGMENTIN) 875-125 mg per tablet 11/1/2019 at Unknown time  No Yes Sig: Take 1 Tab by mouth every twelve (12) hours for 7 days. ibuprofen (ADVIL) 200 mg tablet   Yes Yes Sig: Take 400 mg by mouth daily as needed for Pain.  
melatonin tab tablet 10/31/2019 at Unknown time  No Yes Sig: Take 1 Tab by mouth nightly. mirtazapine (REMERON) 15 mg tablet 10/31/2019 at Unknown time  No Yes Sig: Take 1 Tab by mouth nightly. predniSONE (DELTASONE) 10 mg tablet 11/1/2019 at Unknown time  No Yes Sig: Take 60 mg (6 tablets) 10/26-10/27 then 40 mg (4 tablets) 10/28-10/30 then 20 mg (2 tablets) 10/31-11/2 then 10 mg (1 tablet) 11/3-11/4 then stop Facility-Administered Medications: None Please contact the main inpatient pharmacy with any questions or concerns at [phone] and we will direct you to the clinical pharmacist covering this patient's care while in-house. Mahendra Thorpe, PharmD Candidate [id number]

## 2019-11-02 NOTE — CONSULTS
Date of  Admission: 11/1/2019 10:43 AM 
  
Sejal Díaz is a 70 y.o. male admitted for Respiratory failure ([facility] [address] [J96.90] Subjective:  Asked to see for elevated trop. Pt admitted last week with exacerbation copd and ct scan showed progressive mediastinal adenopathy and pul nodules. Discharged to his group home on steroids and home oxygen. Came in yesterday for bronchoscopy and biopsies. Last night had worsening sob and this am came to er in severe respiratory distress, tachycardic and hypoxic. Found to have tension pneumothorax and chest tube was placed. ekg around noon showed new anterior t wave inversion and subsequent trop are slightly elevated. He is still tachypnic and tachycardic, but says much better than earlier today on nc oxygen. No chest pain. 3/18 had negative nuclear stress test and unremarkable echo. denies palpitations, syncope, orthopnea, paroxysmal nocturnal dyspnea, exertional chest pressure/discomfort, claudication, lower extremity edema. Cardiac risk factors: smoking/ tobacco exposure, male gender. Assessment/Plan:  Likely ekg changes and trop elevation due to strain, supply demand mismatch. No chest pain and feels better than earlier today, not worse. Would continue trop and serial ekg, agree with echo. Will add low dose aspirin and beta blocker. No heparin/lovenox for now with fresh chest tube and recent lung biopsies. Patient Active Problem List  
 Diagnosis Date Noted  Pneumothorax on left 11/01/2019  Shortness of breath 11/01/2019  Respiratory failure ([facility] [address] 11/01/2019  Hypoxia 10/23/2019  Depression with anxiety 04/27/2018  MCI (mild cognitive impairment) with memory loss 04/27/2018  Insomnia 02/15/2018  Back pain 01/11/2018  Lumbar spinal stenosis 01/11/2018  Closed L1 vertebral fracture ([facility] [address] 01/11/2018 Kareen Skelton NP Past Medical History:  
Diagnosis Date  Anxiety  Chronic confusion  Chronic obstructive pulmonary disease ([facility] [address] 4L cont. O2  
 Depression   
 pt has schizophrenia per son  Poor historian  Sleep disorder   
 insomnia Past Surgical History:  
Procedure Laterality Date  COLONOSCOPY N/A 7/31/2019 COLONOSCOPY performed by Davina Maynard MD at [facility] ENDOSCOPY Allergies Allergen Reactions  Pollen Extracts Sneezing Family History Problem Relation Age of Onset  Cancer Mother   
     colon ancer  Cancer Father   
     lung cancer  Cancer Brother   
     lung cancer Current Facility-Administered Medications Medication Dose Route Frequency  sodium chloride (NS) flush 5-40 mL  5-40 mL IntraVENous Q8H  
 sodium chloride (NS) flush 5-40 mL  5-40 mL IntraVENous PRN  
 HYDROcodone-acetaminophen (NORCO) 5-325 mg per tablet 1 Tab  1 Tab Oral Q4H PRN  
 ondansetron (ZOFRAN) injection 4 mg  4 mg IntraVENous Q4H PRN  
 0.45% sodium chloride infusion  75 mL/hr IntraVENous CONTINUOUS  
 mirtazapine (REMERON) tablet 15 mg  15 mg Oral QHS  albuterol (PROVENTIL VENTOLIN) nebulizer solution 2.5 mg  2.5 mg Nebulization Q4H PRN  
 [START ON 11/2/2019] predniSONE (DELTASONE) tablet 20 mg  20 mg Oral DAILY WITH BREAKFAST  hydrALAZINE (APRESOLINE) 20 mg/mL injection 10 mg  10 mg IntraVENous Q6H PRN  
 aspirin chewable tablet 81 mg  81 mg Oral DAILY  metoprolol tartrate (LOPRESSOR) tablet 25 mg  25 mg Oral Q12H Review of Symptoms: A comprehensive review of systems was negative except for that written in the HPI. Physical Exam 
 
Visit Vitals /87 (BP 1 Location: Left arm, BP Patient Position: At rest) Pulse (!) 112 Temp 98.7 °F (37.1 °C) Resp (!) 32 Wt 243 lb 2.7 oz (110.3 kg) SpO2 100% BMI 32.98 kg/m² Neck: supple, symmetrical, trachea midline, no adenopathy, no carotid bruit and no JVD Heart: regular rate and rhythm, S1, S2 normal, no murmur, click, rub or gallop Lungs: dimished bs throughout Abdomen: soft, non-tender. Bowel sounds normal. No masses,  no organomegaly Extremities: extremities normal, atraumatic, no cyanosis or edema Pulses: 2+ and symmetric Neurologic: Grossly normal 
 
Cardiographics Telemetry: sinus tach ECG: sinus tachycardia, new inverted t waves V3,4 Echocardiogram: Not done Recent radiology, intake/output and wt reviewed Labs:  
Recent Results (from the past 24 hour(s)) CBC WITH AUTOMATED DIFF Collection Time: 11/01/19 11:07 AM  
Result Value Ref Range WBC 10.0 4.1 - 11.1 K/uL  
 RBC 3.97 (L) 4.10 - 5.70 M/uL  
 HGB 14.3 12.1 - 17.0 g/dL HCT 46.4 36.6 - 50.3 % .9 (H) 80.0 - 99.0 FL  
 MCH 36.0 (H) 26.0 - 34.0 PG  
 MCHC 30.8 30.0 - 36.5 g/dL  
 RDW 15.2 (H) 11.5 - 14.5 % PLATELET 622 883 - 879 K/uL MPV 10.0 8.9 - 12.9 FL  
 NRBC 0.0 0  WBC ABSOLUTE NRBC 0.00 0.00 - 0.01 K/uL NEUTROPHILS 91 (H) 32 - 75 % LYMPHOCYTES 3 (L) 12 - 49 % MONOCYTES 5 5 - 13 % EOSINOPHILS 0 0 - 7 % BASOPHILS 0 0 - 1 % IMMATURE GRANULOCYTES 1 (H) 0.0 - 0.5 % ABS. NEUTROPHILS 9.1 (H) 1.8 - 8.0 K/UL  
 ABS. LYMPHOCYTES 0.3 (L) 0.8 - 3.5 K/UL  
 ABS. MONOCYTES 0.5 0.0 - 1.0 K/UL  
 ABS. EOSINOPHILS 0.0 0.0 - 0.4 K/UL  
 ABS. BASOPHILS 0.0 0.0 - 0.1 K/UL  
 ABS. IMM. GRANS. 0.1 (H) 0.00 - 0.04 K/UL  
 DF SMEAR SCANNED    
 PLATELET COMMENTS Large Platelets RBC COMMENTS ANISOCYTOSIS 1+ 
    
 RBC COMMENTS MACROCYTOSIS 2+ 
    
 RBC COMMENTS POLYCHROMASIA PRESENT 
    
 RBC COMMENTS RBC FRAGMENTS 
PRESENT 
    
METABOLIC PANEL, COMPREHENSIVE Collection Time: 11/01/19 11:07 AM  
Result Value Ref Range Sodium 143 136 - 145 mmol/L Potassium 5.4 (H) 3.5 - 5.1 mmol/L Chloride 108 97 - 108 mmol/L  
 CO2 23 21 - 32 mmol/L Anion gap 12 5 - 15 mmol/L Glucose 160 (H) 65 - 100 mg/dL BUN 25 (H) 6 - 20 MG/DL Creatinine 1.40 (H) 0.70 - 1.30 MG/DL  
 BUN/Creatinine ratio 18 12 - 20 GFR est AA 60 (L) >60 ml/min/1.73m2 GFR est non-AA 49 (L) >60 ml/min/1.73m2 Calcium 8.5 8.5 - 10.1 MG/DL Bilirubin, total 4.0 (H) 0.2 - 1.0 MG/DL  
 ALT (SGPT) 300 (H) 12 - 78 U/L  
 AST (SGOT) 286 (H) 15 - 37 U/L Alk. phosphatase 90 45 - 117 U/L Protein, total 6.9 6.4 - 8.2 g/dL Albumin 3.6 3.5 - 5.0 g/dL Globulin 3.3 2.0 - 4.0 g/dL A-G Ratio 1.1 1.1 - 2.2 SAMPLES BEING HELD Collection Time: 11/01/19 11:07 AM  
Result Value Ref Range SAMPLES BEING HELD  1 RED, 1 BLUE   
 COMMENT Add-on orders for these samples will be processed based on acceptable specimen integrity and analyte stability, which may vary by analyte. TROPONIN I Collection Time: 11/01/19 11:07 AM  
Result Value Ref Range Troponin-I, Qt. 0.94 (H) <0.05 ng/mL NT-PRO BNP Collection Time: 11/01/19 11:07 AM  
Result Value Ref Range NT pro-BNP 4,149 (H) <450 PG/ML  
SAMPLES BEING HELD Collection Time: 11/01/19 11:07 AM  
Result Value Ref Range SAMPLES BEING HELD  1 RED, 1 BLUE   
 COMMENT Add-on orders for these samples will be processed based on acceptable specimen integrity and analyte stability, which may vary by analyte. EKG, 12 LEAD, INITIAL Collection Time: 11/01/19 12:15 PM  
Result Value Ref Range Ventricular Rate 116 BPM  
 Atrial Rate 116 BPM  
 P-R Interval 120 ms QRS Duration 70 ms Q-T Interval 310 ms QTC Calculation (Bezet) 430 ms Calculated P Axis 77 degrees Calculated R Axis 65 degrees Calculated T Axis -30 degrees Diagnosis Sinus tachycardia T wave abnormality, consider anterior ischemia When compared with ECG of 23-OCT-2019 16:42, Nonspecific T wave abnormality now evident in Inferior leads TROPONIN I Collection Time: 11/01/19  3:09 PM  
Result Value Ref Range Troponin-I, Qt. 1.12 (H) <0.05 ng/mL

## 2019-11-02 NOTE — PROGRESS NOTES
1010- patient arrived in bed on monitor with RN's and tech. Patient transferred to ICU bed, tolerated fairly. 1120- Dr. Abdelrahman Johnson on unit. Orders for STAT chest xray and possible chest tube placement. Material's collected for placement. 1140- assisted Dr. Abdelrahman Johnson in placing a new 29 F chest on left side. After placement, he removed old chest tube. STAT chest xray ordered. 1230- patient sleeping now. Sats %, RR 19-23; much improved. 1605- paged on call  per familys request for advance directive. 1610- spoke with Doris from [facility]. She is unable to come at this time to complete the advance directive. Family will come back on Monday to meet with [facility] to fill out paperwork. 80- paged Dr. Bonita Narayanan per family's request for update. [facility] speaking to Dr. Bonita Narayanan on the phone. 2000- Bedside and Verbal shift change report given to Candise Krabbe, RN (oncoming nurse) by River Serrato RN (offgoing nurse). Report included the following information SBAR, Kardex, Recent Results and Cardiac Rhythm ST.

## 2019-11-02 NOTE — PROGRESS NOTES
Pt placed on [place] NC, 25L 70% per NP verbal order. Pt desat into 80's for 15 mins on midflow of 13L

## 2019-11-02 NOTE — PROGRESS NOTES
[facility] [place] Division of Cardiology [phone] Progress note Misha Morris M.D., ANGYABENITEZ. NAME:  Malka Olea :   1940 MRN:   [id number] ICD-10-CM ICD-9-CM 1. Pneumothorax on left J93.9 512.89 Assessment/Plan: 1. Shortness of breath: He feels better. He has a moderate to large left pneumothorax. Chest tube is in place. He has a new small right-sided pneumothorax. Thoracic surgeon closely following. 2.  Elevated troponin: I suspect demand ischemia. The troponin peaked at 1.1. Nonetheless underlying fixed coronary artery disease cannot be entirely ruled out. Echocardiogram pending at this time. EKG with T wave inversion anterior leads. No gross changes from previous. To be noted the patient had a normal nuclear stress test on 2018. No additional cardiac interventions for now. We will follow echo results. EKG Results Procedure 720 Value Units Date/Time EKG, 12 LEAD, INITIAL [[id number]] Collected:  19 0211 Order Status:  Completed Updated:  19 3732 Ventricular Rate 101 BPM   
  Atrial Rate 101 BPM   
  P-R Interval 126 ms   
  QRS Duration 76 ms   
  Q-T Interval 354 ms QTC Calculation (Bezet) 459 ms Calculated P Axis 76 degrees Calculated R Axis 47 degrees Calculated T Axis 55 degrees Diagnosis --  
  Sinus tachycardia Low voltage QRS T wave abnormality, consider anterior ischemia When compared with ECG of 2019 12:15, 
ST no longer depressed in Inferior leads Nonspecific T wave abnormality no longer evident in Inferior leads EKG, 12 LEAD, INITIAL [[id number]] Order Status:  Sent EKG 12 LEAD INITIAL [[id number]] Collected:  19 1215 Order Status:  Completed Updated:  19 1343   Ventricular Rate 116 BPM   
 Atrial Rate 116 BPM   
  P-R Interval 120 ms QRS Duration 70 ms Q-T Interval 310 ms QTC Calculation (Bezet) 430 ms Calculated P Axis 77 degrees Calculated R Axis 65 degrees Calculated T Axis -30 degrees Diagnosis --  
  Sinus tachycardia T wave abnormality, consider anterior ischemia When compared with ECG of 23-OCT-2019 16:42, Nonspecific T wave abnormality now evident in Inferior leads EKG, 12 LEAD, INITIAL [[id number]] Order Status:  Sent Visit Vitals /78 (BP 1 Location: Left arm, BP Patient Position: At rest) Pulse (!) 102 Temp 98.7 °F (37.1 °C) Resp 29 Wt 148 lb 9.4 oz (67.4 kg) SpO2 99% BMI 20.15 kg/m² Wt Readings from Last 3 Encounters:  
11/02/19 148 lb 9.4 oz (67.4 kg) 10/31/19 132 lb (59.9 kg) 10/24/19 128 lb 4.9 oz (58.2 kg) Review of Systems:  
Pertinent items are noted in the History of Present Illness. Objective:he feels better cpt yesterday he tells me  
 
 
11/02 0701 - 11/02 1900 In: 1351.3 [P.O.:120; I.V.:1231.3] Out: 215 [WDFAD:761] 10/31 1901 - 11/02 0700 In: -  
Out: 251 [Urine:250] Telemetry: normal sinus rhythm Physical Exam: 
 
Neck: no carotid bruit and no JVD Heart: regular rate and rhythm Lungs: diminished breath sounds R anterior, L anterior Abdomen: soft, non-tender. Bowel sounds normal. No masses,  no organomegaly Extremities: no edema Current Facility-Administered Medications Medication Dose Route Frequency  methylPREDNISolone (PF) (SOLU-MEDROL) injection 40 mg  40 mg IntraVENous Q6H  
 sodium chloride (NS) flush 5-40 mL  5-40 mL IntraVENous Q8H  
 sodium chloride (NS) flush 5-40 mL  5-40 mL IntraVENous PRN  
 HYDROcodone-acetaminophen (NORCO) 5-325 mg per tablet 1 Tab  1 Tab Oral Q4H PRN  
 ondansetron (ZOFRAN) injection 4 mg  4 mg IntraVENous Q4H PRN  
 0.45% sodium chloride infusion  75 mL/hr IntraVENous CONTINUOUS  
  mirtazapine (REMERON) tablet 15 mg  15 mg Oral QHS  albuterol (PROVENTIL VENTOLIN) nebulizer solution 2.5 mg  2.5 mg Nebulization Q4H PRN  
 hydrALAZINE (APRESOLINE) 20 mg/mL injection 10 mg  10 mg IntraVENous Q6H PRN  
 aspirin chewable tablet 81 mg  81 mg Oral DAILY  metoprolol tartrate (LOPRESSOR) tablet 25 mg  25 mg Oral Q12H Lab Results Component Value Date/Time WBC 10.6 11/02/2019 02:50 AM  
 HGB 13.1 11/02/2019 02:50 AM  
 HCT 39.5 11/02/2019 02:50 AM  
 PLATELET 441 (L) 43/25/4080 02:50 AM  
 .7 (H) 11/02/2019 02:50 AM  
 
Lab Results Component Value Date/Time Sodium 138 11/02/2019 02:50 AM  
 Potassium 5.0 11/02/2019 02:50 AM  
 Chloride 105 11/02/2019 02:50 AM  
 CO2 29 11/02/2019 02:50 AM  
 Anion gap 4 (L) 11/02/2019 02:50 AM  
 Glucose 99 11/02/2019 02:50 AM  
 BUN 23 (H) 11/02/2019 02:50 AM  
 Creatinine 0.91 11/02/2019 02:50 AM  
 BUN/Creatinine ratio 25 (H) 11/02/2019 02:50 AM  
 GFR est AA >60 11/02/2019 02:50 AM  
 GFR est non-AA >60 11/02/2019 02:50 AM  
 Calcium 8.6 11/02/2019 02:50 AM  
 
Lab Results Component Value Date/Time Cholesterol (POC) 186 07/03/2019 12:50 PM  
 HDL Cholesterol (POC) 56 07/03/2019 12:50 PM  
 LDL Cholesterol (POC) 67 07/03/2019 12:50 PM  
 Triglycerides (POC) 313 07/03/2019 12:50 PM  
 
Lab Results Component Value Date/Time CK 80 01/31/2018 02:00 AM  
 CK - MB 9.2 (H) 01/11/2018 01:19 AM  
 CK-MB Index 0.5 01/11/2018 01:19 AM  
 Troponin-I, Qt. 0.55 (H) 11/02/2019 02:50 AM  
 
Lab Results Component Value Date/Time ALT (SGPT) 1,400 (H) 11/02/2019 02:50 AM  
 AST (SGOT) 1,304 (H) 11/02/2019 02:50 AM  
 Alk. phosphatase 81 11/02/2019 02:50 AM  
 Bilirubin, total 3.3 (H) 11/02/2019 02:50 AM  
 
Lab Results Component Value Date/Time  NT pro-BNP 4,149 (H) 11/01/2019 11:07 AM  
 NT pro- 10/23/2019 04:45 PM

## 2019-11-02 NOTE — PROGRESS NOTES
0900: Received from call from Dr. Ramin Brennan with reading of chest xray showing increasing size of moderately large left pneumothorax and a new small right pneumothorax. Dr. Jazmin Roberts notified of results, orders received to transfer to ICU, Thoracic surgery paged. 1816: Pt vomited small amount of food, pt given zofran. 1690: Notified Dr. Augustine Montana with Thoracic surgery of xray results, pt condition and transfer to CVICU. Orders received to keep chest tube on 40cm H2O continuous suction. Problem: Breathing Pattern - Ineffective Goal: *Absence of hypoxia Outcome: Not Progressing Towards Goal 
 Pt placed on hi diego overnight, pt still with increased respiratory rate and work of breathing, pulmonology and thoracic surgery following patient. Will continue to monitor. TRANSFER - OUT REPORT: 
 
Verbal report given to [facility], [facility] RN(name) on Naty Wong  being transferred to CVICU (unit) for urgent transfer Report consisted of patients Situation, Background, Assessment and  
Recommendations(SBAR). Information from the following report(s) SBAR, Kardex, ED Summary, Procedure Summary, Intake/Output, MAR, Recent Results, Med Rec Status, Cardiac Rhythm Sinus Tach, Alarm Parameters  and Quality Measures was reviewed with the receiving nurse. Lines:  
Peripheral IV 11/01/19 Left Antecubital (Active) Site Assessment Clean, dry, & intact 11/1/2019 11:00 PM  
Phlebitis Assessment 0 11/1/2019 11:00 PM  
Infiltration Assessment 0 11/1/2019 11:00 PM  
Dressing Status Clean, dry, & intact 11/1/2019 11:00 PM  
Dressing Type Transparent;Tape 11/1/2019 11:00 PM  
Hub Color/Line Status Pink; Infusing 11/1/2019 11:00 PM  
Action Taken Open ports on tubing capped 11/1/2019 11:00 PM  
Alcohol Cap Used Yes 11/1/2019 11:00 PM  
  
 
Opportunity for questions and clarification was provided. Patient transported with: 
 Monitor O2 @ 25 liters Registered Nurse Tech

## 2019-11-02 NOTE — PROCEDURES
Left chest tube Dx- PTX ByHaven Raisin 
 
28 Fr, sterile prep 15 cc 1% lidocaine 4th IC space, anterior axillary line Attached to suction Moderate leak Felt better immediately CXR pending Pigtail pulled Tolerated well

## 2019-11-02 NOTE — PROGRESS NOTES
Transferred to ICU for increased O2 need Now feels much better Repeat CXR shows smaller but still sizeable left PTX Pigtail unkinked and flushed, no airleak New chest tube placed Large gush of air, moderate leak Repeat CXR pending Keep left tube to suction Pigtail pulled Right PTX small, will watch for now

## 2019-11-02 NOTE — PROGRESS NOTES
6452: Assisted patient to bedside commode. Pt with an immediate increase work of breathing. Pt assisted back to bed, respiratory paged. O2 sats in the low 80's, respirations 30's. Oma Gutierrez 6246: Pt placed on mid-flow, 12 L. Pt remains A OX3, but continues to struggle with respirations and work of breathing. ABY Cuevas paged with RX'S for ABG and stat CRX 
 
0330:  Pt still displaying increased WOB. Slight increase in crepitus. CT with ongoing air-leak. RXS from ABY Cuevas  To monitor. 0430: Pt desat'ing. High flow placed on patient: 25 L and 70% FIO2. Oxygen sats 90' 0864: Call received from  Dr Patti Ambrosio . Orders for CXR @ 12:00 and water-seal suction increased to 40.  
 
0800:  Orders carried out.   Report given to Gagan Kay

## 2019-11-02 NOTE — PROGRESS NOTES
Hospitalist Progress Note Saba Hayes MD 
Answering service: [phone] from in house phone Date of Service:  2019 NAME:  Red Galindo :  1940 MRN:  [id number] Admission Summary: This is a 68-year-old male, who was recently admitted and discharged on 10/25/2019 with a discharge diagnosis of acute hypoxic respiratory failure likely related to malignancy and COPD. At the time of the discharge, the plan was to follow the patient as outpatient and get a biopsy yesterday and get a bronchoscopy and lung biopsy. Since yesterday night, the patient started having worsening shortness of breath (the patient at baseline is on 4 liters of oxygen). The patient's shortness of breath continued to get worse and today, the patient was brought in to the ER. No reported chest pain, unusual cough, fever, nausea, vomiting. No changes in bowel movement. 
  
In the ER, the patient was found to be having left tension pneumothorax and emergent Thoracic Surgery consult was done and the patient had a chest tube put in. 
  
 
Interval history / Subjective:  
  F/u SOB Feels fine Currently on hi flow Transferred to ICU today Assessment & Plan:  
 
Tension pneumothorax 
-s/p left chest tube  
-since overnight, oxygen requirement getting worse, the patient transferred to ICU 
-Appreciate discussion with Dr Radha Gutierrez, chest tube replaced  
-Follow CXR Bilateral pulmonary nodules,  
-status post lung biopsy.   
-Follow the biopsy results. Acute respiratory failure, likely secondary to above 
-on Hi flow, wean as tolerated 
-On Solumedrol Elevated troponin. -per cardiology likely demand ischemia 
-Follow Echo Schizophrenia 
-Continue home meds Transaminitis 
-sec to ? 
-Worse today 
-Avoid hepatotoxics -Hep panel 
-US abd unremarkable 
-Monitor 
-Consult GI Hyperkalemia 
-monitor Code status: FULL CODE 
 DVT prophylaxis: scd Plan: Follow thoracic surgery,  Cardiology, GI Care Plan discussed with: Patient/Family spoke to nabila GAINES Disposition: TBD Hospital Problems  Date Reviewed: 11/2/2019 Codes Class Noted POA Pneumothorax on left ICD-10-CM: J93.9 ICD-9-CM: 512.89  11/1/2019 Unknown * (Principal) Shortness of breath ICD-10-CM: R06.02 
ICD-9-CM: 786.05  11/1/2019 Yes Respiratory failure ([address] ICD-10-CM: J96.90 ICD-9-CM: 518.81  11/1/2019 Unknown Review of Systems: A comprehensive review of systems was negative except for that written in the HPI. Vital Signs:  
 Last 24hrs VS reviewed since prior progress note. Most recent are: 
Visit Vitals /77 Pulse 92 Temp 98.5 °F (36.9 °C) Resp 21 Ht 6' (1.829 m) Wt 67.1 kg (148 lb) SpO2 90% BMI 20.07 kg/m² Intake/Output Summary (Last 24 hours) at 11/2/2019 1559 Last data filed at 11/2/2019 1140 Gross per 24 hour Intake 1351.25 ml Output 470 ml Net 881.25 ml Physical Examination:  
 
 
     
Constitutional:  No acute distress, cooperative, pleasant ENT:  Oral mucous moist, oropharynx benign. Resp:  CTA bilaterally. No wheezing/rhonchi/rales. No accessory muscle use CV:  Regular rhythm, normal rate, no murmurs, gallops, rubs GI:  Soft, non distended, non tender. normoactive bowel sounds, no hepatosplenomegaly Musculoskeletal:  No edema, warm, 2+ pulses throughout Neurologic:  Moves all extremities. AAOx3, CN II-XII reviewed Psych:  Good insight, Not anxious nor agitated. Data Review:  
 Review and/or order of clinical lab test 
 
 
Labs:  
 
Recent Labs 11/02/19 
0250 11/01/19 
1107 WBC 10.6 10.0 HGB 13.1 14.3 HCT 39.5 46.4 * 151 Recent Labs 11/02/19 
0250 11/01/19 
1107  143  
K 5.0 5.4*  
 108 CO2 29 23 BUN 23* 25* CREA 0.91 1.40* GLU 99 160* CA 8.6 8.5 Recent Labs 11/02/19 6329 11/01/19 
1107 SGOT 1,304* 286* ALT 1,400* 300* AP 81 90 TBILI 3.3* 4.0*  
TP 6.2* 6.9 ALB 3.5 3.6 GLOB 2.7 3.3 No results for input(s): INR, PTP, APTT, INREXT in the last 72 hours. No results for input(s): FE, TIBC, PSAT, FERR in the last 72 hours. No results found for: FOL, RBCF No results for input(s): PH, PCO2, PO2 in the last 72 hours. Recent Labs 11/02/19 
0250 11/01/19 
1509 11/01/19 
1107 TROIQ 0.55* 1.12* 0.94* No results found for: CHOL, CHOLX, CHLST, CHOLV, HDL, HDLP, LDL, LDLC, DLDLP, TGLX, TRIGL, TRIGP, CHHD, CHHDX Lab Results Component Value Date/Time Glucose (POC) 116 (H) 02/05/2018 06:20 AM  
 Glucose (POC) 102 (H) 01/31/2018 12:03 PM  
 Glucose (POC) 91 01/20/2018 06:52 AM  
 Glucose (POC) 136 (H) 01/17/2018 11:46 AM  
 Glucose (POC) 97 01/12/2018 10:18 PM  
 
Lab Results Component Value Date/Time Color YELLOW/STRAW 02/09/2018 01:22 PM  
 Appearance CLEAR 02/09/2018 01:22 PM  
 Specific gravity 1.028 02/09/2018 01:22 PM  
 pH (UA) 6.0 02/09/2018 01:22 PM  
 Protein NEGATIVE  02/09/2018 01:22 PM  
 Glucose NEGATIVE  02/09/2018 01:22 PM  
 Ketone NEGATIVE  02/09/2018 01:22 PM  
 Bilirubin NEGATIVE  02/09/2018 01:22 PM  
 Urobilinogen 1.0 02/09/2018 01:22 PM  
 Nitrites NEGATIVE  02/09/2018 01:22 PM  
 Leukocyte Esterase NEGATIVE  02/09/2018 01:22 PM  
 Epithelial cells FEW 02/09/2018 01:22 PM  
 Bacteria NEGATIVE  02/09/2018 01:22 PM  
 WBC 0-4 02/09/2018 01:22 PM  
 RBC 0-5 02/09/2018 01:22 PM  
 
 
 
Medications Reviewed:  
 
Current Facility-Administered Medications Medication Dose Route Frequency  methylPREDNISolone (PF) (SOLU-MEDROL) injection 40 mg  40 mg IntraVENous Q6H  
 sodium chloride (NS) flush 5-40 mL  5-40 mL IntraVENous Q8H  
 sodium chloride (NS) flush 5-40 mL  5-40 mL IntraVENous PRN  
 HYDROcodone-acetaminophen (NORCO) 5-325 mg per tablet 1 Tab  1 Tab Oral Q4H PRN  
  ondansetron (ZOFRAN) injection 4 mg  4 mg IntraVENous Q4H PRN  
 0.45% sodium chloride infusion  75 mL/hr IntraVENous CONTINUOUS  
 mirtazapine (REMERON) tablet 15 mg  15 mg Oral QHS  albuterol (PROVENTIL VENTOLIN) nebulizer solution 2.5 mg  2.5 mg Nebulization Q4H PRN  
 hydrALAZINE (APRESOLINE) 20 mg/mL injection 10 mg  10 mg IntraVENous Q6H PRN  
 aspirin chewable tablet 81 mg  81 mg Oral DAILY  metoprolol tartrate (LOPRESSOR) tablet 25 mg  25 mg Oral Q12H  
 
______________________________________________________________________ EXPECTED LENGTH OF STAY: - - - 
ACTUAL LENGTH OF STAY:          1 Jayda Evans MD

## 2019-11-03 NOTE — CONSULTS
[address] 
[address] [facility] [address] [place], [address] 
[phone] GI CONSULTATION NOTE 
 
 
NAME:  Sejal Díaz :   1940 MRN:   [id number] Consult Date: 11/3/2019 Chief Complaint: elevated LFTs History of Present Illness:  Patient is a 70 y.o. M with cognitive impairment, schizophrenia, COPD on home 4 L 02, possible lung cancer admitted with left tension pneumothorax after lung nodule biopsy. We are asked to see him at the request of Dr. Jade Heath for elevated LFTs. He presented 19 with severe dyspnea and underwent emergent chest tube placement. Pt tells me he lives in a group home. No alcohol. WBC 13. -->1400-->813, -->1300 -->258. T bili 4-->2.7. BNP 4000. Acute hep panel negative. PMH: 
Past Medical History:  
Diagnosis Date  Anxiety  Chronic confusion  Chronic obstructive pulmonary disease ([address] 4L cont. O2  
 Depression   
 pt has schizophrenia per son  Poor historian  Sleep disorder   
 insomnia PSH: 
Past Surgical History:  
Procedure Laterality Date  COLONOSCOPY N/A 2019 COLONOSCOPY performed by Sondra Vasquez MD at [facility] ENDOSCOPY Allergies: Allergies Allergen Reactions  Pollen Extracts Sneezing Home Medications: 
Prior to Admission Medications Prescriptions Last Dose Informant Patient Reported? Taking? albuterol (PROVENTIL HFA, VENTOLIN HFA, PROAIR HFA) 90 mcg/actuation inhaler   No Yes Sig: Take 1 Puff by inhalation every four (4) hours as needed for Wheezing. amoxicillin-clavulanate (AUGMENTIN) 875-125 mg per tablet 2019 at Unknown time  No Yes Sig: Take 1 Tab by mouth every twelve (12) hours for 7 days. ibuprofen (ADVIL) 200 mg tablet   Yes Yes Sig: Take 400 mg by mouth daily as needed for Pain.  
melatonin tab tablet 10/31/2019 at Unknown time  No Yes Sig: Take 1 Tab by mouth nightly. mirtazapine (REMERON) 15 mg tablet 10/31/2019 at Unknown time  No Yes Sig: Take 1 Tab by mouth nightly. predniSONE (DELTASONE) 10 mg tablet 11/1/2019 at Unknown time  No Yes Sig: Take 60 mg (6 tablets) 10/26-10/27 then 40 mg (4 tablets) 10/28-10/30 then 20 mg (2 tablets) 10/31-11/2 then 10 mg (1 tablet) 11/3-11/4 then stop Facility-Administered Medications: None Hospital Medications: 
Current Facility-Administered Medications Medication Dose Route Frequency  methylPREDNISolone (PF) (SOLU-MEDROL) injection 40 mg  40 mg IntraVENous Q6H  
 HYDROmorphone (DILAUDID) tablet 0.5 mg  0.5 mg Oral Q4H PRN  
 sodium chloride (NS) flush 5-40 mL  5-40 mL IntraVENous Q8H  
 sodium chloride (NS) flush 5-40 mL  5-40 mL IntraVENous PRN  
 ondansetron (ZOFRAN) injection 4 mg  4 mg IntraVENous Q4H PRN  
 0.45% sodium chloride infusion  75 mL/hr IntraVENous CONTINUOUS  
 mirtazapine (REMERON) tablet 15 mg  15 mg Oral QHS  albuterol (PROVENTIL VENTOLIN) nebulizer solution 2.5 mg  2.5 mg Nebulization Q4H PRN  
 hydrALAZINE (APRESOLINE) 20 mg/mL injection 10 mg  10 mg IntraVENous Q6H PRN  
 aspirin chewable tablet 81 mg  81 mg Oral DAILY  metoprolol tartrate (LOPRESSOR) tablet 25 mg  25 mg Oral Q12H Social History: 
Social History Tobacco Use  Smoking status: Former Smoker Packs/day: 1.00 Types: Cigarettes  Smokeless tobacco: Never Used  Tobacco comment: every 2 days Substance Use Topics  Alcohol use: No  
 
 
Family History: 
Family History Problem Relation Age of Onset  Cancer Mother   
     colon ancer  Cancer Father   
     lung cancer  Cancer Brother   
     lung cancer Review of Systems: 
 
Constitutional: negative for fever, negative chills, negative weight loss Eyes:   Negative for visual changes ENT:   Negative for sore throat, tongue or lip swelling Respiratory:  Negative for cough, negative dyspnea Cards:  negative for chest pain, palpitations, lower extremity edema GI:   See HPI 
:  negative for frequency, dysuria Integument:  negative for rash and pruritus Heme:  negative for easy bruising and gum/nose bleeding Musculoskel: negative for myalgias,  back pain and muscle weakness Neuro:  negative for headaches, dizziness, vertigo Psych: negative for feelings of anxiety, depression Objective:  
 
Patient Vitals for the past 8 hrs: 
 BP Temp Pulse Resp SpO2  
11/03/19 1300   85 24 97 % 11/03/19 1200 113/72 98.5 °F (36.9 °C) 83 22 99 % 11/03/19 1100 131/88  80 25 100 % 11/03/19 1000 133/85  76 20 100 % 11/03/19 0900 (!) 144/93  85 20 97 % 11/03/19 0800 132/85 98.8 °F (37.1 °C) 89 23 94 % 11/03/19 0700 117/78  88 21 97 % 11/03 0701 - 11/03 1900 In: 450 [I.V.:450] Out: 160 [Urine:150] PHYSICAL EXAM: 
Gen: NAD, alert M on nc high flow HEENT: PEERLA, EOMI Neck: supple Chest: L chest tube intact CV: RRR, no m/g/c/r Abd: soft, NT, ND Skin: no rash or lesions noted Neuro: Moving all extremities, no gross deficits Data Review Recent Labs 11/03/19 0413 11/02/19 
0250 WBC 15.9* 10.6 HGB 12.9 13.1 HCT 38.9 39.5 * 126* Recent Labs 11/03/19 0413 11/02/19 
0250  138  
K 4.5 5.0  
 105 CO2 31 29 BUN 30* 23* CREA 0.97 0.91  
* 99  
CA 8.3* 8.6 Recent Labs 11/03/19 0413 11/02/19 
0250 SGOT 258* 1,304* AP 77 81  
TP 6.0* 6.2* ALB 3.1* 3.5 GLOB 2.9 2.7 Recent Labs 11/03/19 0413 INR 1.2* PTP 11.9* Normal RUQ US 11/1/19 Assessment:  
 69 yo M with cognitive impairment, schizophrenia, COPD on home 4 L 02, possible lung cancer admitted with left tension pneumothorax after lung nodule biopsy. We are asked to see him at the request of Dr. Bonita Narayanan for elevated LFTs. I suspect this is related to shock liver/ischemia. LFTs down trending. Liver Us normal with intact blood flow. Acute hep panel negative. No obvious med culprits to suggest drug induced liver injury. Plan:  
 
- Supportive care. Recheck LFTs tomorrow. Pt has previously follow with Dr. Keli Sahu and he will assume care tomorrow.

## 2019-11-03 NOTE — PROGRESS NOTES
TRANSFER - IN REPORT: 
 
Verbal report received from [address] [place] [address] RN(name) on Rosealee Pack  being received from CVICU(unit) for routine progression of care Report consisted of patients Situation, Background, Assessment and  
Recommendations(SBAR). Information from the following report(s) SBAR, Kardex, Intake/Output, MAR, Recent Results and Cardiac Rhythm NSR was reviewed with the receiving nurse. Opportunity for questions and clarification was provided. Assessment completed upon patients arrival to unit and care assumed. Skin Assessment: 
Primary Nurse Darrell Martinez RN and Yuli Davidson RN performed a dual skin assessment on this patient Impairment noted- old wound on sacrum Renato score is 18 
 
 
 
2000: Bedside shift change report given to Aurelio Giron RN (oncoming nurse) by Lynn Urban RN (offgoing nurse). Report included the following information SBAR, Kardex, Intake/Output, MAR, Recent Results and Cardiac Rhythm NSR.

## 2019-11-03 NOTE — PROGRESS NOTES
Hospitalist Progress Note Ashley Kim MD 
Answering service: [phone] from in house phone Date of Service:  11/3/2019 NAME:  Donna Avila :  1940 MRN:  [id number] Admission Summary: This is a 44-year-old male, who was recently admitted and discharged on 10/25/2019 with a discharge diagnosis of acute hypoxic respiratory failure likely related to malignancy and COPD. At the time of the discharge, the plan was to follow the patient as outpatient and get a biopsy yesterday and get a bronchoscopy and lung biopsy. Since yesterday night, the patient started having worsening shortness of breath (the patient at baseline is on 4 liters of oxygen). The patient's shortness of breath continued to get worse and today, the patient was brought in to the ER. No reported chest pain, unusual cough, fever, nausea, vomiting. No changes in bowel movement. 
  
In the ER, the patient was found to be having left tension pneumothorax and emergent Thoracic Surgery consult was done and the patient had a chest tube put in. 
  
 
Interval history / Subjective:  
  F/u SOB Looks much more comfortable than yesterday Currently on hi flow Continues on ICU setting Assessment & Plan:  
 
Tension pneumothorax 
-s/p left chest tube  
-since overnight, oxygen requirement getting worse, the patient transferred to ICU 
-Chest tube replaced  
-Appreciate Thoracic surgery Bilateral pulmonary nodules,  
-status post lung biopsy.   
-Follow the biopsy results. Acute respiratory failure, likely secondary to above 
-on Hi flow, wean as tolerated 
-On Solumedrol Elevated troponin. -per cardiology likely demand ischemia 
-Echo EF 51-55% Leukocytosis 
-likely sec to steroids 
-monitor Schizophrenia 
-Continue home meds Transaminitis 
-sec to ? 
-now getting better 
-Avoid hepatotoxics -Hep panel negative -US abd unremarkable 
-Monitor 
-Consult GI Hyperkalemia 
-monitor Cardiac diet Code status: FULL CODE 
DVT prophylaxis: scd Plan: Follow thoracic surgery,  Cardiology, GI Care Plan discussed with: Patient/Family spoke to nabila GAINES Disposition: TBD Hospital Problems  Date Reviewed: 11/2/2019 Codes Class Noted POA Pneumothorax on left ICD-10-CM: J93.9 ICD-9-CM: 512.89  11/1/2019 Unknown * (Principal) Shortness of breath ICD-10-CM: R06.02 
ICD-9-CM: 786.05  11/1/2019 Yes Respiratory failure ([address] ICD-10-CM: J96.90 ICD-9-CM: 518.81  11/1/2019 Unknown Review of Systems: A comprehensive review of systems was negative except for that written in the HPI. Vital Signs:  
 Last 24hrs VS reviewed since prior progress note. Most recent are: 
Visit Vitals /72 (BP 1 Location: Right arm, BP Patient Position: At rest) Pulse 85 Temp 98.5 °F (36.9 °C) Resp 24 Ht 6' (1.829 m) Wt 59.7 kg (131 lb 11.2 oz) SpO2 97% BMI 17.86 kg/m² Intake/Output Summary (Last 24 hours) at 11/3/2019 1351 Last data filed at 11/3/2019 1300 Gross per 24 hour Intake 2252.5 ml Output 540 ml Net 1712.5 ml Physical Examination:  
 
 
     
Constitutional:  No acute distress, cooperative, pleasant ENT:  Oral mucous moist, oropharynx benign. Resp:  CTA bilaterally. No wheezing/rhonchi/rales. No accessory muscle use CV:  Regular rhythm, normal rate, no murmurs, gallops, rubs GI:  Soft, non distended, non tender. normoactive bowel sounds, no hepatosplenomegaly Musculoskeletal:  No edema, warm, 2+ pulses throughout Neurologic:  Moves all extremities. AAOx3, CN II-XII reviewed Psych:  Good insight, Not anxious nor agitated. Data Review:  
 Review and/or order of clinical lab test 
 
 
Labs:  
 
Recent Labs 11/03/19 
0413 11/02/19 
0250 WBC 15.9* 10.6 HGB 12.9 13.1 HCT 38.9 39.5 * 126* Recent Labs 11/03/19 
0413 11/02/19 
0250 11/01/19 
1107  138 143  
K 4.5 5.0 5.4*  
 105 108 CO2 31 29 23 BUN 30* 23* 25* CREA 0.97 0.91 1.40* * 99 160* CA 8.3* 8.6 8.5 Recent Labs 11/03/19 
0413 11/02/19 
0250 11/01/19 
1107 SGOT 258* 1,304* 286* * 1,400* 300* AP 77 81 90 TBILI 2.7* 3.3* 4.0*  
TP 6.0* 6.2* 6.9 ALB 3.1* 3.5 3.6 GLOB 2.9 2.7 3.3 Recent Labs 11/03/19 0413 INR 1.2* PTP 11.9* No results for input(s): FE, TIBC, PSAT, FERR in the last 72 hours. No results found for: FOL, RBCF No results for input(s): PH, PCO2, PO2 in the last 72 hours. Recent Labs 11/03/19 
9522 11/02/19 0250 11/01/19 
1509   --   --   
CKNDX 2.2  --   --   
TROIQ 0.18* 0.55* 1.12* No results found for: CHOL, CHOLX, CHLST, CHOLV, HDL, HDLP, LDL, LDLC, DLDLP, TGLX, TRIGL, TRIGP, CHHD, CHHDX Lab Results Component Value Date/Time Glucose (POC) 116 (H) 02/05/2018 06:20 AM  
 Glucose (POC) 102 (H) 01/31/2018 12:03 PM  
 Glucose (POC) 91 01/20/2018 06:52 AM  
 Glucose (POC) 136 (H) 01/17/2018 11:46 AM  
 Glucose (POC) 97 01/12/2018 10:18 PM  
 
Lab Results Component Value Date/Time Color YELLOW/STRAW 02/09/2018 01:22 PM  
 Appearance CLEAR 02/09/2018 01:22 PM  
 Specific gravity 1.028 02/09/2018 01:22 PM  
 pH (UA) 6.0 02/09/2018 01:22 PM  
 Protein NEGATIVE  02/09/2018 01:22 PM  
 Glucose NEGATIVE  02/09/2018 01:22 PM  
 Ketone NEGATIVE  02/09/2018 01:22 PM  
 Bilirubin NEGATIVE  02/09/2018 01:22 PM  
 Urobilinogen 1.0 02/09/2018 01:22 PM  
 Nitrites NEGATIVE  02/09/2018 01:22 PM  
 Leukocyte Esterase NEGATIVE  02/09/2018 01:22 PM  
 Epithelial cells FEW 02/09/2018 01:22 PM  
 Bacteria NEGATIVE  02/09/2018 01:22 PM  
 WBC 0-4 02/09/2018 01:22 PM  
 RBC 0-5 02/09/2018 01:22 PM  
 
 
 
Medications Reviewed:  
 
Current Facility-Administered Medications Medication Dose Route Frequency  methylPREDNISolone (PF) (SOLU-MEDROL) injection 40 mg  40 mg IntraVENous Q6H  
 HYDROmorphone (DILAUDID) tablet 0.5 mg  0.5 mg Oral Q4H PRN  
 sodium chloride (NS) flush 5-40 mL  5-40 mL IntraVENous Q8H  
 sodium chloride (NS) flush 5-40 mL  5-40 mL IntraVENous PRN  
 ondansetron (ZOFRAN) injection 4 mg  4 mg IntraVENous Q4H PRN  
 0.45% sodium chloride infusion  75 mL/hr IntraVENous CONTINUOUS  
 mirtazapine (REMERON) tablet 15 mg  15 mg Oral QHS  albuterol (PROVENTIL VENTOLIN) nebulizer solution 2.5 mg  2.5 mg Nebulization Q4H PRN  
 hydrALAZINE (APRESOLINE) 20 mg/mL injection 10 mg  10 mg IntraVENous Q6H PRN  
 aspirin chewable tablet 81 mg  81 mg Oral DAILY  metoprolol tartrate (LOPRESSOR) tablet 25 mg  25 mg Oral Q12H  
 
______________________________________________________________________ EXPECTED LENGTH OF STAY: - - - 
ACTUAL LENGTH OF STAY:          2 Jessie Lehman MD

## 2019-11-03 NOTE — PROGRESS NOTES
Problem: Breathing Pattern - Ineffective Goal: *Absence of hypoxia Outcome: Not Progressing Towards Goal 
Note:  
Patient still requiring HiFlow oxygen support to keep O2 sats above 92% Goal: *Use of effective breathing techniques Outcome: Progressing Towards Goal 
  
Problem: Falls - Risk of 
Goal: *Absence of Falls Description Document Ruben Beebece Fall Risk and appropriate interventions in the flowsheet. Outcome: Progressing Towards Goal 
Note:  
Fall Risk Interventions: 
Mobility Interventions: Patient to call before getting OOB, Communicate number of staff needed for ambulation/transfer Medication Interventions: Assess postural VS orthostatic hypotension, Patient to call before getting OOB, Teach patient to arise slowly, Evaluate medications/consider consulting pharmacy Elimination Interventions: Call light in reach, Patient to call for help with toileting needs, Toileting schedule/hourly rounds No falls during this shift Problem: Pressure Injury - Risk of 
Goal: *Prevention of pressure injury Description Document Renato Scale and appropriate interventions in the flowsheet. Outcome: Progressing Towards Goal 
Note:  
Pressure Injury Interventions: Activity Interventions: Pressure redistribution bed/mattress(bed type), PT/OT evaluation, Increase time out of bed Mobility Interventions: Pressure redistribution bed/mattress (bed type), PT/OT evaluation, Turn and reposition approx. every two hours(pillow and wedges) Nutrition Interventions: Document food/fluid/supplement intake, Offer support with meals,snacks and hydration Friction and Shear Interventions: Minimize layers, Transferring/repositioning devices, Lift sheet No pressure injuries noted during this shift. Performed pressure injury prevention Problem: Patient Education: Go to Patient Education Activity Goal: Patient/Family Education Outcome: Progressing Towards Goal 
Note: Educated patient on importance of turning and reason for assessing skin

## 2019-11-03 NOTE — PROGRESS NOTES
[facility] [place] Division of Cardiology [phone] Progress note Marychuy Bolton M.D., F.A.C.C. NAME:  Miriam Avalos :   1940 MRN:   [id number] ICD-10-CM ICD-9-CM 1. Pneumothorax on left J93.9 512.89 Assessment/Plan: 1. He feels better today but episode of nausea associated with chest pain earlier. This has now resolved. He denies any recurrent chest pain for now. 2.  Elevated troponin: Probably related to demand ischemia and possibly right ventricular strain from his COPD and pneumothoraces. Nonetheless I cannot entirely rule out underlying coronary artery disease. The chest pain is completely resolved and occurs only with emesis. Nonetheless the electrocardiogram reveals D. T wave inversion in the anterior leads on today's EKG. The first troponin is still trending down. We will obtain a second troponin as well. At some point we will need to consider coronary ischemic evaluation. 3.  Cor pulmonale: At least moderate reduction in right ventricular systolic function likely related to COPD. I suspect the elevation in proBNP is related to cor pulmonaleright ventricular strain. 4.  Pneumothorax: Status post left chest tube placement and doing well with that at this point. Small right pneumothorax. Thoracic surgeon closely following. 19 ECHO ADULT FOLLOW-UP OR LIMITED 2019 Narrative · Right Ventricle: Moderately dilated right ventricle. Moderately to  
severely reduced systolic function. · Right Atrium: Mildly dilated right atrium. · IVC/Hepatic Veins: Mildly elevated central venous pressure (5-10 mmHg); IVC diameter is less than 21 mm and collapses less than 50% with  
respiration. · Aortic Valve: Probably trileaflet aortic valve. Mild aortic valve  
sclerosis. · Left Ventricle: Normal cavity size. Upper normal wall thickness. Low  
normal systolic dysfunction. Estimated left ventricular ejection fraction  
is 51 - 55%. Visually measured ejection fraction. Abnormal left  
ventricular septal motion. Systolic flattening of the interventricular  
septum consistent with right ventricle pressure overload. · Tricuspid Valve: Mild tricuspid valve regurgitation is present. · Pulmonary Artery: Mild pulmonary hypertension. · Mitral Valve: Mild mitral valve regurgitation is present. Signed by: Karlee Dunlap MD  
 
  
   
EKG Results Procedure 720 Value Units Date/Time EKG, 12 LEAD, INITIAL [[id number]] Order Status:  Sent EKG, 12 LEAD, INITIAL [[id number]] Collected:  11/02/19 0211 Order Status:  Completed Updated:  11/03/19 2653 Ventricular Rate 101 BPM   
  Atrial Rate 101 BPM   
  P-R Interval 126 ms   
  QRS Duration 76 ms   
  Q-T Interval 354 ms QTC Calculation (Bezet) 459 ms Calculated P Axis 76 degrees Calculated R Axis 47 degrees Calculated T Axis 55 degrees Diagnosis --  
  Sinus tachycardia Low voltage QRS T wave abnormality, consider anterior ischemia When compared with ECG of 01-NOV-2019 12:15, 
ST no longer depressed in Inferior leads Nonspecific T wave abnormality no longer evident in Inferior leads Confirmed by Fabiola Price ([id number]) on 11/3/2019 6:28:22 AM 
  
 EKG 12 LEAD INITIAL [[id number]] Collected:  11/01/19 1215 Order Status:  Completed Updated:  11/03/19 6992 Ventricular Rate 116 BPM   
  Atrial Rate 116 BPM   
  P-R Interval 120 ms QRS Duration 70 ms Q-T Interval 310 ms QTC Calculation (Bezet) 430 ms Calculated P Axis 77 degrees Calculated R Axis 65 degrees Calculated T Axis -30 degrees Diagnosis --  
  Sinus tachycardia T wave abnormality, consider anterior ischemia When compared with ECG of 23-OCT-2019 16:42, 
 Nonspecific T wave abnormality now evident in Inferior leads Confirmed by Kaitlynn Sweeney ([id number]) on 11/3/2019 6:22:57 AM 
  
 EKG, 12 LEAD, INITIAL [[id number]] Order Status:  Sent EKG, 12 LEAD, INITIAL [[id number]] Order Status:  Sent Visit Vitals /85 Pulse 76 Temp 98.8 °F (37.1 °C) Resp 20 Ht 6' (1.829 m) Wt 131 lb 11.2 oz (59.7 kg) SpO2 100% BMI 17.86 kg/m² Wt Readings from Last 3 Encounters:  
11/03/19 131 lb 11.2 oz (59.7 kg) 10/31/19 132 lb (59.9 kg) 10/24/19 128 lb 4.9 oz (58.2 kg) Review of Systems:  
Pertinent items are noted in the History of Present Illness. Objective:  
 
 
11/03 0701 - 11/03 1900 In: 300 [I.V.:300] Out: 155 [Urine:150] 11/01 1901 - 11/03 0700 In: 3153.8 [P.O.:360; I.V.:2793.8] Out: 750 [Urine:670] Telemetry: normal sinus rhythm Physical Exam: 
 
Neck: no JVD Heart: regular rate and rhythm Lungs: diminished breath sounds R anterior, L anterior Abdomen: soft, non-tender. Bowel sounds normal. No masses,  no organomegaly Extremities: no edema Current Facility-Administered Medications Medication Dose Route Frequency  methylPREDNISolone (PF) (SOLU-MEDROL) injection 40 mg  40 mg IntraVENous Q6H  
 HYDROmorphone (DILAUDID) tablet 0.5 mg  0.5 mg Oral Q4H PRN  
 sodium chloride (NS) flush 5-40 mL  5-40 mL IntraVENous Q8H  
 sodium chloride (NS) flush 5-40 mL  5-40 mL IntraVENous PRN  
 ondansetron (ZOFRAN) injection 4 mg  4 mg IntraVENous Q4H PRN  
 0.45% sodium chloride infusion  75 mL/hr IntraVENous CONTINUOUS  
 mirtazapine (REMERON) tablet 15 mg  15 mg Oral QHS  albuterol (PROVENTIL VENTOLIN) nebulizer solution 2.5 mg  2.5 mg Nebulization Q4H PRN  
 hydrALAZINE (APRESOLINE) 20 mg/mL injection 10 mg  10 mg IntraVENous Q6H PRN  
 aspirin chewable tablet 81 mg  81 mg Oral DAILY  metoprolol tartrate (LOPRESSOR) tablet 25 mg  25 mg Oral Q12H Lab Results Component Value Date/Time WBC 15.9 (H) 11/03/2019 04:13 AM  
 HGB 12.9 11/03/2019 04:13 AM  
 HCT 38.9 11/03/2019 04:13 AM  
 PLATELET 520 (L) 15/73/6671 04:13 AM  
 .3 (H) 11/03/2019 04:13 AM  
 
Lab Results Component Value Date/Time Sodium 138 11/03/2019 04:13 AM  
 Potassium 4.5 11/03/2019 04:13 AM  
 Chloride 103 11/03/2019 04:13 AM  
 CO2 31 11/03/2019 04:13 AM  
 Anion gap 4 (L) 11/03/2019 04:13 AM  
 Glucose 131 (H) 11/03/2019 04:13 AM  
 BUN 30 (H) 11/03/2019 04:13 AM  
 Creatinine 0.97 11/03/2019 04:13 AM  
 BUN/Creatinine ratio 31 (H) 11/03/2019 04:13 AM  
 GFR est AA >60 11/03/2019 04:13 AM  
 GFR est non-AA >60 11/03/2019 04:13 AM  
 Calcium 8.3 (L) 11/03/2019 04:13 AM  
 
Lab Results Component Value Date/Time Cholesterol (POC) 186 07/03/2019 12:50 PM  
 HDL Cholesterol (POC) 56 07/03/2019 12:50 PM  
 LDL Cholesterol (POC) 67 07/03/2019 12:50 PM  
 Triglycerides (POC) 313 07/03/2019 12:50 PM  
 
Lab Results Component Value Date/Time  11/03/2019 09:28 AM  
 CK - MB 3.7 (H) 11/03/2019 09:28 AM  
 CK-MB Index 2.2 11/03/2019 09:28 AM  
 Troponin-I, Qt. 0.18 (H) 11/03/2019 09:28 AM  
 
Lab Results Component Value Date/Time ALT (SGPT) 813 (H) 11/03/2019 04:13 AM  
 AST (SGOT) 258 (H) 11/03/2019 04:13 AM  
 Alk. phosphatase 77 11/03/2019 04:13 AM  
 Bilirubin, total 2.7 (H) 11/03/2019 04:13 AM  
 
Lab Results Component Value Date/Time  NT pro-BNP 4,149 (H) 11/01/2019 11:07 AM  
 NT pro- 10/23/2019 04:45 PM

## 2019-11-03 NOTE — PROGRESS NOTES
5481: patient siting up at bedside eating breakfast; vomited small amount and then c/o 8/10 chest pain; \"sharp pain\" in the center of the chest. Denies SOB or radiating pain however sats 80% though difficult to get a good pleath with movement. Hi flow increased to 40L and 60% and sats now mid 90s. EKG ordered and Dr Pablo Cruz paged 
 
0337: EKG at bedside; pt states pain subsiding. Medicated with zofran, 
 
0915: Dr Pablo Cruz notified of patient status; orders for cardiac enzymes & CXR 
 
1030: Patient resting in bed; CP subsided. Dr. Ovi Dior paged & notified of troponin 0.18.  
 
1050: 02 sats 100%; Hi Flow decreased to 30L & 50%.

## 2019-11-03 NOTE — PROGRESS NOTES
1930: Bedside shift change report given to [facility], Student nurse & Carlos Ríos RN (oncoming nurse) by Rito Holguin RN (offgoing nurse). Report included the following information SBAR, ED Summary, Procedure Summary, Intake/Output, MAR, Recent Results, Cardiac Rhythm Sinus Tach and Alarm Parameters 2140: Yoselin Hutton NP paged and updated on patient status including HR and blood pressure. Orders to proceed with administering 25 mg Metoprolol unless SBP less than 90 
 
0400: Labs sent 0745: Bedside shift change report given to Nader Escalante RN (oncoming nurse) by Pham Palacios RN (offgoing nurse). Report included the following information SBAR, ED Summary, Procedure Summary, Intake/Output, MAR, Recent Results and Cardiac Rhythm NSR. HiFlow weaned to 20L & 35% FiO2 as patient tolerated throughout the night.

## 2019-11-04 NOTE — PROGRESS NOTES
Bedside and Verbal shift change report given to Brody Lei 88 (oncoming nurse) by Dewey oRbertson (offgoing nurse). Report included the following information SBAR, Kardex, Intake/Output, MAR, Accordion, Recent Results and Cardiac Rhythm SR. Problem: Breathing Pattern - Ineffective Goal: *Absence of hypoxia Outcome: Progressing Towards Goal 
Goal: *Use of effective breathing techniques Outcome: Progressing Towards Goal 
  
Problem: Falls - Risk of 
Goal: *Absence of Falls Description Document Anthony [place] Fall Risk and appropriate interventions in the flowsheet. Outcome: Progressing Towards Goal 
Note:  
Fall Risk Interventions: 
Mobility Interventions: Communicate number of staff needed for ambulation/transfer, Patient to call before getting OOB, PT Consult for mobility concerns, PT Consult for assist device competence, Strengthening exercises (ROM-active/passive), Utilize walker, cane, or other assistive device Medication Interventions: Patient to call before getting OOB, Teach patient to arise slowly, Evaluate medications/consider consulting pharmacy Elimination Interventions: Call light in reach, Stay With Me (per policy), Patient to call for help with toileting needs, Toilet paper/wipes in reach, Toileting schedule/hourly rounds, Urinal in reach Problem: Pressure Injury - Risk of 
Goal: *Prevention of pressure injury Description Document Renato Scale and appropriate interventions in the flowsheet. Outcome: Progressing Towards Goal 
Note:  
Pressure Injury Interventions: 
  
 
Moisture Interventions: Absorbent underpads, Apply protective barrier, creams and emollients, Assess need for specialty bed, Check for incontinence Q2 hours and as needed, Minimize layers, Moisture barrier, Offer toileting Q_hr Activity Interventions: Increase time out of bed, Assess need for specialty bed, PT/OT evaluation Mobility Interventions: Assess need for specialty bed, HOB 30 degrees or less, Pressure redistribution bed/mattress (bed type), PT/OT evaluation, Turn and reposition approx. every two hours(pillow and wedges) Nutrition Interventions: Document food/fluid/supplement intake, Discuss nutritional consult with provider, Offer support with meals,snacks and hydration Friction and Shear Interventions: Apply protective barrier, creams and emollients, HOB 30 degrees or less, Minimize layers Problem: Patient Education: Go to Patient Education Activity Goal: Patient/Family Education Outcome: Progressing Towards Goal 
  
Problem: Breathing Pattern - Ineffective Goal: *Absence of hypoxia Outcome: Progressing Towards Goal

## 2019-11-04 NOTE — PROGRESS NOTES
[address] [place] [address]. Cuco Galvan M.D. 
[phone] GASTROENTEROLOGY PROGRESS NOTE 
 
 
 
NAME: Hawa Denny :  1940 MRN:  [id number] Subjective: No abdominal pain. On HF oxygen. Objective: VITALS:  
Last 24hrs VS reviewed. Most recent are: 
Visit Vitals /61 (BP 1 Location: Left arm, BP Patient Position: At rest) Pulse 84 Temp 97.7 °F (36.5 °C) Resp 21 Ht 6' (1.829 m) Wt 59.7 kg (131 lb 11.2 oz) SpO2 96% BMI 17.86 kg/m² Intake/Output Summary (Last 24 hours) at 2019 6342 Last data filed at 2019 0658 Gross per 24 hour Intake 525 ml Output 630 ml Net -105 ml PHYSICAL EXAM: 
General: Alert, in no acute distress HEENT: Anicteric conjunctiva. Lungs:            Slightly decreased breath sounds bilaterally anteriorly Heart:  Normal S1, S2 Abdomen: Soft, Non distended, Non tender. Normoactive bowel sounds, no HSM,   no rebound/guarding MSK:   Normal muscle tone Skin:   Warm to touch Psych:   Good insight. Not anxious nor agitated. Lab Data Reviewed:  
Recent Labs 19 WBC 18.3* 15.9* HGB 12.2 12.9 HCT 37.0 38.9 * 129* Recent Labs 19 * 138  
K 4.7 4.5  
 103 CO2 28 31 BUN 24* 30* CREA 0.75 0.97 * 131* CA 8.0* 8.3* Recent Labs 19 SGOT 96* 258* AP 70 77  
TP 5.6* 6.0* ALB 2.8* 3.1*  
GLOB 2.8 2.9 Recent Labs 19 INR 1.2* PTP 11.9* No results for input(s): FE, TIBC, PSAT, FERR in the last 72 hours. Recent Labs 19 
5875  CKMB 3.7* See Electronic Medical Record for all procedure/radiology reports and details which were not copied into this note but were reviewed prior to the creation of the Plan.  
 
Assessment:  
 71 yo M with cognitive impairment, schizophrenia, COPD on home 4 L 02, possible lung cancer admitted with left tension pneumothorax after lung nodule biopsy. We are asked to see him at the request of Dr. Ilda Ribeiro for elevated LFTs. 
  
I suspect this is related to ischemic hepatopathy. LFTs down trending. Liver Us normal with intact blood flow. Acute hep panel negative. No obvious med culprits to suggest drug induced liver injury.  
  
Plan:  
  
- Supportive care. Recheck liver tests tomorrow.  
  
 
Signed by:  Hermelinda Peck MD 
       11/4/2019  8:28 AM

## 2019-11-04 NOTE — CDMP QUERY
Pt admitted with tension pneumothorax. Pt noted to have L lung biopsy 10/31/19. If possible, please document in progress notes and discharge summary:  Tension pneumothorax related to other underlying condition or incidental risk factor (please specify) occurring after surgery and not a complication  Tension pneumothorax as a post-operative complication  Tension pneumothorax as an expected/inherent condition that occurred post-operatively and not a complication  Other, please specify  Clinically unable to determine The medical record reflects the following: 
               Risk Factors: COPD, on 4L O2 at baseline, recent L lung bx Clinical indicators: to ED with SOB  recent L lung bx (10/31/19)  CXR- large L tension pneumothorax Treatment:  CXR, chest tube, thoracic surgery consult, O2 support, monitoring Thank you, Alvin Soriano RN 
[facility] 
[phone]

## 2019-11-04 NOTE — PROGRESS NOTES
[facility] [place] Division of Cardiology [phone] Progress note Soledad Echeverria MD 
 F.A.CMARIZA. NAME:  Jovita Leonard :   1940 MRN:   [id number] ICD-10-CM ICD-9-CM 1. Pneumothorax on left J93.9 512.89 Assessment/Plan: 1. He feels better today but episode of nausea associated with chest pain earlier. This has now resolved. He denies any recurrent chest pain for now. 2.  Elevated troponin: Probably related to demand ischemia and possibly right ventricular strain from his COPD and pneumothoraces. Nonetheless I cannot entirely rule out underlying coronary artery disease. The chest pain is completely resolved and occurs only with emesis. Nonetheless the electrocardiogram reveals twi, abnl, trop nonspecific 3. Cor pulmonale: At least moderate reduction in right ventricular systolic function likely related to COPD. I suspect the elevation in proBNP is related to cor pulmonaleright ventricular strain. 4.  Pneumothorax: Status post left chest tube placement and doing well with that at this point. Small right pneumothorax. Thoracic surgeon closely following. 19 ECHO ADULT FOLLOW-UP OR LIMITED 2019 Narrative · Right Ventricle: Moderately dilated right ventricle. Moderately to  
severely reduced systolic function. · Right Atrium: Mildly dilated right atrium. · IVC/Hepatic Veins: Mildly elevated central venous pressure (5-10 mmHg); IVC diameter is less than 21 mm and collapses less than 50% with  
respiration. · Aortic Valve: Probably trileaflet aortic valve. Mild aortic valve  
sclerosis. · Left Ventricle: Normal cavity size. Upper normal wall thickness. Low  
normal systolic dysfunction. Estimated left ventricular ejection fraction is 51 - 55%. Visually measured ejection fraction. Abnormal left  
ventricular septal motion. Systolic flattening of the interventricular  
septum consistent with right ventricle pressure overload. · Tricuspid Valve: Mild tricuspid valve regurgitation is present. · Pulmonary Artery: Mild pulmonary hypertension. · Mitral Valve: Mild mitral valve regurgitation is present. Signed by: Cody Brownlee MD  
 
  
   
EKG Results Procedure 720 Value Units Date/Time EKG, 12 LEAD, INITIAL [[id number]] Collected:  11/03/19 8913 Order Status:  Completed Updated:  11/04/19 1109 Ventricular Rate 86 BPM   
  Atrial Rate 86 BPM   
  P-R Interval 120 ms QRS Duration 82 ms Q-T Interval 430 ms QTC Calculation (Bezet) 514 ms Calculated P Axis 82 degrees Calculated R Axis 74 degrees Calculated T Axis 32 degrees Diagnosis --  
  Normal sinus rhythm T wave abnormality, consider anterior ischemia Prolonged QT When compared with ECG of 03-NOV-2019 09:17, 
T wave changes are more prominent Confirmed by Kenzie Bauer ([id number]) on 11/4/2019 11:09:38 AM 
  
 EKG, 12 LEAD, INITIAL [[id number]] Collected:  11/02/19 0211 Order Status:  Completed Updated:  11/03/19 2107 Ventricular Rate 101 BPM   
  Atrial Rate 101 BPM   
  P-R Interval 126 ms   
  QRS Duration 76 ms   
  Q-T Interval 354 ms QTC Calculation (Bezet) 459 ms Calculated P Axis 76 degrees Calculated R Axis 47 degrees Calculated T Axis 55 degrees Diagnosis --  
  Sinus tachycardia Low voltage QRS T wave abnormality, consider anterior ischemia When compared with ECG of 01-NOV-2019 12:15, 
ST no longer depressed in Inferior leads Nonspecific T wave abnormality no longer evident in Inferior leads Confirmed by Hazel Powers ([id number]) on 11/3/2019 6:28:22 AM 
  
 EKG 12 LEAD INITIAL [[id number]] Collected:  11/01/19 1215 Order Status:  Completed Updated:  11/03/19 9677   Ventricular Rate 116 BPM   
 Atrial Rate 116 BPM   
  P-R Interval 120 ms QRS Duration 70 ms Q-T Interval 310 ms QTC Calculation (Bezet) 430 ms Calculated P Axis 77 degrees Calculated R Axis 65 degrees Calculated T Axis -30 degrees Diagnosis --  
  Sinus tachycardia T wave abnormality, consider anterior ischemia When compared with ECG of 23-OCT-2019 16:42, Nonspecific T wave abnormality now evident in Inferior leads Confirmed by Sami Vilchis ([id number]) on 11/3/2019 6:22:57 AM 
  
 EKG, 12 LEAD, INITIAL [[id number]] Order Status:  Sent EKG, 12 LEAD, INITIAL [[id number]] Order Status:  Sent Visit Vitals /65 (BP 1 Location: Left arm, BP Patient Position: At rest) Pulse 83 Temp 98.2 °F (36.8 °C) Resp 29 Ht 6' (1.829 m) Wt 131 lb 11.2 oz (59.7 kg) SpO2 98% BMI 17.86 kg/m² Wt Readings from Last 3 Encounters:  
11/04/19 131 lb 11.2 oz (59.7 kg) 10/31/19 132 lb (59.9 kg) 10/24/19 128 lb 4.9 oz (58.2 kg) Review of Systems:  
Pertinent items are noted in the History of Present Illness. Objective: No intake/output data recorded. 11/02 1901 - 11/04 0700 In: 2162.5 [I.V.:2162.5] Out: 835 Renetta Sanchez Telemetry: normal sinus rhythm Physical Exam: 
 
Neck: no JVD Heart: regular rate and rhythm Lungs: diminished breath sounds R anterior, L anterior Abdomen: soft, non-tender. Bowel sounds normal. No masses,  no organomegaly Extremities: no edema Current Facility-Administered Medications Medication Dose Route Frequency  methylPREDNISolone (PF) (SOLU-MEDROL) injection 40 mg  40 mg IntraVENous Q6H  
 HYDROmorphone (DILAUDID) tablet 0.5 mg  0.5 mg Oral Q4H PRN  
 sodium chloride (NS) flush 5-40 mL  5-40 mL IntraVENous Q8H  
 sodium chloride (NS) flush 5-40 mL  5-40 mL IntraVENous PRN  
 ondansetron (ZOFRAN) injection 4 mg  4 mg IntraVENous Q4H PRN  
 0.45% sodium chloride infusion  75 mL/hr IntraVENous CONTINUOUS  
  mirtazapine (REMERON) tablet 15 mg  15 mg Oral QHS  albuterol (PROVENTIL VENTOLIN) nebulizer solution 2.5 mg  2.5 mg Nebulization Q4H PRN  
 hydrALAZINE (APRESOLINE) 20 mg/mL injection 10 mg  10 mg IntraVENous Q6H PRN  
 aspirin chewable tablet 81 mg  81 mg Oral DAILY  metoprolol tartrate (LOPRESSOR) tablet 25 mg  25 mg Oral Q12H Lab Results Component Value Date/Time WBC 18.3 (H) 11/04/2019 04:15 AM  
 HGB 12.2 11/04/2019 04:15 AM  
 HCT 37.0 11/04/2019 04:15 AM  
 PLATELET 187 (L) 49/23/5025 04:15 AM  
 .9 (H) 11/04/2019 04:15 AM  
 
Lab Results Component Value Date/Time Sodium 135 (L) 11/04/2019 04:15 AM  
 Potassium 4.7 11/04/2019 04:15 AM  
 Chloride 104 11/04/2019 04:15 AM  
 CO2 28 11/04/2019 04:15 AM  
 Anion gap 3 (L) 11/04/2019 04:15 AM  
 Glucose 124 (H) 11/04/2019 04:15 AM  
 BUN 24 (H) 11/04/2019 04:15 AM  
 Creatinine 0.75 11/04/2019 04:15 AM  
 BUN/Creatinine ratio 32 (H) 11/04/2019 04:15 AM  
 GFR est AA >60 11/04/2019 04:15 AM  
 GFR est non-AA >60 11/04/2019 04:15 AM  
 Calcium 8.0 (L) 11/04/2019 04:15 AM  
 
Lab Results Component Value Date/Time Cholesterol (POC) 186 07/03/2019 12:50 PM  
 HDL Cholesterol (POC) 56 07/03/2019 12:50 PM  
 LDL Cholesterol (POC) 67 07/03/2019 12:50 PM  
 Triglycerides (POC) 313 07/03/2019 12:50 PM  
 
Lab Results Component Value Date/Time  11/03/2019 09:28 AM  
 CK - MB 3.7 (H) 11/03/2019 09:28 AM  
 CK-MB Index 2.2 11/03/2019 09:28 AM  
 Troponin-I, Qt. 0.21 (H) 11/03/2019 01:03 PM  
 
Lab Results Component Value Date/Time ALT (SGPT) 538 (H) 11/04/2019 04:15 AM  
 AST (SGOT) 96 (H) 11/04/2019 04:15 AM  
 Alk. phosphatase 70 11/04/2019 04:15 AM  
 Bilirubin, total 2.4 (H) 11/04/2019 04:15 AM  
 
Lab Results Component Value Date/Time  NT pro-BNP 4,149 (H) 11/01/2019 11:07 AM  
 NT pro- 10/23/2019 04:45 PM

## 2019-11-04 NOTE — PROGRESS NOTES
Problem: Breathing Pattern - Ineffective Goal: *Absence of hypoxia Outcome: Progressing Towards Goal 
Pt on [place] sat's remain above 90% Problem: Falls - Risk of 
Goal: *Absence of Falls Description Document Greer Villagran Fall Risk and appropriate interventions in the flowsheet. Outcome: Progressing Towards Goal 
Note:  
Fall Risk Interventions: 
Mobility Interventions: Communicate number of staff needed for ambulation/transfer, OT consult for ADLs, Patient to call before getting OOB, PT Consult for mobility concerns, PT Consult for assist device competence Medication Interventions: Evaluate medications/consider consulting pharmacy, Patient to call before getting OOB, Teach patient to arise slowly Elimination Interventions: Call light in reach, Patient to call for help with toileting needs, Toileting schedule/hourly rounds, Urinal in reach

## 2019-11-04 NOTE — PROGRESS NOTES
Hospitalist Progress Note Gurjit Miller MD 
Answering service: [phone] from in house phone Date of Service:  2019 NAME:  Keith Gupta :  1940 MRN:  [id number] Admission Summary: This is a 61-year-old male, who was recently admitted and discharged on 10/25/2019 with a discharge diagnosis of acute hypoxic respiratory failure likely related to malignancy and COPD. At the time of the discharge, the plan was to follow the patient as outpatient and get a biopsy yesterday and get a bronchoscopy and lung biopsy. Since yesterday night, the patient started having worsening shortness of breath (the patient at baseline is on 4 liters of oxygen). The patient's shortness of breath continued to get worse and today, the patient was brought in to the ER. No reported chest pain, unusual cough, fever, nausea, vomiting. No changes in bowel movement. 
  
In the ER, the patient was found to be having left tension pneumothorax and emergent Thoracic Surgery consult was done and the patient had a chest tube put in. 
  
 
Interval history / Subjective:  
  F/u SOB Looks much more comfortable than yesterday Currently on hi flow 14l No new issues \"I am getting surgery tomorrow\" Assessment & Plan:  
 
Tension pneumothorax 
-s/p left chest tube  
-since overnight of , oxygen requirement got worse, the patient was transferred to ICU 
-Chest tube replaced  
-Patient transferred to tele 11/3 
-Chest tube to gravity 
-Appreciate Thoracic surgery Bilateral pulmonary nodules,  
-status post lung biopsy 10/31 
-biopsy 10/31 shows benign lung parenchyma Acute respiratory failure, likely secondary to above 
-on Hi flow, wean as tolerated 
-On Solumedrol Elevated troponin. -per cardiology likely demand ischemia 
-Echo EF 51-55% Leukocytosis 
-likely sec to steroids 
-monitor Schizophrenia 
-Continue home meds Transaminitis 
-sec to ? 
-Continues to get better 
-Avoid hepatotoxics -Hep panel negative 
-US abd unremarkable 
-Monitor 
-Appreciate GI Hyperkalemia -now resolved 
-monitor Cardiac diet Code status: FULL CODE 
DVT prophylaxis: scd Plan: Follow thoracic surgery,  Cardiology, GI Care Plan discussed with: Patient/Family Disposition: TBD Hospital Problems  Date Reviewed: 11/2/2019 Codes Class Noted POA Pneumothorax on left ICD-10-CM: J93.9 ICD-9-CM: 512.89  11/1/2019 Unknown * (Principal) Shortness of breath ICD-10-CM: R06.02 
ICD-9-CM: 786.05  11/1/2019 Yes Respiratory failure ([address] ICD-10-CM: J96.90 ICD-9-CM: 518.81  11/1/2019 Unknown Review of Systems: A comprehensive review of systems was negative except for that written in the HPI. Vital Signs:  
 Last 24hrs VS reviewed since prior progress note. Most recent are: 
Visit Vitals /65 (BP 1 Location: Left arm, BP Patient Position: At rest) Pulse 83 Temp 98.2 °F (36.8 °C) Resp 29 Ht 6' (1.829 m) Wt 59.7 kg (131 lb 11.2 oz) SpO2 98% BMI 17.86 kg/m² Intake/Output Summary (Last 24 hours) at 11/4/2019 1334 Last data filed at 11/4/2019 1231 Gross per 24 hour Intake 390 ml Output 525 ml Net -135 ml Physical Examination:  
 
 
     
Constitutional:  No acute distress, cooperative, pleasant ENT:  Oral mucous moist, oropharynx benign. Resp:  CTA bilaterally. No wheezing/rhonchi/rales. No accessory muscle use CV:  Regular rhythm, normal rate, no murmurs, gallops, rubs GI:  Soft, non distended, non tender. normoactive bowel sounds, no hepatosplenomegaly Musculoskeletal:  No edema, warm, 2+ pulses throughout Neurologic:  Moves all extremities. AAOx3, CN II-XII reviewed Psych:  Good insight, Not anxious nor agitated. Data Review:  
 Review and/or order of clinical lab test 
 
 
Labs:  
 
Recent Labs 11/04/19 
0415 11/03/19 0413 WBC 18.3* 15.9* HGB 12.2 12.9 HCT 37.0 38.9 * 129* Recent Labs 11/04/19 
4801 11/03/19 
0413 11/02/19 
0250 * 138 138  
K 4.7 4.5 5.0  
 103 105 CO2 28 31 29 BUN 24* 30* 23* CREA 0.75 0.97 0.91  
* 131* 99  
CA 8.0* 8.3* 8.6 Recent Labs 11/04/19 
8030 11/03/19 
0413 11/02/19 
0250 SGOT 96* 258* 1,304* * 813* 1,400* AP 70 77 81 TBILI 2.4* 2.7* 3.3* TP 5.6* 6.0* 6.2* ALB 2.8* 3.1* 3.5 GLOB 2.8 2.9 2.7 Recent Labs 11/03/19 
0413 INR 1.2* PTP 11.9* No results for input(s): FE, TIBC, PSAT, FERR in the last 72 hours. No results found for: FOL, RBCF No results for input(s): PH, PCO2, PO2 in the last 72 hours. Recent Labs 11/03/19 
1303 11/03/19 
8369 11/02/19 
0250 CPK  --  170  --   
CKNDX  --  2.2  --   
TROIQ 0.21* 0.18* 0.55* No results found for: CHOL, CHOLX, CHLST, CHOLV, HDL, HDLP, LDL, LDLC, DLDLP, TGLX, TRIGL, TRIGP, CHHD, CHHDX Lab Results Component Value Date/Time Glucose (POC) 116 (H) 02/05/2018 06:20 AM  
 Glucose (POC) 102 (H) 01/31/2018 12:03 PM  
 Glucose (POC) 91 01/20/2018 06:52 AM  
 Glucose (POC) 136 (H) 01/17/2018 11:46 AM  
 Glucose (POC) 97 01/12/2018 10:18 PM  
 
Lab Results Component Value Date/Time Color YELLOW/STRAW 02/09/2018 01:22 PM  
 Appearance CLEAR 02/09/2018 01:22 PM  
 Specific gravity 1.028 02/09/2018 01:22 PM  
 pH (UA) 6.0 02/09/2018 01:22 PM  
 Protein NEGATIVE  02/09/2018 01:22 PM  
 Glucose NEGATIVE  02/09/2018 01:22 PM  
 Ketone NEGATIVE  02/09/2018 01:22 PM  
 Bilirubin NEGATIVE  02/09/2018 01:22 PM  
 Urobilinogen 1.0 02/09/2018 01:22 PM  
 Nitrites NEGATIVE  02/09/2018 01:22 PM  
 Leukocyte Esterase NEGATIVE  02/09/2018 01:22 PM  
 Epithelial cells FEW 02/09/2018 01:22 PM  
 Bacteria NEGATIVE  02/09/2018 01:22 PM  
 WBC 0-4 02/09/2018 01:22 PM  
 RBC 0-5 02/09/2018 01:22 PM  
 
 
 
Medications Reviewed:  
 
Current Facility-Administered Medications Medication Dose Route Frequency  methylPREDNISolone (PF) (SOLU-MEDROL) injection 40 mg  40 mg IntraVENous Q6H  
 HYDROmorphone (DILAUDID) tablet 0.5 mg  0.5 mg Oral Q4H PRN  
 sodium chloride (NS) flush 5-40 mL  5-40 mL IntraVENous Q8H  
 sodium chloride (NS) flush 5-40 mL  5-40 mL IntraVENous PRN  
 ondansetron (ZOFRAN) injection 4 mg  4 mg IntraVENous Q4H PRN  
 0.45% sodium chloride infusion  75 mL/hr IntraVENous CONTINUOUS  
 mirtazapine (REMERON) tablet 15 mg  15 mg Oral QHS  albuterol (PROVENTIL VENTOLIN) nebulizer solution 2.5 mg  2.5 mg Nebulization Q4H PRN  
 hydrALAZINE (APRESOLINE) 20 mg/mL injection 10 mg  10 mg IntraVENous Q6H PRN  
 aspirin chewable tablet 81 mg  81 mg Oral DAILY  metoprolol tartrate (LOPRESSOR) tablet 25 mg  25 mg Oral Q12H  
 
______________________________________________________________________ EXPECTED LENGTH OF STAY: 5d 7h 
ACTUAL LENGTH OF STAY:          3 Adiel Colorado MD

## 2019-11-04 NOTE — PROGRESS NOTES
Problem: Breathing Pattern - Ineffective Goal: *Absence of hypoxia 11/4/2019 0633 by Lamar Ugalde Outcome: Progressing Towards Goal 
Pt 97% on 30L, 50% via high flow. Lung sounds diminished bilaterally. Left chest tube in place. Education provided on respiratory medications including Solu-medrol. Pt verbalized understanding. 12 Pt AMS but oriented. Pt found soiled with leads off, pulse ox off, and IV disconnected. Pt reported being hot. Hospitalist called for ABG. Orders received. PO2 72, RT increased FIO2 to 60%. Pt calm, cooperative, and appears to have returned to baseline during clean up. Will continue to monitor LOC. Problem: Falls - Risk of 
Goal: *Absence of Falls Description Document Greer Villagran Fall Risk and appropriate interventions in the flowsheet. Outcome: Progressing Towards Goal 
Note:  
Fall Risk Interventions: 
Mobility Interventions: Patient to call before getting OOB Medication Interventions: Patient to call before getting OOB Elimination Interventions: Call light in reach, Patient to call for help with toileting needs, Urinal in reach Last 3 Recorded Weights in this Encounter 11/02/19 1202 11/03/19 0600 11/04/19 0612 Weight: 67.1 kg (148 lb) 59.7 kg (131 lb 11.2 oz) 59.7 kg (131 lb 11.2 oz) Bedside shift change report given to Starla Cole RN (oncoming nurse) by Chaitanya Sapp RN (offgoing nurse). Report included the following information SBAR, Kardex, Intake/Output, MAR, Recent Results and Cardiac Rhythm NSR.

## 2019-11-05 NOTE — WOUND CARE
WOUND CARE CONSULT for assessment of pink area om sacrum and nurse noted there was breakdown in groin. Patient went off the floor to OR this morning and has not returned. Wound care will see patient tomorrow. JANICE Armijo, RN, [facility] Office x [phone] Pager x [phone]

## 2019-11-05 NOTE — PROGRESS NOTES
Problem: Falls - Risk of 
Goal: *Absence of Falls Description Document Elizabeth Herrings Fall Risk and appropriate interventions in the flowsheet. Outcome: Progressing Towards Goal 
Note:  
Fall Risk Interventions: 
Mobility Interventions: Communicate number of staff needed for ambulation/transfer, Patient to call before getting OOB Medication Interventions: Assess postural VS orthostatic hypotension, Patient to call before getting OOB, Teach patient to arise slowly, Evaluate medications/consider consulting pharmacy Elimination Interventions: Call light in reach, Patient to call for help with toileting needs, Toilet paper/wipes in reach, Toileting schedule/hourly rounds Problem: Pressure Injury - Risk of 
Goal: *Prevention of pressure injury Description Document Renato Scale and appropriate interventions in the flowsheet. Outcome: Progressing Towards Goal 
Note:  
Pressure Injury Interventions: 
Sensory Interventions: Assess changes in LOC, Minimize linen layers, Keep linens dry and wrinkle-free, Assess need for specialty bed Moisture Interventions: Absorbent underpads, Apply protective barrier, creams and emollients, Assess need for specialty bed, Check for incontinence Q2 hours and as needed, Minimize layers, Moisture barrier Activity Interventions: Chair cushion, Increase time out of bed, Pressure redistribution bed/mattress(bed type), PT/OT evaluation Mobility Interventions: Assess need for specialty bed, Chair cushion, Float heels, HOB 30 degrees or less, Turn and reposition approx. every two hours(pillow and wedges), Pressure redistribution bed/mattress (bed type), PT/OT evaluation Nutrition Interventions: Document food/fluid/supplement intake, Discuss nutritional consult with provider, Offer support with meals,snacks and hydration Friction and Shear Interventions: Minimize layers, Apply protective barrier, creams and emollients, Feet elevated on foot rest, Lift sheet Problem: Breathing Pattern - Ineffective Goal: *Absence of hypoxia Outcome: Progressing Towards Goal 
 Pt on hi-flow prior to procedure Problem: Non-Violent Restraints Goal: *No harm/injury to patient while restraints in use Outcome: Progressing Towards Goal 
 
Goal: *Patient's dignity will be maintained Outcome: Progressing Towards Goal 
 
Goal: Non-violent Restaints:Standard Interventions Outcome: Progressing Towards Goal

## 2019-11-05 NOTE — ANESTHESIA PROCEDURE NOTES
Arterial Line Placement Start time: 11/5/2019 3:37 PM 
End time: 11/5/2019 3:37 PM 
Performed by: Odalis Blank MD 
Authorized by: Odalis Blank MD  
 
Pre-Procedure Indications:  Arterial pressure monitoring and blood sampling Preanesthetic Checklist: patient identified, risks and benefits discussed, anesthesia consent, site marked, patient being monitored, timeout performed and patient being monitored Timeout Time: 15:37 Procedure:  
Prep:  ChloraPrep Seldinger Technique?: Yes Orientation:  Right Location:  Brachical artery Catheter size:  20 G Number of attempts:  1 Assessment:  
Post-procedure:  Line secured and sterile dressing applied Patient Tolerance:  Patient tolerated the procedure well with no immediate complications

## 2019-11-05 NOTE — ANESTHESIA POSTPROCEDURE EVALUATION
Post-Anesthesia Evaluation and Assessment Patient: Cecy Adan MRN: [id number]  SSN: [id number] YOB: 1940  Age: 70 y.o. Sex: male I have evaluated the patient and they are stable in the ICU. Cardiovascular Function/Vital Signs Visit Vitals /61 Pulse (!) 101 Temp 35.7 °C (96.3 °F) Resp 30 Ht 6' (1.829 m) Wt 61.2 kg (134 lb 14.7 oz) SpO2 100% BMI 18.30 kg/m² Patient is status post General anesthesia for Procedure(s): LEFT VIDEO ASSISTED THORACOSCOPIC SURGERY/LEFT UPPER LOBE WEDGE RESECTION/ LEFT LOWER LOBE WEDGE RESECTION/ MECHANICAL PLEURODESIS. Nausea/Vomiting: None Postoperative hydration reviewed and adequate. Pain: 
Pain Scale 1: Numeric (0 - 10) (11/05/19 1038) Pain Intensity 1: 0 (11/05/19 1038) Managed Neurological Status:  
Neuro Neurologic State: Confused; Eyes open spontaneously; Alert (11/05/19 0800) Orientation Level: Oriented X4 (11/05/19 0800) Cognition: Follows commands (11/05/19 0800) Speech: Clear (11/05/19 0800) Assessment L Pupil: Brisk;Round (11/05/19 0800) Size L Pupil (mm): 3 (11/04/19 2001) Assessment R Pupil: Brisk;Round (11/05/19 0800) Size R Pupil (mm): 3 (11/04/19 2001) LUE Motor Response: Purposeful (11/05/19 0800) LLE Motor Response: Purposeful (11/05/19 0800) RUE Motor Response: Purposeful (11/05/19 0800) RLE Motor Response: Purposeful (11/05/19 0800) Intubated and sedated Pulmonary Status:  
O2 Device: Nasal mask (11/05/19 1038) Intubated with adequate ventilation and oxygenation Complications related to anesthesia: None Post-anesthesia assessment completed. No concerns Signed By: Zurdo Miranda MD   
 November 5, 2019

## 2019-11-05 NOTE — PROGRESS NOTES
Two occurences of loose stool overnight. Both times pt was found with gown off, leads off, depends off, and feces on bed, floor and pt. One occurrence an IV was taken out by pt, and the second time the high flow was removed from face by pt. Destated down to 70. Got order for non-violent restraints for pt. Pt continues to have periods of tachypnea. Problem: Breathing Pattern - Ineffective Goal: *Absence of hypoxia Outcome: Not Progressing Towards Goal

## 2019-11-05 NOTE — PERIOP NOTES
TRANSFER - OUT REPORT: 
 
Verbal report given to Mark(name) on Mery Briseno  being transferred to ICU(unit) for change in pt condition. Report consisted of patients Situation, Background, Assessment and  
Recommendations(SBAR). Information from the following report(s) OR Summary was reviewed with the receiving nurse. Opportunity for questions and clarification was provided. Patient transported with: 
 Susan Roberson with ambu, arterial line, quad lumen central line, monitor, CRNA

## 2019-11-05 NOTE — PROGRESS NOTES
High risk patient with cor pulmonale and certianly could have significant underlying cad.  EKG trop bnp and echo ordered for evaluation post arrest.

## 2019-11-05 NOTE — BRIEF OP NOTE
BRIEF OPERATIVE NOTE Date of Procedure: 11/5/2019 Preoperative Diagnosis: PNEUMOTHORAX WITH AIR LEAK, multiple pulmonary nodules Postoperative Diagnosis: same Procedure(s): LEFT VIDEO ASSISTED THORACOSCOPIC SURGERY/LEFT UPPER LOBE WEDGE RESECTION/ LEFT LOWER LOBE WEDGE RESECTION/ MECHANICAL PLEURODESIS Surgeon(s) and Role: Quoc Tam MD - Primary Surgical Assistant: none Surgical Staff: 
Circ-1: Lolita Machado RN 
Circ-Relief: Ting Og RN Physician Assistant: Jayesh Ruby, [id number] Scrub Tech-1: Mehnaz Huang Scrub RN-Relief: Lolita Machado RN Event Time In Time Out Incision Start 11/05/2019 1256 Incision Close Anesthesia: General  
Estimated Blood Loss: <50ml Specimens:  
ID Type Source Tests Collected by Time Destination 1 : left lower lobe wedge lung Fresh Lung Biopsy  Ty Lee MD 11/5/2019 1300 Pathology 2 : left upper lobe wedge lung  Fresh Lung Biopsy  Ty Lee MD 11/5/2019 1309 Pathology 1 : left lower lobe wedge lung Tissue Lung CULTURE, ANAEROBIC, CULTURE, TISSUE W GRAM STAIN, GRAM STAIN, CULTURE, FUNGUS, CULTURE & SMEAR, AFB Ty Lee MD 11/5/2019 1302 Microbiology Findings: LLL very nodular. JARETT apical bleb resected Complications: Cardiac arrest with 2 mins CPR after being flipped supine during Anesth reversal.  
Implants: * No implants in log * Condition: critical on Epi gtt Disposition: to icu

## 2019-11-05 NOTE — ANESTHESIA PROCEDURE NOTES
Central Line Placement Start time: 11/5/2019 3:37 PM 
End time: 11/5/2019 3:37 PM 
Performed by: Juwan Márquez MD 
Authorized by: Juwan Márquez MD  
 
Indications: vascular access, central pressure monitoring and need for vasopressors Preanesthetic Checklist: patient identified, risks and benefits discussed, anesthesia consent, site marked, patient being monitored and timeout performed Timeout Time: 15:38 Pre-procedure: All elements of maximal sterile barrier technique followed? Yes   
2% Chlorhexidine for cutaneous antisepsis, Hand hygiene performed prior to catheter insertion and Ultrasound guidance Sterile Ultrasound Technique followed?: Yes Procedure:  
Prep:  Chlorhexidine Orientation:  Right Patient position:  Trendelenburg Catheter type:  Quad lumen Catheter length:  16 cm Number of attempts:  1 Successful placement: Yes Assessment:  
Post-procedure:  Catheter secured and sterile dressing applied Assessment:  Blood return through all ports, free fluid flow and guidewire removal verified Insertion:  Uncomplicated Patient tolerance:  Patient tolerated the procedure well with no immediate complications

## 2019-11-05 NOTE — PROGRESS NOTES
0930: Dr. Carlos Marcelino at beside, verbal orders received to renew restraint (Mitts) order 1015: BRANDON Fontanez, at bedside - pt has no further questions about procedure.

## 2019-11-05 NOTE — PERIOP NOTES
TRANSFER - IN REPORT: 
 
Verbal report received from JASMYN Monteiro(name) on Jovita Leonard  being received from 4E(unit) for routine progression of care Report consisted of patients Situation, Background, Assessment and  
Recommendations(SBAR). Information from the following report(s) SBAR, Kardex and Recent Results was reviewed with the receiving nurse. Opportunity for questions and clarification was provided.

## 2019-11-05 NOTE — PROGRESS NOTES
[facility] Name: Anjelica Hand MRN: [id number] : 1940 Hospital: [address] Date: 2019     
 
 
70year old male with past medical history of abnormal ct chest with bilateral pulmonary nodules and mediastinal lymphadenopathy who underwent a navigational LLL transbronchial biopsy and 4L LN needle aspirate under EBUS guidance. He presented to [facility] with increasing shortness of breath starting at 8 pm and worse this morning.  
  
CXR with left side tension PTX s/p chest tube by Dr Jong Barfield. Path - inconclusive. 
 
============================ 
 
: S/p L VATS / pleurodesis / apical bullectomy / LLL wedge bx. On anesthesia reversal - pea arrest - ROSC 2 minutes. IMPRESSION:  
-abnormal CT Chest with bilateral pulmonary nodule; s/p Da Bronch and TBBx LLL. -Left tension pneumothorax; s/p chest tube. -persistent air leak from the chest tube; s/p VATS/ pleurodesis / LLL wedge biopsy. -PEA arrest during anesthesia recovery - RISC 2 min. 
-acute respiratory failure secondary to above. 
-elevated troponin from demand ischemia. 
-shock post cardiac arrest likely worse with pulm htn and elevated peep. 
-pulmonary htn. -COPD with FEV1 1.31 L (46%). -Nicotine dependence with 1/3 ppd x 55 years. RECOMMENDATIONS:please see orders for details. Case d/w nursing and on Multi D rounds. -vent settings reviewed - not much o2 requirement - will get abg to adjust accordingly. 
-sedation reviewed - fentanyl/ propofol gtt. 
-echo x stats. Send cardiac enzymes. Cardiology consult if elevated troponin and ef down. 
-avoid acidosis in pulm htn. 
-vasopressor. Tremayne gtt and epi gtt. Wean tremayne followed by epi. -Bronchodilator. -NPO for now. -GI ppx - protonix. -DVT ppx - scd. The patient is Critically ill  post cardiopulmonary arrest and at risk for further deterioration. Time spent was exclusive of any procedures, multidisciplinary rounds and did not overlap with another provider. D/w anesthesia and Dr Paul Hawthorne. Critical Care time excluding any procedure: 32 minutes. Routine management Stress ulcer prophylaxis DVT prophylaxis Daily delirium assessment with CAM - ICU Target RASS 0 to -1 I have personally reviewed the radiology films and reports. Subjective/Interval History:  
I have reviewed the flowsheet and previous days notes. Pt is critically ill and unable to give history. Objective: Mode Rate Tidal Volume Pressure FiO2 PEEP Assist control, Volume control   500 ml    100 % 5 cm H20(Per OR MD) Peak airway pressure: 18 cm H2O Minute ventilation: 9.37 l/min Vital Signs:   
Visit Vitals /61 Pulse 89 Temp 96.3 °F (35.7 °C) Resp 16 Ht 6' (1.829 m) Wt 61.2 kg (134 lb 14.7 oz) SpO2 99% BMI 18.30 kg/m² Ventilator Pressures PIP Observed (cm H2O): 18 cm H2O Plateau Pressure (cm H2O): 15 cm H2O 
MAP (cm H2O): 9.9 PEEP/VENT (cm H2O): 5 cm H20(Per OR MD) Auto PEEP Observed (cm H2O): 0.5 cm I3OHKAA(24)Ventilator Pressures PIP Observed (cm H2O): 18 cm H2O Plateau Pressure (cm H2O): 15 cm H2O 
MAP (cm H2O): 9.9 PEEP/VENT (cm H2O): 5 cm H20(Per OR MD) Auto PEEP Observed (cm H2O): 0.5 cm H2O Safety & Alarms Circuit Temperature: (HME) Backup Mode Checked/Apnea: Yes 
Pressure Max: 40 cm H2O Ve Min: 2 Ve Max: 20 Vt Min: 200 ml Vt Max: 900 ml 
RR Max: 40 Intake/Output:  
Last shift:      Ventilator Volumes Vt Set (ml): 500 ml Vt Exhaled (Machine Breath) (ml): 478 ml Vt Spont (ml): 953 ml Ve Observed (l/min): 9.37 l/min Last 3 shifts: 11/05 0701 - 11/05 1900 In: 1700 [I.V.:1700] Out: 15 [id number] Intake/Output Summary (Last 24 hours) at 11/5/2019 1631 Last data filed at 11/5/2019 1533 Gross per 24 hour Intake 4491.25 ml Output 240 ml Net 4251.25 ml EXAM  
 
General:  Sedated. Head:  Normocephalic. Eyes:  Conjunctivae. Nose: Nares normal. Septum midline. Throat: ETT in place. Neck: Supple, symmetrical, trachea midline.  
     
Lungs:   Decreased air entry on the left.  
     
Heart:  Regular rate and rhythm, S1, S2 normal, no murmur, click, rub or gallop. Abdomen:   Soft, non-tender. Bowel sounds normal. No masses,  No organomegaly. Extremities: Extremities normal, atraumatic, no cyanosis or edema. Pulses: 2+ and symmetric all extremities. Skin: Skin color, texture, turgor normal. No rashes or lesions  
     
Neurologic: Grossly nonfocal  
 
 
Data I have personally reviewed data, flowsheets for the last 24 hours. Labs: 
Recent Labs 11/04/19 0415 11/03/19 0413 WBC 18.3* 15.9* HGB 12.2 12.9 HCT 37.0 38.9 * 129* Recent Labs 11/05/19 
4621 11/04/19 0415 11/03/19 0413 NA  --  135* 138 K  --  4.7 4.5  
CL  --  104 103 CO2  --  28 31 GLU  --  124* 131* BUN  --  24* 30* CREA  --  0.75 0.97 CA  --  8.0* 8.3* ALB 2.8* 2.8* 3.1*  
SGOT 60* 96* 258* * 538* 813* INR  --   --  1.2* ABG Recent Labs 11/05/19 
32 61 16 11/04/19 
0243 PHI 7.198* 7.419 PO2I 215* 72* PCO2I 68.1* 37.3 Lalo Hendrickson MD 
[facility]

## 2019-11-05 NOTE — PERIOP NOTES
Patient: Deisy Navas MRN: [id number]  SSN: [id number] YOB: 1940  Age: 70 y.o. Sex: male Patient is status post Procedure(s): LEFT VIDEO ASSISTED THORACOSCOPIC SURGERY/LEFT UPPER LOBE WEDGE RESECTION/ LEFT LOWER LOBE WEDGE RESECTION/ MECHANICAL PLEURODESIS. Surgeon(s) and Role: Boris Yusuf MD - Primary Local/Dose/Irrigation: see mar Peripheral IV 11/04/19 Right Forearm (Active) Site Assessment Clean, dry, & intact 11/5/2019  8:00 AM  
Phlebitis Assessment 0 11/5/2019  8:00 AM  
Infiltration Assessment 0 11/5/2019  8:00 AM  
Dressing Status Clean, dry, & intact 11/5/2019  8:00 AM  
Dressing Type Tape;Transparent 11/5/2019  8:00 AM  
Hub Color/Line Status Pink; Infusing 11/5/2019  8:00 AM  
Action Taken Open ports on tubing capped 11/5/2019  8:00 AM  
Alcohol Cap Used Yes 11/5/2019  8:00 AM  
        
Airway - Endotracheal Tube 11/05/19 Oral (Active) Dressing/Packing:  Wound Sacral/coccyx-Dressing Type: Open to air;Zinc based paste (11/05/19 0800) Splint/Cast:  ] Other:  
 
 
.

## 2019-11-05 NOTE — PROGRESS NOTES
Just after Mr. Sourav Cramer was resuscitated in the OR I personally met with his niece, Ebony Pardo. She was appropriately worried about Mr. Sourav Cramer and stated he had been in decline and she was not shocked by the news of events in the OR. She shared with me his desire to be a DNR but understood all of our efforts to get him through the operation. She is going to file his updated POA and HCPOA and advanced directive paperwork with Social Work.

## 2019-11-05 NOTE — PROGRESS NOTES
Cardiology Progress Note 
 
11/5/2019 5:14 PM 
Admit Date: 11/1/2019 Admit Diagnosis: Respiratory failure ([facility] [address] [J96.90] Assessment/Plan 1. Right heart failure with cor pulmonale and severe pulmonary htn related to COPD 2. COPD, pulmonary nodules, PTX s/p VATS JARETT resection, mechanical pleurodesis 3. Cardiopulmonary arrest, felt to be related to #1, ekg and echo and labs pending.  
-pt not intubated and on pressors 3. DNR rescinded temp for procedure, reviewed note in chart. 4. Body mass index is 18.3 kg/m². cachexia protein calorie malnutrition probably related to #1 and 2 
5. Hypotension- volume resuscitation, on pressor support as well Subjective:  
 
Benjamin López Visit Vitals /61 Pulse (!) 101 Temp 96.3 °F (35.7 °C) Resp 30 Ht 6' (1.829 m) Wt 134 lb 14.7 oz (61.2 kg) SpO2 100% BMI 18.30 kg/m² Current Facility-Administered Medications Medication Dose Route Frequency  sodium chloride (NS) flush 5-40 mL  5-40 mL IntraVENous Q8H  
 sodium chloride (NS) flush 5-40 mL  5-40 mL IntraVENous PRN  
 oxyCODONE-acetaminophen (PERCOCET) 5-325 mg per tablet 2 Tab  2 Tab Oral Q4H PRN  
 HYDROmorphone (PF) (DILAUDID) injection 1 mg  1 mg IntraVENous Q3H PRN  
 naloxone (NARCAN) injection 0.4 mg  0.4 mg IntraVENous PRN  
 PHENYLephrine (MARY-SYNEPHRINE) 30 mg in 0.9% sodium chloride 250 mL infusion   mcg/min IntraVENous TITRATE  EPINEPHrine (ADRENALIN) 5 mg in 0.9% sodium chloride 250 mL infusion  1-10 mcg/min IntraVENous TITRATE  pantoprazole (PROTONIX) 40 mg in sodium chloride 0.9% 10 mL injection  40 mg IntraVENous Q24H  
 albuterol-ipratropium (DUO-NEB) 2.5 MG-0.5 MG/3 ML  3 mL Nebulization Q6H RT  
 methylPREDNISolone (PF) (SOLU-MEDROL) injection 40 mg  40 mg IntraVENous Q6H  
 ondansetron (ZOFRAN) injection 4 mg  4 mg IntraVENous Q4H PRN  mirtazapine (REMERON) tablet 15 mg  15 mg Oral QHS  albuterol (PROVENTIL VENTOLIN) nebulizer solution 2.5 mg  2.5 mg Nebulization Q4H PRN  
 hydrALAZINE (APRESOLINE) 20 mg/mL injection 10 mg  10 mg IntraVENous Q6H PRN  
 aspirin chewable tablet 81 mg  81 mg Oral DAILY  [Held by provider] metoprolol tartrate (LOPRESSOR) tablet 25 mg  25 mg Oral Q12H Objective:  
  
Physical Exam: 
Visit Vitals /61 Pulse (!) 101 Temp 96.3 °F (35.7 °C) Resp 30 Ht 6' (1.829 m) Wt 134 lb 14.7 oz (61.2 kg) SpO2 100% BMI 18.30 kg/m² General Appearance:  Elderly cachetic, intubated Ears/Nose/Mouth/Throat:   Dry mm Neck: Supple. Chest:   scatt crackles on vent. +vent gurgles etc  
Cardiovascular:  Regular, distant heart sounds no obvious murmur Abdomen:   Firm, bowel sounds are active. Extremities: No edema bilaterally. Le pulses 1+ Skin: Warm and dry. Data Review:  
Labs:   
Recent Results (from the past 24 hour(s)) HEPATIC FUNCTION PANEL Collection Time: 11/05/19  3:16 AM  
Result Value Ref Range Protein, total 5.4 (L) 6.4 - 8.2 g/dL Albumin 2.8 (L) 3.5 - 5.0 g/dL Globulin 2.6 2.0 - 4.0 g/dL A-G Ratio 1.1 1.1 - 2.2 Bilirubin, total 2.0 (H) 0.2 - 1.0 MG/DL Bilirubin, direct 0.5 (H) 0.0 - 0.2 MG/DL Alk. phosphatase 66 45 - 117 U/L  
 AST (SGOT) 60 (H) 15 - 37 U/L  
 ALT (SGPT) 375 (H) 12 - 78 U/L  
TYPE & SCREEN Collection Time: 11/05/19  9:48 AM  
Result Value Ref Range Crossmatch Expiration 11/08/2019 ABO/Rh(D) O POSITIVE Antibody screen NEG   
POC G3 - PUL Collection Time: 11/05/19  3:35 PM  
Result Value Ref Range FIO2 (POC) 100 % pH (POC) 7.198 (LL) 7.35 - 7.45    
 pCO2 (POC) 68.1 (H) 35.0 - 45.0 MMHG  
 pO2 (POC) 215 (H) 80 - 100 MMHG  
 HCO3 (POC) 26.5 (H) 22 - 26 MMOL/L  
 sO2 (POC) 100 (H) 92 - 97 % Base deficit (POC) 2 mmol/L Site RIGHT RADIAL Device: VENT Mode SIMV Allens test (POC) YES  Specimen type (POC) ARTERIAL    
 Total resp. rate 12 POC CHEM8 Collection Time: 11/05/19  3:45 PM  
Result Value Ref Range Calcium, ionized (POC) 1.06 (L) 1.12 - 1.32 mmol/L Sodium (POC) 132 (L) 136 - 145 mmol/L Potassium (POC) 5.2 (H) 3.5 - 5.1 mmol/L Chloride (POC) 100 98 - 107 mmol/L  
 CO2 (POC) 28 21 - 32 mmol/L Anion gap (POC) 10 10 - 20 mmol/L Glucose (POC) 234 (H) 65 - 100 mg/dL BUN (POC) 26 (H) 9 - 20 mg/dL Creatinine (POC) 1.0 0.6 - 1.3 mg/dL GFRAA, POC >60 >60 ml/min/1.73m2 GFRNA, POC >60 >60 ml/min/1.73m2 Hematocrit (POC) 26 (L) 36.6 - 50.3 % Comment Comment Not Indicated. Telemetry: NSR with NST, precordial NITA Pt is critically ill with high risk of decline. CC time exclusive or procedure 25 minutes at bedside.   
 
 
Herlinda Law MD

## 2019-11-05 NOTE — PERIOP NOTES
1000ml sterile water to sterile field 1000ml ns to sterile field DRESSING: TOPICAL ADHESIVE port holes x3

## 2019-11-05 NOTE — ANESTHESIA PREPROCEDURE EVALUATION
Relevant Problems No relevant active problems Anesthetic History No history of anesthetic complications Review of Systems / Medical History Patient summary reviewed, nursing notes reviewed and pertinent labs reviewed Pulmonary Within defined limits COPD Neuro/Psych Within defined limits Psychiatric history Cardiovascular Within defined limits Comments: Demand ischemia with peaked troponin GI/Hepatic/Renal 
Within defined limits Endo/Other Within defined limits Other Findings Physical Exam 
 
Airway Mallampati: II 
TM Distance: > 6 cm Neck ROM: normal range of motion Mouth opening: Normal 
 
 Cardiovascular Regular rate and rhythm,  S1 and S2 normal,  no murmur, click, rub, or gallop Dental 
No notable dental hx Pulmonary Breath sounds clear to auscultation Abdominal 
GI exam deferred Other Findings Anesthetic Plan ASA: 3 Anesthesia type: general 
 
 
 
 
Induction: Intravenous Anesthetic plan and risks discussed with: Patient

## 2019-11-06 PROBLEM — I50.810 RIGHT HEART FAILURE (HCC): Status: ACTIVE | Noted: 2019-01-01

## 2019-11-06 NOTE — PROGRESS NOTES
Thoracic Surgery Simple Progress Note Admit Date: 2019 POD: 1 Day Post-Op Procedure:  Procedure(s): LEFT VIDEO ASSISTED THORACOSCOPIC SURGERY/LEFT UPPER LOBE WEDGE RESECTION/ LEFT LOWER LOBE WEDGE RESECTION/ MECHANICAL PLEURODESIS Subjective:  
 
Unable to obtain Objective:  
 
Blood pressure 97/57, pulse 100, temperature 97.8 °F (36.6 °C), resp. rate 17, height 6' (1.829 m), weight 134 lb 7.7 oz (61 kg), SpO2 98 %. Temp (24hrs), Av.4 °F (36.9 °C), Min:96.3 °F (35.7 °C), Max:100 °F (37.8 °C) Hemodynamics PAP 
  CO 
  CI 
 
701 - 1900 In: -  
Out: 85 [Urine:35] 1901 - 700 In: 7826.1 [I.V.:7826.1] Out: 5005 [KAMCI:256] EXAM: 
GENERAL: sedated on vent support, H&H 9. did not require transfusion last night. Platelets  75 down from 106. K+ 5.6 ? Hemolyzed HEART:  regular rate and rhythm, on EPI and Tremayne for hemodynamic support B/P 110/62. Had Echo this morning results pending LUNG: Oral ETT in place on vent AC 14/ / FIO2 40% PEEP 2. Left chest tube in place drained 1040 ml yesterday. Air leak noted. CXR reviewed. NEURO:  ALYSON, sedated on Diprivan INCISION: Clean, dry, and intact EXTREMITIES:No evidence of DVT seen on physical exam. 
GI/: Abd soft, nonterder with + bowel sounds. Horner Labs: 
Recent Results (from the past 24 hour(s)) TYPE & SCREEN Collection Time: 19  9:48 AM  
Result Value Ref Range Crossmatch Expiration 2019 ABO/Rh(D) O POSITIVE Antibody screen NEG   
CULTURE, TISSUE W GRAM STAIN Collection Time: 19  3:27 PM  
Result Value Ref Range Special Requests: NO SPECIAL REQUESTS    
 GRAM STAIN MODERATE WBCS SEEN    
 GRAM STAIN NO ORGANISMS SEEN Culture result: PENDING   
POC G3 - PUL Collection Time: 19  3:35 PM  
Result Value Ref Range FIO2 (POC) 100 % pH (POC) 7.198 (LL) 7.35 - 7.45    
 pCO2 (POC) 68.1 (H) 35.0 - 45.0 MMHG pO2 (POC) 215 (H) 80 - 100 MMHG  
 HCO3 (POC) 26.5 (H) 22 - 26 MMOL/L  
 sO2 (POC) 100 (H) 92 - 97 % Base deficit (POC) 2 mmol/L Site RIGHT RADIAL Device: VENT Mode SIMV Allens test (POC) YES Specimen type (POC) ARTERIAL Total resp. rate 12 POC CHEM8 Collection Time: 11/05/19  3:45 PM  
Result Value Ref Range Calcium, ionized (POC) 1.06 (L) 1.12 - 1.32 mmol/L Sodium (POC) 132 (L) 136 - 145 mmol/L Potassium (POC) 5.2 (H) 3.5 - 5.1 mmol/L Chloride (POC) 100 98 - 107 mmol/L  
 CO2 (POC) 28 21 - 32 mmol/L Anion gap (POC) 10 10 - 20 mmol/L Glucose (POC) 234 (H) 65 - 100 mg/dL BUN (POC) 26 (H) 9 - 20 mg/dL Creatinine (POC) 1.0 0.6 - 1.3 mg/dL GFRAA, POC >60 >60 ml/min/1.73m2 GFRNA, POC >60 >60 ml/min/1.73m2 Hematocrit (POC) 26 (L) 36.6 - 50.3 % Comment Comment Not Indicated. TROPONIN I Collection Time: 11/05/19  4:40 PM  
Result Value Ref Range Troponin-I, Qt. 0.38 (H) <0.05 ng/mL NT-PRO BNP Collection Time: 11/05/19  4:40 PM  
Result Value Ref Range NT pro-BNP 4,688 (H) <450 PG/ML  
LACTIC ACID Collection Time: 11/05/19  4:40 PM  
Result Value Ref Range Lactic acid 3.1 (HH) 0.4 - 2.0 MMOL/L  
CBC WITH AUTOMATED DIFF Collection Time: 11/05/19  4:40 PM  
Result Value Ref Range WBC 27.1 (H) 4.1 - 11.1 K/uL  
 RBC 2.71 (L) 4.10 - 5.70 M/uL HGB 9.8 (L) 12.1 - 17.0 g/dL HCT 29.8 (L) 36.6 - 50.3 % .0 (H) 80.0 - 99.0 FL  
 MCH 36.2 (H) 26.0 - 34.0 PG  
 MCHC 32.9 30.0 - 36.5 g/dL  
 RDW 14.3 11.5 - 14.5 % PLATELET 626 (L) 195 - 400 K/uL MPV 12.2 8.9 - 12.9 FL  
 NRBC 0.1 (H) 0  WBC ABSOLUTE NRBC 0.03 (H) 0.00 - 0.01 K/uL NEUTROPHILS 93 (H) 32 - 75 % LYMPHOCYTES 1 (L) 12 - 49 % MONOCYTES 5 5 - 13 % EOSINOPHILS 0 0 - 7 % BASOPHILS 0 0 - 1 % IMMATURE GRANULOCYTES 1 (H) 0.0 - 0.5 % ABS. NEUTROPHILS 25.1 (H) 1.8 - 8.0 K/UL  
 ABS. LYMPHOCYTES 0.3 (L) 0.8 - 3.5 K/UL ABS. MONOCYTES 1.4 (H) 0.0 - 1.0 K/UL  
 ABS. EOSINOPHILS 0.0 0.0 - 0.4 K/UL  
 ABS. BASOPHILS 0.0 0.0 - 0.1 K/UL  
 ABS. IMM. GRANS. 0.3 (H) 0.00 - 0.04 K/UL  
 DF AUTOMATED    
 RBC COMMENTS MACROCYTOSIS 2+ COAGULATION SCREEN Collection Time: 11/05/19  4:40 PM  
Result Value Ref Range INR 1.3 (H) 0.9 - 1.1 Prothrombin time 13.3 (H) 9.0 - 11.1 sec  
 aPTT 31.0 22.1 - 32.0 sec  
 aPTT, therapeutic range     58.0 - 77.0 SECS Fibrinogen 139 (L) 200 - 475 mg/dL POC G3 - PUL Collection Time: 11/05/19  5:36 PM  
Result Value Ref Range FIO2 (POC) 100 % pH (POC) 7.362 7.35 - 7.45    
 pCO2 (POC) 41.3 35.0 - 45.0 MMHG  
 pO2 (POC) 290 (H) 80 - 100 MMHG  
 HCO3 (POC) 23.4 22 - 26 MMOL/L  
 sO2 (POC) 100 (H) 92 - 97 % Base deficit (POC) 2 mmol/L Site DRAWN FROM ARTERIAL LINE Device: VENT Mode ASSIST CONTROL Tidal volume 450 ml Set Rate 14 bpm  
 PEEP/CPAP (POC) 5 cmH2O  
 PIP (POC) 17 Allens test (POC) N/A Specimen type (POC) ARTERIAL Total resp. rate 29 EKG, 12 LEAD, INITIAL Collection Time: 11/05/19  5:41 PM  
Result Value Ref Range Ventricular Rate 99 BPM  
 Atrial Rate 99 BPM  
 P-R Interval 118 ms QRS Duration 86 ms  
 Q-T Interval 344 ms QTC Calculation (Bezet) 441 ms Calculated P Axis 87 degrees Calculated R Axis -76 degrees Calculated T Axis 87 degrees Diagnosis Normal sinus rhythm Right atrial enlargement Left axis deviation When compared with ECG of 03-NOV-2019 09:18, 
QRS axis shifted left T wave inversion less evident in Anterior leads T wave inversion now evident in Lateral leads QT has shortened Confirmed by Carole Mcdonald M.D., Gilford Haws ([id number]) on 11/6/2019 5:41:41 AM 
  
HEPATIC FUNCTION PANEL Collection Time: 11/06/19  3:04 AM  
Result Value Ref Range Protein, total 4.3 (L) 6.4 - 8.2 g/dL Albumin 2.2 (L) 3.5 - 5.0 g/dL Globulin 2.1 2.0 - 4.0 g/dL A-G Ratio 1.0 (L) 1.1 - 2.2 Bilirubin, total 3.1 (H) 0.2 - 1.0 MG/DL Bilirubin, direct 1.4 (H) 0.0 - 0.2 MG/DL Alk. phosphatase 61 45 - 117 U/L  
 AST (SGOT) 1,556 (H) 15 - 37 U/L  
 ALT (SGPT) 1,732 (H) 12 - 78 U/L  
CBC WITH AUTOMATED DIFF Collection Time: 11/06/19  3:04 AM  
Result Value Ref Range WBC 30.1 (H) 4.1 - 11.1 K/uL  
 RBC 2.66 (L) 4.10 - 5.70 M/uL HGB 9.9 (L) 12.1 - 17.0 g/dL HCT 30.0 (L) 36.6 - 50.3 % .8 (H) 80.0 - 99.0 FL  
 MCH 37.2 (H) 26.0 - 34.0 PG  
 MCHC 33.0 30.0 - 36.5 g/dL  
 RDW 14.8 (H) 11.5 - 14.5 % PLATELET 75 (L) 888 - 400 K/uL NRBC 0.3 (H) 0  WBC ABSOLUTE NRBC 0.08 (H) 0.00 - 0.01 K/uL NEUTROPHILS 92 (H) 32 - 75 % LYMPHOCYTES 1 (L) 12 - 49 % MONOCYTES 5 5 - 13 % EOSINOPHILS 0 0 - 7 % BASOPHILS 0 0 - 1 % IMMATURE GRANULOCYTES 2 (H) 0.0 - 0.5 % ABS. NEUTROPHILS 27.7 (H) 1.8 - 8.0 K/UL  
 ABS. LYMPHOCYTES 0.3 (L) 0.8 - 3.5 K/UL  
 ABS. MONOCYTES 1.5 (H) 0.0 - 1.0 K/UL  
 ABS. EOSINOPHILS 0.0 0.0 - 0.4 K/UL  
 ABS. BASOPHILS 0.0 0.0 - 0.1 K/UL  
 ABS. IMM. GRANS. 0.6 (H) 0.00 - 0.04 K/UL  
 DF SMEAR SCANNED    
 RBC COMMENTS MACROCYTOSIS 2+ 
    
 RBC COMMENTS KEI CELLS 
PRESENT 
    
 RBC COMMENTS RBC FRAGMENTS PRESENT 
POLYCHROMASIA 1+ METABOLIC PANEL, BASIC Collection Time: 11/06/19  3:04 AM  
Result Value Ref Range Sodium 136 136 - 145 mmol/L Potassium 5.6 (H) 3.5 - 5.1 mmol/L Chloride 107 97 - 108 mmol/L  
 CO2 19 (L) 21 - 32 mmol/L Anion gap 10 5 - 15 mmol/L Glucose 176 (H) 65 - 100 mg/dL BUN 35 (H) 6 - 20 MG/DL Creatinine 1.50 (H) 0.70 - 1.30 MG/DL  
 BUN/Creatinine ratio 23 (H) 12 - 20 GFR est AA 55 (L) >60 ml/min/1.73m2 GFR est non-AA 45 (L) >60 ml/min/1.73m2 Calcium 7.4 (L) 8.5 - 10.1 MG/DL MAGNESIUM Collection Time: 11/06/19  6:21 AM  
Result Value Ref Range Magnesium 2.3 1.6 - 2.4 mg/dL LACTIC ACID Collection Time: 11/06/19  6:21 AM  
Result Value Ref Range Lactic acid 8.3 (HH) 0.4 - 2.0 MMOL/L  
TROPONIN I Collection Time: 11/06/19  6:21 AM  
Result Value Ref Range Troponin-I, Qt. 0.56 (H) <0.05 ng/mL PROTHROMBIN TIME + INR Collection Time: 11/06/19  6:21 AM  
Result Value Ref Range INR 1.9 (H) 0.9 - 1.1 Prothrombin time 18.6 (H) 9.0 - 11.1 sec PTT Collection Time: 11/06/19  6:21 AM  
Result Value Ref Range aPTT 34.5 (H) 22.1 - 32.0 sec  
 aPTT, therapeutic range     58.0 - 77.0 SECS  
SAMPLES BEING HELD Collection Time: 11/06/19  6:21 AM  
Result Value Ref Range SAMPLES BEING HELD 1BLU   
 COMMENT Add-on orders for these samples will be processed based on acceptable specimen integrity and analyte stability, which may vary by analyte. EKG, 12 LEAD, INITIAL Collection Time: 11/06/19  6:42 AM  
Result Value Ref Range Ventricular Rate 100 BPM  
 Atrial Rate 100 BPM  
 P-R Interval 128 ms QRS Duration 84 ms Q-T Interval 350 ms QTC Calculation (Bezet) 451 ms Calculated P Axis 86 degrees Calculated R Axis -54 degrees Calculated T Axis 6 degrees Diagnosis Normal sinus rhythm Left axis deviation ST & T wave abnormality, consider anterior ischemia When compared with ECG of 05-NOV-2019 17:41, 
T wave amplitude has decreased in Inferior leads T wave inversion no longer evident in Lateral leads ECHO ADULT FOLLOW-UP OR LIMITED Collection Time: 11/06/19  7:45 AM  
Result Value Ref Range Ao Root D 3.46 cm  
 LVIDd 3.12 (A) 4.2 - 5.9 cm  
 LVPWd 0.78 0.6 - 1.0 cm LVIDs 2.16 cm IVSd 1.19 (A) 0.6 - 1.0 cm RVIDd 3.33 cm  
 LV Mass AL 91.9 88 - 224 g LV Mass AL Index 51.1 49 - 115 g/m2 Left Ventricular Fractional Shortening by 2D [id number] % Assessment:  
No evidence of DVT. Principal Problem: 
  Shortness of breath (11/1/2019) Active Problems: 
  Pneumothorax on left (11/1/2019) Respiratory failure ([address] (11/1/2019) Right heart failure ([address] (11/6/2019) PATH:  pending Plan/Recommendations:  
Keep chest tube to suction Vent per pulmonary See orders Signed By: Glendia Litten FNP

## 2019-11-06 NOTE — PROGRESS NOTES
1930: Bedside and Verbal shift change report given to Mara Cullen RN (oncoming nurse) by George Lance RN (offgoing nurse). Report included the following information SBAR, Kardex, OR Summary, Procedure Summary, Intake/Output, MAR, Accordion, Recent Results, Cardiac Rhythm Sinus Tach, NSR, Alarm Parameters  and Dual Neuro Assessment. Primary Nurse Jael Salguero and George Lance RN performed a dual skin assessment on this patient No impairment noted Renato score is 13 Pt unresponsive upon initial neuro assessment. Does not withdraw or respond to any stimuli. Pupils equal, sluggish 2s. On Tremayne, epi gtt to maintain MAP >65. On propofol gtt. 2100: Paged hospitalist regarding minimal UOP. Will continue to monitor. 0700: Consulted pharmacy to double concentrate Tremayne gtt. 0730: Bedside and Verbal shift change report given to Kenroy Terry RN (oncoming nurse) by Mara Cullen RN (offgoing nurse). Report included the following information SBAR, Kardex, Intake/Output, MAR, Accordion, Recent Results, Cardiac Rhythm Sinus Tach and Alarm Parameters . 0800: Dr. Sean Heath at bedside. Critical lactic 8.3 relayed to Dr. Sean Heath as well as coags, troponin, and EKG results. Will continue to monitor. Dayshift RN at bedside. Shift Summary: PRN dilaudid given for increased WOB and RR 34. When propofol titrated down, pt became noncompliant with vent. Epi and Tremayne titrated to maintain MAP >65. Assessment largely unchanged. With propofol paused, RUE, LUE will withdraw. RLE, LLE do not move to any stimulus. Pupils equal, sluggish 2s. Chest tube output 140 cc for shift. Scattered wheezes auscultated over left lung. Upper airways coarse. Diminished lung sounds bilaterally.

## 2019-11-06 NOTE — PROGRESS NOTES
[facility] Name: Susannah Harris  MRN: [id number]  Date: 2019 10:13 AM 
Admission Date: 2019  : 1940 Impression Plan 1. Acute resp failure- COPD + multiple pulmonary nondules ( s/p non dx left angelika bx last week complicated by tension PTX) 2. S/p Left VATS with JARETT/LLL wedge bx 19-for diffuse pulm nodules- ? Sarcoid vs neoplasia 3. Post op PEA arrest 19 - rosc 2 min 4. Shock- on sammy/epi- suspect ECFV low and septic shock- ? Aspiration pna/pneumonitis- diffuse ASD on CXR other DDX is ischemic bowel 5. MELVIN 6. Lactic acidosis 7. Shock liver 8. COPD FEV1 1.31 ( 46%) 9. ECHO  EF 71% RV moderatley reduced PASP 32 1. Colloid and crystalloid loading 2. Change solumed to hydrocort stress dose 3. CT abd/pelvis - eval for ischemic bowel and GB 4. Lung protective ventilation- no autoPEEP on vent today 5. Inc freq nebs 6. Start zosyn- WBC climbing- DDX listed. 7. Change LR to NS Pt is critically ill CCT EOP 30 min. At risk for decline due to sepsis/MODS Subjective Sedated on vent on prop/sammy/epi drips Review of systems not obtained due to patient factors. EXAM: 
Visit Vitals /60 Pulse (!) 103 Temp 97.8 °F (36.6 °C) Resp 17 Ht 6' (1.829 m) Wt 61 kg (134 lb 7.7 oz) SpO2 98% BMI 18.24 kg/m² Temp (24hrs), Av.4 °F (36.9 °C), Min:96.3 °F (35.7 °C), Max:100 °F (37.8 °C) GENERAL: well developed and in no distress, NECK:  no jugular vein distention, no retractions, no thyromegaly or masses, LUNGS: decreased breath sounds billaterally and with rhonchi , HEART:  Regular rate and rhythm with no MGR; no edema is present, ABDOMEN:  soft with no tenderness, bowel sounds present, EXTREMITIES:  warm with no cyanosis and SKIN:  no jaundice or ecchymosis LABS Recent Labs 19 
0304 19 
1640 19 
0415 WBC 30.1* 27.1* 18.3*  
 HGB 9.9* 9.8* 12.2 HCT 30.0* 29.8* 37.0 PLT 75* 106* 124* Recent Labs 11/06/19 
2794 11/06/19 
0304 11/05/19 
1640 11/05/19 
0316 11/04/19 
1417 NA  --  136  --   --  135* K  --  5.6*  --   --  4.7 CL  --  107  --   --  104 CO2  --  19*  --   --  28  
GLU  --  176*  --   --  124* BUN  --  35*  --   --  24* CREA  --  1.50*  --   --  0.75 CA  --  7.4*  --   --  8.0*  
MG 2.3  --   --   --   --   
ALB  --  2.2*  --  2.8* 2.8* SGOT  --  1,556*  --  60* 96* ALT  --  1,732*  --  375* 538* INR 1.9*  --  1.3*  --   --   
 
 
ABG Recent Labs 11/05/19 
1736 11/05/19 
1535 11/04/19 
0243 PHI 7.362 7.198* 7.419 PO2I 290* 215* 72* PCO2I 41.3 68.1* 37.3 Radiology Films have been personally reviewed. PCXR with diffuse ASD ETT ok no PTX Silvia Shaw MD

## 2019-11-06 NOTE — ACP (ADVANCE CARE PLANNING)
Advanced care planning Patient has an AMD in niece's home completed in 2018. Had copy here per family, but this is not scanned nor in paper chart. To bring in again tonight. Niece Jaymie Nearing is mPOA. Pt , has 1 son Norma Houston. Today son confirmed w/ ICU team that Jaymie Nearing is mPOA. Decision is for partial code- no CPR/shock. Addendum 11/7/19- AMD scanned into chart. Jaymie Nearing is mPOA.

## 2019-11-06 NOTE — PROGRESS NOTES
Transitions of care TBD pending medical progress Lives at [facility] [phone] Neftaly Camargo) On Oxygen at 4 L through [address] Reason for Admission: To ED with left tension PTX , for thoracostomy RRAT Score:    14/ moderate Do you (patient/family) have any concerns for transition/discharge? No  
           
Plan for utilizing home health:  TBD Current Advanced Directive/Advance Care Plan: On file Patient's niece Yanet Krameron: [phone], H: [phone] Patient is s/p LEFT VIDEO ASSISTED THORACOSCOPIC SURGERY/LEFT UPPER LOBE WEDGE RESECTION/ LEFT LOWER LOBE WEDGE RESECTION/ MECHANICAL PLEURODESIS, with chest tube placement. Patient was a PEA arrest post op. Admitted to ICU with septic shock on 2 pressors and propfol and is vented. Gilbert Villanueva RN,CRM Care Management Interventions PCP Verified by CM: Yes(Dr Al) MyChart Signup: Yes Discharge Durable Medical Equipment: No 
Physical Therapy Consult: No 
Occupational Therapy Consult: No 
Speech Therapy Consult: No 
Current Support Network: Adult Group Home([facility]: [phone])

## 2019-11-06 NOTE — OP NOTES
[address] 
OPERATIVE REPORT Name:  Elayne Paredes 
MR#:  [id number] :  1940 ACCOUNT #:  [de-identified] DATE OF SERVICE:  2019 CLINICAL SERVICE:  Thoracic Surgery. ATTENDING SURGEON:  Johanny Allison MD 
 
OPERATIONS PERFORMED: 1. Left video-assisted thoracoscopy. 2.  Left upper lobe wedge resection. 3.  Left lower lobe wedge resection. 4.  Mechanical pleurodesis. PREOPERATIVE DIAGNOSES: 
1. Left iatrogenic pneumothorax with persistent air leak. 2.  Acute hypoxic respiratory insufficiency. 3.  Multiple pulmonary nodules. 4.  Apical bleb disease. POSTOPERATIVE DIAGNOSES: 
1. Left iatrogenic pneumothorax with persistent air leak. 2.  Acute hypoxic respiratory insufficiency. 3.  Multiple pulmonary nodules. 4.  Apical bleb disease. FIRST ASSISTANT:  BRANDON Thornton 
 
SPECIMENS SENT: 
1. Left upper lobe wedge was sent to anatomic pathology. 2.  Left lower lobe wedge was sent to anatomic pathology. DRAINS AND TUBES:  One 28-Guyanese chest tube was left within the left hemithorax. ESTIMATED BLOOD LOSS:  For this case was less than 50 mL. ANESTHESIA:  General with double-lumen endotracheal intubation. INDICATIONS FOR PROCEDURE:  The patient is a 82-year-old gentleman who has been under the care of Pulmonary Medicine for respiratory insufficiency, COPD, as well as new-onset multiple bilateral pulmonary nodules. The patient underwent navigational bronchoscopy last Thursday. Last Friday, he was seen in the emergency department with a left tension pneumothorax. Chest tube was placed. He was followed over the weekend and required a second chest tube due to increase in subcutaneous emphysema in a lung that did not re-expand. He was reevaluated.   His persistent air leak is worsening as was his oxygenation requirements, and after consultation with Pulmonary Medicine, the decision was made to proceed to the operating room for pleurodesis to control his apical bleb disease and to get a surgical lung biopsy. PROCEDURE IN DETAIL:  After informed consent was obtained and placed on the chart, the patient was taken to the operating room, placed supine on the operating table. General anesthesia with double-lumen endotracheal intubation was induced without complication. Preoperative antibiotics were administered. The patient was then placed in the right lateral decubitus position with the left side up. The patient's left chest tube was removed. The patient's left chest was prepped and draped in a sterile fashion. Time-out was performed. Through the eighth intercostal space along the posterior axillary line, a 10-mm Thoracoport was placed. In addition, anteriorly in the fourth intercostal space and posteriorly in the fifth intercostal space, working ports were placed. The lung was abnormal in appearance as the parenchyma was more gray colored with obvious nodularity. The left upper lobe apex was inspected. There were 2 very large blebs, and using Endo-SUMEET stapler, a left upper lobe apical wedge resection was performed. The specimen was extirpated and sent to anatomic pathology. The left lower lobe was then inspected. Posteriorly, at an area of densest nodularity, a left lower lobe wedge resection was performed. The specimen was extirpated, was divided on the back table, and half was sent to microbiology and half was sent to anatomic pathology. 60 mL of 1% lidocaine mixed with 0.25% Marcaine was then used as local anesthetic to perform intercostal nerve blocks. A mechanical pleurodesis was then performed at the apex, the anterior, posterior, and lateral parietal pleural surfaces. Hemostasis was ensured. A 28-Nauruan chest tube was placed posteriorly. The lung was re-inflated under direct visualization.  
 
The incisions were then closed in a multilayered fashion and covered with Dermabond. Of note, during the actual operative portion of this procedure, the patient remained hemodynamically stable, did not have any EKG changes. All surgical counts were correct x2 at the end of this case. The patient was then flipped supine and reversed from general anesthesia. During the reversal process, the patient experienced a profound episode of bradycardia and hypotension and received 2 minutes of chest compressions and ACLS was initiated. Return of spontaneous cardiac activity was obtained after 2 minutes, and the patient was maintained on an epinephrine drip. Intraoperative transesophageal echocardiogram revealed a hypokinetic RV and a dyskinetic LV. There was no direct evidence of a large pulmonary embolism and there was no pericardial effusion. The double-lumen tube was exchanged for a single-lumen tube. Central line was placed. Art line was placed. Cardiology was re-consulted intraoperatively. The patient was transferred in critical condition to the ICU. COMPLICATIONS:  Cardiac arrest during emergence from anesthesia, resuscitated with ACLS. Dr. Rossana Guevara was present and scrubbed throughout the entire procedure. Kerrie TAYLOR, was instrumental in completion of this operation as she assisted with opening and closing of all incisions, the operation of the camera, and the operation of the Endo-SUMEET stapler. Catrina Perdomo MD 
 
 
[id number] 
D:  11/05/2019 16:30 
T:  11/06/2019 2:00 
JOB #:  [id number] CC:   Apryl Cunningham MD

## 2019-11-06 NOTE — PROGRESS NOTES
Clinical Pharmacy Note: Stress Ulcer Prophylaxis Protocol Anjelica Hand has an order for pantoprazole that has been ordered with an indication of stress ulcer prophylaxis.   No other specific indication for a proton pump inhibitor (vs. a histamine type-2 receptor antagonist)  has been noted in the patients chart and therefore it has been changed to famotidine 20 mg IV Q 24 hours per the [facility]-approved Stress Ulcer Prophylaxis Protocol for eligible patients. Please call with any questions. Thank you, Nenita Law, PHARMD

## 2019-11-06 NOTE — CONSULTS
Palliative Medicine Consult Thomas: [phone] ([phone]) Patient Name: Titus Colon YOB: 1940 Date of Initial Consult: 11/6/19 Reason for Consult: End stage disease, arrest, worsening organ failure Requesting Provider: Tim Trujillo, cardiology Primary Care Physician: Clarice Redd NP 
 
 SUMMARY:  
Titus Colon is a 70 y.o. with a past history of COPD, shchizophrenia who was admitted on 11/1/2019 from group home with SOB. Was hospitalized 10/22-10/25/19 w/ resp failure, plan was to have outpatient bronch and lung bx for b/l pulmonary nodules and chest lymphadenopathy- FNA 10/31/19 w/out malignant cells. In ED found to have L tension PTX and emergent chest tube placed. Was initially on NRB and high flow. S/p L VATS w/ wedge bx 11/5/19, afterwards had cardiopulmonary arrest w/ 2 min before ROSC. Pt has R sided heart failure, severe pulm HTN related to COPD. Intubated 11/5/19 and on vasopressors. Worsening leukocytosis, has CT A/P ordered. Current medical issues leading to Palliative Medicine involvement include: end stage disease. Pt w/ multisystem organ dysfunction. Chucky is nabila Gotti. Pt had DNR status upon admission, which was reversed during time in OR. Social: Pt . 1 son- Ronald Nina. Retired 7 years ago, was a . Originally from [facility]. At baseline (per outpatient psych note 9/9/19) able to do his ADLs, traveled alone via tax, cooperative and interactive. PALLIATIVE DIAGNOSES:  
1. Respiratory failure 2. PEA arrest 11/5/19 3. Shock w/ multisystem organ failure 4. Goals of care PLAN:  
1. Pt sedated, on vent and 2 vasopressors. Worsening organ dysfunction, lactate incr. 2. Speak to niece/SaqibA Alice Richards. She brought in AMD completed in 2018 but this was never scanned and is not on paper file. Steve Nina was here earlier and confirmed w/ ICU nursing staff that Alice Richards is mPOA.  She will bring in paperwork again later today and called Jose Enrique to confirm. He is 24 yo, which is why Kory Romo is financial and mPOA- as he was a minor when pt needed more assistance. 3. Give update about worsening condition today. This is not surprising to Kory Romo. She wants her uncle to do well, but realizes that he is critically ill and after cardiopulmonary arrest yest the whole family incl son has come by to say goodbyes. They realize that most likely pt is not going to survive this hospital stay. They appreciate pastoral care team coming by.  
4. Plan is for CT A/P and this is okay w/ family- although they realize it may not change our management, but would perhaps give answers w/ regard to ability to make any recovery at all. 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 6. Pt had DNR status before VATS, family understands why this was reversed but wish to protect him from shock/CPR if he arrests again. 7. Goal is recovery as possible. 8. Changed to partial code- no CPR/shock (as currently intubated and on vasopressors) 9. If pt worsens, family would be open to comfort measures/compassionte extubation- want comfort and peace above all else. 10. Initial consult note routed to primary continuity provider and/or primary health care team members 11. Communicated plan of care with: Palliative IDT, [address] Team incl bedside RN Melanie Leonardo and ICU team  
 
 GOALS OF CARE / TREATMENT PREFERENCES:  
 
GOALS OF CARE: 
Patient/Health Care Proxy Stated Goals: Other (comment)(Recovery as possible, but likely shift to comfort) TREATMENT PREFERENCES:  
Code Status: Partial Code- no CPR/shocks Advance Care Planning: 
[x] [facility] Interdisciplinary Team has updated the ACP Navigator with [place] and Patient Capacity Primary Decision Maker: Gricelda William (Niece and mPOA) - Other Relative - [phone] Advance Care Planning 11/1/2019 Patient's Healthcare Decision Maker is: - Confirm Advance Directive Yes, on file Medical Interventions: Limited additional interventions Other: As far as possible, the palliative care team has discussed with patient / health care proxy about goals of care / treatment preferences for patient. HISTORY:  
 
History obtained from: chart, staff, family CHIEF COMPLAINT: Cannot obtain due to patient factors HPI/SUBJECTIVE: The patient is:  
[] Verbal and participatory [x] Non-participatory due to: medical condition Pt sedated on vent. Clinical Pain Assessment (nonverbal scale for severity on nonverbal patients):  
Clinical Pain Assessment Severity: 0 Activity (Movement): Lying quietly, normal position Duration: for how long has pt been experiencing pain (e.g., 2 days, 1 month, years) Frequency: how often pain is an issue (e.g., several times per day, once every few days, constant) FUNCTIONAL ASSESSMENT:  
 
Palliative Performance Scale (PPS): PPS: 10 PSYCHOSOCIAL/SPIRITUAL SCREENING:  
 
Palliative IDT has assessed this patient for cultural preferences / practices and a referral made as appropriate to needs (Cultural Services, Patient Advocacy, Ethics, etc.) Any spiritual / Mu-ism concerns: 
[] Yes /  [x] No 
 
Caregiver Burnout: 
[] Yes /  [x] No /  [] No Caregiver Present Anticipatory grief assessment:  
[x] Normal  / [] Maladaptive ESAS Anxiety: ESAS Depression:    
Cannot obtain due to patient factors REVIEW OF SYSTEMS:  
 
Positive and pertinent negative findings in ROS are noted above in HPI. The following systems were [] reviewed / [x] unable to be reviewed as noted in HPI Other findings are noted below.  
Systems: constitutional, ears/nose/mouth/throat, respiratory, gastrointestinal, genitourinary, musculoskeletal, integumentary, neurologic, psychiatric, endocrine. Positive findings noted below. Modified ESAS Completed by: provider Fatigue: 10 Drowsiness: 10 Pain: 0 Dyspnea: 0 Stool Occurrence(s): 1 PHYSICAL EXAM:  
 
From RN flowsheet: 
Wt Readings from Last 3 Encounters:  
11/06/19 134 lb 7.7 oz (61 kg) 10/31/19 132 lb (59.9 kg) 10/24/19 128 lb 4.9 oz (58.2 kg) Blood pressure 94/53, pulse (!) 114, temperature 98.2 °F (36.8 °C), resp. rate (!) 34, height 6' (1.829 m), weight 134 lb 7.7 oz (61 kg), SpO2 100 %. Pain Scale 1: Adult Nonverbal Pain Scale Pain Intensity 1: 0 Pain Location 1: Chest 
Pain Orientation 1: Mid 
Pain Description 1: Carlos Manuel Irwin Pain Intervention(s) 1: Medication (see MAR) Last bowel movement, if known:  
 
Constitutional: sedated, on vent, appears stated age Eyes: pupils equal, anicteric ENMT: no nasal discharge, moist mucous membranes, intubated Respiratory: breathing not labored Musculoskeletal: no deformity Skin: warm, dry Neurologic:sedated HISTORY:  
 
Principal Problem: 
  Shortness of breath (11/1/2019) Active Problems: 
  Pneumothorax on left (11/1/2019) Respiratory failure ([address] (11/1/2019) Right heart failure ([address] (11/6/2019) Past Medical History:  
Diagnosis Date  Anxiety  Chronic confusion  Chronic obstructive pulmonary disease ([address] 4L cont. O2  
 Depression   
 pt has schizophrenia per son  Poor historian  Sleep disorder   
 insomnia Past Surgical History:  
Procedure Laterality Date  COLONOSCOPY N/A 7/31/2019 COLONOSCOPY performed by Rito Solares MD at [facility] ENDOSCOPY Family History Problem Relation Age of Onset  Cancer Mother   
     colon ancer  Cancer Father   
     lung cancer  Cancer Brother   
     lung cancer History reviewed, no pertinent family history. Social History Tobacco Use  Smoking status: Former Smoker Packs/day: 1.00 Types: Cigarettes  Smokeless tobacco: Never Used  Tobacco comment: every 2 days Substance Use Topics  Alcohol use: No  
 
Allergies Allergen Reactions  Pollen Extracts Sneezing Current Facility-Administered Medications Medication Dose Route Frequency  hydrocortisone Sod Succ (PF) (SOLU-CORTEF) injection 100 mg  100 mg IntraVENous Q8H  
 albuterol-ipratropium (DUO-NEB) 2.5 MG-0.5 MG/3 ML  3 mL Nebulization Q4H RT  
 albumin human 25% (BUMINATE) solution 12.5 g  12.5 g IntraVENous Q6H  
 fentaNYL citrate (PF) injection 25 mcg  25 mcg IntraVENous Q1H PRN  
 NOREPINephrine (LEVOPHED) 8 mg in 5% dextrose 250mL infusion  0.5-50 mcg/min IntraVENous TITRATE  piperacillin-tazobactam (ZOSYN) 3.375 g in 0.9% sodium chloride (MBP/ADV) 100 mL  3.375 g IntraVENous Q8H  
 0.9% sodium chloride infusion  125 mL/hr IntraVENous CONTINUOUS  chlorhexidine (ORAL CARE KIT) 0.12 % mouthwash 15 mL  15 mL Oral Q12H  
 famotidine (PF) (PEPCID) 20 mg in sodium chloride 0.9% 10 mL injection  20 mg IntraVENous Q24H  
 sodium chloride (NS) flush 5-40 mL  5-40 mL IntraVENous Q8H  
 sodium chloride (NS) flush 5-40 mL  5-40 mL IntraVENous PRN  
 oxyCODONE-acetaminophen (PERCOCET) 5-325 mg per tablet 2 Tab  2 Tab Oral Q4H PRN  
 naloxone (NARCAN) injection 0.4 mg  0.4 mg IntraVENous PRN  
 EPINEPHrine (ADRENALIN) 5 mg in 0.9% sodium chloride 250 mL infusion  1-10 mcg/min IntraVENous TITRATE  propofol (DIPRIVAN) infusion  0-50 mcg/kg/min IntraVENous TITRATE  ondansetron (ZOFRAN) injection 4 mg  4 mg IntraVENous Q4H PRN  
 albuterol (PROVENTIL VENTOLIN) nebulizer solution 2.5 mg  2.5 mg Nebulization Q4H PRN  
 hydrALAZINE (APRESOLINE) 20 mg/mL injection 10 mg  10 mg IntraVENous Q6H PRN  
 aspirin chewable tablet 81 mg  81 mg Oral DAILY  
 
 
 
 LAB AND IMAGING FINDINGS:  
 
Lab Results Component Value Date/Time  WBC 30.1 (H) 11/06/2019 03:04 AM  
 HGB 9.9 (L) 11/06/2019 03:04 AM  
 PLATELET 75 (L) 05/72/8776 03:04 AM  
 
Lab Results Component Value Date/Time Sodium 136 11/06/2019 03:04 AM  
 Potassium 5.6 (H) 11/06/2019 03:04 AM  
 Chloride 107 11/06/2019 03:04 AM  
 CO2 19 (L) 11/06/2019 03:04 AM  
 BUN 35 (H) 11/06/2019 03:04 AM  
 Creatinine 1.50 (H) 11/06/2019 03:04 AM  
 Calcium 7.4 (L) 11/06/2019 03:04 AM  
 Magnesium 2.3 11/06/2019 06:21 AM  
 Phosphorus 4.7 02/04/2018 06:55 PM  
  
Lab Results Component Value Date/Time AST (SGOT) 1,556 (H) 11/06/2019 03:04 AM  
 Alk. phosphatase 61 11/06/2019 03:04 AM  
 Protein, total 4.3 (L) 11/06/2019 03:04 AM  
 Albumin 2.2 (L) 11/06/2019 03:04 AM  
 Globulin 2.1 11/06/2019 03:04 AM  
 
Lab Results Component Value Date/Time INR 1.9 (H) 11/06/2019 06:21 AM  
 Prothrombin time 18.6 (H) 11/06/2019 06:21 AM  
 aPTT 34.5 (H) 11/06/2019 06:21 AM  
  
No results found for: IRON, FE, TIBC, IBCT, PSAT, FERR No results found for: PH, PCO2, PO2 No components found for: [place] Lab Results Component Value Date/Time  11/03/2019 09:28 AM  
 CK - MB 3.7 (H) 11/03/2019 09:28 AM  
  
 
 
   
 
Total time: 70 min Counseling / coordination time, spent as noted above: 50 min 
> 50% counseling / coordination?: yes Prolonged service was provided for  []30 min   []75 min in face to face time in the presence of the patient, spent as noted above. Time Start:  
Time End:  
Note: this can only be billed with 53942 (initial) or 51201 (follow up). If multiple start / stop times, list each separately.

## 2019-11-06 NOTE — PROGRESS NOTES
0730: Bedside and Verbal shift change report given to Isa Riso RN (oncoming nurse) by Fani Vazquez RN (offgoing nurse). Report included the following information SBAR, Kardex, Procedure Summary, Intake/Output, MAR, Recent Results, Cardiac Rhythm Sinus Tachycardia and Alarm Parameters . Shift Summary: Levophed gtt titrated up to maintain MAP 65, Epi gtt titrated off, Propofol gtt 45 mcg/kg/min. Unresponsive, does not withdraw to pain, ETT, central line, arterial line, [place]. Blood cultures and sputum culture sent to lab per orders. 1930: Bedside and Verbal shift change report given to Michele Cade RN (oncoming nurse) by Isa Rios RN (offgoing nurse). Report included the following information SBAR, Kardex, Procedure Summary, Intake/Output, MAR, Recent Results, Cardiac Rhythm Sinus Tachycardia and Alarm Parameters .

## 2019-11-06 NOTE — PROGRESS NOTES
Hospitalist Progress Note Gilles Gutierrez MD 
Answering service: [phone] from in house phone Date of Service:  2019 NAME:  Lazara Toro :  1940 MRN:  [id number] Admission Summary: This is a 82-year-old male, who was recently admitted and discharged on 10/25/2019 with a discharge diagnosis of acute hypoxic respiratory failure likely related to malignancy and COPD. At the time of the discharge, the plan was to follow the patient as outpatient and get a biopsy yesterday and get a bronchoscopy and lung biopsy. Since yesterday night, the patient started having worsening shortness of breath (the patient at baseline is on 4 liters of oxygen). The patient's shortness of breath continued to get worse and today, the patient was brought in to the ER. No reported chest pain, unusual cough, fever, nausea, vomiting. No changes in bowel movement. 
  
In the ER, the patient was found to be having left tension pneumothorax and emergent Thoracic Surgery consult was done and the patient had a chest tube put in. 
  
 
Interval history / Subjective:  
  F/u SOB Looks much more comfortable than yesterday Currently on hi flow 14l No new issues Assessment & Plan:  
 
Tension pneumothorax 
-s/p left chest tube  
-since overnight of , oxygen requirement got worse, the patient was transferred to ICU 
-Chest tube replaced  
-Patient transferred to tele 11/3 
-Chest tube to gravity but persistent air leak from the chest tube; s/p VATS/ pleurodesis / LLL wedge biopsy  
-Appreciate Thoracic surgery Cardiac arrest  post VATS 
-ROSC 2 min 
-Troponin 0.38, BNP 4,007 
-Follow Echo 
-Follow Cardiology Bilateral pulmonary nodules,  
-status post lung biopsy 10/31 
-biopsy 10/31 shows benign lung parenchyma Acute respiratory failure, likely secondary to above 
-Was on Hi flow, now intubated  post VATS -On Solumedrol 
-Appreciate Pulmonary Elevated troponin. -per cardiology likely demand ischemia 
-Echo EF 51-55% Leukocytosis 
-likely sec to steroids 
-monitor Schizophrenia 
-Continue home meds Transaminitis 
-sec to ? 
-Continues to get better 
-Avoid hepatotoxics -Hep panel negative 
-US abd unremarkable 
-Monitor 
-Appreciate GI Hyperkalemia -now resolved 
-monitor Cardiac diet The patient is critically ill and at high risk of further deterioration Code status: FULL CODE 
DVT prophylaxis: scd Plan: Follow thoracic surgery,  Cardiology, GI Care Plan discussed with: Patient/Family Disposition: TBD Hospital Problems  Date Reviewed: 11/2/2019 Codes Class Noted POA Pneumothorax on left ICD-10-CM: J93.9 ICD-9-CM: 512.89  11/1/2019 Unknown * (Principal) Shortness of breath ICD-10-CM: R06.02 
ICD-9-CM: 786.05  11/1/2019 Yes Respiratory failure ([address] ICD-10-CM: J96.90 ICD-9-CM: 518.81  11/1/2019 Unknown Review of Systems: A comprehensive review of systems was negative except for that written in the HPI. Vital Signs:  
 Last 24hrs VS reviewed since prior progress note. Most recent are: 
Visit Vitals BP 95/68 Pulse (!) 102 Temp 100 °F (37.8 °C) Resp 29 Ht 6' (1.829 m) Wt 61.2 kg (134 lb 14.7 oz) SpO2 100% BMI 18.30 kg/m² Intake/Output Summary (Last 24 hours) at 11/5/2019 2113 Last data filed at 11/5/2019 2000 Gross per 24 hour Intake 2365.68 ml Output 910 ml Net 1455.68 ml Physical Examination:  
 
 
     
Constitutional:  intubated, sedated ENT:  Oral mucous moist, oropharynx benign. Resp:  CTA bilaterally. No wheezing/rhonchi/rales. No accessory muscle use CV:  Regular rhythm, normal rate, no murmurs, gallops, rubs GI:  Soft, non distended, non tender. normoactive bowel sounds, no hepatosplenomegaly Musculoskeletal:  No edema, warm, 2+ pulses throughout Neurologic:  intubated, sedated Psych:  Good insight, Not anxious nor agitated. Data Review:  
 Review and/or order of clinical lab test 
 
 
Labs:  
 
Recent Labs 11/05/19 
1640 11/04/19 0415 WBC 27.1* 18.3* HGB 9.8* 12.2 HCT 29.8* 37.0 * 124* Recent Labs 11/04/19 
0415 11/03/19 0413 * 138  
K 4.7 4.5  
 103 CO2 28 31 BUN 24* 30* CREA 0.75 0.97 * 131* CA 8.0* 8.3* Recent Labs 11/05/19 
0131 11/04/19 0415 11/03/19 0413 SGOT 60* 96* 258* * 538* 813* AP 66 70 77 TBILI 2.0* 2.4* 2.7* TP 5.4* 5.6* 6.0* ALB 2.8* 2.8* 3.1*  
GLOB 2.6 2.8 2.9 Recent Labs 11/05/19 1640 11/03/19 0413 INR 1.3* 1.2* PTP 13.3* 11.9* APTT 31.0  -- No results for input(s): FE, TIBC, PSAT, FERR in the last 72 hours. No results found for: FOL, RBCF No results for input(s): PH, PCO2, PO2 in the last 72 hours. Recent Labs 11/05/19 
1640 11/03/19 
1303 11/03/19 
6515 CPK  --   --  170 CKNDX  --   --  2.2  
TROIQ 0.38* 0.21* 0.18* No results found for: CHOL, CHOLX, CHLST, CHOLV, HDL, HDLP, LDL, LDLC, DLDLP, TGLX, TRIGL, TRIGP, CHHD, CHHDX Lab Results Component Value Date/Time Glucose (POC) 234 (H) 11/05/2019 03:45 PM  
 Glucose (POC) 116 (H) 02/05/2018 06:20 AM  
 Glucose (POC) 102 (H) 01/31/2018 12:03 PM  
 Glucose (POC) 91 01/20/2018 06:52 AM  
 Glucose (POC) 136 (H) 01/17/2018 11:46 AM  
 Glucose (POC) 97 01/12/2018 10:18 PM  
 
Lab Results Component Value Date/Time  Color YELLOW/STRAW 02/09/2018 01:22 PM  
 Appearance CLEAR 02/09/2018 01:22 PM  
 Specific gravity 1.028 02/09/2018 01:22 PM  
 pH (UA) 6.0 02/09/2018 01:22 PM  
 Protein NEGATIVE  02/09/2018 01:22 PM  
 Glucose NEGATIVE  02/09/2018 01:22 PM  
 Ketone NEGATIVE  02/09/2018 01:22 PM  
 Bilirubin NEGATIVE  02/09/2018 01:22 PM  
 Urobilinogen 1.0 02/09/2018 01:22 PM  
 Nitrites NEGATIVE  02/09/2018 01:22 PM  
 Leukocyte Esterase NEGATIVE  02/09/2018 01:22 PM  
 Epithelial cells FEW 02/09/2018 01:22 PM  
 Bacteria NEGATIVE  02/09/2018 01:22 PM  
 WBC 0-4 02/09/2018 01:22 PM  
 RBC 0-5 02/09/2018 01:22 PM  
 
 
 
Medications Reviewed:  
 
Current Facility-Administered Medications Medication Dose Route Frequency  sodium chloride (NS) flush 5-40 mL  5-40 mL IntraVENous Q8H  
 sodium chloride (NS) flush 5-40 mL  5-40 mL IntraVENous PRN  
 oxyCODONE-acetaminophen (PERCOCET) 5-325 mg per tablet 2 Tab  2 Tab Oral Q4H PRN  
 HYDROmorphone (PF) (DILAUDID) injection 1 mg  1 mg IntraVENous Q3H PRN  
 naloxone (NARCAN) injection 0.4 mg  0.4 mg IntraVENous PRN  
 PHENYLephrine (MARY-SYNEPHRINE) 30 mg in 0.9% sodium chloride 250 mL infusion   mcg/min IntraVENous TITRATE  EPINEPHrine (ADRENALIN) 5 mg in 0.9% sodium chloride 250 mL infusion  1-10 mcg/min IntraVENous TITRATE  pantoprazole (PROTONIX) 40 mg in sodium chloride 0.9% 10 mL injection  40 mg IntraVENous Q24H  
 albuterol-ipratropium (DUO-NEB) 2.5 MG-0.5 MG/3 ML  3 mL Nebulization Q6H RT  
 propofol (DIPRIVAN) infusion  0-50 mcg/kg/min IntraVENous TITRATE  lactated Ringers infusion  75 mL/hr IntraVENous CONTINUOUS  
 methylPREDNISolone (PF) (SOLU-MEDROL) injection 40 mg  40 mg IntraVENous Q6H  
 ondansetron (ZOFRAN) injection 4 mg  4 mg IntraVENous Q4H PRN  mirtazapine (REMERON) tablet 15 mg  15 mg Oral QHS  albuterol (PROVENTIL VENTOLIN) nebulizer solution 2.5 mg  2.5 mg Nebulization Q4H PRN  
 hydrALAZINE (APRESOLINE) 20 mg/mL injection 10 mg  10 mg IntraVENous Q6H PRN  
 aspirin chewable tablet 81 mg  81 mg Oral DAILY  [Held by provider] metoprolol tartrate (LOPRESSOR) tablet 25 mg  25 mg Oral Q12H  
 
______________________________________________________________________ EXPECTED LENGTH OF STAY: 5d 7h 
ACTUAL LENGTH OF STAY:          4 Les Gómez MD

## 2019-11-06 NOTE — CONSULTS
NEPHROLOGY CONSULT NOTE Patient: Shellie Gallardo MRN: [id number]  PCP: Chayo Nino NP  
:     1940  Age:   70 y.o. Sex:  male Referring physician: Robby Silver MD 
Reason for consultation: 70 y.o. male with Respiratory failure ([facility] [D29.69] complicated by MELVIN Admission Date: 2019 10:43 AM  LOS: 5 days ASSESSMENT and PLAN :  
MELVIN: 
- likely 2/2 ATN from hypotension 
- agree w/ fluids - pressors to maintain MAP > 65 
- Renal U/S 
- repeat labs this PM 
 
Hyperkalemia: 
- 2/2 MELVIN 
- IVF as above Hypotension: 
- pressors as above 
- sepsis w/u   
- on zosyn Lactic Acidosis PTX s/p L VATS Shock liver: 
- trend LFTs PEA arrest: 
- per cardiology Active Problems / Assessment AAActive  :  
Principal Problem: 
  Shortness of breath (2019) Active Problems: 
  Pneumothorax on left (2019) Respiratory failure ([facility] (2019) Right heart failure ([address] (2019) Subjective: HPI: Shellie Gallardo is a 70 y.o.  male who has been admitted to the hospital for SOB. He underwent a recent bronch and bx for pulm nodules that was complicated by a L tension PTX. He underwent VATS procedure on  that was c/b cardiac arrest.  His Cr humberto from 0.75 to 1.5, K of 5.6. He is on pressors and was given IVF bolus and is now on NS at 125cc/hr. Patient is oliguric. Sedated on the vent. On epi and levophed now. Past Medical Hx:  
Past Medical History:  
Diagnosis Date  Anxiety  Chronic confusion  Chronic obstructive pulmonary disease ([facility] [address] 4L cont. O2  
 Depression   
 pt has schizophrenia per son  Poor historian  Sleep disorder   
 insomnia Past Surgical Hx: 
  
Past Surgical History:  
Procedure Laterality Date  COLONOSCOPY N/A 2019 COLONOSCOPY performed by Cody Vang MD at [facility] ENDOSCOPY Medications: 
Prior to Admission medications Medication Sig Start Date End Date Taking? Authorizing Provider  
ibuprofen (ADVIL) 200 mg tablet Take 400 mg by mouth daily as needed for Pain. Yes Provider, Historical  
albuterol (PROVENTIL HFA, VENTOLIN HFA, PROAIR HFA) 90 mcg/actuation inhaler Take 1 Puff by inhalation every four (4) hours as needed for Wheezing. 10/25/19  Yes Deborah Walker NP  
predniSONE (DELTASONE) 10 mg tablet Take 60 mg (6 tablets) 10/26-10/27 then 40 mg (4 tablets) 10/28-10/30 then 20 mg (2 tablets) 10/31-11/2 then 10 mg (1 tablet) 11/3-11/4 then stop 10/25/19  Yes Deborah Felipe NP  
mirtazapine (REMERON) 15 mg tablet Take 1 Tab by mouth nightly. 9/9/19  Yes Terra Gamez NP  
melatonin tab tablet Take 1 Tab by mouth nightly. 9/9/19  Yes Katarzyna Dixon NP Allergies Allergen Reactions  Pollen Extracts Sneezing Social Hx:  reports that he has quit smoking. His smoking use included cigarettes. He smoked 1.00 pack per day. He has never used smokeless tobacco. He reports that he does not drink alcohol or use drugs. Family History Problem Relation Age of Onset  Cancer Mother   
     colon ancer  Cancer Father   
     lung cancer  Cancer Brother   
     lung cancer Review of Systems: A twelve point review of system was performed today. Pertinent positives and negatives are mentioned in the HPI. The reminder of the ROS is negative and noncontributory. Objective:   
Vitals:   
Vitals:  
 11/06/19 0930 11/06/19 0945 11/06/19 1000 11/06/19 1015 BP: 118/60 126/60 137/62 108/60 Pulse: (!) 102 (!) 103 (!) 102 (!) 105 Resp: 17 17 17 17 Temp:      
SpO2:      
Weight:      
Height:      
 
I&O's:  11/05 0701 - 11/06 0700 In: 5034.8 [I.V.:5034.8] Out: 1500 [Urine:445] Visit Vitals /60 Pulse (!) 105 Temp 97.8 °F (36.6 °C) Resp 17 Ht 6' (1.829 m) Wt 61 kg (134 lb 7.7 oz) SpO2 98% BMI 18.24 kg/m² Physical Exam: 
General:sedated on the vent HEENT: ET tube in place Neck:Supple,no mass palpable Lungs : reduce breath sounds b/l, L CT in place CVS: RRR, S1 S2 normal, No rub,  trace LE edema Abdomen: Soft, Non tender, No hepatosplenomegaly, bowel sounds present Extremities: No cyanosis, No clubbing Skin: No rash or lesions. Neurologic: sedated on the vent Psych: unable to assess : ying in place Laboratory Results: 
 
Lab Results Component Value Date BUN 35 (H) 11/06/2019  11/06/2019  
 K 5.6 (H) 11/06/2019  11/06/2019 CO2 19 (L) 11/06/2019 Lab Results Component Value Date BUN 35 (H) 11/06/2019 BUN 24 (H) 11/04/2019 BUN 30 (H) 11/03/2019 BUN 23 (H) 11/02/2019 BUN 25 (H) 11/01/2019  
 K 5.6 (H) 11/06/2019  
 K 4.7 11/04/2019  
 K 4.5 11/03/2019  
 K 5.0 11/02/2019  
 K 5.4 (H) 11/01/2019 Lab Results Component Value Date WBC 30.1 (H) 11/06/2019 RBC 2.66 (L) 11/06/2019 HGB 9.9 (L) 11/06/2019 HCT 30.0 (L) 11/06/2019 .8 (H) 11/06/2019 MCH 37.2 (H) 11/06/2019 RDW 14.8 (H) 11/06/2019 PLT 75 (L) 11/06/2019 Lab Results Component Value Date PHOS 4.7 02/04/2018 Urine dipstick:  
Lab Results Component Value Date/Time Color YELLOW/STRAW 02/09/2018 01:22 PM  
 Appearance CLEAR 02/09/2018 01:22 PM  
 Specific gravity 1.028 02/09/2018 01:22 PM  
 pH (UA) 6.0 02/09/2018 01:22 PM  
 Protein NEGATIVE  02/09/2018 01:22 PM  
 Glucose NEGATIVE  02/09/2018 01:22 PM  
 Ketone NEGATIVE  02/09/2018 01:22 PM  
 Bilirubin NEGATIVE  02/09/2018 01:22 PM  
 Urobilinogen 1.0 02/09/2018 01:22 PM  
 Nitrites NEGATIVE  02/09/2018 01:22 PM  
 Leukocyte Esterase NEGATIVE  02/09/2018 01:22 PM  
 Epithelial cells FEW 02/09/2018 01:22 PM  
 Bacteria NEGATIVE  02/09/2018 01:22 PM  
 WBC 0-4 02/09/2018 01:22 PM  
 RBC 0-5 02/09/2018 01:22 PM  
 
 
 
Thank you for allowing us to participate in the care of this patient. We will follow patient. Please dont hesitate to call with any questions Yolanda Mckinley MD 
11/6/2019 [facility]  
[address] [address], [address] [facility] Phone - [phone] Fax - [phone] 
[email or web address]

## 2019-11-06 NOTE — WOUND CARE
Wound Care Note:  
 
New consult placed by nurse request for assessment of pink area on sacrum Chart shows: 
Admitted for shortness of breath s/p left video-assisted thorascopy, left upper lobe wedge resection, left lower lobe resection and  pleurodesis 11/5/19 (chest compressions and ACLS initiated at end of surgery) Past Medical History:  
Diagnosis Date  Anxiety  Chronic confusion  Chronic obstructive pulmonary disease ([address] 4L cont. O2  
 Depression   
 pt has schizophrenia per son  Poor historian  Sleep disorder   
 insomnia WBC = 30.1 on 11/6/19 Assessment:  
Patient is intubated and sedated, incontinent with moderate assistance needed in repositioning. Bed: Total Care Sport Patient has a Horner. Diet: NPO Patient did not grimace or moan with turning or wound care; patient not teachable at this time. Bilateral heels and sacral skin intact and without erythema. 1. POA left lower back, just above sacrum with partial thickness wound probable  abrasion measuring 0.5 cm x 1 cm x 0.1 cm, wound bed is pink, no drainage, jett-wound intact. Z guard paste applied. 2.  Upper buttocks with scant amount of serous drainage, possible thin fissures, but very faint. Z guard paste applied. Spoke with Dr. Phillip Bernal, wound care orders obtained. Patient repositioned on left side. Heels offloaded on pillows. Recommendations:   
Left lower back and upper buttocks- Every 12 hours and as needed apply Z guard paste (orange tube). Skin Care & Pressure Prevention: 
Minimize layers of linen/pads under patient to optimize support surface. Turn/reposition approximately every 2 hours and offload heels. Manage incontinence / promote continence Discussed above plan with patient & Jo Ann Julien RN Transition of Care: Plan to follow as needed while admitted to hospital. 
 
JANICE Palafox, RN, [facility], [facility]. 
 office [phone] 
pager [phone] or call  to page

## 2019-11-06 NOTE — PROGRESS NOTES
GI note: 
 
Events noted. Worsening elevated liver tests after cardiac arrest consistent with worsening ischemic hepatopathy. Dr. Bebe Severino

## 2019-11-06 NOTE — PROGRESS NOTES
Cardiology Progress Note 
 
11/6/2019 5:14 PM 
Admit Date: 11/1/2019 Admit Diagnosis: Respiratory failure ([facility] [address] [J96.90] Assessment/Plan 1. 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 
-trop nonspecific up to .56. 
2. COPD, pulmonary nodules, PTX s/p VATS JARETT resection, mechanical pleurodesis 3. Cardiopulmonary arrest, felt to be related to #1, ekg and echo and labs pending.  
-pt now intubated and on pressors 
-lactate up to 8, WBC climbing 3. DNR rescinded temp for procedure, reviewed note in chart. 4. Body mass index is 18.3 kg/m². cachexia protein calorie malnutrition probably related to #1 and 2 
5. Hypotension- volume resuscitation, on pressor support as well 6. Acute liver failure/injury likely related to rt heart failure and arrest, supportive care, elev INR 7. Schizophrenia 8. MELVIN w high K 
 
Critiallly ill with high risk of death. Subjective:  
 
Magaly Olivier admitted with copd hypoxia respiratory distress developed acute cardiopulmonary arrest post VATS. High risk pt going in to procedure. Remains intubated with evidence of developing liver and kidney failure, elevating WBC. DNR per family, palliative consult today. Visit Vitals /63 Pulse (!) 102 Temp 98.8 °F (37.1 °C) Resp 16 Ht 6' (1.829 m) Wt 134 lb 14.7 oz (61.2 kg) SpO2 100% BMI 18.30 kg/m² Current Facility-Administered Medications Medication Dose Route Frequency  sodium chloride (NS) flush 5-40 mL  5-40 mL IntraVENous Q8H  
 sodium chloride (NS) flush 5-40 mL  5-40 mL IntraVENous PRN  
 oxyCODONE-acetaminophen (PERCOCET) 5-325 mg per tablet 2 Tab  2 Tab Oral Q4H PRN  
 HYDROmorphone (PF) (DILAUDID) injection 1 mg  1 mg IntraVENous Q3H PRN  
 naloxone (NARCAN) injection 0.4 mg  0.4 mg IntraVENous PRN  
 PHENYLephrine (MARY-SYNEPHRINE) 30 mg in 0.9% sodium chloride 250 mL infusion   mcg/min IntraVENous TITRATE  EPINEPHrine (ADRENALIN) 5 mg in 0.9% sodium chloride 250 mL infusion  1-10 mcg/min IntraVENous TITRATE  pantoprazole (PROTONIX) 40 mg in sodium chloride 0.9% 10 mL injection  40 mg IntraVENous Q24H  
 albuterol-ipratropium (DUO-NEB) 2.5 MG-0.5 MG/3 ML  3 mL Nebulization Q6H RT  
 propofol (DIPRIVAN) infusion  0-50 mcg/kg/min IntraVENous TITRATE  lactated Ringers infusion  75 mL/hr IntraVENous CONTINUOUS  
 methylPREDNISolone (PF) (SOLU-MEDROL) injection 40 mg  40 mg IntraVENous Q6H  
 ondansetron (ZOFRAN) injection 4 mg  4 mg IntraVENous Q4H PRN  mirtazapine (REMERON) tablet 15 mg  15 mg Oral QHS  albuterol (PROVENTIL VENTOLIN) nebulizer solution 2.5 mg  2.5 mg Nebulization Q4H PRN  
 hydrALAZINE (APRESOLINE) 20 mg/mL injection 10 mg  10 mg IntraVENous Q6H PRN  
 aspirin chewable tablet 81 mg  81 mg Oral DAILY  [Held by provider] metoprolol tartrate (LOPRESSOR) tablet 25 mg  25 mg Oral Q12H Objective:  
  
Physical Exam: 
Visit Vitals /63 Pulse (!) 102 Temp 98.8 °F (37.1 °C) Resp 16 Ht 6' (1.829 m) Wt 134 lb 14.7 oz (61.2 kg) SpO2 100% BMI 18.30 kg/m² General Appearance:  Elderly cachetic, intubated Ears/Nose/Mouth/Throat:   Dry mm Neck: Supple. Chest:   scatt crackles on vent. +vent gurgles etc  
Cardiovascular:  Regular, distant heart sounds no obvious murmur Abdomen:   Firm, bowel sounds are active. Extremities: No edema bilaterally. Le pulses 1+ Skin: Warm and dry. Data Review:  
Labs:   
Recent Results (from the past 24 hour(s)) TYPE & SCREEN Collection Time: 11/05/19  9:48 AM  
Result Value Ref Range Crossmatch Expiration 11/08/2019 ABO/Rh(D) O POSITIVE Antibody screen NEG   
CULTURE, TISSUE W GRAM STAIN Collection Time: 11/05/19  3:27 PM  
Result Value Ref Range Special Requests: NO SPECIAL REQUESTS    
 GRAM STAIN MODERATE WBCS SEEN    
 GRAM STAIN NO ORGANISMS SEEN  Culture result: PENDING   
 POC G3 - PUL Collection Time: 11/05/19  3:35 PM  
Result Value Ref Range FIO2 (POC) 100 % pH (POC) 7.198 (LL) 7.35 - 7.45    
 pCO2 (POC) 68.1 (H) 35.0 - 45.0 MMHG  
 pO2 (POC) 215 (H) 80 - 100 MMHG  
 HCO3 (POC) 26.5 (H) 22 - 26 MMOL/L  
 sO2 (POC) 100 (H) 92 - 97 % Base deficit (POC) 2 mmol/L Site RIGHT RADIAL Device: VENT Mode SIMV Allens test (POC) YES Specimen type (POC) ARTERIAL Total resp. rate 12 POC CHEM8 Collection Time: 11/05/19  3:45 PM  
Result Value Ref Range Calcium, ionized (POC) 1.06 (L) 1.12 - 1.32 mmol/L Sodium (POC) 132 (L) 136 - 145 mmol/L Potassium (POC) 5.2 (H) 3.5 - 5.1 mmol/L Chloride (POC) 100 98 - 107 mmol/L  
 CO2 (POC) 28 21 - 32 mmol/L Anion gap (POC) 10 10 - 20 mmol/L Glucose (POC) 234 (H) 65 - 100 mg/dL BUN (POC) 26 (H) 9 - 20 mg/dL Creatinine (POC) 1.0 0.6 - 1.3 mg/dL GFRAA, POC >60 >60 ml/min/1.73m2 GFRNA, POC >60 >60 ml/min/1.73m2 Hematocrit (POC) 26 (L) 36.6 - 50.3 % Comment Comment Not Indicated. TROPONIN I Collection Time: 11/05/19  4:40 PM  
Result Value Ref Range Troponin-I, Qt. 0.38 (H) <0.05 ng/mL NT-PRO BNP Collection Time: 11/05/19  4:40 PM  
Result Value Ref Range NT pro-BNP 4,688 (H) <450 PG/ML  
LACTIC ACID Collection Time: 11/05/19  4:40 PM  
Result Value Ref Range Lactic acid 3.1 (HH) 0.4 - 2.0 MMOL/L  
CBC WITH AUTOMATED DIFF Collection Time: 11/05/19  4:40 PM  
Result Value Ref Range WBC 27.1 (H) 4.1 - 11.1 K/uL  
 RBC 2.71 (L) 4.10 - 5.70 M/uL HGB 9.8 (L) 12.1 - 17.0 g/dL HCT 29.8 (L) 36.6 - 50.3 % .0 (H) 80.0 - 99.0 FL  
 MCH 36.2 (H) 26.0 - 34.0 PG  
 MCHC 32.9 30.0 - 36.5 g/dL  
 RDW 14.3 11.5 - 14.5 % PLATELET 181 (L) 196 - 400 K/uL MPV 12.2 8.9 - 12.9 FL  
 NRBC 0.1 (H) 0  WBC ABSOLUTE NRBC 0.03 (H) 0.00 - 0.01 K/uL NEUTROPHILS 93 (H) 32 - 75 % LYMPHOCYTES 1 (L) 12 - 49 % MONOCYTES 5 5 - 13 % EOSINOPHILS 0 0 - 7 % BASOPHILS 0 0 - 1 % IMMATURE GRANULOCYTES 1 (H) 0.0 - 0.5 % ABS. NEUTROPHILS 25.1 (H) 1.8 - 8.0 K/UL  
 ABS. LYMPHOCYTES 0.3 (L) 0.8 - 3.5 K/UL  
 ABS. MONOCYTES 1.4 (H) 0.0 - 1.0 K/UL  
 ABS. EOSINOPHILS 0.0 0.0 - 0.4 K/UL  
 ABS. BASOPHILS 0.0 0.0 - 0.1 K/UL  
 ABS. IMM. GRANS. 0.3 (H) 0.00 - 0.04 K/UL  
 DF AUTOMATED    
 RBC COMMENTS MACROCYTOSIS 2+ COAGULATION SCREEN Collection Time: 11/05/19  4:40 PM  
Result Value Ref Range INR 1.3 (H) 0.9 - 1.1 Prothrombin time 13.3 (H) 9.0 - 11.1 sec  
 aPTT 31.0 22.1 - 32.0 sec  
 aPTT, therapeutic range     58.0 - 77.0 SECS Fibrinogen 139 (L) 200 - 475 mg/dL POC G3 - PUL Collection Time: 11/05/19  5:36 PM  
Result Value Ref Range FIO2 (POC) 100 % pH (POC) 7.362 7.35 - 7.45    
 pCO2 (POC) 41.3 35.0 - 45.0 MMHG  
 pO2 (POC) 290 (H) 80 - 100 MMHG  
 HCO3 (POC) 23.4 22 - 26 MMOL/L  
 sO2 (POC) 100 (H) 92 - 97 % Base deficit (POC) 2 mmol/L Site DRAWN FROM ARTERIAL LINE Device: VENT Mode ASSIST CONTROL Tidal volume 450 ml Set Rate 14 bpm  
 PEEP/CPAP (POC) 5 cmH2O  
 PIP (POC) 17 Allens test (POC) N/A Specimen type (POC) ARTERIAL Total resp. rate 29 EKG, 12 LEAD, INITIAL Collection Time: 11/05/19  5:41 PM  
Result Value Ref Range Ventricular Rate 99 BPM  
 Atrial Rate 99 BPM  
 P-R Interval 118 ms QRS Duration 86 ms  
 Q-T Interval 344 ms QTC Calculation (Bezet) 441 ms Calculated P Axis 87 degrees Calculated R Axis -76 degrees Calculated T Axis 87 degrees Diagnosis Normal sinus rhythm Right atrial enlargement Left axis deviation When compared with ECG of 03-NOV-2019 09:18, 
QRS axis shifted left T wave inversion less evident in Anterior leads T wave inversion now evident in Lateral leads QT has shortened Confirmed by Bhavana Eckert M.D., April Walker ([id number]) on 11/6/2019 5:41:41 AM 
  
HEPATIC FUNCTION PANEL  
 Collection Time: 11/06/19  3:04 AM  
Result Value Ref Range Protein, total 4.3 (L) 6.4 - 8.2 g/dL Albumin 2.2 (L) 3.5 - 5.0 g/dL Globulin 2.1 2.0 - 4.0 g/dL A-G Ratio 1.0 (L) 1.1 - 2.2 Bilirubin, total 3.1 (H) 0.2 - 1.0 MG/DL Bilirubin, direct 1.4 (H) 0.0 - 0.2 MG/DL Alk. phosphatase 61 45 - 117 U/L  
 AST (SGOT) 1,556 (H) 15 - 37 U/L  
 ALT (SGPT) 1,732 (H) 12 - 78 U/L  
CBC WITH AUTOMATED DIFF Collection Time: 11/06/19  3:04 AM  
Result Value Ref Range WBC 30.1 (H) 4.1 - 11.1 K/uL  
 RBC 2.66 (L) 4.10 - 5.70 M/uL HGB 9.9 (L) 12.1 - 17.0 g/dL HCT 30.0 (L) 36.6 - 50.3 % .8 (H) 80.0 - 99.0 FL  
 MCH 37.2 (H) 26.0 - 34.0 PG  
 MCHC 33.0 30.0 - 36.5 g/dL  
 RDW 14.8 (H) 11.5 - 14.5 % PLATELET 75 (L) 947 - 400 K/uL NRBC 0.3 (H) 0  WBC ABSOLUTE NRBC 0.08 (H) 0.00 - 0.01 K/uL NEUTROPHILS 92 (H) 32 - 75 % LYMPHOCYTES 1 (L) 12 - 49 % MONOCYTES 5 5 - 13 % EOSINOPHILS 0 0 - 7 % BASOPHILS 0 0 - 1 % IMMATURE GRANULOCYTES 2 (H) 0.0 - 0.5 % ABS. NEUTROPHILS 27.7 (H) 1.8 - 8.0 K/UL  
 ABS. LYMPHOCYTES 0.3 (L) 0.8 - 3.5 K/UL  
 ABS. MONOCYTES 1.5 (H) 0.0 - 1.0 K/UL  
 ABS. EOSINOPHILS 0.0 0.0 - 0.4 K/UL  
 ABS. BASOPHILS 0.0 0.0 - 0.1 K/UL  
 ABS. IMM. GRANS. 0.6 (H) 0.00 - 0.04 K/UL  
 DF SMEAR SCANNED    
 RBC COMMENTS MACROCYTOSIS 2+ 
    
 RBC COMMENTS KEI CELLS 
PRESENT 
    
 RBC COMMENTS RBC FRAGMENTS PRESENT 
POLYCHROMASIA 1+ METABOLIC PANEL, BASIC Collection Time: 11/06/19  3:04 AM  
Result Value Ref Range Sodium 136 136 - 145 mmol/L Potassium 5.6 (H) 3.5 - 5.1 mmol/L Chloride 107 97 - 108 mmol/L  
 CO2 19 (L) 21 - 32 mmol/L Anion gap 10 5 - 15 mmol/L Glucose 176 (H) 65 - 100 mg/dL BUN 35 (H) 6 - 20 MG/DL Creatinine 1.50 (H) 0.70 - 1.30 MG/DL  
 BUN/Creatinine ratio 23 (H) 12 - 20 GFR est AA 55 (L) >60 ml/min/1.73m2 GFR est non-AA 45 (L) >60 ml/min/1.73m2  Calcium 7.4 (L) 8.5 - 10.1 MG/DL  
 
 
 Telemetry: NSR with NST, precordial NITA, EKG actually improved form prior on 11/2, which was previously not available to me for review. Pt is critically ill with high risk of decline.   
\ 
 
 
Janeth Palmer MD

## 2019-11-06 NOTE — PROGRESS NOTES
visit per palliative consult. Pt is unable to interact and communicate and no family present at this time.  spoke with pt's RN, there is family support and visits.  was able to get a better understanding of pt's status. Please contact [phone] [address]ico Jung for further support.  follow up as needed. Elvis Solorzano, MACE 
 [phone]

## 2019-11-06 NOTE — PALLIATIVE CARE
Palliative Medicine Social Work Hawa Denny is a 70year old man with a history significant for schizophrenia, COPD on 4L O2, depression and anxiety. Patient had recent admission for hypoxia (10/23-10/25/19) and found to have lung nodules; discharged home with planned bronchoscopy and biopsy (10/31). Patient admitted on 11/1 for worsening dyspnea and found to have L tension pneumothorax s/p chest tube (11/1); new small R sided pneumothorax and elevated troponin, new chest tube placed (11/2); L VATS, wedge resection, pleurodesis (11/5); PEA arrest in OR; echo revealed R heart failure with cor pulmonale and severe pulmonary HTN; septic now on pressor support; concern for ischemia. Patient does not have and AMD on file. There is a General POA on file that designates his niece, Sayda Agrawal ([phone]), for legal financial concerns, but but does not include medical decision-making. Christine Cao confirms that she does have another document for mPOA and will bring in for scanning. Spoke with Christine Cao this date about his condition and concerns for recovery. Discussed previous DNR that was reversed for surgery. Confirmed decision with her to re-instate DNR now for no CPR, shock or re-intubation. She is realistic in her understanding and fully prepared for shift to comfort if things worsen. Thank you for the opportunity to be involved in the care of Mr. Janeth Irwin. Christina Dick, MALAW, [facility]- Palliative Medicine  Respecting Choices ® ACP Facilitator [phone]

## 2019-11-06 NOTE — PROGRESS NOTES
[address] [facility] visit. Abiodun Hutchins is medically unable to receive communion at this time. Prayer and spiritual communion offered. No family members present at the time of the visit. VINCE Zambrano RN, ACSW, LCSW  Page:  [phone] Awake

## 2019-11-07 NOTE — PROGRESS NOTES
1930: Bedside and Verbal shift change report given to [address] (oncoming nurse) by Ijeoma Acharya RN (offgoing nurse). Report included the following information SBAR, Kardex, ED Summary, Intake/Output, MAR, Recent Results, Cardiac Rhythm SR/ST and Alarm Parameters . Shift summary: Pt withdraws in all extremities, very weak, doesn't open eyes to stimulus, no command following, pupils equal and reactive. Levophed titrated for MAP >65. 
 
0730: Bedside and Verbal shift change report given to [address] [facility] (oncoming nurse) by [address] (offgoing nurse). Report included the following information SBAR, Kardex, ED Summary, Intake/Output, MAR, Recent Results, Cardiac Rhythm SR/ST and Alarm Parameters .

## 2019-11-07 NOTE — PROGRESS NOTES
[facility] Name: Hawa Denny  MRN: [id number]  Date: 2019 10:13 AM 
Admission Date: 2019  : 1940 Impression Plan 1. Acute resp failure- COPD + multiple pulmonary nondules ( s/p non dx left angelika bx last week complicated by tension PTX) 2. S/p Left VATS with JARETT/LLL wedge bx 19 and mechanical pleurodesis-for diffuse pulm nodules- path shows metastatic adenocarcinoma 3. Post op PEA arrest 19 - rosc 2 min 4. Shock- on sammy/epi- suspect ECFV low and septic shock- asp PNA vs other DDX is ischemic bowel 5. MELVIN 6. Lactic acidosis 7. Shock liver 8. COPD FEV1 1.31 ( 46%) 9. ECHO  EF 71% RV moderatley reduced PASP 32 1. Continue colloid loading 2. On stress dose steroids 3. Poor prognosis given diffuse pulmonary adenocarcinoma- will ask onc to weigh in but I suspect we need to move in a palliative/comfort route. 4. Zosyn 5. CT abd/pelvis 6. WBC trending down 7. MRSA on sputum antigen but WBC improving on zosyn so will hold off on vanco 
 
Pt is critically ill CCT EOP 30 min. At risk for decline due to sepsis/MODS Subjective Sedated, does not follow commands. Review of systems not obtained due to patient factors. EXAM: 
Visit Vitals /62 Pulse (!) 113 Temp 98.8 °F (37.1 °C) Resp (!) 36 Ht 6' (1.829 m) Wt 70.8 kg (156 lb 1.4 oz) SpO2 100% BMI 21.17 kg/m² Temp (24hrs), Av.9 °F (37.2 °C), Min:97.4 °F (36.3 °C), Max:100.4 °F (38 °C) GENERAL: well developed and in no distress, NECK:  no jugular vein distention, no retractions, no thyromegaly or masses, LUNGS: decreased breath sounds billaterally and with rhonchi , HEART:  Regular rate and rhythm with no MGR; no edema is present, ABDOMEN:  soft with no tenderness, bowel sounds present, EXTREMITIES:  warm with no cyanosis and SKIN:  no jaundice or ecchymosis LABS Recent Labs 19 5152 11/06/19 
0304 11/05/19 
1640 WBC 28.1* 30.1* 27.1* HGB 7.2* 9.9* 9.8* HCT 22.1* 30.0* 29.8* PLT 47* 75* 106* Recent Labs 11/07/19 
0348 11/06/19 
0091 11/06/19 
0304 11/05/19 
1640 11/05/19 0316   --  136  --   --   
K 4.6  --  5.6*  --   --   
  --  107  --   --   
CO2 21  --  19*  --   --   
*  --  176*  --   --   
BUN 54*  --  35*  --   --   
CREA 2.29*  --  1.50*  --   --   
CA 7.3*  --  7.4*  --   --   
MG  --  2.3  --   --   --   
ALB  --   --  2.2*  --  2.8* SGOT  --   --  1,556*  --  60* ALT  --   --  1,732*  --  375* INR  --  1.9*  --  1.3*  --   
 
 
ABG Recent Labs 11/05/19 
1736 11/05/19 
1535 PHI 7.362 7.198* PO2I 290* 215* PCO2I 41.3 68.1* Radiology Films have been personally reviewed. PCXR with diffuse ASD ETT ok no PTX Zena Garcia MD

## 2019-11-07 NOTE — PROGRESS NOTES
Thoracic Surgery Simple Progress Note Admit Date: 2019 POD: 2 Days Post-Op Procedure:  Procedure(s): LEFT VIDEO ASSISTED THORACOSCOPIC SURGERY/LEFT UPPER LOBE WEDGE RESECTION/ LEFT LOWER LOBE WEDGE RESECTION/ MECHANICAL PLEURODESIS Subjective:  
 
Unable to obtain Objective:  
 
Blood pressure 104/62, pulse (!) 113, temperature 98.8 °F (37.1 °C), resp. rate (!) 36, height 6' (1.829 m), weight 156 lb 1.4 oz (70.8 kg), SpO2 100 %. Temp (24hrs), Av.9 °F (37.2 °C), Min:97.4 °F (36.3 °C), Max:100.4 °F (38 °C) Hemodynamics PAP 
  CO 
  CI 
 
701 - 1900 In: 159.4 [I.V.:159.4] Out: 340 [Urine:250] 1901 - 700 In: 8458.4 [I.V.:8458.4] Out: 2115 [QSIGT:1638] EXAM: 
GENERAL: sedated on vent support, WBC down to 27, H&H 9.8/29.8 plat 106 Na+ 136 K+ 4.6 BUN/creat 54/2.29 HEART:  regular rate and rhythm Epi is off. Tremayne on for support LUNG: decreased BS, Oral ETT in place on Vent AC 14/ / FIO2 40% PEEP 2 cm. Air leak noted via chest tube both on and off suction. Output of 200 ml NEURO:  ALYSON INCISION: Clean, dry, and intact EXTREMITIES:No evidence of DVT seen on physical exam. 
GI/: Abd soft, nonterder with + bowel sounds. ying Labs: 
Recent Results (from the past 24 hour(s)) CULTURE, BLOOD, PAIRED Collection Time: 19  1:15 PM  
Result Value Ref Range Special Requests: NO SPECIAL REQUESTS Culture result: NO GROWTH AFTER 15 HOURS    
CBC WITH AUTOMATED DIFF Collection Time: 19  3:48 AM  
Result Value Ref Range WBC 28.1 (H) 4.1 - 11.1 K/uL  
 RBC 1.95 (L) 4.10 - 5.70 M/uL HGB 7.2 (L) 12.1 - 17.0 g/dL HCT 22.1 (L) 36.6 - 50.3 % .3 (H) 80.0 - 99.0 FL  
 MCH 36.9 (H) 26.0 - 34.0 PG  
 MCHC 32.6 30.0 - 36.5 g/dL  
 RDW 15.9 (H) 11.5 - 14.5 % PLATELET 47 (LL) 995 - 400 K/uL NRBC 3.2 (H) 0  WBC ABSOLUTE NRBC 0.91 (H) 0.00 - 0.01 K/uL NEUTROPHILS 87 (H) 32 - 75 % BAND NEUTROPHILS 6 0 - 6 % LYMPHOCYTES 2 (L) 12 - 49 % MONOCYTES 3 (L) 5 - 13 % EOSINOPHILS 0 0 - 7 % BASOPHILS 1 0 - 1 % MYELOCYTES 1 (H) 0 % IMMATURE GRANULOCYTES 0 %  
 ABS. NEUTROPHILS 26.1 (H) 1.8 - 8.0 K/UL  
 ABS. LYMPHOCYTES 0.6 (L) 0.8 - 3.5 K/UL  
 ABS. MONOCYTES 0.8 0.0 - 1.0 K/UL  
 ABS. EOSINOPHILS 0.0 0.0 - 0.4 K/UL  
 ABS. BASOPHILS 0.3 (H) 0.0 - 0.1 K/UL  
 ABS. IMM. GRANS. 0.0 K/UL  
 DF MANUAL    
 RBC COMMENTS MACROCYTOSIS 2+ 
    
 RBC COMMENTS ANISOCYTOSIS 1+ 
    
 RBC COMMENTS POLYCHROMASIA 1+ 
    
 RBC COMMENTS NRBC PST 
SCHISTOCYTES 1+ RBC COMMENTS OVALOCYTES 1+ METABOLIC PANEL, BASIC Collection Time: 11/07/19  3:48 AM  
Result Value Ref Range Sodium 136 136 - 145 mmol/L Potassium 4.6 3.5 - 5.1 mmol/L Chloride 107 97 - 108 mmol/L  
 CO2 21 21 - 32 mmol/L Anion gap 8 5 - 15 mmol/L Glucose 214 (H) 65 - 100 mg/dL BUN 54 (H) 6 - 20 MG/DL Creatinine 2.29 (H) 0.70 - 1.30 MG/DL  
 BUN/Creatinine ratio 24 (H) 12 - 20 GFR est AA 34 (L) >60 ml/min/1.73m2 GFR est non-AA 28 (L) >60 ml/min/1.73m2 Calcium 7.3 (L) 8.5 - 10.1 MG/DL  
TROPONIN I Collection Time: 11/07/19  3:48 AM  
Result Value Ref Range Troponin-I, Qt. 0.39 (H) <0.05 ng/mL LACTIC ACID Collection Time: 11/07/19  3:54 AM  
Result Value Ref Range Lactic acid 2.4 (HH) 0.4 - 2.0 MMOL/L  
GLUCOSE, POC Collection Time: 11/07/19 11:16 AM  
Result Value Ref Range Glucose (POC) 186 (H) 65 - 100 mg/dL Performed by [facility] Assessment:  
No evidence of DVT. Principal Problem: 
  Shortness of breath (11/1/2019) Active Problems: 
  Pneumothorax on left (11/1/2019) Respiratory failure ([address] (11/1/2019) Right heart failure ([facility] (11/6/2019) MICRO: 
Special Requests: NO SPECIAL REQUESTS    Preliminary GRAM STAIN 1+ WBCS SEEN    Preliminary GRAM STAIN FEW GRAM POSITIVE COCCI    Preliminary Culture result: Abnormal     Preliminary MODERATE Preliminary report of Methicillin Resistant Staphylococcus aureus by antigen detection. Confirmation and Sensitivities to follow Source TISSUE    Final  
AFB Specimen processing Comment    Final  
(NOTE) Tissue Grinding and Digestion/Decontamination Acid Fast Smear NEGATIVE     Final  
(NOTE) Plan/Recommendations:  
Leave chest tube in place Vent and sedation per pulmonary MELVIN per nephrology See orders Signed By: Aliya TINOCOP

## 2019-11-07 NOTE — PROGRESS NOTES
Nephrology Progress Note Radha Hsu Date of Admission : 11/1/2019 CC:  Follow up for MELVIN Assessment and Plan MELVIN: 
- likely 2/2 ATN from hypotension/PEA arrest 
- Cr up, voiding more 
- cont IVF and pressors 
- maintain MAP > 65 
- daily labs 
- no urgent need for RRT 
- may not be a candidate depending on his brain function Hyperkalemia: 
- 2/2 MELVIN 
- improving 
  
Hypotension: 
- pressors as above 
- sepsis w/u   
- on zosyn 
  
Lactic Acidosis: 
- improving 
  
PTX s/p L VATS 
  
Shock liver: 
- trend LFTs 
  
PEA arrest: 
- per cardiology Interval History: 
Seen and examined. Cr up, voiding more. Sedated now on the vent. Unable to obtain ROS Current Medications: all current  Medications have been eviewed in [facility] Review of Systems: Pertinent items are noted in HPI. Objective: 
Vitals:   
Vitals:  
 11/07/19 0750 11/07/19 0800 11/07/19 0838 11/07/19 0900 BP:  121/61  114/57 Pulse:  (!) 108  (!) 110 Resp:  (!) 35  (!) 36 Temp:   98.8 °F (37.1 °C) SpO2: 100% 100%  100% Weight:      
Height:      
 
Intake and Output: 
No intake/output data recorded. 11/05 1901 - 11/07 0700 In: 8458.4 [I.V.:8458.4] Out: 2115 [[id number]] Physical Examination: 
Pt intubated    Yes General: sedated Neck:  Supple, no mass Resp:  Lungs CTA B/L, no wheezing , normal respiratory effort CV:  RRR,  no murmur or rub, no LE edema GI:  Soft, NT, + Bowel sounds, no hepatosplenomegaly Neurologic:  Sedated now Psych:             Unable to assess Skin:  No Rash :  Horner in place 
 
[]    High complexity decision making was performed 
[]    Patient is at high-risk of decompensation with multiple organ involvement Lab Data Personally Reviewed: I have reviewed all the pertinent labs, microbiology data and radiology studies during assessment. Recent Labs 11/07/19 
0348 11/06/19 
7047 11/06/19 
0304 11/05/19 
1640 11/05/19 
0316   --  136  --   --   
 K 4.6  --  5.6*  --   --   
  --  107  --   --   
CO2 21  --  19*  --   --   
*  --  176*  --   --   
BUN 54*  --  35*  --   --   
CREA 2.29*  --  1.50*  --   --   
CA 7.3*  --  7.4*  --   --   
MG  --  2.3  --   --   --   
ALB  --   --  2.2*  --  2.8* SGOT  --   --  1,556*  --  60* ALT  --   --  1,732*  --  375* INR  --  1.9*  --  1.3*  --   
 
Recent Labs 11/07/19 
0348 11/06/19 
0304 11/05/19 
1640 WBC 28.1* 30.1* 27.1* HGB 7.2* 9.9* 9.8* HCT 22.1* 30.0* 29.8* PLT 47* 75* 106* No results found for: SDES Lab Results Component Value Date/Time Culture result: NO GROWTH AFTER 15 HOURS 11/06/2019 01:15 PM  
 Culture result: MODERATE PROBABLE STAPHYLOCOCCUS AUREUS (A) 11/06/2019 10:34 AM  
 Culture result: LIGHT NORMAL RESPIRATORY RANJAN 11/06/2019 10:34 AM  
 
Recent Results (from the past 24 hour(s)) CULTURE, RESPIRATORY/SPUTUM/BRONCH W GRAM STAIN Collection Time: 11/06/19 10:34 AM  
Result Value Ref Range Special Requests: NO SPECIAL REQUESTS    
 GRAM STAIN 1+ WBCS SEEN    
 GRAM STAIN FEW GRAM POSITIVE COCCI Culture result: MODERATE PROBABLE STAPHYLOCOCCUS AUREUS (A) Culture result: LIGHT NORMAL RESPIRATORY RANJAN    
CULTURE, BLOOD, PAIRED Collection Time: 11/06/19  1:15 PM  
Result Value Ref Range Special Requests: NO SPECIAL REQUESTS Culture result: NO GROWTH AFTER 15 HOURS    
CBC WITH AUTOMATED DIFF Collection Time: 11/07/19  3:48 AM  
Result Value Ref Range WBC 28.1 (H) 4.1 - 11.1 K/uL  
 RBC 1.95 (L) 4.10 - 5.70 M/uL HGB 7.2 (L) 12.1 - 17.0 g/dL HCT 22.1 (L) 36.6 - 50.3 % .3 (H) 80.0 - 99.0 FL  
 MCH 36.9 (H) 26.0 - 34.0 PG  
 MCHC 32.6 30.0 - 36.5 g/dL  
 RDW 15.9 (H) 11.5 - 14.5 % PLATELET 47 (LL) 687 - 400 K/uL NRBC 3.2 (H) 0  WBC ABSOLUTE NRBC 0.91 (H) 0.00 - 0.01 K/uL NEUTROPHILS 87 (H) 32 - 75 % BAND NEUTROPHILS 6 0 - 6 % LYMPHOCYTES 2 (L) 12 - 49 % MONOCYTES 3 (L) 5 - 13 % EOSINOPHILS 0 0 - 7 % BASOPHILS 1 0 - 1 % MYELOCYTES 1 (H) 0 % IMMATURE GRANULOCYTES 0 %  
 ABS. NEUTROPHILS 26.1 (H) 1.8 - 8.0 K/UL  
 ABS. LYMPHOCYTES 0.6 (L) 0.8 - 3.5 K/UL  
 ABS. MONOCYTES 0.8 0.0 - 1.0 K/UL  
 ABS. EOSINOPHILS 0.0 0.0 - 0.4 K/UL  
 ABS. BASOPHILS 0.3 (H) 0.0 - 0.1 K/UL  
 ABS. IMM. GRANS. 0.0 K/UL  
 DF MANUAL    
 RBC COMMENTS MACROCYTOSIS 2+ 
    
 RBC COMMENTS ANISOCYTOSIS 1+ 
    
 RBC COMMENTS POLYCHROMASIA 1+ 
    
 RBC COMMENTS NRBC PST 
SCHISTOCYTES 1+ RBC COMMENTS OVALOCYTES 1+ METABOLIC PANEL, BASIC Collection Time: 11/07/19  3:48 AM  
Result Value Ref Range Sodium 136 136 - 145 mmol/L Potassium 4.6 3.5 - 5.1 mmol/L Chloride 107 97 - 108 mmol/L  
 CO2 21 21 - 32 mmol/L Anion gap 8 5 - 15 mmol/L Glucose 214 (H) 65 - 100 mg/dL BUN 54 (H) 6 - 20 MG/DL Creatinine 2.29 (H) 0.70 - 1.30 MG/DL  
 BUN/Creatinine ratio 24 (H) 12 - 20 GFR est AA 34 (L) >60 ml/min/1.73m2 GFR est non-AA 28 (L) >60 ml/min/1.73m2 Calcium 7.3 (L) 8.5 - 10.1 MG/DL  
TROPONIN I Collection Time: 11/07/19  3:48 AM  
Result Value Ref Range Troponin-I, Qt. 0.39 (H) <0.05 ng/mL LACTIC ACID Collection Time: 11/07/19  3:54 AM  
Result Value Ref Range Lactic acid 2.4 (HH) 0.4 - 2.0 MMOL/L Trevin Young MD 
[address] [place] [address], [address] [facility] Phone - [phone] Fax - [phone] 
www. [employer] [facility]

## 2019-11-07 NOTE — PROGRESS NOTES
Cardiology Progress Note 11/7/2019 8:15 AM  
Admit Date: 11/1/2019 Admit Diagnosis: Respiratory failure ([facility] [address] [J96.90] Critically ill, high risk of death, appreciate palliative care consultation Assessment/Plan 1. 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 
-trop nonspecific up to 0.56, will recheck this AM 
2. COPD, pulmonary nodules, PTX s/p VATS JARETT resection, mechanical pleurodesis - management per pulmonary/thoracic surgery 3. Cardiopulmonary arrest, felt to be related to #1 - ROSC 2 min, TTE showed EF > 70% post arrest, recent ECG c/w anterior ischemia 
-pt intubated and on levophed 10mcg/min for hypotension post arrest 
-lactic acid coming down (2.4 today) 3. Partial code now per palliative care - no CPR/shock, family agreeable to comfort measures if he does not improve 4. Body mass index is 21.17 kg/m². cachexia protein calorie malnutrition probably related to #1 and #2 5. Hypotension- getting IV fluids, solu-cortef, albumin, levophed 10mcg/min 6. Acute liver failure/injury likely related to right heart failure and cardiopulmonary arrest - continue supportive care, INR 1.9 yesterday, check INR and CMP in AM, maintain BP, further management per GI 
7. Hx of Schizophrenia 8. MELVIN - creatinine up to 2.2 today, continue IV fluids, management per nephrology 9. Anemia/thrombocytopenia - Hgb down to 7.2, Plt down to 47,000, holding ASA, management per primary team   
 
Echo 11/6/19 - EF > 70% Echo 11/2/219 - EF 50-55%, mod-severely reduced RV systolic function, RV pressure overload, mild AV sclerosis, mild TR, mild PA HTN, mild MR Nuc Stress 3/18 - no ischemia, EF 55% Subjective:  
 
Nikki Bernal admitted with copd hypoxia respiratory distress developed acute cardiopulmonary arrest post VATS. High risk pt going in to procedure. Remains intubated and sedated. Does not open eyes or follow commands. Unable to obtain ROS. No family at bedside. Partial code now, palliative care consulting. .  
 
Visit Vitals /57 Pulse (!) 117 Temp 97.4 °F (36.3 °C) Resp (!) 33 Ht 6' (1.829 m) Wt 156 lb 1.4 oz (70.8 kg) SpO2 (!) 83% BMI 21.17 kg/m² Current Facility-Administered Medications Medication Dose Route Frequency  hydrocortisone Sod Succ (PF) (SOLU-CORTEF) injection 100 mg  100 mg IntraVENous Q8H  
 albuterol-ipratropium (DUO-NEB) 2.5 MG-0.5 MG/3 ML  3 mL Nebulization Q4H RT  
 fentaNYL citrate (PF) injection 25 mcg  25 mcg IntraVENous Q1H PRN  
 NOREPINephrine (LEVOPHED) 8 mg in 5% dextrose 250mL infusion  0.5-50 mcg/min IntraVENous TITRATE  piperacillin-tazobactam (ZOSYN) 3.375 g in 0.9% sodium chloride (MBP/ADV) 100 mL  3.375 g IntraVENous Q8H  
 0.9% sodium chloride infusion  125 mL/hr IntraVENous CONTINUOUS  chlorhexidine (ORAL CARE KIT) 0.12 % mouthwash 15 mL  15 mL Oral Q12H  
 famotidine (PF) (PEPCID) 20 mg in sodium chloride 0.9% 10 mL injection  20 mg IntraVENous Q24H  
 sodium chloride (NS) flush 5-40 mL  5-40 mL IntraVENous Q8H  
 sodium chloride (NS) flush 5-40 mL  5-40 mL IntraVENous PRN  
 oxyCODONE-acetaminophen (PERCOCET) 5-325 mg per tablet 2 Tab  2 Tab Oral Q4H PRN  
 naloxone (NARCAN) injection 0.4 mg  0.4 mg IntraVENous PRN  
 EPINEPHrine (ADRENALIN) 5 mg in 0.9% sodium chloride 250 mL infusion  1-10 mcg/min IntraVENous TITRATE  propofol (DIPRIVAN) infusion  0-50 mcg/kg/min IntraVENous TITRATE  ondansetron (ZOFRAN) injection 4 mg  4 mg IntraVENous Q4H PRN  
 albuterol (PROVENTIL VENTOLIN) nebulizer solution 2.5 mg  2.5 mg Nebulization Q4H PRN  
 hydrALAZINE (APRESOLINE) 20 mg/mL injection 10 mg  10 mg IntraVENous Q6H PRN  
 [Held by provider] aspirin chewable tablet 81 mg  81 mg Oral DAILY Objective:  
  
Physical Exam: 
Visit Vitals /57 Pulse (!) 117 Temp 97.4 °F (36.3 °C) Resp (!) 33 Ht 6' (1.829 m) Wt 156 lb 1.4 oz (70.8 kg) SpO2 (!) 83% BMI 21.17 kg/m² General Appearance:  Elderly, cachetic, intubated, sedated Ears/Nose/Mouth/Throat:   Dry mm Neck: Supple. Chest:   Scattered wheezing Cardiovascular:  Regular, distant heart sounds, no obvious murmur Abdomen:   Firm, bowel sounds are active. Extremities: 1+ LE edema bilaterally, right IJ in place Skin: Warm and dry. Telemetry: sinus tachycardia -120bpm  
 
 
Data Review:  
Labs:   
Recent Results (from the past 24 hour(s)) CULTURE, RESPIRATORY/SPUTUM/BRONCH W GRAM STAIN Collection Time: 11/06/19 10:34 AM  
Result Value Ref Range Special Requests: NO SPECIAL REQUESTS    
 GRAM STAIN 1+ WBCS SEEN    
 GRAM STAIN FEW GRAM POSITIVE COCCI Culture result: PENDING   
CULTURE, BLOOD, PAIRED Collection Time: 11/06/19  1:15 PM  
Result Value Ref Range Special Requests: NO SPECIAL REQUESTS Culture result: NO GROWTH AFTER 15 HOURS    
CBC WITH AUTOMATED DIFF Collection Time: 11/07/19  3:48 AM  
Result Value Ref Range WBC 28.1 (H) 4.1 - 11.1 K/uL  
 RBC 1.95 (L) 4.10 - 5.70 M/uL HGB 7.2 (L) 12.1 - 17.0 g/dL HCT 22.1 (L) 36.6 - 50.3 % .3 (H) 80.0 - 99.0 FL  
 MCH 36.9 (H) 26.0 - 34.0 PG  
 MCHC 32.6 30.0 - 36.5 g/dL  
 RDW 15.9 (H) 11.5 - 14.5 % PLATELET 47 (LL) 501 - 400 K/uL NRBC 3.2 (H) 0  WBC ABSOLUTE NRBC 0.91 (H) 0.00 - 0.01 K/uL NEUTROPHILS 87 (H) 32 - 75 % BAND NEUTROPHILS 6 0 - 6 % LYMPHOCYTES 2 (L) 12 - 49 % MONOCYTES 3 (L) 5 - 13 % EOSINOPHILS 0 0 - 7 % BASOPHILS 1 0 - 1 % MYELOCYTES 1 (H) 0 % IMMATURE GRANULOCYTES 0 %  
 ABS. NEUTROPHILS 26.1 (H) 1.8 - 8.0 K/UL  
 ABS. LYMPHOCYTES 0.6 (L) 0.8 - 3.5 K/UL  
 ABS. MONOCYTES 0.8 0.0 - 1.0 K/UL  
 ABS. EOSINOPHILS 0.0 0.0 - 0.4 K/UL  
 ABS. BASOPHILS 0.3 (H) 0.0 - 0.1 K/UL  
 ABS. IMM. GRANS. 0.0 K/UL  
 DF MANUAL    
 RBC COMMENTS MACROCYTOSIS 2+ 
    
 RBC COMMENTS ANISOCYTOSIS 1+ 
    
 RBC COMMENTS POLYCHROMASIA 1+ 
    
 RBC COMMENTS NRBC PST SCHISTOCYTES 1+ RBC COMMENTS OVALOCYTES 1+ METABOLIC PANEL, BASIC Collection Time: 11/07/19  3:48 AM  
Result Value Ref Range Sodium 136 136 - 145 mmol/L Potassium 4.6 3.5 - 5.1 mmol/L Chloride 107 97 - 108 mmol/L  
 CO2 21 21 - 32 mmol/L Anion gap 8 5 - 15 mmol/L Glucose 214 (H) 65 - 100 mg/dL BUN 54 (H) 6 - 20 MG/DL Creatinine 2.29 (H) 0.70 - 1.30 MG/DL  
 BUN/Creatinine ratio 24 (H) 12 - 20 GFR est AA 34 (L) >60 ml/min/1.73m2 GFR est non-AA 28 (L) >60 ml/min/1.73m2 Calcium 7.3 (L) 8.5 - 10.1 MG/DL  
LACTIC ACID Collection Time: 11/07/19  3:54 AM  
Result Value Ref Range Lactic acid 2.4 (HH) 0.4 - 2.0 MMOL/L Lane Amanda, NP

## 2019-11-07 NOTE — PROGRESS NOTES
Transitions of care TBD pending medical progress Lives at [facility] [phone] Rosemarie Torres) On Oxygen at 4 L through [address] Patient remains in the ICU intubated on vasopressor support. Patient able to withdraws to pain when sedation lightened, but not following commands. Palliative care team involved, patient is now a partial code No CPR/ Shock. MPOA is nabila Lobato [phone]. Per palliative note if patient medical condition worsens MPOA would consider comfort measures/compassionte extubation Care management will continue to follow for transitions of care. Louis Youssef RN,CRM

## 2019-11-07 NOTE — PROGRESS NOTES
[address] [facility] Lenora Villagomez M.D. 
[phone] GASTROENTEROLOGY PROGRESS NOTE 
 
 
 
NAME: Titus Colon :  1940 MRN:  [id number] Subjective:  
Critically ill on pressor Objective: VITALS:  
Last 24hrs VS reviewed. Most recent are: 
Visit Vitals /59 (BP 1 Location: Left arm, BP Patient Position: At rest) Pulse 100 Temp 98.1 °F (36.7 °C) Resp (!) 31 Ht 6' (1.829 m) Wt 70.8 kg (156 lb 1.4 oz) SpO2 100% BMI 21.17 kg/m² Intake/Output Summary (Last 24 hours) at 2019 1328 Last data filed at 2019 1200 Gross per 24 hour Intake 4266.41 ml Output 2125 ml Net 2141.41 ml PHYSICAL EXAM: 
General: Alert, in no acute distress Lungs:            Slightly decreased breath sounds bilaterally anteriorly Heart:  Normal S1, S2 Abdomen: Soft, Non distended, Non tender. Normoactive bowel sounds, no HSM,   no rebound/guarding MSK:   Normal muscle tone Skin:   Warm to touch Psych:   Not anxious nor agitated. Lab Data Reviewed:  
Recent Labs 19 
0348 19 
0304 WBC 28.1* 30.1* HGB 7.2* 9.9*  
HCT 22.1* 30.0*  
PLT 47* 75* Recent Labs 19 
5684 19 
5456 19 
0304   --  136  
K 4.6  --  5.6*  
  --  107 CO2 21  --  19* BUN 54*  --  35* CREA 2.29*  --  1.50* *  --  176* CA 7.3*  --  7.4* MG  --  2.3  --   
 
Recent Labs 19 
0304 19 
2420 SGOT 1,556* 60* AP 61 66  
TP 4.3* 5.4* ALB 2.2* 2.8*  
GLOB 2.1 2.6 Recent Labs 19 
1106 19 
0621 19 
1640 INR  --  1.9* 1.3* PTP  --  18.6* 13.3* APTT 36.7* 34.5* 31.0 No results for input(s): FE, TIBC, PSAT, FERR in the last 72 hours. No results for input(s): CPK, CKMB in the last 72 hours. No lab exists for component: Rosalinda Pitch See Electronic Medical Record for all procedure/radiology reports and details which were not copied into this note but were reviewed prior to the creation of the Plan. Assessment:  
 71 yo M with cognitive impairment, schizophrenia, COPD on home 4 L 02, possible lung cancer admitted with left tension pneumothorax after lung nodule biopsy. We are asked to see him at the request of Dr. Gloria Simon for elevated LFTs. 
  
I suspect this is related to ischemic hepatopathy. LFTs down trending until cardiac arrest with elevations consistent with ischemic hepatopathy. Liver Us normal with intact blood flow. Acute hep panel negative. No obvious med culprits to suggest drug induced liver injury.  
  
Plan:  
  
- Supportive care. Recheck liver tests tomorrow.  
  
 
Signed by:  Betty Mcknight MD 
       11/7/2019  8:28 AM

## 2019-11-07 NOTE — PROGRESS NOTES
Pharmacist Note - Vancomycin Dosing Consult provided for this 70 y.o. male for indication of PNA. Antibiotic regimen(s): vancomycin + Zosyn Recent Labs 19 
0348 19 
0304 19 
1640 WBC 28.1* 30.1* 27.1*  
CREA 2.29* 1.50*  --   
BUN 54* 35*  --   
 
Frequency of BMP: tomorrow x 1 Height: 183 cm Weight: 70.8 kg Est CrCl: 25-30 ml/min; UO: 1.1 ml/kg/hr Temp (24hrs), Av.9 °F (37.2 °C), Min:97.4 °F (36.3 °C), Max:100.4 °F (38 °C) Cultures: 
 tissue cultures from VATS JARETT resection: no growth, preliminary -- CMV, AFB smear negative  sputum: MRSA, preliminary  blood: no growth, preliminary Goal trough = 15 - 20 mcg/mL Therapy will be initiated with a loading dose of 1750 mg IV x 1 to be followed by maintenance dosing by drug levels. Patient's Cr has more than doubled in the past three days 0.75 ->1.5 -> 2.29.  
 
24-hour level ordered for  @ 1500 Thank you, Ryan Asher, TRUDYD

## 2019-11-07 NOTE — PALLIATIVE CARE
Palliative Medicine Social Work Checked in on patient. No family present. Note patient's worsening liver and renal failure; withdrawing to pain with sedation lifted, but still not following commands. Will continue to support and clarify goals as more is known. Brent Cutler, is prepared for shift to comfort. Thank you for the opportunity to be involved in the care of Mr. Perry Heck. Christina Dick, MALAW, [facility] Hospital[facility]SW Palliative Medicine  [employer] ® ACP Facilitator [phone]

## 2019-11-07 NOTE — PROGRESS NOTES
Palliative Medicine Consult Thomas: [phone] ([id number]) Patient Name: Red Galindo YOB: 1940 Date of Initial Consult: 11/6/19 Reason for Consult: End stage disease, arrest, worsening organ failure Requesting Provider: Ade Mitchell, cardiology Primary Care Physician: Zhang Tay NP 
 
 SUMMARY:  
Red Galindo is a 70 y.o. with a past history of COPD, shchizophrenia who was admitted on 11/1/2019 from group home with SOB. Was hospitalized 10/22-10/25/19 w/ resp failure, plan was to have outpatient bronch and lung bx for b/l pulmonary nodules and chest lymphadenopathy- FNA 10/31/19 w/out malignant cells. In ED found to have L tension PTX and emergent chest tube placed. Was initially on NRB and high flow. S/p L VATS w/ wedge bx 11/5/19, afterwards had cardiopulmonary arrest w/ 2 min before ROSC. Pt has R sided heart failure, severe pulm HTN related to COPD. Intubated 11/5/19 and on vasopressors. Worsening leukocytosis, has CT A/P ordered. Current medical issues leading to Palliative Medicine involvement include: end stage disease. Pt w/ multisystem organ dysfunction. mPOA is nabila Mcmahon. Pt had DNR status upon admission, which was reversed during time in OR. Social: Pt . 1 son- Rob Velasco. Retired 7 years ago, was a . Originally from [facility]. At baseline (per outpatient psych note 9/9/19) able to do his ADLs, traveled alone via tax, cooperative and interactive. PALLIATIVE DIAGNOSES:  
1. Respiratory failure 2. PEA arrest 11/5/19 3. Shock w/ multisystem organ failure 4. Goals of care PLAN:  
1. Pt remains sedated, on one pressor. Remains critically ill. When propofol stopped, did withdraw from pain for nursing. 2. Leukocytosis and lactic acidosis improving some. 3. Will continue to follow closely to see how pt does. 4. Family has good understanding of situation- they have my contact info. 5. Goal is recovery as possible. 6. Changed to partial code- no CPR/shock (as currently intubated and on vasopressors) 7. If pt worsens, family would be open to comfort measures/compassionte extubation- want comfort and peace above all else. 8. Initial consult note routed to primary continuity provider and/or primary health care team members 9. Communicated plan of care with: Palliative Toya MCCARTNEY 192 Team incl bedside RN [facility] GOALS OF CARE / TREATMENT PREFERENCES:  
 
GOALS OF CARE: 
Patient/Health Care Proxy Stated Goals: Other (comment)(Recovery as possible, but likely shift to comfort) TREATMENT PREFERENCES:  
Code Status: Partial Code- no CPR/shocks Advance Care Planning: 
[x] The [facility] Interdisciplinary Team has updated the ACP Navigator with [place] and Patient Capacity Primary Decision Maker: Gricelda William (Niece and mPOA) - Other Relative - [phone] Advance Care Planning 11/1/2019 Patient's Healthcare Decision Maker is: - Confirm Advance Directive Yes, on file Medical Interventions: Limited additional interventions Other: As far as possible, the palliative care team has discussed with patient / health care proxy about goals of care / treatment preferences for patient. HISTORY:  
 
History obtained from: chart, staff, family CHIEF COMPLAINT: Cannot obtain due to patient factors HPI/SUBJECTIVE: The patient is:  
[] Verbal and participatory [x] Non-participatory due to: medical condition Pt sedated on vent. Clinical Pain Assessment (nonverbal scale for severity on nonverbal patients):  
Clinical Pain Assessment Severity: 0 Activity (Movement): Lying quietly, normal position Duration: for how long has pt been experiencing pain (e.g., 2 days, 1 month, years) Frequency: how often pain is an issue (e.g., several times per day, once every few days, constant)  FUNCTIONAL ASSESSMENT:  
 
Palliative Performance Scale (PPS): 
 PPS: 10 PSYCHOSOCIAL/SPIRITUAL SCREENING:  
 
Palliative IDT has assessed this patient for cultural preferences / practices and a referral made as appropriate to needs (Cultural Services, Patient Advocacy, Ethics, etc.) Any spiritual / Cheondoism concerns: 
[] Yes /  [x] No 
 
Caregiver Burnout: 
[] Yes /  [x] No /  [] No Caregiver Present Anticipatory grief assessment:  
[x] Normal  / [] Maladaptive ESAS Anxiety: ESAS Depression:    
Cannot obtain due to patient factors REVIEW OF SYSTEMS:  
 
Positive and pertinent negative findings in ROS are noted above in HPI. The following systems were [] reviewed / [x] unable to be reviewed as noted in HPI Other findings are noted below. Systems: constitutional, ears/nose/mouth/throat, respiratory, gastrointestinal, genitourinary, musculoskeletal, integumentary, neurologic, psychiatric, endocrine. Positive findings noted below. Modified ESAS Completed by: provider Fatigue: 10 Drowsiness: 10 Pain: 0 Dyspnea: 0 Stool Occurrence(s): 1 PHYSICAL EXAM:  
 
From RN flowsheet: 
Wt Readings from Last 3 Encounters:  
11/07/19 156 lb 1.4 oz (70.8 kg) 10/31/19 132 lb (59.9 kg) 10/24/19 128 lb 4.9 oz (58.2 kg) Blood pressure 104/62, pulse (!) 113, temperature 98.8 °F (37.1 °C), resp. rate (!) 36, height 6' (1.829 m), weight 156 lb 1.4 oz (70.8 kg), SpO2 100 %. Pain Scale 1: Adult Nonverbal Pain Scale Pain Intensity 1: 0 Pain Location 1: Chest 
Pain Orientation 1: Mid 
Pain Description 1: Miachel Proper Pain Intervention(s) 1: Medication (see MAR) Last bowel movement, if known:  
 
Constitutional: sedated, on vent, appears stated age Eyes: pupils equal, anicteric ENMT: no nasal discharge, moist mucous membranes, intubated Respiratory: breathing not labored Musculoskeletal: no deformity Skin: warm, dry Neurologic:sedated , cannot assess HISTORY:  
 
Principal Problem: 
  Shortness of breath (11/1/2019) Active Problems: 
  Pneumothorax on left (11/1/2019) Respiratory failure ([facility] [address] (11/1/2019) Right heart failure ([address] (11/6/2019) Past Medical History:  
Diagnosis Date  Anxiety  Chronic confusion  Chronic obstructive pulmonary disease ([facility] [address] 4L cont. O2  
 Depression   
 pt has schizophrenia per son  Poor historian  Sleep disorder   
 insomnia Past Surgical History:  
Procedure Laterality Date  COLONOSCOPY N/A 7/31/2019 COLONOSCOPY performed by L[facility]un Humphrey MD at [facility] ENDOSCOPY Family History Problem Relation Age of Onset  Cancer Mother   
     colon ancer  Cancer Father   
     lung cancer  Cancer Brother   
     lung cancer History reviewed, no pertinent family history. Social History Tobacco Use  Smoking status: Former Smoker Packs/day: 1.00 Types: Cigarettes  Smokeless tobacco: Never Used  Tobacco comment: every 2 days Substance Use Topics  Alcohol use: No  
 
Allergies Allergen Reactions  Pollen Extracts Sneezing Current Facility-Administered Medications Medication Dose Route Frequency  glucose chewable tablet 16 g  4 Tab Oral PRN  
 glucagon (GLUCAGEN) injection 1 mg  1 mg IntraMUSCular PRN  
 dextrose 10% infusion 0-250 mL  0-250 mL IntraVENous PRN  
 insulin lispro (HUMALOG) injection   SubCUTAneous Q6H  
 hydrocortisone Sod Succ (PF) (SOLU-CORTEF) injection 100 mg  100 mg IntraVENous Q8H  
 albuterol-ipratropium (DUO-NEB) 2.5 MG-0.5 MG/3 ML  3 mL Nebulization Q4H RT  
 fentaNYL citrate (PF) injection 25 mcg  25 mcg IntraVENous Q1H PRN  
 NOREPINephrine (LEVOPHED) 8 mg in 5% dextrose 250mL infusion  0.5-50 mcg/min IntraVENous TITRATE  piperacillin-tazobactam (ZOSYN) 3.375 g in 0.9% sodium chloride (MBP/ADV) 100 mL  3.375 g IntraVENous Q8H  
 0.9% sodium chloride infusion  125 mL/hr IntraVENous CONTINUOUS  chlorhexidine (ORAL CARE KIT) 0.12 % mouthwash 15 mL  15 mL Oral Q12H  famotidine (PF) (PEPCID) 20 mg in sodium chloride 0.9% 10 mL injection  20 mg IntraVENous Q24H  
 sodium chloride (NS) flush 5-40 mL  5-40 mL IntraVENous Q8H  
 sodium chloride (NS) flush 5-40 mL  5-40 mL IntraVENous PRN  
 oxyCODONE-acetaminophen (PERCOCET) 5-325 mg per tablet 2 Tab  2 Tab Oral Q4H PRN  
 naloxone (NARCAN) injection 0.4 mg  0.4 mg IntraVENous PRN  propofol (DIPRIVAN) infusion  0-50 mcg/kg/min IntraVENous TITRATE  ondansetron (ZOFRAN) injection 4 mg  4 mg IntraVENous Q4H PRN  
 albuterol (PROVENTIL VENTOLIN) nebulizer solution 2.5 mg  2.5 mg Nebulization Q4H PRN  
 hydrALAZINE (APRESOLINE) 20 mg/mL injection 10 mg  10 mg IntraVENous Q6H PRN  
 [Held by provider] aspirin chewable tablet 81 mg  81 mg Oral DAILY  
 
 
 
 LAB AND IMAGING FINDINGS:  
 
Lab Results Component Value Date/Time WBC 28.1 (H) 11/07/2019 03:48 AM  
 HGB 7.2 (L) 11/07/2019 03:48 AM  
 PLATELET 47 (LL) 06/89/5882 03:48 AM  
 
Lab Results Component Value Date/Time Sodium 136 11/07/2019 03:48 AM  
 Potassium 4.6 11/07/2019 03:48 AM  
 Chloride 107 11/07/2019 03:48 AM  
 CO2 21 11/07/2019 03:48 AM  
 BUN 54 (H) 11/07/2019 03:48 AM  
 Creatinine 2.29 (H) 11/07/2019 03:48 AM  
 Calcium 7.3 (L) 11/07/2019 03:48 AM  
 Magnesium 2.3 11/06/2019 06:21 AM  
 Phosphorus 4.7 02/04/2018 06:55 PM  
  
Lab Results Component Value Date/Time AST (SGOT) 1,556 (H) 11/06/2019 03:04 AM  
 Alk. phosphatase 61 11/06/2019 03:04 AM  
 Protein, total 4.3 (L) 11/06/2019 03:04 AM  
 Albumin 2.2 (L) 11/06/2019 03:04 AM  
 Globulin 2.1 11/06/2019 03:04 AM  
 
Lab Results Component Value Date/Time INR 1.9 (H) 11/06/2019 06:21 AM  
 Prothrombin time 18.6 (H) 11/06/2019 06:21 AM  
 aPTT 34.5 (H) 11/06/2019 06:21 AM  
  
No results found for: IRON, FE, TIBC, IBCT, PSAT, FERR No results found for: PH, PCO2, PO2 No components found for: [place] Lab Results Component Value Date/Time   11/03/2019 09:28 AM  
 CK - MB 3.7 (H) 11/03/2019 09:28 AM  
  
 
 
   
 
Total time: 25 min Counseling / coordination time, spent as noted above: 20 min 
> 50% counseling / coordination?: yes Prolonged service was provided for  []30 min   []75 min in face to face time in the presence of the patient, spent as noted above. Time Start:  
Time End:  
Note: this can only be billed with 09462 (initial) or 38201 (follow up). If multiple start / stop times, list each separately.

## 2019-11-07 NOTE — PROGRESS NOTES
0730 - Bedside shift change report given to [facility] (oncoming nurse) by Krystian Simon RN (offgoing nurse). Report included the following information SBAR, Intake/Output, Recent Results and Cardiac Rhythm NSR/ST. 1100 - OGT placed. Abd x-ray ordered 1206 - Oncology consult called to Dr. Tita Mcdonald. Also confirmed MRSA in sputum. [facility] and JASMYN Leary performed a dual skin assessment on this patient No impairment noted Renato score is 12 
 
1400 - CT called about ABD CT that is ordered. OGT placement finally verified. CT stated to send someone to get the dye in 30 minutes and then to call them to confirm a time (about 2 hours after started). 1520 - 1st bottle of Dye placed down OGT. CT notified. 1530 - Bedside shift change report given to JASMYN Gandhi (oncoming nurse) by [facility], RN (offgoing nurse). Report included the following information SBAR, ED Summary, OR Summary, Intake/Output and Cardiac Rhythm NSR-ST.

## 2019-11-07 NOTE — CONSULTS
[facility] [place] [facility] [address] [place] [address] Road, [address] [place] [address] W: [phone]  F: [phone] Reason for Consult:  
Miriam Avalos is a 70 y.o. male who is seen in consultation at the request of Dr. 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  
Miriam Avalos is a 70 y.o. male with a history of anxiety, depression, COPD and pulmonary nodules initially noted on CT imaging 8/20/19 who initially presented to the ER on 10/23 with complaints of shortness of breath and increasing oxygen requirement. CTA was obtained in ER and showed lung nodules and mediastinal and hilar adenopathy. Patient was discharged in anticipated for bronch and EUS outpatient. He presented to the ER again on 11/1/19 with worsening SOB and increased oxygen requirement. CXR showed a large left tension pneumothorax and thoracic surgery placed chest tube. He then underwent a LVATS with upper lobe wedge resection/left lower lobe wedge resection with thoracic on 11/5/19. Surgery was complicated by cardiac arrest. Pathology shows NSCLC. Oncology was consulted for further evaluation of malignancy. Patient vented and sedated so no history or ROS was obtained. No family at bedside. Past Medical History:  
Diagnosis Date  Anxiety  Chronic confusion  Chronic obstructive pulmonary disease ([facility] [address] 4L cont. O2  
 Depression   
 pt has schizophrenia per son  Poor historian  Sleep disorder   
 insomnia Past Surgical History:  
Procedure Laterality Date  COLONOSCOPY N/A 7/31/2019 COLONOSCOPY performed by Kulwinder Guajardo MD at [facility] ENDOSCOPY Social History Tobacco Use  Smoking status: Former Smoker Packs/day: 1.00 Types: Cigarettes  Smokeless tobacco: Never Used  Tobacco comment: every 2 days Substance Use Topics  Alcohol use: No  
  
Family History Problem Relation Age of Onset  Cancer Mother   
     colon ancer  Cancer Father   
     lung cancer  Cancer Brother   
     lung cancer Current Facility-Administered Medications Medication Dose Route Frequency  glucose chewable tablet 16 g  4 Tab Oral PRN  
 glucagon (GLUCAGEN) injection 1 mg  1 mg IntraMUSCular PRN  
 dextrose 10% infusion 0-250 mL  0-250 mL IntraVENous PRN  
 insulin lispro (HUMALOG) injection   SubCUTAneous Q6H  Vancomycin - Pharmacy Dosing   Other Rx Dosing/Monitoring  vancomycin (VANCOCIN) 1750 mg in  ml infusion  1,750 mg IntraVENous ONCE  
 [START ON 11/8/2019] Vancomycin 24-Hour Level - 11/7 at 15:00. Thank you! Other ONCE  hydrocortisone Sod Succ (PF) (SOLU-CORTEF) injection 100 mg  100 mg IntraVENous Q8H  
 albuterol-ipratropium (DUO-NEB) 2.5 MG-0.5 MG/3 ML  3 mL Nebulization Q4H RT  
 fentaNYL citrate (PF) injection 25 mcg  25 mcg IntraVENous Q1H PRN  
 NOREPINephrine (LEVOPHED) 8 mg in 5% dextrose 250mL infusion  0.5-50 mcg/min IntraVENous TITRATE  piperacillin-tazobactam (ZOSYN) 3.375 g in 0.9% sodium chloride (MBP/ADV) 100 mL  3.375 g IntraVENous Q8H  
 0.9% sodium chloride infusion  125 mL/hr IntraVENous CONTINUOUS  chlorhexidine (ORAL CARE KIT) 0.12 % mouthwash 15 mL  15 mL Oral Q12H  
 famotidine (PF) (PEPCID) 20 mg in sodium chloride 0.9% 10 mL injection  20 mg IntraVENous Q24H  
 sodium chloride (NS) flush 5-40 mL  5-40 mL IntraVENous Q8H  
 sodium chloride (NS) flush 5-40 mL  5-40 mL IntraVENous PRN  
 oxyCODONE-acetaminophen (PERCOCET) 5-325 mg per tablet 2 Tab  2 Tab Oral Q4H PRN  
  naloxone (NARCAN) injection 0.4 mg  0.4 mg IntraVENous PRN  propofol (DIPRIVAN) infusion  0-50 mcg/kg/min IntraVENous TITRATE  ondansetron (ZOFRAN) injection 4 mg  4 mg IntraVENous Q4H PRN  
 albuterol (PROVENTIL VENTOLIN) nebulizer solution 2.5 mg  2.5 mg Nebulization Q4H PRN  
 hydrALAZINE (APRESOLINE) 20 mg/mL injection 10 mg  10 mg IntraVENous Q6H PRN  
 [Held by provider] aspirin chewable tablet 81 mg  81 mg Oral DAILY Allergies Allergen Reactions  Pollen Extracts Sneezing Review of Systems: A complete review of systems was obtained, negative except as described above. Physical Exam:  
 
Visit Vitals /55 Pulse 89 Temp 98.1 °F (36.7 °C) Resp 24 Ht 6' (1.829 m) Wt 156 lb 1.4 oz (70.8 kg) SpO2 100% BMI 21.17 kg/m² ECOG PS: 4 General: vented and sedated HENT: on vent Neck: Supple Lymphatic: No cervical, supraclavicular, or inguinal adenopathy Respiratory: on vent CV: on tele GI: Soft MS: unable to assess Skin: warm, dry Psych: unable to assess Results:  
 
Lab Results Component Value Date/Time WBC 28.1 (H) 11/07/2019 03:48 AM  
 HGB 7.2 (L) 11/07/2019 03:48 AM  
 HCT 22.1 (L) 11/07/2019 03:48 AM  
 PLATELET 47 (LL) 75/92/3768 03:48 AM  
 .3 (H) 11/07/2019 03:48 AM  
 ABS. NEUTROPHILS 26.1 (H) 11/07/2019 03:48 AM  
 Hematocrit (POC) 26 (L) 11/05/2019 03:45 PM  
 
Lab Results Component Value Date/Time  Sodium 136 11/07/2019 03:48 AM  
 Potassium 4.6 11/07/2019 03:48 AM  
 Chloride 107 11/07/2019 03:48 AM  
 CO2 21 11/07/2019 03:48 AM  
 Glucose 214 (H) 11/07/2019 03:48 AM  
 BUN 54 (H) 11/07/2019 03:48 AM  
 Creatinine 2.29 (H) 11/07/2019 03:48 AM  
 GFR est AA 34 (L) 11/07/2019 03:48 AM  
 GFR est non-AA 28 (L) 11/07/2019 03:48 AM  
 Calcium 7.3 (L) 11/07/2019 03:48 AM  
 Sodium (POC) 132 (L) 11/05/2019 03:45 PM  
 Potassium (POC) 5.2 (H) 11/05/2019 03:45 PM  
 Chloride (POC) 100 11/05/2019 03:45 PM  
 Glucose (POC) 186 (H) 11/07/2019 11:16 AM  
 BUN (POC) 26 (H) 11/05/2019 03:45 PM  
 Creatinine (POC) 1.0 11/05/2019 03:45 PM  
 Calcium, ionized (POC) 1.06 (L) 11/05/2019 03:45 PM  
 
Lab Results Component Value Date/Time Bilirubin, total 3.1 (H) 11/06/2019 03:04 AM  
 ALT (SGPT) 1,732 (H) 11/06/2019 03:04 AM  
 AST (SGOT) 1,556 (H) 11/06/2019 03:04 AM  
 Alk. phosphatase 61 11/06/2019 03:04 AM  
 Protein, total 4.3 (L) 11/06/2019 03:04 AM  
 Albumin 2.2 (L) 11/06/2019 03:04 AM  
 Globulin 2.1 11/06/2019 03:04 AM  
 
Records reviewed and summarized above. Pathology report(s) reviewed above. Radiology report(s) reviewed above. CT chest 8/20/19: IMPRESSION: 
1. Innumerable pulmonary nodules, new since the prior CT from March, suspicious 
for metastatic malignancy. 2.  Unchanged right upper lobe spiculated nodule. 3.  No intrathoracic lymphadenopathy. CT chest 10/31/19: IMPRESSION: 
Bilateral pulmonary nodules and mediastinal and right hilar lymphadenopathy 
unchanged compared to the prior examination. CT abd/pelvis: pending FINAL PATHOLOGIC DIAGNOSIS 1. Lung, left lower lobe, wedge resection: Mucinous adenocarcinoma, lepidic and acinar patterns Multiple discrete foci of adenocarcinoma ranging from 0.2-1.4 cm in greatest dimension Adenocarcinoma is present at the parenchymal resection margin No definite pleural involvement is identified 2. Lung, left upper lobe, wedge resection: Mucinous adenocarcinoma, lepidic and acinar patterns Multiple discrete foci of adenocarcinoma measuring up to 0.7 cm in greatest dimension Adenocarcinoma is present at the parenchymal resection margin No definite pleural involvement is identified Multiple subpleural blebs are also identified Assessment:  
1) NSCLC Bilateral Pulmonary nodules initially identified on CT of chest in August 2019 Now s/p LVATS w/ wedge resection and pathology positive for NSCLC  
 Unfortunately now with cardiac arrest and critically ill with multi organ failure CT abd/pelvis pending however had PET in September 2019 which showed no metastatic disease We will send molecular testing on biopsy however patient is currently not a candidate for systemic therapy I reached out to his [facility] and left VM on cell & home number Appreciate palliative care assistance 2) Multiorgan failure Per primary team  
Patient prognosis is poor and palliative care is following 3) Tension pneumothorax Secondary to #1 Thoracic surgery following 4) Cardiac arrest  
Per primary team  
 
5) Anemia New this admission Likely secondary to bone marrow suppression from acute illness , liver and renal failure Monitor CBC and transfusion if Hgb <7g/dL 6) Thrombocytopenia Worsening and multifactorial 
Likely from DIC, bon marrow suppression and liver failure Supportive management Plan: · Molecular testing to be added to biopsy -  
· Not a candidate for systemic therapy at this time due to multiorgan failure. Should he recover from this , could be re visited · Monitor CBC daily and transfuse to maintain Hgb >7g/dL , platelets > 87X · If he has bleeding and Fibrinogen is < 100 , then give 1 unit of cryoprecipitate · Administer 10 mg IV of VK for his coagulopathy Will follow I appreciate the opportunity to participate in Mr. Shaji Bustillos's care. See in conjunction with Desean Ricks NP Signed By: Danita Pete MD

## 2019-11-07 NOTE — PROGRESS NOTES
Hospitalist Progress Note NAME: Hari Mcintosh :  1940 MRN:  [id number] Assessment / Plan: 
Tension pneumothorax 
-s/p left chest tube  
-since overnight of , oxygen requirement got worse, the patient was transferred to ICU - currently intubated 
 
-Appreciate Thoracic surgery, chest tube remains in place 
  
Cardiac arrest  post VATS 
-ROSC 2 min 
-Troponin 0.38, BNP 2,194 Chest remains in place 
  
Bilateral pulmonary nodules,  
-status post lung biopsy 10/31 
-biopsy  with adenocarcinoma 
  
Acute respiratory failure, likely secondary to above 
-Was on Hi flow, now intubated  post VATS 
-On ssolucortef 
-Appreciate Pulmonary 
  
MELVIN 
-likely 2/2 ATN from hypotension 
-on IV fluids 
 
  
Hyperkalemia 
-sec to above 
-recheck improved. 
  
Elevated troponin. -per cardiology likely demand ischemia 
-Echo EF 51-55% 
  
Leukocytosis 
-likely sec to steroids 
-monitor 
  
Schizophrenia 
-Continue home meds 
  
Transaminitis 
-seen by gi,  Likely shock liver. 
  
Hyperkalemia -now resolved 
-monitor 
  
Cardiac diet 
  
The patient is critically ill and at high risk of further deterioration 
  
Code status: Partial code now. Appreciate Palliative care DVT prophylaxis: scd 
  
Plan: Follow thoracic surgery,  Cardiology, GI 
  
Care Plan discussed with: Patient/Family niece Luis Antonio Murray is mPOA Disposition: TBD Subjective: Chief Complaint / Reason for Physician Visit \"sedated and intubated. \". Discussed with RN events overnight. Review of Systems: 
Symptom Y/N Comments  Symptom Y/N Comments Fever/Chills    Chest Pain Poor Appetite    Edema Cough    Abdominal Pain Sputum    Joint Pain SOB/CARD    Pruritis/Rash Nausea/vomit    Tolerating PT/OT Diarrhea    Tolerating Diet Constipation    Other Could NOT obtain due to:   
 
Objective: VITALS:  
Last 24hrs VS reviewed since prior progress note. Most recent are: Patient Vitals for the past 24 hrs: 
 Temp Pulse Resp BP SpO2  
11/07/19 1214     100 % 11/07/19 1200 98.1 °F (36.7 °C) 100 (!) 31 108/59 98 % 11/07/19 1100  (!) 113 (!) 36 104/62 100 % 11/07/19 1000  (!) 110 (!) 35 112/61 98 % 11/07/19 0900  (!) 110 (!) 36 114/57 100 % 11/07/19 0838 98.8 °F (37.1 °C)      
11/07/19 0800 98.8 °F (37.1 °C) (!) 108 (!) 35 121/61 100 % 11/07/19 0750     100 % 11/07/19 0700  (!) 117 (!) 33 114/57 (!) 83 % 11/07/19 0600  (!) 103 (!) 31 113/54 100 % 11/07/19 0500  99 29 111/48 100 % 11/07/19 0400 97.4 °F (36.3 °C) (!) 105 29 109/58 100 % 11/07/19 0336  (!) 110 (!) 35  98 % 11/07/19 0335     98 % 11/07/19 0300  (!) 108 (!) 35 143/69 100 % 11/07/19 0200  99 (!) 32 144/71 100 % 11/07/19 0100  100 26 136/60 100 % 11/07/19 0039  (!) 104 (!) 32  100 % 11/07/19 0038     100 % 11/07/19 0000 98.7 °F (37.1 °C) (!) 108 (!) 32 143/66 100 % 11/06/19 2300  (!) 111 (!) 33 132/66 100 % 11/06/19 2230  (!) 115 (!) 34 118/63   
11/06/19 2200  (!) 115 (!) 34 125/56 100 % 11/06/19 2130  (!) 110 (!) 33 145/66 98 % 11/06/19 2100  (!) 104 26 175/78 100 % 11/06/19 2049  83 30  92 % 11/06/19 2047  (!) 110 28 112/62 100 % 11/06/19 2045  (!) 109 25 (!) 59/37   
11/06/19 2042  (!) 108 21 (!) 84/46 100 % 11/06/19 2000 100.1 °F (37.8 °C) (!) 114 (!) 33 130/65 100 % 11/06/19 1940     100 % 11/06/19 1934  (!) 109 30  100 % 11/06/19 1915  (!) 116 (!) 33 117/62 100 % 11/06/19 1900  (!) 117 (!) 34 99/51 100 % 11/06/19 1830  (!) 123 (!) 35 111/60 100 % 11/06/19 1815  (!) 122 (!) 35 112/57 100 % 11/06/19 1800  (!) 120 (!) 33 110/52 100 % 11/06/19 1745  (!) 120 (!) 34 104/55 100 % 11/06/19 1730  (!) 119 (!) 34 104/61 100 % 11/06/19 1715  (!) 118 (!) 34 101/59 100 % 11/06/19 1700  (!) 118 (!) 33 113/53 100 % 11/06/19 1645  (!) 119 (!) 34 117/57 100 % 11/06/19 1630  (!) 118 (!) 33 107/53 100 % 11/06/19 1615  (!) 119 (!) 34 102/58 100 % 11/06/19 1600 100.4 °F (38 °C) (!) 118 (!) 33 110/55 100 % 11/06/19 1545  (!) 118 (!) 34 109/48 100 % 11/06/19 1534     100 % 11/06/19 1532  (!) 118 (!) 33  99 % 11/06/19 1530  (!) 119 (!) 33 115/53 100 % 11/06/19 1515  (!) 120 (!) 35 124/55   
11/06/19 1500  (!) 116 (!) 33 114/59 100 % 11/06/19 1445  (!) 116 (!) 34 115/50 100 % 11/06/19 1430  (!) 111 28 95/53 100 % 11/06/19 1415  (!) 114 (!) 34 94/53 100 % 11/06/19 1400  (!) 113 (!) 33 (!) 89/51   
11/06/19 1345  (!) 113 (!) 32 103/54   
11/06/19 1330  (!) 115 (!) 33 118/58   
11/06/19 1315  (!) 113 (!) 34 139/65   
11/06/19 1300  (!) 108 (!) 34 143/66 100 % Intake/Output Summary (Last 24 hours) at 11/7/2019 1246 Last data filed at 11/7/2019 0800 Gross per 24 hour Intake 3702.37 ml Output 1875 ml Net 1827.37 ml PHYSICAL EXAM: 
General: WD, WN. intubatd EENT:  EOMI. Anicteric sclerae. MMM Resp:  CTA bilaterally, no wheezing or rales. No accessory muscle use CV:  Regular  rhythm,  No edema GI:  Soft, Non distended, Non tender.  +Bowel sounds Neurologic:  Intubated. Reviewed most current lab test results and cultures  YES Reviewed most current radiology test results   YES Review and summation of old records today    NO Reviewed patient's current orders and MAR    YES 
PMH/SH reviewed - no change compared to H&P 
________________________________________________________________________ Care Plan discussed with: 
  Comments Patient Family RN Care Manager Consultant Multidiciplinary team rounds were held today with , nursing, pharmacist and clinical coordinator. Patient's plan of care was discussed; medications were reviewed and discharge planning was addressed. ________________________________________________________________________ Total NON critical care TIME:  20   Minutes Total CRITICAL CARE TIME Spent:   Minutes non procedure based Comments >50% of visit spent in counseling and coordination of care    
________________________________________________________________________ Sorin Seat, DO  
 
Procedures: see electronic medical records for all procedures/Xrays and details which were not copied into this note but were reviewed prior to creation of Plan. LABS: 
I reviewed today's most current labs and imaging studies. Pertinent labs include: 
Recent Labs 11/07/19 0348 11/06/19 0304 11/05/19 
1640 WBC 28.1* 30.1* 27.1* HGB 7.2* 9.9* 9.8* HCT 22.1* 30.0* 29.8* PLT 47* 75* 106* Recent Labs 11/07/19 0348 11/06/19 
2304 11/06/19 0304 11/05/19 
1640 11/05/19 
0316   --  136  --   --   
K 4.6  --  5.6*  --   --   
  --  107  --   --   
CO2 21  --  19*  --   --   
*  --  176*  --   --   
BUN 54*  --  35*  --   --   
CREA 2.29*  --  1.50*  --   --   
CA 7.3*  --  7.4*  --   --   
MG  --  2.3  --   --   --   
ALB  --   --  2.2*  --  2.8* TBILI  --   --  3.1*  --  2.0*  
SGOT  --   --  1,556*  --  60* ALT  --   --  1,732*  --  375* INR  --  1.9*  --  1.3*  --   
 
 
Signed: Sorin Fine, DO

## 2019-11-08 NOTE — PROGRESS NOTES
0730: Bedside and Verbal shift change report given to Brown Collins, RN (oncoming nurse) by Akil Valentin RN (offgoing nurse). Report included the following information SBAR, Kardex, Procedure Summary, Intake/Output, MAR, Recent Results, Cardiac Rhythm NSR/Sinus Tachycardia and Alarm Parameters . 1527: Patient's POA Jaymie Nearing contacted regarding orders for blood transfusion as patient needs a blood consent. Jaymie Nearing declines blood transfusion at this time and will be at hospital this evening. 1530: Bedside and Verbal shift change report given to Cayetano Sorensen, RN (oncoming nurse) by Brown Collins RN (offgoing nurse). Report included the following information SBAR, Kardex, Procedure Summary, Intake/Output, MAR, Recent Results, Cardiac Rhythm SInus Tachycardia and Alarm Parameters .

## 2019-11-08 NOTE — PROGRESS NOTES
1600: Half of contrast in for pending CT abd-began giving second half of oral contrast.  1710:  Oral contrast adm via OGT complete. Called CT to schedule time to come for scan. 36-  Down to CT with RN, PCT x 2 and RT-Pt tolerated well. 1800-Back up to ICU-uneventful trip. Shift summary:  Weaned Levo down from 12 mcg/min to 8 mcg/min. CT of abd completed-results pending.

## 2019-11-08 NOTE — PROGRESS NOTES
Day # 1 of Fluconazole Indication: empiric for PNA/sepsis Current regimen:  400 mgIV Q24hr Abx regimen: Van+zosyn+fluconazole Recent Labs 19 
0355 19 
0348 19 
0304 WBC 38.5* 28.1* 30.1* CREA 1.80* 2.29* 1.50* BUN 55* 54* 35* Est CrCl:~ 35 ml/min; UO:>1ml/kg/hr Temp (24hrs), Av.6 °F (37 °C), Min:98 °F (36.7 °C), Max:99.3 °F (37.4 °C) Cultures:  
 tissue cultures from VATS JARETT resection:NGTD 
-- CMV, AFB smear negative  sputum: MRSA present  blood:NGTD Plan: Change to 400 mg day 1, then 200 mg q24 hr

## 2019-11-08 NOTE — PROGRESS NOTES
[facility] Name: Jil Serna  MRN: [id number]  Date: 2019 10:13 AM 
Admission Date: 2019  : 1940 Impression Plan 1. Acute resp failure- COPD + multiple pulmonary nondules ( s/p non dx left angelika bx last week complicated by tension PTX) 2. S/p Left VATS with JARETT/LLL wedge bx 19 and mechanical pleurodesis-for diffuse pulm nodules- path shows metastatic adenocarcinoma 3. Post op PEA arrest 19 - rosc 2 min 4. Septic shock-suspect ECFV replete, on stress dose steroids/LV fxn ok. 5. MELVIN 6. Lactic acidosis 7. Coagulopathy/DIC 
8. Shock liver 9. COPD FEV1 1.31 ( 46%) 10. ECHO  EF 71% RV moderatley reduced PASP 32 1. WBC continues to climb- CT abd/pelvis without evidence bowel ischemia. GB distended but no stones. 2. On zosyn/vanco for MRSA PNA but cannot r/o another source ? acal lynn- repeat RUQ US- if interval pericholecystic fluid- will look into perc lynn 3. Lung protective ventilation 4. Add fluconazole 5. transfure to Hb 7 or higher 6. nebs Pt is critically ill CCT EOP 30 min. At risk for decline due to sepsis/MODS Subjective Sedated, does not follow commands. Review of systems not obtained due to patient factors. EXAM: 
Visit Vitals /72 Pulse (!) 114 Temp 99.3 °F (37.4 °C) Resp 25 Ht 6' (1.829 m) Wt 71.1 kg (156 lb 12 oz) SpO2 100% BMI 21.26 kg/m² Temp (24hrs), Av.6 °F (37 °C), Min:98 °F (36.7 °C), Max:99.3 °F (37.4 °C) GENERAL: well developed and in no distress, NECK:  no jugular vein distention, no retractions, no thyromegaly or masses, LUNGS: decreased breath sounds billaterally and with rhonchi , HEART:  Regular rate and rhythm with no MGR; no edema is present, ABDOMEN:  soft with no tenderness, bowel sounds present, EXTREMITIES:  warm with no cyanosis and SKIN:  no jaundice or ecchymosis LABS Recent Labs 19 0826 11/07/19 
0348 11/06/19 
0304 WBC 38.5* 28.1* 30.1* HGB 6.5* 7.2* 9.9*  
HCT 20.0* 22.1* 30.0*  
PLT 45* 47* 75* Recent Labs 11/08/19 
0355 11/07/19 0348 11/06/19 
0621 11/06/19 
0304 11/05/19 
1640  136  --  136  --   
K 3.9 4.6  --  5.6*  --   
* 107  --  107  --   
CO2 22 21  --  19*  --   
* 214*  --  176*  --   
BUN 55* 54*  --  35*  --   
CREA 1.80* 2.29*  --  1.50*  --   
CA 7.4* 7.3*  --  7.4*  --   
MG  --   --  2.3  --   --   
ALB  --   --   --  2.2*  --   
SGOT  --   --   --  1,556*  --   
ALT  --   --   --  1,732*  --   
INR 1.3*  --  1.9*  --  1.3* ABG Recent Labs 11/05/19 
1736 11/05/19 
1535 PHI 7.362 7.198* PO2I 290* 215* PCO2I 41.3 68.1* Radiology Films have been personally reviewed. PCXR with diffuse ASD ETT ok no PTX Brenda Lemos MD

## 2019-11-08 NOTE — PROGRESS NOTES
NUTRITION COMPLETE ASSESSMENT 
 
RECOMMENDATIONS:  
Check phosphorus and magnesium with am labs tomorrow Interventions/Plan:  
Food/Nutrient Delivery:   Initiate enteral nutrition Osmolite 1.5 @ 45 ml/hr with 1 packet Prosource daily and 140 ml water flush q 4 hr  
 
Assessment:  
Reason for Assessment:  
[x] Provider Consult-Tube Feeding management Diet: NPO Nutritionally Significant Medications: [x] Reviewed & Includes: correction scale insulin, NS @ 75 ml/hr, Diprivan, hydrocortisone Meal Intake:  
Patient Vitals for the past 168 hrs: 
 % Diet Eaten 11/04/19 1523 25 % 11/04/19 1126 50 % 11/03/19 0900 50 % 11/02/19 1800 75 % 11/02/19 1400 75 % 11/02/19 0900 10 % Subjective: 
Pt intubated. No family present. Objective: 
Mr Raji Mcbride was admitted with SOB. Noted: 11/1 (L) tension PTX s/p EBUS-CT placed; ischemic hepatopathy; 11/5 (L) VATS, PEA arrest following surgery-intubated; path: NSCLC; MELVIN 2/2 ATN-improving. Patient has had a significant weight loss in the past few months (11% from July to October) which is significant. Recently admitted and followed by dietitian. Weight loss mostly likely related to malignancy in addition to decreased PO intake. Lytes WNL-recommend adding phosphorus and magnesium to am labs tomorrow. BG slightly elevated d/t steroid. Mr Raji Mcbride has been NPO x 4 days; averaged 40% intake of meals prior to intubation. Will start enteral nutrition support to meet nutrient needs. Family considering transition to comfort care if no improvement in next few days. Diprivan @ current rate of 11 ml/hr will provide 290 lipid calories per day. Suggested goal feeding: Osmolite 1.5 @ 45 ml/hr with 1 packet Prosource daily and 140 ml water flush q 4 hr. This will provide 1080 ml, 1680 calories (1970 including Diprivan), 83 gm protein and 1730 ml free water (tube feeding/flush) per day to meet estimated needs. Estimated Nutrition Needs:  
Kcals/day: 1965 Kcals/day Protein: 78 g(78-90 (1.3-1.5g/kg) Fluid: (1 ml/kcal) Based On: [facility] ([id number]) Weight Used: Actual wt(60 kg) Pt expected to meet estimated nutrient needs:  [x]   Yes     []  No  [] Unable to predict at this time Nutrition Diagnosis:  
1. Inadequate oral intake related to PEA arrest and acute respiratory failure as evidenced by NPO d/t intubation. Goals: Tolerate tube feeding at goal in next 1-2 days. Monitoring & Evaluation: - Enteral/parenteral nutrition intake - Electrolyte and renal profile, Weight/weight change Previous Nutrition Goals Met: N/A Previous Recommendations: N/A Education & Discharge Needs: 
 [x] None Identified 
 [] Identified and addressed [x] Participated in care plan, discharge planning, and/or interdisciplinary rounds Cultural, Tenriism and ethnic food preferences identified: 
 None Skin Integrity: []Intact  [x] partial thickness wound lower back Edema: []None [x] NP generalized, BLE's Last BM: 11/4 Food Allergies: [x]None []Other Anthropometrics:   
Weight Loss Metrics 11/8/2019 10/31/2019 10/24/2019 9/23/2019 9/9/2019 7/31/2019 7/3/2019 Today's Wt 156 lb 12 oz 132 lb 128 lb 4.9 oz 138 lb 138 lb 9.6 oz 138 lb 144 lb 12.8 oz  
BMI 21.26 kg/m2 17.9 kg/m2 17.4 kg/m2 18.72 kg/m2 18.8 kg/m2 18.72 kg/m2 19.64 kg/m2 Last 3 Recorded Weights in this Encounter 11/07/19 0400 11/08/19 0400 11/08/19 1522 Weight: 70.8 kg (156 lb 1.4 oz) 71.1 kg (156 lb 12 oz) 71.1 kg (156 lb 12 oz) Weight Source: Bed Height: 6' (182.9 cm), Body mass index is 21.26 kg/m². IBW : 80.7 kg (178 lb), % IBW (Calculated): 88.06 % 
 ,   
 
Labs:   
Lab Results Component Value Date/Time  Sodium 142 11/08/2019 11:59 AM  
 Potassium 3.8 11/08/2019 11:59 AM  
 Chloride 112 (H) 11/08/2019 11:59 AM  
 CO2 20 (L) 11/08/2019 11:59 AM  
 Glucose 181 (H) 11/08/2019 11:59 AM  
 BUN 52 (H) 11/08/2019 11:59 AM  
 Creatinine 1.78 (H) 11/08/2019 11:59 AM  
 Calcium 7.2 (L) 11/08/2019 11:59 AM  
 Magnesium 2.3 11/06/2019 06:21 AM  
 Phosphorus 4.7 02/04/2018 06:55 PM  
 Albumin 2.4 (L) 11/08/2019 11:59 AM  
 
Lab Results Component Value Date/Time Hemoglobin A1c (POC) 5.2 07/03/2019 01:00 PM  
 
Lab Results Component Value Date/Time Glucose (POC) 181 (H) 11/08/2019 01:00 PM  
  
Lab Results Component Value Date/Time ALT (SGPT) 1,277 (H) 11/08/2019 11:59 AM  
 AST (SGOT) 329 (H) 11/08/2019 11:59 AM  
 Alk.  phosphatase 69 11/08/2019 11:59 AM  
 Bilirubin, direct 1.4 (H) 11/06/2019 03:04 AM  
 Bilirubin, total 2.3 (H) 11/08/2019 11:59 AM  
  
 
Donaldo Barreto RD [facility]

## 2019-11-08 NOTE — PROGRESS NOTES
Pharmacist Note - Vancomycin Dosing Therapy day #2 Indication: PNA Current regimen: Dosing by levels A Random Level resulted at 7.3 mcg/mL which was obtained 24 hrs post-dose. Goal trough: 15 - 20 mcg/mL Plan: Change to vancomycin 1000 mg IV Q24 hours . Pharmacy will continue to monitor this patient daily for changes in clinical status and renal function. Thank you, Aryan Mcdonnell, PHARMD

## 2019-11-08 NOTE — PROGRESS NOTES
1930: Bedside and Verbal shift change report given to [address] (oncoming nurse) by Fatoumata Cespedes RN (offgoing nurse). Report included the following information SBAR, Kardex, ED Summary, Intake/Output, MAR, Recent Results, Cardiac Rhythm SR/ST and Alarm Parameters . 0500: Paged hospitalist for critical platelets 45 and Hgb 6.5. Orders received for 1 unit PRBC. 
 
0525: Pt's Kaiden Parks called to get consent for blood transfusion. Message left on voicemail stating need for consent and to call hospital back. 36: Spoke with pt's Kaiden Parks on the phone. She would like to wait until she comes in today to make a decision regarding blood transfusion and future care. MD updated. 0730: Bedside and Verbal shift change report [facility] ([facility]) by Donaldo Cintron RN (offgoing nurse). Report included the following information SBAR, Kardex, ED Summary, Intake/Output, MAR, Recent Results, Cardiac Rhythm SR/ST and Alarm Parameters .

## 2019-11-08 NOTE — TELEPHONE ENCOUNTER
Spoke with Chucky niece. Explained diagnosis of NSCLC. Advised her that pt is not currently a candidate for systemic therapy at this time due to acute issues however if he recovers this could be discussed. She understands and states she has a clear understanding of what is going on with patient. She had no other questions or concerns, will continue to follow.

## 2019-11-08 NOTE — PROGRESS NOTES
Hospitalist Progress Note NAME: Cecy Adan :  1940 MRN:  [id number] Assessment / Plan: 
Tension pneumothorax 
-s/p left chest tube  
-since overnight of , oxygen requirement got worse, the patient was transferred to ICU - currently intubated 
  
-Appreciate Thoracic surgery, chest tube remains in place. Remains critical 
  
Cardiac arrest  post VATS 
-ROSC 2 min 
-Troponin 0.38, BNP 5,129 Chest tube in place 
  
Bilateral pulmonary nodules,  
-status post lung biopsy 10/31 
-biopsy  with adenocarcinoma. Seen by oncology and palliative care. Prognosis appears poor. 
  
Acute respiratory failure, likely secondary to above 
-Was on Hi flow, now intubated  post VATS 
-On ssolucortef 
-Appreciate Pulmonary help 
  
MELVIN 
-likely 2/2 ATN from hypotension 
-on IV fluids. Dc on  if crt back to normal. 
  
  
Hyperkalemia 
-resolbed. 
  
Elevated troponin. -per cardiology likely demand ischemia 
-Echo EF 51-55% 
  
Leukocytosis 
-rpt us pending. On stress dosed steroids. 
  
Schizophrenia 
-Continue home meds 
  
Transaminitis 
-seen by gi,  Likely shock liver. Trending down. 
 
 Tension pneumothorax as an expected/inherent condition that occurred post-operatively and not a complication The patient is critically ill and at high risk of further deterioration 
  
Code status: Partial code now. Appreciate Palliative care DVT prophylaxis: scd 
  
Plan: Follow thoracic surgery,  Cardiology, GI 
  
Care Plan discussed with: Patient/Family nibeka Salazar Marrow is mPOA Disposition: TBD 
 
 
18.5 - 24.9 Normal weight / Body mass index is 21.26 kg/m². Subjective: Chief Complaint / Reason for Physician Visit \"intubated and sedated. \". Discussed with RN events overnight. Review of Systems: 
Symptom Y/N Comments  Symptom Y/N Comments Fever/Chills    Chest Pain Poor Appetite    Edema Cough    Abdominal Pain Sputum    Joint Pain SOB/CARD    Pruritis/Rash Nausea/vomit    Tolerating PT/OT Diarrhea    Tolerating Diet Constipation    Other Could NOT obtain due to:   
 
Objective: VITALS:  
Last 24hrs VS reviewed since prior progress note. Most recent are: 
Patient Vitals for the past 24 hrs: 
 Temp Pulse Resp BP SpO2  
11/08/19 1246     100 % 11/08/19 1245  (!) 103 29 106/58 100 % 11/08/19 1230  (!) 105 30 107/56 99 % 11/08/19 1215  (!) 109 (!) 31 104/56 100 % 11/08/19 1200  (!) 110 (!) 31 98/59 100 % 11/08/19 1145  (!) 114 (!) 32 121/59 100 % 11/08/19 1130  (!) 115 (!) 33 109/68   
11/08/19 1115  (!) 114 (!) 32 112/62 100 % 11/08/19 1100  (!) 112 (!) 31 104/65   
11/08/19 1045  (!) 111 (!) 31 111/70   
11/08/19 1030  (!) 108 (!) 32 116/60   
11/08/19 1015  (!) 108 29 116/62   
11/08/19 1000  (!) 105 (!) 31 109/59   
11/08/19 0930  (!) 108 (!) 31 111/69   
11/08/19 0900  (!) 114 (!) 38 117/70   
11/08/19 0830  (!) 114 (!) 34 112/66   
11/08/19 0815  (!) 114 25 135/72 100 % 11/08/19 0800 99.3 °F (37.4 °C) (!) 118 (!) 36 128/75 100 % 11/08/19 0754     100 % 11/08/19 0700  (!) 107 (!) 33 112/61 100 % 11/08/19 0600  (!) 103 (!) 31 106/59 100 % 11/08/19 0500  (!) 104 (!) 31 124/60 100 % 11/08/19 0400 98.7 °F (37.1 °C) 98 30 111/58 100 % 11/08/19 0339  (!) 105 (!) 31  100 % 11/08/19 0300  95 28 107/52 100 % 11/08/19 0200  (!) 102 (!) 32 124/62 100 % 11/08/19 0100  94 30 128/60 98 % 11/08/19 0006     100 % 11/08/19 0003  97 (!) 33  100 % 11/08/19 0000 98.3 °F (36.8 °C) 91 30 127/60 100 % 11/07/19 2300  83 25 107/53 98 % 11/07/19 2200  90 25 115/53 98 % 11/07/19 2100  94 28 106/51 98 % 11/07/19 2035  100 30  100 % 11/07/19 2000 98 °F (36.7 °C) 97 (!) 31 113/53 96 % 11/07/19 1900  89 29 114/53 98 % 11/07/19 1800  89 29 129/57 100 % 11/07/19 1703  87 27 146/64 100 % 11/07/19 1658    147/68   
11/07/19 1625     99 % 11/07/19 1600 98.9 °F (37.2 °C) 77 24 138/62 100 % 11/07/19 1500  89 24 121/55 100 % 11/07/19 1400  97 28 122/57 100 % Intake/Output Summary (Last 24 hours) at 11/8/2019 1308 Last data filed at 11/8/2019 1000 Gross per 24 hour Intake 4063.51 ml Output 2510 ml Net 1553.51 ml PHYSICAL EXAM: 
General: WD, WN. sedated EENT:  EOMI. Anicteric sclerae. MMM Resp:  CTA bilaterally, no wheezing or rales. No accessory muscle use CV:  Regular  rhythm,  No edema GI:  Soft, Non distended, Non tender.  +Bowel sounds Neurologic:  Alert and oriented X 3, normal speech, Psych:   Good insight. Not anxious nor agitated Skin:  No rashes. No jaundice Reviewed most current lab test results and cultures  YES Reviewed most current radiology test results   YES Review and summation of old records today    NO Reviewed patient's current orders and MAR    YES 
PMH/ reviewed - no change compared to H&P 
________________________________________________________________________ Care Plan discussed with: 
  Comments Patient Family RN Care Manager Consultant Multidiciplinary team rounds were held today with , nursing, pharmacist and clinical coordinator. Patient's plan of care was discussed; medications were reviewed and discharge planning was addressed. ________________________________________________________________________ Total NON critical care TIME:  20   Minutes Total CRITICAL CARE TIME Spent:   Minutes non procedure based Comments >50% of visit spent in counseling and coordination of care    
________________________________________________________________________ Indira Darling,   
 
Procedures: see electronic medical records for all procedures/Xrays and details which were not copied into this note but were reviewed prior to creation of Plan. LABS: 
I reviewed today's most current labs and imaging studies. Pertinent labs include: 
Recent Labs 11/08/19 
0355 11/07/19 0348 11/06/19 
0304 WBC 38.5* 28.1* 30.1* HGB 6.5* 7.2* 9.9*  
HCT 20.0* 22.1* 30.0*  
PLT 45* 47* 75* Recent Labs 11/08/19 
1159 11/08/19 
0355 11/07/19 0348 11/06/19 
0621 11/06/19 
0304  11/05/19 
1640  142 136  --  136   < >  --   
K 3.8 3.9 4.6  --  5.6*   < >  --   
* 112* 107  --  107   < >  --   
CO2 20* 22 21  --  19*   < >  --   
* 167* 214*  --  176*   < >  --   
BUN 52* 55* 54*  --  35*   < >  --   
CREA 1.78* 1.80* 2.29*  --  1.50*   < >  --   
CA 7.2* 7.4* 7.3*  --  7.4*   < >  --   
MG  --   --   --  2.3  --   --   --   
ALB 2.4*  --   --   --  2.2*  --   --   
TBILI 2.3*  --   --   --  3.1*  --   --   
SGOT 329*  --   --   --  1,556*  --   --   
ALT 1,277*  --   --   --  1,732*  --   --   
INR  --  1.3*  --  1.9*  --   --  1.3*  
 < > = values in this interval not displayed.   
 
 
Signed: Damaris Lovell,

## 2019-11-08 NOTE — PROGRESS NOTES
Thoracic Surgery Simple Progress Note Admit Date: 2019 POD: 3 Days Post-Op Procedure:  Procedure(s): LEFT VIDEO ASSISTED THORACOSCOPIC SURGERY/LEFT UPPER LOBE WEDGE RESECTION/ LEFT LOWER LOBE WEDGE RESECTION/ MECHANICAL PLEURODESIS Subjective:  
 
Review of Systems: 
Unable to obtain due to patient factors Objective:  
 
Blood pressure 135/72, pulse (!) 114, temperature 98.7 °F (37.1 °C), resp. rate 25, height 6' (1.829 m), weight 156 lb 12 oz (71.1 kg), SpO2 100 %. Temp (24hrs), Av.5 °F (36.9 °C), Min:98 °F (36.7 °C), Max:98.9 °F (37.2 °C) Hemodynamics PAP 
  CO 
  CI No intake/output data recorded.  1901 -  0700 In: 7286.7 [I.V.:6536.7] Out: 4180 [[id number]] EXAM: 
GENERAL: sedated on ventilator, on propofol. H&H down to 6.5/20.0--transfusion pending. Plt 45 this AM 
HEART:  regular rate and rhythm. On levophed LUNG: L CT w 240 ml/24 hrs output. +airleak. On PEEP 2.0 NEURO: responds to pain INCISION: Clean, dry, and intact EXTREMITIES:No evidence of DVT seen on physical exam. 
GI/: Abd soft, flat. +voiding dark urine via ying catheter Labs: 
Recent Results (from the past 24 hour(s)) FIBRINOGEN Collection Time: 19 11:06 AM  
Result Value Ref Range Fibrinogen 141 (L) 200 - 475 mg/dL PTT Collection Time: 19 11:06 AM  
Result Value Ref Range aPTT 36.7 (H) 22.1 - 32.0 sec  
 aPTT, therapeutic range     58.0 - 77.0 SECS  
GLUCOSE, POC Collection Time: 19 11:16 AM  
Result Value Ref Range Glucose (POC) 186 (H) 65 - 100 mg/dL Performed by [facility] GLUCOSE, POC Collection Time: 19  6:35 PM  
Result Value Ref Range Glucose (POC) 139 (H) 65 - 100 mg/dL Performed by Landry Paul, POC Collection Time: 19 12:46 AM  
Result Value Ref Range Glucose (POC) 160 (H) 65 - 100 mg/dL Performed by [facility]   
CBC W/O DIFF  Collection Time: 19  3:55 AM  
 Result Value Ref Range WBC 38.5 (H) 4.1 - 11.1 K/uL  
 RBC 1.75 (L) 4.10 - 5.70 M/uL HGB 6.5 (L) 12.1 - 17.0 g/dL HCT 20.0 (L) 36.6 - 50.3 % .3 (H) 80.0 - 99.0 FL  
 MCH 37.1 (H) 26.0 - 34.0 PG  
 MCHC 32.5 30.0 - 36.5 g/dL  
 RDW 17.3 (H) 11.5 - 14.5 % PLATELET 45 (LL) 702 - 400 K/uL NRBC 4.2 (H) 0  WBC ABSOLUTE NRBC 1.63 (H) 0.00 - 0.01 K/uL PROTHROMBIN TIME + INR Collection Time: 11/08/19  3:55 AM  
Result Value Ref Range INR 1.3 (H) 0.9 - 1.1 Prothrombin time 13.0 (H) 9.0 - 11.1 sec METABOLIC PANEL, BASIC Collection Time: 11/08/19  3:55 AM  
Result Value Ref Range Sodium 142 136 - 145 mmol/L Potassium 3.9 3.5 - 5.1 mmol/L Chloride 112 (H) 97 - 108 mmol/L  
 CO2 22 21 - 32 mmol/L Anion gap 8 5 - 15 mmol/L Glucose 167 (H) 65 - 100 mg/dL BUN 55 (H) 6 - 20 MG/DL Creatinine 1.80 (H) 0.70 - 1.30 MG/DL  
 BUN/Creatinine ratio 31 (H) 12 - 20 GFR est AA 45 (L) >60 ml/min/1.73m2 GFR est non-AA 37 (L) >60 ml/min/1.73m2 Calcium 7.4 (L) 8.5 - 10.1 MG/DL  
TYPE & SCREEN Collection Time: 11/08/19  5:33 AM  
Result Value Ref Range Crossmatch Expiration 11/11/2019 ABO/Rh(D) O POSITIVE Antibody screen NEG   
GLUCOSE, POC Collection Time: 11/08/19  7:09 AM  
Result Value Ref Range Glucose (POC) 180 (H) 65 - 100 mg/dL Performed by [facility] Assessment:  
No evidence of DVT. Principal Problem: 
  Shortness of breath (11/1/2019) Active Problems: 
  Pneumothorax on left (11/1/2019) Respiratory failure ([address] (11/1/2019) Right heart failure ([address] (11/6/2019) MICRO: 
Special Requests: NO SPECIAL REQUESTS    Preliminary GRAM STAIN 1+ WBCS SEEN    Preliminary GRAM STAIN FEW GRAM POSITIVE COCCI    Preliminary Culture result: Abnormal      Preliminary MODERATE Preliminary report of Methicillin Resistant Staphylococcus aureus by antigen detection.  Confirmation and Sensitivities to follow  
  
 Source TISSUE    Final  
AFB Specimen processing Comment    Final  
(NOTE) Tissue Grinding and Digestion/Decontamination Acid Fast Smear NEGATIVE     Final  
(NOTE) Plan/Recommendations:  
 
Meeting with MPOA pending Continue CT on suction Vent & sedation per pulmonary See orders BRANDON Lennon Thoracic Surgery 11/8/2019

## 2019-11-08 NOTE — PROGRESS NOTES
Nephrology Progress Note Miriam Avalos Date of Admission : 11/1/2019 CC:  Follow up for MELVIN Assessment and Plan MELVIN: 
- likely 2/2 ATN from hypotension/PEA arrest 
- Cr improving 
- stable UOP 
- reduce IVF rate today  
- can d/c IVF tomorrow if Cr continues to improve 
- daily labs Hypotension: 
- remains on pressors 
  
Lactic Acidosis: 
- improving Anemia: 
- hgb 6.5 
- would transfuse PRBCs today 
  
NSCLC: 
- per oncology PTX s/p L VATS 
  
Shock liver: 
- trend LFTs 
  
PEA arrest: 
- ECHO from 11/6 w/ EF > 70% - per cardiology Interval History: 
Seen and examined. Remains sedated on the vent. Cr improving. UOP approx 3 L, net + 2 L. Unable to obtain ROS Current Medications: all current  Medications have been eviewed in [facility] Review of Systems: Pertinent items are noted in HPI. Objective: 
Vitals:   
Vitals:  
 11/08/19 0700 11/08/19 0754 11/08/19 0800 11/08/19 0815 BP: 112/61  128/75 135/72 Pulse: (!) 107  (!) 118 (!) 114 Resp: (!) 33  (!) 36 25 Temp:   (P) 99.3 °F (37.4 °C) SpO2: 100% 100%  100% Weight:      
Height:      
 
Intake and Output: 
11/08 0701 - 11/08 1900 In: -  
Out: 30  
11/06 1901 - 11/08 0700 In: 7286.7 [I.V.:6536.7] Out: 4180 [[id number]] Physical Examination: 
Pt intubated    Yes General: sedated Neck:  Supple, no mass Resp:  Lungs CTA B/L, no wheezing , normal respiratory effort CV:  RRR,  no murmur or rub, no LE edema GI:  Soft, NT, + Bowel sounds, no hepatosplenomegaly Neurologic:  Sedated now Psych:             Unable to assess Skin:  No Rash :  Horner in place 
 
[]    High complexity decision making was performed 
[]    Patient is at high-risk of decompensation with multiple organ involvement Lab Data Personally Reviewed: I have reviewed all the pertinent labs, microbiology data and radiology studies during assessment. Recent Labs 11/08/19 
0355 11/07/19 
0348 11/06/19 
6306 11/06/19 
0304 11/05/19 1640  
 136  --  136  --   
K 3.9 4.6  --  5.6*  --   
* 107  --  107  --   
CO2 22 21  --  19*  --   
* 214*  --  176*  --   
BUN 55* 54*  --  35*  --   
CREA 1.80* 2.29*  --  1.50*  --   
CA 7.4* 7.3*  --  7.4*  --   
MG  --   --  2.3  --   --   
ALB  --   --   --  2.2*  --   
SGOT  --   --   --  1,556*  --   
ALT  --   --   --  1,732*  --   
INR 1.3*  --  1.9*  --  1.3* Recent Labs 11/08/19 
0355 11/07/19 
0348 11/06/19 
0304 WBC 38.5* 28.1* 30.1* HGB 6.5* 7.2* 9.9*  
HCT 20.0* 22.1* 30.0*  
PLT 45* 47* 75* No results found for: SDES Lab Results Component Value Date/Time Culture result: MRSA PRESENT (A) 11/07/2019 11:06 AM  
 Culture result:  11/07/2019 11:06 AM  
      Screening of patient nares for MRSA is for surveillance purposes and, if positive, to facilitate isolation considerations in high risk settings. It is not intended for automatic decolonization interventions per se as regimens are not sufficiently effective to warrant routine use. Culture result: NO GROWTH 2 DAYS 11/06/2019 01:15 PM  
 
Recent Results (from the past 24 hour(s)) FIBRINOGEN Collection Time: 11/07/19 11:06 AM  
Result Value Ref Range Fibrinogen 141 (L) 200 - 475 mg/dL PTT Collection Time: 11/07/19 11:06 AM  
Result Value Ref Range aPTT 36.7 (H) 22.1 - 32.0 sec  
 aPTT, therapeutic range     58.0 - 77.0 SECS  
CULTURE, MRSA Collection Time: 11/07/19 11:06 AM  
Result Value Ref Range Special Requests: NO SPECIAL REQUESTS Culture result: MRSA PRESENT (A) Culture result:     
      Screening of patient nares for MRSA is for surveillance purposes and, if positive, to facilitate isolation considerations in high risk settings. It is not intended for automatic decolonization interventions per se as regimens are not sufficiently effective to warrant routine use. GLUCOSE, POC Collection Time: 11/07/19 11:16 AM  
Result Value Ref Range Glucose (POC) 186 (H) 65 - 100 mg/dL Performed by [facility] GLUCOSE, POC Collection Time: 11/07/19  6:35 PM  
Result Value Ref Range Glucose (POC) 139 (H) 65 - 100 mg/dL Performed by Speedy Chu, POC Collection Time: 11/08/19 12:46 AM  
Result Value Ref Range Glucose (POC) 160 (H) 65 - 100 mg/dL Performed by Greg Funes   
CBC W/O DIFF Collection Time: 11/08/19  3:55 AM  
Result Value Ref Range WBC 38.5 (H) 4.1 - 11.1 K/uL  
 RBC 1.75 (L) 4.10 - 5.70 M/uL HGB 6.5 (L) 12.1 - 17.0 g/dL HCT 20.0 (L) 36.6 - 50.3 % .3 (H) 80.0 - 99.0 FL  
 MCH 37.1 (H) 26.0 - 34.0 PG  
 MCHC 32.5 30.0 - 36.5 g/dL  
 RDW 17.3 (H) 11.5 - 14.5 % PLATELET 45 (LL) 930 - 400 K/uL NRBC 4.2 (H) 0  WBC ABSOLUTE NRBC 1.63 (H) 0.00 - 0.01 K/uL PROTHROMBIN TIME + INR Collection Time: 11/08/19  3:55 AM  
Result Value Ref Range INR 1.3 (H) 0.9 - 1.1 Prothrombin time 13.0 (H) 9.0 - 11.1 sec METABOLIC PANEL, BASIC Collection Time: 11/08/19  3:55 AM  
Result Value Ref Range Sodium 142 136 - 145 mmol/L Potassium 3.9 3.5 - 5.1 mmol/L Chloride 112 (H) 97 - 108 mmol/L  
 CO2 22 21 - 32 mmol/L Anion gap 8 5 - 15 mmol/L Glucose 167 (H) 65 - 100 mg/dL BUN 55 (H) 6 - 20 MG/DL Creatinine 1.80 (H) 0.70 - 1.30 MG/DL  
 BUN/Creatinine ratio 31 (H) 12 - 20 GFR est AA 45 (L) >60 ml/min/1.73m2 GFR est non-AA 37 (L) >60 ml/min/1.73m2 Calcium 7.4 (L) 8.5 - 10.1 MG/DL  
TYPE & SCREEN Collection Time: 11/08/19  5:33 AM  
Result Value Ref Range Crossmatch Expiration 11/11/2019 ABO/Rh(D) O POSITIVE Antibody screen NEG   
GLUCOSE, POC Collection Time: 11/08/19  7:09 AM  
Result Value Ref Range Glucose (POC) 180 (H) 65 - 100 mg/dL Performed by Gerg Shafer MD 
[address] [place] [address], [facility] Phone - [phone] Fax - [phone] 
[email or web address]

## 2019-11-08 NOTE — PROGRESS NOTES
[address] Goldie Isaacs M.D. 
[phone] GASTROENTEROLOGY PROGRESS NOTE 
 
 
 
NAME: Shellie Gallardo :  1940 MRN:  [id number] Subjective:  
Critically ill on pressor Objective: VITALS:  
Last 24hrs VS reviewed. Most recent are: 
Visit Vitals /61 (BP 1 Location: Right arm, BP Patient Position: At rest) Pulse 99 Temp (!) 100.6 °F (38.1 °C) Resp (!) 33 Ht 6' (1.829 m) Wt 71.1 kg (156 lb 12 oz) SpO2 100% BMI 21.26 kg/m² Intake/Output Summary (Last 24 hours) at 2019 1710 Last data filed at 2019 1600 Gross per 24 hour Intake 3531.68 ml Output 2585 ml Net 946.68 ml PHYSICAL EXAM: 
General: Alert, in no acute distress Lungs:            Slightly decreased breath sounds bilaterally anteriorly Heart:  Normal S1, S2 Abdomen: Soft, Non distended, Non tender. Normoactive bowel sounds, no HSM,   no rebound/guarding MSK:   Normal muscle tone Skin:   Warm to touch Psych:   Not anxious nor agitated. Lab Data Reviewed:  
Recent Labs 19 
[phone] 19 
6236 WBC 38.5* 28.1* HGB 6.5* 7.2* HCT 20.0* 22.1*  
PLT 45* 47* Recent Labs 19 
1159 19 
0355  19 
0262  142   < >  --   
K 3.8 3.9   < >  --   
* 112*   < >  --   
CO2 20* 22   < >  --   
BUN 52* 55*   < >  --   
CREA 1.78* 1.80*   < >  --   
* 167*   < >  --   
CA 7.2* 7.4*   < >  --   
MG  --   --   --  2.3  
 < > = values in this interval not displayed. Recent Labs 19 
1159 19 
0304 SGOT 329* 1,556* AP 69 61  
TP 4.2* 4.3* ALB 2.4* 2.2*  
GLOB 1.8* 2.1 Recent Labs 19 
0355 19 
1106 19 
8133 INR 1.3*  --  1.9* PTP 13.0*  --  18.6* APTT  --  36.7* 34.5* No results for input(s): FE, TIBC, PSAT, FERR in the last 72 hours. No results for input(s): CPK, CKMB in the last 72 hours. No lab exists for component: Rosalva Barcenas See Electronic Medical Record for all procedure/radiology reports and details which were not copied into this note but were reviewed prior to the creation of the Plan. Assessment:  
 71 yo M with cognitive impairment, schizophrenia, COPD on home 4 L 02, possible lung cancer admitted with left tension pneumothorax after lung nodule biopsy. We are asked to see him at the request of Dr. Radha Asher for elevated LFTs. 
  
I suspect this is related to ischemic hepatopathy. LFTs down trending until cardiac arrest with elevations consistent with ischemic hepatopathy. Liver Us normal with intact blood flow. Acute hep panel negative. No obvious med culprits to suggest drug induced liver injury. Liver tests trending down again. 
  
Plan:  
  
- Supportive care. Prognosis guarding. Weekend coverage will see if requested. 
  
 
Signed by:  Chirag Davis MD 
       11/8/2019  8:28 AM

## 2019-11-09 NOTE — PROGRESS NOTES
Advance Care Planning Note Name: Hari Mcintosh YOB: 1940 MRN: [id number] Admission Date: 11/1/2019 10:43 AM 
 
Date of discussion: 11/9/2019 Active Diagnoses: 
 
Hospital Problems  Date Reviewed: 11/8/2019 Codes Class Noted POA Right heart failure (HCC) ICD-10-CM: I50.810 ICD-9-CM: 428.0  11/6/2019 Unknown Pneumothorax on left ICD-10-CM: J93.9 ICD-9-CM: 512.89  11/1/2019 Unknown * (Principal) Shortness of breath ICD-10-CM: R06.02 
ICD-9-CM: 786.05  11/1/2019 Yes Respiratory failure ([address] ICD-10-CM: J96.90 ICD-9-CM: 518.81  11/1/2019 Unknown These active diagnoses are of sufficient risk that focused discussion on advance care planning is indicated in order to allow the patient to thoughtfully consider personal goals of care, and if situations arise that prevent the ability to personally give input, to ensure appropriate representation of their personal desires for different levels and aggressiveness of care. Discussion:  
 
Persons present and participating in discussion: Hari McintoshRossy MD, Discussion: had a long discussion with the POA , and multiple other family members and explained all the results and report and prognosis of the pt. Explained the code status and expectation. [t is a partial code now and they will consider extubation if does not improve in 48 hrs. Time Spent:  
 
Total time spent face-to-face in education and discussion: 20  minutes.   
 
Rossy Thornton MD 
11/9/2019 
1:32 PM

## 2019-11-09 NOTE — PROGRESS NOTES
Nephrology Progress Note Magaly Olivier Date of Admission : 11/1/2019 CC:  Follow up for MELVIN Assessment and Plan MELVIN: 
- likely 2/2 ATN from hypotension/PEA arrest 
- resolving  
- fluid overloaded. Stopped NS 
- start Bumex 1 mg BID  
- serial labs Hypernatremia : 
- watch w/ diuresis. - If Na rises, start OGT water flushes. Hypotension: 
- off pressors  
  
Anemia: 
- hgb 6.8 
  
NSCLC: 
- per oncology PTX s/p L VATS 
  
Shock liver: 
- trend LFTs 
  
PEA arrest: 
- ECHO from 11/6 w/ EF > 70% - per cardiology Interval History: 
Seen and examined. Remains sedated on the vent. Cr improving. UOP good. Not all charted yet Unable to obtain ROS Current Medications: all current  Medications have been eviewed in [facility] Review of Systems: Review of systems not obtained due to patient factors. Objective: 
Vitals:   
Vitals:  
 11/09/19 0300 11/09/19 0400 11/09/19 0431 11/09/19 0436 BP: 112/59 121/63 Pulse: 93 100 100 Resp: 25 24 22 Temp:  99 °F (37.2 °C) SpO2: 100% 99% 100% 100% Weight:      
Height:      
 
Intake and Output: 
11/08 1901 - 11/09 0700 In: 0641 [I.V.:1075] Out: 825 [Urine:695] 11/07 0701 - 11/08 1900 In: 6286.9 [I.V.:5536.9] Out: Isaac Mcpherson [facility] Physical Examination: 
Pt intubated    Yes General: sedated Neck:  Supple, no mass Resp:  Lungs CTA B/L, no wheezing , normal respiratory effort CV:  RRR,  no murmur or rub, no LE edema GI:  Soft, NT, + Bowel sounds, no hepatosplenomegaly Neurologic:  Sedated now Psych:             Unable to assess Skin:  No Rash :  Horner in place 
 
[]    High complexity decision making was performed 
[]    Patient is at high-risk of decompensation with multiple organ involvement Lab Data Personally Reviewed: I have reviewed all the pertinent labs, microbiology data and radiology studies during assessment. Recent Labs 11/09/19 
84 17 85 11/08/19 
1159 11/08/19 
0355 * 142 142 K 3.7 3.8 3.9 * 112* 112* CO2 23 20* 22 * 181* 167* BUN 49* 52* 55* CREA 1.48* 1.78* 1.80* CA 7.4* 7.2* 7.4* PHOS 3.7  --   --   
ALB 2.2* 2.4*  --   
SGOT 204* 329*  --   
ALT 1,029* 1,277*  --   
INR  --   --  1.3* Recent Labs 11/09/19 
2985 11/08/19 
0355 11/07/19 
6242 WBC 30.9* 38.5* 28.1* HGB 6.8* 6.5* 7.2* HCT 21.5* 20.0* 22.1*  
PLT 40* 45* 47* No results found for: SDES Lab Results Component Value Date/Time Culture result: MRSA PRESENT (A) 11/07/2019 11:06 AM  
 Culture result:  11/07/2019 11:06 AM  
      Screening of patient nares for MRSA is for surveillance purposes and, if positive, to facilitate isolation considerations in high risk settings. It is not intended for automatic decolonization interventions per se as regimens are not sufficiently effective to warrant routine use. Culture result: NO GROWTH 2 DAYS 11/06/2019 01:15 PM  
 
Recent Results (from the past 24 hour(s)) GLUCOSE, POC Collection Time: 11/08/19  7:09 AM  
Result Value Ref Range Glucose (POC) 180 (H) 65 - 100 mg/dL Performed by Wendy Mckeon METABOLIC PANEL, COMPREHENSIVE Collection Time: 11/08/19 11:59 AM  
Result Value Ref Range Sodium 142 136 - 145 mmol/L Potassium 3.8 3.5 - 5.1 mmol/L Chloride 112 (H) 97 - 108 mmol/L  
 CO2 20 (L) 21 - 32 mmol/L Anion gap 10 5 - 15 mmol/L Glucose 181 (H) 65 - 100 mg/dL BUN 52 (H) 6 - 20 MG/DL Creatinine 1.78 (H) 0.70 - 1.30 MG/DL  
 BUN/Creatinine ratio 29 (H) 12 - 20 GFR est AA 45 (L) >60 ml/min/1.73m2 GFR est non-AA 37 (L) >60 ml/min/1.73m2 Calcium 7.2 (L) 8.5 - 10.1 MG/DL Bilirubin, total 2.3 (H) 0.2 - 1.0 MG/DL  
 ALT (SGPT) 1,277 (H) 12 - 78 U/L  
 AST (SGOT) 329 (H) 15 - 37 U/L Alk. phosphatase 69 45 - 117 U/L Protein, total 4.2 (L) 6.4 - 8.2 g/dL Albumin 2.4 (L) 3.5 - 5.0 g/dL Globulin 1.8 (L) 2.0 - 4.0 g/dL A-G Ratio 1.3 1.1 - 2.2 GLUCOSE, POC  
 Collection Time: 11/08/19  1:00 PM  
Result Value Ref Range Glucose (POC) 181 (H) 65 - 100 mg/dL Performed by Lucy Cotton Collection Time: 11/08/19  3:43 PM  
Result Value Ref Range Vancomycin, random 7.3 UG/ML  
GLUCOSE, POC Collection Time: 11/08/19  5:11 PM  
Result Value Ref Range Glucose (POC) 187 (H) 65 - 100 mg/dL Performed by Chelo Manzano   
GLUCOSE, POC Collection Time: 11/09/19 12:14 AM  
Result Value Ref Range Glucose (POC) 193 (H) 65 - 100 mg/dL Performed by Kostas Marrero METABOLIC PANEL, BASIC Collection Time: 11/09/19  4:37 AM  
Result Value Ref Range Sodium 146 (H) 136 - 145 mmol/L Potassium 3.7 3.5 - 5.1 mmol/L Chloride 115 (H) 97 - 108 mmol/L  
 CO2 23 21 - 32 mmol/L Anion gap 8 5 - 15 mmol/L Glucose 165 (H) 65 - 100 mg/dL BUN 49 (H) 6 - 20 MG/DL Creatinine 1.48 (H) 0.70 - 1.30 MG/DL  
 BUN/Creatinine ratio 33 (H) 12 - 20 GFR est AA 56 (L) >60 ml/min/1.73m2 GFR est non-AA 46 (L) >60 ml/min/1.73m2 Calcium 7.4 (L) 8.5 - 10.1 MG/DL  
CBC WITH AUTOMATED DIFF Collection Time: 11/09/19  4:37 AM  
Result Value Ref Range WBC 30.9 (H) 4.1 - 11.1 K/uL  
 RBC 1.84 (L) 4.10 - 5.70 M/uL HGB 6.8 (L) 12.1 - 17.0 g/dL HCT 21.5 (L) 36.6 - 50.3 % .8 (H) 80.0 - 99.0 FL  
 MCH 37.0 (H) 26.0 - 34.0 PG  
 MCHC 31.6 30.0 - 36.5 g/dL RDW 20.3 (H) 11.5 - 14.5 % PLATELET 40 (LL) 379 - 400 K/uL MPV ABNORMAL 8.9 - 12.9 FL  
 NRBC 9.0 (H) 0  WBC ABSOLUTE NRBC 2.78 (H) 0.00 - 0.01 K/uL NEUTROPHILS 92 (H) 32 - 75 % LYMPHOCYTES 0 (L) 12 - 49 % MONOCYTES 4 (L) 5 - 13 % EOSINOPHILS 0 0 - 7 % BASOPHILS 0 0 - 1 % IMMATURE GRANULOCYTES 4 (H) 0.0 - 0.5 % ABS. NEUTROPHILS 28.5 (H) 1.8 - 8.0 K/UL  
 ABS. LYMPHOCYTES 0.0 (L) 0.8 - 3.5 K/UL  
 ABS. MONOCYTES 1.2 (H) 0.0 - 1.0 K/UL  
 ABS. EOSINOPHILS 0.0 0.0 - 0.4 K/UL  
 ABS. BASOPHILS 0.0 0.0 - 0.1 K/UL ABS. IMM. GRANS. 1.2 (H) 0.00 - 0.04 K/UL  
 DF SMEAR SCANNED    
 RBC COMMENTS ANISOCYTOSIS 2+ 
    
 RBC COMMENTS MACROCYTOSIS 2+ 
    
 RBC COMMENTS POLYCHROMASIA 1+ 
    
 RBC COMMENTS RBC FRAGMENTS    
PHOSPHORUS Collection Time: 11/09/19  4:37 AM  
Result Value Ref Range Phosphorus 3.7 2.6 - 4.7 MG/DL  
HEPATIC FUNCTION PANEL Collection Time: 11/09/19  4:37 AM  
Result Value Ref Range Protein, total 4.4 (L) 6.4 - 8.2 g/dL Albumin 2.2 (L) 3.5 - 5.0 g/dL Globulin 2.2 2.0 - 4.0 g/dL A-G Ratio 1.0 (L) 1.1 - 2.2 Bilirubin, total 2.1 (H) 0.2 - 1.0 MG/DL Bilirubin, direct 1.1 (H) 0.0 - 0.2 MG/DL Alk. phosphatase 72 45 - 117 U/L  
 AST (SGOT) 204 (H) 15 - 37 U/L  
 ALT (SGPT) 1,029 (H) 12 - 78 U/L  
GLUCOSE, POC Collection Time: 11/09/19  6:38 AM  
Result Value Ref Range Glucose (POC) 185 (H) 65 - 100 mg/dL Performed by Tatianna Coronado MD 
[address] [place] [address], [address] [facility] Phone - [phone] Fax - [phone] 
[email or web address]. [email or web address]

## 2019-11-09 NOTE — PROGRESS NOTES
Pharmacy consult note: 
Day #3 of Vancomycin Indication:  PNA Current regimen:  1g q24h Abx regimen:  vanomycin + pip-tazo + fluconazole ID Following ?: NO 
Concomitant nephrotoxic drugs (requires more frequent monitoring): Contrast agents and Vasopressors Frequency of BMP?: none--qdx3 ordered Recent Labs 19 
9562 19 
1159 19 
0355 19 
9156 WBC 30.9*  --  38.5* 28.1*  
CREA 1.48* 1.78* 1.80* 2.29* BUN 49* 52* 55* 54* Est CrCl:44 ml/min; UO: 1  ml/kg/hr Temp (24hrs), Av.9 °F (37.7 °C), Min:99 °F (37.2 °C), Max:100.6 °F (38.1 °C) Cultures:  
 tissue cultures from VATS JARETT resection: no growth, preliminary -- CMV, AFB smear negative  sputum: MRSA, yeast - final  
 blood: NGTD - prelim  MRSA screen: positive  blood [fungus]: pending Goal trough = 15 - 20 mcg/mL Recent trough history (date/time/level/dose/action taken): 
Random  @ 1543 = 7.3 mcg/ml ~24 hours post 1750 mg load. Begin 1g q24h Plan:Empirically will change to 1g IV q18h for significant improvement in renal function for a predicted trough ~ 16 mcg/ml

## 2019-11-09 NOTE — PROGRESS NOTES
1930: Bedside shift change report given to Luh Jorge RN (oncoming nurse) by Magalys Moreland RN (offgoing nurse). Report included the following information SBAR, Kardex, ED Summary, OR Summary, Intake/Output, MAR, Recent Results, Med Rec Status, Cardiac Rhythm ST and Alarm Parameters . 2045: Called equipment for kangaroo pump, will bring up.  
 
2102: Kangaroo pump not available, attempted to call equipment back referred to voicemail. Pt also breathing in the 30s-40s, tube feeds held at this time. Fentanyl given. 0000: Managing sedation with prn fentanyl, RR mid 30s. Will re-evaluate to determine if safe to start TF once kangaroo pump available. 0700: Nephrology at bedside, informed of pt RR status and holding of TF. Propofol restarted, TF held until family decides on comfort measures. 0730: Bedside shift change report given to Amanda Garcia RN and Diana Hines RN (oncoming nurse) by Luh Jorge RN (offgoing nurse). Report included the following information SBAR, Kardex, ED Summary, Intake/Output, MAR, Recent Results, Med Rec Status, Cardiac Rhythm ST and Alarm Parameters .

## 2019-11-09 NOTE — PROGRESS NOTES
Cardiology Progress Note 
 
11/9/2019 8:15 AM  
Admit Date: 11/1/2019 Admit Diagnosis: Respiratory failure ([facility] [address] [J96.90] Assessment/Plan 1. Right heart failure with cor pulmonale and severe pulmonary htn related to COPD Lab Results Component Value Date/Time  11/03/2019 09:28 AM  
 CK - MB 3.7 (H) 11/03/2019 09:28 AM  
 CK-MB Index 2.2 11/03/2019 09:28 AM  
 Troponin-I, Qt. 0.39 (H) 11/07/2019 03:48 AM  
 2. COPD, pulmonary nodules, PTX s/p VATS JARETT resection, mechanical pleurodesis - management per pulmonary/thoracic surgery 3. Cardiopulmonary arrest, felt to be related to #1 - ROSC 2 min, TTE showed EF > 70% post arrest, recent ECG c/w anterior ischemia 
-pt intubated and now off  Levophed for hypotension post arrest 
-lactic acid coming down (2.4 today) 3. Partial code now per palliative care - no CPR/shock, family agreeable to comfort measures if he does not improve 4. Body mass index is 22.54 kg/m². cachexia protein calorie malnutrition probably related to #1 and #2 5. Hypotension- getting IV fluids, solu-cortef, albumin 6. Acute liver failure/injury likely related to right heart failure and cardiopulmonary arrest - continue supportive care Lab Results Component Value Date/Time INR 1.3 (H) 11/08/2019 03:55 AM  
 INR 1.9 (H) 11/06/2019 06:21 AM  
 INR 1.3 (H) 11/05/2019 04:40 PM  
 Prothrombin time 13.0 (H) 11/08/2019 03:55 AM  
 Prothrombin time 18.6 (H) 11/06/2019 06:21 AM  
 Prothrombin time 13.3 (H) 11/05/2019 04:40 PM  
 , maintain BP, further management per GI 
7. Hx of Schizophrenia 8. MELVIN - creatinine up t continue IV fluids, management per nephrology Creat down from 2.2 Lab Results Component Value Date/Time Creatinine (POC) 1.0 11/05/2019 03:45 PM  
 Creatinine 1.48 (H) 11/09/2019 04:37 AM  
  
9. Anemia/thrombocytopenia - Hgb down to 7.2, Plt down to 47,000, holding ASA, management per primary team   
Lab Results Component Value Date/Time HGB 6.8 (L) 11/09/2019 04:37 AM  
 family POA is declining transfusion Echo 11/6/19 - EF > 70% Echo 11/2/219 - EF 50-55%, mod-severely reduced RV systolic function, RV pressure overload, mild AV sclerosis, mild TR, mild PA HTN, mild MR Nuc Stress 3/18 - no ischemia, EF 55% Subjective:  
 
Susannah Harris admitted with copd hypoxia respiratory distress developed acute cardiopulmonary arrest post VATS. Remains intubated Off propofol and on prn Fentanyl. Did open eyes Unable to obtain ROS. Rhythm and hemodynamics are stable Partial code now, palliative care consulting. .  
 
Objective Patient Vitals for the past 12 hrs: 
 Temp Pulse Resp BP SpO2  
11/09/19 1300  (!) 102 24 127/73 99 % 11/09/19 1208  (!) 102   100 % 11/09/19 1207     100 % 11/09/19 1200 99.3 °F (37.4 °C) (!) 102 22 124/65 100 % 11/09/19 1100  (!) 106 25 90/61   
11/09/19 1000  (!) 103 27 109/70   
11/09/19 0900  (!) 101 28 124/72   
11/09/19 0800 98.9 °F (37.2 °C) 96 21 113/63 100 % 11/09/19 0734  96   100 % 11/09/19 0733     100 % 11/09/19 0700  98 25 127/59 100 % 11/09/19 0500  97 26 105/56 100 % 11/09/19 0436     100 % 11/09/19 0431  100 22  100 % 11/09/19 0400 99 °F (37.2 °C) 100 24 121/63 99 % 11/09/19 0300  93 25 112/59 100 % 11/09/19 0200  (!) 104 (!) 33 116/70 100 % 11/09/19 0127  95 17  100 % General appearance: intubated obtunded but open eyes. Neck exam - supple, no JVD Chest: clear to auscultation, no wheezes, rales or rhonchi, symmetric air entry. CVS exam: normal rate, regular rhythm, normal S1, S2, no murmurs, rubs, clicks or gallops. Exam of extremities:no pedal edema, no clubbing or cyanosis   
abd non distended Neuro unable to assess Review of Systems Unable to perform ROS: Critical illness Lab Results Component Value Date/Time  WBC 30.9 (H) 11/09/2019 04:37 AM  
 HGB 6.8 (L) 11/09/2019 04:37 AM  
 Hematocrit (POC) 26 (L) 11/05/2019 03:45 PM  
 HCT 21.5 (L) 11/09/2019 04:37 AM  
 PLATELET 40 (LL) 14/19/2574 04:37 AM  
 .8 (H) 11/09/2019 04:37 AM  
 
Lab Results Component Value Date/Time Sodium 146 (H) 11/09/2019 04:37 AM  
 Potassium 3.7 11/09/2019 04:37 AM  
 Chloride 115 (H) 11/09/2019 04:37 AM  
 CO2 23 11/09/2019 04:37 AM  
 Anion gap 8 11/09/2019 04:37 AM  
 Glucose 165 (H) 11/09/2019 04:37 AM  
 BUN 49 (H) 11/09/2019 04:37 AM  
 Creatinine 1.48 (H) 11/09/2019 04:37 AM  
 BUN/Creatinine ratio 33 (H) 11/09/2019 04:37 AM  
 GFR est AA 56 (L) 11/09/2019 04:37 AM  
 GFR est non-AA 46 (L) 11/09/2019 04:37 AM  
 Calcium 7.4 (L) 11/09/2019 04:37 AM  
 
 
 
Current Facility-Administered Medications:  
  dextrose 5% - 0.45% NaCl with KCl 10 mEq/L infusion, 25 mL/hr, IntraVENous, CONTINUOUS, Ying SOMERS MD, Last Rate: 25 mL/hr at 11/09/19 0838, 25 mL/hr at 11/09/19 8705   bumetanide (BUMEX) injection 1 mg, 1 mg, IntraVENous, Q12H, Clay Evans MD, 1 mg at 11/09/19 0930 
  vancomycin (VANCOCIN) 1,000 mg in 0.9% sodium chloride (MBP/ADV) 250 mL, 1,000 mg, IntraVENous, Q18H, Isaiah Alfonso MD, Last Rate: 125 mL/hr at 11/09/19 1252, 1,000 mg at 11/09/19 1252   fentaNYL citrate (PF) injection 25-50 mcg, 25-50 mcg, IntraVENous, Q1H PRN, Francisco Womack MD, 25 mcg at 11/09/19 1137   hydrocortisone Sod Succ (PF) (SOLU-CORTEF) injection 50 mg, 50 mg, IntraVENous, Q8H, Zohra Woodard MD 
  0.9% sodium chloride infusion 250 mL, 250 mL, IntraVENous, PRN, Nery Wise MD 
  0.9% sodium chloride infusion 250 mL, 250 mL, IntraVENous, PRN, Mauro Elias MD 
  fluconazole (DIFLUCAN) 200mg/100 mL IVPB (premix), 200 mg, IntraVENous, Q24H, Mauro Kumar MD, Last Rate: 100 mL/hr at 11/09/19 1117, 200 mg at 11/09/19 1117 
  glucose chewable tablet 16 g, 4 Tab, Oral, PRN, Justin Garcia MD 
  glucagon (GLUCAGEN) injection 1 mg, 1 mg, IntraMUSCular, PRN, Justin Garcia MD 
   dextrose 10% infusion 0-250 mL, 0-250 mL, IntraVENous, PRN, Mulu Rojas MD 
  insulin lispro (HUMALOG) injection, , SubCUTAneous, Q6H, Mulu Rojas MD, 2 Units at 11/09/19 1251   Vancomycin - Pharmacy Dosing, , Other, Rx Dosing/Monitoring, Mulu Rojas MD 
  albuterol-ipratropium (DUO-NEB) 2.5 MG-0.5 MG/3 ML, 3 mL, Nebulization, Q4H RT, Mauro Kumar MD, 3 mL at 11/09/19 1207   piperacillin-tazobactam (ZOSYN) 3.375 g in 0.9% sodium chloride (MBP/ADV) 100 mL, 3.375 g, IntraVENous, Q8H, Mauro Kumar MD, Last Rate: 25 mL/hr at 11/09/19 1127, 3.375 g at 11/09/19 1127   chlorhexidine (ORAL CARE KIT) 0.12 % mouthwash 15 mL, 15 mL, Oral, Q12H, Mauro Kumar MD, 15 mL at 11/09/19 6273   famotidine (PF) (PEPCID) 20 mg in sodium chloride 0.9% 10 mL injection, 20 mg, IntraVENous, Q24H, Mauro Kumar MD, 20 mg at 11/08/19 1800 
  sodium chloride (NS) flush 5-40 mL, 5-40 mL, IntraVENous, Q8H, Lucía Day MD, 10 mL at 11/09/19 0435   sodium chloride (NS) flush 5-40 mL, 5-40 mL, IntraVENous, PRN, Lucía Day MD 
  oxyCODONE-acetaminophen (PERCOCET) 5-325 mg per tablet 2 Tab, 2 Tab, Oral, Q4H PRN, Lucía Day MD 
  [facility]) injection 0.4 mg, 0.4 mg, IntraVENous, PRN, Lucía Day MD 
  ondansetron [facility]) injection 4 mg, 4 mg, IntraVENous, Q4H PRN, Lucía Day MD, 4 mg at 11/02/19 4897   albuterol (PROVENTIL VENTOLIN) nebulizer solution 2.5 mg, 2.5 mg, Nebulization, Q4H PRN, Lucía Day MD, 2.5 mg at 11/04/19 7315   hydrALAZINE (APRESOLINE) 20 mg/mL injection 10 mg, 10 mg, IntraVENous, Q6H PRN, Lucía Day MD 
  [Held by provider] aspirin chewable tablet 81 mg, 81 mg, Oral, DAILY, Lucía Day MD, Stopped at 11/06/19 0900  
 
 
 
 reports that he has quit smoking. His smoking use included cigarettes. He smoked 1.00 pack per day.  He has never used smokeless tobacco. He reports that he does not drink alcohol or use drugs. family history includes Cancer in his brother, father, and mother.   
Macarena Rajan MD

## 2019-11-09 NOTE — PROGRESS NOTES
Hospitalist Progress Note NAME: Pedrito Driver :  1940 MRN:  [id number] Assessment / Plan: 
AHRF s/p VATS with tension PTX  
-Was on Hi flow, now intubated  post VATS 
-On ssolucortef 
-Appreciate Pulmonary help 
  Tension pneumothorax 
-s/p left chest tube  s/p VATS 
-since overnight of , oxygen requirement got worse, the patient was transferred to ICU - currently intubated - MGMT of vent per [facility] 
  
-Appreciate Thoracic surgery, chest tube remains in place. Remains critical 
  
Cardiac arrest  post VATS 
-ROSC 2 min 
-Troponin 0.38, BNP 4,688 
- Chest tube in place 
  
Bilateral pulmonary nodules,  
-status post lung biopsy 10/31 
-biopsy  with adenocarcinoma. Seen by oncology and palliative care. Prognosis appears poor. Cor pulmonale with rt sided heart failure - cardiology following MELVIN 
-likely 2/2 ATN from hypotension 
-on IV fluids. Dc on  if crt back to normal. 
  
Hyperkalemia 
-resolbed. 
  
Elevated troponin. -per cardiology likely demand ischemia 
-Echo EF 51-55% 
  
Leukocytosis 
-rpt us pending. On stress dosed steroids. 
  
Schizophrenia 
-Continue home meds 
  
Transaminitis 
-seen by gi,  Likely shock liver. Trending down. Tension pneumothorax as an expected/inherent condition that occurred post-operatively and not a complication The patient is critically ill and at high risk of further deterioration 
  
Code status: Partial code now. Appreciate Palliative care had a long discussion today as well with all the family members DVT prophylaxis: scd 
  
Plan: Follow thoracic surgery,  Cardiology, GI 
  
Care Plan discussed with: Patient/Family nabila Gross Celso is [place] Disposition: TBD 
 
 
18.5 - 24.9 Normal weight / Body mass index is 22.54 kg/m². Subjective: Chief Complaint / Reason for Physician Visit \"intubated and sedated. \".   
 
Review of Systems: 
Symptom Y/N Comments  Symptom Y/N Comments Fever/Chills    Chest Pain Poor Appetite    Edema Cough    Abdominal Pain Sputum    Joint Pain SOB/CARD    Pruritis/Rash Nausea/vomit    Tolerating PT/OT Diarrhea    Tolerating Diet Constipation    Other Could NOT obtain due to: sedation Objective: VITALS:  
Last 24hrs VS reviewed since prior progress note. Most recent are: 
Patient Vitals for the past 24 hrs: 
 Temp Pulse Resp BP SpO2  
11/09/19 1300  (!) 102 24 127/73 99 % 11/09/19 1208  (!) 102   100 % 11/09/19 1207     100 % 11/09/19 1200 99.3 °F (37.4 °C) (!) 102 22 124/65 100 % 11/09/19 1100  (!) 106 25 90/61   
11/09/19 1000  (!) 103 27 109/70   
11/09/19 0900  (!) 101 28 124/72   
11/09/19 0800 98.9 °F (37.2 °C) 96 21 113/63 100 % 11/09/19 0734  96   100 % 11/09/19 0733     100 % 11/09/19 0700  98 25 127/59 100 % 11/09/19 0500  97 26 105/56 100 % 11/09/19 0436     100 % 11/09/19 0431  100 22  100 % 11/09/19 0400 99 °F (37.2 °C) 100 24 121/63 99 % 11/09/19 0300  93 25 112/59 100 % 11/09/19 0200  (!) 104 (!) 33 116/70 100 % 11/09/19 0127  95 17  100 % 11/09/19 0100  92 28 99/62 100 % 11/09/19 0000 99.3 °F (37.4 °C) (!) 104 28 121/58 100 % 11/08/19 2322  (!) 105 29  100 % 11/08/19 2300  99 26 123/62 100 % 11/08/19 2200  (!) 103 26 120/51 97 % 11/08/19 2100  (!) 111 21 126/63 100 % 11/08/19 2000 99.9 °F (37.7 °C) (!) 106 26 115/55 100 % 11/08/19 1900  (!) 109 24 111/53 100 % 11/08/19 1800  (!) 109 29 114/63 100 % 11/08/19 1700  (!) 110 (!) 37 147/82 100 % 11/08/19 1600 (!) 100.6 °F (38.1 °C) 99 (!) 33 122/61 100 % 11/08/19 1445  100 (!) 36 131/67 100 % 11/08/19 1430  94 (!) 31 110/59 99 % 11/08/19 1400  (!) 101 (!) 33 118/68   
11/08/19 1330  95 30 134/63 100 % Intake/Output Summary (Last 24 hours) at 11/9/2019 1329 Last data filed at 11/9/2019 1300 Gross per 24 hour Intake 3116.75 ml Output 3235 ml Net -118.25 ml PHYSICAL EXAM: 
 General: WD, WN. sedated EENT:  EOMI. Anicteric sclerae. MMM Resp:  CTA bilaterally, no wheezing or rales. No accessory muscle use CV:  Regular  rhythm,  No edema GI:  Soft, Non distended, Non tender.  +Bowel sounds Neurologic:  Alert and oriented X 3, normal speech, Psych:   Good insight. Not anxious nor agitated Skin:  No rashes. No jaundice Reviewed most current lab test results and cultures  YES Reviewed most current radiology test results   YES Review and summation of old records today    NO Reviewed patient's current orders and MAR    YES 
PMH/SH reviewed - no change compared to H&P 
________________________________________________________________________ Care Plan discussed with: 
  Comments Patient Family RN Care Manager Consultant Multidiciplinary team rounds were held today with , nursing, pharmacist and clinical coordinator. Patient's plan of care was discussed; medications were reviewed and discharge planning was addressed. ________________________________________________________________________ Total NON critical care TIME:  20   Minutes Total CRITICAL CARE TIME Spent:   Minutes non procedure based Comments >50% of visit spent in counseling and coordination of care    
________________________________________________________________________ Lucille Song MD  
 
Procedures: see electronic medical records for all procedures/Xrays and details which were not copied into this note but were reviewed prior to creation of Plan. LABS: 
I reviewed today's most current labs and imaging studies. Pertinent labs include: 
Recent Labs 11/09/19 
4072 11/08/19 
0355 11/07/19 
1904 WBC 30.9* 38.5* 28.1* HGB 6.8* 6.5* 7.2* HCT 21.5* 20.0* 22.1*  
PLT 40* 45* 47* Recent Labs 11/09/19 
84 17 85 11/08/19 
1159 11/08/19 
0355 * 142 142  
K 3.7 3.8 3.9 * 112* 112* CO2 23 20* 22 * 181* 167* BUN 49* 52* 55* CREA 1.48* 1.78* 1.80* CA 7.4* 7.2* 7.4* PHOS 3.7  --   --   
ALB 2.2* 2.4*  --   
TBILI 2.1* 2.3*  --   
SGOT 204* 329*  --   
ALT 1,029* 1,277*  --   
INR  --   --  1.3* Signed: Estefany Donaldson MD

## 2019-11-09 NOTE — PROGRESS NOTES
[facility] [address] 
____________________________________________________________ Admit Date: 2019 POD/HD 4 Days Post-Op Procedure:  Procedure(s): LEFT VIDEO ASSISTED THORACOSCOPIC SURGERY/LEFT UPPER LOBE WEDGE RESECTION/ LEFT LOWER LOBE WEDGE RESECTION/ MECHANICAL PLEURODESIS Subjective:  
 
Patient has no new complaints. Objective:  
 
Blood pressure 124/72, pulse (!) 101, temperature 98.9 °F (37.2 °C), resp. rate 28, height 6' (1.829 m), weight 75.4 kg (166 lb 3.6 oz), SpO2 100 %. Temp (24hrs), Av.7 °F (37.6 °C), Min:98.9 °F (37.2 °C), Max:100.6 °F (38.1 °C) Date 19 - 11/10/19 7000 Shift 5175-3923 0100-9422 1413-5999 24 Hour Total  
INTAKE  
I.V.(mL/kg/hr) 256.1   256.1 Shift Total(mL/kg) 256. 1(3.4)   256. 1(3.4) OUTPUT Urine(mL/kg/hr) 235   235 Shift Total(mL/kg) 235(3.1)   235(3.1) Weight (kg) 75.4 75.4 75.4 75.4 Date 19 - 11/10/19 9811 Shift 2490-2927 3163-6496 8946-4985 24 Hour Total  
INTAKE  
I.V.(mL/kg/hr) 256.1   256.1 Shift Total(mL/kg) 256. 1(3.4)   256. 1(3.4) OUTPUT Urine(mL/kg/hr) 235   235 Shift Total(mL/kg) 235(3.1)   235(3.1) Weight (kg) 75.4 75.4 75.4 75.4 Physical Exam:  GENERAL: pt on vent , LUNG: coarse BS, HEART: regular rate and rhythm, S1, S2 normal, no murmur, click, rub or gallop Incision:clean, dry and intact Chest Tube: with airleak Labs:  
Recent Results (from the past 24 hour(s)) METABOLIC PANEL, COMPREHENSIVE Collection Time: 19 11:59 AM  
Result Value Ref Range Sodium 142 136 - 145 mmol/L Potassium 3.8 3.5 - 5.1 mmol/L Chloride 112 (H) 97 - 108 mmol/L  
 CO2 20 (L) 21 - 32 mmol/L Anion gap 10 5 - 15 mmol/L Glucose 181 (H) 65 - 100 mg/dL BUN 52 (H) 6 - 20 MG/DL Creatinine 1.78 (H) 0.70 - 1.30 MG/DL  
 BUN/Creatinine ratio 29 (H) 12 - 20 GFR est AA 45 (L) >60 ml/min/1.73m2 GFR est non-AA 37 (L) >60 ml/min/1.73m2 Calcium 7.2 (L) 8.5 - 10.1 MG/DL Bilirubin, total 2.3 (H) 0.2 - 1.0 MG/DL  
 ALT (SGPT) 1,277 (H) 12 - 78 U/L  
 AST (SGOT) 329 (H) 15 - 37 U/L Alk. phosphatase 69 45 - 117 U/L Protein, total 4.2 (L) 6.4 - 8.2 g/dL Albumin 2.4 (L) 3.5 - 5.0 g/dL Globulin 1.8 (L) 2.0 - 4.0 g/dL A-G Ratio 1.3 1.1 - 2.2 GLUCOSE, POC Collection Time: 11/08/19  1:00 PM  
Result Value Ref Range Glucose (POC) 181 (H) 65 - 100 mg/dL Performed by Carolann Donaldson Collection Time: 11/08/19  3:43 PM  
Result Value Ref Range Vancomycin, random 7.3 UG/ML  
GLUCOSE, POC Collection Time: 11/08/19  5:11 PM  
Result Value Ref Range Glucose (POC) 187 (H) 65 - 100 mg/dL Performed by Benedict Hui   
GLUCOSE, POC Collection Time: 11/09/19 12:14 AM  
Result Value Ref Range Glucose (POC) 193 (H) 65 - 100 mg/dL Performed by Veronica Schwab METABOLIC PANEL, BASIC Collection Time: 11/09/19  4:37 AM  
Result Value Ref Range Sodium 146 (H) 136 - 145 mmol/L Potassium 3.7 3.5 - 5.1 mmol/L Chloride 115 (H) 97 - 108 mmol/L  
 CO2 23 21 - 32 mmol/L Anion gap 8 5 - 15 mmol/L Glucose 165 (H) 65 - 100 mg/dL BUN 49 (H) 6 - 20 MG/DL Creatinine 1.48 (H) 0.70 - 1.30 MG/DL  
 BUN/Creatinine ratio 33 (H) 12 - 20 GFR est AA 56 (L) >60 ml/min/1.73m2 GFR est non-AA 46 (L) >60 ml/min/1.73m2 Calcium 7.4 (L) 8.5 - 10.1 MG/DL  
CBC WITH AUTOMATED DIFF Collection Time: 11/09/19  4:37 AM  
Result Value Ref Range WBC 30.9 (H) 4.1 - 11.1 K/uL  
 RBC 1.84 (L) 4.10 - 5.70 M/uL HGB 6.8 (L) 12.1 - 17.0 g/dL HCT 21.5 (L) 36.6 - 50.3 % .8 (H) 80.0 - 99.0 FL  
 MCH 37.0 (H) 26.0 - 34.0 PG  
 MCHC 31.6 30.0 - 36.5 g/dL RDW 20.3 (H) 11.5 - 14.5 % PLATELET 40 (LL) 227 - 400 K/uL MPV ABNORMAL 8.9 - 12.9 FL  
 NRBC 9.0 (H) 0  WBC ABSOLUTE NRBC 2.78 (H) 0.00 - 0.01 K/uL NEUTROPHILS 92 (H) 32 - 75 % LYMPHOCYTES 0 (L) 12 - 49 % MONOCYTES 4 (L) 5 - 13 % EOSINOPHILS 0 0 - 7 % BASOPHILS 0 0 - 1 % IMMATURE GRANULOCYTES 4 (H) 0.0 - 0.5 % ABS. NEUTROPHILS 28.5 (H) 1.8 - 8.0 K/UL  
 ABS. LYMPHOCYTES 0.0 (L) 0.8 - 3.5 K/UL  
 ABS. MONOCYTES 1.2 (H) 0.0 - 1.0 K/UL  
 ABS. EOSINOPHILS 0.0 0.0 - 0.4 K/UL  
 ABS. BASOPHILS 0.0 0.0 - 0.1 K/UL  
 ABS. IMM. GRANS. 1.2 (H) 0.00 - 0.04 K/UL  
 DF SMEAR SCANNED    
 RBC COMMENTS ANISOCYTOSIS 2+ 
    
 RBC COMMENTS MACROCYTOSIS 2+ 
    
 RBC COMMENTS POLYCHROMASIA 1+ 
    
 RBC COMMENTS RBC FRAGMENTS    
PHOSPHORUS Collection Time: 11/09/19  4:37 AM  
Result Value Ref Range Phosphorus 3.7 2.6 - 4.7 MG/DL  
HEPATIC FUNCTION PANEL Collection Time: 11/09/19  4:37 AM  
Result Value Ref Range Protein, total 4.4 (L) 6.4 - 8.2 g/dL Albumin 2.2 (L) 3.5 - 5.0 g/dL Globulin 2.2 2.0 - 4.0 g/dL A-G Ratio 1.0 (L) 1.1 - 2.2 Bilirubin, total 2.1 (H) 0.2 - 1.0 MG/DL Bilirubin, direct 1.1 (H) 0.0 - 0.2 MG/DL Alk. phosphatase 72 45 - 117 U/L  
 AST (SGOT) 204 (H) 15 - 37 U/L  
 ALT (SGPT) 1,029 (H) 12 - 78 U/L  
GLUCOSE, POC Collection Time: 11/09/19  6:38 AM  
Result Value Ref Range Glucose (POC) 185 (H) 65 - 100 mg/dL Performed by Vivienne Singh Data Review images and reports reviewed CT in good position Assessment:  
 
Principal Problem: 
  Shortness of breath (11/1/2019) Active Problems: 
  Pneumothorax on left (11/1/2019) Respiratory failure ([facility] [address] (11/1/2019) Right heart failure ([facility] [address] (11/6/2019) Plan/Recommendations/Medical Decision Making:  
 
Cont CT to suction Rest of care per ICU team  
 
Thank you for allowing us to participate in the care of your patient.  
 
Malu Tan MD

## 2019-11-09 NOTE — PROGRESS NOTES
[facility] Name: Shireen Connelly  MRN: [id number]  Date: 2019 10:13 AM 
Admission Date: 2019  : 1940 Impression Plan 1. Acute resp failure- COPD + multiple pulmonary nondules ( s/p non dx left angelika bx last week complicated by tension PTX) 2. S/p Left VATS with JARETT/LLL wedge bx 19 and mechanical pleurodesis-for diffuse pulm nodules- path shows metastatic adenocarcinoma 3. Post op PEA arrest 19 - rosc 2 min 4. Septic shock-suspect ECFV replete, on stress dose steroids/LV fxn ok. 5. MELVIN 6. Lactic acidosis 7. Coagulopathy/DIC 
8. Shock liver 9. COPD FEV1 1.31 ( 46%) 10. ECHO  EF 71% RV moderatley reduced PASP 32 1. Sedation reviewed 2. Lung protective ventilation 3. On abx 4. transfure to Hb 7 or higher 5. Nebs 6. CT in place- thoracic following 7. Palliative Medicine following- meeting planned for Monday: family has declined blood products 8. Receiving diuretics 9. On stress dose steroids- start wean Pt is critically ill CCT EOP 30 min. At risk for decline due to sepsis/MODS. D/W RN Subjective Sedated, does not follow commands. Review of systems not obtained due to patient factors. EXAM: 
Visit Vitals BP 90/61 Pulse (!) 106 Temp 98.9 °F (37.2 °C) Resp 25 Ht 6' (1.829 m) Wt 75.4 kg (166 lb 3.6 oz) SpO2 100% BMI 22.54 kg/m² Temp (24hrs), Av.7 °F (37.6 °C), Min:98.9 °F (37.2 °C), Max:100.6 °F (38.1 °C) GENERAL: well developed and in no distress, NECK:  no jugular vein distention, no retractions, no thyromegaly or masses, LUNGS: decreased breath sounds billaterally and with rhonchi , HEART:  Regular rate and rhythm with no MGR; no edema is present, ABDOMEN:  soft with no tenderness, bowel sounds present, EXTREMITIES:  warm with no cyanosis and SKIN:  no jaundice or ecchymosis LABS Recent Labs 19 
84 17 85 19 
0355 19 0348  
WBC 30.9* 38.5* 28.1* HGB 6.8* 6.5* 7.2* HCT 21.5* 20.0* 22.1*  
PLT 40* 45* 47* Recent Labs 11/09/19 
84 17 85 11/08/19 
1159 11/08/19 
0355 * 142 142  
K 3.7 3.8 3.9 * 112* 112* CO2 23 20* 22 * 181* 167* BUN 49* 52* 55* CREA 1.48* 1.78* 1.80* CA 7.4* 7.2* 7.4* PHOS 3.7  --   --   
ALB 2.2* 2.4*  --   
SGOT 204* 329*  --   
ALT 1,029* 1,277*  --   
INR  --   --  1.3* ABG No results for input(s): PHI, PO2I, PCO2I in the last 72 hours. Radiology Films have been personally reviewed. PCXR with diffuse ASD ETT ok no PTX Seth Rico MD

## 2019-11-10 NOTE — PROGRESS NOTES
Hospitalist Progress Note NAME: Jil Serna :  1940 MRN:  [id number] Assessment / Plan: 
AHRF s/p VATS with tension PTX  
-Was on Hi flow, now intubated  post VATS 
-On solu cortef 
-Appreciate Pulmonary help 
  Tension pneumothorax 
-s/p left chest tube  s/p VATS 
-since overnight of , oxygen requirement got worse, the patient was transferred to ICU - currently intubated - MGMT of vent per [facility] 
  
-Appreciate Thoracic surgery, chest tube remains in place. Remains critical 
  
Cardiac arrest  post VATS 
-ROSC 2 min 
-Troponin 0.38, BNP 4,688 
- Chest tube in place 
  
Bilateral pulmonary nodules,  
-status post lung biopsy 10/31 
-biopsy  with adenocarcinoma. Seen by oncology and palliative care. Prognosis appears poor. Cor pulmonale with rt sided heart failure - cardiology following MELVIN 
-likely 2/2 ATN from hypotension 
-on IV fluids. Dc on  if crt back to normal. 
  
Hyperkalemia 
-resolbed. 
  
Elevated troponin. -per cardiology likely demand ischemia 
-Echo EF 51-55% 
  
Leukocytosis 
-rpt us pending. On stress dosed steroids. 
  
Schizophrenia 
-Continue home meds 
  
Transaminitis 
-seen by gi,  Likely shock liver. Numbers looking better Replete K PRN Tension pneumothorax as an expected/inherent condition that occurred post-operatively and not a complication The patient is critically ill and at high risk of further deterioration 
  
Code status: Partial code now. Appreciate Palliative care had a long discussion today as well with all the family members DVT prophylaxis: scd 
  
Plan: Follow thoracic surgery,  Cardiology, GI 
  
Care Plan discussed with: Patient/Family nabila Dale is mPOA Disposition: TBD Poor prognosis 18.5 - 24.9 Normal weight / Body mass index is 21.95 kg/m². Subjective: Chief Complaint / Reason for Physician Visit Sedated and intubated awake, had a long discussion with POA and other family members on 11/10 see separate note No new changes overnight Review of Systems: 
Symptom Y/N Comments  Symptom Y/N Comments Fever/Chills    Chest Pain Poor Appetite    Edema Cough    Abdominal Pain Sputum    Joint Pain SOB/CARD    Pruritis/Rash Nausea/vomit    Tolerating PT/OT Diarrhea    Tolerating Diet Constipation    Other Could NOT obtain due to: sedation Objective: VITALS:  
Last 24hrs VS reviewed since prior progress note. Most recent are: 
Patient Vitals for the past 24 hrs: 
 Temp Pulse Resp BP SpO2  
11/10/19 1000  (!) 109 24 135/70   
11/10/19 0900  (!) 121 (!) 35 157/76 97 % 11/10/19 0800 99.6 °F (37.6 °C) (!) 105 18 99/55   
11/10/19 0733     100 % 11/10/19 0700  (!) 103 22 115/63 100 % 11/10/19 0600  (!) 111 19 107/60 100 % 11/10/19 0550  (!) 113 20  100 % 11/10/19 0500  (!) 113 19 126/74 99 % 11/10/19 0400 99.6 °F (37.6 °C) (!) 115 16 126/66 99 % 11/10/19 0300  (!) 117 29 123/73 100 % 11/10/19 0200  (!) 115 19 119/67 100 % 11/10/19 0100  (!) 118 28 131/77 98 % 11/10/19 0030  (!) 117 22  100 % 11/10/19 0000 99.4 °F (37.4 °C) (!) 119 29 125/73 97 % 11/09/19 2300  (!) 116 19 126/71 98 % 11/09/19 2200  (!) 111 27 136/73 97 % 11/09/19 2100  (!) 111 29 130/76 99 % 11/09/19 2026  (!) 119 (!) 34  98 % 11/09/19 2000 99.7 °F (37.6 °C) (!) 109 29 130/74 100 % 11/09/19 1900  (!) 111 (!) 33 138/87 98 % 11/09/19 1800  (!) 111 (!) 32 138/71 97 % 11/09/19 1700  (!) 106 21 124/68 97 % 11/09/19 1623  99 26  100 % 11/09/19 1622     100 % 11/09/19 1600 99.6 °F (37.6 °C) 97 26 108/59   
11/09/19 1500  97 24 106/59 100 % 11/09/19 1400  99 20 106/61   
11/09/19 1300  (!) 102 24 127/73 99 % 11/09/19 1208  (!) 102   100 % 11/09/19 1207     100 % 11/09/19 1200 99.3 °F (37.4 °C) (!) 102 22 124/65 100 % 11/09/19 1100  (!) 106 25 90/61  Intake/Output Summary (Last 24 hours) at 11/10/2019 1051 Last data filed at 11/10/2019 0700 Gross per 24 hour Intake 1788.7 ml Output 3940 ml Net -2151.3 ml PHYSICAL EXAM: 
General: WD, WN. sedated EENT:  EOMI. Anicteric sclerae. MMM Resp:  CTA bilaterally, no wheezing or rales. No accessory muscle use CV:  Regular  rhythm,  No edema GI:  Soft, Non distended, Non tender.  +Bowel sounds Neurologic:  Alert and oriented X 3, normal speech, Psych:   Good insight. Not anxious nor agitated Skin:  No rashes. No jaundice Reviewed most current lab test results and cultures  YES Reviewed most current radiology test results   YES Review and summation of old records today    NO Reviewed patient's current orders and MAR    YES 
PMH/SH reviewed - no change compared to H&P 
________________________________________________________________________ Care Plan discussed with: 
  Comments Patient Family RN Care Manager Consultant Multidiciplinary team rounds were held today with , nursing, pharmacist and clinical coordinator. Patient's plan of care was discussed; medications were reviewed and discharge planning was addressed. ________________________________________________________________________ Total NON critical care TIME:  20   Minutes Total CRITICAL CARE TIME Spent:   Minutes non procedure based Comments >50% of visit spent in counseling and coordination of care    
________________________________________________________________________ Case Newberry MD  
 
Procedures: see electronic medical records for all procedures/Xrays and details which were not copied into this note but were reviewed prior to creation of Plan. LABS: 
I reviewed today's most current labs and imaging studies. Pertinent labs include: 
Recent Labs 11/10/19 
77 [phone] 11/09/19 
0437 11/08/19 
0355 WBC 31.4* 30.9* 38.5* HGB 7.2* 6.8* 6.5*  
 HCT 23.4* 21.5* 20.0*  
PLT 47* 40* 45* Recent Labs 11/10/19 
77 383 447 11/09/19 
0437 11/08/19 
1159 11/08/19 
0355 * 146* 142 142  
K 3.2* 3.7 3.8 3.9 * 115* 112* 112* CO2 28 23 20* 22 * 165* 181* 167* BUN 56* 49* 52* 55* CREA 1.59* 1.48* 1.78* 1.80* CA 7.7* 7.4* 7.2* 7.4* MG 2.3  --   --   --   
PHOS 4.2 3.7  --   --   
ALB 2.4* 2.2* 2.4*  --   
TBILI 1.6* 2.1* 2.3*  --   
SGOT 129* 204* 329*  --   
* 1,029* 1,277*  --   
INR  --   --   --  1.3* Signed: Heavenly Cordon MD

## 2019-11-10 NOTE — PROGRESS NOTES
Cardiology Progress Note 11/10/2019 8:15 AM  
Admit Date: 11/1/2019 Admit Diagnosis: Respiratory failure ([facility] [address] [J96.90] Cardiology will sign off, glad to see back if we can be of help Assessment/Plan 1. Right heart failure with cor pulmonale and severe pulmonary htn related to COPD- resp failure Lab Results Component Value Date/Time  11/03/2019 09:28 AM  
 CK - MB 3.7 (H) 11/03/2019 09:28 AM  
 CK-MB Index 2.2 11/03/2019 09:28 AM  
 Troponin-I, Qt. 0.39 (H) 11/07/2019 03:48 AM  
 2. COPD, pulmonary nodules, PTX s/p VATS JARETT resection, mechanical pleurodesis - management per pulmonary/thoracic surgery 3. Cardiopulmonary arrest, felt to be related to #1 - ROSC 2 min, TTE showed EF > 70% post arrest, recent ECG c/w anterior ischemia 
-pt intubated and now off  Levophed for hypotension post arrest 
-lactic acid coming down 3. Partial code now per palliative care - no CPR/shock, family agreeable to comfort measures if he does not improve 4. Body mass index is 21.95 kg/m². cachexia protein calorie malnutrition probably related to #1 and #2 5. Hypotension- getting IV fluids, solu-cortef, albumin 6. Acute liver failure/injury likely related to right heart failure and cardiopulmonary arrest - continue supportive care Lab Results Component Value Date/Time INR 1.3 (H) 11/08/2019 03:55 AM  
 INR 1.9 (H) 11/06/2019 06:21 AM  
 INR 1.3 (H) 11/05/2019 04:40 PM  
 Prothrombin time 13.0 (H) 11/08/2019 03:55 AM  
 Prothrombin time 18.6 (H) 11/06/2019 06:21 AM  
 Prothrombin time 13.3 (H) 11/05/2019 04:40 PM  
 , maintain BP, further management per GI 
7. Hx of Schizophrenia 8. MELVIN - creatinine up t continue IV fluids, management per nephrology Creat down from 2.2 Lab Results Component Value Date/Time Creatinine (POC) 1.0 11/05/2019 03:45 PM  
 Creatinine 1.59 (H) 11/10/2019 04:31 AM  
  
9.  Anemia/thrombocytopenia - Hgb down to 7.2, Plt [place] to 47,000, holding ASA, management per primary team   
Lab Results Component Value Date/Time HGB 7.2 (L) 11/10/2019 04:31 AM  
 family POA is declining transfusion Echo 11/6/19 - EF > 70% Echo 11/2/219 - EF 50-55%, mod-severely reduced RV systolic function, RV pressure overload, mild AV sclerosis, mild TR, mild PA HTN, mild MR Nuc Stress 3/18 - no ischemia, EF 55% Subjective:  
 
Sushma Sapp admitted with copd hypoxia respiratory distress developed acute cardiopulmonary arrest post VATS. Remains intubated Off propofol and on prn Fentanyl. Did open eyes Unable to obtain ROS. Rhythm and hemodynamics are stable Partial code now, palliative care consulting. Nurse reports HR only up when in pain so plan is Fentanyl drip Objective Patient Vitals for the past 12 hrs: 
 Temp Pulse Resp BP SpO2  
11/10/19 0733     100 % 11/10/19 0700  (!) 103 22 115/63 100 % 11/10/19 0600  (!) 111 19 107/60 100 % 11/10/19 0550  (!) 113 20  100 % 11/10/19 0500  (!) 113 19 126/74 99 % 11/10/19 0400 99.6 °F (37.6 °C) (!) 115 16 126/66 99 % 11/10/19 0300  (!) 117 29 123/73 100 % 11/10/19 0200  (!) 115 19 119/67 100 % 11/10/19 0100  (!) 118 28 131/77 98 % 11/10/19 0030  (!) 117 22  100 % 11/10/19 0000 99.4 °F (37.4 °C) (!) 119 29 125/73 97 % 11/09/19 2300  (!) 116 19 126/71 98 % 11/09/19 2200  (!) 111 27 136/73 97 % General appearance: intubated obtunded but open eyes. Neck exam -  no JVD Chest: clear to auscultation, no wheezes, rales or rhonchi, symmetric air entry. CVS exam: normal rate, regular rhythm, normal S1, S2, no murmurs, rubs, clicks or gallops. Exam of extremities:no pedal edema, no clubbing or cyanosis   
abd non distended Neuro unable to assess Review of Systems Unable to perform ROS: Critical illness Lab Results Component Value Date/Time  WBC 31.4 (H) 11/10/2019 04:31 AM  
 HGB 7.2 (L) 11/10/2019 04:31 AM  
 Hematocrit (POC) 26 (L) 2019 03:45 PM  
 HCT 23.4 (L) 11/10/2019 04:31 AM  
 PLATELET 47 (LL)  04:31 AM  
 .2 (H) 11/10/2019 04:31 AM  
 
Lab Results Component Value Date/Time Sodium 147 (H) 11/10/2019 04:31 AM  
 Potassium 3.2 (L) 11/10/2019 04:31 AM  
 Chloride 113 (H) 11/10/2019 04:31 AM  
 CO2 28 11/10/2019 04:31 AM  
 Anion gap 6 11/10/2019 04:31 AM  
 Glucose 240 (H) 11/10/2019 04:31 AM  
 BUN 56 (H) 11/10/2019 04:31 AM  
 Creatinine 1.59 (H) 11/10/2019 04:31 AM  
 BUN/Creatinine ratio 35 (H) 11/10/2019 04:31 AM  
 GFR est AA 51 (L) 11/10/2019 04:31 AM  
 GFR est non-AA 42 (L) 11/10/2019 04:31 AM  
 Calcium 7.7 (L) 11/10/2019 04:31 AM  
 
 
 
Current Facility-Administered Medications:  
  dextrose 5% 1,000 mL with potassium chloride 40 mEq infusion, , IntraVENous, CONTINUOUS, Clay Evans MD 
  potassium chloride 20 mEq in 50 ml IVPB, 20 mEq, IntraVENous, ONCE, Clay Evans MD, Last Rate: 25 mL/hr at 11/10/19 0925, 20 mEq at 11/10/19 0925 
  vancomycin (VANCOCIN) 1,000 mg in 0.9% sodium chloride (MBP/ADV) 250 mL, 1,000 mg, IntraVENous, Q18H, Santosh Layne MD, Last Rate: 125 mL/hr at 11/10/19 0730, 1,000 mg at 11/10/19 0730   fentaNYL citrate (PF) injection 25-50 mcg, 25-50 mcg, IntraVENous, Q1H PRN, Brandi Burt MD, 50 mcg at 11/10/19 0915   hydrocortisone Sod Succ (PF) (SOLU-CORTEF) injection 50 mg, 50 mg, IntraVENous, Q8H, Brandi Burt MD, 50 mg at 11/10/19 0730 
  0.9% sodium chloride infusion 250 mL, 250 mL, IntraVENous, PRN, Aixa Coleman MD 
  0.9% sodium chloride infusion 250 mL, 250 mL, IntraVENous, PRN, Mauro Alvarez MD 
  fluconazole (DIFLUCAN) 200mg/100 mL IVPB (premix), 200 mg, IntraVENous, Q24H, Mauro Kumar MD, Last Rate: 100 mL/hr at 19 1117, 200 mg at 19 1117 
  glucose chewable tablet 16 g, 4 Tab, Oral, PRN, Cathryn Stanford MD 
  glucagon (GLUCAGEN) injection 1 mg, 1 mg, IntraMUSCular, PRN, José, MD Mauro 
  dextrose 10% infusion 0-250 mL, 0-250 mL, IntraVENous, PRN, Felipa Maravilla MD 
  insulin lispro (HUMALOG) injection, , SubCUTAneous, Q6H, Felipa Maravilla MD, 5 Units at 11/10/19 0081   Vancomycin - Pharmacy Dosing, , Other, Rx Dosing/Monitoring, Felipa Maravilla MD 
  albuterol-ipratropium (DUO-NEB) 2.5 MG-0.5 MG/3 ML, 3 mL, Nebulization, Q4H RT, Mauro Kumar MD, 3 mL at 11/10/19 0634   piperacillin-tazobactam (ZOSYN) 3.375 g in 0.9% sodium chloride (MBP/ADV) 100 mL, 3.375 g, IntraVENous, Q8H, Mauro Kumar MD, Last Rate: 25 mL/hr at 11/10/19 0335, 3.375 g at 11/10/19 0335   chlorhexidine (ORAL CARE KIT) 0.12 % mouthwash 15 mL, 15 mL, Oral, Q12H, Mauro Kumar MD, 15 mL at 11/09/19 2123   famotidine (PF) (PEPCID) 20 mg in sodium chloride 0.9% 10 mL injection, 20 mg, IntraVENous, Q24H, Mauro Kumar MD, 20 mg at 11/09/19 1851   sodium chloride (NS) flush 5-40 mL, 5-40 mL, IntraVENous, Q8H, Meera Thayer MD, 10 mL at 11/10/19 0730 
  sodium chloride (NS) flush 5-40 mL, 5-40 mL, IntraVENous, PRN, Meera Thayer MD 
  oxyCODONE-acetaminophen (PERCOCET) 5-325 mg per tablet 2 Tab, 2 Tab, Oral, Q4H PRN, Meera Thayer MD 
  [facility]) injection 0.4 mg, 0.4 mg, IntraVENous, PRN, Meera Thayer MD 
  ondansetron [facility]) injection 4 mg, 4 mg, IntraVENous, Q4H PRN, Meera Thayer MD, 4 mg at 11/02/19 7719   albuterol (PROVENTIL VENTOLIN) nebulizer solution 2.5 mg, 2.5 mg, Nebulization, Q4H PRN, Meera Thayer MD, 2.5 mg at 11/04/19 2106   hydrALAZINE (APRESOLINE) 20 mg/mL injection 10 mg, 10 mg, IntraVENous, Q6H PRN, Meera Thayer MD 
  [Held by provider] aspirin chewable tablet 81 mg, 81 mg, Oral, DAILY, Meera Thayer MD, Stopped at 11/06/19 0900  
 
 
 
 reports that he has quit smoking. His smoking use included cigarettes. He smoked 1.00 pack per day.  He has never used smokeless tobacco. He reports that he does not drink alcohol or use drugs. family history includes Cancer in his brother, father, and mother.   
Daysi Acevedo MD

## 2019-11-10 NOTE — PROGRESS NOTES
1930: Bedside shift change report given to Luh Jorge RN (oncoming nurse) by Kristal Herrmann RN and Hali Cuello RN (offgoing nurse). Report included the following information SBAR, Kardex, ED Summary, OR Summary, Intake/Output, MAR, Recent Results, Med Rec Status, Cardiac Rhythm SR/ST and Alarm Parameters . 0730: Bedside shift change report given to JASMYN Guerrero (oncoming nurse) by Luh Jorge RN (offgoing nurse). Report included the following information SBAR, Kardex, ED Summary, Intake/Output, MAR, Recent Results, Med Rec Status, Cardiac Rhythm ST and Alarm Parameters .

## 2019-11-10 NOTE — PROGRESS NOTES
0730  Bedside and Verbal shift change report given to [address] (oncoming nurse) by Neeru Joshua  (offgoing nurse). Report included the following information SBAR, MAR, Cardiac Rhythm NRS and Alarm Parameters . 1200 Primary Nurse Ubaldo Rosado RN and Walt Alarcon RN performed a dual skin assessment on this patient Impairment noted- see wound doc flow sheet Renato score is 11 
 
 1940Bedside and Verbal shift change report given to Neeru Joshua  (oncoming nurse) by [address]  (offgoing nurse). Report included the following information SBAR, Intake/Output, Cardiac Rhythm NSR and Alarm Parameters .

## 2019-11-10 NOTE — PROGRESS NOTES
[facility] [address] 
____________________________________________________________ Admit Date: 2019 POD/HD 5 Days Post-Op Procedure:  Procedure(s): LEFT VIDEO ASSISTED THORACOSCOPIC SURGERY/LEFT UPPER LOBE WEDGE RESECTION/ LEFT LOWER LOBE WEDGE RESECTION/ MECHANICAL PLEURODESIS Subjective:  
 
Patient has no new complaints. Objective:  
 
Blood pressure 115/63, pulse (!) 103, temperature 99.6 °F (37.6 °C), resp. rate 22, height 6' (1.829 m), weight 73.4 kg (161 lb 13.1 oz), SpO2 100 %. Temp (24hrs), Av.5 °F (37.5 °C), Min:99.3 °F (37.4 °C), Max:99.7 °F (37.6 °C) Date 11/10/19 0700 - 19 4002 Shift 6140-5629 5575-1718 0951-3266 24 Hour Total  
INTAKE  
I.V.(mL/kg/hr) 25   25 Shift Total(mL/kg) 25(0.3)   25(0.3) OUTPUT Urine(mL/kg/hr) 100   100 Shift Total(mL/kg) 100(1.4)   100(1.4) Weight (kg) 73.4 73.4 73.4 73.4 Date 11/10/19 0700 - 19 6606 Shift 5568-8076 6180-8051 9476-0219 24 Hour Total  
INTAKE  
I.V.(mL/kg/hr) 25   25 Shift Total(mL/kg) 25(0.3)   25(0.3) OUTPUT Urine(mL/kg/hr) 100   100 Shift Total(mL/kg) 100(1.4)   100(1.4) Weight (kg) 73.4 73.4 73.4 73.4 Physical Exam:  GENERAL: sedated on vent , LUNG: coarse BS , HEART: regular rate and rhythm, S1, S2 normal, no murmur, click, rub or gallop Incision:clean, dry and intact Chest Tube: positive air leak Labs:  
Recent Results (from the past 24 hour(s)) GLUCOSE, POC Collection Time: 19 12:37 PM  
Result Value Ref Range Glucose (POC) 198 (H) 65 - 100 mg/dL Performed by [employer] GLUCOSE, POC Collection Time: 19  5:55 PM  
Result Value Ref Range Glucose (POC) 224 (H) 65 - 100 mg/dL Performed by [employer] GLUCOSE, POC Collection Time: 11/10/19  1:14 AM  
Result Value Ref Range Glucose (POC) 289 (H) 65 - 100 mg/dL  Performed by Anish Burgos   
CBC WITH AUTOMATED DIFF  
 Collection Time: 11/10/19  4:31 AM  
Result Value Ref Range WBC 31.4 (H) 4.1 - 11.1 K/uL  
 RBC 1.98 (L) 4.10 - 5.70 M/uL HGB 7.2 (L) 12.1 - 17.0 g/dL HCT 23.4 (L) 36.6 - 50.3 % .2 (H) 80.0 - 99.0 FL  
 MCH 36.4 (H) 26.0 - 34.0 PG  
 MCHC 30.8 30.0 - 36.5 g/dL RDW 22.3 (H) 11.5 - 14.5 % PLATELET 47 (LL) 375 - 400 K/uL NRBC 9.3 (H) 0  WBC ABSOLUTE NRBC 2.93 (H) 0.00 - 0.01 K/uL NEUTROPHILS 91 (H) 32 - 75 % LYMPHOCYTES 0 (L) 12 - 49 % MONOCYTES 8 5 - 13 % EOSINOPHILS 0 0 - 7 % BASOPHILS 0 0 - 1 % METAMYELOCYTES 1 (H) 0 % IMMATURE GRANULOCYTES 0 %  
 ABS. NEUTROPHILS 28.6 (H) 1.8 - 8.0 K/UL  
 ABS. LYMPHOCYTES 0.0 (L) 0.8 - 3.5 K/UL  
 ABS. MONOCYTES 2.5 (H) 0.0 - 1.0 K/UL  
 ABS. EOSINOPHILS 0.0 0.0 - 0.4 K/UL  
 ABS. BASOPHILS 0.0 0.0 - 0.1 K/UL  
 ABS. IMM. GRANS. 0.0 K/UL  
 DF MANUAL PLATELET COMMENTS Large Platelets RBC COMMENTS ANISOCYTOSIS 2+ 
    
 RBC COMMENTS MACROCYTOSIS 2+ 
    
 RBC COMMENTS POLYCHROMASIA 1+ 
    
 RBC COMMENTS SCHISTOCYTES PRESENT 
    
METABOLIC PANEL, COMPREHENSIVE Collection Time: 11/10/19  4:31 AM  
Result Value Ref Range Sodium 147 (H) 136 - 145 mmol/L Potassium 3.2 (L) 3.5 - 5.1 mmol/L Chloride 113 (H) 97 - 108 mmol/L  
 CO2 28 21 - 32 mmol/L Anion gap 6 5 - 15 mmol/L Glucose 240 (H) 65 - 100 mg/dL BUN 56 (H) 6 - 20 MG/DL Creatinine 1.59 (H) 0.70 - 1.30 MG/DL  
 BUN/Creatinine ratio 35 (H) 12 - 20 GFR est AA 51 (L) >60 ml/min/1.73m2 GFR est non-AA 42 (L) >60 ml/min/1.73m2 Calcium 7.7 (L) 8.5 - 10.1 MG/DL Bilirubin, total 1.6 (H) 0.2 - 1.0 MG/DL  
 ALT (SGPT) 875 (H) 12 - 78 U/L  
 AST (SGOT) 129 (H) 15 - 37 U/L Alk. phosphatase 94 45 - 117 U/L Protein, total 4.9 (L) 6.4 - 8.2 g/dL Albumin 2.4 (L) 3.5 - 5.0 g/dL Globulin 2.5 2.0 - 4.0 g/dL A-G Ratio 1.0 (L) 1.1 - 2.2 PHOSPHORUS Collection Time: 11/10/19  4:31 AM  
Result Value Ref Range Phosphorus 4.2 2.6 - 4.7 MG/DL MAGNESIUM Collection Time: 11/10/19  4:31 AM  
Result Value Ref Range Magnesium 2.3 1.6 - 2.4 mg/dL GLUCOSE, POC Collection Time: 11/10/19  7:29 AM  
Result Value Ref Range Glucose (POC) 249 (H) 65 - 100 mg/dL Performed by Natty Elizondo Data Review images and reports reviewed Assessment:  
 
Principal Problem: 
  Shortness of breath (11/1/2019) Active Problems: 
  Pneumothorax on left (11/1/2019) Respiratory failure ([address] (11/1/2019) Right heart failure ([address] (11/6/2019) Plan/Recommendations/Medical Decision Making:  
 
Cont CT to suction for now Thank you for allowing us to participate in the care of your patient.  
 
Finn Connell MD

## 2019-11-10 NOTE — PROGRESS NOTES
Nephrology Progress Note Shireen Connelly Date of Admission : 11/1/2019 CC:  Follow up for MELVIN Assessment and Plan MELVIN: 
- likely 2/2 ATN from hypotension/PEA arrest 
- resolving  
- continue w/ low dose diuretics Hypernatremia : 
- getting TF intermittently  
- unable to add water flushes 
- ordered D5W w/ Kcl . Hypotension: 
- off pressors  
  
Anemia: 
- hgb 6.8 
  
NSCLC: 
- per oncology PTX s/p L VATS 
  
Shock liver: 
- trend LFTs 
  
PEA arrest: 
- ECHO from 11/6 w/ EF > 70% - per cardiology Interval History: 
Seen and examined. Remains on vent Unable to obtain ROS Current Medications: all current  Medications have been eviewed in [facility] Review of Systems: Review of systems not obtained due to patient factors. Objective: 
Vitals:   
Vitals:  
 11/10/19 0550 11/10/19 0600 11/10/19 0700 11/10/19 2521 BP:  107/60 115/63 Pulse: (!) 113 (!) 111 (!) 103 Resp: 20 19 22 Temp:      
SpO2: 100% 100% 100% 100% Weight:      
Height:      
 
Intake and Output: 
No intake/output data recorded. 11/08 1901 - 11/10 0700 In: 3194.8 [I.V.:2484.8] Out: 5800 [YTZBX:3386] Physical Examination: 
Pt intubated    Yes General: sedated Neck:  Supple, no mass Resp:  Lungs CTA B/L, no wheezing , normal respiratory effort CV:  RRR,  no murmur or rub, no LE edema GI:  Soft, NT, + Bowel sounds, no hepatosplenomegaly Neurologic:  Sedated now Psych:             Unable to assess Skin:  No Rash :  Horner in place 
 
[]    High complexity decision making was performed 
[]    Patient is at high-risk of decompensation with multiple organ involvement Lab Data Personally Reviewed: I have reviewed all the pertinent labs, microbiology data and radiology studies during assessment. Recent Labs 11/10/19 
77 [phone] 11/09/19 
0437 11/08/19 
1159 11/08/19 
0355 * 146* 142 142  
K 3.2* 3.7 3.8 3.9 * 115* 112* 112* CO2 28 23 20* 22 * 165* 181* 167* BUN 56* 49* 52* 55* CREA 1.59* 1.48* 1.78* 1.80* CA 7.7* 7.4* 7.2* 7.4* MG 2.3  --   --   --   
PHOS 4.2 3.7  --   --   
ALB 2.4* 2.2* 2.4*  --   
SGOT 129* 204* 329*  --   
* 1,029* 1,277*  --   
INR  --   --   --  1.3* Recent Labs 11/10/19 
77 383 447 11/09/19 
0437 11/08/19 
0355 WBC 31.4* 30.9* 38.5* HGB 7.2* 6.8* 6.5* HCT 23.4* 21.5* 20.0*  
PLT 47* 40* 45* No results found for: SDES Lab Results Component Value Date/Time Culture result: NO GROWTH 2 DAYS 11/08/2019 11:12 AM  
 Culture result: MRSA PRESENT (A) 11/07/2019 11:06 AM  
 Culture result:  11/07/2019 11:06 AM  
      Screening of patient nares for MRSA is for surveillance purposes and, if positive, to facilitate isolation considerations in high risk settings. It is not intended for automatic decolonization interventions per se as regimens are not sufficiently effective to warrant routine use. Recent Results (from the past 24 hour(s)) GLUCOSE, POC Collection Time: 11/09/19 12:37 PM  
Result Value Ref Range Glucose (POC) 198 (H) 65 - 100 mg/dL Performed by [employer] GLUCOSE, POC Collection Time: 11/09/19  5:55 PM  
Result Value Ref Range Glucose (POC) 224 (H) 65 - 100 mg/dL Performed by [employer] GLUCOSE, POC Collection Time: 11/10/19  1:14 AM  
Result Value Ref Range Glucose (POC) 289 (H) 65 - 100 mg/dL Performed by Natty Elizondo   
CBC WITH AUTOMATED DIFF Collection Time: 11/10/19  4:31 AM  
Result Value Ref Range WBC 31.4 (H) 4.1 - 11.1 K/uL  
 RBC 1.98 (L) 4.10 - 5.70 M/uL HGB 7.2 (L) 12.1 - 17.0 g/dL HCT 23.4 (L) 36.6 - 50.3 % .2 (H) 80.0 - 99.0 FL  
 MCH 36.4 (H) 26.0 - 34.0 PG  
 MCHC 30.8 30.0 - 36.5 g/dL RDW 22.3 (H) 11.5 - 14.5 % PLATELET 47 (LL) 310 - 400 K/uL NRBC 9.3 (H) 0  WBC ABSOLUTE NRBC 2.93 (H) 0.00 - 0.01 K/uL NEUTROPHILS 91 (H) 32 - 75 % LYMPHOCYTES 0 (L) 12 - 49 % MONOCYTES 8 5 - 13 % EOSINOPHILS 0 0 - 7 % BASOPHILS 0 0 - 1 % METAMYELOCYTES 1 (H) 0 % IMMATURE GRANULOCYTES 0 %  
 ABS. NEUTROPHILS 28.6 (H) 1.8 - 8.0 K/UL  
 ABS. LYMPHOCYTES 0.0 (L) 0.8 - 3.5 K/UL  
 ABS. MONOCYTES 2.5 (H) 0.0 - 1.0 K/UL  
 ABS. EOSINOPHILS 0.0 0.0 - 0.4 K/UL  
 ABS. BASOPHILS 0.0 0.0 - 0.1 K/UL  
 ABS. IMM. GRANS. 0.0 K/UL  
 DF MANUAL PLATELET COMMENTS Large Platelets RBC COMMENTS ANISOCYTOSIS 2+ 
    
 RBC COMMENTS MACROCYTOSIS 2+ 
    
 RBC COMMENTS POLYCHROMASIA 1+ 
    
 RBC COMMENTS SCHISTOCYTES PRESENT 
    
METABOLIC PANEL, COMPREHENSIVE Collection Time: 11/10/19  4:31 AM  
Result Value Ref Range Sodium 147 (H) 136 - 145 mmol/L Potassium 3.2 (L) 3.5 - 5.1 mmol/L Chloride 113 (H) 97 - 108 mmol/L  
 CO2 28 21 - 32 mmol/L Anion gap 6 5 - 15 mmol/L Glucose 240 (H) 65 - 100 mg/dL BUN 56 (H) 6 - 20 MG/DL Creatinine 1.59 (H) 0.70 - 1.30 MG/DL  
 BUN/Creatinine ratio 35 (H) 12 - 20 GFR est AA 51 (L) >60 ml/min/1.73m2 GFR est non-AA 42 (L) >60 ml/min/1.73m2 Calcium 7.7 (L) 8.5 - 10.1 MG/DL Bilirubin, total 1.6 (H) 0.2 - 1.0 MG/DL  
 ALT (SGPT) 875 (H) 12 - 78 U/L  
 AST (SGOT) 129 (H) 15 - 37 U/L Alk. phosphatase 94 45 - 117 U/L Protein, total 4.9 (L) 6.4 - 8.2 g/dL Albumin 2.4 (L) 3.5 - 5.0 g/dL Globulin 2.5 2.0 - 4.0 g/dL A-G Ratio 1.0 (L) 1.1 - 2.2 PHOSPHORUS Collection Time: 11/10/19  4:31 AM  
Result Value Ref Range Phosphorus 4.2 2.6 - 4.7 MG/DL MAGNESIUM Collection Time: 11/10/19  4:31 AM  
Result Value Ref Range Magnesium 2.3 1.6 - 2.4 mg/dL GLUCOSE, POC Collection Time: 11/10/19  7:29 AM  
Result Value Ref Range Glucose (POC) 249 (H) 65 - 100 mg/dL Performed by Delbert Montgomery MD 
[address] [place] [address], [facility] Phone - [phone] Fax - [phone] 
www. [email or web address]

## 2019-11-10 NOTE — PROGRESS NOTES
[facility] Name: Lalit Artis  MRN: [id number]  Date: 11/10/2019 10:13 AM 
Admission Date: 2019  : 1940 Impression Plan 1. Acute resp failure- COPD + multiple pulmonary nondules ( s/p non dx left angelika bx last week complicated by tension PTX) 2. S/p Left VATS with JARETT/LLL wedge bx 19 and mechanical pleurodesis-for diffuse pulm nodules- path shows metastatic adenocarcinoma 3. Post op PEA arrest 19 - rosc 2 min 4. Septic shock-suspect ECFV replete, on stress dose steroids/LV fxn ok. 5. MELVIN 6. Lactic acidosis 7. Coagulopathy/DIC 
8. Shock liver 9. COPD FEV1 1.31 ( 46%) 10. ECHO  EF 71% RV moderatley reduced PASP 32 
11. Volume overload 1. Sedation reviewed 2. Lung protective ventilation 3. On abx 4. transfure to Hb 7 or higher 5. Nebs 6. CT in place- thoracic following 7. Palliative Medicine following- meeting planned for Monday: family has declined blood products-- patient has apparently communicated that he does not want life support measures and compassionate withdrawal is planned in about 48 hours 8. Mental status is better and SBT will be tried to see if can extubate with a DNI order 9. Diuresis to see if pulmonary status can be optimized more 10. On stress dose steroids- start wean Pt is critically ill. . At risk for decline due to sepsis/MODS. CCT 30'. D/W RN Subjective Resting, woke up and communicated his wishes yesterday. Off pressors. 12 liters positive Review of systems not obtained due to patient factors. EXAM: 
Visit Vitals /68 Pulse 100 Temp 99.5 °F (37.5 °C) Resp 26 Ht 6' (1.829 m) Wt 73.4 kg (161 lb 13.1 oz) SpO2 95% BMI 21.95 kg/m² Temp (24hrs), Av.6 °F (37.6 °C), Min:99.4 °F (37.4 °C), Max:99.7 °F (37.6 °C) GENERAL: well developed and in no distress, NECK:  no jugular vein distention, no retractions, no thyromegaly or masses, LUNGS: decreased breath sounds billaterally and with rhonchi , HEART:  Regular rate and rhythm with no MGR; no edema is present, ABDOMEN:  soft with no tenderness, bowel sounds present, EXTREMITIES:  warm with no cyanosis and SKIN:  no jaundice or ecchymosis LABS Recent Labs 11/10/19 
77 [phone] 11/09/19 
0437 11/08/19 
0355 WBC 31.4* 30.9* 38.5* HGB 7.2* 6.8* 6.5* HCT 23.4* 21.5* 20.0*  
PLT 47* 40* 45* Recent Labs 11/10/19 
77 383 447 11/09/19 
0437 11/08/19 
1159 11/08/19 
0355 * 146* 142 142  
K 3.2* 3.7 3.8 3.9 * 115* 112* 112* CO2 28 23 20* 22 * 165* 181* 167* BUN 56* 49* 52* 55* CREA 1.59* 1.48* 1.78* 1.80* CA 7.7* 7.4* 7.2* 7.4* MG 2.3  --   --   --   
PHOS 4.2 3.7  --   --   
ALB 2.4* 2.2* 2.4*  --   
SGOT 129* 204* 329*  --   
* 1,029* 1,277*  --   
INR  --   --   --  1.3* ABG Recent Labs 11/10/19 
8869 PHI 7.441 PO2I 63* PCO2I 38.8 Radiology Films have been personally reviewed. PCXR with diffuse ASD ETT ok no PTX Vaishnavi Tam MD

## 2019-11-11 NOTE — PROGRESS NOTES
11/11/19 0750 Weaning Parameters Spontaneous Breathing Trial Complete Yes Resp Rate Observed 16 Ve 7.5  RSBI 37 SBT COMPLETE 
SBT PASSED CUFF LEAK PRESENTED AT THIS TIME

## 2019-11-11 NOTE — PROGRESS NOTES
Pharmacist Note - Vancomycin Dosing Therapy day 4 Indication: pneumonia Current regimen: 1 g IV q 18 h A Trough Level resulted at 10.4 mcg/mL which was obtained 15 hrs post-dose. The extrapolated \"true\" trough is approximately 8.34 mcg/mL based on the patient's known kinetics. Goal trough: 15 - 20 mcg/mL Plan: Change to 1 g IV q 12 h for an estimated trough ~ 16 mcg/ml . Pharmacy will continue to monitor this patient daily for changes in clinical status and renal function.

## 2019-11-11 NOTE — PROGRESS NOTES
Nephrology Progress Note Titus Colon Date of Admission : 11/1/2019 CC:  Follow up for MELVIN Assessment and Plan MELVIN: 
- likely 2/2 ATN from hypotension/PEA arrest 
- resolving  
- continue w/ low dose diuretics Hypernatremia : 
- getting TF intermittently - pls start water flushes 150 ml Q4 hr today and stop D5W. Hypotension: 
- off pressors  
  
Anemia: 
- hgb 6.8 
  
NSCLC: 
- per oncology PTX s/p L VATS 
  
Shock liver: 
- trend LFTs 
  
PEA arrest: 
- ECHO from 11/6 w/ EF > 70% - per cardiology Interval History: 
Seen and examined. Remains on vent Unable to obtain ROS Current Medications: all current  Medications have been eviewed in [facility] Review of Systems: Review of systems not obtained due to patient factors. Objective: 
Vitals:   
Vitals:  
 11/11/19 0730 11/11/19 0745 11/11/19 0750 11/11/19 0800 BP:    124/73 Pulse: (!) 112 100 (!) 109 (!) 111 Resp: 18 19 17 16 Temp:    (!) 100.5 °F (38.1 °C) SpO2: 92% 91% 93% 99% Weight:      
Height:      
 
Intake and Output: 
11/11 0701 - 11/11 1900 In: -  
Out: 539 [IZNS:438] 11/09 1901 - 11/11 0700 In: 5396.2 [I.V.:2396.2] Out: 5630 [PFVCB:2822] Physical Examination: 
Pt intubated    Yes General: sedated Neck:  Supple, no mass Resp:  Lungs CTA B/L, no wheezing , normal respiratory effort CV:  RRR,  no murmur or rub, no LE edema GI:  Soft, NT, + Bowel sounds, no hepatosplenomegaly Neurologic:  Sedated now Psych:             Unable to assess Skin:  No Rash :  Horner in place 
 
[]    High complexity decision making was performed 
[]    Patient is at high-risk of decompensation with multiple organ involvement Lab Data Personally Reviewed: I have reviewed all the pertinent labs, microbiology data and radiology studies during assessment. Recent Labs 11/11/19 
0335 11/10/19 
0431 11/09/19 
1205 * 147* 146*  
K 4.3 3.2* 3.7 * 113* 115* CO2 31 28 23 * 240* 165* BUN 52* 56* 49* CREA 1.43* 1.59* 1.48* CA 7.6* 7.7* 7.4* MG 2.1 2.3  --   
PHOS 4.1 4.2 3.7 ALB 2.1* 2.4* 2.2*  
SGOT 93* 129* 204* * 875* 1,029* Recent Labs 11/11/19 
0335 11/10/19 
0431 11/09/19 
0671 WBC 31.4* 31.4* 30.9* HGB 6.9* 7.2* 6.8* HCT 22.9* 23.4* 21.5*  
PLT 53* 47* 40* No results found for: SDES Lab Results Component Value Date/Time Culture result: NO GROWTH 3 DAYS 11/08/2019 11:12 AM  
 Culture result: MRSA PRESENT (A) 11/07/2019 11:06 AM  
 Culture result:  11/07/2019 11:06 AM  
      Screening of patient nares for MRSA is for surveillance purposes and, if positive, to facilitate isolation considerations in high risk settings. It is not intended for automatic decolonization interventions per se as regimens are not sufficiently effective to warrant routine use. Recent Results (from the past 24 hour(s)) GLUCOSE, POC Collection Time: 11/10/19 11:34 AM  
Result Value Ref Range Glucose (POC) 214 (H) 65 - 100 mg/dL Performed by [employer] GLUCOSE, POC Collection Time: 11/10/19  6:27 PM  
Result Value Ref Range Glucose (POC) 288 (H) 65 - 100 mg/dL Performed by Fortune Brandmelvin Murray Seat Collection Time: 11/10/19 10:39 PM  
Result Value Ref Range Vancomycin,trough 10.4 (H) 5.0 - 10.0 ug/mL Reported dose date: NONE Reported dose time: NONE Reported dose: NONE UNITS  
GLUCOSE, POC Collection Time: 11/11/19 12:29 AM  
Result Value Ref Range Glucose (POC) 269 (H) 65 - 100 mg/dL Performed by Lynne Hernandez   
CBC WITH AUTOMATED DIFF Collection Time: 11/11/19  3:35 AM  
Result Value Ref Range WBC 31.4 (H) 4.1 - 11.1 K/uL  
 RBC 1.89 (L) 4.10 - 5.70 M/uL HGB 6.9 (L) 12.1 - 17.0 g/dL HCT 22.9 (L) 36.6 - 50.3 % .2 (H) 80.0 - 99.0 FL  
 MCH 36.5 (H) 26.0 - 34.0 PG  
 MCHC 30.1 30.0 - 36.5 g/dL RDW 23.5 (H) 11.5 - 14.5 % PLATELET 53 (L) 949 - 400 K/uL NRBC 19.8 (H) 0  WBC ABSOLUTE NRBC 6.23 (H) 0.00 - 0.01 K/uL NEUTROPHILS 96 (H) 32 - 75 % BAND NEUTROPHILS 1 0 - 6 % LYMPHOCYTES 0 (L) 12 - 49 % MONOCYTES 2 (L) 5 - 13 % EOSINOPHILS 0 0 - 7 % BASOPHILS 0 0 - 1 % METAMYELOCYTES 1 (H) 0 % IMMATURE GRANULOCYTES 0 %  
 ABS. NEUTROPHILS 30.5 (H) 1.8 - 8.0 K/UL  
 ABS. LYMPHOCYTES 0.0 (L) 0.8 - 3.5 K/UL  
 ABS. MONOCYTES 0.6 0.0 - 1.0 K/UL  
 ABS. EOSINOPHILS 0.0 0.0 - 0.4 K/UL  
 ABS. BASOPHILS 0.0 0.0 - 0.1 K/UL  
 ABS. IMM. GRANS. 0.0 K/UL  
 DF MANUAL    
 RBC COMMENTS ANISOCYTOSIS 2+ 
    
 RBC COMMENTS MACROCYTOSIS 2+ 
    
 RBC COMMENTS POLYCHROMASIA 2+ 
    
 RBC COMMENTS OVALOCYTES PRESENT 
    
METABOLIC PANEL, COMPREHENSIVE Collection Time: 11/11/19  3:35 AM  
Result Value Ref Range Sodium 146 (H) 136 - 145 mmol/L Potassium 4.3 3.5 - 5.1 mmol/L Chloride 113 (H) 97 - 108 mmol/L  
 CO2 31 21 - 32 mmol/L Anion gap 2 (L) 5 - 15 mmol/L Glucose 237 (H) 65 - 100 mg/dL BUN 52 (H) 6 - 20 MG/DL Creatinine 1.43 (H) 0.70 - 1.30 MG/DL  
 BUN/Creatinine ratio 36 (H) 12 - 20 GFR est AA 58 (L) >60 ml/min/1.73m2 GFR est non-AA 48 (L) >60 ml/min/1.73m2 Calcium 7.6 (L) 8.5 - 10.1 MG/DL Bilirubin, total 1.2 (H) 0.2 - 1.0 MG/DL  
 ALT (SGPT) 582 (H) 12 - 78 U/L  
 AST (SGOT) 93 (H) 15 - 37 U/L Alk. phosphatase 98 45 - 117 U/L Protein, total 4.6 (L) 6.4 - 8.2 g/dL Albumin 2.1 (L) 3.5 - 5.0 g/dL Globulin 2.5 2.0 - 4.0 g/dL A-G Ratio 0.8 (L) 1.1 - 2.2 MAGNESIUM Collection Time: 11/11/19  3:35 AM  
Result Value Ref Range Magnesium 2.1 1.6 - 2.4 mg/dL PHOSPHORUS Collection Time: 11/11/19  3:35 AM  
Result Value Ref Range Phosphorus 4.1 2.6 - 4.7 MG/DL  
GLUCOSE, POC Collection Time: 11/11/19  6:26 AM  
Result Value Ref Range Glucose (POC) 254 (H) 65 - 100 mg/dL Performed by Aline Irvin   
POC G3 - PUL Collection Time: 11/11/19  7:55 AM  
Result Value Ref Range FIO2 (POC) 0.40 % pH (POC) 7.458 (H) 7.35 - 7.45    
 pCO2 (POC) 40.9 35.0 - 45.0 MMHG  
 pO2 (POC) 54 (L) 80 - 100 MMHG  
 HCO3 (POC) 29.0 (H) 22 - 26 MMOL/L  
 sO2 (POC) 89 (L) 92 - 97 % Base excess (POC) 5 mmol/L Site DRAWN FROM ARTERIAL LINE Device: VENT Mode CPAP/SPON    
 PEEP/CPAP (POC) 5 cmH2O Pressure support 5 cmH2O Allens test (POC) N/A Specimen type (POC) ARTERIAL Dariela Nayak MD 
[address] [place] [address], [address] [facility] Phone - [phone] Fax - [phone] 
[email or web address]

## 2019-11-11 NOTE — PROGRESS NOTES
Hospitalist Progress Note NAME: Deng Gordon :  1940 MRN:  [id number] Assessment / Plan: 
AHRF s/p VATS with tension PTX  
- extubated today on NC  
-Was intubated  post VATS 
-On solu cortef taper off  
-Appreciate Pulmonary help 
  Tension pneumothorax 
-s/p left chest tube  s/p VATS 
-since overnight of , oxygen requirement got worse, the patient was transferred to ICU  
- see by CTS Reduce CT to -10cm suction 
  
-Appreciate Thoracic surgery, chest tube remains in place. Remains critical 
  
Cardiac arrest  post VATS 
-ROSC 2 min 
-Troponin 0.38, BNP 4,688 
- Chest tube in place 
  
Bilateral pulmonary nodules,  
-status post lung biopsy 10/31 
-biopsy  with adenocarcinoma. Seen by oncology and palliative care. Prognosis appears poor. Cor pulmonale with rt sided heart failure - cardiology following MELVIN 
-likely 2/2 ATN from hypotension 
-on IV fluids. Dc on  if crt back to normal. 
  
Hyperkalemia 
-resolbed. 
  
Elevated troponin. -per cardiology likely demand ischemia 
-Echo EF 51-55% 
  
Leukocytosis 
-rpt us pending. On stress dosed steroids. 
  
Schizophrenia 
-Continue home meds 
  
Transaminitis 
-seen by gi,  Likely shock liver. Numbers looking better Replete K PRN Tension pneumothorax as an expected/inherent condition that occurred post-operatively and not a complication The patient is critically ill and at high risk of further deterioration 
  
Code status: DNR/DNI . Appreciate Palliative care had a long discussion today as well with all the family members have another meeting today DVT prophylaxis: scd 
  
Plan: Follow thoracic surgery,  Cardiology, GI 
  
Care Plan discussed with: Patient/Family niece Elizabeth Stafford is mPOA Disposition: TBD Poor prognosis 18.5 - 24.9 Normal weight / Body mass index is 21.95 kg/m². Subjective: Chief Complaint / Reason for Physician Visit Sedated and intubated awake, had a long discussion with POA and other family members on 11/10 see separate note No new changes overnight Review of Systems: 
Symptom Y/N Comments  Symptom Y/N Comments Fever/Chills    Chest Pain Poor Appetite    Edema Cough    Abdominal Pain Sputum    Joint Pain SOB/CARD    Pruritis/Rash Nausea/vomit    Tolerating PT/OT Diarrhea    Tolerating Diet Constipation    Other Could NOT obtain due to: sedation Objective: VITALS:  
Last 24hrs VS reviewed since prior progress note. Most recent are: 
Patient Vitals for the past 24 hrs: 
 Temp Pulse Resp BP SpO2  
11/11/19 1141     93 % 11/11/19 1130     90 % 11/11/19 0800 (!) 100.5 °F (38.1 °C) (!) 111 16 124/73 99 % 11/11/19 0750  (!) 109 17  93 % 11/11/19 0745  100 19  91 % 11/11/19 0730  (!) 112 18  92 % 11/11/19 0714  (!) 105 24  93 % 11/11/19 0713     93 % 11/11/19 0700  (!) 108 16 99/62 96 % 11/11/19 0630  (!) 105 30 120/75 94 % 11/11/19 0600  (!) 106 20 135/70 94 % 11/11/19 0530  (!) 103 15 102/66 96 % 11/11/19 0516     98 % 11/11/19 0500  (!) 105 15 103/60 97 % 11/11/19 0430  (!) 105 16 108/64 97 % 11/11/19 0400 100.4 °F (38 °C) (!) 104 14 111/64 97 % 11/11/19 0330  (!) 110 16 107/63 98 % 11/11/19 0300  (!) 109 14 105/65 97 % 11/11/19 0230  (!) 111 21 107/63 97 % 11/11/19 0200  (!) 110 16 101/65 98 % 11/11/19 0130  (!) 112 15 102/62 98 % 11/11/19 0100  (!) 113 15 95/55 97 % 11/11/19 0036  (!) 113 14  94 % 11/11/19 0030  (!) 114 15 98/59 93 % 11/11/19 0000 (!) 100.6 °F (38.1 °C)      
11/10/19 2330  (!) 113 14 100/62 96 % 11/10/19 2300  (!) 116 14 103/57 100 % 11/10/19 2230  (!) 122 20 149/78 91 % 11/10/19 2200  (!) 119 16 139/77 91 % 11/10/19 2130  (!) 116 15 109/58 96 % 11/10/19 2100  (!) 119 16 103/52 96 % 11/10/19 2030  (!) 122 15 115/63 95 % 11/10/19 2000 100.4 °F (38 °C) (!) 123 22 120/71 95 % 11/10/19 1940  (!) 138 (!) 35  95 % 11/10/19 1938     94 % 11/10/19 1930  (!) 135 (!) 36 135/85 93 % 11/10/19 1900  (!) 135 (!) 36 145/84 92 % 11/10/19 1800  (!) 136 (!) 36 138/81 91 % 11/10/19 1700  (!) 126 (!) 43 (!) 164/94 91 % 11/10/19 1622  92 28  92 % 11/10/19 1600 99.8 °F (37.7 °C) (!) 104 24 125/68 92 % 11/10/19 1557    133/69   
11/10/19 1500  (!) 105 28 132/73 92 % 11/10/19 1400  100 26 132/82 96 % 11/10/19 1359  100 26  95 % Intake/Output Summary (Last 24 hours) at 11/11/2019 1319 Last data filed at 11/11/2019 1100 Gross per 24 hour Intake 3147.54 ml Output 4240 ml Net -1092.46 ml PHYSICAL EXAM: 
General: WD, WN. sedated EENT:  EOMI. Anicteric sclerae. MMM Resp:  CTA bilaterally, no wheezing or rales. No accessory muscle use CV:  Regular  rhythm,  No edema GI:  Soft, Non distended, Non tender.  +Bowel sounds Neurologic:  Alert and oriented X 3, normal speech, Psych:   Good insight. Not anxious nor agitated Skin:  No rashes. No jaundice Reviewed most current lab test results and cultures  YES Reviewed most current radiology test results   YES Review and summation of old records today    NO Reviewed patient's current orders and MAR    YES 
PMH/SH reviewed - no change compared to H&P 
________________________________________________________________________ Care Plan discussed with: 
  Comments Patient Family RN Care Manager Consultant Multidiciplinary team rounds were held today with , nursing, pharmacist and clinical coordinator. Patient's plan of care was discussed; medications were reviewed and discharge planning was addressed. ________________________________________________________________________ Total NON critical care TIME:  20   Minutes Total CRITICAL CARE TIME Spent:   Minutes non procedure based Comments >50% of visit spent in counseling and coordination of care    
________________________________________________________________________ Esperanza Harley MD  
 
Procedures: see electronic medical records for all procedures/Xrays and details which were not copied into this note but were reviewed prior to creation of Plan. LABS: 
I reviewed today's most current labs and imaging studies. Pertinent labs include: 
Recent Labs 11/11/19 
0335 11/10/19 
0431 11/09/19 
0671 WBC 31.4* 31.4* 30.9* HGB 6.9* 7.2* 6.8* HCT 22.9* 23.4* 21.5*  
PLT 53* 47* 40* Recent Labs 11/11/19 
0335 11/10/19 
0431 11/09/19 
5698 * 147* 146*  
K 4.3 3.2* 3.7 * 113* 115* CO2 31 28 23 * 240* 165* BUN 52* 56* 49* CREA 1.43* 1.59* 1.48* CA 7.6* 7.7* 7.4* MG 2.1 2.3  --   
PHOS 4.1 4.2 3.7 ALB 2.1* 2.4* 2.2* TBILI 1.2* 1.6* 2.1*  
SGOT 93* 129* 204* * 875* 1,029* Signed: Esperanza Harley MD

## 2019-11-11 NOTE — PROGRESS NOTES
PCCM 
 
Did well from a ventilatory standpoint on SBT but on 40% FiO2, PO2 in the 50's now on 60% FiO2 CXR - with ground glass opacities bilaterally - small L apical PTX Patient Vitals for the past 4 hrs: 
 BP Temp Pulse Resp SpO2  
19 0800 124/73 (!) 100.5 °F (38.1 °C) (!) 111 16 99 % 19 0750   (!) 109 17 93 % 19 0745   100 19 91 % 19 0730   (!) 112 18 92 % 19 0714   (!) 105 24 93 % 19 0713     93 % 19 0700 99/62  (!) 108 16 96 % 19 0630 120/75  (!) 105 30 94 % 19 0600 135/70  (!) 106 20 94 % 19 0530 102/66  (!) 103 15 96 % Temp (24hrs), Av.2 °F (37.9 °C), Min:99.5 °F (37.5 °C), Max:100.6 °F (38.1 °C) Intake/Output Summary (Last 24 hours) at 2019 0706 Last data filed at 2019 0900 Gross per 24 hour Intake 4256.16 ml Output 4050 ml Net 206.16 ml Opens eyes to name No resp distress Oral ett 
anicteirc MMM Scattered rhonchi RRR Soft Warm and dry Lab: 
Recent Labs 19 
0335 11/10/19 
0431 19 
6167 WBC 31.4* 31.4* 30.9* HGB 6.9* 7.2* 6.8*  
PLT 53* 47* 40* * 147* 146*  
K 4.3 3.2* 3.7 * 113* 115* CO2 31 28 23 BUN 52* 56* 49* CREA 1.43* 1.59* 1.48* * 240* 165* CA 7.6* 7.7* 7.4* MG 2.1 2.3  --   
PHOS 4.1 4.2 3.7 TBILI 1.2* 1.6* 2.1*  
SGOT 93* 129* 204* ABG: 
Recent Labs 19 
0755 11/10/19 
0401 PHI 7.458* 7.441 PCO2I 40.9 38.8 PO2I 54* 63* HCO3I 29.0* 26.4* SO2I 89* 93  
FIO2I 0.40 40 Impression 1. Acute resp failure- COPD + multiple pulmonary nodules ( s/p non dx left angelika bx last week complicated by tension PTX) 2. S/p Left VATS with JARETT/LLL wedge bx 19 and mechanical pleurodesis-for diffuse pulm nodules- path shows metastatic adenocarcinoma 3. Post op PEA arrest 19 - rosc 2 min 4. Septic shock-suspect ECFV replete, on stress dose steroids/LV fxn ok. 5. MELVIN 6. Lactic acidosis 7. Coagulopathy/DIC 
8. Shock liver 9. COPD FEV1 1.31 ( 46%) 10. ECHO 11/6 EF 71% RV moderatley reduced PASP 32 
11. Volume overload 
 
--marginal gas exchange (oxygenation) on sbt. Now a DNI per RN Will need to clarify - that he is not to be re-intubated once extubated 
--palliative meeting today 
--has declined blood products Carisa Correa MD

## 2019-11-11 NOTE — DIABETES MGMT
[facility] Progress Note Recommendations/ Comments: Chart review for hyperglycemia; BG's rising and now > 200 mg/dL persistently. Received 18 units of lispro correction yesterday. Steroids Solu Cortef 50 mg Q 12 hours. This will change to Solu Cortef 25 mg Q 12 hours on 11/12/2019. If appropriate, please consider Addition of Lantus 10 units daily while on steroids Current hospital DM medication:  
Lispro correction scale, normal sensitivity Chart reviewed on Miriam Avalos. Patient is a 70 y.o. male with no hx DM 
 
A1c:  
No results found for: HBA1C, HGBE8, JFQ1BDQL Recent Glucose Results:  
Lab Results Component Value Date/Time  (H) 11/11/2019 03:35 AM  
 GLUCPOC 196 (H) 11/11/2019 01:23 PM  
 GLUCPOC 254 (H) 11/11/2019 06:26 AM  
 GLUCPOC 269 (H) 11/11/2019 12:29 AM  
  
 
Lab Results Component Value Date/Time Creatinine 1.43 (H) 11/11/2019 03:35 AM  
 
Estimated Creatinine Clearance: 44.9 mL/min (A) (based on SCr of 1.43 mg/dL (H)). Active Orders Diet DIET NPO  
  
 
PO intake: No data found. Will continue to follow as needed. Thank you Betty Bass RN, CDE Time spent: 5 min

## 2019-11-11 NOTE — PROGRESS NOTES
0730  Bedside and Verbal shift change report given to [address] (oncoming nurse) by Scherry Favre  (offgoing nurse). Report included the following information SBAR, Cardiac Rhythm ST  and Alarm Parameters . \ 
 
1930 Bedside and Verbal shift change report given to Delio Rivero  (oncoming nurse) by [address]  (offgoing nurse). Report included the following information SBAR, Cardiac Rhythm ST  and Alarm Parameters .

## 2019-11-11 NOTE — PROGRESS NOTES
[address] [facility] Jess Wilkinson. Tanya Brambila M.D. 
[phone] GASTROENTEROLOGY PROGRESS NOTE 
 
 
 
NAME: Rachael Rubinstein :  1940 MRN:  [id number] Subjective:  
Possible SBT today. Objective: VITALS:  
Last 24hrs VS reviewed. Most recent are: 
Visit Vitals /73 Pulse (!) 111 Temp 100.4 °F (38 °C) Resp 16 Ht 6' (1.829 m) Wt 73.4 kg (161 lb 13.1 oz) SpO2 99% BMI 21.95 kg/m² Intake/Output Summary (Last 24 hours) at 2019 1130 Last data filed at 2019 0600 Gross per 24 hour Intake 4226.75 ml Output 3940 ml Net 286.75 ml PHYSICAL EXAM: 
General: Alert, in no acute distress Lungs:            Slightly decreased breath sounds bilaterally anteriorly Heart:  Normal S1, S2 Abdomen: Soft, Non distended, Non tender. Normoactive bowel sounds, no HSM,   no rebound/guarding MSK:   Normal muscle tone Skin:   Warm to touch Psych:   Not anxious nor agitated. Lab Data Reviewed:  
Recent Labs 11/11/19 
0335 11/10/19 
0431 WBC 31.4* 31.4* HGB 6.9* 7.2* HCT 22.9* 23.4*  
PLT 53* 47* Recent Labs 11/11/19 
0335 11/10/19 
0431 * 147* K 4.3 3.2*  
* 113* CO2 31 28 BUN 52* 56* CREA 1.43* 1.59* * 240* CA 7.6* 7.7* MG 2.1 2.3 PHOS 4.1 4.2 Recent Labs 11/11/19 
0335 11/10/19 
0431 SGOT 93* 129* AP 98 94  
TP 4.6* 4.9* ALB 2.1* 2.4*  
GLOB 2.5 2.5 No results for input(s): INR, PTP, APTT, INREXT, INREXT in the last 72 hours. No results for input(s): FE, TIBC, PSAT, FERR in the last 72 hours. No results for input(s): CPK, CKMB in the last 72 hours. No lab exists for component: Teodora Ayers See Electronic Medical Record for all procedure/radiology reports and details which were not copied into this note but were reviewed prior to the creation of the Plan.  
 
Assessment:  
 69 yo M with cognitive impairment, schizophrenia, COPD on home 4 L , possible lung cancer admitted with left tension pneumothorax after lung nodule biopsy. We are asked to see him at the request of Dr. Gloria Simon for elevated LFTs. 
  
I suspect this is related to ischemic hepatopathy. LFTs down trending until cardiac arrest with elevations consistent with ischemic hepatopathy. Liver Us normal with intact blood flow. Acute hep panel negative. No obvious med culprits to suggest drug induced liver injury. Liver tests trending down again. 
  
Plan:  
  
- Supportive care. Prognosis guarding. Will sign off. Please call with any questions. 
  
 
Signed by:  Betty Mcknight MD 
       11/11/2019  8:28 AM

## 2019-11-11 NOTE — PROGRESS NOTES
Thoracic Surgery Simple Progress Note Admit Date: 2019 POD: 6 Days Post-Op Procedure:  Procedure(s): LEFT VIDEO ASSISTED THORACOSCOPIC SURGERY/LEFT UPPER LOBE WEDGE RESECTION/ LEFT LOWER LOBE WEDGE RESECTION/ MECHANICAL PLEURODESIS Subjective:  
 
Review of Systems: 
Unable to assess due to patient factors. Remains intubated & sedated--currently trying SBT Objective:  
 
Blood pressure 124/73, pulse (!) 111, temperature (!) 100.5 °F (38.1 °C), resp. rate 16, height 6' (1.829 m), weight 161 lb 13.1 oz (73.4 kg), SpO2 99 %. Temp (24hrs), Av.2 °F (37.9 °C), Min:99.5 °F (37.5 °C), Max:100.6 °F (38.1 °C) Hemodynamics PAP 
  CO 
  CI 
 
 07 - 1900 In: -  
Out: 241 [SSRCX:073] 1901 -  07 In: 5396.2 [I.V.:2396.2] Out: 5630 [HKBCT:5321] EXAM: 
GENERAL: appears frail, no acute distress. +MRSA HEART:  regular rate and rhythm LUNG: dec  BS bilat. L CT w 260ml/24 hrs output. +airleak INCISION: Clean, dry, and intact EXTREMITIES:No evidence of DVT seen on physical exam. 
GI/: Abd soft, flat. Voiding via ying Labs: 
Recent Results (from the past 24 hour(s)) GLUCOSE, POC Collection Time: 11/10/19 11:34 AM  
Result Value Ref Range Glucose (POC) 214 (H) 65 - 100 mg/dL Performed by [employer] GLUCOSE, POC Collection Time: 11/10/19  6:27 PM  
Result Value Ref Range Glucose (POC) 288 (H) 65 - 100 mg/dL Performed by [employer] Mateus Likes Collection Time: 11/10/19 10:39 PM  
Result Value Ref Range Vancomycin,trough 10.4 (H) 5.0 - 10.0 ug/mL Reported dose date: NONE Reported dose time: NONE Reported dose: NONE UNITS  
GLUCOSE, POC Collection Time: 19 12:29 AM  
Result Value Ref Range Glucose (POC) 269 (H) 65 - 100 mg/dL Performed by Basil Bernard   
CBC WITH AUTOMATED DIFF Collection Time: 19  3:35 AM  
Result Value Ref Range WBC 31.4 (H) 4.1 - 11.1 K/uL RBC 1.89 (L) 4.10 - 5.70 M/uL HGB 6.9 (L) 12.1 - 17.0 g/dL HCT 22.9 (L) 36.6 - 50.3 % .2 (H) 80.0 - 99.0 FL  
 MCH 36.5 (H) 26.0 - 34.0 PG  
 MCHC 30.1 30.0 - 36.5 g/dL RDW 23.5 (H) 11.5 - 14.5 % PLATELET 53 (L) 694 - 400 K/uL NRBC 19.8 (H) 0  WBC ABSOLUTE NRBC 6.23 (H) 0.00 - 0.01 K/uL NEUTROPHILS 96 (H) 32 - 75 % BAND NEUTROPHILS 1 0 - 6 % LYMPHOCYTES 0 (L) 12 - 49 % MONOCYTES 2 (L) 5 - 13 % EOSINOPHILS 0 0 - 7 % BASOPHILS 0 0 - 1 % METAMYELOCYTES 1 (H) 0 % IMMATURE GRANULOCYTES 0 %  
 ABS. NEUTROPHILS 30.5 (H) 1.8 - 8.0 K/UL  
 ABS. LYMPHOCYTES 0.0 (L) 0.8 - 3.5 K/UL  
 ABS. MONOCYTES 0.6 0.0 - 1.0 K/UL  
 ABS. EOSINOPHILS 0.0 0.0 - 0.4 K/UL  
 ABS. BASOPHILS 0.0 0.0 - 0.1 K/UL  
 ABS. IMM. GRANS. 0.0 K/UL  
 DF MANUAL    
 RBC COMMENTS ANISOCYTOSIS 2+ 
    
 RBC COMMENTS MACROCYTOSIS 2+ 
    
 RBC COMMENTS POLYCHROMASIA 2+ 
    
 RBC COMMENTS OVALOCYTES PRESENT 
    
METABOLIC PANEL, COMPREHENSIVE Collection Time: 11/11/19  3:35 AM  
Result Value Ref Range Sodium 146 (H) 136 - 145 mmol/L Potassium 4.3 3.5 - 5.1 mmol/L Chloride 113 (H) 97 - 108 mmol/L  
 CO2 31 21 - 32 mmol/L Anion gap 2 (L) 5 - 15 mmol/L Glucose 237 (H) 65 - 100 mg/dL BUN 52 (H) 6 - 20 MG/DL Creatinine 1.43 (H) 0.70 - 1.30 MG/DL  
 BUN/Creatinine ratio 36 (H) 12 - 20 GFR est AA 58 (L) >60 ml/min/1.73m2 GFR est non-AA 48 (L) >60 ml/min/1.73m2 Calcium 7.6 (L) 8.5 - 10.1 MG/DL Bilirubin, total 1.2 (H) 0.2 - 1.0 MG/DL  
 ALT (SGPT) 582 (H) 12 - 78 U/L  
 AST (SGOT) 93 (H) 15 - 37 U/L Alk. phosphatase 98 45 - 117 U/L Protein, total 4.6 (L) 6.4 - 8.2 g/dL Albumin 2.1 (L) 3.5 - 5.0 g/dL Globulin 2.5 2.0 - 4.0 g/dL A-G Ratio 0.8 (L) 1.1 - 2.2 MAGNESIUM Collection Time: 11/11/19  3:35 AM  
Result Value Ref Range Magnesium 2.1 1.6 - 2.4 mg/dL PHOSPHORUS Collection Time: 11/11/19  3:35 AM  
Result Value Ref Range Phosphorus 4.1 2.6 - 4.7 MG/DL  
GLUCOSE, POC Collection Time: 11/11/19  6:26 AM  
Result Value Ref Range Glucose (POC) 254 (H) 65 - 100 mg/dL Performed by Issa Soto   
POC G3 - PUL Collection Time: 11/11/19  7:55 AM  
Result Value Ref Range FIO2 (POC) 0.40 % pH (POC) 7.458 (H) 7.35 - 7.45    
 pCO2 (POC) 40.9 35.0 - 45.0 MMHG  
 pO2 (POC) 54 (L) 80 - 100 MMHG  
 HCO3 (POC) 29.0 (H) 22 - 26 MMOL/L  
 sO2 (POC) 89 (L) 92 - 97 % Base excess (POC) 5 mmol/L Site DRAWN FROM ARTERIAL LINE Device: VENT Mode CPAP/SPON    
 PEEP/CPAP (POC) 5 cmH2O Pressure support 5 cmH2O Allens test (POC) N/A Specimen type (POC) ARTERIAL Assessment:  
No evidence of DVT. Principal Problem: 
  Shortness of breath (11/1/2019) Active Problems: 
  Pneumothorax on left (11/1/2019) Respiratory failure ([address] (11/1/2019) Right heart failure ([address] (11/6/2019) PATH: JARETT/LLL wedge bx 11/5/19 and mechanical pleurodesis-for diffuse pulm nodules- path shows metastatic Plan/Recommendations:  
Reduce CT to -10cm suction Palliative meeting later today Further care per ICU team 
See BRANDON Vicente Thoracic Surgery 11/11/2019

## 2019-11-11 NOTE — PROGRESS NOTES
1930: Bedside and Verbal shift change report given to Jorge Hunter RN (oncoming nurse) by Vladimir Delgado RN (offgoing nurse). Report included the following information SBAR, Kardex, Intake/Output, MAR, Accordion, Recent Results, Cardiac Rhythm Sinus Tach and Alarm Parameters . 2230: Increased WOB, use of accessory muscles, desatting to 88%. Air leak appears worse. Coarse lung sounds auscultated. CXR. Propofol restarted. 0730: Bedside and Verbal shift change report given to Benny Nicholson RN (oncoming nurse) by Jorge Hunter RN (offgoing nurse). Report included the following information SBAR, Kardex, Intake/Output, MAR, Accordion, Recent Results, Cardiac Rhythm Sinus Tach and Alarm Parameters . Shift Summary: Decreased WOB after

## 2019-11-11 NOTE — PROGRESS NOTES
Transitions of care TBD pending medical progress Palliative following, Code status No CPR, Shock Lives at [facility] [phone] Kimani Hands) On Oxygen at 4 L through [address] Patient remains in the ICU , extubated. Left CT to sx for left PTX. Opens eyes to name. Care management continuing to follow for transitions of care. Fadi Gifford RN,CRM

## 2019-11-12 NOTE — PROGRESS NOTES
[facility] [place] [address] [address] W: [phone]  F: [phone] Reason for Consult:  
Sushma Sapp is a 70 y.o. male who is seen in consultation at the request of Dr. Terrence Garrett for evaluation of NSCLC- stage IV. 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  
Sushma Sapp is a 70 y.o. male with a history of anxiety, depression, COPD and pulmonary nodules initially noted on CT imaging 8/20/19 who initially presented to the ER on 10/23 with complaints of shortness of breath and increasing oxygen requirement. CTA was obtained in ER and showed lung nodules and mediastinal and hilar adenopathy. Patient was discharged in anticipated for bronch and EUS outpatient. He presented to the ER again on 11/1/19 with worsening SOB and increased oxygen requirement. CXR showed a large left tension pneumothorax and thoracic surgery placed chest tube. He then underwent a LVATS with upper lobe wedge resection/left lower lobe wedge resection with thoracic on 11/5/19. Surgery was complicated by cardiac arrest. Pathology shows NSCLC. Oncology was consulted for further evaluation of malignancy. Interval history Some improvement in liver and kidney function 
 son at bedside, on vent Past Medical History:  
Diagnosis Date  Anxiety  Chronic confusion  Chronic obstructive pulmonary disease ([address] 4L cont. O2  
 Depression   
 pt has schizophrenia per son  Poor historian  Sleep disorder   
 insomnia Past Surgical History:  
Procedure Laterality Date  COLONOSCOPY N/A 7/31/2019 COLONOSCOPY performed by Erick Segura MD at [facility] ENDOSCOPY Social History Tobacco Use  Smoking status: Former Smoker Packs/day: 1.00 Types: Cigarettes  Smokeless tobacco: Never Used  Tobacco comment: every 2 days Substance Use Topics  Alcohol use: No  
  
Family History Problem Relation Age of Onset  Cancer Mother   
     colon ancer  Cancer Father   
     lung cancer  Cancer Brother   
     lung cancer Current Facility-Administered Medications Medication Dose Route Frequency  dextrose 5% infusion  75 mL/hr IntraVENous CONTINUOUS  
 [START ON 11/13/2019] Vancomycin trough - due 11/13 prior to 1200 dose. Thanks! Other ONCE  hydrocortisone Sod Succ (PF) (SOLU-CORTEF) injection 25 mg  25 mg IntraVENous Q12H  
 acetaminophen (TYLENOL) suppository 650 mg  650 mg Rectal Q6H PRN  
 vancomycin (VANCOCIN) 1,000 mg in 0.9% sodium chloride (MBP/ADV) 250 mL  1,000 mg IntraVENous Q12H  
 fentaNYL citrate (PF) injection 25-50 mcg  25-50 mcg IntraVENous Q1H PRN  
 0.9% sodium chloride infusion 250 mL  250 mL IntraVENous PRN  
 0.9% sodium chloride infusion 250 mL  250 mL IntraVENous PRN  
 fluconazole (DIFLUCAN) 200mg/100 mL IVPB (premix)  200 mg IntraVENous Q24H  
 glucose chewable tablet 16 g  4 Tab Oral PRN  
 glucagon (GLUCAGEN) injection 1 mg  1 mg IntraMUSCular PRN  
 dextrose 10% infusion 0-250 mL  0-250 mL IntraVENous PRN  
 insulin lispro (HUMALOG) injection   SubCUTAneous Q6H  Vancomycin - Pharmacy Dosing   Other Rx Dosing/Monitoring  albuterol-ipratropium (DUO-NEB) 2.5 MG-0.5 MG/3 ML  3 mL Nebulization Q4H RT  
 piperacillin-tazobactam (ZOSYN) 3.375 g in 0.9% sodium chloride (MBP/ADV) 100 mL  3.375 g IntraVENous Q8H  
 famotidine (PF) (PEPCID) 20 mg in sodium chloride 0.9% 10 mL injection  20 mg IntraVENous Q24H  
 sodium chloride (NS) flush 5-40 mL  5-40 mL IntraVENous Q8H  
  sodium chloride (NS) flush 5-40 mL  5-40 mL IntraVENous PRN  
 oxyCODONE-acetaminophen (PERCOCET) 5-325 mg per tablet 2 Tab  2 Tab Oral Q4H PRN  
 naloxone (NARCAN) injection 0.4 mg  0.4 mg IntraVENous PRN  
 ondansetron (ZOFRAN) injection 4 mg  4 mg IntraVENous Q4H PRN  
 albuterol (PROVENTIL VENTOLIN) nebulizer solution 2.5 mg  2.5 mg Nebulization Q4H PRN  
 hydrALAZINE (APRESOLINE) 20 mg/mL injection 10 mg  10 mg IntraVENous Q6H PRN  
 [Held by provider] aspirin chewable tablet 81 mg  81 mg Oral DAILY Allergies Allergen Reactions  Pollen Extracts Sneezing Review of Systems: A complete review of systems was obtained, negative except as described above. Physical Exam:  
 
Visit Vitals /61 Pulse (!) 111 Temp 98.5 °F (36.9 °C) Resp (!) 39 Ht 6' (1.829 m) Wt 148 lb 2.4 oz (67.2 kg) SpO2 95% BMI 20.09 kg/m² ECOG PS: 4 General: vented , opens eyes HENT: on vent Neck: Supple Lymphatic: No cervical, supraclavicular, or inguinal adenopathy Respiratory: on vent CV: on tele GI: Soft MS: unable to assess Skin: warm, dry Psych: unable to assess Results:  
 
Lab Results Component Value Date/Time WBC 31.7 (H) 11/12/2019 04:36 AM  
 HGB 6.8 (L) 11/12/2019 04:36 AM  
 HCT 22.7 (L) 11/12/2019 04:36 AM  
 PLATELET 63 (L) 15/36/2071 04:36 AM  
 .0 (H) 11/12/2019 04:36 AM  
 ABS. NEUTROPHILS 30.5 (H) 11/11/2019 03:35 AM  
 Hematocrit (POC) 26 (L) 11/05/2019 03:45 PM  
 
Lab Results Component Value Date/Time  Sodium 149 (H) 11/12/2019 04:36 AM  
 Potassium 4.2 11/12/2019 04:36 AM  
 Chloride 112 (H) 11/12/2019 04:36 AM  
 CO2 32 11/12/2019 04:36 AM  
 Glucose 152 (H) 11/12/2019 04:36 AM  
 BUN 50 (H) 11/12/2019 04:36 AM  
 Creatinine 1.28 11/12/2019 04:36 AM  
 GFR est AA >60 11/12/2019 04:36 AM  
 GFR est non-AA 55 (L) 11/12/2019 04:36 AM  
 Calcium 8.0 (L) 11/12/2019 04:36 AM  
 Sodium (POC) 132 (L) 11/05/2019 03:45 PM  
 Potassium (POC) 5.2 (H) 11/05/2019 03:45 PM  
 Chloride (POC) 100 11/05/2019 03:45 PM  
 Glucose (POC) 163 (H) 11/12/2019 05:26 PM  
 BUN (POC) 26 (H) 11/05/2019 03:45 PM  
 Creatinine (POC) 1.0 11/05/2019 03:45 PM  
 Calcium, ionized (POC) 1.06 (L) 11/05/2019 03:45 PM  
 
Lab Results Component Value Date/Time Bilirubin, total 1.2 (H) 11/11/2019 03:35 AM  
 ALT (SGPT) 582 (H) 11/11/2019 03:35 AM  
 AST (SGOT) 93 (H) 11/11/2019 03:35 AM  
 Alk. phosphatase 98 11/11/2019 03:35 AM  
 Protein, total 4.6 (L) 11/11/2019 03:35 AM  
 Albumin 2.1 (L) 11/11/2019 03:35 AM  
 Globulin 2.5 11/11/2019 03:35 AM  
 
Records reviewed and summarized above. Pathology report(s) reviewed above. Radiology report(s) reviewed above. CT chest 8/20/19: IMPRESSION: 
1. Innumerable pulmonary nodules, new since the prior CT from March, suspicious 
for metastatic malignancy. 2.  Unchanged right upper lobe spiculated nodule. 3.  No intrathoracic lymphadenopathy. CT chest 10/31/19: IMPRESSION: 
Bilateral pulmonary nodules and mediastinal and right hilar lymphadenopathy 
unchanged compared to the prior examination. CT abd/pelvis: pending FINAL PATHOLOGIC DIAGNOSIS 1. Lung, left lower lobe, wedge resection: Mucinous adenocarcinoma, lepidic and acinar patterns Multiple discrete foci of adenocarcinoma ranging from 0.2-1.4 cm in greatest dimension Adenocarcinoma is present at the parenchymal resection margin No definite pleural involvement is identified 2. Lung, left upper lobe, wedge resection: Mucinous adenocarcinoma, lepidic and acinar patterns Multiple discrete foci of adenocarcinoma measuring up to 0.7 cm in greatest dimension Adenocarcinoma is present at the parenchymal resection margin No definite pleural involvement is identified Multiple subpleural blebs are also identified Assessment:  
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  
S/P PEA arrest with  multi organ failure Molecular tests pending and pending If he does recover from his current critical illness we could revisit palliative systemic therapy Certainly not likely to tolerate anything at the moment Son understands 2) Multiorgan failure Per primary team  
Patient prognosis is poor and palliative care is following 3) Tension pneumothorax Secondary to #1 Thoracic surgery following 4) Cardiac arrest  
Per primary team  
 
5) Anemia Likely secondary to bone marrow suppression from acute illness , liver and renal failure Monitor CBC and transfusion if Hgb <7g/dL 6) Thrombocytopenia 
multifactorial 
Likely from DIC, bon marrow suppression and liver failure Supportive management 7) Coagulopathy DIC and liver failure In case of bleeding consider transfusion parameters as below Plan: · Molecular testing added to biopsy -  
· Not a candidate for systemic therapy at this time due to multiorgan failure. Should he recover from this , could be re visited · Monitor CBC daily and transfuse to maintain Hgb >7g/dL , platelets > 53F · If he has bleeding and Fibrinogen is < 100 , then give 1 unit of cryoprecipitate · Administer 10 mg IV of VK for his coagulopathy if not already don Discussed with son Family understand the poor prognosis but hope to see some recovery in the next few days Will follow See in conjunction with [place] NP Signed By: Jeffy Boyd MD

## 2019-11-12 NOTE — PROGRESS NOTES
Bedside, Verbal and Written shift change report given to Deanne (oncoming nurse) by Orestes Reese (offgoing nurse). Report included the following information SBAR, Kardex, MAR and Recent Results. 1500-Primary Nurse Carine Mahajan RN and Vincent Myles RN performed a dual skin assessment on this patient Impairment noted- see wound doc flow sheet Renato score is 13 SHIFT SUMMARY- VSS. Afebrile. Family planning on transitioning to comfort care in the morning. Uneventful shift.

## 2019-11-12 NOTE — PROGRESS NOTES
Hospitalist Progress Note NAME: Mery Briseno :  1940 MRN:  [id number] Subjective:  
Patient appear in some resp distress with mild accessory muscle use. Family just had a meeting with palliative care and decided to withdraw care and switch to comfort tomorrow AM. Assessment / Plan: 
AHRF s/p VATS with tension PTX  
- extubated today on NC  
-Was intubated  post VATS-On solu cortef taper off -Appreciate Pulmonary help 
  Tension pneumothorax 
-s/p left chest tube  s/p VATS 
-since overnight of , oxygen requirement got worse, the patient was transferred to ICU - see by CTS Reduce CT to -10cm suction 
-Appreciate Thoracic surgery, chest tube remains in place. Remains critical 
- after meeting with palliative care team, family decided to switch to comfort . 
  
Cardiac arrest  post VATS-ROSC 2 min-Troponin 0.38, Chest tube in place 
  
Bilateral pulmonary nodules, -status post lung biopsy 10/31 
-biopsy  with adenocarcinoma. Seen by oncology and palliative care. Prognosis appears poor. Cor pulmonale with rt sided heart failure- cardiology following MELVIN-likely 2/2 ATN from hypotension-on IV fluids. Dc on  if crt back to normal. 
Hyperkalemia-resolbed. Elevated troponin. -per cardiology likely demand ischemia-Echo EF 51-55% Leukocytosis-rpt us pending. On stress dosed steroids. Schizophrenia-Continue home meds Transaminitis-seen by gi,  Likely shock liver. Numbers looking better Replete K PRN Tension pneumothorax as an expected/inherent condition that occurred post-operatively and not a complication The patient is critically ill and at high risk of further deterioration Code status: DNR/DNI  
DVT prophylaxis: scd Plan: Follow thoracic surgery,  Cardiology, GI Care Plan discussed with: Patient/Family nabila Quinonez is mPOA Disposition: comfort starting tomorrow  per family meeting with palliative 18.5 - 24.9 Normal weight / Body mass index is 20.09 kg/m². Review of Systems: 
Symptom Y/N Comments  Symptom Y/N Comments Fever/Chills    Chest Pain Poor Appetite    Edema Cough    Abdominal Pain Sputum    Joint Pain SOB/CARD    Pruritis/Rash Nausea/vomit    Tolerating PT/OT Diarrhea    Tolerating Diet Constipation    Other Could NOT obtain due to: sedation Objective: VITALS:  
Last 24hrs VS reviewed since prior progress note. Most recent are: 
Patient Vitals for the past 24 hrs: 
 Temp Pulse Resp BP SpO2  
11/12/19 1300  (!) 110 30 128/53   
11/12/19 1200 98.1 °F (36.7 °C) (!) 116 25 114/66 94 % 11/12/19 1100  (!) 118 30 114/55   
11/12/19 1000  (!) 122 (!) 33 135/60 (!) 88 % 11/12/19 0900  (!) 111 (!) 31 112/59 91 % 11/12/19 0800 98.6 °F (37 °C) (!) 107 30 104/57 (!) 89 % 11/12/19 0600  99 26 107/59 90 % 11/12/19 0500  98 28 109/56 90 % 11/12/19 0400 100 °F (37.8 °C) 91 23 104/60 96 % 11/12/19 0300  (!) 107 25 125/59 99 % 11/12/19 0200  97 23 100/57 95 % 11/12/19 0100  100 25 95/52 93 % 11/12/19 0000 100.4 °F (38 °C) (!) 103 21 94/53 98 % 11/11/19 2332     97 % 11/11/19 2200  (!) 102 24 104/56 97 % 11/11/19 2100  (!) 105 24 111/52 90 % 11/11/19 2000 (!) 100.7 °F (38.2 °C) (!) 105 22 111/60 (!) 88 % 11/11/19 1943     90 % 11/11/19 1800  (!) 109 19 107/65 97 % 11/11/19 1730  (!) 105 16 114/66 93 % 11/11/19 1700    138/64   
11/11/19 1630  (!) 111 18 117/72 100 % 11/11/19 1627     99 % 11/11/19 1600 (!) 100.8 °F (38.2 °C) (!) 115 20 130/83 97 % 11/11/19 1530  (!) 117 25 131/77 (!) 89 % Intake/Output Summary (Last 24 hours) at 11/12/2019 1504 Last data filed at 11/12/2019 1300 Gross per 24 hour Intake 2340.28 ml Output 2730 ml Net -389.72 ml PHYSICAL EXAM: 
General: WD, WN. sedated Resp:   no sig wheezing or rales. mild accessory muscle use CV:  Regular  rhythm,  No edema GI:  Soft, Non distended, Non tender Neurologic:  Alert , Psych:   Can't assess Reviewed most current lab test results and cultures  YES Reviewed most current radiology test results   YES Review and summation of old records today    NO Reviewed patient's current orders and MAR    YES 
PMH/SH reviewed - no change compared to H&P 
________________________________________________________________________ Care Plan discussed with: 
  Comments Patient Family RN Care Manager Consultant Multidiciplinary team rounds were held today with , nursing, pharmacist and clinical coordinator. Patient's plan of care was discussed; medications were reviewed and discharge planning was addressed. ________________________________________________________________________ Total NON critical care TIME:  20   Minutes Total CRITICAL CARE TIME Spent:   Minutes non procedure based Comments >50% of visit spent in counseling and coordination of care    
________________________________________________________________________ Cordell Barragan MD  
 
Procedures: see electronic medical records for all procedures/Xrays and details which were not copied into this note but were reviewed prior to creation of Plan. LABS: 
I reviewed today's most current labs and imaging studies. Pertinent labs include: 
Recent Labs 11/12/19 
0485 11/11/19 0335 11/10/19 
0431 WBC 31.7* 31.4* 31.4* HGB 6.8* 6.9* 7.2* HCT 22.7* 22.9* 23.4* PLT 63* 53* 47* Recent Labs 11/12/19 
2531 11/11/19 
0335 11/10/19 
0431 * 146* 147* K 4.2 4.3 3.2*  
* 113* 113* CO2 32 31 28 * 237* 240* BUN 50* 52* 56* CREA 1.28 1.43* 1.59* CA 8.0* 7.6* 7.7* MG  --  2.1 2.3 PHOS  --  4.1 4.2 ALB  --  2.1* 2.4* TBILI  --  1.2* 1.6* SGOT  --  93* 129* ALT  --  582* 875* Signed: Cordell Barragan MD

## 2019-11-12 NOTE — PROGRESS NOTES
Rounded on [facility] patients and provided Anointing of the Sick at request of family. Chrissy Gonzales

## 2019-11-12 NOTE — PROGRESS NOTES
Thoracic Surgery Simple Progress Note Admit Date: 2019 POD: 7 Days Post-Op Procedure:  Procedure(s): LEFT VIDEO ASSISTED THORACOSCOPIC SURGERY/LEFT UPPER LOBE WEDGE RESECTION/ LEFT LOWER LOBE WEDGE RESECTION/ MECHANICAL PLEURODESIS Subjective:  
 
Patient has complaints: No significant medical complaints Extubated yesterday--on hi flow  DNR Review of Systems: 
CARDIAC: positive for R heart failure RESP: positive for Lung cancer, resp failure, SOB, h/o PTX NEURO:  negative INCISION: Clean, dry, and intact EXT: Denies new swelling or pain in the legs or calves. Objective:  
 
Blood pressure 104/57, pulse (!) 107, temperature 98.6 °F (37 °C), resp. rate 30, height 6' (1.829 m), weight 148 lb 2.4 oz (67.2 kg), SpO2 (!) 89 %. Temp (24hrs), Av.2 °F (37.9 °C), Min:98.6 °F (37 °C), Max:100.9 °F (38.3 °C) Hemodynamics PAP 
  CO 
  CI 
 
701 - 1900 In: -  
Out: 125 [Urine:125] 11/10 1901 -  07 In: 4502.7 [I.V.:3192.7] Out: [place] Gip EXAM: 
GENERAL: VSS, afrible, extubated. Follows commands. Makes eye contact. HEART:  regular rate and rhythm LUNG: diminished breath sounds bilat. +L CT. No airleak noted in pleuravac. INCISION: Clean, dry, and intact EXTREMITIES:No evidence of DVT seen on physical exam. 
GI/: Abd soft, fla Voiding w ying Labs: 
Recent Results (from the past 24 hour(s)) GLUCOSE, POC Collection Time: 19  1:23 PM  
Result Value Ref Range Glucose (POC) 196 (H) 65 - 100 mg/dL Performed by Darcy BOEWN) GLUCOSE, POC Collection Time: 19  6:26 PM  
Result Value Ref Range Glucose (POC) 155 (H) 65 - 100 mg/dL Performed by Margy Cruz GLUCOSE, POC Collection Time: 19 12:25 AM  
Result Value Ref Range Glucose (POC) 170 (H) 65 - 100 mg/dL Performed by Nirmal Koch, BASIC Collection Time: 19  4:36 AM  
Result Value Ref Range Sodium 149 (H) 136 - 145 mmol/L Potassium 4.2 3.5 - 5.1 mmol/L Chloride 112 (H) 97 - 108 mmol/L  
 CO2 32 21 - 32 mmol/L Anion gap 5 5 - 15 mmol/L Glucose 152 (H) 65 - 100 mg/dL BUN 50 (H) 6 - 20 MG/DL Creatinine 1.28 0.70 - 1.30 MG/DL  
 BUN/Creatinine ratio 39 (H) 12 - 20 GFR est AA >60 >60 ml/min/1.73m2 GFR est non-AA 55 (L) >60 ml/min/1.73m2 Calcium 8.0 (L) 8.5 - 10.1 MG/DL  
CBC W/O DIFF Collection Time: 11/12/19  4:36 AM  
Result Value Ref Range WBC 31.7 (H) 4.1 - 11.1 K/uL  
 RBC 1.83 (L) 4.10 - 5.70 M/uL HGB 6.8 (L) 12.1 - 17.0 g/dL HCT 22.7 (L) 36.6 - 50.3 % .0 (H) 80.0 - 99.0 FL  
 MCH 37.2 (H) 26.0 - 34.0 PG  
 MCHC 30.0 30.0 - 36.5 g/dL RDW 25.0 (H) 11.5 - 14.5 % PLATELET 63 (L) 311 - 400 K/uL MPV ABNORMAL 8.9 - 12.9 FL  
 NRBC 7.6 (H) 0  WBC ABSOLUTE NRBC 2.41 (H) 0.00 - 0.01 K/uL GLUCOSE, POC Collection Time: 11/12/19  6:20 AM  
Result Value Ref Range Glucose (POC) 147 (H) 65 - 100 mg/dL Performed by [facility] Shall Assessment:  
No evidence of DVT. Principal Problem: 
  Shortness of breath (11/1/2019) Active Problems: 
  Pneumothorax on left (11/1/2019) Respiratory failure ([address] (11/1/2019) Right heart failure ([address] (11/6/2019) PATH: JAERTT/LLL wedge bx 11/5/19 and mechanical pleurodesis-for diffuse pulm nodules- path shows metastatic Plan/Recommendations:  
 
CT to waterseal 
No subcutaneous emphysema appreciated 1.5 hrs later Further care per ICU team 
See orders BRANDON Stiles Thoracic Surgery 11/12/2019

## 2019-11-12 NOTE — PROGRESS NOTES
SLP Contact Note Consult received and appreciated. Patient chart reviewed and attempted to see. Patient is currently on 40-50% FiO2 high flow nasal cannula. Pt's FiO2 puts him at very high risk for prandial aspiration, as breathe-swallow coordination will almost certainly be adversely impacted. Therefore, will defer evaluation for now. Thank you, Brady Barrera M.Ed, CCC-SLP Speech-Language Pathologist

## 2019-11-12 NOTE — PROGRESS NOTES
1930: Report received from [facility], [address]: Patient easily arousable to voice. Will focus and track. Intermittently follows commands - more consistent with right arm and right leg. Leg left is most variable and often times patient will not wiggle toes to command but will withdraw. Patient is also nonverbal at this point; occasionally nods appropriate to questions. Per report, this is unchanged from patient's baseline s/p extubation. Patient on 40L/50% HFNC.  
 
0730: Bedside shift change report given to Zoila VINES (oncoming nurse) by Brandon Steve RN (offgoing nurse). Report included the following information SBAR, Intake/Output, MAR, Recent Results and Cardiac Rhythm NSR.

## 2019-11-12 NOTE — PROGRESS NOTES
Spiritual Care Assessment/Progress Note [facility] 
 
 
NAME: Sejal Díaz      MRN: [id number] AGE: 70 y.o. SEX: male Jain Affiliation: Denominational Language: English  
 
11/12/2019     Total Time (in minutes): 10 Spiritual Assessment begun in [facility] 7S1 INTENSIVE CARE through conversation with: 
  
    [x]Patient        [] Family    [] Friend(s) Reason for Consult: Palliative Care, Initial/Spiritual Assessment Spiritual beliefs: (Please include comment if needed) [x] Identifies with a sohail tradition: Denominational     
   [] Supported by a sohail community:        
   [] Claims no spiritual orientation:       
   [] Seeking spiritual identity:            
   [] Adheres to an individual form of spirituality:       
   [] Not able to assess:                   
 
    
Identified resources for coping:  
   [x] Prayer                           
   [] Music                  [] Guided Imagery 
   [] Family/friends                 [] Pet visits [] Devotional reading                         [] Unknown 
   [] Other Interventions offered during this visit: (See comments for more details) Patient Interventions: Prayer (actual), Prayer (assurance of), Affirmation of emotions/emotional suffering, Coordination with community clergy Plan of Care: 
 
 [x] Support spiritual and/or cultural needs  
 [] Support AMD and/or advance care planning process    
 [] Support grieving process 
 [] Coordinate Rites and/or Rituals [x] Coordination with community clergy [] No spiritual needs identified at this time 
 [] Detailed Plan of Care below (See Comments)  [] Make referral to Music Therapy 
[] Make referral to Pet Therapy    
[] Make referral to Addiction services 
[] Make referral to [facility] 
[] Make referral to Spiritual Care Partner 
[] No future visits requested       
[] Follow up visits as needed Comments: Visited with Mr. Sourav Cramer after pt's case discussed with nursing staff. Pt held my hand and mentioned something about [facility]. Difficult to understand pt's words at times. Pt is Church; provided [employer] of presence and prayer. Will request another visit from Fr. Cm. Zoila Duckworth, Palliative

## 2019-11-12 NOTE — PROGRESS NOTES
Nephrology Progress Note Sushma Sekou Date of Admission : 11/1/2019 CC:  Follow up for MELVIN Assessment and Plan MELVIN: 
- likely 2/2 ATN from hypotension/PEA arrest 
- resolving  
- hold diuretics for today Hypernatremia : 
- D5W until he can take p.o Hypotension: 
- off pressors  
  
Anemia: 
- Hb stable  
  
NSCLC: 
- per oncology PTX s/p L VATS 
  
Shock liver: 
  
PEA arrest: 
- ECHO from 11/6 w/ EF > 70% - per cardiology Interval History: 
Seen and examined. Extubated and not for re-intubation. Remains on high flow. Na upto 149, good UOP  Unable to obtain ROS Current Medications: all current  Medications have been eviewed in [facility] Review of Systems: Review of systems not obtained due to patient factors. Objective: 
Vitals:   
Vitals:  
 11/12/19 0600 11/12/19 0800 11/12/19 0900 11/12/19 1000 BP: 107/59 104/57 112/59 135/60 Pulse: 99 (!) 107 (!) 111 (!) 122 Resp: 26 30 (!) 31 (!) 33 Temp:  98.6 °F (37 °C) SpO2: 90% (!) 89% 91% (!) 88% Weight: 67.2 kg (148 lb 2.4 oz) Height:      
 
Intake and Output: 
11/12 0701 - 11/12 1900 In: -  
Out: 725 [Urine:725] 11/10 1901 - 11/12 0700 In: 4502.7 [I.V.:3192.7] Out: Luann Beckwith Physical Examination: 
 
General: Lethargic Neck:  Supple, no mass Resp:  Diminished Left side CV:  RRR,  no murmur or rub, no LE edema GI:  Soft, NT, + BS Neurologic:  Lethargic :  Horner in place 
 
[]    High complexity decision making was performed 
[]    Patient is at high-risk of decompensation with multiple organ involvement Lab Data Personally Reviewed: I have reviewed all the pertinent labs, microbiology data and radiology studies during assessment. Recent Labs 11/12/19 
7205 11/11/19 
0335 11/10/19 
0431 * 146* 147* K 4.2 4.3 3.2*  
* 113* 113* CO2 32 31 28 * 237* 240* BUN 50* 52* 56* CREA 1.28 1.43* 1.59* CA 8.0* 7.6* 7.7* MG  --  2.1 2.3 PHOS  --  4.1 4.2 ALB  --  2.1* 2.4* SGOT  --  93* 129* ALT  --  582* 875* Recent Labs 11/12/19 
9241 11/11/19 
0335 11/10/19 
0431 WBC 31.7* 31.4* 31.4* HGB 6.8* 6.9* 7.2* HCT 22.7* 22.9* 23.4* PLT 63* 53* 47* No results found for: SDES Lab Results Component Value Date/Time Culture result: NO GROWTH 4 DAYS 11/08/2019 11:12 AM  
 Culture result: MRSA PRESENT (A) 11/07/2019 11:06 AM  
 Culture result:  11/07/2019 11:06 AM  
      Screening of patient nares for MRSA is for surveillance purposes and, if positive, to facilitate isolation considerations in high risk settings. It is not intended for automatic decolonization interventions per se as regimens are not sufficiently effective to warrant routine use. Recent Results (from the past 24 hour(s)) GLUCOSE, POC Collection Time: 11/11/19  1:23 PM  
Result Value Ref Range Glucose (POC) 196 (H) 65 - 100 mg/dL Performed by Gayle Schwartz ( [facility]) GLUCOSE, POC Collection Time: 11/11/19  6:26 PM  
Result Value Ref Range Glucose (POC) 155 (H) 65 - 100 mg/dL Performed by Giovani Rai GLUCOSE, POC Collection Time: 11/12/19 12:25 AM  
Result Value Ref Range Glucose (POC) 170 (H) 65 - 100 mg/dL Performed by Marsha Osborn, BASIC Collection Time: 11/12/19  4:36 AM  
Result Value Ref Range Sodium 149 (H) 136 - 145 mmol/L Potassium 4.2 3.5 - 5.1 mmol/L Chloride 112 (H) 97 - 108 mmol/L  
 CO2 32 21 - 32 mmol/L Anion gap 5 5 - 15 mmol/L Glucose 152 (H) 65 - 100 mg/dL BUN 50 (H) 6 - 20 MG/DL Creatinine 1.28 0.70 - 1.30 MG/DL  
 BUN/Creatinine ratio 39 (H) 12 - 20 GFR est AA >60 >60 ml/min/1.73m2 GFR est non-AA 55 (L) >60 ml/min/1.73m2 Calcium 8.0 (L) 8.5 - 10.1 MG/DL  
CBC W/O DIFF Collection Time: 11/12/19  4:36 AM  
Result Value Ref Range WBC 31.7 (H) 4.1 - 11.1 K/uL  
 RBC 1.83 (L) 4.10 - 5.70 M/uL HGB 6.8 (L) 12.1 - 17.0 g/dL HCT 22.7 (L) 36.6 - 50.3 % .0 (H) 80.0 - 99.0 FL  
 MCH 37.2 (H) 26.0 - 34.0 PG  
 MCHC 30.0 30.0 - 36.5 g/dL RDW 25.0 (H) 11.5 - 14.5 % PLATELET 63 (L) 554 - 400 K/uL MPV ABNORMAL 8.9 - 12.9 FL  
 NRBC 7.6 (H) 0  WBC ABSOLUTE NRBC 2.41 (H) 0.00 - 0.01 K/uL GLUCOSE, POC Collection Time: 11/12/19  6:20 AM  
Result Value Ref Range Glucose (POC) 147 (H) 65 - 100 mg/dL Performed by Sidra Gonzalez MD 
[address] [place] [address] [facility] Phone - [phone] Fax - [phone] 
[email or web address]

## 2019-11-12 NOTE — PALLIATIVE CARE
Palliative Medicine Social Work Chart reviewed and note patient extubated to HF yesterday; not yet showing signs of ability to wean from support; ongoing worsening of renal and liver function. Spoke with nurse and met with patient's niece and mPOA, Flex Landeros ([phone]); great-niece, Monie Murray. Patient was minimally alert on HF during visit, but family reports fairly decent interaction with him. Patient's speech is difficult to understand. Reviewed his course and previous decisions for No CPR, shock or re-intubation. We had also discussed the option of shifting to comfort if his condition worsened. Informed of his continued worsening organ failure and the concern for weaning effectively from HF. Discussed reality of a limited life expectancy regardless and the appropriateness of shifting to comfort at any time. Talked more about what shift would look like; prepared that focus would be on pain, shortness of breath and anxiety; that we would not focus on numbers, labs or further diagnostics. Discussed plan for weaning from HF and simultaneously managing symptoms with medications. Assured goal would not be to sedate, but this may be a trade off. Prepared as well that if he stabilizes, we may consider discharge back to group home with hospice. Family is very accepting of a plan to shift to comfort, but would like to wait until patient's son, Igor Austin, visits tonight. Will plan full shift to comfort Wednesday morning. Will call Shahla Stevenson prior to shift to make her aware of specific changes and plan. She plans to visit around noon. DDNR signed this date in case he is discharged. She also provided contact information for his [employer] Plans through [address] [place] [address] ([phone]). His member card and pre-paid plans are placed in chart. Communicated information to nurse and attending. Thank you for the opportunity to be involved in the care of Mr. Vilma Otero. Christina Dick, ODIN, [facility] Palliative Medicine  Respecting Choices ® ACP Facilitator [phone]

## 2019-11-12 NOTE — PROGRESS NOTES
PCCM 
 
Extubated  - not to be reintubated On high flow NC O2 No distress Patient Vitals for the past 4 hrs: 
 BP Temp Pulse Resp SpO2 Weight 19 0800 104/57 98.6 °F (37 °C) (!) 107 30 (!) 89 %   
19 0600 107/59  99 26 90 % 67.2 kg (148 lb 2.4 oz)  
19 0500 109/56  98 28 90 %  Temp (24hrs), Av.2 °F (37.9 °C), Min:98.6 °F (37 °C), Max:100.9 °F (38.3 °C) Intake/Output Summary (Last 24 hours) at 2019 4957 Last data filed at 2019 0800 Gross per 24 hour Intake 1801.53 ml Output 3085 ml Net -1283.47 ml Opens eyes to name No resp distress 
anicteirc MMM Scattered rhonchi RRR Soft Warm and dry Lab: 
Recent Labs 19 
7185 19 
0335 11/10/19 
0431 WBC 31.7* 31.4* 31.4* HGB 6.8* 6.9* 7.2*  
PLT 63* 53* 47* * 146* 147* K 4.2 4.3 3.2*  
* 113* 113* CO2 32 31 28 BUN 50* 52* 56* CREA 1.28 1.43* 1.59* * 237* 240* CA 8.0* 7.6* 7.7* MG  --  2.1 2.3 PHOS  --  4.1 4.2 TBILI  --  1.2* 1.6* SGOT  --  93* 129* ABG: 
Recent Labs 19 
0755 11/10/19 
7132 PHI 7.458* 7.441 PCO2I 40.9 38.8 PO2I 54* 63* HCO3I 29.0* 26.4* SO2I 89* 93  
FIO2I 0.40 40 Impression 1. Acute resp failure- COPD + multiple pulmonary nodules ( s/p non dx left angelika bx last week complicated by tension PTX) 2. S/p Left VATS with JARETT/LLL wedge bx 19 and mechanical pleurodesis-for diffuse pulm nodules- path shows metastatic adenocarcinoma 3. Post op PEA arrest 19 - rosc 2 min 4. Septic shock-suspect ECFV replete, on stress dose steroids/LV fxn ok. 5. MELVIN 6. Lactic acidosis 7. Coagulopathy/DIC 
8. Shock liver 9. COPD FEV1 1.31 ( 46%) 10. ECHO  EF 71% RV moderatley reduced PASP 32 
11. Volume overload 
 
--Pulm toilet - wean high flow O2 as able 
--palliative care following 
--nebs 
--diuretics 
--weaning off stress solucortef 
--on zosyn vanc and ketty He MD

## 2019-11-13 NOTE — PROGRESS NOTES
[address] visit. Unable to receive communion today. Prayer offered. No family present. Cherylene Blind, SBS, RN, ACSW, LCSW  Page:  [phone]

## 2019-11-13 NOTE — PROGRESS NOTES
was called by staff to visit with pt.  visited pt in room 7103.  provided prayer of comfort and blessing. Sheyla Damon,  Intern, MDiv 
[phone]

## 2019-11-13 NOTE — WOUND CARE
WOCN Note: Follow up for: left lower back above sacrum and upper buttocks. Assessment:  
Patient is vented. Patient incontinent with ying Bed: TCSP Diet: NPO Patient can not report pain. Bilateral heels and sacrum skin intact and without erythema. Heels offloaded on pillow. POA/ Left lower back just above sacrum with partial thickness abrasion. 0.4cm x 0.8cm x 0.1cm / wound bed 100% pink and no drainage. z - guard paste applied. surrounding skin is intact. Upper buttocks- fissure like areas , linear, no drainage. Surrounding skin intact. Recommendations:   
Continue every 12 hour Z-guard paste to above areas. Minimize layers of linen/pads under patient to optimize support surface. Turn/reposition approximately every 2 hours and offload heels. Manage incontinence / promote continence; Hydraguard cream to buttocks and sacrum daily and as needed with incontinence care. Specialty bed: TCSP Discussed above plan with RN: Nela Gotti. Transition of Care: Plan to follow weekly and as needed while admitted to hospital.  
 
Oneida CAMACHO RN Wound Care Department Office: [phone] Pager: [phone]

## 2019-11-13 NOTE — PROGRESS NOTES
[facility] NP Progress note Name: Sushma Sapp YOB: 1940 MRN: [id number] Admission Date: 11/1/2019 10:43 AM 
 
Date of service: 11/13/2019 6:25 AM 
 
Overnight Update:  
  
 
Complaint: Decreased respiratory status Paged by: Gisselle Gallagher Subjective: Paged by primary RN due to worsening respiratory status over the past hour. On HiFlow, tolerating overnight, now with increased work of breathing, saturations in the 80s. Plan: Patient on course to be changed to comfort care this morning - family has had discussions with palliative medicine. I discussed patients current status with his niece, Kaylynn Schilling ([phone]) mPOA, in particular to discuss how aggressive she and the family would feel is appropriate as we transition to comfort care. At this point, she would prefer to focus only on interventions leading to comfort - no further blood draws or aggressive therapy. Medications placed for pain and anxiety. Confirmed DNR/DNI status. Will discuss with Dr Kayli Frankel, primary attending, in AM. Sotero Ferro RN notified of plan. Jake TINOCOP-C, PA-C 
[phone] or Susanna

## 2019-11-13 NOTE — PROGRESS NOTES
Hospitalist Progress Note NAME: Jil Serna :  1940 MRN:  [id number] Subjective:  
Patient appears comfortable, switched to comfort measures, stable on HFNC< and a/w transfer to  Assessment / Plan: 
AHRF s/p VATS with tension PTX  
- extubated -Was intubated  post VATS-On solu cortef taper off -Appreciate Pulmonary help 
  Tension pneumothorax 
-s/p left chest tube  s/p VATS 
-since overnight of , oxygen requirement got worse, the patient was transferred to ICU - see by CTS Reduce CT to -10cm suction 
-Appreciate Thoracic surgery, chest tube remains in place. Remains critical 
- after meeting with palliative care team, family decided to switch to comfort . 
- switched goal of care to [address] 
  
Cardiac arrest  post VATS-ROSC 2 min-Troponin 0.38, Chest tube in place Bilateral pulmonary nodules, -status post lung biopsy 10/31- adenocarcinoma. Seen by oncology and palliative care. Prognosis poor. Cor pulmonale with rt sided heart failure- cardiology following MELVIN-likely 2/2 ATN from hypotension-on IV fluids. Dc on  if crt back to normal. 
Hyperkalemia-resolbed. Elevated troponin. -per cardiology likely demand ischemia-Echo EF 51-55% Leukocytosis-rpt us pending. On stress dosed steroids. Schizophrenia-Continue home meds Transaminitis-seen by gi,  Likely shock liver. Numbers looking better Replete K PRN Tension pneumothorax as an expected/inherent condition that occurred post-operatively and not a complication The patient is critically ill and at high risk of further deterioration Code status: DNR/DNI  
DVT prophylaxis: scd Plan: Follow thoracic surgery,  Cardiology, GI Care Plan discussed with: Patient/Family nabila Dale is mPOA Disposition: comfort per family meeting with palliative 18.5 - 24.9 Normal weight / Body mass index is 20.09 kg/m². Review of Systems: 
Symptom Y/N Comments  Symptom Y/N Comments Fever/Chills    Chest Pain Poor Appetite    Edema Cough    Abdominal Pain Sputum    Joint Pain SOB/CARD    Pruritis/Rash Nausea/vomit    Tolerating PT/OT Diarrhea    Tolerating Diet Constipation    Other Could NOT obtain due to: sedation Objective: VITALS:  
Last 24hrs VS reviewed since prior progress note. Most recent are: 
Patient Vitals for the past 24 hrs: 
 Temp Pulse Resp BP SpO2  
11/13/19 0900  (!) 123 (!) 44 (!) 86/51   
11/13/19 0830  (!) 131 (!) 51 102/50   
11/13/19 0801     (!) 89 % 11/13/19 0800 98.7 °F (37.1 °C) (!) 136 (!) 55 112/60 (!) 88 % 11/13/19 0700  (!) 127 (!) 48 97/58 (!) 89 % 11/13/19 0615  (!) 129 (!) 44  (!) 81 % 11/13/19 0538     92 % 11/13/19 0400 98 °F (36.7 °C) (!) 106 15 113/59 96 % 11/13/19 0155     92 % 11/13/19 0000 98.1 °F (36.7 °C) (!) 110 24 107/58 (!) 89 % 11/12/19 2043     100 % 11/12/19 2000 98.3 °F (36.8 °C) (!) 113 (!) 33 108/55 96 % 11/12/19 1800  (!) 111 (!) 39 113/61   
11/12/19 1700  (!) 112 29 113/59 95 % 11/12/19 1600 98.5 °F (36.9 °C) (!) 111 (!) 35 117/57 97 % 11/12/19 1500  (!) 116 (!) 37 108/58 93 % Intake/Output Summary (Last 24 hours) at 11/13/2019 1444 Last data filed at 11/13/2019 1116 Gross per 24 hour Intake 1650 ml Output 1565 ml Net 85 ml PHYSICAL EXAM: 
General: WD, WN. sedated Resp:  no sig wheezing or rales. mild accessory muscle use GI:  Soft, Non distended, Non tender Neurologic:  Comfortable. No apparent distress. Psych:   Can't assess Reviewed most current lab test results and cultures  YES Reviewed most current radiology test results   YES Review and summation of old records today    NO Reviewed patient's current orders and MAR    YES 
PMH/ reviewed - no change compared to H&P 
________________________________________________________________________ Care Plan discussed with: 
  Comments Patient Family RN    
 Care Manager Consultant Multidiciplinary team rounds were held today with , nursing, pharmacist and clinical coordinator. Patient's plan of care was discussed; medications were reviewed and discharge planning was addressed. ________________________________________________________________________ Total NON critical care TIME:  20   Minutes Total CRITICAL CARE TIME Spent:   Minutes non procedure based Comments >50% of visit spent in counseling and coordination of care    
________________________________________________________________________ Tj Lane MD  
 
Procedures: see electronic medical records for all procedures/Xrays and details which were not copied into this note but were reviewed prior to creation of Plan. LABS: 
I reviewed today's most current labs and imaging studies. Pertinent labs include: 
Recent Labs 11/13/19 0405 11/12/19 0436 11/11/19 
0335 WBC 27.6* 31.7* 31.4* HGB 6.7* 6.8* 6.9*  
HCT 22.8* 22.7* 22.9*  
PLT 76* 63* 53* Recent Labs 11/13/19 0405 11/12/19 0436 11/11/19 
0335 * 149* 146*  
K 3.6 4.2 4.3 * 112* 113* CO2 33* 32 31 * 152* 237* BUN 45* 50* 52* CREA 1.39* 1.28 1.43* CA 7.7* 8.0* 7.6*  
MG 2.0  --  2.1 PHOS 4.5  --  4.1 ALB 1.9*  --  2.1* TBILI 1.6*  --  1.2* SGOT 75*  --  93* *  --  582* Signed: Tj Lane MD

## 2019-11-13 NOTE — PROGRESS NOTES
Thoracic Surgery Simple Progress Note Admit Date: 2019 POD: 8 Days Post-Op Procedure:  Procedure(s): LEFT VIDEO ASSISTED THORACOSCOPIC SURGERY/LEFT UPPER LOBE WEDGE RESECTION/ LEFT LOWER LOBE WEDGE RESECTION/ MECHANICAL PLEURODESIS Subjective:  
 
Patient on HiFlow, tolerating overnight, now with increased work of breathing, saturations in the 80s. Plan: Patient on course to be changed to comfort care this morning - family has had discussions with palliative medicine. Selena TAYLOR discussed patients current status with his niece, David Salazar ([phone]) mPOA early this morning, in particular to discuss how aggressive she and the family would feel is appropriate as we transition to comfort care. At this point, she would prefer to focus only on interventions leading to comfort - no further blood draws or aggressive therapy. Medications placed for pain and anxiety. Confirmed DNR/DNI status. Review of Systems: 
 
CARDIAC: positive for H/O right heart failure RESP: positive for resp failure NEURO:  negative INCISION: Clean, dry, and intact EXT: Denies new swelling or pain in the legs or calves. Objective:  
 
Blood pressure 97/58, pulse (!) 127, temperature 98 °F (36.7 °C), resp. rate (!) 48, height 6' (1.829 m), weight 148 lb 2.4 oz (67.2 kg), SpO2 (!) 89 %. Temp (24hrs), Av.3 °F (36.8 °C), Min:98 °F (36.7 °C), Max:98.6 °F (37 °C) Hemodynamics PAP 
  CO 
  CI No intake/output data recorded.  1901 -  0700 In: 3288.8 [I.V.:3288.8] Out: 3350 [EDGYA:1639] EXAM: 
GENERAL: VSS, afrible, alert and cooperative HEART:  regular rate and rhythm, tachycardiac in 130s LUNG: diminished breath sounds through out fields,CXR revealed moderate left PTX, tachypnea with rate in 50s, remains on HiFlow with sats in 80s NEURO:  He is not responding to verbal stimuli INCISION: Clean, dry, and intact EXTREMITIES:No evidence of DVT seen on physical exam. 
 GI/: Abd soft, nonterder with + bowel sounds. [place] Labs: 
Recent Results (from the past 24 hour(s)) GLUCOSE, POC Collection Time: 11/12/19 11:25 AM  
Result Value Ref Range Glucose (POC) 126 (H) 65 - 100 mg/dL Performed by Robin Piper, POC Collection Time: 11/12/19  5:26 PM  
Result Value Ref Range Glucose (POC) 163 (H) 65 - 100 mg/dL Performed by Robin Piper, POC Collection Time: 11/13/19 12:10 AM  
Result Value Ref Range Glucose (POC) 161 (H) 65 - 100 mg/dL Performed by Mary Gustafson   
CBC WITH AUTOMATED DIFF Collection Time: 11/13/19  4:05 AM  
Result Value Ref Range WBC 27.6 (H) 4.1 - 11.1 K/uL  
 RBC 1.85 (L) 4.10 - 5.70 M/uL HGB 6.7 (L) 12.1 - 17.0 g/dL HCT 22.8 (L) 36.6 - 50.3 % .2 (H) 80.0 - 99.0 FL  
 MCH 36.2 (H) 26.0 - 34.0 PG  
 MCHC 29.4 (L) 30.0 - 36.5 g/dL RDW 23.1 (H) 11.5 - 14.5 % PLATELET 76 (L) 272 - 400 K/uL NRBC 2.0 (H) 0  WBC ABSOLUTE NRBC 0.55 (H) 0.00 - 0.01 K/uL NEUTROPHILS 93 (H) 32 - 75 % LYMPHOCYTES 1 (L) 12 - 49 % MONOCYTES 4 (L) 5 - 13 % EOSINOPHILS 0 0 - 7 % BASOPHILS 0 0 - 1 % IMMATURE GRANULOCYTES 2 (H) 0.0 - 0.5 % ABS. NEUTROPHILS 25.6 (H) 1.8 - 8.0 K/UL  
 ABS. LYMPHOCYTES 0.3 (L) 0.8 - 3.5 K/UL  
 ABS. MONOCYTES 1.1 (H) 0.0 - 1.0 K/UL  
 ABS. EOSINOPHILS 0.0 0.0 - 0.4 K/UL  
 ABS. BASOPHILS 0.0 0.0 - 0.1 K/UL  
 ABS. IMM. GRANS. 0.6 (H) 0.00 - 0.04 K/UL  
 DF SMEAR SCANNED    
 RBC COMMENTS ANISOCYTOSIS 3+ 
    
 RBC COMMENTS MACROCYTOSIS 2+ 
    
 RBC COMMENTS OVALOCYTES 1+ RBC COMMENTS SCHISTOCYTES 1+ RBC COMMENTS HYPOCHROMIA 1+ 
    
 RBC COMMENTS POLYCHROMASIA 2+ MAGNESIUM Collection Time: 11/13/19  4:05 AM  
Result Value Ref Range Magnesium 2.0 1.6 - 2.4 mg/dL METABOLIC PANEL, COMPREHENSIVE Collection Time: 11/13/19  4:05 AM  
Result Value Ref Range  Sodium 148 (H) 136 - 145 mmol/L  
 Potassium 3.6 3.5 - 5.1 mmol/L Chloride 111 (H) 97 - 108 mmol/L  
 CO2 33 (H) 21 - 32 mmol/L Anion gap 4 (L) 5 - 15 mmol/L Glucose 162 (H) 65 - 100 mg/dL BUN 45 (H) 6 - 20 MG/DL Creatinine 1.39 (H) 0.70 - 1.30 MG/DL  
 BUN/Creatinine ratio 32 (H) 12 - 20 GFR est AA >60 >60 ml/min/1.73m2 GFR est non-AA 50 (L) >60 ml/min/1.73m2 Calcium 7.7 (L) 8.5 - 10.1 MG/DL Bilirubin, total 1.6 (H) 0.2 - 1.0 MG/DL  
 ALT (SGPT) 306 (H) 12 - 78 U/L  
 AST (SGOT) 75 (H) 15 - 37 U/L Alk. phosphatase 85 45 - 117 U/L Protein, total 4.6 (L) 6.4 - 8.2 g/dL Albumin 1.9 (L) 3.5 - 5.0 g/dL Globulin 2.7 2.0 - 4.0 g/dL A-G Ratio 0.7 (L) 1.1 - 2.2 PHOSPHORUS Collection Time: 11/13/19  4:05 AM  
Result Value Ref Range Phosphorus 4.5 2.6 - 4.7 MG/DL Assessment:  
No evidence of DVT. Principal Problem: 
  Shortness of breath (11/1/2019) Active Problems: 
  Pneumothorax on left (11/1/2019) Respiratory failure ([address] (11/1/2019) Right heart failure ([address] (11/6/2019) Plan/Recommendations:  
Chest tube back to -20 cm suction HiFlow O2 per pulmonary See orders Signed By: Miquel HAINES

## 2019-11-13 NOTE — PALLIATIVE CARE
Palliative Medicine Social Work Chart reviewed and checked in on patient who was minimally responsive; using accessory muscles to breath. Assured him of plan for optimized comfort  Updated niece and mPOA of plan. Full shift to comfort today with weaning of HF to NC. Niece coming in midday. Will continue to support; will consult hospice for GIP if needed. Family would prefer ongoing comfort here vs. Discharge back to group home. Thank you for the opportunity to be involved in the care of Emanuel Camacho and his family. Christina Dick, MALAW, [facility]- Palliative Medicine  Respecting Choices ® ACP Facilitator [phone]

## 2019-11-13 NOTE — PROGRESS NOTES
SLP Contact Note Note plans to transition to comfort care. Therefore, no SLP needs anticipated. Will sign-off. Please reconsult should SLP be of any assistance. Thank you, Jose Charles M.Ed, CCC-SLP Speech-Language Pathologist

## 2019-11-13 NOTE — PROGRESS NOTES
1930: Report received from 966 Satish Renteria RN.  
 
2000: Patient on HF NC 40/50%. Patient is a DNR and plan to transition to comfort care tomorrow morning.  
 
0600: Uneventful shift until 0600 - patient had maintained Sp02 throughout night on stable HF settings. Acutely desaturated to 71% at 0600 with HR 140s. HF NC titrated up to full support with Sp02 only increasing to low 80s. RR increased to 40-50s with pronounced work of breathing. ABY Leung paged and updated. RN called YOSHI Dale and updated her. Niece stated she did not want BIPAP and would like patient transitioned to comfort care now. RN informed nabila Fowler NP would call to confirm change in plan of care. 9747: ABY Leung called and stated he spoke with nabila. He reiterated the niece does not want BIPAP or Pulmonary notification for recs - she wants him on [address] NP stating he would be placing Comfort Care order set. 0700: Patient received PRN Ativan for anxiety. Patient now resting more comfortably with improved RR and HR. Patient's son at bedside; updated by RN. Son expressed his agreement with plan. 0720Ulysees BRANDON Dodge (Thoracic) called to ask for CT to be placed back to suction @ -20. RN informed PA of change to comfort care; PA still requested CT to be placed back to suction and stated she would update Crista Urias MD of change to comfort. CT placed to suction with raging air leak ~ Level 4. When CT was previously to suction it was only a Level 1 air leak. 0745: Bedside shift change report given to Catrina Live RN (oncoming nurse) by Stevie Reyes RN (offgoing nurse). Report included the following information SBAR, Intake/Output, MAR, Recent Results and Cardiac Rhythm NST.

## 2019-11-13 NOTE — PROGRESS NOTES
Palliative Medicine Consult Thomas: [phone] Patient Name: Mery Briseno YOB: 1940 Date of Initial Consult: 11/6/19 Reason for Consult: End stage disease, arrest, worsening organ failure Requesting Provider: Fantasma Mcdonnell, cardiology Primary Care Physician: Abdulkadir Walker NP 
 
 SUMMARY:  
Mery Briseno is a 70 y.o. with a past history of COPD, shchizophrenia who was admitted on 11/1/2019 from group home with SOB. Was hospitalized 10/22-10/25/19 w/ resp failure, plan was to have outpatient bronch and lung bx for b/l pulmonary nodules and chest lymphadenopathy- FNA 10/31/19 w/out malignant cells. In ED found to have L tension PTX and emergent chest tube placed. Was initially on NRB and high flow. S/p L VATS w/ wedge bx 11/5/19, afterwards had cardiopulmonary arrest w/ 2 min before ROSC. Pt has R sided heart failure, severe pulm HTN related to COPD. Intubated 11/5/19 and on vasopressors. Worsening leukocytosis. Extubated to high flow O2. 
 
11/13/19- Shift to comfort measures yest evening, now decision to wean from high flow. Current medical issues leading to Palliative Medicine involvement include: end stage disease. Pt w/ multisystem organ dysfunction. mPOA is nabila Gongora. Pt had DNR status upon admission, which was reversed during time in OR. Social: Pt . 1 son- Tim Somers. Retired 7 years ago, was a . Originally from [facility] [place]. At baseline (per outpatient psych note 9/9/19) able to do his ADLs, traveled alone via tax, cooperative and interactive. PALLIATIVE DIAGNOSES:  
1. Respiratory failure, SOB 
2. Incr secretions 3. PEA arrest 11/5/19 4. Shock w/ multisystem organ failure 5. Goals of care PLAN:  
1. Comfort measures. Son was able to visit w/ pt yesterday and was minimally responsive. Today is not responding to voice.   
2. Updated niece/mPOA Barbra Cristiano- confirm plan to wean high flow O2 while maximizing comfort w/ medications. She is planning on visiting in an hour- discuss that pt may be weaned to RA by that time, just depends on how his sx are doing. 3. Dilaudid 1mg IV every 4h scheduled and prn for air hunger. Some labored breathing right now. 4. Robinul scheduled and prn.  
5. Transfer to , if stabilizes off high flow will consult hospice. 6. Communicated plan of care with: Palliative IDT, [address] Team incl bedside JASMYN Chua GOALS OF CARE / TREATMENT PREFERENCES:  
 
GOALS OF CARE: 
Patient/Health Care Proxy Stated Goals: Comfort TREATMENT PREFERENCES:  
Code Status: DNR- no CPR/shocks Advance Care Planning: 
[x] [employer] Interdisciplinary Team has updated the ACP Navigator with [place] and Patient Capacity Primary Decision Maker: Gricelda William (Niece and mPOA) - Other Relative - [phone] Advance Care Planning 11/1/2019 Patient's Healthcare Decision Maker is: - Confirm Advance Directive Yes, on file Medical Interventions: Comfort measures Other: As far as possible, the palliative care team has discussed with patient / health care proxy about goals of care / treatment preferences for patient. HISTORY:  
 
History obtained from: chart, staff, family CHIEF COMPLAINT: Cannot obtain due to patient factors HPI/SUBJECTIVE: The patient is:  
[] Verbal and participatory [x] Non-participatory due to: medical condition Pt on high flow, not responding to voice. Clinical Pain Assessment (nonverbal scale for severity on nonverbal patients):  
Clinical Pain Assessment Severity: 0 Activity (Movement): Lying quietly, normal position Duration: for how long has pt been experiencing pain (e.g., 2 days, 1 month, years) Frequency: how often pain is an issue (e.g., several times per day, once every few days, constant) FUNCTIONAL ASSESSMENT:  
 
Palliative Performance Scale (PPS): PPS: 10 
 
 
 PSYCHOSOCIAL/SPIRITUAL SCREENING:  
 
Palliative IDT has assessed this patient for cultural preferences / practices and a referral made as appropriate to needs (Cultural Services, Patient Advocacy, Ethics, etc.) Any spiritual / Oriental orthodox concerns: 
[] Yes /  [x] No 
 
Caregiver Burnout: 
[] Yes /  [x] No /  [] No Caregiver Present Anticipatory grief assessment:  
[x] Normal  / [] Maladaptive ESAS Anxiety: ESAS Depression:    
Cannot obtain due to patient factors REVIEW OF SYSTEMS:  
 
Positive and pertinent negative findings in ROS are noted above in HPI. The following systems were [] reviewed / [x] unable to be reviewed as noted in HPI Other findings are noted below. Systems: constitutional, ears/nose/mouth/throat, respiratory, gastrointestinal, genitourinary, musculoskeletal, integumentary, neurologic, psychiatric, endocrine. Positive findings noted below. Modified ESAS Completed by: provider Fatigue: 10 Drowsiness: 10 Pain: 0 Dyspnea: 0 Stool Occurrence(s): 1 PHYSICAL EXAM:  
 
From RN flowsheet: 
Wt Readings from Last 3 Encounters:  
11/12/19 148 lb 2.4 oz (67.2 kg) 10/31/19 132 lb (59.9 kg) 10/24/19 128 lb 4.9 oz (58.2 kg) Blood pressure (!) 86/51, pulse (!) 123, temperature 98.7 °F (37.1 °C), resp. rate (!) 44, height 6' (1.829 m), weight 148 lb 2.4 oz (67.2 kg), SpO2 (!) 89 %. Pain Scale 1: Numeric (0 - 10) Pain Intensity 1: 0 Pain Location 1: Generalized Pain Orientation 1: Mid 
Pain Description 1: Jake Hand Pain Intervention(s) 1: Medication (see MAR) Last bowel movement, if known:  
 
Constitutional: fatigued,not responsive Eyes: pupils equal, anicteric ENMT: no nasal discharge, moist mucous membranes, intubated Respiratory: breathing min labored Musculoskeletal: no deformity Skin: warm, dry Neurologic: not responding to voice HISTORY:  
 
Principal Problem: 
  Shortness of breath (11/1/2019) Active Problems: Pneumothorax on left (11/1/2019) Respiratory failure ([facility] (11/1/2019) Right heart failure ([facility] (11/6/2019) Past Medical History:  
Diagnosis Date  Anxiety  Chronic confusion  Chronic obstructive pulmonary disease ([facility] 4L cont. O2  
 Depression   
 pt has schizophrenia per son  Poor historian  Sleep disorder   
 insomnia Past Surgical History:  
Procedure Laterality Date  COLONOSCOPY N/A 7/31/2019 COLONOSCOPY performed by Hannah Pichardo MD at [facility] ENDOSCOPY Family History Problem Relation Age of Onset  Cancer Mother   
     colon ancer  Cancer Father   
     lung cancer  Cancer Brother   
     lung cancer History reviewed, no pertinent family history. Social History Tobacco Use  Smoking status: Former Smoker Packs/day: 1.00 Types: Cigarettes  Smokeless tobacco: Never Used  Tobacco comment: every 2 days Substance Use Topics  Alcohol use: No  
 
Allergies Allergen Reactions  Pollen Extracts Sneezing Current Facility-Administered Medications Medication Dose Route Frequency  scopolamine (TRANSDERM-SCOP) 1 mg over 3 days 1 Patch  1 Patch TransDERmal Q72H PRN  
 LORazepam (ATIVAN) injection 1 mg  1 mg IntraVENous Q15MIN PRN  
 morphine injection 1 mg  1 mg IntraVENous Q15MIN PRN  
 HYDROmorphone (PF) (DILAUDID) injection 1 mg  1 mg IntraVENous Q15MIN PRN  
 dexamethasone (DECADRON) 4 mg/mL injection 2 mg  2 mg IntraVENous Q6H PRN  
 glycopyrrolate (ROBINUL) injection 0.2 mg  0.2 mg IntraVENous Q4H PRN  
 LORazepam (ATIVAN) injection 1 mg  1 mg IntraVENous Q15MIN PRN  
 HYDROmorphone (PF) (DILAUDID) injection 1 mg  1 mg IntraVENous Q4H  
 glycopyrrolate (ROBINUL) injection 0.2 mg  0.2 mg IntraVENous TID  fentaNYL citrate (PF) injection 25-50 mcg  25-50 mcg IntraVENous Q1H PRN  
 glucose chewable tablet 16 g  4 Tab Oral PRN  
 glucagon (GLUCAGEN) injection 1 mg  1 mg IntraMUSCular PRN  
  sodium chloride (NS) flush 5-40 mL  5-40 mL IntraVENous PRN  
 hydrALAZINE (APRESOLINE) 20 mg/mL injection 10 mg  10 mg IntraVENous Q6H PRN  
 
 
 
 LAB AND IMAGING FINDINGS:  
 
Lab Results Component Value Date/Time WBC 27.6 (H) 11/13/2019 04:05 AM  
 HGB 6.7 (L) 11/13/2019 04:05 AM  
 PLATELET 76 (L) 69/13/2469 04:05 AM  
 
Lab Results Component Value Date/Time Sodium 148 (H) 11/13/2019 04:05 AM  
 Potassium 3.6 11/13/2019 04:05 AM  
 Chloride 111 (H) 11/13/2019 04:05 AM  
 CO2 33 (H) 11/13/2019 04:05 AM  
 BUN 45 (H) 11/13/2019 04:05 AM  
 Creatinine 1.39 (H) 11/13/2019 04:05 AM  
 Calcium 7.7 (L) 11/13/2019 04:05 AM  
 Magnesium 2.0 11/13/2019 04:05 AM  
 Phosphorus 4.5 11/13/2019 04:05 AM  
  
Lab Results Component Value Date/Time AST (SGOT) 75 (H) 11/13/2019 04:05 AM  
 Alk. phosphatase 85 11/13/2019 04:05 AM  
 Protein, total 4.6 (L) 11/13/2019 04:05 AM  
 Albumin 1.9 (L) 11/13/2019 04:05 AM  
 Globulin 2.7 11/13/2019 04:05 AM  
 
Lab Results Component Value Date/Time INR 1.3 (H) 11/08/2019 03:55 AM  
 Prothrombin time 13.0 (H) 11/08/2019 03:55 AM  
 aPTT 36.7 (H) 11/07/2019 11:06 AM  
  
No results found for: IRON, FE, TIBC, IBCT, PSAT, FERR No results found for: PH, PCO2, PO2 No components found for: [place] Lab Results Component Value Date/Time  11/03/2019 09:28 AM  
 CK - MB 3.7 (H) 11/03/2019 09:28 AM  
  
 
 
   
 
Total time: 35 min Counseling / coordination time, spent as noted above: 30 min 
> 50% counseling / coordination?: yes Prolonged service was provided for  []30 min   []75 min in face to face time in the presence of the patient, spent as noted above. Time Start:  
Time End:  
Note: this can only be billed with 16554 (initial) or 62679 (follow up). If multiple start / stop times, list each separately.

## 2019-11-14 NOTE — PROGRESS NOTES
Death Note Events prior to patients death: 
Called to bedside at 20:49 PM for unresponsive and pulseless patient. On exam, no heart sounds or breath sounds were noted after 1 minute of auscultation. Pupils were fixed and dilated without pupillary light reflex. Patient was pronounced dead on 11/13/19  at 2049. Date/Time of death: 11/13/19 at 2049. Cause: 
Immediate: Respiratory failure secondary to pneumothorax, right heart failure Underlying cause:  
Hospital Problems  Date Reviewed: 11/8/2019 Codes Class Noted POA Right heart failure (HCC) ICD-10-CM: I50.810 ICD-9-CM: 428.0  11/6/2019 Unknown Pneumothorax on left ICD-10-CM: J93.9 ICD-9-CM: 512.89  11/1/2019 Unknown * (Principal) Shortness of breath ICD-10-CM: R06.02 
ICD-9-CM: 786.05  11/1/2019 Yes Respiratory failure ([address] ICD-10-CM: J96.90 ICD-9-CM: 518.81  11/1/2019 Unknown Physician Pronouncing Death: Nick Castillo NP Attending Physician of Record:   Danna Fonseca MD  
 
Events related to death: 
Was code called: NO 
 notified: NO Family notified: Shivam Roberto Autopsy requested: NO 
Death certificate completed: NO 
Code Status Prior to Death: DNR Discharge summary and death certificate will be completed by: Danna Fonseca MD

## 2019-11-14 NOTE — PROGRESS NOTES
responded to page from staff notifying the  of the patients passing. There was no family present. The  consulted with nurse who said that she had contacted the patients POA and notified her of patients death. POA will not be coming to the hospital. Patient will be going to the [facility].  provided a compassionate presence.  provided pastoral care and support. . Meliza Hoffman Pediatric Palliative Staff  [place] [address] W: [phone]/ V:[phone] [address] 
[email or web address]

## 2019-11-15 NOTE — H&P
PATIENT ID: Char Zarate MRN: [id number] YOB: 1940 DATE OF ADMISSION: 11/1/2019 10:43 AM   
DATE OF DISCHARGE: 11/15/2019 PRIMARY CARE PROVIDER: Hector Jones NP  
 
ATTENDING PHYSICIAN: Danyel Almodovar MD 
DISCHARGING PROVIDER: Danyel Almodovar MD   
To contact this individual call [phone] and ask the  to page. If unavailable ask to be transferred the Adult Hospitalist Department. CONSULTATIONS: IP CONSULT TO THORACIC SURGERY 
IP CONSULT TO GASTROENTEROLOGY 
IP CONSULT TO NEPHROLOGY 
IP CONSULT TO ONCOLOGY 
IP CONSULT TO CARDIOLOGY PROCEDURES/SURGERIES: Procedure(s): LEFT VIDEO ASSISTED THORACOSCOPIC SURGERY/LEFT UPPER LOBE WEDGE RESECTION/ LEFT LOWER LOBE WEDGE RESECTION/ MECHANICAL PLEURODESIS 
 
ADMITTING DIAGNOSES & HOSPITAL COURSE:  
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  
- extubated 11/11-Was intubated 11/5 post VATS-On solu cortef taper off -Appreciate Pulmonary help 
-s/p left chest tube 11/1 s/p VATS 
-since overnight of 11/1, oxygen requirement got worse, the patient was transferred to [facility], [facility]FF[facility] OF [facility]- see by CTS Reduce CT to -10cm suction 
-Appreciate Thoracic surgery, chest tube remains in place. Remains critical 
- after meeting with palliative care team, family decided to switch to comfort 11/13. 
- switched goal of care to [address] [address].. - patient passed away same night after switched to comfort in the morning. Cause of death metastatic Lung cancer Date/Time of death: 11/13/19 at 2049. May his soul rest in peace

## 2019-11-29 LAB
BACTERIA SPEC CULT: NORMAL
SERVICE CMNT-IMP: NORMAL

## 2019-12-02 LAB
BACTERIA SPEC CULT: NORMAL
SERVICE CMNT-IMP: NORMAL

## 2019-12-09 LAB
BACTERIA SPEC CULT: NORMAL
SERVICE CMNT-IMP: NORMAL

## 2020-04-06 NOTE — PROGRESS NOTES
From: Emma ORTIZ  To: Maria Victoria Vences MD  Sent: 4/5/2020 2:59 PM CDT  Subject: Non-Urgent Medical Question    Good Afternoon!   I think I may have a UTI or the start of one. Thought I might have had the start of one friday, but Last night it really progressed and started to get uncomfortable. I was up most of the night.   I provided a urine sample at my appointment last Friday I was not sure if this is able to be detected or on the list of things Dr. Vences was looking for since I saw many of them were run. I noticed some results came back and was not sure if that was what the (Red) explination favio on the my NEXAGE brock ment.   Looking to see if I am able to set up an appointment or stop by Monday (4/6) to provide another sample if needed, and or get some suggestions on how to releave the slight low abdomnial pain (more of like abdonimal pressure).  No rush I am doing just fine, just some help Monday would be wonderful!   Thanks you for all you do!   Emma   Problem: Mobility Impaired (Adult and Pediatric)  Goal: *Acute Goals and Plan of Care (Insert Text)  Physical Therapy Goals  Updated 1/29/2018  Initiated 1/11/2018  Goals reassessed 1/18/18 and remain appropriate over next 7 days  Goals reassessed 2/12/18 and remain appropriate over next 7 days  1. Patient will move from supine to sit and sit to supine  in bed with supervision/set-up within 7 day(s). MET  2. Patient will transfer from bed to chair and chair to bed with supervision/set-up using the least restrictive device within 7 day(s). 3.  Patient will perform sit to stand with modified independence within 7 day(s). 4.  Patient will ambulate with supervision/set-up for 250 feet with the least restrictive device within 7 day(s). 5.  Patient will ascend/descend 12 stairs with 1 handrail(s) with supervision/set-up within 7 day(s). 6.  Patient will independently don and doff quick draw TLSO within 7 days. Physical Therapy Goals    Initiated 1/11/2018  Updated: 2/5/2018      1. Patient will move from supine to sit and sit to supine  in bed with supervision/set-up within 7 day(s). MET  2. Patient will transfer from bed to chair and chair to bed with supervision/set-up using the least restrictive device within 7 day(s). 3.  Patient will perform sit to stand with modified independence within 7 day(s). MET  4. Patient will ambulate with supervision/set-up for 250 feet with the least restrictive device within 7 day(s). 5.  Patient will ascend/descend 12 stairs with 1 handrail(s) with supervision/set-up within 7 day(s). 6.  Patient will independently don and doff quick draw TLSO within 7 days.       physical Therapy TREATMENT  Patient: Megha Feliciano (6 y.o. male)  Date: 2/13/2018  Diagnosis: Abnormal chest x-ray [R93.8] Pneumonia  Procedure(s) (LRB):  BRONCHOSCOPY (N/A)    Precautions: Back, Fall, Bed Alarm ([place] B: reads lips; smokes),  No bending, no lifting greater than 5 lbs, no twisting, log-roll technique, repositioning every 20-30 min except when sleeping, brace when OOB    Chart, physical therapy assessment, plan of care and goals were reviewed. ASSESSMENT:  Pt is making steady progress, pt quickly transferred OOB, verbally reviewed the log roll technique, donned [place] TLSO with moderate assist x 1, sit to stand with CGA, ambulated with CGA x 150 feet, pt cued to slow down, no overt loss of balance, ambulates with a flexed posture, pt reports minimal back pain, 2/10, HR increased to 121 with gait, 102 after ambulation while sitting in chair, pt attempted to remove his brace 2 times while sitting in chair, reviewed importance of wearing brace at all times, recommend educating and practicing the log roll technique with pt on next session, recommend home health at group home  Progression toward goals:  []      Improving appropriately and progressing toward goals  [x]      Improving slowly and progressing toward goals  []      Not making progress toward goals and plan of care will be adjusted     PLAN:  Patient continues to benefit from skilled intervention to address the above impairments. Continue treatment per established plan of care. Discharge Recommendations:  Home Health  Further Equipment Recommendations for Discharge:  Recommend reacher     SUBJECTIVE:   Patient stated It hurts a little bit.    The patient stated 3/3 back precautions. Reviewed all 3 with patient.     OBJECTIVE DATA SUMMARY:   Critical Behavior:  Neurologic State: Confused  Orientation Level: Disoriented to time  Cognition: Follows commands  Safety/Judgement: Awareness of environment  Functional Mobility Training:  Bed Mobility:      Supine to Sit: Supervision, Pt had HOB elevated and sat up when therapist spoke to him, verbally reviewed the log roll technique               Brace donned with  moderate assistance    Transfers:  Sit to Stand: Contact guard assistance;Assist x1  Stand to Sit: Contact guard assistance;Assist x1 Balance:  Sitting: Intact  Standing: Impaired  Standing - Static: Good  Standing - Dynamic : Good  Ambulation/Gait Training:  Distance (ft): 150 Feet (ft)  Assistive Device: Brace/Splint;Gait belt  Ambulation - Level of Assistance: Contact guard assistance;Assist x1        Gait Abnormalities: Antalgic;Decreased step clearance (flexed posture)        Base of Support: Narrowed       Note that pt attempted to remove his brace 2 times after returning to his chair, reviewed importance of wearing brace at all times when out of bed                     Pain:  Pain Scale 1: Numeric (0 - 10)  Pain Intensity 1: 2  Pain Location 1: Back  Pain Orientation 1: Lower  Pain Description 1: Aching  Pain Intervention(s: Rest  Activity Tolerance:   Fair, HR increased to 121 with ambulation, 102 after returning to chair    After treatment:   [x]  Patient left in no apparent distress sitting up in chair  []  Patient left in no apparent distress in bed  [x]  Call bell left within reach  [x]  Nursing notified  []  Caregiver present  []  Bed alarm activated    COMMUNICATION/COLLABORATION:   The patients plan of care was discussed with: Registered Nurse    Zoila Perez   Time Calculation: 13 mins

## 2022-08-21 NOTE — PROGRESS NOTES
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 Prior to current admission he was on Levothyroxine 112 mcg daily  - Labs consistent with hypothyroidism until current admission, initially on 7/13, with low TSH and elevated fT4. Repeat TFTs on 7/27 with worsening hyperthyroidism. He continued to receive LT4 112 mcg through 7/28. He remains on amiodarone for episodes of Ventricular Tachycardia  - Suspect current hyperthyroidism is likely due to AIT-2, however continued exogenous LT4 certainly contribute to current presentation   - fT4 now normal    Lab Results   Component Value Date    TSH 3.940 08/21/2022    TSH 0.127 (L) 08/14/2022    TSH <0.010 (L) 07/27/2022    FREET4 0.93 08/21/2022    FREET4 0.98 08/19/2022    FREET4 1.24 08/16/2022     Plan  - Strongly suspect AIT type 2 with improvement seen currently with the use of prednisone.   - Will continue methimazole 5mg daily and prednisone 20 mg daily for now. May need to be discharged on both, however may warrant discontinuation of methimazole and prednisone tapering while inpatient based on clinical picture and labs.  - Trend TFTs q48-72h   - If FT4 continues to down trend and concerns of hypothyroidism arise will stop methimazole and will start slow taper of steroids.       Mew 4; r/t resp and hr; pt c/o back pain 8/10   Will medicate and cont to monitor

## 2023-06-16 NOTE — ROUTINE PROCESS
Bedside and Verbal shift change report given to Oksana DE LEON (oncoming nurse) by Luis Daniel Joiner (offgoing nurse). Report included the following information SBAR, Kardex, ED Summary, OR Summary, Procedure Summary, Intake/Output, MAR and Recent Results. Bedside and Verbal shift change report given to Ava Pineda (oncoming nurse) by Ronnie Silver (offgoing nurse). Report included the following information SBAR, Kardex, ED Summary, Intake/Output, MAR and Recent Results.
TRANSFER - OUT REPORT: 
 
Verbal report given to TRINIDAD(name) on Magaly Olivier  being transferred to [facility]) for routine progression of care Report consisted of patients Situation, Background, Assessment and  
Recommendations(SBAR). Information from the following report(s) SBAR, Kardex, ED Summary, Procedure Summary, Intake/Output, MAR, Accordion, Recent Results and Cardiac Rhythm NSR was reviewed with the receiving nurse. Lines:  
Peripheral IV 11/01/19 Left Antecubital (Active) Site Assessment Clean, dry, & intact 11/3/2019 12:00 PM  
Phlebitis Assessment 0 11/3/2019 12:00 PM  
Infiltration Assessment 0 11/3/2019 12:00 PM  
Dressing Status Clean, dry, & intact 11/3/2019 12:00 PM  
Dressing Type Transparent;Tape 11/3/2019 12:00 PM  
Hub Color/Line Status Pink; Infusing 11/3/2019 12:00 PM  
Action Taken Open ports on tubing capped 11/2/2019  8:00 PM  
Alcohol Cap Used Yes 11/2/2019  8:00 PM  
  
 
Opportunity for questions and clarification was provided. Patient transported with: 
 O2 @ hi flow liters Registered Nurse Tech
TRANSFER - OUT REPORT: 
 
Verbal report given to jossy(name) on Shireen Connelly  being transferred to [facility]) for routine progression of care Report consisted of patients Situation, Background, Assessment and  
Recommendations(SBAR). Information from the following report(s) SBAR was reviewed with the receiving nurse. Lines:  
Peripheral IV 11/01/19 Right Antecubital (Active) Site Assessment Clean, dry, & intact 11/1/2019 11:42 AM  
Phlebitis Assessment 0 11/1/2019 11:42 AM  
Infiltration Assessment 0 11/1/2019 11:42 AM  
Dressing Status Clean, dry, & intact 11/1/2019 11:42 AM  
   
Peripheral IV 11/01/19 Left Antecubital (Active) Site Assessment Clean, dry, & intact 11/1/2019  3:18 PM  
Phlebitis Assessment 0 11/1/2019  3:18 PM  
Infiltration Assessment 0 11/1/2019  3:18 PM  
Dressing Status Clean, dry, & intact 11/1/2019  3:18 PM  
  
 
Opportunity for questions and clarification was provided. Patient transported with: 
 Registered Nurse
Ambulatory

## (undated) DEVICE — TUBING, SUCTION, 3/16" X 6', STRAIGHT: Brand: MEDLINE

## (undated) DEVICE — SINGLE USE LIGATING DEVICE: Brand: SINGLE USE LIGATING DEVICE

## (undated) DEVICE — KIT COLON W/ 1.1OZ LUB AND 2 END

## (undated) DEVICE — SYRINGE MED 10CC ECC TIP W/O NDL

## (undated) DEVICE — TOWEL SURG W17XL27IN STD BLU COT NONFENESTRATED PREWASHED

## (undated) DEVICE — CATHETER IV 20GA L1IN FEP STR HUB INTROCAN SFTY

## (undated) DEVICE — CATH SUC CTRL PRT TRIFLO 14FR --

## (undated) DEVICE — SYR 5ML 1/5 GRAD LL NSAF LF --

## (undated) DEVICE — SYR LR LCK 1ML GRAD NSAF 30ML --

## (undated) DEVICE — ENDOBRONCHIAL ULTRASOUND TRANSBRONCHIAL ASPIRATION NEEDLE: Brand: EXPECT PULMONARY

## (undated) DEVICE — NEONATAL-ADULT SPO2 SENSOR: Brand: NELLCOR

## (undated) DEVICE — 3M™ CUROS™ DISINFECTING CAP FOR NEEDLELESS CONNECTORS 270/CARTON 20 CARTONS/CASE CFF1-270: Brand: CUROS™

## (undated) DEVICE — SPONGE,DRAIN,NONWVN,4"X4",6PLY,STRL,LF: Brand: MEDLINE

## (undated) DEVICE — CATHETER THORACENTESIS STR 28 FRX23 IN 6 EYELET TAPR TIP LF

## (undated) DEVICE — NEEDLE SPNL 22GA L3.5IN BLK HUB S STL REG WALL FIT STYL W/

## (undated) DEVICE — DRAPE,REIN 53X77,STERILE: Brand: MEDLINE

## (undated) DEVICE — HANDLE LT SNAP ON ULT DURABLE LENS FOR TRUMPF ALC DISPOSABLE

## (undated) DEVICE — SYRINGE MED 20ML STD CLR PLAS LUERSLIP TIP N CTRL DISP

## (undated) DEVICE — TRAP SUC MUCOUS 70ML -- MEDICHOICE MEDLINE

## (undated) DEVICE — SOL IRRIGATION INJ NACL 0.9% 500ML BTL

## (undated) DEVICE — STERILE POLYISOPRENE POWDER-FREE SURGICAL GLOVES WITH EMOLLIENT COATING: Brand: PROTEXIS

## (undated) DEVICE — SUT ETHLN 2-0 18IN FS BLK --

## (undated) DEVICE — PROTECTOR E TIP CLR PLAS TB

## (undated) DEVICE — Device: Brand: MEDICAL ACTION INDUSTRIES

## (undated) DEVICE — SINGLE USE SUCTION VALVE MAJ-209: Brand: SINGLE USE SUCTION VALVE (STERILE)

## (undated) DEVICE — SYR 3ML LL TIP 1/10ML GRAD --

## (undated) DEVICE — AIRLIFE™ ADULT CUSHION NASAL CANNULA 14 FOOT (4.3) CRUSH-RESISTANT OXYGEN TUBING, AND U/CONNECT-IT ADAPTER: Brand: AIRLIFE™

## (undated) DEVICE — SOLIDIFIER FLUID 3000 CC ABSORB

## (undated) DEVICE — PAD BD MATTRESS 73X32 IN STD CONVOLUTED FOAM LTX FREE

## (undated) DEVICE — STRAP,POSITIONING,KNEE/BODY,FOAM,4X60": Brand: MEDLINE

## (undated) DEVICE — TUBING, SUCTION, 1/4" X 10', STRAIGHT: Brand: MEDLINE

## (undated) DEVICE — MAGNETIC INSTR DRAPE 20X16: Brand: MEDLINE INDUSTRIES, INC.

## (undated) DEVICE — 1200 GUARD II KIT W/5MM TUBE W/O VAC TUBE: Brand: GUARDIAN

## (undated) DEVICE — QUILTED PREMIUM COMFORT UNDERPAD,EXTRA HEAVY: Brand: WINGS

## (undated) DEVICE — SOLUTION IV 1000ML 0.9% SOD CHL

## (undated) DEVICE — SOLUTION IRRIG 1000ML H2O STRL BLT

## (undated) DEVICE — FORCEPS BX L240CM JAW DIA2.8MM L CAP W/ NDL MIC MESH TOOTH

## (undated) DEVICE — Z CONVERTED USE 2274305 CUFF BLD PRSS AD L SZ 12 FOR 32-43CM LIMB VLY SFT W/O TUBE

## (undated) DEVICE — CONNECTOR TBNG WHT PLAS SUCT STR 5IN1 LTWT W/ M CONN

## (undated) DEVICE — ELECTRODE,RADIOTRANSLUCENT,FOAM,3PK: Brand: MEDLINE

## (undated) DEVICE — SUTURE VCRL SZ 3-0 L27IN ABSRB UD L26MM SH 1/2 CIR J416H

## (undated) DEVICE — BASKET SPEC RETRV L230CM W22MM ODSEC2.5MM 3 DIM NET ROT

## (undated) DEVICE — SINGLE USE BIOPSY VALVE MAJ-210: Brand: SINGLE USE BIOPSY VALVE (STERILE)

## (undated) DEVICE — CUFF BLD PRSS AD L SZ 12L FOR 32-43CM LIMB LNG VYN SFT W/O

## (undated) DEVICE — SUTURE PERMA-HAND 0 L18IN NONABSORBABLE BLK CT-1 L36MM 1/2 C021D

## (undated) DEVICE — CONTAINER SPEC 20 ML LID NEUT BUFF FORMALIN 10 % POLYPR STS

## (undated) DEVICE — NDL PRT INJ NSAF BLNT 18GX1.5 --

## (undated) DEVICE — 3M™ IOBAN™ 2 ANTIMICROBIAL INCISE DRAPE 6648EZ: Brand: IOBAN™ 2

## (undated) DEVICE — PAD,NON-ADHERENT,3X8,STERILE,LF,1/PK: Brand: MEDLINE

## (undated) DEVICE — SUTURE SZ 0 27IN 5/8 CIR UR-6  TAPER PT VIOLET ABSRB VICRYL J603H

## (undated) DEVICE — BITE BLK ENDOSCP AD 54FR GRN POLYETH ENDOSCP W STRP SLD

## (undated) DEVICE — Device

## (undated) DEVICE — APPLICATOR BNDG 1MM ADH PREMIERPRO EXOFIN

## (undated) DEVICE — SPONGE GZ W4XL4IN COT 12 PLY TYP VII WVN C FLD DSGN

## (undated) DEVICE — TRACKER PT NAVIGATE SPINPERC VPAD

## (undated) DEVICE — PREP SKN CHLRAPRP SNGL 1.75ML --

## (undated) DEVICE — KIT,1200CC CANISTER,3/16"X6' TUBING: Brand: MEDLINE INDUSTRIES, INC.

## (undated) DEVICE — SYR 10ML LUER LOK 1/5ML GRAD --

## (undated) DEVICE — INFECTION CONTROL KIT SYS

## (undated) DEVICE — FORCEPS ENDO DIA1.8MM SERR CUP ALWAYS-ON TIP TRACKED

## (undated) DEVICE — NDL FLTR TIP 5 MIC 18GX1.5IN --

## (undated) DEVICE — NEEDLE HYPO 22GA L1.5IN BLK S STL HUB POLYPR SHLD REG BVL

## (undated) DEVICE — SET ADMIN 16ML TBNG L100IN 2 Y INJ SITE IV PIGGY BK DISP

## (undated) DEVICE — KIT IV STRT W CHLORAPREP PD 1ML

## (undated) DEVICE — (D)PREP SKN CHLRAPRP APPL 26ML -- CONVERT TO ITEM 371833

## (undated) DEVICE — SUTURE VCRL SZ 4-0 L27IN ABSRB UD L19MM PS-2 3/8 CIR PRIM J426H

## (undated) DEVICE — BAG SPEC BIOHZRD 10 X 10 IN --

## (undated) DEVICE — BLADE ELECTRODE: Brand: EDGE

## (undated) DEVICE — KENDALL RADIOLUCENT FOAM MONITORING ELECTRODE -RECTANGULAR SHAPE: Brand: KENDALL

## (undated) DEVICE — BASIN SPNG 32OZ BLU STRL --

## (undated) DEVICE — REM POLYHESIVE ADULT PATIENT RETURN ELECTRODE: Brand: VALLEYLAB

## (undated) DEVICE — YANKAUER,BULB TIP,W/O VENT,RIGID,STERILE: Brand: MEDLINE

## (undated) DEVICE — SURGICAL PROCEDURE KIT GEN LAPAROSCOPY LF

## (undated) DEVICE — SPONGE TONSIL MED X RAY DETECTABLE STRL LTX FREE

## (undated) DEVICE — VISUALIZATION SYSTEM: Brand: CLEARIFY

## (undated) DEVICE — SWAB CULT DBL W/O CHAR RAYON TIP AMIES GEL CLMN FOR COLL

## (undated) DEVICE — GARMENT,MEDLINE,DVT,INT,CALF,MED, GEN2: Brand: MEDLINE

## (undated) DEVICE — MEDI-VAC NON-CONDUCTIVE SUCTION TUBING: Brand: CARDINAL HEALTH